# Patient Record
Sex: MALE | Race: BLACK OR AFRICAN AMERICAN | Employment: OTHER | URBAN - METROPOLITAN AREA
[De-identification: names, ages, dates, MRNs, and addresses within clinical notes are randomized per-mention and may not be internally consistent; named-entity substitution may affect disease eponyms.]

---

## 2017-01-12 PROBLEM — L60.0 INGROWN LEFT BIG TOENAIL: Status: ACTIVE | Noted: 2017-01-12

## 2017-07-06 PROBLEM — E11.40 TYPE 2 DIABETES, CONTROLLED, WITH NEUROPATHY (HCC): Status: ACTIVE | Noted: 2017-07-06

## 2017-07-06 PROBLEM — M54.42 ACUTE LEFT-SIDED LOW BACK PAIN WITH LEFT-SIDED SCIATICA: Status: ACTIVE | Noted: 2017-07-06

## 2017-08-28 PROBLEM — M54.2 CERVICALGIA: Status: ACTIVE | Noted: 2017-08-28

## 2017-08-29 ENCOUNTER — HOSPITAL ENCOUNTER (INPATIENT)
Age: 72
LOS: 1 days | Discharge: HOME | DRG: 066 | End: 2017-08-31
Attending: EMERGENCY MEDICINE | Admitting: INTERNAL MEDICINE
Payer: MEDICARE

## 2017-08-29 DIAGNOSIS — R42 DIZZINESS: ICD-10-CM

## 2017-08-29 DIAGNOSIS — I63.9 LEFT-SIDED CEREBROVASCULAR ACCIDENT (CVA) (HCC): Primary | ICD-10-CM

## 2017-08-29 PROBLEM — I63.81 LACUNAR INFARCT, ACUTE (HCC): Status: ACTIVE | Noted: 2017-08-29

## 2017-08-29 LAB
ALBUMIN SERPL-MCNC: 3.4 G/DL (ref 3.2–4.6)
ALBUMIN/GLOB SERPL: 0.8 {RATIO} (ref 1.2–3.5)
ALP SERPL-CCNC: 109 U/L (ref 50–136)
ALT SERPL-CCNC: 36 U/L (ref 12–65)
ANION GAP SERPL CALC-SCNC: 10 MMOL/L (ref 7–16)
APTT PPP: 28.9 SEC (ref 23.5–31.7)
AST SERPL-CCNC: 27 U/L (ref 15–37)
BASOPHILS # BLD: 0 K/UL (ref 0–0.2)
BASOPHILS NFR BLD: 0 % (ref 0–2)
BILIRUB SERPL-MCNC: 0.3 MG/DL (ref 0.2–1.1)
BUN SERPL-MCNC: 14 MG/DL (ref 8–23)
CALCIUM SERPL-MCNC: 9.3 MG/DL (ref 8.3–10.4)
CHLORIDE SERPL-SCNC: 105 MMOL/L (ref 98–107)
CO2 SERPL-SCNC: 28 MMOL/L (ref 21–32)
CREAT SERPL-MCNC: 1.38 MG/DL (ref 0.8–1.5)
DIFFERENTIAL METHOD BLD: ABNORMAL
EOSINOPHIL # BLD: 0.1 K/UL (ref 0–0.8)
EOSINOPHIL NFR BLD: 2 % (ref 0.5–7.8)
ERYTHROCYTE [DISTWIDTH] IN BLOOD BY AUTOMATED COUNT: 14 % (ref 11.9–14.6)
GLOBULIN SER CALC-MCNC: 4.4 G/DL (ref 2.3–3.5)
GLUCOSE SERPL-MCNC: 136 MG/DL (ref 65–100)
HCT VFR BLD AUTO: 37.3 % (ref 41.1–50.3)
HGB BLD-MCNC: 12.7 G/DL (ref 13.6–17.2)
IMM GRANULOCYTES # BLD: 0 K/UL (ref 0–0.5)
IMM GRANULOCYTES NFR BLD: 0.1 % (ref 0–5)
INR PPP: 1.1 (ref 0.9–1.2)
LYMPHOCYTES # BLD: 2.5 K/UL (ref 0.5–4.6)
LYMPHOCYTES NFR BLD: 35 % (ref 13–44)
MCH RBC QN AUTO: 30.1 PG (ref 26.1–32.9)
MCHC RBC AUTO-ENTMCNC: 34 G/DL (ref 31.4–35)
MCV RBC AUTO: 88.4 FL (ref 79.6–97.8)
MONOCYTES # BLD: 0.9 K/UL (ref 0.1–1.3)
MONOCYTES NFR BLD: 12 % (ref 4–12)
NEUTS SEG # BLD: 3.8 K/UL (ref 1.7–8.2)
NEUTS SEG NFR BLD: 51 % (ref 43–78)
PLATELET # BLD AUTO: 212 K/UL (ref 150–450)
PMV BLD AUTO: 12.5 FL (ref 10.8–14.1)
POTASSIUM SERPL-SCNC: 3.7 MMOL/L (ref 3.5–5.1)
PROT SERPL-MCNC: 7.8 G/DL (ref 6.3–8.2)
PROTHROMBIN TIME: 11.4 SEC (ref 9.6–12)
RBC # BLD AUTO: 4.22 M/UL (ref 4.23–5.67)
SODIUM SERPL-SCNC: 143 MMOL/L (ref 136–145)
WBC # BLD AUTO: 7.3 K/UL (ref 4.3–11.1)

## 2017-08-29 PROCEDURE — 85730 THROMBOPLASTIN TIME PARTIAL: CPT | Performed by: EMERGENCY MEDICINE

## 2017-08-29 PROCEDURE — 74011250637 HC RX REV CODE- 250/637: Performed by: EMERGENCY MEDICINE

## 2017-08-29 PROCEDURE — 80053 COMPREHEN METABOLIC PANEL: CPT | Performed by: EMERGENCY MEDICINE

## 2017-08-29 PROCEDURE — 85025 COMPLETE CBC W/AUTO DIFF WBC: CPT | Performed by: EMERGENCY MEDICINE

## 2017-08-29 PROCEDURE — 99285 EMERGENCY DEPT VISIT HI MDM: CPT | Performed by: EMERGENCY MEDICINE

## 2017-08-29 PROCEDURE — 83036 HEMOGLOBIN GLYCOSYLATED A1C: CPT | Performed by: INTERNAL MEDICINE

## 2017-08-29 PROCEDURE — 93005 ELECTROCARDIOGRAM TRACING: CPT | Performed by: EMERGENCY MEDICINE

## 2017-08-29 PROCEDURE — 85610 PROTHROMBIN TIME: CPT | Performed by: EMERGENCY MEDICINE

## 2017-08-29 RX ORDER — INSULIN LISPRO 100 [IU]/ML
INJECTION, SOLUTION INTRAVENOUS; SUBCUTANEOUS
Status: DISCONTINUED | OUTPATIENT
Start: 2017-08-30 | End: 2017-08-31 | Stop reason: HOSPADM

## 2017-08-29 RX ORDER — SODIUM CHLORIDE 0.9 % (FLUSH) 0.9 %
5-10 SYRINGE (ML) INJECTION EVERY 8 HOURS
Status: DISCONTINUED | OUTPATIENT
Start: 2017-08-29 | End: 2017-08-31 | Stop reason: HOSPADM

## 2017-08-29 RX ORDER — BISACODYL 5 MG
5 TABLET, DELAYED RELEASE (ENTERIC COATED) ORAL DAILY PRN
Status: DISCONTINUED | OUTPATIENT
Start: 2017-08-29 | End: 2017-08-31 | Stop reason: HOSPADM

## 2017-08-29 RX ORDER — ONDANSETRON 2 MG/ML
4 INJECTION INTRAMUSCULAR; INTRAVENOUS
Status: DISCONTINUED | OUTPATIENT
Start: 2017-08-29 | End: 2017-08-31 | Stop reason: HOSPADM

## 2017-08-29 RX ORDER — GUAIFENESIN 100 MG/5ML
81 LIQUID (ML) ORAL DAILY
Status: DISCONTINUED | OUTPATIENT
Start: 2017-08-30 | End: 2017-08-31 | Stop reason: HOSPADM

## 2017-08-29 RX ORDER — GUAIFENESIN 100 MG/5ML
324 LIQUID (ML) ORAL
Status: COMPLETED | OUTPATIENT
Start: 2017-08-29 | End: 2017-08-29

## 2017-08-29 RX ORDER — PRAVASTATIN SODIUM 20 MG/1
40 TABLET ORAL
Status: DISCONTINUED | OUTPATIENT
Start: 2017-08-29 | End: 2017-08-31 | Stop reason: HOSPADM

## 2017-08-29 RX ORDER — COLCHICINE 0.6 MG/1
0.6 TABLET ORAL 2 TIMES DAILY
Status: CANCELLED | OUTPATIENT
Start: 2017-08-29

## 2017-08-29 RX ORDER — ENOXAPARIN SODIUM 100 MG/ML
40 INJECTION SUBCUTANEOUS EVERY 24 HOURS
Status: DISCONTINUED | OUTPATIENT
Start: 2017-08-29 | End: 2017-08-31 | Stop reason: HOSPADM

## 2017-08-29 RX ORDER — SODIUM CHLORIDE 0.9 % (FLUSH) 0.9 %
5-10 SYRINGE (ML) INJECTION AS NEEDED
Status: DISCONTINUED | OUTPATIENT
Start: 2017-08-29 | End: 2017-08-31 | Stop reason: HOSPADM

## 2017-08-29 RX ORDER — ACETAMINOPHEN 325 MG/1
650 TABLET ORAL
Status: DISCONTINUED | OUTPATIENT
Start: 2017-08-29 | End: 2017-08-31 | Stop reason: HOSPADM

## 2017-08-29 RX ADMIN — ASPIRIN 81 MG 324 MG: 81 TABLET ORAL at 21:08

## 2017-08-29 NOTE — IP AVS SNAPSHOT
[address] 
[phone] Patient: Peyton Oneill MRN: [id number] RLL:[id number][birth year]71 You are allergic to the following Allergen Reactions Celecoxib Swelling Hands Ibuprofen Unknown (comments) Patient unsure Lescol (Fluvastatin) Unknown (comments) Other reaction(s): Unknown (comments) Nsaids (Non-Steroidal Anti-Inflammatory Drug) Other (comments) Elevated serum creatinine Sulfa (Sulfonamide Antibiotics) Rash Uloric (Febuxostat) Other (comments) Joint Pain Recent Documentation Height Weight BMI Smoking Status 1.778 m 80.9 kg 25.6 kg/m2 Former Smoker Emergency Contacts Name Discharge Info Relation Home Work Mobile Daniel Bowie  Spouse [3] [phone] About your hospitalization You were admitted on:  August 30, 2017 You last received care in the:  [facility] You were discharged on:  August 31, 2017 Unit phone number:  [phone] Why you were hospitalized Your primary diagnosis was:  Lacunar Infarct, Acute (Hcc) Your diagnoses also included:  Type 2 Diabetes, Controlled, With Neuropathy (Hcc), S/P Cabg (Coronary Artery Bypass Graft), Paf (Paroxysmal Atrial Fibrillation) ([facility]), Htn (Hypertension), Ckd (Chronic Kidney Disease) Providers Seen During Your Hospitalizations Provider Role Specialty Primary office phone Carmelo Salinas MD Attending Provider Emergency Medicine [phone] Gisselle Ambrocio MD Attending Provider Internal Medicine [phone] Your Primary Care Physician (PCP) Primary Care Physician Office Phone Office Fax Gonsalo [facility] [phone] Follow-up Information Follow up With Details Comments Contact Info Peace Viramontes MD On 9/7/2017 10:30 arrival for 10:45 appt [address] [place] [address] [address] 
[phone] Scott Ferrara MD On 9/8/2017 11:30 [address] [place] [address] [address] [facility] [address] 
[phone] Your Appointments Thursday September 07, 2017 10:45 AM EDT SHORT with Sarah Fischer MD  
[facility] ([address]) [address] [place] [address]  
[phone] Friday September 08, 2017 11:30 AM EDT TRANSITIONAL CARE MANAGEMENT with Scott Ferrara MD  
[place] OFFICE ([address] [place] [address]) [address]  
[address] [place] [address]  
[phone] Current Discharge Medication List  
  
START taking these medications Dose & Instructions Dispensing Information Comments Morning Noon Evening Bedtime  
 apixaban 5 mg tablet Commonly known as:  Donnis [place] Your next dose is:  Tomorrow Dose:  5 mg Take 1 Tab by mouth two (2) times a day. Quantity:  60 Tab Refills:  0  
     
   
   
   
  
 gabapentin 100 mg capsule Commonly known as:  NEURONTIN Your next dose is:  Tomorrow Dose:  100 mg Take 1 Cap by mouth two (2) times a day. Quantity:  60 Cap Refills:  0  
     
   
   
   
  
 pravastatin 40 mg tablet Commonly known as:  PRAVACHOL Your next dose is: Today Dose:  40 mg Take 1 Tab by mouth nightly. Quantity:  30 Tab Refills:  0 CONTINUE these medications which have CHANGED Dose & Instructions Dispensing Information Comments Morning Noon Evening Bedtime  
 metoprolol tartrate 50 mg tablet Commonly known as:  LOPRESSOR What changed:   
- medication strength 
- how much to take Dose:  50 mg Take 1 Tab by mouth two (2) times a day. Quantity:  60 Tab Refills:  0  
     
   
   
   
10pm  
  
  
CONTINUE these medications which have NOT CHANGED Dose & Instructions Dispensing Information Comments Morning Noon Evening Bedtime  
 dilTIAZem 360 mg ER capsule Commonly known as:  [facility] Your next dose is:  Tomorrow Dose:  360 mg Take 1 Cap by mouth daily. Indications: am  
 Quantity:  90 Cap Refills:  1 ELIDEL 1 % topical cream  
Generic drug:  pimecrolimus Apply  to affected area two (2) times a day. Refills:  0  
     
   
   
   
  
 esomeprazole 40 mg capsule Commonly known as:  NexIUM Your next dose is:  Tomorrow Dose:  40 mg Take 1 Cap by mouth daily. Indications: am  
 Quantity:  90 Cap Refills:  1 FLONASE 50 mcg/actuation nasal spray Generic drug:  fluticasone  
   
 as needed. Refills:  0  
     
   
   
   
  
 glucose blood VI test strips strip Commonly known as:  blood glucose test  
   
 Test once to twice daily DX: E11.8 Quantity:  300 Strip Refills:  3 Lancets Misc Test once to twice daily DX: E11.8 Quantity:  300 Each Refills:  3  
     
   
   
   
  
 mometasone 0.1 % topical cream  
Commonly known as:  Louvenia Fraise Refills:  0  
     
   
   
   
  
 nitroglycerin 0.4 mg SL tablet Commonly known as:  NITROSTAT Dose:  0.4 mg  
1 Tab by SubLINGual route every five (5) minutes as needed for Chest Pain. Quantity:  25 Tab Refills:  11  
     
   
   
   
  
 peg 400-propylene glycol 0.4-0.3 % Drop Commonly known as:  SYSTANE Administer  to left eye as needed. Refills:  0  
     
   
   
   
  
 RANEXA 500 mg SR tablet Generic drug:  ranolazine ER Your next dose is: Today TAKE 1 TABLET TWICE A DAY Quantity:  180 Tab Refills:  3  
     
   
   
   
  
  
 tacrolimus 0.1 % ointment Commonly known as:  PROTOPIC Refills:  0  
     
   
   
   
  
 traMADol 50 mg tablet Commonly known as:  ULTRAM  
   
 Dose:  50 mg  
 Take 1 Tab by mouth every six (6) hours as needed for Pain. Max Daily Amount: 200 mg. Quantity:  60 Tab Refills:  1  
     
   
   
   
  
 triamcinolone acetonide 0.1 % ointment Commonly known as:  KENALOG Refills:  0  
     
   
   
   
  
 VITAMIN D3 2,000 unit Tab Generic drug:  cholecalciferol (vitamin D3) Your next dose is:  Tomorrow Dose:  2000 Units Take 2,000 Units by mouth daily. Indications: OSTEOPOROSIS, am  
 Refills:  0 ZyrTEC 10 mg Cap Generic drug:  Cetirizine Dose:  10 mg Take 10 mg by mouth nightly. Indications: ALLERGIC RHINITIS Refills:  0 STOP taking these medications   
 acetaminophen 325 mg tablet Commonly known as:  TYLENOL  
   
  
 aspirin 81 mg tablet  
   
  
 losartan 100 mg tablet Commonly known as:  COZAAR Where to Get Your Medications Information on where to get these meds will be given to you by the nurse or doctor. ! Ask your nurse or doctor about these medications  
  apixaban 5 mg tablet  
 gabapentin 100 mg capsule  
 metoprolol tartrate 50 mg tablet  
 pravastatin 40 mg tablet Discharge Instructions DISCHARGE SUMMARY from Nurse The following personal items are in your possession at time of discharge: 
 
Dental Appliances: Uppers, Lowers, With patient Home Medications: None Jewelry: Ring, With patient Clothing: At bedside Other Valuables: Angie Galvni, With patient PATIENT INSTRUCTIONS: 
 
After general anesthesia or intravenous sedation, for 24 hours or while taking prescription Narcotics: · Limit your activities · Do not drive and operate hazardous machinery · Do not make important personal or business decisions · Do  not drink alcoholic beverages · If you have not urinated within 8 hours after discharge, please contact your surgeon on call. Report the following to your surgeon: · Excessive pain, swelling, redness or odor of or around the surgical area · Temperature over 100.5 · Nausea and vomiting lasting longer than 4 hours or if unable to take medications · Any signs of decreased circulation or nerve impairment to extremity: change in color, persistent  numbness, tingling, coldness or increase pain · Any questions What to do at Home: 
Recommended activity: Activity as tolerated, per MD 
 
If you experience any of the following symptoms fever>101, pain unrelieved with medication, nausea/vomiting, shortness of breath, dizziness/fainting, chest pain. , please follow up with your doctor. *  Please give a list of your current medications to your Primary Care Provider. *  Please update this list whenever your medications are discontinued, doses are 
    changed, or new medications (including over-the-counter products) are added. *  Please carry medication information at all times in case of emergency situations. These are general instructions for a healthy lifestyle: No smoking/ No tobacco products/ Avoid exposure to second hand smoke Surgeon General's Warning:  Quitting smoking now greatly reduces serious risk to your health. Obesity, smoking, and sedentary lifestyle greatly increases your risk for illness A healthy diet, regular physical exercise & weight monitoring are important for maintaining a healthy lifestyle You may be retaining fluid if you have a history of heart failure or if you experience any of the following symptoms:  Weight gain of 3 pounds or more overnight or 5 pounds in a week, increased swelling in our hands or feet or shortness of breath while lying flat in bed. Please call your doctor as soon as you notice any of these symptoms; do not wait until your next office visit. Recognize signs and symptoms of STROKE: 
 
F-face looks uneven A-arms unable to move or move unevenly S-speech slurred or non-existent T-time-call 911 as soon as signs and symptoms begin-DO NOT go Back to bed or wait to see if you get better-TIME IS BRAIN. Warning Signs of HEART ATTACK Call 911 if you have these symptoms: 
? Chest discomfort. Most heart attacks involve discomfort in the center of the chest that lasts more than a few minutes, or that goes away and comes back. It can feel like uncomfortable pressure, squeezing, fullness, or pain. ? Discomfort in other areas of the upper body. Symptoms can include pain or discomfort in one or both arms, the back, neck, jaw, or stomach. ? Shortness of breath with or without chest discomfort. ? Other signs may include breaking out in a cold sweat, nausea, or lightheadedness. Don't wait more than five minutes to call [address]! Fast action can save your life. Calling 911 is almost always the fastest way to get lifesaving treatment. Emergency Medical Services staff can begin treatment when they arrive  up to an hour sooner than if someone gets to the hospital by car. The discharge information has been reviewed with the patient. The patient verbalized understanding. Discharge medications reviewed with the patient and appropriate educational materials and side effects teaching were provided. Stroke: After Your Visit Your Care Instructions You have had a stroke. Risk factors for stroke include being overweight, smoking, and sedentary lifestyle. This means that the blood flow to a part of your brain was blocked for some time, which damages the nerve cells in that part of the brain. The part of your body controlled by that part of your brain may not function properly now. The brain is an amazing organ that can heal itself to some degree. The stroke you had damaged part of your brain, but other parts of your brain may take over in some way for the damaged areas. You have already started this process. Going home may be hard for you and your family. The more you can try to do for yourself, the better. Remember to take each day one at a time. Follow-up care is a key part of your treatment and safety. Be sure to make and go to all appointments, and call your doctor if you are having problems. Its also a good idea to know your test results and keep a list of the medicines you take. How can you care for yourself at home? Enter a stroke rehabilitation (rehab) program, if your doctor recommends it. Physical, speech, and occupational therapies can help you manage bathing, dressing, eating, and other basics of daily living. Eat a heart-healthy diet that is low in cholesterol, saturated fat, and salt. Eat lots of fresh fruits and vegetables and foods high in fiber. Increase your activities slowly. Take short rest breaks when you get tired. Gradually increase the amount you walk. Start out by walking a little more than you did the day before. Do not drive until your doctor says it is okay. It is normal to feel sad or depressed after a stroke. If the blues last, talk to your doctor. If you are having problems with urine leakage, go to the bathroom at regular times, including when you first wake up and at bedtime. Also, limit fluids after dinner. If you are constipated, drink plenty of fluids, enough so that your urine is light yellow or clear like water. If you have kidney, heart, or liver disease and have to limit fluids, talk with your doctor before you increase the amount of fluids you drink. Set up a regular time for using the toilet. If you continue to have constipation, your doctor may suggest using a bulking agent, such as Metamucil, or a stool softener, laxative, or enema. Medicines Take your medicines exactly as prescribed. Call your doctor if you think you are having a problem with your medicine. You may be taking several medicines.  ACE (angiotensin-converting enzyme) inhibitors, angiotensin II receptor blockers (ARBs), beta-blockers, diuretics (water pills), and calcium channel blockers control your blood pressure. Statins help lower cholesterol. Your doctor may also prescribe medicines for depression, pain, sleep problems, anxiety, or agitation. If your doctor has given you medicine that prevents blood clots, such as warfarin (Coumadin), aspirin combined with extended-release dipyridamole (Aggrenox), clopidogrel (Plavix), or aspirin to prevent another stroke, you should: 
Tell your dentist, pharmacist, and other health professionals that you take these medicines. Watch for unusual bruising or bleeding, such as blood in your urine, red or black stools, or bleeding from your nose or gums. Get regular blood tests to check your clotting time if you are taking Coumadin. Wear medical alert jewelry that says you take blood thinners. You can buy this at most [employer]. Do not take any over-the-counter medicines or herbal products without talking to your doctor first. 
If you take birth control pills or hormone replacement therapy, talk to your doctor about whether they are right for you. For family members and caregivers Make the home safe. Set up a room so that your loved one does not have to climb stairs. Be sure the bathroom is on the same floor. Move throw rugs and furniture that could cause falls, and make sure that the lighting is good. Put grab bars and seats in tubs and showers. Find out what your loved one can do and what he or she needs help with. Try not to do things for your loved one that your loved one can do on his or her own. Help him or her learn and practice new skills. Visit and talk with your loved one often. Try doing activities together that you both enjoy, such as playing cards or board games. Keep in touch with your loved one's friends as much as you can, and encourage them to visit. Take care of yourself. Do not try to do everything yourself.  Ask other family members to help. Eat well, get enough rest, and take time to do things that you enjoy. Keep up with your own doctor visits, and make sure to take your medicines regularly. Get out of the house as much as you can. Join a local support group. Find out if you qualify for home health care visits to help with rehab or for adult day care. When should you call for help? Call 911 anytime you think you may need emergency care. For example, call if: 
You have signs of another stroke. These may include: 
Sudden numbness, paralysis, or weakness in your face, arm, or leg, especially on only one side of your body. New problems with walking or balance. Sudden vision changes. Drooling or slurred speech. New problems speaking or understanding simple statements, or you feel confused. A sudden, severe headache that is different from past headaches. Call 911 even if these symptoms go away in a few minutes. You cough up blood. You vomit blood or what looks like coffee grounds. You pass maroon or very bloody stools. Call your doctor now or seek immediate medical care if: 
You have new bruises or blood spots under your skin. You have a nosebleed. Your gums bleed when you brush your teeth. You have blood in your urine. Your stools are black and tarlike or have streaks of blood. You have vaginal bleeding when you are not having your period, or heavy period bleeding. You have new symptoms that may be related to your stroke, such as falls or trouble swallowing. Watch closely for changes in your health, and be sure to contact your doctor if you have any problems. Where can you learn more? Go to Hell[employer].be Enter Z396  in the search box to learn more about \"Stroke: After Your Visit\". © 4577-3874 Healthwise, Incorporated.  Care instructions adapted under license by [employer] (which disclaims liability or warranty for this information). This care instruction is for use with your licensed healthcare professional. If you have questions about a medical condition or this instruction, always ask your healthcare professional. Shay Littlejohn any warranty or liability for your use of this information. Discharge Orders None ACO Transitions of Care Introducing [employer] [address] Clot[address] Noriega offers a voluntary care coordination program to provide high quality service and care to [facility] fee-for-service beneficiaries. Jorge Solis was designed to help you enhance your health and well-being through the following services: ? Transitions of Care  support for individuals who are transitioning from one care setting to another (example: Hospital to home). ? Chronic and Complex Care Coordination  support for individuals and caregivers of those with serious or chronic illnesses or with more than one chronic (ongoing) condition and those who take a number of different medications. If you meet specific medical criteria, a [facility] [address] may call you directly to coordinate your care with your primary care physician and your other care providers. For questions about the [facility] programs, please, contact your physicians office. For general questions or additional information about Accountable Care Organizations: 
Please visit www.medicare.gov/acos. html or call 1-800-MEDICARE (4-777.364.9703) TTY users should call 2-922.621.4870. [facility] Announcement We are excited to announce that we are making your provider's discharge notes available to you in Campus [facility]. You will see these notes when they are completed and signed by the physician that discharged you from your recent hospital stay.   If you have any questions or concerns about any information you see in [facility], please call the [employer] Department where you were seen or reach out to your Primary Care Provider for more information about your plan of care. Introducing [facility]! Dear Mary Small: Thank you for requesting a [employer] account. Our records indicate that you already have an active [employer] account. You can access your account anytime at [email or web address] Did you know that you can access your hospital and ER discharge instructions at any time in [employer]? You can also review all of your test results from your hospital stay or ER visit. Additional Information If you have questions, please visit the Frequently Asked Questions section of the [employer] website at [email or web address] Remember, [employer] is NOT to be used for urgent needs. For medical emergencies, dial 911. Now available from your iPhone and Android! General Information Please provide this summary of care documentation to your next provider. Patient Signature:  ____________________________________________________________ Date:  ____________________________________________________________  
  
[facility] Provider Signature:  ____________________________________________________________ Date:  ____________________________________________________________

## 2017-08-30 ENCOUNTER — APPOINTMENT (OUTPATIENT)
Dept: ULTRASOUND IMAGING | Age: 72
DRG: 066 | End: 2017-08-30
Attending: INTERNAL MEDICINE
Payer: MEDICARE

## 2017-08-30 LAB
ATRIAL RATE: 60 BPM
BACTERIA SPEC CULT: NORMAL
CALCULATED P AXIS, ECG09: 68 DEGREES
CALCULATED R AXIS, ECG10: 41 DEGREES
CALCULATED T AXIS, ECG11: 53 DEGREES
CHOLEST SERPL-MCNC: 183 MG/DL
CK MB CFR SERPL CALC: 0.9 %
CK MB CFR SERPL CALC: 0.9 %
CK MB SERPL-MCNC: 0.5 NG/ML (ref 0.5–3.6)
CK MB SERPL-MCNC: 0.5 NG/ML (ref 0.5–3.6)
CK SERPL-CCNC: 58 U/L (ref 21–215)
CK SERPL-CCNC: 58 U/L (ref 21–215)
DIAGNOSIS, 93000: NORMAL
EST. AVERAGE GLUCOSE BLD GHB EST-MCNC: 146 MG/DL
GLUCOSE BLD STRIP.AUTO-MCNC: 109 MG/DL (ref 65–100)
GLUCOSE BLD STRIP.AUTO-MCNC: 124 MG/DL (ref 65–100)
GLUCOSE BLD STRIP.AUTO-MCNC: 65 MG/DL (ref 65–100)
GLUCOSE BLD STRIP.AUTO-MCNC: 67 MG/DL (ref 65–100)
GLUCOSE BLD STRIP.AUTO-MCNC: 78 MG/DL (ref 65–100)
GLUCOSE BLD STRIP.AUTO-MCNC: 81 MG/DL (ref 65–100)
HBA1C MFR BLD: 6.7 % (ref 4.8–6)
HDLC SERPL-MCNC: 39 MG/DL (ref 40–60)
HDLC SERPL: 4.7 {RATIO}
LDLC SERPL CALC-MCNC: 128.8 MG/DL
LIPID PROFILE,FLP: ABNORMAL
P-R INTERVAL, ECG05: 176 MS
Q-T INTERVAL, ECG07: 418 MS
QRS DURATION, ECG06: 88 MS
QTC CALCULATION (BEZET), ECG08: 418 MS
SERVICE CMNT-IMP: NORMAL
TRIGL SERPL-MCNC: 76 MG/DL (ref 35–150)
TROPONIN I SERPL-MCNC: <0.04 NG/ML (ref 0.02–0.05)
TROPONIN I SERPL-MCNC: <0.04 NG/ML (ref 0.02–0.05)
VENTRICULAR RATE, ECG03: 60 BPM
VLDLC SERPL CALC-MCNC: 15.2 MG/DL (ref 6–23)

## 2017-08-30 PROCEDURE — 84484 ASSAY OF TROPONIN QUANT: CPT | Performed by: INTERNAL MEDICINE

## 2017-08-30 PROCEDURE — 87641 MR-STAPH DNA AMP PROBE: CPT | Performed by: INTERNAL MEDICINE

## 2017-08-30 PROCEDURE — 80061 LIPID PANEL: CPT | Performed by: INTERNAL MEDICINE

## 2017-08-30 PROCEDURE — 93880 EXTRACRANIAL BILAT STUDY: CPT

## 2017-08-30 PROCEDURE — 97162 PT EVAL MOD COMPLEX 30 MIN: CPT

## 2017-08-30 PROCEDURE — 82962 GLUCOSE BLOOD TEST: CPT

## 2017-08-30 PROCEDURE — 65610000001 HC ROOM ICU GENERAL

## 2017-08-30 PROCEDURE — 82550 ASSAY OF CK (CPK): CPT | Performed by: INTERNAL MEDICINE

## 2017-08-30 PROCEDURE — 74011250636 HC RX REV CODE- 250/636: Performed by: INTERNAL MEDICINE

## 2017-08-30 PROCEDURE — 97165 OT EVAL LOW COMPLEX 30 MIN: CPT

## 2017-08-30 PROCEDURE — 36415 COLL VENOUS BLD VENIPUNCTURE: CPT | Performed by: INTERNAL MEDICINE

## 2017-08-30 PROCEDURE — 74011250637 HC RX REV CODE- 250/637: Performed by: INTERNAL MEDICINE

## 2017-08-30 PROCEDURE — 93306 TTE W/DOPPLER COMPLETE: CPT

## 2017-08-30 PROCEDURE — 92610 EVALUATE SWALLOWING FUNCTION: CPT

## 2017-08-30 RX ORDER — TRAMADOL HYDROCHLORIDE 50 MG/1
50 TABLET ORAL
Status: DISCONTINUED | OUTPATIENT
Start: 2017-08-30 | End: 2017-08-31 | Stop reason: HOSPADM

## 2017-08-30 RX ORDER — DILTIAZEM HYDROCHLORIDE 180 MG/1
360 CAPSULE, COATED, EXTENDED RELEASE ORAL DAILY
Status: DISCONTINUED | OUTPATIENT
Start: 2017-08-30 | End: 2017-08-31 | Stop reason: HOSPADM

## 2017-08-30 RX ORDER — METOPROLOL TARTRATE 50 MG/1
100 TABLET ORAL 2 TIMES DAILY
Status: DISCONTINUED | OUTPATIENT
Start: 2017-08-30 | End: 2017-08-31

## 2017-08-30 RX ORDER — RANOLAZINE 500 MG/1
500 TABLET, EXTENDED RELEASE ORAL 2 TIMES DAILY
Status: DISCONTINUED | OUTPATIENT
Start: 2017-08-30 | End: 2017-08-31 | Stop reason: HOSPADM

## 2017-08-30 RX ORDER — PANTOPRAZOLE SODIUM 40 MG/1
40 TABLET, DELAYED RELEASE ORAL
Status: DISCONTINUED | OUTPATIENT
Start: 2017-08-30 | End: 2017-08-31 | Stop reason: HOSPADM

## 2017-08-30 RX ADMIN — PRAVASTATIN SODIUM 40 MG: 20 TABLET ORAL at 22:01

## 2017-08-30 RX ADMIN — RANOLAZINE 500 MG: 500 TABLET, FILM COATED, EXTENDED RELEASE ORAL at 08:05

## 2017-08-30 RX ADMIN — PRAVASTATIN SODIUM 40 MG: 20 TABLET ORAL at 04:37

## 2017-08-30 RX ADMIN — RANOLAZINE 500 MG: 500 TABLET, FILM COATED, EXTENDED RELEASE ORAL at 18:05

## 2017-08-30 RX ADMIN — Medication 10 ML: at 22:03

## 2017-08-30 RX ADMIN — ASPIRIN 81 MG 81 MG: 81 TABLET ORAL at 08:05

## 2017-08-30 RX ADMIN — ENOXAPARIN SODIUM 40 MG: 40 INJECTION SUBCUTANEOUS at 04:36

## 2017-08-30 RX ADMIN — METOPROLOL TARTRATE 100 MG: 50 TABLET ORAL at 08:05

## 2017-08-30 RX ADMIN — PANTOPRAZOLE SODIUM 40 MG: 40 TABLET, DELAYED RELEASE ORAL at 08:05

## 2017-08-30 RX ADMIN — Medication 10 ML: at 14:00

## 2017-08-30 RX ADMIN — ENOXAPARIN SODIUM 40 MG: 40 INJECTION SUBCUTANEOUS at 22:01

## 2017-08-30 RX ADMIN — DILTIAZEM HYDROCHLORIDE 360 MG: 180 CAPSULE, EXTENDED RELEASE ORAL at 08:05

## 2017-08-30 NOTE — PROGRESS NOTES
Problem: Self Care Deficits Care Plan (Adult)  Goal: *Acute Goals and Plan of Care (Insert Text)  1. Patient will perform grooming with independent. 2. Patient will perform Upper body dressing with independent  3. Patient will perform lower body dressing with stand by assist.  4. Patient will perform upper and lower body bathing with supervision. 5. Patient will perform toilet transfers with modified independent  6. Patient will perform shower transfer with stand by assist  7. Patient will participate in 30 + minutes of ADL/ therapeutic exercise/therapeutic activity with min rest breaks to increase activity tolerance for self care. 8. Patient will perform ADL functional mobility in room with stand by assist    Goals to be achieved in 7 days. OCCUPATIONAL THERAPY: Initial Assessment 8/30/2017  EMERGENCY: Hospital Day: 2  [place] / Product Type: Medicare /      NAME/AGE/GENDER: Rafia Chávez is a 80 y.o. male         PRIMARY DIAGNOSIS:  Lacunar infarct, acute ([address] Lacunar infarct, acute ([address] Lacunar infarct, acute ([address]        ICD-10: Treatment Diagnosis:        · Generalized Muscle Weakness (M62.81)  · Other lack of cordination (R27.8)   Precautions/Allergies:         Celecoxib; Ibuprofen; Lescol [fluvastatin]; Nsaids (non-steroidal anti-inflammatory drug); Sulfa (sulfonamide antibiotics); and Uloric [febuxostat]       ASSESSMENT:      Mr. Rosmery Sims admitted with above diagnosis; pt presents with some slight confusion, decreased self care , functional mobility and generalized weakness. Pt would benefit from skilled OT to increase independence. This section established at most recent assessment   PROBLEM LIST (Impairments causing functional limitations):  1. Decreased Strength  2. Decreased ADL/Functional Activities  3. Decreased Transfer Abilities  4. Decreased Ambulation Ability/Technique  5. Decreased Balance  6.  Decreased Activity Tolerance INTERVENTIONS PLANNED: (Benefits and precautions of occupational therapy have been discussed with the patient.)  1. Activities of daily living training  2. Adaptive equipment training  3. Balance training  4. Clothing management  5. Therapeutic activity  6. Therapeutic exercise      TREATMENT PLAN: Frequency/Duration: Follow patient 2x/week to address above goals. Rehabilitation Potential For Stated Goals: GOOD      RECOMMENDED REHABILITATION/EQUIPMENT: (at time of discharge pending progress): Continue Skilled Therapy. OCCUPATIONAL PROFILE AND HISTORY:   History of Present Injury/Illness (Reason for Referral):  Lacunar infarct  Past Medical History/Comorbidities:   Mr. Nathan Jay  has a past medical history of Allergic rhinitis; Arthritis; CAD (coronary artery disease); Diabetes mellitus type 2, controlled ([facility] Dyslipidemia; ED (erectile dysfunction); Encephalitis (1962); GERD (gastroesophageal reflux disease); Gout; Hypertension; Impotence of organic origin; Lumbago; Mitral valve regurgitation (7/14/12); ROBERTO (obstructive sleep apnea); Prostate cancer ([facility] Psoriasis; SBO (small bowel obstruction) ([facility] (2011, 2015, 2016); and Unspecified sleep apnea. Mr. Nathan Jay  has a past surgical history that includes appendectomy; abdomen surgery proc unlisted (1967); other surgical (1951); coronary artery bypass graft (2009); left heart cath,percutaneous (7/2012); radical prostatectomy (2007); prostate biopsy, needle, saturation sampling; colonoscopy (1998, 2010); hernia repair (Bilateral, 2012); and cataract removal (Bilateral, 2013).   Social History/Living Environment:    lives with wife  Prior Level of Function/Work/Activity:  Independent    Number of Personal Factors/Comorbidities that affect the Plan of Care: Expanded review of therapy/medical records (1-2):  MODERATE COMPLEXITY   ASSESSMENT OF OCCUPATIONAL PERFORMANCE[de-identified]   Activities of Daily Living:          Basic ADLs (From Assessment) Complex ADLs (From Assessment)   Basic ADL  Feeding: Setup  Oral Facial Hygiene/Grooming: Setup  Bathing: Contact guard assistance  Upper Body Dressing: Setup  Lower Body Dressing: Contact guard assistance  Toileting: Setup     Grooming/Bathing/Dressing Activities of Daily Living     Cognitive Retraining  Safety/Judgement: Awareness of environment; Fall prevention                 Functional Transfers  Toilet Transfer : Supervision  Shower Transfer: Supervision     Bed/Mat Mobility  Supine to Sit: Stand-by asssistance  Sit to Supine: Stand-by asssistance  Sit to Stand: Stand-by asssistance  Bed to Chair: Stand-by asssistance          Most Recent Physical Functioning:   Gross Assessment:  AROM: Within functional limits (BUE)  Strength: Generally decreased, functional (BUE)  Coordination: Generally decreased, functional (BUE)  Tone: Normal  Sensation: Intact               Posture:     Balance:  Sitting: Intact  Standing: With support Bed Mobility:  Supine to Sit: Stand-by asssistance  Sit to Supine: Stand-by asssistance  Wheelchair Mobility:     Transfers:  Sit to Stand: Stand-by asssistance  Stand to Sit: Stand-by asssistance  Bed to Chair: Stand-by asssistance                 Patient Vitals for the past 6 hrs:       BP SpO2 Pulse   08/30/17 0530 161/82 100 % (!) 54   08/30/17 0600 177/90 100 % 60   08/30/17 0630 (!) 204/97 100 % 68   08/30/17 0659 - 100 % 65   08/30/17 0713 (!) 180/96 100 % 66   08/30/17 0720 168/86 - 60   08/30/17 0725 - 100 % 65   08/30/17 0740 172/89 - 61   08/30/17 0741 - 100 % (!) 58   08/30/17 0800 186/83 100 % 60   08/30/17 0820 174/84 - 64   08/30/17 0839 - 97 % 70   08/30/17 0900 112/57 - (!) 51   08/30/17 0901 - 100 % (!) 54   08/30/17 0920 132/61 100 % (!) 53   08/30/17 0926 - 100 % (!) 52   08/30/17 0940 139/76 - (!) 57   08/30/17 1000 120/65 - (!) 50   08/30/17 1001 120/65 100 % -        Mental Status  Neurologic State: Alert  Orientation Level: Oriented to person, Oriented to place, Oriented to time  Cognition: Follows commands  Perception: Appears intact  Perseveration: No perseveration noted  Safety/Judgement: Awareness of environment, Fall prevention                               Physical Skills Involved:  1. Balance  2. Strength  3. Activity Tolerance Cognitive Skills Affected (resulting in the inability to perform in a timely and safe manner): 1. none Psychosocial Skills Affected:  1. none   Number of elements that affect the Plan of Care: 1-3:  LOW COMPLEXITY   CLINICAL DECISION MAKIN[address] [place] [address] AM-PAC 6 Clicks   Daily Activity Inpatient Short Form  How much help from another person does the patient currently need. .. Total A Lot A Little None   1. Putting on and taking off regular lower body clothing?   [ ] 1   [ ] 2   [X] 3   [ ] 4   2. Bathing (including washing, rinsing, drying)? [ ] 1   [ ] 2   [X] 3   [ ] 4   3. Toileting, which includes using toilet, bedpan or urinal?   [ ] 1   [ ] 2   [X] 3   [ ] 4   4. Putting on and taking off regular upper body clothing?   [ ] 1   [ ] 2   [X] 3   [ ] 4   5. Taking care of personal grooming such as brushing teeth? [ ] 1   [ ] 2   [X] 3   [ ] 4   6. Eating meals? [ ] 1   [ ] 2   [X] 3   [ ] 4   © , Trustees of [address] [place] [address], under license to [place]. All rights reserved    Score:  Initial: 18 Most Recent: X (Date: -- )     Interpretation of Tool:  Represents activities that are increasingly more difficult (i.e. Bed mobility, Transfers, Gait).        Score 24 23 22-20 19-15 14-10 9-7 6       Modifier CH CI CJ CK CL CM CN         · Self Care:               - CURRENT STATUS:    CK - 40%-59% impaired, limited or restricted               - GOAL STATUS:           CJ - 20%-39% impaired, limited or restricted               - D/C STATUS:                       ---------------To be determined---------------  [place] / Product Type: Medicare /       Medical Necessity: · Patient is expected to demonstrate progress in strength, balance, coordination and functional technique to increase independence with self care and functional mobility. Reason for Services/Other Comments:  · Patient continues to require skilled intervention due to decrease self care and mobility. Use of outcome tool(s) and clinical judgement create a POC that gives a: LOW COMPLEXITY             TREATMENT:   (In addition to Assessment/Re-Assessment sessions the following treatments were rendered)      Pre-treatment Symptoms/Complaints:  No complaints  Pain: Initial:   Pain Intensity 1: 0  Post Session:  0      Assessment/Reassessment only, no treatment provided today     Braces/Orthotics/Lines/Etc:   · ER monitors  · O2 Device: Room air  Treatment/Session Assessment:    · Response to Treatment:  Tolerated well  · Interdisciplinary Collaboration:  · Physical Therapist  · Registered Nurse  · After treatment position/precautions:  · Supine in bed  · Bed/Chair-wheels locked  · Bed in low position  · Call light within reach  · RN notified  · Side rails x 2  · Compliance with Program/Exercises: Will assess as treatment progresses. · Recommendations/Intent for next treatment session: \"Next visit will focus on advancements to more challenging activities and reduction in assistance provided\".   Total Treatment Duration:  OT Patient Time In/Time Out  Time In: 1040  Time Out: 1100 [facility] Jasmin Hugo

## 2017-08-30 NOTE — PROGRESS NOTES
Neuro consult placed to [facility]. Required patient records faxed. Per [facility] nurse, consult will take place tomorrow morning between 8 and 11 am tomorrow morning.

## 2017-08-30 NOTE — ED NOTES
Report from Charis Cook, [address] [facility]. Pt is with PT at this time ambulating around unit.

## 2017-08-30 NOTE — PROGRESS NOTES
Report received. Chart reviewed. Bedside nerological examination completed with AM nurse. Patient alert. No complaints. Vital signs acceptable.

## 2017-08-30 NOTE — H&P
HOSPITALIST H&P/CONSULT  NAME:  Thomas Alvarez   Age:  80 y.o.  :   1945   MRN:   [id number]  PCP: Neva Arciniega MD  Consulting MD:  Treatment Team: Attending Provider: Keyla Colon MD; Primary Nurse: Amaya Jc RN  HPI:   68yo M with PMH as listed below was sent to ER by PCP for abnormal MRI brain that showed Left sided Lacunar Infarct. Pt started having some Left sided headache and Neck pain 3 days ago. PCP ordered MRI that showed Acute Stroke. MRI report not available in Epic yet. He denies any Visual changes, numbness or weakness of face, facial droop, gait dirturbance, speech abnormality, Numbness or weakness of limbs. No LOC reported but he does get some mild CP off and on. In ER his neuro exam was unremarkable. IN ER he was given ASA 325mg and all blood work and EKG were unremarkable. He does have history of Paroxysmal A Fib (old ekg reviewed 2016). Hospitalist asked to admit. Pt feels fine, neck pain has resolved. Denies recent illness, fever, chills, Bowel or bladder symptoms. 10 point ROS done and is negative except as noted in HPI.   Past Medical History:   Diagnosis Date    Allergic rhinitis     Arthritis     CAD (coronary artery disease)     CABG '09; troponin elevated 12 (\"likely due to suply/demand mismatch in setting of fever and increased metabolic deman\"-per cardiac MD note 12)-had cath, echo, EKG-all WNL except cath showed 2 of the bypasses with blockages- \"occluded SVG to PDA & SVG to LISA\"; EF=55%    Diabetes mellitus type 2, controlled ([facility]     Dyslipidemia     ED (erectile dysfunction)     Encephalitis 1962    x 2/hospitalized as teenager    GERD (gastroesophageal reflux disease)     controlled with Nexium    Gout     gout-hx of - off Allopurinol x 1 month, no exacerbations    Hypertension     Impotence of organic origin     Lumbago     Mitral valve regurgitation 12    \"moderate\" on echo    ROBERTO (obstructive sleep apnea)     Prostate cancer ([facility]     prostate    Psoriasis     SBO (small bowel obstruction) ([facility] 2011, 2015, 2016    Unspecified sleep apnea     mild per patient, does not use CPAP      Past Surgical History:   Procedure Laterality Date    ABDOMEN SURGERY PROC UNLISTED  1967    exp lap    HX APPENDECTOMY      over 20 yrs ago    HX CATARACT REMOVAL Bilateral 2013    HX COLONOSCOPY  1998, 2010    HX CORONARY ARTERY BYPASS GRAFT  2009    x4 bypass    HX HERNIA REPAIR Bilateral 2012    HX OTHER SURGICAL  1951    splenectomy    HX RADICAL PROSTATECTOMY  2007    MO LEFT HEART CATH,PERCUTANEOUS  7/2012    no intervention    MO PROSTATE BIOPSY, NEEDLE, SATURATION SAMPLING      and ultrasound      Prior to Admission Medications   Prescriptions Last Dose Informant Patient Reported? Taking? Cetirizine (ZYRTEC) 10 mg Cap   Yes No   Sig: Take 10 mg by mouth nightly. Indications: ALLERGIC RHINITIS   Lancets misc   No No   Sig: Test once to twice daily DX: E11.8   RANEXA 500 mg SR tablet   No No   Sig: TAKE 1 TABLET TWICE A DAY   acetaminophen (TYLENOL) 325 mg tablet   Yes No   Sig: Take  by mouth every four (4) hours as needed for Pain. aspirin 81 mg tablet   Yes No   Sig: Take 81 mg by mouth daily. Last dose 8/18/12   Indications: pm   cholecalciferol, vitamin D3, (VITAMIN D3) 2,000 unit Tab   Yes No   Sig: Take 2,000 Units by mouth daily. Indications: OSTEOPOROSIS, am   dilTIAZem (TIAZAC) 360 mg ER capsule   No No   Sig: Take 1 Cap by mouth daily. Indications: am   esomeprazole (NEXIUM) 40 mg capsule   No No   Sig: Take 1 Cap by mouth daily. Indications: am   fluticasone (FLONASE) 50 mcg/actuation nasal spray   Yes No   Sig: as needed. glucose blood VI test strips (BLOOD GLUCOSE TEST) strip   No No   Sig: Test once to twice daily DX: E11.8   losartan (COZAAR) 100 mg tablet   No No   Sig: Take 1 Tab by mouth daily.    metoprolol tartrate (LOPRESSOR) 100 mg IR tablet   No No   Sig: Take 1 Tab by mouth two (2) times a day. mometasone (ELOCON) 0.1 % topical cream   Yes No   nitroglycerin (NITROSTAT) 0.4 mg SL tablet   No No   Si Tab by SubLINGual route every five (5) minutes as needed for Chest Pain.   peg 400-propylene glycol (SYSTANE) 0.4-0.3 % drop   Yes No   Sig: Administer  to left eye as needed. pimecrolimus (ELIDEL) 1 % topical cream   Yes No   Sig: Apply  to affected area two (2) times a day. tacrolimus (PROTOPIC) 0.1 % ointment   Yes No   traMADol (ULTRAM) 50 mg tablet   No No   Sig: Take 1 Tab by mouth every six (6) hours as needed for Pain. Max Daily Amount: 200 mg.   triamcinolone acetonide (KENALOG) 0.1 % ointment   Yes No      Facility-Administered Medications: None     Home meds reconciled.   Allergies   Allergen Reactions    Celecoxib Swelling     Hands    Ibuprofen Unknown (comments)     Patient unsure    Lescol [Fluvastatin] Unknown (comments)     Other reaction(s): Unknown (comments)    Nsaids (Non-Steroidal Anti-Inflammatory Drug) Other (comments)     Elevated serum creatinine    Sulfa (Sulfonamide Antibiotics) Rash    Uloric [Febuxostat] Other (comments)     Joint Pain      Social History   Substance Use Topics    Smoking status: Former Smoker     Packs/day: 1.00     Years: 15.00     Quit date: 1975    Smokeless tobacco: Former User    Alcohol use Yes      Comment: rarely      Family History   Problem Relation Age of Onset    Hypertension Mother     Gout Mother     Arthritis-osteo Mother     Heart Disease Mother       of Heart Disease    Coronary Artery Disease Mother     Diabetes Father     Cancer Father     Heart Disease Father     Bleeding Prob Paternal Grandmother     Hypertension Brother     Cancer Maternal Uncle      Prostate    Malignant Hyperthermia Neg Hx     Pseudocholinesterase Deficiency Neg Hx     Delayed Awakening Neg Hx     Post-op Nausea/Vomiting Neg Hx     Emergence Delirium Neg Hx     Post-op Cognitive Dysfunction Neg Hx     Other Neg Hx       Immunization History   Administered Date(s) Administered    Influenza High Dose Vaccine PF 10/09/2015    Influenza Vaccine 2014    Pneumococcal Conjugate (PCV-13) 10/09/2015    Pneumococcal Polysaccharide (PPSV-23) 2017    Pneumococcal Vaccine (Unspecified Type) 2010    TD Vaccine 2010    Tdap 2014    ZZZ-RETIRED (DO NOT USE) Pneumococcal Vaccine (Unspecified Type) 2010    Zoster Vaccine, Live 2010     Objective:     Visit Vitals    /70    Pulse (!) 59    Temp 97.5 °F (36.4 °C)    Resp 20    Ht 5' 10\" (1.778 m)    Wt 82.6 kg (182 lb)    SpO2 100%    BMI 26.11 kg/m2      Temp (24hrs), Av.5 °F (36.4 °C), Min:97.5 °F (36.4 °C), Max:97.5 °F (36.4 °C)    Oxygen Therapy  O2 Sat (%): 100 % (17)  Pulse via Oximetry: 59 beats per minute (17)  O2 Device: Room air (17)  Physical Exam:  General:    Alert, cooperative, no distress   Head:   NCAT. No obvious deformity  Nose:  Nares normal. No drainage  Lungs:   CTABL. No wheezing/rhonchi/rales  Heart:   RRR. No m/r/g. Abdomen:   S/nt/nd. Bowel sounds normal.   Extremities: No cyanosis. Skin:     No rashes or lesions. Not Jaundiced  Neurologic: Moves all extremities. no gross focal deficits      Data Review:   Recent Results (from the past 24 hour(s))   CBC, POC    Collection Time: 17 10:51 AM   Result Value Ref Range    WBC 6.4 4.0 - 11.0 K/uL    RBC 4.31 (L) 4.40 - 6.20 M/uL    HGB 12.8 (L) 13.5 - 18.0 g/dL    HCT 40.1 (L) 41.0 - 54.0 %    MCV 93 80 - 100 fL    MCH 29.7 27.0 - 34.0 pg    MCHC 31.9 31.0 - 36.0 g/dL    PLATELET 243 254 - 369 K/uL    LYMPHOCYTES 33.4 20.5 - 51.1 %    MONOCYTES 7.1 3.0 - 10.0 %    GRANULOCYTES 59.5 37.0 - 92.0 %    ABS. LYMPHOCYTES 2.1 0.7 - 3.1 K/uL    ABS. MONOCYTES 0.4 0.1 - 1.1 K/uL    ABS.  GRANULOCYTES 3.9 2.0 - 7.8 K/uL    RDW 15.0 11.0 - 16.0 %    MEAN PLATELET VOLUME 9.7 6.9 - 10.4 fL   URINALYSIS W/MICROSCOPIC Collection Time: 08/29/17 10:51 AM   Result Value Ref Range    Color Yellow Straw-Dark Yellow    Appearance Clear Clear-Slightly Cloudy    Glucose Negative Negative mg/dL    Bilirubin Negative Negative mg/dL    Ketone Negative Negative mg/dL    Specific gravity 1.020 1.000 - 1.030    Blood Negative Negative JUDITH/uL    pH (UA) 5.5 5.0 - 8.0    Protein Negative Negative mg/dL    Urobilinogen 0.2 E.U./dL 0.2 - 1.0 E.U./dl    Nitrites Negative Negative    Leukocyte Esterase Negative Negative CARLOTA/uL    WBC 1-3 (A) 0 - 2 /HPF    RBC 0 0 - 1 /HPF    URINE EPITHELIAL CELLS Rare Negatine-Many /HPF    Bacteria Negative Negative /HPF   EKG, 12 LEAD, INITIAL    Collection Time: 08/29/17  8:24 PM   Result Value Ref Range    Ventricular Rate 60 BPM    Atrial Rate 60 BPM    P-R Interval 176 ms    QRS Duration 88 ms    Q-T Interval 418 ms    QTC Calculation (Bezet) 418 ms    Calculated P Axis 68 degrees    Calculated R Axis 41 degrees    Calculated T Axis 53 degrees    Diagnosis       Normal sinus rhythm  Normal ECG  When compared with ECG of 23-FEB-2016 10:22,  Sinus rhythm has replaced Atrial fibrillation  Nonspecific T wave abnormality, improved in Anterolateral leads     PROTHROMBIN TIME + INR    Collection Time: 08/29/17  8:27 PM   Result Value Ref Range    Prothrombin time 11.4 9.6 - 12.0 sec    INR 1.1 0.9 - 1.2     CBC WITH AUTOMATED DIFF    Collection Time: 08/29/17  8:29 PM   Result Value Ref Range    WBC 7.3 4.3 - 11.1 K/uL    RBC 4.22 (L) 4.23 - 5.67 M/uL    HGB 12.7 (L) 13.6 - 17.2 g/dL    HCT 37.3 (L) 41.1 - 50.3 %    MCV 88.4 79.6 - 97.8 FL    MCH 30.1 26.1 - 32.9 PG    MCHC 34.0 31.4 - 35.0 g/dL    RDW 14.0 11.9 - 14.6 %    PLATELET 473 094 - 907 K/uL    MPV 12.5 10.8 - 14.1 FL    DF AUTOMATED      NEUTROPHILS 51 43 - 78 %    LYMPHOCYTES 35 13 - 44 %    MONOCYTES 12 4.0 - 12.0 %    EOSINOPHILS 2 0.5 - 7.8 %    BASOPHILS 0 0.0 - 2.0 %    IMMATURE GRANULOCYTES 0.1 0.0 - 5.0 %    ABS.  NEUTROPHILS 3.8 1.7 - 8.2 K/UL ABS. LYMPHOCYTES 2.5 0.5 - 4.6 K/UL    ABS. MONOCYTES 0.9 0.1 - 1.3 K/UL    ABS. EOSINOPHILS 0.1 0.0 - 0.8 K/UL    ABS. BASOPHILS 0.0 0.0 - 0.2 K/UL    ABS. IMM. GRANS. 0.0 0.0 - 0.5 K/UL   METABOLIC PANEL, COMPREHENSIVE    Collection Time: 08/29/17  8:29 PM   Result Value Ref Range    Sodium 143 136 - 145 mmol/L    Potassium 3.7 3.5 - 5.1 mmol/L    Chloride 105 98 - 107 mmol/L    CO2 28 21 - 32 mmol/L    Anion gap 10 7 - 16 mmol/L    Glucose 136 (H) 65 - 100 mg/dL    BUN 14 8 - 23 MG/DL    Creatinine 1.38 0.8 - 1.5 MG/DL    GFR est AA >60 >60 ml/min/1.73m2    GFR est non-AA 54 (L) >60 ml/min/1.73m2    Calcium 9.3 8.3 - 10.4 MG/DL    Bilirubin, total 0.3 0.2 - 1.1 MG/DL    ALT (SGPT) 36 12 - 65 U/L    AST (SGOT) 27 15 - 37 U/L    Alk. phosphatase 109 50 - 136 U/L    Protein, total 7.8 6.3 - 8.2 g/dL    Albumin 3.4 3.2 - 4.6 g/dL    Globulin 4.4 (H) 2.3 - 3.5 g/dL    A-G Ratio 0.8 (L) 1.2 - 3.5     PTT    Collection Time: 08/29/17  8:29 PM   Result Value Ref Range    aPTT 28.9 23.5 - 31.7 SEC     Imaging /Procedures /Studies:  I personally reviewed all labs, imaging, and other studies this admission:  CXR Results  (Last 48 hours)    None        CT Results  (Last 48 hours)    None          Assessment and Plan: Active Hospital Problems    Diagnosis Date Noted    Lacunar infarct, acute ([facility] 08/29/2017    Type 2 diabetes, controlled, with neuropathy ([facility] 07/06/2017    CKD (chronic kidney disease) 02/24/2016    HTN (hypertension) 07/14/2012    PAF (paroxysmal atrial fibrillation) ([facility] 03/13/2009    S/P CABG (coronary artery bypass graft) 03/10/2009     taken off statins after an episode of idiopathic jaundice, lipids followed by PCP   3/9/09: LIMA to LAD, SVGs to OM1, OM2 and PDA. Keloid former, required dermatologist care  Cath 7/17/12: Occluded SVG to PDA, Occluded SVG to OM1. Patent LIMA feeding a small vessel. Patent SVG to OM2, small target vessel.   Native Cx small in the AV groove, diffusely small vessels. Sep 2015 -  Stress MPI for chest discomfort - 6 and a half minutes, chest discomfort (declined NTG), non diagnostic EKG changes, normal perfusion and LVEF. Hypertensive response to 222/88           PLAN  · Admit to inpt remote Tele bed  · Start on Aspirin/Statin. Get Carotid doppler, 2d TTE, teleneuro consult  · Monitor for A Fib on tele, trend Trop. He will likely need AC given CHADS score 3. Start NOAC once OK with Neuro for concerns of hemorrhagic conversion. · Check A1c, Lipid panel, start iss  · Hold losartan allow for permissive HTN, c/w metoprolol and cardiazem for rate control. · SLP/PT/OT eval    FEN:  Cardiac/diabetic diet  DVT ppx:  lovenox  Code status:  Full  Estimated LOS:  2 days  Risk assessment:  Moderate due to new stroke  Plan of care discussed with: patient and family at bedside.      Signed By: Long Fortune MD     August 29, 2017

## 2017-08-30 NOTE — PROGRESS NOTES
Patient given a Patient Stroke Education book. Patient educated on signs and symptoms of stroke and importance of getting to nearest ED as soon as symptoms occur. Patient educated on risk factors of stroke.

## 2017-08-30 NOTE — PROGRESS NOTES
Problem: Mobility Impaired (Adult and Pediatric)  Goal: *Acute Goals and Plan of Care (Insert Text)  1 WEEK GOALS :  (1.)Mr. Rosalia Herbert will move from supine to sit and sit to supine with MODIFIED INDEPENDENCE within 7 day(s). (2.)Mr. Rosalia Herbert will transfer from bed to chair and chair to bed with SUPERVISION using the least restrictive device within 7 day(s). (3.)Mr. Rosalia Herbert will ambulate with SUPERVISION for 400 feet with the least restrictive device within 7 day(s). 4) pt ambulating up & down 1 step with no rail & SBA.  5) fair += static & dynamic standing balance.    ________________________________________________________________________________________________      PHYSICAL THERAPY: INITIAL ASSESSMENT, TREATMENT DAY: DAY OF ASSESSMENT, AM 8/30/2017  EMERGENCY: Hospital Day: 2  [place] / Product Type: Medicare /      NAME/AGE/GENDER: Gurmeet Gutierrez is a 80 y.o. male         PRIMARY DIAGNOSIS: Lacunar infarct, acute ([facility] Lacunar infarct, acute ([facility] Lacunar infarct, acute ([facility]        ICD-10: Treatment Diagnosis:       · Generalized Muscle Weakness (M62.81)  · Other lack of cordination (R27.8)  · Difficulty in walking, Not elsewhere classified (R26.2)  · Other abnormalities of gait and mobility (R26.89)   Precaution/Allergies:  Celecoxib; Ibuprofen; Lescol [fluvastatin]; Nsaids (non-steroidal anti-inflammatory drug); Sulfa (sulfonamide antibiotics); and Uloric [febuxostat]       ASSESSMENT:      Mr. Rosalia Herbert presents with some confused speech, but oriented x 2.5. Pt followed cues with mild unsteadiness present during gait & transfers. This pt will benefit from follow up therapy to help restore safe function prior to returning home with caregiver      This section established at most recent assessment   PROBLEM LIST (Impairments causing functional limitations):  1. Decreased Strength  2. Decreased ADL/Functional Activities  3. Decreased Transfer Abilities  4.  Decreased Ambulation Ability/Technique  5. Decreased Balance  6. Decreased Activity Tolerance  7. Decreased Flexibility/Joint Mobility  8. Decreased Whitley with Home Exercise Program    INTERVENTIONS PLANNED: (Benefits and precautions of physical therapy have been discussed with the patient.)  1. Balance Exercise  2. Bed Mobility  3. Family Education  4. Gait Training  5. Neuromuscular Re-education/Strengthening  6. Therapeutic Activites  7. Therapeutic Exercise/Strengthening  8. Transfer Training      TREATMENT PLAN: Frequency/Duration: daily for duration of hospital stay  Rehabilitation Potential For Stated Goals: GOOD      RECOMMENDED REHABILITATION/EQUIPMENT: (at time of discharge pending progress): Continue Skilled Therapy and Home Health: Physical Therapy. HISTORY:   History of Present Injury/Illness (Reason for Referral):  70yo M with PMH as listed below was sent to ER by PCP for abnormal MRI brain that showed Left sided Lacunar Infarct. Pt started having some Left sided headache and Neck pain 3 days ago. PCP ordered MRI that showed Acute Stroke. MRI report not available in Epic yet. He denies any Visual changes, numbness or weakness of face, facial droop, gait dirturbance, speech abnormality, Numbness or weakness of limbs. No LOC reported but he does get some mild CP off and on. In ER his neuro exam was unremarkable. IN ER he was given ASA 325mg and all blood work and EKG were unremarkable. He does have history of Paroxysmal A Fib (old ekg reviewed 2/2016). Hospitalist asked to admit. Past Medical History/Comorbidities:   Mr. Eleno Manzano  has a past medical history of Allergic rhinitis; Arthritis; CAD (coronary artery disease); Diabetes mellitus type 2, controlled ([facility] [address] Dyslipidemia; ED (erectile dysfunction); Encephalitis (1962); GERD (gastroesophageal reflux disease); Gout; Hypertension;  Impotence of organic origin; Lumbago; Mitral valve regurgitation (7/14/12); ROBERTO (obstructive sleep apnea); Prostate cancer ([facility] [address] Psoriasis; SBO (small bowel obstruction) ([facility] (2011, 2015, 2016); and Unspecified sleep apnea. Mr. Alba Jimenez  has a past surgical history that includes appendectomy; abdomen surgery proc unlisted (1967); other surgical (1951); coronary artery bypass graft (2009); left heart cath,percutaneous (7/2012); radical prostatectomy (2007); prostate biopsy, needle, saturation sampling; colonoscopy (1998, 2010); hernia repair (Bilateral, 2012); and cataract removal (Bilateral, 2013). Social History/Living Environment:   Home Environment: Private residence  # Steps to Enter: 1  Rails to Enter: No  One/Two Story Residence: Two story  # of Interior Steps: 15  Interior Rails: Left  Living Alone: No  Support Systems: Child(joelle), Spouse/Significant Other/Partner  Patient Expects to be Discharged to[de-identified] Private residence  Current DME Used/Available at Home: None  Prior Level of Function/Work/Activity:  Pt was independent without an assistive device prior to this admission  Personal Factors: Other factors that influence how disability is experienced by the patient:  Current & PMH   Number of Personal Factors/Comorbidities that affect the Plan of Care: 3+: HIGH COMPLEXITY   EXAMINATION:   Most Recent Physical Functioning:   Gross Assessment:  AROM: Generally decreased, functional (throughout )  Strength: Generally decreased, functional (throughout )  Coordination: Generally decreased, functional (throughout )  Sensation: Intact (grossly)                     Balance:  Sitting: Intact; Without support  Standing: Impaired; Without support Bed Mobility:  Supine to Sit: Stand-by asssistance  Sit to Supine: Stand-by asssistance  Scooting: Stand-by asssistance        Transfers:  Sit to Stand: Stand-by asssistance  Stand to Sit: Stand-by asssistance  Bed to Chair: Stand-by asssistance  Gait:     Speed/Gloria: Delayed  Step Length: Left shortened;Right shortened  Gait Abnormalities: Decreased step clearance;Shuffling gait;Trunk sway increased (slight stagger)  Distance (ft): 200 Feet (ft)  Assistive Device:  (none)  Ambulation - Level of Assistance: Stand-by asssistance   Functional Mobility:         Gait/Ambulation:  sba        Transfers:  sba        Bed Mobility:  sba   Body Structures Involved:  1. Nerves  2. Muscles Body Functions Affected:  1. Neuromusculoskeletal  2. Movement Related Activities and Participation Affected:  1. General Tasks and Demands  2. Mobility   Number of elements that affect the Plan of Care: 4+: HIGH COMPLEXITY   CLINICAL PRESENTATION:   Presentation: Evolving clinical presentation with changing clinical characteristics: MODERATE COMPLEXITY   CLINICAL DECISION MAKIN [facility] Mobility Inpatient Short Form  How much difficulty does the patient currently have. .. Unable A Lot A Little None   1. Turning over in bed (including adjusting bedclothes, sheets and blankets)? [ ] 1   [ ] 2   [X] 3   [ ] 4   2. Sitting down on and standing up from a chair with arms ( e.g., wheelchair, bedside commode, etc.)   [ ] 1   [ ] 2   [X] 3   [ ] 4   3. Moving from lying on back to sitting on the side of the bed? [ ] 1   [ ] 2   [X] 3   [ ] 4   How much help from another person does the patient currently need. .. Total A Lot A Little None   4. Moving to and from a bed to a chair (including a wheelchair)? [ ] 1   [ ] 2   [X] 3   [ ] 4   5. Need to walk in hospital room? [ ] 1   [ ] 2   [X] 3   [ ] 4   6. Climbing 3-5 steps with a railing? [X] 1   [ ] 2   [ ] 3   [ ] 4   © , Trustees of [address] [place] [id number], under license to [employer]. All rights reserved    Score:  Initial: 16 Most Recent: X (Date: -- )     Interpretation of Tool:  Represents activities that are increasingly more difficult (i.e. Bed mobility, Transfers, Gait).        Score 24 23 22-20 19-15 14-10 9-7 6       Modifier CH CI CJ CK CL CM CN         · Mobility - Walking and Moving Around:               - CURRENT STATUS:    CK - 40%-59% impaired, limited or restricted               - GOAL STATUS:           CJ - 20%-39% impaired, limited or restricted               - D/C STATUS:                       ---------------To be determined---------------  [place] / Product Type: Medicare /       Medical Necessity:     · Patient is expected to demonstrate progress in strength, balance, coordination and functional technique to decrease assistance required with bed mobility, transfers & gait. Reason for Services/Other Comments:  · Patient continues to require skilled intervention due to unsteady functional mobility. Use of outcome tool(s) and clinical judgement create a POC that gives a: Questionable prediction of patient's progress: MODERATE COMPLEXITY                 TREATMENT:   (In addition to Assessment/Re-Assessment sessions the following treatments were rendered)   Pre-treatment Symptoms/Complaints:  \" I don't feel the same \"  Pain: Initial: visual scale  Pain Intensity 1: 0  Post Session:  0/10      Assessment/Reassessment only, no treatment provided today     Braces/Orthotics/Lines/Etc:   · IV  · telemetry monitors  · O2 Device: Room air  Treatment/Session Assessment:    · Response to Treatment:  Tolerated well  · Interdisciplinary Collaboration:  · Occupational Therapist  · Registered Nurse  · After treatment position/precautions:  · Supine in bed  · Bed/Chair-wheels locked  · Bed in low position  · Call light within reach  · RN notified  · Compliance with Program/Exercises: Will assess as treatment progresses. · Recommendations/Intent for next treatment session: \"Next visit will focus on advancements to more challenging activities and reduction in assistance provided\".   Total Treatment Duration:  PT Patient Time In/Time Out  Time In: 1030  Time Out: [address] Tori Bills PT

## 2017-08-30 NOTE — PROGRESS NOTES
Speech language pathology: bedside swallow note: Initial Assessment and Discharge    NAME/AGE/GENDER: Connie Goodrich is a 80 y.o. male  DATE: 8/30/2017  PRIMARY DIAGNOSIS: Lacunar infarct, acute ([facility]       ICD-10: Treatment Diagnosis: Oropharyngeal dysphagia (R13.12)  INTERDISCIPLINARY COLLABORATION: none  PRECAUTIONS/ALLERGIES: Celecoxib; Ibuprofen; Lescol [fluvastatin]; Nsaids (non-steroidal anti-inflammatory drug); Sulfa (sulfonamide antibiotics); and Uloric [febuxostat] ASSESSMENT:Based on the objective data described below, Mr. Ellen Andre presents with functional oral and pharyngeal phase of swallow without overt signs or symptoms of aspiration with any consistency assessed. Swallows of all textures timely with adequate laryngeal excursion and clear voicing after swallow. Conversational speech clear and appropriate during assessment, but patient reports it might be a \"little slower\" than baseline. Recommend continue regular textures and thin liquids. No skilled speech/swallow intervention indicated in current setting. If speech issues persist and affect communication after discharge, recommend follow up with outpatient speech therapy. PLAN OF CARE:   Patient will benefit from skilled intervention to address the following impairments. RECOMMENDATIONS AND PLANNED INTERVENTIONS (Benefits and precautions of therapy have been discussed with the patient.):  · continue prescribed diet  MEDICATIONS:  · With liquid  COMPENSATORY STRATEGIES/MODIFICATIONS INCLUDING:  · None  RECOMMENDED REHABILITATION/EQUIPMENT: (at time of discharge pending progress):   None. SUBJECTIVE:   \"I didn't have problems swallowing breakfast. That wasn't the problem. The problem was I didn't really like it. \"  History of Present Injury/Illness: Mr. Ellen Andre  has a past medical history of Allergic rhinitis; Arthritis; CAD (coronary artery disease); Diabetes mellitus type 2, controlled ([facility] [address] Dyslipidemia; ED (erectile dysfunction);  Encephalitis (1065); GERD (gastroesophageal reflux disease); Gout; Hypertension; Impotence of organic origin; Lumbago; Mitral valve regurgitation (7/14/12); ROBERTO (obstructive sleep apnea); Pro[facility] ([facility] Psoriasis; SBO (small bowel obstruction) ([facility] (2011, 2015, 2016); and Unspecified sleep apnea. . He also  has a past surgical history that includes appendectomy; abdomen surgery proc unlisted (1967); other surgical (1951); coronary artery bypass graft (2009); left heart cath,percutaneous (7/2012); radical prostatectomy (2007); prostate biopsy, needle, saturation sampling; colonoscopy (1998, 2010); hernia repair (Bilateral, 2012); and cataract removal (Bilateral, 2013). Present Symptoms: CVA   Pain Intensity 1: 0  Pain Location 1: Neck  Current Medications:   No current facility-administered medications on file prior to encounter. Current Outpatient Prescriptions on File Prior to Encounter   Medication Sig Dispense Refill    esomeprazole (NEXIUM) 40 mg capsule Take 1 Cap by mouth daily. Indications: am 90 Cap 1    glucose blood VI test strips (BLOOD GLUCOSE TEST) strip Test once to twice daily DX: E11.8 300 Strip 3    dilTIAZem (TIAZAC) 360 mg ER capsule Take 1 Cap by mouth daily. Indications: am 90 Cap 1    traMADol (ULTRAM) 50 mg tablet Take 1 Tab by mouth every six (6) hours as needed for Pain. Max Daily Amount: 200 mg. 60 Tab 1    metoprolol tartrate (LOPRESSOR) 100 mg IR tablet Take 1 Tab by mouth two (2) times a day. 180 Tab 3    losartan (COZAAR) 100 mg tablet Take 1 Tab by mouth daily. 90 Tab 3    Lancets misc Test once to twice daily DX: E11.8 300 Each 3    RANEXA 500 mg SR tablet TAKE 1 TABLET TWICE A  Tab 3    triamcinolone acetonide (KENALOG) 0.1 % ointment       tacrolimus (PROTOPIC) 0.1 % ointment       mometasone (ELOCON) 0.1 % topical cream       peg 400-propylene glycol (SYSTANE) 0.4-0.3 % drop Administer  to left eye as needed.       acetaminophen (TYLENOL) 325 mg tablet Take  by mouth every four (4) hours as needed for Pain.  nitroglycerin (NITROSTAT) 0.4 mg SL tablet 1 Tab by SubLINGual route every five (5) minutes as needed for Chest Pain. 25 Tab 11    pimecrolimus (ELIDEL) 1 % topical cream Apply  to affected area two (2) times a day.  fluticasone (FLONASE) 50 mcg/actuation nasal spray as needed.  aspirin 81 mg tablet Take 81 mg by mouth daily. Last dose 8/18/12   Indications: pm      cholecalciferol, vitamin D3, (VITAMIN D3) 2,000 unit Tab Take 2,000 Units by mouth daily. Indications: OSTEOPOROSIS, am      Cetirizine (ZYRTEC) 10 mg Cap Take 10 mg by mouth nightly. Indications: ALLERGIC RHINITIS       Current Dietary Status:  Regular textures, thin liquids      Social History/Home Situation:    Home Environment: Private residence  # Steps to Enter: 1  Rails to Enter: No  One/Two Story Residence: Two story  # of Interior Steps: 15  Interior Rails: Left  Living Alone: No  Support Systems: Child(joelle), Spouse/Significant Other/Partner  Patient Expects to be Discharged to[de-identified] Private residence  Current DME Used/Available at Home: None  OBJECTIVE:   Respiratory Status:          Cognitive and Communication Status:  Neurologic State: Alert  Orientation Level: Oriented to person;Oriented to place;Oriented to situation  Cognition: Follows commands  Perception: Appears intact  Perseveration: No perseveration noted  Safety/Judgement: Awareness of environment    BEDSIDE SWALLOW EVALUATION  Oral Assessment:  Oral Assessment  Labial: No impairment  Dentition: Upper dentures  Oral Hygiene: adequate  Lingual: No impairment  Velum: No impairment  P.O. Trials:  Patient Position: upright in stretcher    The patient was given the following:   Consistency Presented: Thin liquid; Solid;Puree;Mixed consistency  How Presented: Self-fed/presented;Cup/sip;Cup/gulp; Spoon;Straw;Successive swallows    ORAL PHASE:  Bolus Acceptance: No impairment  Bolus Formation/Control: No impairment  Propulsion: No impairment     Oral Residue: None    PHARYNGEAL PHASE:  Initiation of Swallow: No impairment  Laryngeal Elevation: Functional  Aspiration Signs/Symptoms: None  Vocal Quality: No impairment           Pharyngeal Phase Characteristics: No impairment, issues, or problems     OTHER OBSERVATIONS:  Rate/bite size: WNL   Endurance: WNL      Tool Used: Dysphagia Outcome and Severity Scale (RAMANDEEP)    Score Comments   Normal Diet  [x] 7 With no strategies or extra time needed   Functional Swallow  [] 6 May have mild oral or pharyngeal delay       Mild Dysphagia    [] 5 Which may require one diet consistency restricted (those who demonstrate penetration which is entirely cleared on MBS would be included)   Mild-Moderate Dysphagia  [] 4 With 1-2 diet consistencies restricted       Moderate Dysphagia  [] 3 With 2 or more diet consistencies restricted       Moderately Severe Dysphagia  [] 2 With partial PO strategies (trials with ST only)       Severe Dysphagia  [] 1 With inability to tolerate any PO safely          Score:  Initial: 7 Most Recent: X (Date: -- )   Interpretation of Tool: The Dysphagia Outcome and Severity Scale (RAMANDEEP) is a simple, easy-to-use, 7-point scale developed to systematically rate the functional severity of dysphagia based on objective assessment and make recommendations for diet level, independence level, and type of nutrition. Score 7 6 5 4 3 2 1   Modifier CH CI CJ CK CL CM CN   ?  Swallowing:     - CURRENT STATUS: CH - 0% impaired, limited or restricted    - GOAL STATUS:  CH - 0% impaired, limited or restricted    - D/C STATUS:  CH - 0% impaired, limited or restricted  [place] / Product Type: Medicare /     TREATMENT:    (In addition to Assessment/Re-Assessment sessions the following treatments were rendered)  Assessment/Reassessment only, no treatment provided today  __________________________________________________________________________________________  Safety:   After treatment position/precautions:  · Upright in Bed  Treatment Assessment:  No further skilled speech/swallow intervention currently indicated.     Total Treatment Duration:  Time In: 1120  Time Out: [address] [place], [place], [facility]

## 2017-08-30 NOTE — PROGRESS NOTES
Dual skin assessment competed on arrival to unit. Surgical scars to chest and abdomen. Skin appears otherwise grossly intact. CHG bath completed.

## 2017-08-30 NOTE — PROGRESS NOTES
Hospitalist Progress Note     Admit Date:  2017  8:13 PM   Name:  Jackelyn Franks   Age:  80 y.o.  :  1945   MRN:  [id number]   PCP:  Marilee Samaniego MD  Treatment Team: Primary Nurse: Rayna Mckeon RN    Subjective:       Mr. Jonh Wilde is a 79 yo male with PMH of pafib, HTN, CAD, DM2, HLP evaluated per PCP due to MRI showing acute CVA. MRI report not received to date. Admitted for ECHO, carotid duplex, teleneuro consult. Presently on asa/statin/antihypertensvies-likely to need anticoagulation at some point due to afib.  Plans for home at discharge        denies ataxia or focal extremity weakness, no vision or speech changes, some memory loss and word finding issues at times,           Objective:   Patient Vitals for the past 24 hrs:   Temp Pulse Resp BP SpO2   17 1001 98 °F (36.7 °C) - 16 120/65 100 %   17 1000 - (!) 50 14 120/65 -   17 0940 - (!) 57 16 139/76 -   17 0926 - (!) 52 18 - 100 %   17 0920 - (!) 53 16 132/61 100 %   17 0901 - (!) 54 14 - 100 %   17 0900 - (!) 51 16 112/57 -   17 0839 - 70 22 - 97 %   17 0820 - 64 20 174/84 -   17 0800 - 60 13 186/83 100 %   17 0741 - (!) 58 20 - 100 %   17 0740 - 61 11 172/89 -   17 0725 - 65 22 - 100 %   17 0720 - 60 11 168/86 -   17 0713 - 66 15 (!) 180/96 100 %   17 0659 - 65 15 - 100 %   17 0630 - 68 12 (!) 204/97 100 %   17 0600 - 60 16 177/90 100 %   17 0530 - (!) 54 16 161/82 100 %   17 0500 - (!) 54 16 155/75 100 %   17 0430 - (!) 58 16 151/74 100 %   17 0426 - (!) 54 16 159/72 100 %   17 0400 - (!) 59 20 187/85 100 %   17 0330 - (!) 58 20 168/81 98 %   17 0300 - 61 20 145/80 100 %   17 0230 - (!) 59 16 189/88 100 %   17 0200 - (!) 58 16 163/82 100 %   17 0130 - 61 14 151/79 100 %   17 0119 - 60 14 159/77 (!) 78 %   17 0000 - 75 24 148/85 -   17 2330 - 71 16 150/75 100 %   08/29/17 2300 - (!) 59 20 162/70 100 %   08/29/17 2230 - 60 19 121/68 100 %   08/29/17 2200 - 63 14 119/68 100 %   08/29/17 2130 - 68 18 126/70 100 %   08/29/17 2109 - 65 18 - 99 %   08/29/17 2108 - 63 17 130/67 -   08/29/17 2026 - 65 21 - 97 %   08/29/17 2025 - 66 18 166/76 -   08/29/17 2009 97.5 °F (36.4 °C) 69 18 (!) 140/92 94 %     Oxygen Therapy  O2 Sat (%): 100 % (08/30/17 1001)  Pulse via Oximetry: 53 beats per minute (08/30/17 0926)  O2 Device: Room air (08/29/17 2009)  No intake or output data in the 24 hours ending 08/30/17 1012      General:    Well nourished. Alert. CV:   RRR. No murmur, rub, or gallop. Lungs:   Clear to auscultation bilaterally. No wheezing, rhonchi, or rales. Anterior exam   Abdomen:   Soft, nontender, nondistended. Extremities: Warm and dry. No cyanosis or edema. Skin:     No rashes or jaundice. Data Review:  I have reviewed all labs, meds, telemetry events, and studies from the last 24 hours. Recent Results (from the past 24 hour(s))   CBC, POC    Collection Time: 08/29/17 10:51 AM   Result Value Ref Range    WBC 6.4 4.0 - 11.0 K/uL    RBC 4.31 (L) 4.40 - 6.20 M/uL    HGB 12.8 (L) 13.5 - 18.0 g/dL    HCT 40.1 (L) 41.0 - 54.0 %    MCV 93 80 - 100 fL    MCH 29.7 27.0 - 34.0 pg    MCHC 31.9 31.0 - 36.0 g/dL    PLATELET 010 154 - 195 K/uL    LYMPHOCYTES 33.4 20.5 - 51.1 %    MONOCYTES 7.1 3.0 - 10.0 %    GRANULOCYTES 59.5 37.0 - 92.0 %    ABS. LYMPHOCYTES 2.1 0.7 - 3.1 K/uL    ABS. MONOCYTES 0.4 0.1 - 1.1 K/uL    ABS.  GRANULOCYTES 3.9 2.0 - 7.8 K/uL    RDW 15.0 11.0 - 16.0 %    MEAN PLATELET VOLUME 9.7 6.9 - 10.4 fL   URINALYSIS W/MICROSCOPIC    Collection Time: 08/29/17 10:51 AM   Result Value Ref Range    Color Yellow Straw-Dark Yellow    Appearance Clear Clear-Slightly Cloudy    Glucose Negative Negative mg/dL    Bilirubin Negative Negative mg/dL    Ketone Negative Negative mg/dL    Specific gravity 1.020 1.000 - 1.030    Blood Negative Negative JUDITH/uL    pH (UA) 5.5 5.0 - 8.0    Protein Negative Negative mg/dL    Urobilinogen 0.2 E.U./dL 0.2 - 1.0 E.U./dl    Nitrites Negative Negative    Leukocyte Esterase Negative Negative CARLOTA/uL    WBC 1-3 (A) 0 - 2 /HPF    RBC 0 0 - 1 /HPF    URINE EPITHELIAL CELLS Rare Negatine-Many /HPF    Bacteria Negative Negative /HPF   METABOLIC PANEL, COMPREHENSIVE    Collection Time: 08/29/17 12:50 PM   Result Value Ref Range    Glucose 147 (H) 65 - 99 mg/dL    BUN 14 8 - 27 mg/dL    Creatinine 1.39 (H) 0.76 - 1.27 mg/dL    GFR est non-AA 51 (L) >59 mL/min/1.73    GFR est AA 59 (L) >59 mL/min/1.73    BUN/Creatinine ratio 10 10 - 24    Sodium 144 134 - 144 mmol/L    Potassium 4.3 3.5 - 5.2 mmol/L    Chloride 106 96 - 106 mmol/L    CO2 22 18 - 29 mmol/L    Calcium 9.6 8.6 - 10.2 mg/dL    Protein, total 6.4 6.0 - 8.5 g/dL    Albumin 3.7 3.5 - 4.8 g/dL    GLOBULIN, TOTAL 2.7 1.5 - 4.5 g/dL    A-G Ratio 1.4 1.2 - 2.2    Bilirubin, total 0.3 0.0 - 1.2 mg/dL    Alk.  phosphatase 94 39 - 117 IU/L    AST (SGOT) 18 0 - 40 IU/L    ALT (SGPT) 20 0 - 44 IU/L   TSH 3RD GENERATION    Collection Time: 08/29/17 12:50 PM   Result Value Ref Range    TSH 1.350 0.450 - 4.500 uIU/mL   T4, FREE    Collection Time: 08/29/17 12:50 PM   Result Value Ref Range    T4, Free 1.03 0.82 - 1.77 ng/dL   EKG, 12 LEAD, INITIAL    Collection Time: 08/29/17  8:24 PM   Result Value Ref Range    Ventricular Rate 60 BPM    Atrial Rate 60 BPM    P-R Interval 176 ms    QRS Duration 88 ms    Q-T Interval 418 ms    QTC Calculation (Bezet) 418 ms    Calculated P Axis 68 degrees    Calculated R Axis 41 degrees    Calculated T Axis 53 degrees    Diagnosis       Normal sinus rhythm  Normal ECG  When compared with ECG of 23-FEB-2016 10:22,  Sinus rhythm has replaced Atrial fibrillation  Nonspecific T wave abnormality, improved in Anterolateral leads  Confirmed by PARAS PEREZ (), DESTINY VOGT ([id number]) on 8/30/2017 8:04:37 AM     PROTHROMBIN TIME + INR    Collection Time: 08/29/17  8:27 PM   Result Value Ref Range    Prothrombin time 11.4 9.6 - 12.0 sec    INR 1.1 0.9 - 1.2     CBC WITH AUTOMATED DIFF    Collection Time: 08/29/17  8:29 PM   Result Value Ref Range    WBC 7.3 4.3 - 11.1 K/uL    RBC 4.22 (L) 4.23 - 5.67 M/uL    HGB 12.7 (L) 13.6 - 17.2 g/dL    HCT 37.3 (L) 41.1 - 50.3 %    MCV 88.4 79.6 - 97.8 FL    MCH 30.1 26.1 - 32.9 PG    MCHC 34.0 31.4 - 35.0 g/dL    RDW 14.0 11.9 - 14.6 %    PLATELET 171 149 - 566 K/uL    MPV 12.5 10.8 - 14.1 FL    DF AUTOMATED      NEUTROPHILS 51 43 - 78 %    LYMPHOCYTES 35 13 - 44 %    MONOCYTES 12 4.0 - 12.0 %    EOSINOPHILS 2 0.5 - 7.8 %    BASOPHILS 0 0.0 - 2.0 %    IMMATURE GRANULOCYTES 0.1 0.0 - 5.0 %    ABS. NEUTROPHILS 3.8 1.7 - 8.2 K/UL    ABS. LYMPHOCYTES 2.5 0.5 - 4.6 K/UL    ABS. MONOCYTES 0.9 0.1 - 1.3 K/UL    ABS. EOSINOPHILS 0.1 0.0 - 0.8 K/UL    ABS. BASOPHILS 0.0 0.0 - 0.2 K/UL    ABS. IMM. GRANS. 0.0 0.0 - 0.5 K/UL   METABOLIC PANEL, COMPREHENSIVE    Collection Time: 08/29/17  8:29 PM   Result Value Ref Range    Sodium 143 136 - 145 mmol/L    Potassium 3.7 3.5 - 5.1 mmol/L    Chloride 105 98 - 107 mmol/L    CO2 28 21 - 32 mmol/L    Anion gap 10 7 - 16 mmol/L    Glucose 136 (H) 65 - 100 mg/dL    BUN 14 8 - 23 MG/DL    Creatinine 1.38 0.8 - 1.5 MG/DL    GFR est AA >60 >60 ml/min/1.73m2    GFR est non-AA 54 (L) >60 ml/min/1.73m2    Calcium 9.3 8.3 - 10.4 MG/DL    Bilirubin, total 0.3 0.2 - 1.1 MG/DL    ALT (SGPT) 36 12 - 65 U/L    AST (SGOT) 27 15 - 37 U/L    Alk.  phosphatase 109 50 - 136 U/L    Protein, total 7.8 6.3 - 8.2 g/dL    Albumin 3.4 3.2 - 4.6 g/dL    Globulin 4.4 (H) 2.3 - 3.5 g/dL    A-G Ratio 0.8 (L) 1.2 - 3.5     PTT    Collection Time: 08/29/17  8:29 PM   Result Value Ref Range    aPTT 28.9 23.5 - 31.7 SEC   HEMOGLOBIN A1C WITH EAG    Collection Time: 08/29/17  8:29 PM   Result Value Ref Range    Hemoglobin A1c 6.7 (H) 4.8 - 6.0 %    Est. average glucose 146 mg/dL   LIPID PANEL    Collection Time: 08/30/17  5:47 AM Result Value Ref Range    LIPID PROFILE          Cholesterol, total 183 <200 MG/DL    Triglyceride 76 35 - 150 MG/DL    HDL Cholesterol 39 (L) 40 - 60 MG/DL    LDL, calculated 128.8 (H) <100 MG/DL    VLDL, calculated 15.2 6.0 - 23.0 MG/DL    CHOL/HDL Ratio 4.7     TROPONIN I    Collection Time: 08/30/17  5:47 AM   Result Value Ref Range    Troponin-I, Qt. <0.04 0.02 - 0.05 NG/ML   CK WITH MB    Collection Time: 08/30/17  5:47 AM   Result Value Ref Range    CK 58 21 - 215 U/L    CK - MB 0.5 0.5 - 3.6 ng/ml    CK-MB Index 0.9 %   GLUCOSE, POC    Collection Time: 08/30/17  7:19 AM   Result Value Ref Range    Glucose (POC) 67 65 - 100 mg/dL   GLUCOSE, POC    Collection Time: 08/30/17  9:05 AM   Result Value Ref Range    Glucose (POC) 81 65 - 100 mg/dL        All Micro Results     None          Current Meds:  Current Facility-Administered Medications   Medication Dose Route Frequency    dilTIAZem CD (CARDIZEM CD) capsule 360 mg  360 mg Oral DAILY    pantoprazole (PROTONIX) tablet 40 mg  40 mg Oral ACB    ranolazine ER (RANEXA) tablet 500 mg  500 mg Oral BID    traMADol (ULTRAM) tablet 50 mg  50 mg Oral Q6H PRN    metoprolol tartrate (LOPRESSOR) tablet 100 mg  100 mg Oral BID    sodium chloride (NS) flush 5-10 mL  5-10 mL IntraVENous Q8H    sodium chloride (NS) flush 5-10 mL  5-10 mL IntraVENous PRN    ondansetron (ZOFRAN) injection 4 mg  4 mg IntraVENous Q6H PRN    aspirin chewable tablet 81 mg  81 mg Oral DAILY    pravastatin (PRAVACHOL) tablet 40 mg  40 mg Oral QHS    acetaminophen (TYLENOL) tablet 650 mg  650 mg Oral Q4H PRN    bisacodyl (DULCOLAX) tablet 5 mg  5 mg Oral DAILY PRN    enoxaparin (LOVENOX) injection 40 mg  40 mg SubCUTAneous Q24H    insulin lispro (HUMALOG) injection   SubCUTAneous AC&HS     Current Outpatient Prescriptions   Medication Sig    esomeprazole (NEXIUM) 40 mg capsule Take 1 Cap by mouth daily.  Indications: am    glucose blood VI test strips (BLOOD GLUCOSE TEST) strip Test once to twice daily DX: E11.8    dilTIAZem (TIAZAC) 360 mg ER capsule Take 1 Cap by mouth daily. Indications: am    traMADol (ULTRAM) 50 mg tablet Take 1 Tab by mouth every six (6) hours as needed for Pain. Max Daily Amount: 200 mg.    metoprolol tartrate (LOPRESSOR) 100 mg IR tablet Take 1 Tab by mouth two (2) times a day.  losartan (COZAAR) 100 mg tablet Take 1 Tab by mouth daily.  Lancets misc Test once to twice daily DX: E11.8    RANEXA 500 mg SR tablet TAKE 1 TABLET TWICE A DAY    triamcinolone acetonide (KENALOG) 0.1 % ointment     tacrolimus (PROTOPIC) 0.1 % ointment     mometasone (ELOCON) 0.1 % topical cream     peg 400-propylene glycol (SYSTANE) 0.4-0.3 % drop Administer  to left eye as needed.  acetaminophen (TYLENOL) 325 mg tablet Take  by mouth every four (4) hours as needed for Pain.  nitroglycerin (NITROSTAT) 0.4 mg SL tablet 1 Tab by SubLINGual route every five (5) minutes as needed for Chest Pain.  pimecrolimus (ELIDEL) 1 % topical cream Apply  to affected area two (2) times a day.  fluticasone (FLONASE) 50 mcg/actuation nasal spray as needed.  aspirin 81 mg tablet Take 81 mg by mouth daily. Last dose 8/18/12   Indications: pm    cholecalciferol, vitamin D3, (VITAMIN D3) 2,000 unit Tab Take 2,000 Units by mouth daily. Indications: OSTEOPOROSIS, am    Cetirizine (ZYRTEC) 10 mg Cap Take 10 mg by mouth nightly. Indications: ALLERGIC RHINITIS       Other Studies (last 24 hours):  Duplex Carotid Bilateral    Result Date: 8/30/2017  CAROTID ULTRASOUND 8/30/2017 12:24 AM CLINICAL INFORMATION: 52-year-old with concern for CVA. COMPARISON: Brain MRI 8/29/2017. FINDINGS: Grayscale and color Doppler evaluation was performed arteries of the neck.  Right common carotid velocities are as follows: Proximal velocity = 89.5 cm/sec Distal velocity = 76.4 cm/sec Velocity at the bulb = 58.2 cm/sec Internal carotid artery velocities are as follows: Proximal ICA = 62.1 cm/sec Mid ICA = 5.5 cm/sec Distal ICA = 67.7 cm/sec ICA/CCA ratio = 0.9 External carotid artery is patent. The right vertebral artery flow is antegrade. Grayscale images demonstrate diffuse intimal thickening in the common carotid artery with minimal plaque at the level of the bulb. Minimal plaque is also present in the proximal ICA without significant stenosis. Left common carotid velocities are as follows: Proximal velocity = 100.4 cm/sec Distal velocity = 84.9 cm/sec Velocity at the bulb = 42.7 cm/sec Internal carotid artery velocities are as follows: Proximal ICA = 51.1 cm/sec Mid ICA = 53.3 cm/sec Distal ICA = 57.6 cm/sec ICA/CCA ratio = 0.7 External carotid artery is patent. The left vertebral artery flow is antegrade. Grayscale images demonstrate diffuse intimal thickening in the common carotid artery and bulb with mild partially calcific atherosclerotic plaque at the level of the bulb and proximal ICA without significant stenosis. IMPRESSION: 1. No hemodynamically significant stenosis by NASCET criteria.        Assessment and Plan:     Hospital Problems as of 8/30/2017  Date Reviewed: 8/29/2017          Codes Class Noted - Resolved POA    * (Principal)Lacunar infarct, acute ([facility] ICD-10-CM: I63.9  ICD-9-CM: 434.91  8/29/2017 - Present Yes        Type 2 diabetes, controlled, with neuropathy ([facility] ICD-10-CM: E11.40  ICD-9-CM: 250.60, 357.2  7/6/2017 - Present Yes        CKD (chronic kidney disease) (Chronic) ICD-10-CM: N18.9  ICD-9-CM: 585.9  2/24/2016 - Present Yes        HTN (hypertension) (Chronic) ICD-10-CM: I10  ICD-9-CM: 401.9  7/14/2012 - Present Yes        PAF (paroxysmal atrial fibrillation) (HCC) ICD-10-CM: I48.0  ICD-9-CM: 427.31  3/13/2009 - Present Yes        S/P CABG (coronary artery bypass graft) ICD-10-CM: Z95.1  ICD-9-CM: V45.81  3/10/2009 - Present Yes    Overview Signed 8/25/2016 10:12 AM by Luiza Harris MD     taken off statins after an episode of idiopathic jaundice, lipids followed by PCP 3/9/09: LIMA to LAD, SVGs to OM1, OM2 and PDA. Keloid former, required dermatologist care  Cath 7/17/12: Occluded SVG to PDA, Occluded SVG to OM1. Patent LIMA feeding a small vessel. Patent SVG to OM2, small target vessel. Native Cx small in the AV groove, diffusely small vessels. Sep 2015 -  Stress MPI for chest discomfort - 6 and a half minutes, chest discomfort (declined NTG), non diagnostic EKG changes, normal perfusion and LVEF.   Hypertensive response to 222/88                     PLAN:    · Awaiting ECHO, teleneuro and ST/PT/OT consulted   · Consider transition to eliquis in future for afib and CVA risk reduction   · Continue present asa/statin/antihypertensives     DC planning/Dispo:  Pending   DVT ppx:  lovenox    Signed:  Adriana Cazares MD

## 2017-08-30 NOTE — ED PROVIDER NOTES
HPI Comments: 60-year-old male with history of CAD, DM 2, HTN who presents with complaint of dizziness and lethargy over the past several days. Patient had MRI outpatient set up with primary care physician. MRI with evidence of CVA. Patient instructed to present to ED. Patient denies any focal weakness, numbness, tingling, nausea, vomiting. Patient does report mild left-sided neck pain and headache. Denies chest pain, shortness of breath, visual symptoms, abdominal pain. Patient is a 80 y.o. male presenting with weakness. The history is provided by the patient. No  was used. Extremity Weakness    This is a new problem. The current episode started more than 2 days ago. The problem occurs constantly. The problem has not changed since onset. The patient is experiencing no pain. Associated symptoms include neck pain. Pertinent negatives include no numbness and no back pain. He has tried nothing for the symptoms. The treatment provided no relief. There has been no history of extremity trauma.         Past Medical History:   Diagnosis Date    Allergic rhinitis     Arthritis     CAD (coronary artery disease)     CABG '09; troponin elevated 7/14/12 (\"likely due to suply/demand mismatch in setting of fever and increased metabolic deman\"-per cardiac MD note 8/20/12)-had cath, echo, EKG-all WNL except cath showed 2 of the bypasses with blockages- \"occluded SVG to PDA & SVG to LISA\"; EF=55%    Diabetes mellitus type 2, controlled ([facility]     Dyslipidemia     ED (erectile dysfunction)     Encephalitis 1962    x 2/hospitalized as teenager    GERD (gastroesophageal reflux disease)     controlled with Nexium    Gout     gout-hx of - off Allopurinol x 1 month, no exacerbations    Hypertension     Impotence of organic origin     Lumbago     Mitral valve regurgitation 7/14/12    \"moderate\" on echo    ROBERTO (obstructive sleep apnea)     Prostate cancer (HCC)     prostate    Psoriasis     SBO (small bowel obstruction) ([facility] , , 2016    Unspecified sleep apnea     mild per patient, does not use CPAP       Past Surgical History:   Procedure Laterality Date    ABDOMEN SURGERY PROC UNLISTED  1967    exp lap    HX APPENDECTOMY      over 20 yrs ago    HX CATARACT REMOVAL Bilateral 2013    HX COLONOSCOPY  , 2010    HX CORONARY ARTERY BYPASS GRAFT  2009    x4 bypass    HX HERNIA REPAIR Bilateral 2012    HX OTHER SURGICAL  195    splenectomy    HX RADICAL PROSTATECTOMY  2007    AK LEFT HEART CATH,PERCUTANEOUS  2012    no intervention    AK PROSTATE BIOPSY, NEEDLE, SATURATION SAMPLING      and ultrasound         Family History:   Problem Relation Age of Onset    Hypertension Mother    Janomar Erb Gout Mother     Arthritis-osteo Mother     Heart Disease Mother       of Heart Disease    Coronary Artery Disease Mother     Diabetes Father     Cancer Father     Heart Disease Father     Bleeding Prob Paternal Grandmother     Hypertension Brother     Cancer Maternal Uncle      Prostate    Malignant Hyperthermia Neg Hx     Pseudocholinesterase Deficiency Neg Hx     Delayed Awakening Neg Hx     Post-op Nausea/Vomiting Neg Hx     Emergence Delirium Neg Hx     Post-op Cognitive Dysfunction Neg Hx     Other Neg Hx        Social History     Social History    Marital status:      Spouse name: N/A    Number of children: N/A    Years of education: N/A     Occupational History    Not on file. Social History Main Topics    Smoking status: Former Smoker     Packs/day: 1.00     Years: 15.00     Quit date: 1975    Smokeless tobacco: Former User    Alcohol use Yes      Comment: rarely    Drug use: No    Sexual activity: Yes     Partners: Female     Other Topics Concern    Not on file     Social History Narrative         ALLERGIES: Celecoxib; Ibuprofen; Lescol [fluvastatin]; Nsaids (non-steroidal anti-inflammatory drug);  Sulfa (sulfonamide antibiotics); and Uloric [febuxostat]    Review of Systems   Constitutional: Negative for chills and fever. HENT: Negative for congestion, facial swelling and sore throat. Eyes: Negative for pain, redness and visual disturbance. Respiratory: Negative for cough and shortness of breath. Cardiovascular: Negative for chest pain, palpitations and leg swelling. Gastrointestinal: Negative for abdominal pain, constipation, nausea and vomiting. Endocrine: Negative for polydipsia and polyphagia. Genitourinary: Negative for dysuria and hematuria. Musculoskeletal: Positive for neck pain. Negative for back pain, joint swelling and myalgias. Skin: Negative for pallor and wound. Neurological: Positive for dizziness and headaches. Negative for weakness and numbness. Psychiatric/Behavioral: Negative for agitation and confusion. Vitals:    08/29/17 2009 08/29/17 2025   BP: (!) 140/92 166/76   Pulse: 69 66   Resp: 18 18   Temp: 97.5 °F (36.4 °C)    SpO2: 94%    Weight: 82.6 kg (182 lb)    Height: 5' 10\" (1.778 m)             Physical Exam   Constitutional: He is oriented to person, place, and time. He appears well-developed and well-nourished. HENT:   Head: Normocephalic. Mouth/Throat: Oropharynx is clear and moist. No oropharyngeal exudate. Eyes: Conjunctivae and EOM are normal. Pupils are equal, round, and reactive to light. Neck: Normal range of motion. No JVD present. No tracheal deviation present. Cardiovascular: Normal rate, regular rhythm, normal heart sounds and intact distal pulses. No murmur heard. Radial pulses 2+ and equal bilaterally    Pulmonary/Chest: Effort normal and breath sounds normal. No respiratory distress. He has no wheezes. He has no rales. He exhibits no tenderness. Abdominal: Soft. He exhibits no distension. There is no tenderness. There is no rebound and no guarding. Musculoskeletal: Normal range of motion. He exhibits no edema, tenderness or deformity.    Neurological: He is alert and oriented to person, place, and time. No cranial nerve deficit. Coordination normal.   Strength 5/5 throughout. Normal sensory. Skin: Skin is warm and dry. Nursing note and vitals reviewed. MDM  Number of Diagnoses or Management Options  Dizziness: new and requires workup  Left-sided cerebrovascular accident (CVA) [facility]): new and requires workup  Diagnosis management comments:   Patient given aspirin. Hospitalist consulted for admission.        Amount and/or Complexity of Data Reviewed  Clinical lab tests: ordered and reviewed  Tests in the radiology section of CPT®: ordered and reviewed  Tests in the medicine section of CPT®: ordered and reviewed  Review and summarize past medical records: yes  Independent visualization of images, tracings, or specimens: yes    Risk of Complications, Morbidity, and/or Mortality  Presenting problems: moderate  Diagnostic procedures: moderate  Management options: moderate    Patient Progress  Patient progress: stable    ED Course       EKG  Date/Time: 8/29/2017 9:38 PM  Performed by: Sahra Mcgregor  Authorized by: ROSALINDA Lew     Rate:     ECG rate:  60    ECG rate assessment: normal    Rhythm:     Rhythm: sinus rhythm    Ectopy:     Ectopy: none    QRS:     QRS axis:  Normal    QRS intervals:  Normal  Conduction:     Conduction: normal    ST segments:     ST segments:  Normal  T waves:     T waves: normal

## 2017-08-30 NOTE — PROGRESS NOTES
Visited with patient. Demographics on face sheet verified. Patient states he lives at home with his wife and 5 teenage grandchildren. States he was completely independent in all ADLs prior to admission and still drives. He does not use any ambulation assistance devices. He denies any residual weakness and states he expects to go back to the same at d/c and does not anticipate any discharge needs at this time.

## 2017-08-30 NOTE — ED NOTES
TRANSFER - OUT REPORT:    Verbal report given to JOSE MARTIN Lewis(name) on [employer]  being transferred to 373(unit) for routine progression of care       Report consisted of patients Situation, Background, Assessment and   Recommendations(SBAR). Information from the following report(s) SBAR, ED Summary and Procedure Summary was reviewed with the receiving nurse. Lines:   Peripheral IV 08/29/17 Left Hand (Active)   Site Assessment Clean, dry, & intact 8/29/2017  8:33 PM   Phlebitis Assessment 0 8/29/2017  8:33 PM   Infiltration Assessment 0 8/29/2017  8:33 PM   Dressing Status Clean, dry, & intact 8/29/2017  8:33 PM        Opportunity for questions and clarification was provided.       Patient transported with:   Monitor

## 2017-08-30 NOTE — ED NOTES
Pt presents to the ED for weakness and general malaise since last week.   Pt had a MRI today and was told to come to the ED for treatment

## 2017-08-30 NOTE — PROGRESS NOTES
TRANSFER - IN REPORT:    Verbal report received from [facility]) on [employer]  being received from ED(unit) for routine progression of care      Report consisted of patients Situation, Background, Assessment and   Recommendations(SBAR). Information from the following report(s) SBAR, Kardex and ED Summary was reviewed with the receiving nurse. Opportunity for questions and clarification was provided. Assessment completed upon patients arrival to unit and care assumed.

## 2017-08-31 ENCOUNTER — APPOINTMENT (OUTPATIENT)
Dept: MRI IMAGING | Age: 72
DRG: 066 | End: 2017-08-31
Attending: INTERNAL MEDICINE
Payer: MEDICARE

## 2017-08-31 VITALS
TEMPERATURE: 97.8 F | WEIGHT: 178.4 LBS | SYSTOLIC BLOOD PRESSURE: 141 MMHG | RESPIRATION RATE: 18 BRPM | HEART RATE: 58 BPM | DIASTOLIC BLOOD PRESSURE: 76 MMHG | OXYGEN SATURATION: 96 % | BODY MASS INDEX: 25.54 KG/M2 | HEIGHT: 70 IN

## 2017-08-31 LAB
GLUCOSE BLD STRIP.AUTO-MCNC: 111 MG/DL (ref 65–100)
GLUCOSE BLD STRIP.AUTO-MCNC: 135 MG/DL (ref 65–100)
GLUCOSE BLD STRIP.AUTO-MCNC: 161 MG/DL (ref 65–100)

## 2017-08-31 PROCEDURE — 70544 MR ANGIOGRAPHY HEAD W/O DYE: CPT

## 2017-08-31 PROCEDURE — 74011250636 HC RX REV CODE- 250/636: Performed by: INTERNAL MEDICINE

## 2017-08-31 PROCEDURE — 74011636637 HC RX REV CODE- 636/637: Performed by: INTERNAL MEDICINE

## 2017-08-31 PROCEDURE — A9577 INJ MULTIHANCE: HCPCS | Performed by: INTERNAL MEDICINE

## 2017-08-31 PROCEDURE — 74011250637 HC RX REV CODE- 250/637: Performed by: INTERNAL MEDICINE

## 2017-08-31 PROCEDURE — 70548 MR ANGIOGRAPHY NECK W/DYE: CPT

## 2017-08-31 PROCEDURE — 97530 THERAPEUTIC ACTIVITIES: CPT

## 2017-08-31 PROCEDURE — 72141 MRI NECK SPINE W/O DYE: CPT

## 2017-08-31 PROCEDURE — 82962 GLUCOSE BLOOD TEST: CPT

## 2017-08-31 PROCEDURE — 74011000258 HC RX REV CODE- 258: Performed by: INTERNAL MEDICINE

## 2017-08-31 RX ORDER — METOPROLOL TARTRATE 50 MG/1
50 TABLET ORAL 2 TIMES DAILY
Status: DISCONTINUED | OUTPATIENT
Start: 2017-08-31 | End: 2017-08-31 | Stop reason: HOSPADM

## 2017-08-31 RX ORDER — PRAVASTATIN SODIUM 40 MG/1
40 TABLET ORAL
Qty: 30 TAB | Refills: 0 | Status: SHIPPED | OUTPATIENT
Start: 2017-08-31 | End: 2017-10-04 | Stop reason: SDUPTHER

## 2017-08-31 RX ORDER — GABAPENTIN 100 MG/1
100 CAPSULE ORAL 2 TIMES DAILY
Status: DISCONTINUED | OUTPATIENT
Start: 2017-08-31 | End: 2017-08-31 | Stop reason: HOSPADM

## 2017-08-31 RX ORDER — GABAPENTIN 100 MG/1
100 CAPSULE ORAL 2 TIMES DAILY
Qty: 60 CAP | Refills: 0 | Status: SHIPPED | OUTPATIENT
Start: 2017-08-31 | End: 2017-10-04 | Stop reason: SDUPTHER

## 2017-08-31 RX ORDER — METOPROLOL TARTRATE 50 MG/1
50 TABLET ORAL 2 TIMES DAILY
Qty: 60 TAB | Refills: 0 | Status: SHIPPED | OUTPATIENT
Start: 2017-08-31 | End: 2017-10-04 | Stop reason: SDUPTHER

## 2017-08-31 RX ADMIN — SODIUM CHLORIDE 100 ML: 900 INJECTION, SOLUTION INTRAVENOUS at 11:57

## 2017-08-31 RX ADMIN — PANTOPRAZOLE SODIUM 40 MG: 40 TABLET, DELAYED RELEASE ORAL at 08:28

## 2017-08-31 RX ADMIN — RANOLAZINE 500 MG: 500 TABLET, FILM COATED, EXTENDED RELEASE ORAL at 08:28

## 2017-08-31 RX ADMIN — GABAPENTIN 100 MG: 100 CAPSULE ORAL at 16:38

## 2017-08-31 RX ADMIN — ASPIRIN 81 MG 81 MG: 81 TABLET ORAL at 08:29

## 2017-08-31 RX ADMIN — METOPROLOL TARTRATE 100 MG: 50 TABLET ORAL at 08:28

## 2017-08-31 RX ADMIN — Medication 10 ML: at 16:41

## 2017-08-31 RX ADMIN — GADOBENATE DIMEGLUMINE 20 ML: 529 INJECTION, SOLUTION INTRAVENOUS at 11:57

## 2017-08-31 RX ADMIN — RANOLAZINE 500 MG: 500 TABLET, FILM COATED, EXTENDED RELEASE ORAL at 16:40

## 2017-08-31 RX ADMIN — DILTIAZEM HYDROCHLORIDE 360 MG: 180 CAPSULE, EXTENDED RELEASE ORAL at 08:28

## 2017-08-31 NOTE — PROGRESS NOTES
Problem: Nutrition Deficit  Goal: *Optimize nutritional status  Nutrition  Reason for assessment: Referral received from nursing admission Malnutrition Screening Tool for recently lost 14-23# without trying and eating poorly due to decreased appetite. Assessment:   Diet order(s): Consistent CHO, Cardiac  Food/Nutrition Patient History:  Pt admitted with acute stroke. Past medical history notable for CAD, DM2. Pt lives at home with spouse; eats 3 meals per day; usually cereal and coffee at breakfast, sandwich at lunch and meat, potato and vegetable at supper. Pt reports he was ~ 201# 3 months ago; pt attributes weigh loss to a decrease in appetite and was trying to loose weight. Anthropometrics:Height: 5' 10\" (177.8 cm),  Weight: 80.9 kg (178 lb 6.4 oz), Weight Source: Bed, Body mass index is 25.6 kg/(m^2). BMI class of normal range. Pt currently at 89% from stated body weight from 3 months ago. Macronutrient needs:  EER:  4349-8387 kcal /day (20-25 kcal/kg BW)  EPR:  76-91 grams protein/day (1-1.2 grams/kg IBW)  Intake/Comparative Standards: No recorded intake; insufficient data to determine % estimated kcal and protein needs met. Pt states he ate well at lunch today. Nutrition Diagnosis: No nutrition diagnosis at this time. Intervention:  1. Meals and snacks: Continue current diet. 2.  Nutrition Supplement Therapy:  Declines at this time. 3.  Coordination of Nutrition Care:  Interdisciplinary Rounds  4. Nutrition Discharge Plans:  Continue current diet.      [facility], [address] [place] [address], [facility] [address] [place] [address], [facility]  [phone]

## 2017-08-31 NOTE — PROGRESS NOTES
Assessment complete. Patient alert, oriented x 4. Respirations equal and unlabored, no distress noted. Abdomen soft, bowel sounds active in all 4 quadrants. Denies needs or pain. Aware to call should needs arise. Will monitor.

## 2017-08-31 NOTE — PROGRESS NOTES
TRANSFER - IN REPORT:    Verbal report received from Saira RN(name) on [employer]  being received from ICU(unit) for routine progression of care      Report consisted of patients Situation, Background, Assessment and   Recommendations(SBAR). Information from the following report(s) SBAR, Kard[employer], [facility] and Recent Results was reviewed with the receiving nurse. Opportunity for questions and clarification was provided. Assessment completed upon patients arrival to unit and care assumed.

## 2017-08-31 NOTE — PROGRESS NOTES
TRANSFER - OUT REPORT:    Verbal report given to Fahad Hunt RN on Clarisa Lopez  being transferred to med/surg floor for routine progression of care. Report consisted of patients Situation, Background, Assessment and   Recommendations(SBAR). Information from the following report(s) SBAR was reviewed with the receiving nurse. Lines:   Peripheral IV 08/29/17 Left Hand (Active)   Site Assessment Clean, dry, & intact 8/31/2017  1:51 AM   Phlebitis Assessment 0 8/31/2017  1:51 AM   Infiltration Assessment 0 8/31/2017  1:51 AM   Dressing Status Clean, dry, & intact 8/31/2017  1:51 AM   Dressing Type Tape;Transparent 8/31/2017  1:51 AM   Hub Color/Line Status Capped 8/31/2017  1:51 AM   Alcohol Cap Used No 8/30/2017  7:26 PM        Opportunity for questions and clarification was provided.

## 2017-08-31 NOTE — PROGRESS NOTES
Discharge instructions and prescriptions provided and explained to the pt. Med side effect sheet reviewed. Opportunity for questions provided. Pt is waiting on visitors to arrive. Instructed to call once ready to leave.

## 2017-08-31 NOTE — DISCHARGE INSTRUCTIONS
DISCHARGE SUMMARY from Nurse    The following personal items are in your possession at time of discharge:    Dental Appliances: Uppers, Lowers, With patient        Home Medications: None  Jewelry: Ring, With patient  Clothing: At bedside  Other Valuables: Serg Filler, With patient             PATIENT INSTRUCTIONS:    After general anesthesia or intravenous sedation, for 24 hours or while taking prescription Narcotics:  · Limit your activities  · Do not drive and operate hazardous machinery  · Do not make important personal or business decisions  · Do  not drink alcoholic beverages  · If you have not urinated within 8 hours after discharge, please contact your surgeon on call. Report the following to your surgeon:  · Excessive pain, swelling, redness or odor of or around the surgical area  · Temperature over 100.5  · Nausea and vomiting lasting longer than 4 hours or if unable to take medications  · Any signs of decreased circulation or nerve impairment to extremity: change in color, persistent  numbness, tingling, coldness or increase pain  · Any questions        What to do at Home:  Recommended activity: Activity as tolerated, per MD    If you experience any of the following symptoms fever>101, pain unrelieved with medication, nausea/vomiting, shortness of breath, dizziness/fainting, chest pain. , please follow up with your doctor. *  Please give a list of your current medications to your Primary Care Provider. *  Please update this list whenever your medications are discontinued, doses are      changed, or new medications (including over-the-counter products) are added. *  Please carry medication information at all times in case of emergency situations. These are general instructions for a healthy lifestyle:    No smoking/ No tobacco products/ Avoid exposure to second hand smoke    Surgeon General's Warning:  Quitting smoking now greatly reduces serious risk to your health.     Obesity, smoking, and sedentary lifestyle greatly increases your risk for illness    A healthy diet, regular physical exercise & weight monitoring are important for maintaining a healthy lifestyle    You may be retaining fluid if you have a history of heart failure or if you experience any of the following symptoms:  Weight gain of 3 pounds or more overnight or 5 pounds in a week, increased swelling in our hands or feet or shortness of breath while lying flat in bed. Please call your doctor as soon as you notice any of these symptoms; do not wait until your next office visit. Recognize signs and symptoms of STROKE:    F-face looks uneven    A-arms unable to move or move unevenly    S-speech slurred or non-existent    T-time-call 911 as soon as signs and symptoms begin-DO NOT go       Back to bed or wait to see if you get better-TIME IS BRAIN. Warning Signs of HEART ATTACK     Call 911 if you have these symptoms:   Chest discomfort. Most heart attacks involve discomfort in the center of the chest that lasts more than a few minutes, or that goes away and comes back. It can feel like uncomfortable pressure, squeezing, fullness, or pain.  Discomfort in other areas of the upper body. Symptoms can include pain or discomfort in one or both arms, the back, neck, jaw, or stomach.  Shortness of breath with or without chest discomfort.  Other signs may include breaking out in a cold sweat, nausea, or lightheadedness. Don't wait more than five minutes to call 911 - MINUTES MATTER! Fast action can save your life. Calling 911 is almost always the fastest way to get lifesaving treatment. Emergency Medical Services staff can begin treatment when they arrive -- up to an hour sooner than if someone gets to the hospital by car. The discharge information has been reviewed with the patient. The patient verbalized understanding.     Discharge medications reviewed with the patient and appropriate educational materials and side effects teaching were provided. Stroke: After Your Visit     Your Care Instructions     You have had a stroke. Risk factors for stroke include being overweight, smoking, and sedentary lifestyle. This means that the blood flow to a part of your brain was blocked for some time, which damages the nerve cells in that part of the brain. The part of your body controlled by that part of your brain may not function properly now. The brain is an amazing organ that can heal itself to some degree. The stroke you had damaged part of your brain, but other parts of your brain may take over in some way for the damaged areas. You have already started this process. Going home may be hard for you and your family. The more you can try to do for yourself, the better. Remember to take each day one at a time. Follow-up care is a key part of your treatment and safety. Be sure to make and go to all appointments, and call your doctor if you are having problems. Its also a good idea to know your test results and keep a list of the medicines you take. How can you care for yourself at home? Enter a stroke rehabilitation (rehab) program, if your doctor recommends it. Physical, speech, and occupational therapies can help you manage bathing, dressing, eating, and other basics of daily living. Eat a heart-healthy diet that is low in cholesterol, saturated fat, and salt. Eat lots of fresh fruits and vegetables and foods high in fiber. Increase your activities slowly. Take short rest breaks when you get tired. Gradually increase the amount you walk. Start out by walking a little more than you did the day before. Do not drive until your doctor says it is okay. It is normal to feel sad or depressed after a stroke. If the blues last, talk to your doctor. If you are having problems with urine leakage, go to the bathroom at regular times, including when you first wake up and at bedtime. Also, limit fluids after dinner.   If you are constipated, drink plenty of fluids, enough so that your urine is light yellow or clear like water. If you have kidney, heart, or liver disease and have to limit fluids, talk with your doctor before you increase the amount of fluids you drink. Set up a regular time for using the toilet. If you continue to have constipation, your doctor may suggest using a bulking agent, such as Metamucil, or a stool softener, laxative, or enema. Medicines  Take your medicines exactly as prescribed. Call your doctor if you think you are having a problem with your medicine. You may be taking several medicines. ACE (angiotensin-converting enzyme) inhibitors, angiotensin II receptor blockers (ARBs), beta-blockers, diuretics (water pills), and calcium channel blockers control your blood pressure. Statins help lower cholesterol. Your doctor may also prescribe medicines for depression, pain, sleep problems, anxiety, or agitation. If your doctor has given you medicine that prevents blood clots, such as warfarin (Coumadin), aspirin combined with extended-release dipyridamole (Aggrenox), clopidogrel (Plavix), or aspirin to prevent another stroke, you should:  Tell your dentist, pharmacist, and other health professionals that you take these medicines. Watch for unusual bruising or bleeding, such as blood in your urine, red or black stools, or bleeding from your nose or gums. Get regular blood tests to check your clotting time if you are taking Coumadin. Wear medical alert jewelry that says you take blood thinners. You can buy this at most drugstores. Do not take any over-the-counter medicines or herbal products without talking to your doctor first.  If you take birth control pills or hormone replacement therapy, talk to your doctor about whether they are right for you. For family members and caregivers  Make the home safe. Set up a room so that your loved one does not have to climb stairs.  Be sure the bathroom is on the same floor. Move throw rugs and furniture that could cause falls, and make sure that the lighting is good. Put grab bars and seats in tubs and showers. Find out what your loved one can do and what he or she needs help with. Try not to do things for your loved one that your loved one can do on his or her own. Help him or her learn and practice new skills. Visit and talk with your loved one often. Try doing activities together that you both enjoy, such as playing cards or board games. Keep in touch with your loved one's friends as much as you can, and encourage them to visit. Take care of yourself. Do not try to do everything yourself. Ask other family members to help. Eat well, get enough rest, and take time to do things that you enjoy. Keep up with your own doctor visits, and make sure to take your medicines regularly. Get out of the house as much as you can. Join a local support group. Find out if you qualify for home health care visits to help with rehab or for adult day care. When should you call for help? Call 911 anytime you think you may need emergency care. For example, call if:  You have signs of another stroke. These may include:  Sudden numbness, paralysis, or weakness in your face, arm, or leg, especially on only one side of your body. New problems with walking or balance. Sudden vision changes. Drooling or slurred speech. New problems speaking or understanding simple statements, or you feel confused. A sudden, severe headache that is different from past headaches. Call 911 even if these symptoms go away in a few minutes. You cough up blood. You vomit blood or what looks like coffee grounds. You pass maroon or very bloody stools. Call your doctor now or seek immediate medical care if:  You have new bruises or blood spots under your skin. You have a nosebleed. Your gums bleed when you brush your teeth. You have blood in your urine.   Your stools are black and tarlike or have streaks of blood.  You have vaginal bleeding when you are not having your period, or heavy period bleeding. You have new symptoms that may be related to your stroke, such as falls or trouble swallowing. Watch closely for changes in your health, and be sure to contact your doctor if you have any problems. Where can you learn more? Go to [employer]    Enter C294  in the search box to learn more about \"Stroke: After Your Visit\". © 0357-2032 [employer]. Care instructions adapted under license by Samara Ch (which disclaims liability or warranty for this information). This care instruction is for use with your licensed healthcare professional. If you have questions about a medical condition or this instruction, always ask your healthcare professional. Amara Newberry any warranty or liability for your use of this information.

## 2017-08-31 NOTE — PROGRESS NOTES
Problem: Mobility Impaired (Adult and Pediatric)  Goal: *Acute Goals and Plan of Care (Insert Text)  1 WEEK GOALS :  (1.)Mr. Jodie Cid will move from supine to sit and sit to supine with MODIFIED INDEPENDENCE within 7 day(s). (2.)Mr. Jodie Cid will transfer from bed to chair and chair to bed with SUPERVISION using the least restrictive device within 7 day(s). (3.)Mr. Jodie Cid will ambulate with SUPERVISION for 400 feet with the least restrictive device within 7 day(s). Met 8/31/17  4) pt ambulating up & down 1 step with no rail & SBA.  5) fair += static & dynamic standing balance. Met 8/31/17    ________________________________________________________________________________________________      PHYSICAL THERAPY: Daily Note and AM 8/31/2017  INPATIENT: Hospital Day: 3  [place] / [place] / Product Type: Medicare /      NAME/AGE/GENDER: Zhane Yung is a 80 y.o. male         PRIMARY DIAGNOSIS: Lacunar infarct, acute ([facility] [address] Lacunar infarct, acute ([facility] [address] Lacunar infarct, acute ([facility] [address]        ICD-10: Treatment Diagnosis:       · Generalized Muscle Weakness (M62.81)  · Other lack of cordination (R27.8)  · Difficulty in walking, Not elsewhere classified (R26.2)  · Other abnormalities of gait and mobility (R26.89)   Precaution/Allergies:  Celecoxib; Ibuprofen; Lescol [fluvastatin]; Nsaids (non-steroidal anti-inflammatory drug); Sulfa (sulfonamide antibiotics); and Uloric [febuxostat]       ASSESSMENT:      Mr. Jodie Cid presents with some confused speech, but oriented x 2.5. Pt followed cues with mild unsteadiness present during gait & transfers. This pt will benefit from follow up therapy to help restore safe function prior to returning home with caregiver. Patient participated well with therapy but session shortened secondary to patient being transported to MRI. Patient able to ambulate entire 3rd floor with supervision.   While ambulating, patient able to demonstrate marching for weight shifting and head movements horizontally and vertically without loss of balance or path deviations. Will follow patient for likely one additional session to ensure all goals met. Otherwise, no new needs observed. Patient may benefit from outpatient therapy assessment to further assess balance but do not anticipate need for HHPT. This section established at most recent assessment   PROBLEM LIST (Impairments causing functional limitations):  1. Decreased Strength  2. Decreased ADL/Functional Activities  3. Decreased Transfer Abilities  4. Decreased Ambulation Ability/Technique  5. Decreased Balance  6. Decreased Activity Tolerance  7. Decreased Flexibility/Joint Mobility  8. Decreased [place] with Home Exercise Program    INTERVENTIONS PLANNED: (Benefits and precautions of physical therapy have been discussed with the patient.)  1. Balance Exercise  2. Bed Mobility  3. Family Education  4. Gait Training  5. Neuromuscular Re-education/Strengthening  6. Therapeutic Activites  7. Therapeutic Exercise/Strengthening  8. Transfer Training      TREATMENT PLAN: Frequency/Duration: daily for duration of hospital stay  Rehabilitation Potential For Stated Goals: GOOD      RECOMMENDED REHABILITATION/EQUIPMENT: (at time of discharge pending progress): Continue Skilled Therapy and Home Health: Physical Therapy. HISTORY:   History of Present Injury/Illness (Reason for Referral):  70yo M with PMH as listed below was sent to ER by PCP for abnormal MRI brain that showed Left sided Lacunar Infarct. Pt started having some Left sided headache and Neck pain 3 days ago. PCP ordered MRI that showed Acute Stroke. MRI report not available in Epic yet. He denies any Visual changes, numbness or weakness of face, facial droop, gait dirturbance, speech abnormality, Numbness or weakness of limbs. No LOC reported but he does get some mild CP off and on. In ER his neuro exam was unremarkable.  IN ER he was given ASA 325mg and all blood work and EKG were unremarkable. He does have history of Paroxysmal A Fib (old ekg reviewed 2/2016). Hospitalist asked to admit. Past Medical History/Comorbidities:   Mr. Margaret Guallpa  has a past medical history of Allergic rhinitis; Arthritis; CAD (coronary artery disease); Diabetes mellitus type 2, controlled ([facility] [address] Dyslipidemia; ED (erectile dysfunction); Encephalitis (1962); GERD (gastroesophageal reflux disease); Gout; Hypertension; Impotence of organic origin; Lumbago; Mitral valve regurgitation (7/14/12); ROBERTO (obstructive sleep apnea); Prostate cancer ([facility] [address] Psoriasis; SBO (small bowel obstruction) ([facility] [address] (2011, 2015, 2016); and Unspecified sleep apnea. Mr. Margaret Guallpa  has a past surgical history that includes appendectomy; abdomen surgery proc unlisted (1967); other surgical (1951); coronary artery bypass graft (2009); left heart cath,percutaneous (7/2012); radical prostatectomy (2007); prostate biopsy, needle, saturation sampling; colonoscopy (1998, 2010); hernia repair (Bilateral, 2012); and cataract removal (Bilateral, 2013). Social History/Living Environment:   Home Environment: Private residence  # Steps to Enter: 1  Rails to Enter: No  One/Two Story Residence: Two story  # of Interior Steps: 13  Interior Rails: Left  Living Alone: No  Support Systems: Child(joelle), Family member(s), Friends \ neighbors, [facility], Spouse/Significant Other/Partner  Patient Expects to be Discharged to[de-identified] Private residence  Current DME Used/Available at Home: None  Prior Level of Function/Work/Activity:  Pt was independent without an assistive device prior to this admission  Personal Factors:           Other factors that influence how disability is experienced by the patient:  Current & PMH   Number of Personal Factors/Comorbidities that affect the Plan of Care: 3+: HIGH COMPLEXITY   EXAMINATION:   Most Recent Physical Functioning:   Gross Assessment:  AROM: Generally decreased, functional  Strength: Generally decreased, functional  Coordination: Within functional limits  Tone: Normal  Sensation: Intact                     Balance:  Sitting: Intact  Standing - Static: Good  Standing - Dynamic : Fair (plus) Bed Mobility:           Transfers:  Sit to Stand: Independent  Stand to Sit: Independent  Gait:     Speed/Gloria: Pace decreased (<100 feet/min); Shuffled  Step Length: Left shortened;Right shortened  Gait Abnormalities: Decreased step clearance  Distance (ft): 650 Feet (ft)  Ambulation - Level of Assistance: Supervision  Interventions: Safety awareness training;Verbal cues   Functional Mobility:         Gait/Ambulation:  sba        Transfers:  sba        Bed Mobility:  sba   Body Structures Involved:  1. Nerves  2. Muscles Body Functions Affected:  1. Neuromusculoskeletal  2. Movement Related Activities and Participation Affected:  1. General Tasks and Demands  2. Mobility   Number of elements that affect the Plan of Care: 4+: HIGH COMPLEXITY   CLINICAL PRESENTATION:   Presentation: Evolving clinical presentation with changing clinical characteristics: MODERATE COMPLEXITY   CLINICAL DECISION MAKIN [facility] Inpatient Short Form  How much difficulty does the patient currently have. .. Unable A Lot A Little None   1. Turning over in bed (including adjusting bedclothes, sheets and blankets)? [ ] 1   [ ] 2   [X] 3   [ ] 4   2. Sitting down on and standing up from a chair with arms ( e.g., wheelchair, bedside commode, etc.)   [ ] 1   [ ] 2   [X] 3   [ ] 4   3. Moving from lying on back to sitting on the side of the bed? [ ] 1   [ ] 2   [X] 3   [ ] 4   How much help from another person does the patient currently need. .. Total A Lot A Little None   4. Moving to and from a bed to a chair (including a wheelchair)? [ ] 1   [ ] 2   [X] 3   [ ] 4   5. Need to walk in hospital room? [ ] 1   [ ] 2   [X] 3   [ ] 4   6.   Climbing 3-5 steps with a railing? [X] 1   [ ] 2   [ ] 3   [ ] 4   © 2007, Trustees of [address] [place] [address] [address] [id number], under license to [employer]. All rights reserved    Score:  Initial: 16 Most Recent: X (Date: -- )     Interpretation of Tool:  Represents activities that are increasingly more difficult (i.e. Bed mobility, Transfers, Gait). Score 24 23 22-20 19-15 14-10 9-7 6       Modifier CH CI CJ CK CL CM CN         · Mobility - Walking and Moving Around:               - CURRENT STATUS:    CK - 40%-59% impaired, limited or restricted               - GOAL STATUS:           CJ - 20%-39% impaired, limited or restricted               - D/C STATUS:                       ---------------To be determined---------------  [place] / Product Type: Medicare /       Medical Necessity:     · Patient is expected to demonstrate progress in strength, balance, coordination and functional technique to decrease assistance required with bed mobility, transfers & gait. Reason for Services/Other Comments:  · Patient continues to require skilled intervention due to unsteady functional mobility. Use of outcome tool(s) and clinical judgement create a POC that gives a: Questionable prediction of patient's progress: MODERATE COMPLEXITY                 TREATMENT:   (In addition to Assessment/Re-Assessment sessions the following treatments were rendered)   Pre-treatment Symptoms/Complaints:  Patient agreeable to work with therapy. Pain: Initial:   Pain Intensity 1: 2  Pain Location 1: Back  Pain Orientation 1: Lower, Mid  Post Session:  2/10      Therapeutic Activity: (    14 minutes): Therapeutic activities including Chair transfers and Ambulation on level ground to improve mobility, strength, balance and coordination. Ambulated 650' with supervision with intermittent marching and head movements horizontally and vertically. Transitioned between sitting and standing x 5 from chair without UE support. Required minimal Safety awareness training;Verbal cues to promote static and dynamic balance in standing. Braces/Orthotics/Lines/Etc:   · none  Treatment/Session Assessment:    · Response to Treatment:  Patient tolerated well. Session shortened by patient being taken to MRI. May need one additional session to ensure all goals met. · Interdisciplinary Collaboration:  · Physical Therapist and Registered Nurse  · After treatment position/precautions:  · Patient being transported to MRI  · Compliance with Program/Exercises: compliant most of the time. · Recommendations/Intent for next treatment session: \"Next visit will focus on advancements to more challenging activities and reduction in assistance provided\".   Total Treatment Duration:PT Patient Time In/Time Out  Time In: 1404  Time Out: 161 [facility] YULISA Maldonado

## 2017-08-31 NOTE — PROGRESS NOTES
Shift assessment complete. Respirations present. Even and unlabored. No s/s of distress. Zero c/o pain at this time. Call light within reach. Encouraged patient to call for assistance. Patient verbalizes understanding. See Doc Flowsheet for assessment details. Patient resting in bed. Patient with no complaints. Moves all extremities. Speech clear. Ambulates to bathroom. Steady on feet. Saline well intact .

## 2017-08-31 NOTE — DISCHARGE SUMMARY
Hospitalist Discharge Summary     Admit Date:  2017  8:13 PM   Name:  Benjy Rain   Age:  80 y.o.  :  1945   MRN:  [id number]   PCP:  Corby Cruz MD  Treatment Team: Attending Provider: Reinaldo Jauregui MD; Care Manager: Tamiko Herrera RN    Problem List for this Hospitalization:  Hospital Problems as of 2017  Date Reviewed: 2017          Codes Class Noted - Resolved POA    * (Principal)Lacunar infarct, acute ([facility] ICD-10-CM: I63.9  ICD-9-CM: 434.91  2017 - Present Yes        Type 2 diabetes, controlled, with neuropathy ([facility] ICD-10-CM: E11.40  ICD-9-CM: 250.60, 357.2  2017 - Present Yes        CKD (chronic kidney disease) (Chronic) ICD-10-CM: N18.9  ICD-9-CM: 585.9  2016 - Present Yes        HTN (hypertension) (Chronic) ICD-10-CM: I10  ICD-9-CM: 401.9  2012 - Present Yes        PAF (paroxysmal atrial fibrillation) ([facility] ICD-10-CM: I48.0  ICD-9-CM: 427.31  3/13/2009 - Present Yes        S/P CABG (coronary artery bypass graft) ICD-10-CM: Z95.1  ICD-9-CM: V45.81  3/10/2009 - Present Yes    Overview Signed 2016 10:12 AM by Priscila Stacy MD     taken off statins after an episode of idiopathic jaundice, lipids followed by PCP   3/9/09: LIMA to LAD, SVGs to OM1, OM2 and PDA. Keloid former, required dermatologist care  Cath 12: Occluded SVG to PDA, Occluded SVG to OM1. Patent LIMA feeding a small vessel. Patent SVG to OM2, small target vessel. Native Cx small in the AV groove, diffusely small vessels. Sep 2015 -  Stress MPI for chest discomfort - 6 and a half minutes, chest discomfort (declined NTG), non diagnostic EKG changes, normal perfusion and LVEF. Hypertensive response to 222/                         Hospital Course:      Mr. Beba Allen is a 81 yo male with PMH of pafib, HTN, CAD, DM2, HLP evaluated per PCP due to MRI showing acute CVA of left basal ganglia .  Admitted for ECHO, carotid duplex, MRI cspine and MRA head/neck/ seen by teleneuro consult who has recommended starting eliquis. He is also on statin/antihypertensvies. I have reviewed this plan with him and he voiced understanding and is agreeable. Additionally he had a headache and neck pain with left Upper extremity paresthesia, and cspine MRI showed moderate spinal stenosis. Neurosurgery referral recommended and neurontin started. He has Plans for home at discharge with cardiology and PCP followup. He declines explanation of his course to his wife at this time. Follow up instructions below. Plan was discussed with patient. All questions answered. Patient was stable at time of discharge and was instructed to call or return if there are any concerns or recurrence of symptoms. Diagnostic Imaging/Tests:   Duplex Carotid Bilateral    Result Date: 8/30/2017  CAROTID ULTRASOUND 8/30/2017 12:24 AM CLINICAL INFORMATION: 80-year-old with concern for CVA. COMPARISON: Brain MRI 8/29/2017. FINDINGS: Grayscale and color Doppler evaluation was performed arteries of the neck. Right common carotid velocities are as follows: Proximal velocity = 89.5 cm/sec Distal velocity = 76.4 cm/sec Velocity at the bulb = 58.2 cm/sec Internal carotid artery velocities are as follows: Proximal ICA = 62.1 cm/sec Mid ICA = 5.5 cm/sec Distal ICA = 67.7 cm/sec ICA/CCA ratio = 0.9 External carotid artery is patent. The right vertebral artery flow is antegrade. Grayscale images demonstrate diffuse intimal thickening in the common carotid artery with minimal plaque at the level of the bulb. Minimal plaque is also present in the proximal ICA without significant stenosis. Left common carotid velocities are as follows: Proximal velocity = 100.4 cm/sec Distal velocity = 84.9 cm/sec Velocity at the bulb = 42.7 cm/sec Internal carotid artery velocities are as follows: Proximal ICA = 51.1 cm/sec Mid ICA = 53.3 cm/sec Distal ICA = 57.6 cm/sec ICA/CCA ratio = 0.7 External carotid artery is patent.  The left vertebral artery flow is antegrade. Grayscale images demonstrate diffuse intimal thickening in the common carotid artery and bulb with mild partially calcific atherosclerotic plaque at the level of the bulb and proximal ICA without significant stenosis. IMPRESSION: 1. No hemodynamically significant stenosis by NASCET criteria. Echocardiogram results:  Results for orders placed or performed during the hospital encounter of 17   2D ECHO COMPLETE ADULT (TTE) [address]  [address] [facility], [address] [facility]  [phone]    Transthoracic Echocardiogram  2D, M-mode, Doppler, and Color Doppler    Patient: Jose Norton  MR #: [id number]  : 1945  Age: 80 years  Gender: Male  Study date: 30-Aug-2017  Account #: [de-identified]  Height: 70 in  Weight: 181.7 lb  BSA: 2.01 mï¾²  Status:Routine  Location:  ER 8  BP: 168/ 86    Allergies: CELECOXIB, IBUPROFEN, FLUVASTATIN, NSAIDS (NON-STEROIDAL  ANTI-INFLAMMATORY DRUG), SULFA (SULFONAMIDE ANTIBIOTICS), FEBUXOSTAT    Sonographer:  Brenna Cash [facility]  Group:  [id number] Cardiology  Referring Physician:  Alexandre Gilbert MD  Reading Physician:  WILLOW Richardson MD [facility]    INDICATIONS: Stroke Workup. PROCEDURE: This was a routine study. A transthoracic echocardiogram was  performed. The study included complete 2D imaging, M-mode, complete spectral  Doppler, and color Doppler. Intravenous contrast (agitated saline) was  administered. Image quality was adequate. LEFT VENTRICLE: Size was normal. Systolic function was at the lower limits of  normal. Ejection fraction was estimated in the range of 50 % to 55 %. There  were no regional wall motion abnormalities. Wall thickness was normal. Left  ventricular diastolic function parameters were normal for the patient's age.     RIGHT VENTRICLE: The size was normal. Systolic function was low normal. The  tricuspid jet envelope definition was inadequate for estimation of RV   systolic  pressure. LEFT ATRIUM: Size was normal.    ATRIAL SEPTUM: This study was inadequate to rule out a PFO or ASD because   there  was no visualization of agitated saline after using working IV access. Attempted multiple times with his nurse. RIGHT ATRIUM: Size was normal.    SYSTEMIC VEINS: IVC: The inferior vena cava was not well visualized. AORTIC VALVE: The valve was structurally normal, tri-commissural. There was   no  evidence for stenosis. There was no insufficiency. MITRAL VALVE: Valve structure was normal. There was no evidence for stenosis. There was mild regurgitation. TRICUSPID VALVE: The valve structure was normal. There was no evidence for  stenosis. There was mild regurgitation. PULMONIC VALVE: Not well visualized. There was no evidence for stenosis. There  was trivial regurgitation. PERICARDIUM: There was no pericardial effusion. AORTA: The root exhibited normal size. SUMMARY:    -  Left ventricle: Systolic function was at the lower limits of normal.  Ejection fraction was estimated in the range of 50 % to 55 %. There were no  regional wall motion abnormalities. -  Atrial septum: This study was inadequate to rule out a PFO or ASD because  there was no visualization of agitated saline after using working IV access. Attempted multiple times with his nurse. -  Mitral valve: There was mild regurgitation.    -  Tricuspid valve: There was mild regurgitation. SYSTEM MEASUREMENT TABLES    2D mode  AoR Diam (2D): 3.6 cm  LA Dimension (2D): 3.3 cm  Left Atrium Systolic Volume Index; Method of Disks, Biplane; 2D mode;: 20.4  ml/m2  IVS/LVPW (2D): 1.1  IVSd (2D): 1.1 cm  LVIDd (2D): 4.1 cm  LVIDs (2D): 2.8 cm  LVPWd (2D): 1 cm    Unspecified Scan Mode  Peak Grad; Mean; Antegrade Flow: 4 mm[Hg]  Vmax; Antegrade Flow: 105 cm/s    Prepared and signed by    WILLOW Painter MD [facility]  Signed 30-Aug-2017 12:41:35           All Micro Results     Procedure Component Value Units Date/Time    MRSA SCREEN - PCR (NASAL) [[id number]] Collected:  08/30/17 1900    Order Status:  Completed Specimen:  Nasal from Nares Updated:  08/30/17 2216     Special Requests: NO SPECIAL REQUESTS        Culture result:         MRSA target DNA is not detected (presumptive not colonized with MRSA)          Labs: Results:       BMP, Mg, Phos Recent Labs      08/29/17 2029 08/29/17   1250   NA  143  144   K  3.7  4.3   CL  105  106   CO2  28  22   AGAP  10   --    BUN  14  14   CREA  1.38  1.39*   CA  9.3  9.6   GLU  136*  147*      CBC Recent Labs      08/29/17 2029 08/29/17   1051   WBC  7.3  6.4   RBC  4.22*  4.31*   HGB  12.7*  12.8*   HCT  37.3*  40.1*   PLT  212  203   GRANS  51   --    LYMPH  35   --    EOS  2   --    MONOS  12   --    BASOS  0   --    IG  0.1   --    ANEU  3.8   --    ABL  2.5   --    MARY  0.1   --    ABM  0.9   --    ABB  0.0   --    AIG  0.0   --       LFT Recent Labs      08/29/17 2029 08/29/17   1250   SGOT  27  18   ALT  36  20   AP  109  94   TP  7.8  6.4   ALB  3.4  3.7   GLOB  4.4*   --    AGRAT  0.8*  1.4      Cardiac Testing Lab Results   Component Value Date/Time    CK 58 08/30/2017 03:23 PM    CK 58 08/30/2017 05:47 AM    CK 86 07/14/2012 08:48 AM    CK - MB 0.5 08/30/2017 03:23 PM    CK - MB 0.5 08/30/2017 05:47 AM    CK - MB 3.3 07/14/2012 08:48 AM    CK-MB Index 0.9 08/30/2017 03:23 PM    CK-MB Index 0.9 08/30/2017 05:47 AM    CK-MB Index 3.8 07/14/2012 08:48 AM    Troponin-I, Qt. <0.04 08/30/2017 03:23 PM    Troponin-I, Qt. <0.04 08/30/2017 05:47 AM    Troponin-I, Qt. <0.04 02/23/2016 01:18 PM      Coagulation Tests Lab Results   Component Value Date/Time    Prothrombin time 11.4 08/29/2017 08:27 PM    Prothrombin time 11.8 02/20/2015 06:37 AM    Prothrombin time 12.0 07/15/2012 06:01 AM    INR 1.1 08/29/2017 08:27 PM    INR 1.1 02/20/2015 06:37 AM    INR 1.2 07/15/2012 06:01 AM    aPTT 28.9 08/29/2017 08:29 PM    aPTT 30.6 07/15/2012 06:01 AM    aPTT 38.1 03/09/2009 07:00 PM      A1c Lab Results   Component Value Date/Time    Hemoglobin A1c 6.7 08/29/2017 08:29 PM    Hemoglobin A1c 6.6 06/29/2017 08:43 AM    Hemoglobin A1c 6.6 03/30/2017 09:41 AM    Hemoglobin A1c, External 6.5 03/30/2015      Lipid Panel Lab Results   Component Value Date/Time    Cholesterol, total 183 08/30/2017 05:47 AM    HDL Cholesterol 39 08/30/2017 05:47 AM    LDL, calculated 128.8 08/30/2017 05:47 AM    VLDL, calculated 15.2 08/30/2017 05:47 AM    Triglyceride 76 08/30/2017 05:47 AM    CHOL/HDL Ratio 4.7 08/30/2017 05:47 AM      Thyroid Panel Lab Results   Component Value Date/Time    TSH 1.350 08/29/2017 12:50 PM    TSH 2.050 06/29/2017 08:43 AM        Most Recent UA Lab Results   Component Value Date/Time    Color Yellow 08/29/2017 10:51 AM    Appearance Clear 08/29/2017 10:51 AM    pH (UA) 5.5 08/29/2017 10:51 AM    Protein Negative 08/29/2017 10:51 AM    Glucose Negative 08/29/2017 10:51 AM    Ketone Negative 08/29/2017 10:51 AM    Bilirubin Negative 08/29/2017 10:51 AM    Blood Negative 08/29/2017 10:51 AM    Urobilinogen 0.2 E.U./dL 08/29/2017 10:51 AM    Nitrites Negative 08/29/2017 10:51 AM    Leukocyte Esterase Negative 08/29/2017 10:51 AM        Allergies   Allergen Reactions    Celecoxib Swelling     Hands    Ibuprofen Unknown (comments)     Patient unsure    Lescol [Fluvastatin] Unknown (comments)     Other reaction(s): Unknown (comments)    Nsaids (Non-Steroidal Anti-Inflammatory Drug) Other (comments)     Elevated serum creatinine    Sulfa (Sulfonamide Antibiotics) Rash    Uloric [Febuxostat] Other (comments)     Joint Pain     Immunization History   Administered Date(s) Administered    Influenza High Dose Vaccine PF 10/09/2015    Influenza Vaccine 09/29/2014    Pneumococcal Conjugate (PCV-13) 10/09/2015    Pneumococcal Polysaccharide (PPSV-23) 07/06/2017    Pneumococcal Vaccine (Unspecified Type) 08/25/2010    TD Vaccine 01/16/2010  Tdap 09/29/2014    ZZZ-RETIRED (DO NOT USE) Pneumococcal Vaccine (Unspecified Type) 07/14/2010    Zoster Vaccine, Live 01/01/2010       All Labs from Last 24 Hrs:  Recent Results (from the past 24 hour(s))   GLUCOSE, POC    Collection Time: 08/30/17  6:10 PM   Result Value Ref Range    Glucose (POC) 65 65 - 100 mg/dL   MRSA SCREEN - PCR (NASAL)    Collection Time: 08/30/17  7:00 PM   Result Value Ref Range    Special Requests: NO SPECIAL REQUESTS      Culture result:        MRSA target DNA is not detected (presumptive not colonized with MRSA)   GLUCOSE, POC    Collection Time: 08/30/17  7:03 PM   Result Value Ref Range    Glucose (POC) 109 (H) 65 - 100 mg/dL   GLUCOSE, POC    Collection Time: 08/30/17  9:17 PM   Result Value Ref Range    Glucose (POC) 124 (H) 65 - 100 mg/dL   GLUCOSE, POC    Collection Time: 08/31/17  8:08 AM   Result Value Ref Range    Glucose (POC) 111 (H) 65 - 100 mg/dL   GLUCOSE, POC    Collection Time: 08/31/17 12:09 PM   Result Value Ref Range    Glucose (POC) 135 (H) 65 - 100 mg/dL       Discharge Exam:  Patient Vitals for the past 24 hrs:   Temp Pulse Resp BP SpO2   08/31/17 1442 97.8 °F (36.6 °C) (!) 58 18 141/76 96 %   08/31/17 1209 98.2 °F (36.8 °C) (!) 55 22 132/73 95 %   08/31/17 0710 96.5 °F (35.8 °C) 64 20 141/80 97 %   08/31/17 0550 97.7 °F (36.5 °C) (!) 52 20 166/80 99 %   08/31/17 0151 99.7 °F (37.6 °C) 62 24 131/66 97 %   08/30/17 2118 - (!) 53 29 110/66 -   08/30/17 2018 - 60 (!) 34 153/62 -   08/30/17 1918 97.5 °F (36.4 °C) (!) 53 21 147/75 97 %   08/30/17 1849 - (!) 55 28 134/81 -   08/30/17 1822 - - - - 93 %   08/30/17 1800 - (!) 53 19 (!) 145/91 -   08/30/17 1734 - (!) 52 18 164/77 -   08/30/17 1719 - (!) 52 26 152/88 -   08/30/17 1704 - (!) 51 9 153/89 -   08/30/17 1634 - (!) 53 16 147/82 -     Oxygen Therapy  O2 Sat (%): 96 % (08/31/17 1442)  Pulse via Oximetry: 50 beats per minute (08/30/17 1549)  O2 Device: Room air (08/30/17 1917)    Intake/Output Summary (Last 24 hours) at 08/31/17 1630  Last data filed at 08/31/17 0942   Gross per 24 hour   Intake                0 ml   Output              650 ml   Net             -650 ml       General:    Well nourished. Alert. No distress. Eyes:   Normal sclera. Extraocular movements intact. ENT:  Normocephalic, atraumatic. Moist mucous membranes  CV:   Regular rate and irregular rhythm. No murmur, rub, or gallop. Lungs:  Clear to auscultation bilaterally. No wheezing, rhonchi, or rales. Abdomen: Soft, nontender, nondistended. Bowel sounds normal.   Extremities: Warm and dry. No cyanosis or edema. Neurologic:  grossly intact. Skin:     No rashes or jaundice. Psych:  Normal mood and affect. Discharge Info:   Current Discharge Medication List      START taking these medications    Details   pravastatin (PRAVACHOL) 40 mg tablet Take 1 Tab by mouth nightly. Qty: 30 Tab, Refills: 0      gabapentin (NEURONTIN) 100 mg capsule Take 1 Cap by mouth two (2) times a day. Qty: 60 Cap, Refills: 0      apixaban (ELIQUIS) 5 mg tablet Take 1 Tab by mouth two (2) times a day. Qty: 60 Tab, Refills: 0         CONTINUE these medications which have CHANGED    Details   metoprolol tartrate (LOPRESSOR) 50 mg tablet Take 1 Tab by mouth two (2) times a day. Qty: 60 Tab, Refills: 0         CONTINUE these medications which have NOT CHANGED    Details   esomeprazole (NEXIUM) 40 mg capsule Take 1 Cap by mouth daily. Indications: am  Qty: 90 Cap, Refills: 1    Associated Diagnoses: Gastroesophageal reflux disease without esophagitis      glucose blood VI test strips (BLOOD GLUCOSE TEST) strip Test once to twice daily DX: E11.8  Qty: 300 Strip, Refills: 3    Associated Diagnoses: Type 2 diabetes mellitus with complication, unspecified long term insulin use status (HCC)      dilTIAZem (TIAZAC) 360 mg ER capsule Take 1 Cap by mouth daily.  Indications: am  Qty: 90 Cap, Refills: 1    Associated Diagnoses: Essential hypertension      traMADol (ULTRAM) 50 mg tablet Take 1 Tab by mouth every six (6) hours as needed for Pain. Max Daily Amount: 200 mg. Qty: 60 Tab, Refills: 1    Associated Diagnoses: Acute left-sided low back pain with left-sided sciatica      Lancets misc Test once to twice daily DX: E11.8  Qty: 300 Each, Refills: 3    Associated Diagnoses: Type 2 diabetes mellitus with complication, unspecified long term insulin use status (HCC)      RANEXA 500 mg SR tablet TAKE 1 TABLET TWICE A DAY  Qty: 180 Tab, Refills: 3      triamcinolone acetonide (KENALOG) 0.1 % ointment       tacrolimus (PROTOPIC) 0.1 % ointment       mometasone (ELOCON) 0.1 % topical cream       peg 400-propylene glycol (SYSTANE) 0.4-0.3 % drop Administer  to left eye as needed. nitroglycerin (NITROSTAT) 0.4 mg SL tablet 1 Tab by SubLINGual route every five (5) minutes as needed for Chest Pain. Qty: 25 Tab, Refills: 11      pimecrolimus (ELIDEL) 1 % topical cream Apply  to affected area two (2) times a day. fluticasone (FLONASE) 50 mcg/actuation nasal spray as needed. cholecalciferol, vitamin D3, (VITAMIN D3) 2,000 unit Tab Take 2,000 Units by mouth daily. Indications: OSTEOPOROSIS, am      Cetirizine (ZYRTEC) 10 mg Cap Take 10 mg by mouth nightly. Indications: ALLERGIC RHINITIS         STOP taking these medications       losartan (COZAAR) 100 mg tablet Comments:   Reason for Stopping:         acetaminophen (TYLENOL) 325 mg tablet Comments:   Reason for Stopping:         aspirin 81 mg tablet Comments:   Reason for Stopping:                 Disposition: home    Activity: Activity as tolerated  Diet: DIET DIABETIC CONSISTENT CARB Regular; 2 GM NA (House Low NA); AHA-LOW-CHOL FAT; No Conc.  Sweets    Follow-up Appointments   Procedures    FOLLOW UP VISIT Appointment in: One Week 1 week PCP, 2 weeks [facility] cardiology, needs new visit with neurosurgery for cervical spinal stenosis thanks     1 week PCP, 2 weeks [facility] cardiology, needs new visit with neurosurgery for cervical spinal stenosis thanks     Standing Status:   Standing     Number of Occurrences:   1     Order Specific Question:   Appointment in     Answer: One Week         Follow-up Information     Follow up With Details Comments [address] [place] Isela Laguna MD   [address] [facility]  [phone]            Time spent in patient discharge planning and coordination 30 minutes.     Signed:  Gurvinder Cm MD

## 2017-09-01 ENCOUNTER — PATIENT OUTREACH (OUTPATIENT)
Dept: CASE MANAGEMENT | Age: 72
End: 2017-09-01

## 2017-09-01 NOTE — PROGRESS NOTES
CC attempted to contact patient. Someone answered the phone but did not speak and hung-up phone. CC will attempt second outreach to patient. This note will not be viewable in [address]

## 2017-09-02 ENCOUNTER — PATIENT OUTREACH (OUTPATIENT)
Dept: CASE MANAGEMENT | Age: 72
End: 2017-09-02

## 2017-09-02 NOTE — PROGRESS NOTES
Second outreach attempt to patient was unsuccessful. Third outreach attempt will be made within 5 business days. PCP appt scheduled 9/7. This note will not be viewable in [address]

## 2017-09-08 ENCOUNTER — PATIENT OUTREACH (OUTPATIENT)
Dept: CASE MANAGEMENT | Age: 72
End: 2017-09-08

## 2017-12-05 PROBLEM — M79.674 GREAT TOE PAIN, RIGHT: Status: ACTIVE | Noted: 2017-12-05

## 2017-12-28 ENCOUNTER — HOSPITAL ENCOUNTER (OUTPATIENT)
Dept: LAB | Age: 72
Discharge: HOME | End: 2017-12-28
Payer: MEDICARE

## 2017-12-28 ENCOUNTER — ANESTHESIA EVENT (OUTPATIENT)
Dept: SURGERY | Age: 72
End: 2017-12-28
Payer: MEDICARE

## 2017-12-28 LAB
ANION GAP SERPL CALC-SCNC: 7 MMOL/L (ref 7–16)
BUN SERPL-MCNC: 12 MG/DL (ref 8–23)
CALCIUM SERPL-MCNC: 9.4 MG/DL (ref 8.3–10.4)
CHLORIDE SERPL-SCNC: 111 MMOL/L (ref 98–107)
CO2 SERPL-SCNC: 26 MMOL/L (ref 21–32)
CREAT SERPL-MCNC: 1.3 MG/DL (ref 0.8–1.5)
GLUCOSE SERPL-MCNC: 93 MG/DL (ref 65–100)
POTASSIUM SERPL-SCNC: 4.2 MMOL/L (ref 3.5–5.1)
SODIUM SERPL-SCNC: 144 MMOL/L (ref 136–145)

## 2017-12-28 PROCEDURE — 80048 BASIC METABOLIC PNL TOTAL CA: CPT | Performed by: SURGERY

## 2017-12-28 PROCEDURE — 36415 COLL VENOUS BLD VENIPUNCTURE: CPT | Performed by: SURGERY

## 2017-12-29 ENCOUNTER — ANESTHESIA (OUTPATIENT)
Dept: SURGERY | Age: 72
End: 2017-12-29
Payer: MEDICARE

## 2017-12-29 ENCOUNTER — HOSPITAL ENCOUNTER (OUTPATIENT)
Age: 72
Setting detail: OUTPATIENT SURGERY
Discharge: HOME | End: 2017-12-29
Attending: SURGERY | Admitting: SURGERY
Payer: MEDICARE

## 2017-12-29 ENCOUNTER — APPOINTMENT (OUTPATIENT)
Dept: INTERVENTIONAL RADIOLOGY/VASCULAR | Age: 72
End: 2017-12-29
Attending: SURGERY
Payer: MEDICARE

## 2017-12-29 VITALS
HEIGHT: 71 IN | RESPIRATION RATE: 17 BRPM | WEIGHT: 190.9 LBS | BODY MASS INDEX: 26.73 KG/M2 | TEMPERATURE: 98 F | SYSTOLIC BLOOD PRESSURE: 176 MMHG | HEART RATE: 53 BPM | DIASTOLIC BLOOD PRESSURE: 93 MMHG | OXYGEN SATURATION: 98 %

## 2017-12-29 LAB
GLUCOSE BLD STRIP.AUTO-MCNC: 135 MG/DL (ref 65–100)
GLUCOSE BLD STRIP.AUTO-MCNC: 86 MG/DL (ref 65–100)

## 2017-12-29 PROCEDURE — 76060000033 HC ANESTHESIA 1 TO 1.5 HR: Performed by: SURGERY

## 2017-12-29 PROCEDURE — 74011250636 HC RX REV CODE- 250/636: Performed by: SURGERY

## 2017-12-29 PROCEDURE — 74011250636 HC RX REV CODE- 250/636: Performed by: ANESTHESIOLOGY

## 2017-12-29 PROCEDURE — 74011250636 HC RX REV CODE- 250/636

## 2017-12-29 PROCEDURE — 77030020782 HC GWN BAIR PAWS FLX 3M -B: Performed by: ANESTHESIOLOGY

## 2017-12-29 PROCEDURE — 76210000000 HC OR PH I REC 2 TO 2.5 HR: Performed by: SURGERY

## 2017-12-29 PROCEDURE — C1760 CLOSURE DEV, VASC: HCPCS

## 2017-12-29 PROCEDURE — C1887 CATHETER, GUIDING: HCPCS

## 2017-12-29 PROCEDURE — C1751 CATH, INF, PER/CENT/MIDLINE: HCPCS

## 2017-12-29 PROCEDURE — 74011000250 HC RX REV CODE- 250

## 2017-12-29 PROCEDURE — C1894 INTRO/SHEATH, NON-LASER: HCPCS

## 2017-12-29 PROCEDURE — C1769 GUIDE WIRE: HCPCS

## 2017-12-29 PROCEDURE — 74011000250 HC RX REV CODE- 250: Performed by: SURGERY

## 2017-12-29 PROCEDURE — 76210000020 HC REC RM PH II FIRST 0.5 HR: Performed by: SURGERY

## 2017-12-29 PROCEDURE — 76010000149 HC OR TIME 1 TO 1.5 HR: Performed by: SURGERY

## 2017-12-29 PROCEDURE — 74011636320 HC RX REV CODE- 636/320: Performed by: SURGERY

## 2017-12-29 PROCEDURE — 74011250637 HC RX REV CODE- 250/637: Performed by: ANESTHESIOLOGY

## 2017-12-29 PROCEDURE — 76937 US GUIDE VASCULAR ACCESS: CPT

## 2017-12-29 PROCEDURE — 82962 GLUCOSE BLOOD TEST: CPT

## 2017-12-29 RX ORDER — HEPARIN SODIUM 1000 [USP'U]/ML
INJECTION, SOLUTION INTRAVENOUS; SUBCUTANEOUS AS NEEDED
Status: DISCONTINUED | OUTPATIENT
Start: 2017-12-29 | End: 2017-12-29 | Stop reason: HOSPADM

## 2017-12-29 RX ORDER — OXYCODONE AND ACETAMINOPHEN 5; 325 MG/1; MG/1
1 TABLET ORAL
Status: DISCONTINUED | OUTPATIENT
Start: 2017-12-29 | End: 2017-12-29 | Stop reason: HOSPADM

## 2017-12-29 RX ORDER — SODIUM CHLORIDE, SODIUM LACTATE, POTASSIUM CHLORIDE, CALCIUM CHLORIDE 600; 310; 30; 20 MG/100ML; MG/100ML; MG/100ML; MG/100ML
1000 INJECTION, SOLUTION INTRAVENOUS CONTINUOUS
Status: DISCONTINUED | OUTPATIENT
Start: 2017-12-29 | End: 2017-12-29 | Stop reason: HOSPADM

## 2017-12-29 RX ORDER — HYDROMORPHONE HYDROCHLORIDE 2 MG/ML
0.5 INJECTION, SOLUTION INTRAMUSCULAR; INTRAVENOUS; SUBCUTANEOUS
Status: DISCONTINUED | OUTPATIENT
Start: 2017-12-29 | End: 2017-12-29 | Stop reason: HOSPADM

## 2017-12-29 RX ORDER — MORPHINE SULFATE 10 MG/ML
1 INJECTION, SOLUTION INTRAMUSCULAR; INTRAVENOUS
Status: DISCONTINUED | OUTPATIENT
Start: 2017-12-29 | End: 2017-12-29 | Stop reason: HOSPADM

## 2017-12-29 RX ORDER — SODIUM CHLORIDE 0.9 % (FLUSH) 0.9 %
5-10 SYRINGE (ML) INJECTION AS NEEDED
Status: DISCONTINUED | OUTPATIENT
Start: 2017-12-29 | End: 2017-12-29 | Stop reason: HOSPADM

## 2017-12-29 RX ORDER — GLYCOPYRROLATE 0.2 MG/ML
INJECTION INTRAMUSCULAR; INTRAVENOUS AS NEEDED
Status: DISCONTINUED | OUTPATIENT
Start: 2017-12-29 | End: 2017-12-29 | Stop reason: HOSPADM

## 2017-12-29 RX ORDER — PROTAMINE SULFATE 10 MG/ML
INJECTION, SOLUTION INTRAVENOUS AS NEEDED
Status: DISCONTINUED | OUTPATIENT
Start: 2017-12-29 | End: 2017-12-29 | Stop reason: HOSPADM

## 2017-12-29 RX ORDER — SODIUM CHLORIDE 9 MG/ML
75 INJECTION, SOLUTION INTRAVENOUS CONTINUOUS
Status: DISCONTINUED | OUTPATIENT
Start: 2017-12-29 | End: 2017-12-29 | Stop reason: HOSPADM

## 2017-12-29 RX ORDER — MIDAZOLAM HYDROCHLORIDE 1 MG/ML
2 INJECTION, SOLUTION INTRAMUSCULAR; INTRAVENOUS
Status: DISCONTINUED | OUTPATIENT
Start: 2017-12-29 | End: 2017-12-29 | Stop reason: HOSPADM

## 2017-12-29 RX ORDER — HEPARIN SODIUM 5000 [USP'U]/ML
INJECTION, SOLUTION INTRAVENOUS; SUBCUTANEOUS AS NEEDED
Status: DISCONTINUED | OUTPATIENT
Start: 2017-12-29 | End: 2017-12-29 | Stop reason: HOSPADM

## 2017-12-29 RX ORDER — GABAPENTIN 100 MG/1
200 CAPSULE ORAL ONCE
Status: COMPLETED | OUTPATIENT
Start: 2017-12-29 | End: 2017-12-29

## 2017-12-29 RX ORDER — ONDANSETRON 2 MG/ML
4 INJECTION INTRAMUSCULAR; INTRAVENOUS
Status: DISCONTINUED | OUTPATIENT
Start: 2017-12-29 | End: 2017-12-29 | Stop reason: HOSPADM

## 2017-12-29 RX ORDER — ALBUTEROL SULFATE 0.83 MG/ML
2.5 SOLUTION RESPIRATORY (INHALATION) AS NEEDED
Status: DISCONTINUED | OUTPATIENT
Start: 2017-12-29 | End: 2017-12-29 | Stop reason: HOSPADM

## 2017-12-29 RX ORDER — PROPOFOL 10 MG/ML
INJECTION, EMULSION INTRAVENOUS AS NEEDED
Status: DISCONTINUED | OUTPATIENT
Start: 2017-12-29 | End: 2017-12-29 | Stop reason: HOSPADM

## 2017-12-29 RX ORDER — DIPHENHYDRAMINE HYDROCHLORIDE 50 MG/ML
12.5 INJECTION, SOLUTION INTRAMUSCULAR; INTRAVENOUS
Status: DISCONTINUED | OUTPATIENT
Start: 2017-12-29 | End: 2017-12-29 | Stop reason: HOSPADM

## 2017-12-29 RX ORDER — CEFAZOLIN SODIUM/WATER 2 G/20 ML
2 SYRINGE (ML) INTRAVENOUS
Status: COMPLETED | OUTPATIENT
Start: 2017-12-29 | End: 2017-12-29

## 2017-12-29 RX ORDER — LIDOCAINE HYDROCHLORIDE 10 MG/ML
0.1 INJECTION INFILTRATION; PERINEURAL AS NEEDED
Status: DISCONTINUED | OUTPATIENT
Start: 2017-12-29 | End: 2017-12-29 | Stop reason: HOSPADM

## 2017-12-29 RX ORDER — SODIUM CHLORIDE 0.9 % (FLUSH) 0.9 %
5-10 SYRINGE (ML) INJECTION EVERY 8 HOURS
Status: DISCONTINUED | OUTPATIENT
Start: 2017-12-29 | End: 2017-12-29 | Stop reason: HOSPADM

## 2017-12-29 RX ORDER — FENTANYL CITRATE 50 UG/ML
INJECTION, SOLUTION INTRAMUSCULAR; INTRAVENOUS AS NEEDED
Status: DISCONTINUED | OUTPATIENT
Start: 2017-12-29 | End: 2017-12-29 | Stop reason: HOSPADM

## 2017-12-29 RX ORDER — ACETAMINOPHEN 500 MG
1000 TABLET ORAL ONCE
Status: COMPLETED | OUTPATIENT
Start: 2017-12-29 | End: 2017-12-29

## 2017-12-29 RX ORDER — LIDOCAINE HYDROCHLORIDE 20 MG/ML
INJECTION, SOLUTION INFILTRATION; PERINEURAL AS NEEDED
Status: DISCONTINUED | OUTPATIENT
Start: 2017-12-29 | End: 2017-12-29 | Stop reason: HOSPADM

## 2017-12-29 RX ORDER — PROPOFOL 10 MG/ML
INJECTION, EMULSION INTRAVENOUS
Status: DISCONTINUED | OUTPATIENT
Start: 2017-12-29 | End: 2017-12-29 | Stop reason: HOSPADM

## 2017-12-29 RX ADMIN — Medication 2 G: at 07:24

## 2017-12-29 RX ADMIN — PROTAMINE SULFATE 20 MG: 10 INJECTION, SOLUTION INTRAVENOUS at 08:22

## 2017-12-29 RX ADMIN — FENTANYL CITRATE 25 MCG: 50 INJECTION, SOLUTION INTRAMUSCULAR; INTRAVENOUS at 07:45

## 2017-12-29 RX ADMIN — HEPARIN SODIUM 4000 UNITS: 1000 INJECTION, SOLUTION INTRAVENOUS; SUBCUTANEOUS at 08:01

## 2017-12-29 RX ADMIN — ACETAMINOPHEN 1000 MG: 500 TABLET, FILM COATED ORAL at 06:16

## 2017-12-29 RX ADMIN — SODIUM CHLORIDE, SODIUM LACTATE, POTASSIUM CHLORIDE, AND CALCIUM CHLORIDE: 600; 310; 30; 20 INJECTION, SOLUTION INTRAVENOUS at 07:19

## 2017-12-29 RX ADMIN — GABAPENTIN 200 MG: 100 CAPSULE ORAL at 06:58

## 2017-12-29 RX ADMIN — PROPOFOL 50 MG: 10 INJECTION, EMULSION INTRAVENOUS at 07:29

## 2017-12-29 RX ADMIN — SODIUM CHLORIDE, SODIUM LACTATE, POTASSIUM CHLORIDE, AND CALCIUM CHLORIDE 1000 ML: 600; 310; 30; 20 INJECTION, SOLUTION INTRAVENOUS at 06:16

## 2017-12-29 RX ADMIN — PROPOFOL 140 MCG/KG/MIN: 10 INJECTION, EMULSION INTRAVENOUS at 07:29

## 2017-12-29 RX ADMIN — GLYCOPYRROLATE 0.1 MG: 0.2 INJECTION INTRAMUSCULAR; INTRAVENOUS at 07:38

## 2017-12-29 RX ADMIN — FENTANYL CITRATE 25 MCG: 50 INJECTION, SOLUTION INTRAMUSCULAR; INTRAVENOUS at 07:37

## 2017-12-29 NOTE — ANESTHESIA POSTPROCEDURE EVALUATION
Post-Anesthesia Evaluation and Assessment    Patient: Jolanta Le MRN: [id number]  SSN: [id number]    YOB: 1945  Age: 71 y.o. Sex: male       Cardiovascular Function/Vital Signs  Visit Vitals    /86    Pulse (!) 57    Temp 36.6 °C (97.9 °F)    Resp 16    Ht 5' 10.5\" (1.791 m)    Wt 86.6 kg (190 lb 14.4 oz)    SpO2 98%    BMI 27 kg/m2       Patient is status post total IV anesthesia anesthesia for Procedure(s):  BILATERAL LOWER EXTREMITY ARTERIOGRAM , AORTAGRAM WITH RUN OFF, ULTRASOUND GUIDED ACCESS, . Nausea/Vomiting: None    Postoperative hydration reviewed and adequate. Pain:  Pain Scale 1: Numeric (0 - 10) (12/29/17 0857)  Pain Intensity 1: 0 (12/29/17 0857)   Managed    Neurological Status:   Neuro (WDL): Within Defined Limits (12/29/17 0844)   At baseline    Mental Status and Level of Consciousness: Arousable    Pulmonary Status:   O2 Device: Nasal cannula (12/29/17 0844)   Adequate oxygenation and airway patent    Complications related to anesthesia: None    Post-anesthesia assessment completed.  No concerns    Signed By: Kota Valdivia MD     December 29, 2017

## 2017-12-29 NOTE — DISCHARGE INSTRUCTIONS
*January 5th @ 3PM for Bilateral Lower Extremity vein mapping  * January 11th @ 3:15PM for Follow up with Dr. Manuel Last: What to Expect at [address] [facility]  The doctor inserted a thin, flexible tube (catheter) into a blood vessel in your groin. In some cases, the catheter is placed in a blood vessel in the arm. After an arteriogram, your groin or arm may have a bruise and feel sore for a day or two. You can do light activities around the house but nothing strenuous for several days. Your doctor may give you specific instructions on when you can do your normal activities again, such as driving and going back to work. This care sheet gives you a general idea about how long it will take for you to recover. But each person recovers at a different pace. Follow the steps below to feel better as quickly as possible. How can you care for yourself at home? Activity  · Do not do strenuous exercise and do not lift, pull, or push anything heavy until your doctor says it is okay. This may be for a day or two. You can walk around the house and do light activity, such as cooking. · You may shower 24 to 48 hours after the procedure, if your doctor okays it. Pat the incision dry. Do not take a bath for 1 week, or until your doctor tells you it is okay. · If the catheter was placed in your groin, try not to walk up stairs for the first couple of days. · If your doctor recommends it, get more exercise. Walking is a good choice. Bit by bit, increase the amount you walk every day. Try for at least 30 minutes on most days of the week. Diet  · Drink plenty of fluids to help your body flush out the dye. If you have kidney, heart, or liver disease and have to limit fluids, talk with your doctor before you increase the amount of fluids you drink. · You can eat your normal diet. If your stomach is upset, try bland, low-fat foods like plain rice, broiled chicken, toast, and yogurt. Medicines  · Be safe with medicines. Read and follow all instructions on the label. ¨ If the doctor gave you a prescription medicine for pain, take it as prescribed. ¨ If you are not taking a prescription pain medicine, ask your doctor if you can take an over-the-counter medicine. · If you take blood thinners, such as warfarin (Coumadin), clopidogrel (Plavix), or aspirin, be sure to talk to your doctor. He or she will tell you if and when to start taking those medicines again. Make sure that you understand exactly what your doctor wants you to do. · Your doctor will tell you if and when you can restart your medicines. He or she will also give you instructions about taking any new medicines. Care of the catheter site  · You will have a dressing over the cut (incision). A dressing helps the incision heal and protects it. Your doctor will tell you how to take care of this. · Put ice or a cold pack on the area for 10 to 20 minutes at a time to help with soreness or swelling. Put a thin cloth between the ice and your skin. Follow-up care is a key part of your treatment and safety. Be sure to make and go to all appointments, and call your doctor if you are having problems. It's also a good idea to know your test results and keep a list of the medicines you take. When should you call for help? Call 911 anytime you think you may need emergency care. For example, call if:  · You passed out (lost consciousness). · You have severe trouble breathing. · You have sudden chest pain and shortness of breath, or you cough up blood. Call your doctor now or seek immediate medical care if:  · You are bleeding from the area where the catheter was put in your artery. · You have a fast-growing, painful lump at the catheter site. · You have signs of infection, such as:  ¨ Increased pain, swelling, warmth, or redness. ¨ Red streaks leading from the incision. ¨ Pus draining from the incision. ¨ A fever.   Watch closely for any changes in your health, and be sure to contact your doctor if:  · You don't get better as expected. After general anesthesia or intravenous sedation, for 24 hours or while taking prescription Narcotics:  · Limit your activities  · Do not drive and operate hazardous machinery  · Do not make important personal or business decisions  · Do  not drink alcoholic beverages  · If you have not urinated within 8 hours after discharge, please contact your surgeon on call. *  Please give a list of your current medications to your Primary Care Provider. *  Please update this list whenever your medications are discontinued, doses are      changed, or new medications (including over-the-counter products) are added. *  Please carry medication information at all times in case of emergency situations. These are general instructions for a healthy lifestyle:  No smoking/ No tobacco products/ Avoid exposure to second hand smoke  Surgeon General's Warning:  Quitting smoking now greatly reduces serious risk to your health. Obesity, smoking, and sedentary lifestyle greatly increases your risk for illness  A healthy diet, regular physical exercise & weight monitoring are important for maintaining a healthy lifestyle    You may be retaining fluid if you have a history of heart failure or if you experience any of the following symptoms:  Weight gain of 3 pounds or more overnight or 5 pounds in a week, increased swelling in our hands or feet or shortness of breath while lying flat in bed. Please call your doctor as soon as you notice any of these symptoms; do not wait until your next office visit. Recognize signs and symptoms of STROKE:    F-face looks uneven    A-arms unable to move or move unevenly    S-speech slurred or non-existent    T-time-call 911 as soon as signs and symptoms begin-DO NOT go       Back to bed or wait to see if you get better-TIME IS BRAIN.

## 2017-12-29 NOTE — OP NOTES
[address] REPORT    Maryellen Kauffman  MR#: [id number]  : 1945  ACCOUNT #: [de-identified]   DATE OF SERVICE: 2017    PREOPERATIVE DIAGNOSIS:  Right lower extremity ischemia. with right 1st toe infection. POSTOPERATIVE DIAGNOSIS:  Right lower extremity ischemia, with right 1st toe infection. PROCEDURES PERFORMED;  1. Aortogram, with bilateral lower extremity runoff imaging. 2.  Selective right lower extremity arteriogram (third-order). 3.  Ultrasound-guided left femoral artery access. SURGEON:  Thomas Lopez MD    ANESTHESIA:  IV sedation, with 1% lidocaine as local.      TOTAL CONTRAST: 108 mL. TOTAL FLUOROSCOPY TIME:  9 minutes. DESCRIPTION OF PROCEDURE:  The patient brought to the Endo suite, placed on the Endo table in supine position. Following adequate IV sedation and a time-out procedure, the groins were draped and prepped in sterile fashion. The left common femoral artery was percutaneously punctured under direct vision using ultrasound and local anesthesia. A 5-Ecuadorean sheath was placed over a guidewire. Next, a 5-Ecuadorean Omniflush catheter was advanced in the supraceliac aorta. Abdominal aortogram was performed. The catheter was then pulled down to just above the aortic bifurcation, where bilateral lower extremity runoff imaging was performed. At this point, the [employer] [address] catheter was used to direct a wire over to the right side. A 6-Ecuadorean crossover sheath was then advanced to the distal SFA. At this point, serial imaging of the tibial vessels was performed in multiple views, including lateral foot. I see no opportunity for catheter-based intervention based on the severity of his tibial disease. The guidewires and catheters were removed, and the puncture site was closed with the Mynx closure device. Sterile dressings were applied. The patient was awakened from anesthesia and transferred to the recovery room in stable condition. The patient tolerated the procedure well. No complications. ANGIOGRAPHIC FINDINGS:  The abdominal aorta is normal caliber. There are multiple areas of irregularity, but no luminal deficit is seen. There appear to be single renal arteries bilaterally, each of which are widely patent. The common iliac arteries were patent bilaterally. The hypogastric arteries were patent bilaterally. The external iliac arteries are normal in appearance bilaterally. On the left, the common femoral and profunda are widely patent. The SFA is patent throughout its length. The popliteal artery is normal in appearance. The trifurcation of the tibial vessels is occluded. There is no reconstitution of any named vessels in the left lower extremity. There does appear to be reconstitution of the medial plantar branch of the posterior tibial artery on this side. On the right, the common femoral and profunda are patent. The SFA is patent throughout its length. There is mild irregularity of the popliteal artery, with no evidence of hemodynamically significant stenosis. The anterior tibial artery is occluded shortly beyond its origin. The tibioperoneal trunk is patent; however, the posterior tibial and peroneal arteries appear to be occluded. There are multiple diffuse collaterals present below the level of chronic occlusion of the tibial vessels. There ultimately is reconstitution of a very small distal anterior tibial artery that fills to an even smaller dorsalis pedis artery on the foot. I cannot tell if it is in continuity with the plantar arch or not. There also was reconstitution of the posterior tibial artery at its bifurcation with the medial and lateral plantar branches. I believe the medial plantar branch fills the plantar arch. 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       MD Momo Altamirano  D: 12/29/2017 14:41     T: 12/29/2017 16:38  JOB #: [id number]

## 2017-12-29 NOTE — IP AVS SNAPSHOT
[facility] 
 
 
 [address] [facility] [address] 
[phone] Patient: Ole Alegria MRN: [id number] About your hospitalization You were admitted on:  December 29, 2017 You last received care in the:  [facility] PACU You were discharged on:  December 29, 2017 Why you were hospitalized Your primary diagnosis was:  Not on File Things You Need To Do (next 8 weeks) Follow up with Inez Hale MD  
  
Phone:  [phone] Where:  [address] [place] [address], [address] [place] [id number] Follow up with Del Dean MD  
  
Phone:  [phone] Where:  [facility], [address] [facility], Vascular [address] [facility] [address] Monday Jan 22, 2018 ESTABLISHED PATIENT with VSAE PROVIDER at  2:00 PM  
Where:  [facility] ([facility]) Discharge Orders None A check yonas indicates which time of day the medication should be taken. My Medications TAKE these medications as instructed Instructions Each Dose to Equal  
 Morning Noon Evening Bedtime  
 allopurinol 300 mg tablet Commonly known as:  Bk Croon Your last dose was: Your next dose is: Take  by mouth daily. apixaban 5 mg tablet Commonly known as:  Gillian Nunes Your last dose was: Your next dose is: Take 1 Tab by mouth two (2) times a day. 5 mg  
    
   
   
   
  
 aspirin delayed-release 81 mg tablet Your last dose was: Your next dose is: Take 81 mg by mouth nightly. 81 mg  
    
   
   
   
  
 dilTIAZem  mg Tb24 tablet Commonly known as:  CARDIZEM LA Your last dose was: Your next dose is: Take 360 mg by mouth daily.  Patient has not been taking for several days, called to doctor to see if he was to continue this 360 mg  
    
   
   
   
  
 ELIDEL 1 % topical cream  
Generic drug:  pimecrolimus Your last dose was: Your next dose is:    
   
   
 Apply  to affected area two (2) times a day. esomeprazole 40 mg capsule Commonly known as:  NexIUM Your last dose was: Your next dose is: Take 1 Cap by mouth daily. Indications: am  
 40 mg  
    
   
   
   
  
 FLONASE 50 mcg/actuation nasal spray Generic drug:  fluticasone Your last dose was: Your next dose is:    
   
   
 as needed. gabapentin 100 mg capsule Commonly known as:  NEURONTIN Your last dose was: Your next dose is: Take 1 Cap by mouth two (2) times a day. 100 mg  
    
   
   
   
  
 glucose blood VI test strips strip Commonly known as:  blood glucose test  
   
Your last dose was: Your next dose is:    
   
   
 Test once to twice daily DX: E11.8 Lancets Misc Your last dose was: Your next dose is:    
   
   
 Test once to twice daily DX: E11.8  
     
   
   
   
  
 losartan 100 mg tablet Commonly known as:  COZAAR Your last dose was: Your next dose is: Take 100 mg by mouth daily. Call in to doctor to see if he should restart this 100 mg  
    
   
   
   
  
 metoprolol tartrate 50 mg tablet Commonly known as:  LOPRESSOR Your last dose was: Your next dose is: Take 1 Tab by mouth two (2) times a day. 50 mg  
    
   
   
   
  
 mometasone 0.1 % topical cream  
Commonly known as:  Migue Young Your last dose was: Your next dose is:    
   
   
      
   
   
   
  
 nitroglycerin 0.4 mg SL tablet Commonly known as:  NITROSTAT Your last dose was: Your next dose is: 1 Tab by SubLINGual route every five (5) minutes as needed for Chest Pain. 0.4 mg  
    
   
   
   
  
 pravastatin 40 mg tablet Commonly known as:  PRAVACHOL Your last dose was: Your next dose is: Take 1 Tab by mouth nightly. 40 mg  
    
   
   
   
  
 RANEXA 500 mg SR tablet Generic drug:  ranolazine ER Your last dose was: Your next dose is: TAKE 1 TABLET TWICE A DAY  
     
   
   
   
  
 tacrolimus 0.1 % ointment Commonly known as:  PROTOPIC Your last dose was: Your next dose is:    
   
   
      
   
   
   
  
 traMADol 50 mg tablet Commonly known as:  ULTRAM  
   
Your last dose was: Your next dose is: Take 1 Tab by mouth every six (6) hours as needed for Pain. Max Daily Amount: 200 mg.  
 50 mg  
    
   
   
   
  
 triamcinolone acetonide 0.1 % ointment Commonly known as:  KENALOG Your last dose was: Your next dose is: VITAMIN D3 2,000 unit Tab Generic drug:  cholecalciferol (vitamin D3) Your last dose was: Your next dose is: Take 2,000 Units by mouth daily. Indications: OSTEOPOROSIS, am  
 2000 Units ZyrTEC 10 mg Cap Generic drug:  Cetirizine Your last dose was: Your next dose is: Take 10 mg by mouth nightly. Indications: ALLERGIC RHINITIS 10 mg Discharge Instructions *January 5th @ 3PM for Bilateral Lower Extremity vein mapping * January 11th @ 3:15PM for Follow up with Dr. Archana Rhodes Arteriogram: What to Expect at [facility] Your Recovery The doctor inserted a thin, flexible tube (catheter) into a blood vessel in your groin. In some cases, the catheter is placed in a blood vessel in the arm.  
After an arteriogram, your groin or arm may have a bruise and feel sore for a day or two. You can do light activities around the house but nothing strenuous for several days. Your doctor may give you specific instructions on when you can do your normal activities again, such as driving and going back to work. This care sheet gives you a general idea about how long it will take for you to recover. But each person recovers at a different pace. Follow the steps below to feel better as quickly as possible. How can you care for yourself at home? Activity · Do not do strenuous exercise and do not lift, pull, or push anything heavy until your doctor says it is okay. This may be for a day or two. You can walk around the house and do light activity, such as cooking. · You may shower 24 to 48 hours after the procedure, if your doctor okays it. Pat the incision dry. Do not take a bath for 1 week, or until your doctor tells you it is okay. · If the catheter was placed in your groin, try not to walk up stairs for the first couple of days. · If your doctor recommends it, get more exercise. Walking is a good choice. Bit by bit, increase the amount you walk every day. Try for at least 30 minutes on most days of the week. Diet · Drink plenty of fluids to help your body flush out the dye. If you have kidney, heart, or liver disease and have to limit fluids, talk with your doctor before you increase the amount of fluids you drink. · You can eat your normal diet. If your stomach is upset, try bland, low-fat foods like plain rice, broiled chicken, toast, and yogurt. Medicines · Be safe with medicines. Read and follow all instructions on the label. ¨ If the doctor gave you a prescription medicine for pain, take it as prescribed. ¨ If you are not taking a prescription pain medicine, ask your doctor if you can take an over-the-counter medicine. · If you take blood thinners, such as warfarin (Coumadin), clopidogrel (Plavix), or aspirin, be sure to talk to your doctor.  He or she will tell you if and when to start taking those medicines again. Make sure that you understand exactly what your doctor wants you to do. · Your doctor will tell you if and when you can restart your medicines. He or she will also give you instructions about taking any new medicines. Care of the catheter site · You will have a dressing over the cut (incision). A dressing helps the incision heal and protects it. Your doctor will tell you how to take care of this. · Put ice or a cold pack on the area for 10 to 20 minutes at a time to help with soreness or swelling. Put a thin cloth between the ice and your skin. Follow-up care is a key part of your treatment and safety. Be sure to make and go to all appointments, and call your doctor if you are having problems. It's also a good idea to know your test results and keep a list of the medicines you take. When should you call for help? Call 911 anytime you think you may need emergency care. For example, call if: 
· You passed out (lost consciousness). · You have severe trouble breathing. · You have sudden chest pain and shortness of breath, or you cough up blood. Call your doctor now or seek immediate medical care if: 
· You are bleeding from the area where the catheter was put in your artery. · You have a fast-growing, painful lump at the catheter site. · You have signs of infection, such as: 
¨ Increased pain, swelling, warmth, or redness. ¨ Red streaks leading from the incision. ¨ Pus draining from the incision. ¨ A fever. Watch closely for any changes in your health, and be sure to contact your doctor if: 
· You don't get better as expected. After general anesthesia or intravenous sedation, for 24 hours or while taking prescription Narcotics: · Limit your activities · Do not drive and operate hazardous machinery · Do not make important personal or business decisions · Do  not drink alcoholic beverages · If you have not urinated within 8 hours after discharge, please contact your surgeon on call. *  Please give a list of your current medications to your Primary Care Provider. *  Please update this list whenever your medications are discontinued, doses are 
    changed, or new medications (including over-the-counter products) are added. *  Please carry medication information at all times in case of emergency situations. These are general instructions for a healthy lifestyle: No smoking/ No tobacco products/ Avoid exposure to second hand smoke Surgeon General's Warning:  Quitting smoking now greatly reduces serious risk to your health. Obesity, smoking, and sedentary lifestyle greatly increases your risk for illness A healthy diet, regular physical exercise & weight monitoring are important for maintaining a healthy lifestyle You may be retaining fluid if you have a history of heart failure or if you experience any of the following symptoms:  Weight gain of 3 pounds or more overnight or 5 pounds in a week, increased swelling in our hands or feet or shortness of breath while lying flat in bed. Please call your doctor as soon as you notice any of these symptoms; do not wait until your next office visit. Recognize signs and symptoms of STROKE: 
 
F-face looks uneven A-arms unable to move or move unevenly S-speech slurred or non-existent T-time-call 911 as soon as signs and symptoms begin-DO NOT go Back to bed or wait to see if you get better-TIME IS BRAIN. ACO Transitions of Care Introducing [employer] [address] Noriega offers a voluntary care coordination program to provide high quality service and care to [facility] fee-for-service beneficiaries. Taylor Spencer was designed to help you enhance your health and well-being through the following services: ? Transitions of Care  support for individuals who are transitioning from one care setting to another (example: Hospital to home). ? Chronic and Complex Care Coordination  support for individuals and caregivers of those with serious or chronic illnesses or with more than one chronic (ongoing) condition and those who take a number of different medications. If you meet specific medical criteria, a [address] may call you directly to coordinate your care with your primary care physician and your other care providers. For questions about the [facility] programs, please, contact your physicians office. For general questions or additional information about Accountable Care Organizations: 
Please visit www.medicare.gov/acos. html or call 1-800-MEDICARE (9-182.914.6914) TTY users should call 0-528.840.6109. Introducing [facility]! Dear Elaine Lozoya: Thank you for requesting a [facility] account. Our records indicate that you already have an active [facility] account. You can access your account anytime at [email or web address] Did you know that you can access your hospital and ER discharge instructions at any time in [facility]? You can also review all of your test results from your hospital stay or ER visit. Additional Information If you have questions, please visit the Frequently Asked Questions section of the [facility] website at [email or web address] Remember, [facility] is NOT to be used for urgent needs. For medical emergencies, dial 911. Now available from your iPhone and Android! Unresulted Labs-Please follow up with your PCP about these lab tests Order Current Status IR ANGIO EXT LOWER BI In process Providers Seen During Your Hospitalization Provider Specialty Primary office phone Velia Webster MD Vascular Surgery [phone] Your Primary Care Physician (PCP) Primary Care Physician Office Phone Office Fax Yadira Abebe [phone] You are allergic to the following Allergen Reactions Celecoxib Swelling Hands Ibuprofen Unknown (comments) Patient unsure Lescol (Fluvastatin) Unknown (comments) Other reaction(s): Unknown (comments) Nsaids (Non-Steroidal Anti-Inflammatory Drug) Other (comments) Elevated serum creatinine Sulfa (Sulfonamide Antibiotics) Rash Uloric (Febuxostat) Other (comments) Joint Pain Recent Documentation Height Weight BMI Smoking Status 1.791 m 86.6 kg 27 kg/m2 Former Smoker Emergency Contacts Name Discharge Info Relation Home Work Mobile Neville Perez CAREGIVER [3] Spouse [3] [phone] Patient Belongings The following personal items are in your possession at time of discharge: 
  Dental Appliances: Uppers, Lowers, Partials  Visual Aid: Glasses, At home      Home Medications: None   Jewelry: Ring (gold colored wedding band with no stones)  Clothing: Footwear, Jacket/Coat, Shirt, Pants, Undergarments    Other Valuables: None Please provide this summary of care documentation to your next provider. Signatures-by signing, you are acknowledging that this After Visit Summary has been reviewed with you and you have received a copy. Patient Signature:  ____________________________________________________________ Date:  ____________________________________________________________  
  
Shea Moritz Provider Signature:  ____________________________________________________________ Date:  ____________________________________________________________

## 2017-12-29 NOTE — PROGRESS NOTES
's Pre-op visit requested by patient. Conveyed care and concern for patient and family. Offered prayer as requested for patient, family, and staff.     Martin Gomes MDiv, BS  Board Certified

## 2018-01-04 PROBLEM — I99.8 ISCHEMIA OF RIGHT LOWER EXTREMITY: Status: ACTIVE | Noted: 2018-01-04

## 2018-01-04 PROBLEM — L08.9 TOE INFECTION: Status: ACTIVE | Noted: 2018-01-04

## 2018-02-04 ENCOUNTER — APPOINTMENT (OUTPATIENT)
Dept: GENERAL RADIOLOGY | Age: 73
End: 2018-02-04
Attending: EMERGENCY MEDICINE
Payer: MEDICARE

## 2018-02-04 ENCOUNTER — HOSPITAL ENCOUNTER (EMERGENCY)
Age: 73
Discharge: HOME | End: 2018-02-04
Attending: EMERGENCY MEDICINE
Payer: MEDICARE

## 2018-02-04 VITALS
DIASTOLIC BLOOD PRESSURE: 71 MMHG | SYSTOLIC BLOOD PRESSURE: 131 MMHG | WEIGHT: 187 LBS | TEMPERATURE: 98.2 F | RESPIRATION RATE: 16 BRPM | BODY MASS INDEX: 26.77 KG/M2 | OXYGEN SATURATION: 97 % | HEART RATE: 65 BPM | HEIGHT: 70 IN

## 2018-02-04 DIAGNOSIS — M25.511 ACUTE PAIN OF RIGHT SHOULDER: Primary | ICD-10-CM

## 2018-02-04 PROCEDURE — 73030 X-RAY EXAM OF SHOULDER: CPT

## 2018-02-04 PROCEDURE — L3650 SO 8 ABD RESTRAINT PRE OTS: HCPCS | Performed by: EMERGENCY MEDICINE

## 2018-02-04 PROCEDURE — 99283 EMERGENCY DEPT VISIT LOW MDM: CPT | Performed by: EMERGENCY MEDICINE

## 2018-02-04 NOTE — DISCHARGE INSTRUCTIONS
Shoulder Pain: Care Instructions  Your Care Instructions    You can hurt your shoulder by using it too much during an activity, such as fishing or baseball. It can also happen as part of the everyday wear and tear of getting older. Shoulder injuries can be slow to heal, but your shoulder should get better with time. Your doctor may recommend a sling to rest your shoulder. If you have injured your shoulder, you may need testing and treatment. Follow-up care is a key part of your treatment and safety. Be sure to make and go to all appointments, and call your doctor if you are having problems. It's also a good idea to know your test results and keep a list of the medicines you take. How can you care for yourself at home? · Take pain medicines exactly as directed. ¨ If the doctor gave you a prescription medicine for pain, take it as prescribed. ¨ If you are not taking a prescription pain medicine, ask your doctor if you can take an over-the-counter medicine. ¨ Do not take two or more pain medicines at the same time unless the doctor told you to. Many pain medicines contain acetaminophen, which is Tylenol. Too much acetaminophen (Tylenol) can be harmful. · If your doctor recommends that you wear a sling, use it as directed. Do not take it off before your doctor tells you to. · Put ice or a cold pack on the sore area for 10 to 20 minutes at a time. Put a thin cloth between the ice and your skin. · If there is no swelling, you can put moist heat, a heating pad, or a warm cloth on your shoulder. Some doctors suggest alternating between hot and cold. · Rest your shoulder for a few days. If your doctor recommends it, you can then begin gentle exercise of the shoulder, but do not lift anything heavy. When should you call for help? Call 911 anytime you think you may need emergency care. For example, call if:  ? · You have chest pain or pressure. This may occur with:  ¨ Sweating.   ¨ Shortness of breath. ¨ Nausea or vomiting. ¨ Pain that spreads from the chest to the neck, jaw, or one or both shoulders or arms. ¨ Dizziness or lightheadedness. ¨ A fast or uneven pulse. After calling 911, chew 1 adult-strength aspirin. Wait for an ambulance. Do not try to drive yourself. ? · Your arm or hand is cool or pale or changes color. ?Call your doctor now or seek immediate medical care if:  ? · You have signs of infection, such as:  ¨ Increased pain, swelling, warmth, or redness in your shoulder. ¨ Red streaks leading from a place on your shoulder. ¨ Pus draining from an area of your shoulder. ¨ Swollen lymph nodes in your neck, armpits, or groin. ¨ A fever. ? Watch closely for changes in your health, and be sure to contact your doctor if:  ? · You cannot use your shoulder. ? · Your shoulder does not get better as expected. Where can you learn more? Go to http://maykel-xander.info/. Enter O433 in the search box to learn more about \"Shoulder Pain: Care Instructions. \"  Current as of: March 21, 2017  Content Version: 11.4  © 3008-1535 [employer]. Care instructions adapted under license by [employer] (which disclaims liability or warranty for this information). If you have questions about a medical condition or this instruction, always ask your healthcare professional. [place] [address] any warranty or liability for your use of this information. Arthritis: Care Instructions  Your Care Instructions  Arthritis, also called osteoarthritis, is a breakdown of the cartilage that cushions your joints. When the cartilage wears down, your bones rub against each other. This causes pain and stiffness. Many people have some arthritis as they age. Arthritis most often affects the joints of the spine, hands, hips, knees, or feet. You can take simple measures to protect your joints, ease your pain, and help you stay active.   Follow-up care is a key part of your treatment and safety. Be sure to make and go to all appointments, and call your doctor if you are having problems. It's also a good idea to know your test results and keep a list of the medicines you take. How can you care for yourself at home? · Stay at a healthy weight. Being overweight puts extra strain on your joints. · Talk to your doctor or physical therapist about exercises that will help ease joint pain. ¨ Stretch. You may enjoy gentle forms of yoga to help keep your joints and muscles flexible. ¨ Walk instead of jog. Other types of exercise that are less stressful on the joints include riding a bicycle, swimming, adeel chi, or water exercise. ¨ Lift weights. Strong muscles help reduce stress on your joints. Stronger thigh muscles, for example, take some of the stress off of the knees and hips. Learn the right way to lift weights so you do not make joint pain worse. · Take your medicines exactly as prescribed. Call your doctor if you think you are having a problem with your medicine. · Take pain medicines exactly as directed. ¨ If the doctor gave you a prescription medicine for pain, take it as prescribed. ¨ If you are not taking a prescription pain medicine, ask your doctor if you can take an over-the-counter medicine. · Use a cane, crutch, walker, or another device if you need help to get around. These can help rest your joints. You also can use other things to make life easier, such as a higher toilet seat and padded handles on kitchen utensils. · Do not sit in low chairs, which can make it hard to get up. · Put heat or cold on your sore joints as needed. Use whichever helps you most. You also can take turns with hot and cold packs. ¨ Apply heat 2 or 3 times a day for 20 to 30 minutes-using a heating pad, hot shower, or hot pack-to relieve pain and stiffness. ¨ Put ice or a cold pack on your sore joint for 10 to 20 minutes at a time.  Put a thin cloth between the ice and your skin.  When should you call for help? Call your doctor now or seek immediate medical care if:  ? · You have sudden swelling, warmth, or pain in any joint. ? · You have joint pain and a fever or rash. ? · You have such bad pain that you cannot use a joint. ? Watch closely for changes in your health, and be sure to contact your doctor if:  ? · You have mild joint symptoms that continue even with more than 6 weeks of care at home. ? · You have stomach pain or other problems with your medicine. Where can you learn more? Go to http://maykel-xander.info/. Enter D295 in the search box to learn more about \"Arthritis: Care Instructions. \"  Current as of: October 31, 2016  Content Version: 11.4  © 1761-6821 [employer]. Care instructions adapted under license by [employer] (which disclaims liability or warranty for this information). If you have questions about a medical condition or this instruction, always ask your healthcare professional. [address] any warranty or liability for your use of this information.

## 2018-02-04 NOTE — ED TRIAGE NOTES
Pain in right shoulder since yesterday. Pt states he does not remember injury but pain is getting worse.

## 2018-02-04 NOTE — ED NOTES
I have reviewed discharge instructions with the patient. The patient verbalized understanding. Patient left ED via Discharge Method: ambulatory to Home with (spouse). Opportunity for questions and clarification provided. Patient given 0 scripts. To continue your aftercare when you leave the hospital, you may receive an automated call from our care team to check in on how you are doing. This is a free service and part of our promise to provide the best care and service to meet your aftercare needs.  If you have questions, or wish to unsubscribe from this service please call [phone]. Thank you for Choosing our [facility] Emergency Department.

## 2018-02-04 NOTE — ED PROVIDER NOTES
HPI Comments: Patient is a 70 yo male with right shoulder pain. States  It gradually started hurting yesterday afternoon and worsened to this morning. Denies any injury or sudden pain however states \"maybe it is out of socket\" and states he has \"bad cartilage\" and was supposed to have surgery in the past because his shoulder has come out of socket before. No chest pain, no SOB, no weakness or his extremities or numbness, no fevers or chills, no redness. No further complaints. Patient well appearing. Patient is a 71 y.o. male presenting with shoulder pain. The history is provided by the patient. No  was used.    Shoulder Pain           Past Medical History:   Diagnosis Date    Allergic rhinitis     Arthritis     CAD (coronary artery disease)     CABG '09; troponin elevated 7/14/12 (\"likely due to suply/demand mismatch in setting of fever and increased metabolic deman\"-per cardiac MD note 8/20/12)-had cath, echo, EKG-all WNL except cath showed 2 of the bypasses with blockages- \"occluded SVG to PDA & SVG to LISA\"; EF=55%    CVA (cerebral vascular accident) [facility]) 2017 August, 2017-Left , no residual weakness    Diabetes mellitus type 2, controlled ([address]     requires no medications for this    Dyslipidemia     ED (erectile dysfunction)     Encephalitis 1962    x 2/hospitalized as teenager    GERD (gastroesophageal reflux disease)     controlled with Nexium    Gout     gout-hx of - off Allopurinol x 1 month, no exacerbations    Hypertension     Impotence of organic origin     Lacunar infarct, acute ([address]     Lumbago     Mitral valve regurgitation 7/14/12    \"moderate\" on echo    ROBERTO (obstructive sleep apnea)     Prostate cancer ([address]     prostate    Psoriasis     SBO (small bowel obstruction) 2011, 2015, 2016    Unspecified sleep apnea     mild per patient, does not use CPAP       Past Surgical History:   Procedure Laterality Date   [address] exp lap    HX APPENDECTOMY  age 49         HX CATARACT REMOVAL Bilateral 2013    iol     HX COLONOSCOPY  , 2010    HX CORONARY ARTERY BYPASS GRAFT  2009    x4 bypass    HX HERNIA REPAIR Bilateral 2012    HX OTHER SURGICAL  1951    splenectomy    HX RADICAL PROSTATECTOMY       OH LEFT HEART CATH,PERCUTANEOUS  2012    no intervention    OH PROSTATE BIOPSY, NEEDLE, SATURATION SAMPLING      and ultrasound    VASCULAR SURGERY PROCEDURE UNLIST  2017    aortogram, w/cass LE runoff,R-LE arteriogram         Family History:   Problem Relation Age of Onset    Hypertension Mother    Flora Rung Gout Mother     Arthritis-osteo Mother     Heart Disease Mother       of Heart Disease    Coronary Artery Disease Mother     Diabetes Father     Cancer Father     Heart Disease Father     Bleeding Prob Paternal Grandmother     Hypertension Brother     Cancer Maternal Uncle      Prostate    Malignant Hyperthermia Neg Hx     Pseudocholinesterase Deficiency Neg Hx     Delayed Awakening Neg Hx     Post-op Nausea/Vomiting Neg Hx     Emergence Delirium Neg Hx     Post-op Cognitive Dysfunction Neg Hx     Other Neg Hx        Social History     Social History    Marital status:      Spouse name: N/A    Number of children: N/A    Years of education: N/A     Occupational History    Not on file. Social History Main Topics    Smoking status: Former Smoker     Packs/day: 1.00     Years: 15.00     Quit date: 1975    Smokeless tobacco: Former User    Alcohol use Yes      Comment: rarely    Drug use: No    Sexual activity: Yes     Partners: Female     Other Topics Concern    Not on file     Social History Narrative         ALLERGIES: Celecoxib; Ibuprofen; Lescol [fluvastatin]; Nsaids (non-steroidal anti-inflammatory drug); Sulfa (sulfonamide antibiotics); and Uloric [febuxostat]    Review of Systems   Constitutional: Negative for chills and fever. HENT: Negative for rhinorrhea and sore throat. Eyes: Negative for visual disturbance. Respiratory: Negative for cough and shortness of breath. Cardiovascular: Negative for chest pain and leg swelling. Gastrointestinal: Negative for abdominal pain, diarrhea, nausea and vomiting. Genitourinary: Negative for dysuria. Musculoskeletal: Positive for arthralgias. Negative for back pain and neck pain. Skin: Negative for rash. Neurological: Negative for weakness and headaches. Psychiatric/Behavioral: The patient is not nervous/anxious. Vitals:    02/04/18 1010   BP: 138/78   Pulse: 68   Resp: 16   Temp: 97.5 °F (36.4 °C)   SpO2: 96%   Weight: 84.8 kg (187 lb)   Height: 5' 10\" (1.778 m)            Physical Exam   Constitutional: He is oriented to person, place, and time. He appears well-developed and well-nourished. HENT:   Head: Normocephalic. Right Ear: External ear normal.   Left Ear: External ear normal.   Eyes: Conjunctivae and EOM are normal. Pupils are equal, round, and reactive to light. Neck: Normal range of motion. Neck supple. No tracheal deviation present. Cardiovascular: Normal rate, regular rhythm, normal heart sounds and intact distal pulses. No murmur heard. Pulmonary/Chest: Effort normal and breath sounds normal. No respiratory distress. Abdominal: Soft. There is no tenderness. Musculoskeletal: He exhibits tenderness (To palpation of right distal clavicle and over glenohumeral joint. ROM intact passively, pain with active ROM. Radial pulse 2+ bilaterally). Neurological: He is alert and oriented to person, place, and time. No cranial nerve deficit. Skin: No rash noted. Nursing note and vitals reviewed.        MDM  Number of Diagnoses or Management Options  Acute pain of right shoulder: new and requires workup     Amount and/or Complexity of Data Reviewed  Tests in the radiology section of CPT®: ordered and reviewed  Review and summarize past medical records: yes    Risk of Complications, Morbidity, and/or Mortality  Presenting problems: high  Diagnostic procedures: high  Management options: high    Patient Progress  Patient progress: stable        ED Course       Procedures  Xr Shoulder Rt Ap/lat Min 2 V    Result Date: 2/4/2018  RIGHT 3 views. HISTORY: Right shoulder pain without known injury. TECHNIQUE: AP and lateral and oblique views. COMPARISON: None. FINDINGS: There are large osteophytes off the humeral head and inferior glenoid with subchondral cystic changes and joint space narrowing. No fracture or dislocation. Included right ribs are unremarkable. There are sternal wires. IMPRESSION: Moderate osteoarthritis of the glenohumeral joint. No fracture or dislocation. 72 yo male with shoulder pain:     Patient with moderate/severe osteoarthritis, no pain at rest, only with movement and to palpation. Will place in sling, discussed tylenol for pain and most importantly how vital it is for follow up with orthopedic surgery which patient and wife agree to do. Patient will return with any further pain, any chest pain or SOB, inability to move shoulder or any further concerns.

## 2018-03-07 ENCOUNTER — HOSPITAL ENCOUNTER (OUTPATIENT)
Dept: SURGERY | Age: 73
Discharge: HOME | End: 2018-03-07
Attending: ORTHOPAEDIC SURGERY
Payer: MEDICARE

## 2018-03-07 VITALS
DIASTOLIC BLOOD PRESSURE: 88 MMHG | HEART RATE: 62 BPM | WEIGHT: 190.44 LBS | BODY MASS INDEX: 27.26 KG/M2 | TEMPERATURE: 96.9 F | RESPIRATION RATE: 14 BRPM | SYSTOLIC BLOOD PRESSURE: 145 MMHG | OXYGEN SATURATION: 97 % | HEIGHT: 70 IN

## 2018-03-07 DIAGNOSIS — R06.83 SNORING: Primary | ICD-10-CM

## 2018-03-07 LAB
ALBUMIN SERPL-MCNC: 3.4 G/DL (ref 3.2–4.6)
ALBUMIN/GLOB SERPL: 0.8 {RATIO} (ref 1.2–3.5)
ALP SERPL-CCNC: 92 U/L (ref 50–136)
ALT SERPL-CCNC: 31 U/L (ref 12–65)
ANION GAP SERPL CALC-SCNC: 9 MMOL/L (ref 7–16)
APTT PPP: 37.3 SEC (ref 23.2–35.3)
AST SERPL-CCNC: 27 U/L (ref 15–37)
BACTERIA SPEC CULT: ABNORMAL
BILIRUB SERPL-MCNC: 0.4 MG/DL (ref 0.2–1.1)
BUN SERPL-MCNC: 16 MG/DL (ref 8–23)
CALCIUM SERPL-MCNC: 9.7 MG/DL (ref 8.3–10.4)
CHLORIDE SERPL-SCNC: 106 MMOL/L (ref 98–107)
CO2 SERPL-SCNC: 26 MMOL/L (ref 21–32)
CREAT SERPL-MCNC: 1.46 MG/DL (ref 0.8–1.5)
ERYTHROCYTE [DISTWIDTH] IN BLOOD BY AUTOMATED COUNT: 14.3 % (ref 11.9–14.6)
GLOBULIN SER CALC-MCNC: 4.4 G/DL (ref 2.3–3.5)
GLUCOSE BLD STRIP.AUTO-MCNC: 120 MG/DL (ref 65–100)
GLUCOSE SERPL-MCNC: 117 MG/DL (ref 65–100)
HCT VFR BLD AUTO: 38.6 % (ref 41.1–50.3)
HGB BLD-MCNC: 13.1 G/DL (ref 13.6–17.2)
INR PPP: 1.2
MAGNESIUM SERPL-MCNC: 1.8 MG/DL (ref 1.8–2.4)
MCH RBC QN AUTO: 29.8 PG (ref 26.1–32.9)
MCHC RBC AUTO-ENTMCNC: 33.9 G/DL (ref 31.4–35)
MCV RBC AUTO: 87.7 FL (ref 79.6–97.8)
PLATELET # BLD AUTO: 227 K/UL (ref 150–450)
PMV BLD AUTO: 12.4 FL (ref 10.8–14.1)
POTASSIUM SERPL-SCNC: 3.9 MMOL/L (ref 3.5–5.1)
PROT SERPL-MCNC: 7.8 G/DL (ref 6.3–8.2)
PROTHROMBIN TIME: 15 SEC (ref 11.5–14.5)
RBC # BLD AUTO: 4.4 M/UL (ref 4.23–5.67)
SERVICE CMNT-IMP: ABNORMAL
SODIUM SERPL-SCNC: 141 MMOL/L (ref 136–145)
WBC # BLD AUTO: 6.3 K/UL (ref 4.3–11.1)

## 2018-03-07 PROCEDURE — 77030027138 HC INCENT SPIROMETER -A

## 2018-03-07 PROCEDURE — 85730 THROMBOPLASTIN TIME PARTIAL: CPT | Performed by: ORTHOPAEDIC SURGERY

## 2018-03-07 PROCEDURE — 87641 MR-STAPH DNA AMP PROBE: CPT | Performed by: ORTHOPAEDIC SURGERY

## 2018-03-07 PROCEDURE — 83735 ASSAY OF MAGNESIUM: CPT | Performed by: ORTHOPAEDIC SURGERY

## 2018-03-07 PROCEDURE — 85027 COMPLETE CBC AUTOMATED: CPT | Performed by: ORTHOPAEDIC SURGERY

## 2018-03-07 PROCEDURE — 80053 COMPREHEN METABOLIC PANEL: CPT | Performed by: ORTHOPAEDIC SURGERY

## 2018-03-07 PROCEDURE — 82962 GLUCOSE BLOOD TEST: CPT

## 2018-03-07 PROCEDURE — 85610 PROTHROMBIN TIME: CPT | Performed by: ORTHOPAEDIC SURGERY

## 2018-03-07 RX ORDER — SODIUM CHLORIDE 9 MG/ML
250 INJECTION, SOLUTION INTRAVENOUS AS NEEDED
Status: CANCELLED | OUTPATIENT
Start: 2018-03-07

## 2018-03-07 NOTE — PROGRESS NOTES
18 1339   Oxygen Therapy   O2 Sat (%) 97 %   O2 Device Room air   Pre-Treatment   Cough Non-productive;Dry   Breath Sounds Bilateral Clear   Pre FEV1 (liters) 2.4 liters   % Predicted 85   Incentive Spirometry Treatment   Actual Volume (ml) 1750 ml   Sleep Disorder Breathing Screen:     Patient reports symptoms of:   · Snoring   · Excessive daytime sleepiness with napping  · Observed apnea        HTN  · STOP-BANG _6___  · [place] Score 12____  · Height___5'10\"__ Weight___190 lbs__   Tsai 4, history of A-fib  PREVIOUS DIAGNOSIS OF ROBERTO  Refer patient for sleep study based on above assessment. Sleepiness Scale:     Sitting and reading 1    Watching TV 1    Sitting inactive in a public place 3    As a passenger in a car for an hour without a break 3    Lying down to rest in the afternoon when circumstances Permits 3    Sitting and talking to someone 0    Sitting quietly after lunch without alcohol 1    In a car, while stopped for a few minutes 0    Total :  12    Initial respiratory Assessment completed with pt. Pt was interviewed and evaluated in [facility] camp prior to surgery. Patient ID:  Sirena Dillard  [id number]  22 y.o.  1945  Surgeon: Dr. Niraj Delgadillo  Date of Surgery: 3/29/2018  Procedure: Total Right Shoulder Arthroplasty  Primary Care Physician: Dalton Parker [phone]  Specialists:                                  Pt instructed in the use of Incentive Spirometry. Pt instructed to bring Incentive Spirometer back on date of surgery & to start using Is upon return to pt room.     Pt taught proper cough technique    History of smokin 1/2 PPD FOR 16 YEARS                                   FORMER                          Quit date:        0    Secondhand smoke:PARENTS      Past procedures with Oxygen desaturation:NONE    Past Medical History:   Diagnosis Date    Allergic rhinitis     Arthritis     CAD (coronary artery disease)     CABG '09; troponin elevated 12 (\"likely due to supply/demand mismatch in setting of fever and increased metabolic demand\"-per cardiac MD note 8/20/12)-had cath, echo, EKG-all WNL except cath showed 2 of the bypasses with blockages- \"occluded SVG to PDA & SVG to LISA\"; EF=55%    [facility]A [facility]cere[facility]) [facility] 08/2017    Left- no residual weakness    Diabetes mellitus type 2, controlled ([facility])     no meds, does not check BG on regular basis, last hgba1c- 6.7 (8/2017)    Dyslipidemia     ED (erectile dysfunction)     Encephalitis 1962    x 2/hospitalized as teenager    GERD (gastroesophageal reflux disease)     controlled with Nexium    Gout     hx of    Hypertension     Impotence of organic origin     Lacunar infarct, acute ([facility]     possible embolic, remote a-fib- on eliquis    Lumbago     Mitral valve regurgitation 7/14/12    \"moderate\" on echo    ROBERTO (obstructive sleep apnea)     mild per patient, does not use CPAP    Prostate cancer ([facility]     Psoriasis     topical creams    SBO (small bowel obstruction) 2011, 2015, 2016                                                                                                                                                   Respiratory history:                                  SOB  ON EXERTION                                    Respiratory meds:                                         FAMILY PRESENT:            SPOUSE,                                                                                          PAST SLEEP STUDY:        YES                  OVER 15 YEARS AGO  HX OF ROBERTO:                        YES                    - NO CPAP                                     ROBERTO assessment:                                               SLEEPS ON SIDE                                                           PHYSICAL EXAM   Body mass index is 27.32 kg/(m^2).    Visit Vitals    /88 (BP 1 Location: Left arm, BP Patient Position: At rest;Sitting)    Pulse 62    Temp 96.9 °F (36.1 °C)  Resp 14    Ht 5' 10\" (1.778 m)    Wt 86.4 kg (190 lb 7 oz)    SpO2 97%    BMI 27.32 kg/m2     Neck circumference: 39     cm    Loud snoring:        YES                         Witnessed apnea or wakening gasping or choking:,                                                                                                            APNEA    Awakens with headaches:                                                  DENIES    Morning or daytime tiredness/ sleepiness:                NAPS EVERY DAY FOR 1 1/2 HOURS                                                                                         TIRED   Dry mouth or sore throat in morning:                YES                                                                         Tsai stage:  4    SACS score:22    STOP/BAN                              CPAP:                       NONE                                   LUNG EXPANSION THERAPY , PEP THERAPY         CONT SAT HS         O2 AT 3 HS

## 2018-03-07 NOTE — PERIOP NOTES
Lab results within anesthesia guidelines except for elevated PT/PTT- will have anesthesia review per protocol; MRSA swab result pending. Recent Results (from the past 12 hour(s))   GLUCOSE, POC    Collection Time: 03/07/18  1:45 PM   Result Value Ref Range    Glucose (POC) 120 (H) 65 - 100 mg/dL   CBC W/O DIFF    Collection Time: 03/07/18  1:48 PM   Result Value Ref Range    WBC 6.3 4.3 - 11.1 K/uL    RBC 4.40 4.23 - 5.67 M/uL    HGB 13.1 (L) 13.6 - 17.2 g/dL    HCT 38.6 (L) 41.1 - 50.3 %    MCV 87.7 79.6 - 97.8 FL    MCH 29.8 26.1 - 32.9 PG    MCHC 33.9 31.4 - 35.0 g/dL    RDW 14.3 11.9 - 14.6 %    PLATELET 849 911 - 593 K/uL    MPV 12.4 10.8 - 40.4 FL   METABOLIC PANEL, COMPREHENSIVE    Collection Time: 03/07/18  1:48 PM   Result Value Ref Range    Sodium 141 136 - 145 mmol/L    Potassium 3.9 3.5 - 5.1 mmol/L    Chloride 106 98 - 107 mmol/L    CO2 26 21 - 32 mmol/L    Anion gap 9 7 - 16 mmol/L    Glucose 117 (H) 65 - 100 mg/dL    BUN 16 8 - 23 MG/DL    Creatinine 1.46 0.8 - 1.5 MG/DL    GFR est AA >60 >60 ml/min/1.73m2    GFR est non-AA 51 (L) >60 ml/min/1.73m2    Calcium 9.7 8.3 - 10.4 MG/DL    Bilirubin, total 0.4 0.2 - 1.1 MG/DL    ALT (SGPT) 31 12 - 65 U/L    AST (SGOT) 27 15 - 37 U/L    Alk.  phosphatase 92 50 - 136 U/L    Protein, total 7.8 6.3 - 8.2 g/dL    Albumin 3.4 3.2 - 4.6 g/dL    Globulin 4.4 (H) 2.3 - 3.5 g/dL    A-G Ratio 0.8 (L) 1.2 - 3.5     MAGNESIUM    Collection Time: 03/07/18  1:48 PM   Result Value Ref Range    Magnesium 1.8 1.8 - 2.4 mg/dL   PROTHROMBIN TIME + INR    Collection Time: 03/07/18  1:48 PM   Result Value Ref Range    Prothrombin time 15.0 (H) 11.5 - 14.5 sec    INR 1.2     PTT    Collection Time: 03/07/18  1:48 PM   Result Value Ref Range    aPTT 37.3 (H) 23.2 - 35.3 SEC

## 2018-03-07 NOTE — PERIOP NOTES
Patient verified name, , and surgery as listed in [facility]. Type 3 surgery, joint PAt assessment complete. Labs per surgeon: CBC, CMP, Magnesium, PT/PTT, UA and MRSA swab collected  Labs per anesthesia protocol: no additional labs needed  EKG: done 17- result NSR and tracing in EMR for anesthesia reference    Pt has cardiology clearance appt 3/12/18- will follow up in EMR re clearance note. In EMR for anesthesia reference- ECHO (17) and heart cath (12)- 2/4 patent grafts. Hibiclens and instructions to return bottle on DOS given per hospital policy. Nasal Swab collected per MD order and instructions for Mupirocin nasal ointment if required. Patient provided with handouts including Guide to Surgery, Pain Management, Hand Hygiene, Blood Transfusion Education, and [facility] Brochure. Patient answered medical/surgical history questions at their best of ability. All prior to admission medications documented in [facility]. Original medication prescription bottles not visualized during patient appointment. Patient instructed to hold all vitamins 7 days prior to surgery and NSAIDS 5 days prior to surgery. Medications to be held: eliquis x 3 days (if okay with cardiology)- pt instructed to remain on ASA 81mg. Patient instructed to continue previous medications as prescribed prior to surgery and to take the following medications the day of surgery according to anesthesia guidelines with a small sip of water: allopurinol, diltiazem, nexium, gabapentin, metoprolol, ranexa, tramadol. Patient teach back successful and patient demonstrates knowledge of instruction. Pt taken to [facility], respiratory therapist, for respiratory consult prior to surgery.

## 2018-03-08 PROBLEM — Z91.14 NONCOMPLIANCE WITH CPAP TREATMENT: Status: ACTIVE | Noted: 2018-03-08

## 2018-03-08 NOTE — ADVANCED PRACTICE NURSE
Total Joint Surgery Preoperative Chart Review      Patient ID:  Tereza Wilkins  [id number]  48 y.o.  1945  Surgeon: Dr. Anam Noel  Date of Surgery: 3/29/2018  Procedure: Total Right Shoulder Arthroplasty  Primary Care Physician: Claudia Guardado [phone]  Specialty Physician(s):      Subjective:   Tereza Wilkins is a 71 y.o. BLACK OR  male who presents for preoperative evaluation for Total Right Shoulder arthroplasty. This is a preoperative chart review note based on data collected by the nurse at the surgical Pre-Assessment visit.     Past Medical History:   Diagnosis Date    Allergic rhinitis     Arthritis     CAD (coronary artery disease)     CABG '09; troponin elevated 7/14/12 (\"likely due to supply/demand mismatch in setting of fever and increased metabolic demand\"-per cardiac MD note 8/20/12)-had cath, echo, EKG-all WNL except cath showed 2 of the bypasses with blockages- \"occluded SVG to PDA & SVG to LISA\"; EF=55%    CVA (cerebral vascular accident) ([address] 08/2017    Left- no residual weakness    Diabetes mellitus type 2, controlled ([facility])     no meds, does not check BG on regular basis, last hgba1c- 6.7 (8/2017)    Dyslipidemia     ED (erectile dysfunction)     Encephalitis 1962    x 2/hospitalized as teenager    GERD (gastroesophageal reflux disease)     controlled with Nexium    Gout     hx of    Hypertension     Impotence of organic origin     Lacunar infarct, acute ([address]     possible embolic, remote a-fib- on eliquis    Lumbago     Mitral valve regurgitation 7/14/12    \"moderate\" on echo    ROBERTO (obstructive sleep apnea)     mild per patient, does not use CPAP    Prostate cancer ([address]     Psoriasis     topical creams    SBO (small bowel obstruction) 2011, 2015, 2016      Past Surgical History:   Procedure Laterality Date    ABDOMEN SURGERY PROC UNLISTED  1967    exp lap    HX APPENDECTOMY  age 49        [place] [place] CATARACT REMOVAL Bilateral 2013    iol     HX COLONOSCOPY  ,     HX CORONARY ARTERY BYPASS GRAFT  2009    x4 bypass    HX HERNIA REPAIR Bilateral     HX RADICAL PROSTATECTOMY       HX SPLENECTOMY  1951    ND LEFT HEART CATH,PERCUTANEOUS  2012    no intervention    ND PROSTATE BIOPSY, NEEDLE, SATURATION SAMPLING      and ultrasound    VASCULAR SURGERY PROCEDURE UNLIST  2017    aortogram, w/cass LE runoff,R-LE arteriogram     Family History   Problem Relation Age of Onset    Hypertension Mother    [facility] Gout Mother     Arthritis-osteo Mother     Heart Disease Mother       of Heart Disease    Coronary Artery Disease Mother     Diabetes Father     Cancer Father     Heart Disease Father     Bleeding Prob Paternal Grandmother     Hypertension Brother     Cancer Maternal Uncle      Prostate    Malignant Hyperthermia Neg Hx     Pseudocholinesterase Deficiency Neg Hx     Delayed Awakening Neg Hx     Post-op Nausea/Vomiting Neg Hx     Emergence Delirium Neg Hx     Post-op Cognitive Dysfunction Neg Hx     Other Neg Hx       Social History   Substance Use Topics    Smoking status: Former Smoker     Packs/day: 1.00     Years: 15.00     Quit date: 1975    Smokeless tobacco: Former User    Alcohol use Yes      Comment: rarely- once/month       Prior to Admission medications    Medication Sig Start Date End Date Taking? Authorizing Provider   dilTIAZem ER (CARDIZEM LA) 360 mg Tb24 tablet Take 1 Tab by mouth daily. 18  Yes Idris Lara MD   losartan (COZAAR) 100 mg tablet Take 1 Tab by mouth daily. 18  Yes Idris Lara MD   aspirin delayed-release 81 mg tablet Take 81 mg by mouth nightly. Yes Historical Provider   esomeprazole (NEXIUM) 40 mg capsule Take 1 Cap by mouth daily. Indications: am 17  Yes Idris Lara MD   apixaban [facility]) 5 mg tablet Take 1 Tab by mouth two (2) times a day.  17  Yes Idris Lara MD   allopurinol (ZYLOPRIM) 300 mg tablet Take  by mouth daily. Yes Historical Provider   pravastatin (PRAVACHOL) 40 mg tablet Take 1 Tab by mouth nightly. 10/4/17  Yes Kitty Sumner MD   metoprolol tartrate (LOPRESSOR) 50 mg tablet Take 1 Tab by mouth two (2) times a day. 10/4/17  Yes Kitty Sumner MD   gabapentin (NEURONTIN) 100 mg capsule Take 1 Cap by mouth two (2) times a day. 10/4/17  Yes Kitty Sumner MD   traMADol Othel Solders) 50 mg tablet Take 1 Tab by mouth every six (6) hours as needed for Pain. Max Daily Amount: 200 mg. 10/4/17  Yes Kitty Sumner MD   RANEXA 500 mg SR tablet TAKE 1 TABLET TWICE A DAY 1/2/17  Yes David Leon MD   nitroglycerin (NITROSTAT) 0.4 mg SL tablet 1 Tab by SubLINGual route every five (5) minutes as needed for Chest Pain. 5/19/16  Yes David Leon MD   fluticasone (FLONASE) 50 mcg/actuation nasal spray as needed. Yes Historical Provider   cholecalciferol, vitamin D3, (VITAMIN D3) 2,000 unit Tab Take 2,000 Units by mouth daily. Indications: OSTEOPOROSIS, am   Yes Historical Provider   Cetirizine (ZYRTEC) 10 mg Cap Take 10 mg by mouth nightly. Indications: ALLERGIC RHINITIS   Yes Historical Provider   glucose blood VI test strips (BLOOD GLUCOSE TEST) strip Test once to twice daily DX: E11.8 7/6/17   Kitty Sumner MD   Lancets misc Test once to twice daily DX: E11.8 1/12/17   Kitty Sumner MD   triamcinolone acetonide (KENALOG) 0.1 % ointment  7/30/16   Historical Provider   tacrolimus (PROTOPIC) 0.1 % ointment  7/14/16   Historical Provider   mometasone (ELOCON) 0.1 % topical cream  7/30/16   Historical Provider   pimecrolimus (ELIDEL) 1 % topical cream Apply  to affected area two (2) times a day.     Historical Provider     Allergies   Allergen Reactions    Celecoxib Swelling     Hands    Ibuprofen Unknown (comments)     Patient unsure    Lescol [Fluvastatin] Unknown (comments)     Other reaction(s): Unknown (comments)    Nsaids (Non-Steroidal Anti-Inflammatory Drug) Other (comments)     Elevated serum creatinine    Sulfa (Sulfonamide Antibiotics) Rash    Uloric [Febuxostat] Other (comments)     Joint Pain          Objective:     Physical Exam:   Patient Vitals for the past 24 hrs:   Temp Pulse Resp BP SpO2   03/07/18 1339 96.9 °F (36.1 °C) 62 14 145/88 97 %       ECG:    EKG Results     None          Data Review:   Labs:   Recent Results (from the past 24 hour(s))   GLUCOSE, POC    Collection Time: 03/07/18  1:45 PM   Result Value Ref Range    Glucose (POC) 120 (H) 65 - 100 mg/dL   CBC W/O DIFF    Collection Time: 03/07/18  1:48 PM   Result Value Ref Range    WBC 6.3 4.3 - 11.1 K/uL    RBC 4.40 4.23 - 5.67 M/uL    HGB 13.1 (L) 13.6 - 17.2 g/dL    HCT 38.6 (L) 41.1 - 50.3 %    MCV 87.7 79.6 - 97.8 FL    MCH 29.8 26.1 - 32.9 PG    MCHC 33.9 31.4 - 35.0 g/dL    RDW 14.3 11.9 - 14.6 %    PLATELET 250 772 - 518 K/uL    MPV 12.4 10.8 - 75.7 FL   METABOLIC PANEL, COMPREHENSIVE    Collection Time: 03/07/18  1:48 PM   Result Value Ref Range    Sodium 141 136 - 145 mmol/L    Potassium 3.9 3.5 - 5.1 mmol/L    Chloride 106 98 - 107 mmol/L    CO2 26 21 - 32 mmol/L    Anion gap 9 7 - 16 mmol/L    Glucose 117 (H) 65 - 100 mg/dL    BUN 16 8 - 23 MG/DL    Creatinine 1.46 0.8 - 1.5 MG/DL    GFR est AA >60 >60 ml/min/1.73m2    GFR est non-AA 51 (L) >60 ml/min/1.73m2    Calcium 9.7 8.3 - 10.4 MG/DL    Bilirubin, total 0.4 0.2 - 1.1 MG/DL    ALT (SGPT) 31 12 - 65 U/L    AST (SGOT) 27 15 - 37 U/L    Alk.  phosphatase 92 50 - 136 U/L    Protein, total 7.8 6.3 - 8.2 g/dL    Albumin 3.4 3.2 - 4.6 g/dL    Globulin 4.4 (H) 2.3 - 3.5 g/dL    A-G Ratio 0.8 (L) 1.2 - 3.5     MAGNESIUM    Collection Time: 03/07/18  1:48 PM   Result Value Ref Range    Magnesium 1.8 1.8 - 2.4 mg/dL   PROTHROMBIN TIME + INR    Collection Time: 03/07/18  1:48 PM   Result Value Ref Range    Prothrombin time 15.0 (H) 11.5 - 14.5 sec    INR 1.2     PTT    Collection Time: 03/07/18  1:48 PM   Result Value Ref Range    aPTT 37.3 (H) 23.2 - 35.3 SEC   MSSA/MRSA SC BY PCR, NASAL SWAB    Collection Time: 03/07/18  1:48 PM   Result Value Ref Range    Special Requests: NO SPECIAL REQUESTS      Culture result: (A)       MRSA target DNA not detected, SA target DNA detected. A MRSA negative, SA positive test result does not preclude MRSA nasal colonization. Problem List:  )  Patient Active Problem List   Diagnosis Code    Coronary atherosclerosis of native coronary artery I25.10    Dyslipidemia E78.5    Gout M10.9    S/P CABG (coronary artery bypass graft) Z95.1    PAF (paroxysmal atrial fibrillation) ([facility]) I48.0    Intestinal adhesions with obstruction K56.50    ANABELLA (acute kidney injury) ([facility] N17.9    HTN (hypertension) I10    ROBERTO (obstructive sleep apnea) G47.33    Malignant neoplasm of prostate ([facility] C61    Other specified disorder of male genital organs(608.89) N50.89    Elevated prostate specific antigen (PSA) R97.20    Lumbago M54.5    Impotence of organic origin N52.9    Allergic rhinitis J30.9    ED (erectile dysfunction) N52.9    Psoriasis L40.9    Arthritis M19.90    Pain, foot M79.673    Medicare annual wellness visit, subsequent Z00.00    Arthralgia M25.50    CKD (chronic kidney disease) N18.9    Ingrown left big toenail L60.0    Type 2 diabetes, controlled, with neuropathy ([facility] E11.40    Acute left-sided low back pain with left-sided sciatica M54.42    Cervicalgia M54.2    Lacunar infarct, acute ([facility]) I63.9    Great toe pain, right M79.674    Ischemia of right lower extremity I99.8    Toe infection L08.9    Noncompliance with CPAP treatment Z91.14       Total Joint Surgery Pre-Assessment Recommendations: The patient is not compliant with wearing CPAP. Recommend oxygen saturation monitoring during hospitalization and oxygen at 3 liter via nc qhs. PEP therapy for lung expansion due to sleep apnea. Patient agreed to new sleep study.   Will refer patient for HST based on above assessment.          Signed By: WAGNER Murillo    March 8, 2018

## 2018-03-12 NOTE — PERIOP NOTES
Anesthesia (Meet Palomo ) reviewed chart. OK for surgery. Received orders to repeat PT/PTT DOS. Pt with cardiology appt 3/12/18.

## 2018-03-13 NOTE — PERIOP NOTES
Called pt, spoke with wife. Informed that pt will need to reschedule Cardiology appointment prior to DOS. Noted in EMR that letter was sent from Dr. Cristofer Santana Cardiology yesterday for missed appointment 3/12/18. Per Dr. Maureen Jernigan (PCP)  note in EMR dated  3/12/18, pt needs cardiac clearance for surgery. Per wife, she will make sure pt contacts [facility] cardiology and reschedules appointment.

## 2018-03-20 PROBLEM — Z01.810 PREOP CARDIOVASCULAR EXAM: Status: ACTIVE | Noted: 2018-03-20

## 2018-03-21 NOTE — PERIOP NOTES
Per [facility] Cardiology office note (3/20/18) - pt needs stress prior to surgery. Stress scheduled for 4/6/18 and new surgery date noted of 4/19/18.

## 2018-04-09 PROBLEM — R41.3 MEMORY LOSS: Status: ACTIVE | Noted: 2018-04-09

## 2018-04-09 PROBLEM — L08.9 TOE INFECTION: Status: RESOLVED | Noted: 2018-01-04 | Resolved: 2018-04-09

## 2018-04-09 RX ORDER — METFORMIN HYDROCHLORIDE 500 MG/1
1000 TABLET, EXTENDED RELEASE ORAL
COMMUNITY
End: 2018-04-16

## 2018-04-10 NOTE — PERIOP NOTES
Pt with stress test 4/6/18- results: EF =62%. Stress EKG- normal. Normal SPECT perfusion imaging. LVSF normal. Low risk scan.       Awaiting MD note stating pt clear to proceed with shoulder surgery per Dr. Greer Cue note 3/2018- clearance pending outcome of stress test.

## 2018-04-13 RX ORDER — SODIUM CHLORIDE 9 MG/ML
250 INJECTION, SOLUTION INTRAVENOUS AS NEEDED
Status: CANCELLED | OUTPATIENT
Start: 2018-04-13

## 2018-04-13 NOTE — PERIOP NOTES
Reviewed pt's chart for upcoming sx. Had preop cardiac consult visit 3/20/18 with follow-up stress test 4/6/18- no follow-up office visit/note/telephone encounter stating \"okay\" to proceed with surgery from cardiac standpoint. Sent note to [facility] Cardiology triage nurse requesting above. Pt had labs collected 3/7/18 and new surgery date is 4/19/18. Left message with Fouzia Ochoa, [place] to Dr Americo Thornton, asking if surgeon would like labs repeated so they will be within 30 days of surgery. Also, informed Fouzia Ochoa that we have requested a cardiac clearance note from [facility] Cardiology. Lab orders placed in EMR on hold- PT/PTT ordered per anesthesia due to abnormal results from walk-in PAT appt. Type and Cross for 2 units ordered in EMR on hold to be collected day before surgery per surgeon's orders. Will complete phone assessment update once the above is confirmed.

## 2018-04-13 NOTE — PERIOP NOTES
Received call from Rommel Mayen to surgeon, re pt's labs- all labs ordered by Dr Carmen Vazquez will need to recollected prior to new surgery date to be within 30 days of surgery. Lab orders entered on hold for pt's arrival to o/p lab. Jd Barber stated she was reviewing pt's chart earlier and also noticed that no \"okay\" to proceed with surgery received from [facility] in chart- she had left a message this AM but has not received a return call at this time.

## 2018-04-16 ENCOUNTER — HOSPITAL ENCOUNTER (OUTPATIENT)
Dept: SURGERY | Age: 73
Discharge: HOME | End: 2018-04-16

## 2018-04-16 VITALS — WEIGHT: 192 LBS | BODY MASS INDEX: 26.88 KG/M2 | HEIGHT: 71 IN

## 2018-04-16 NOTE — PERIOP NOTES
Pt's wife, Lupis Dixon, called to verify instructions given this AM- states pt was confused with information that he wrote down earlier this AM. Medication instructions, o/p lab instructions and surgery instructions discussed with pt's wife in detail. Sealed envelope with surgery instructions and Hibiclens soap left at registration desk at Entrance B for when pt arrives to o/p lab for blood draw.

## 2018-04-16 NOTE — PERIOP NOTES
Patient verified name, , and surgery as listed in [facility]. Type 3 surgery, phone update assessment complete. Orders on chart- received. Labs per surgeon: CBC, CMP, Magnesium, PT/PTT, UA, MRSA swab, Type and Cross orders in EMR- Pt instructed to go to outpatient lab at Entrance B for blood draw Tuesday or Wednesday [phone] prior to day of surgery. Pt verbalizes understanding. Orders in EMR on hold for arrival.    Labs per anesthesia protocol: no additional labs needed    Telephone encounter 18 from Dr Esperanza Mercado Cardiology- states: \"Stress study was normal.  Surgical risk is considered low to moderate. Continue low dose ASA. \" All cardiology records in EMR for anesthesia reference. Patient answered medical/surgical history questions at their best of ability- pt is a poor historian- pt's wife on speaker phone helping to answer questions. All prior to admission medications documented in [facility]. Patient instructed to take the following medications the day of surgery according to anesthesia guidelines with a small sip of water: allopurinol, diltiazem, nexium, gabapentin, metoprolol, ranexa, tramadol. Hold all vitamins 7 days prior to surgery and NSAIDS 5 days prior to surgery. Medications to be held- eliquis x 3 days- must remain on ASA 81mg- pt verbalizes understanding. Pt states took Eliquis this AM but will not take evening dose nor any more doses prior to surgery. MRSA nasal swab positive 3/7/18- Pt states he will start using his mupirocin nasal ointment this AM and understands to continue to use from now until surgery.  Pt will complete doses here in hospital.    Patient instructed on the following:  Arrive at A Entrance, time of arrival to be called the day before by 1700  NPO after midnight including gum, mints, and ice chips  Responsible adult must drive patient to the hospital, stay during surgery, and patient will need supervision 24 hours after anesthesia  Use Hibiclens in shower the night before surgery and on the morning of surgery  Leave all valuables (money and jewelry) at home but bring insurance card and ID on  DOS  Do not wear make-up, nail polish, lotions, cologne, perfumes, powders, or oil on skin. Patient teach back successful and patient demonstrates knowledge of instruction.

## 2018-04-17 ENCOUNTER — HOSPITAL ENCOUNTER (OUTPATIENT)
Dept: LAB | Age: 73
Discharge: HOME | DRG: 483 | End: 2018-04-17
Attending: ORTHOPAEDIC SURGERY
Payer: MEDICARE

## 2018-04-17 LAB
ALBUMIN SERPL-MCNC: 3.4 G/DL (ref 3.2–4.6)
ALBUMIN/GLOB SERPL: 0.8 {RATIO} (ref 1.2–3.5)
ALP SERPL-CCNC: 94 U/L (ref 50–136)
ALT SERPL-CCNC: 25 U/L (ref 12–65)
ANION GAP SERPL CALC-SCNC: 10 MMOL/L (ref 7–16)
APPEARANCE UR: CLEAR
APTT PPP: 34.9 SEC (ref 23.2–35.3)
AST SERPL-CCNC: 19 U/L (ref 15–37)
BACTERIA SPEC CULT: ABNORMAL
BILIRUB SERPL-MCNC: 0.4 MG/DL (ref 0.2–1.1)
BILIRUB UR QL: NEGATIVE
BUN SERPL-MCNC: 13 MG/DL (ref 8–23)
CALCIUM SERPL-MCNC: 9 MG/DL (ref 8.3–10.4)
CHLORIDE SERPL-SCNC: 106 MMOL/L (ref 98–107)
CO2 SERPL-SCNC: 27 MMOL/L (ref 21–32)
COLOR UR: YELLOW
CREAT SERPL-MCNC: 1.44 MG/DL (ref 0.8–1.5)
ERYTHROCYTE [DISTWIDTH] IN BLOOD BY AUTOMATED COUNT: 14.3 % (ref 11.9–14.6)
GLOBULIN SER CALC-MCNC: 4.2 G/DL (ref 2.3–3.5)
GLUCOSE SERPL-MCNC: 189 MG/DL (ref 65–100)
GLUCOSE UR STRIP.AUTO-MCNC: >1000 MG/DL
HCT VFR BLD AUTO: 40.3 % (ref 41.1–50.3)
HGB BLD-MCNC: 13.3 G/DL (ref 13.6–17.2)
HGB UR QL STRIP: NEGATIVE
INR PPP: 1.1
KETONES UR QL STRIP.AUTO: NEGATIVE MG/DL
LEUKOCYTE ESTERASE UR QL STRIP.AUTO: NEGATIVE
MAGNESIUM SERPL-MCNC: 1.7 MG/DL (ref 1.8–2.4)
MCH RBC QN AUTO: 29.7 PG (ref 26.1–32.9)
MCHC RBC AUTO-ENTMCNC: 33 G/DL (ref 31.4–35)
MCV RBC AUTO: 90 FL (ref 79.6–97.8)
NITRITE UR QL STRIP.AUTO: NEGATIVE
PH UR STRIP: 5.5 [PH] (ref 5–9)
PLATELET # BLD AUTO: 214 K/UL (ref 150–450)
PMV BLD AUTO: 12.5 FL (ref 10.8–14.1)
POTASSIUM SERPL-SCNC: 4 MMOL/L (ref 3.5–5.1)
PROT SERPL-MCNC: 7.6 G/DL (ref 6.3–8.2)
PROT UR STRIP-MCNC: NEGATIVE MG/DL
PROTHROMBIN TIME: 14.2 SEC (ref 11.5–14.5)
RBC # BLD AUTO: 4.48 M/UL (ref 4.23–5.67)
SERVICE CMNT-IMP: ABNORMAL
SODIUM SERPL-SCNC: 143 MMOL/L (ref 136–145)
SP GR UR REFRACTOMETRY: 1.01 (ref 1–1.02)
UROBILINOGEN UR QL STRIP.AUTO: 1 EU/DL (ref 0.2–1)
WBC # BLD AUTO: 6 K/UL (ref 4.3–11.1)

## 2018-04-17 PROCEDURE — 85730 THROMBOPLASTIN TIME PARTIAL: CPT | Performed by: ORTHOPAEDIC SURGERY

## 2018-04-17 PROCEDURE — 85027 COMPLETE CBC AUTOMATED: CPT | Performed by: ORTHOPAEDIC SURGERY

## 2018-04-17 PROCEDURE — 36415 COLL VENOUS BLD VENIPUNCTURE: CPT | Performed by: ORTHOPAEDIC SURGERY

## 2018-04-17 PROCEDURE — 87641 MR-STAPH DNA AMP PROBE: CPT | Performed by: ORTHOPAEDIC SURGERY

## 2018-04-17 PROCEDURE — 80053 COMPREHEN METABOLIC PANEL: CPT | Performed by: ORTHOPAEDIC SURGERY

## 2018-04-17 PROCEDURE — 83735 ASSAY OF MAGNESIUM: CPT | Performed by: ORTHOPAEDIC SURGERY

## 2018-04-17 PROCEDURE — 85610 PROTHROMBIN TIME: CPT | Performed by: ORTHOPAEDIC SURGERY

## 2018-04-17 PROCEDURE — 81003 URINALYSIS AUTO W/O SCOPE: CPT | Performed by: ORTHOPAEDIC SURGERY

## 2018-04-18 ENCOUNTER — ANESTHESIA EVENT (OUTPATIENT)
Dept: SURGERY | Age: 73
DRG: 483 | End: 2018-04-18
Payer: MEDICARE

## 2018-04-18 PROBLEM — M19.011 OSTEOARTHRITIS OF RIGHT GLENOHUMERAL JOINT: Status: ACTIVE | Noted: 2018-04-18

## 2018-04-18 NOTE — PERIOP NOTES
4/17/2018  3:21 PM - Daniel, Lab In [facility] ([place])   Component Results   Component Value Flag Ref Range Units Status   Special Requests: NO SPECIAL REQUESTS     Final   Culture result:  (A)    Final   MRSA target DNA not detected, SA target DNA detected.   A MRSA negative, SA positive test result does not preclude MRSA nasal colonization. Pt has mupirocin Rx. Confirmed with pt he has ointment and has been using it.

## 2018-04-18 NOTE — BRIEF OP NOTE
BRIEF OPERATIVE NOTE    Date of Procedure: 4/19/2018     Preoperative Diagnosis:  GLENOHUMERAL OSTEOARTHRITIS RIGHT SHOULDER  [M19.011]      Postoperative Diagnosis:  SAME    Procedure(s): REVERSE RIGHT TOTAL SHOULDER ARTHROPLASTY WITH DELTA EXTEND PROSTHESIS, BICEPS TENODESIS, LATISSIMUS DORSI AND TERES MAJOR TENDON TRANSFERS    Surgeon(s) and Role:     * Kathe Carr MD - Primary         Assistant Staff: None      Surgical Staff:  Circ-1: (Unknown)  Scrub Tech-1: (Unknown)  Scrub Tech-2: (Unknown)  Scrub Tech-3: (Unknown)  No case tracking events are documented in the log. Anesthesia:  GENERAL WITH INTERSCALENE BLOCK    Estimated Blood Loss: 250 cc. Complications: NONE    Implants:     Implant Name Type Inv.  Item Serial No.  Lot No. LRB No. Used Action   CEMENT BNE FL 20ML MONMR 40GM -- SIMPLEX P - [id number]  CEMENT BNE FL 20ML MONMR 40GM -- SIMPLEX P [id number] [employer] HOW [id number] Right 1 Implanted   CEMENT BNE FL 20ML MONMR 40GM -- SIMPLEX P - [id number]  CEMENT BNE FL 20ML MONMR 40GM -- SIMPLEX P [id number] [employer] HOW [id number] Right 1 Implanted   COMPNT DIANA METAGLENE -- DELTA EXTEND - [id number]  COMPNT DIANA METAGLENE -- DELTA EXTEND [id number] [facility] [employer] [id number] Right 1 Implanted   COMPNT GLENOSPHERE ECC 42MM -- DELTA EXTEND - [id number]  COMPNT GLENOSPHERE ECC 42MM -- DELTA EXTEND [id number] [facility] [employer] [id number] Right 1 Implanted   SCR GLENOID THRD 4.5X18MM -- DELTA EXTEND - [id number]  [facility] GLENOID THRD 4.5X18MM -- DELTA EXTEND [id number] [facility] [employer] [id number] Right 1 Implanted   SCR GLENOID THRD 4.5X42MM -- DELTA EXTEND - [id number]  SCR GLENOID THRD 4.5X42MM -- DELTA EXTEND [id number] [facility] [employer] [id number] Right 1 Implanted   SCR GLENOID THRD 4.5X42MM -- DELTA EXTEND - [id number]  SCR GLENOID THRD 4.5X42MM -- DELTA EXTEND [id number] [facility] [employer] [id number] Right 1 Implanted   SCR GLENOID THRD 4.5X42MM -- DELTA EXTEND - [id number]  SCR GLENOID THRD 4.5X42MM -- DELTA EXTEND [id number] [facility] [employer] [id number] Right 1 Implanted   STEM [facility] EPI STD SZ2 12M -- Belvie Clines - [id number]  STEM HUM MBLOC EPI STD SZ2 12M -- DELTA XTEND [id number] [facility] [employer] [id number] Right 1 Implanted   RSTRCTR JIMMY BNE BIOABSRB 12MM -- Brooklynn Tawana - [id number]  RSTRCTR JIMMY BNE BIOABSRB 12MM -- Brooklynn Tawana [id number] [facility] [employer] [id number] Right 1 Implanted   CUP HUM STD DELT PE 42+9MM -- Belchivo Clines - [id number]   CUP HUM STD DELT PE 42+9MM -- Belvie Clines [id number] [facility] [employer]THOPEDICS [id number] Right 1 Implanted       Christian Sevilla MD

## 2018-04-18 NOTE — H&P
[facility] [place] HISTORY AND PHYSICAL    Subjective:     Patient is a 71 y.o. RHD MALE WITH RIGHT SHOULDER PAIN. SEE OFFICE NOTE.     Patient Active Problem List    Diagnosis Date Noted    Osteoarthritis of right glenohumeral joint 04/18/2018    Memory loss 04/09/2018    Preop cardiovascular exam 03/20/2018    Noncompliance with CPAP treatment 03/08/2018    Ischemia of right lower extremity 01/04/2018    Great toe pain, right 12/05/2017    Lacunar infarct, acute ([address] 08/29/2017    Cervicalgia 08/28/2017    Type 2 diabetes, controlled, with neuropathy ([address] 07/06/2017    Acute left-sided low back pain with left-sided sciatica 07/06/2017    Ingrown left big toenail 01/12/2017    CKD (chronic kidney disease) 02/24/2016    Arthralgia 02/02/2016    Pain, foot 10/09/2015    Medicare annual wellness visit, subsequent 10/09/2015    Allergic rhinitis 07/16/2015    ED (erectile dysfunction) 07/16/2015    Psoriasis 07/16/2015    Arthritis 07/16/2015    Malignant neoplasm of prostate ([address]     Other specified disorder of male genital organs(608.89)     Elevated prostate specific antigen (PSA)     Lumbago     Impotence of organic origin     ANABELLA (acute kidney injury) ([address] 07/14/2012    HTN (hypertension) 07/14/2012    ROBERTO (obstructive sleep apnea) 07/14/2012    Intestinal adhesions with obstruction ([address] 11/09/2011    PAF (paroxysmal atrial fibrillation) ([address] 03/13/2009    S/P CABG (coronary artery bypass graft) 03/10/2009    Coronary atherosclerosis of native coronary artery 03/06/2009    Dyslipidemia 03/06/2009    Gout 03/06/2009     Past Medical History:   Diagnosis Date    Allergic rhinitis     Arthritis     CAD (coronary artery disease)     CABG '09; troponin elevated 7/14/12 (\"likely due to supply/demand mismatch in setting of fever and increased metabolic demand\"-per cardiac MD note 8/20/12)-had cath, echo, EKG-all WNL except cath showed 2 of the bypasses with blockages- \"occluded SVG to PDA & SVG to LISA\"; EF=55%    CVA (cerebral vascular accident) ([address] 08/2017    Left- no residual weakness    Diabetes mellitus type 2, controlled ([address]     restarted oral med (metformin 3/2018), does not check BG on regular basis, last hgba1c- 8 (3/12/18)    Dyslipidemia     ED (erectile dysfunction)     Encephalitis 1962    x 2/hospitalized as teenager    GERD (gastroesophageal reflux disease)     controlled with Nexium    Gout     hx of    Hypertension     Impotence of organic origin     Lacunar infarct, acute ([address]     possible embolic, remote a-fib- on eliquis    Lumbago     Memory loss     Mitral valve regurgitation 7/14/12    \"moderate\" on echo    ROBERTO (obstructive sleep apnea)     mild per patient, does not use CPAP    PAF (paroxysmal atrial fibrillation) ([address]     Prostate cancer ([address]     Psoriasis     topical creams    SBO (small bowel obstruction) ([address] 2011, 2015, 2016    Stage 2 chronic kidney disease       Past Surgical History:   Procedure Laterality Date    ABDOMEN SURGERY PROC UNLISTED  1967    exp lap    HX APPENDECTOMY  age 49        [de-identified] CATARACT REMOVAL Bilateral 2013    iol     HX COLONOSCOPY  1998, 2010    HX CORONARY ARTERY BYPASS GRAFT  2009    x4 bypass    HX HERNIA REPAIR Bilateral 2012    HX RADICAL PROSTATECTOMY       HX SPLENECTOMY  1951    IA LEFT HEART CATH,PERCUTANEOUS  7/2012    no intervention    IA PROSTATE BIOPSY, NEEDLE, SATURATION SAMPLING      and ultrasound    VASCULAR SURGERY PROCEDURE UNLIST  12/29/2017    aortogram, w/cass LE runoff,R-LE arteriogram      Prior to Admission medications    Medication Sig Start Date End Date Taking? Authorizing Provider   metFORMIN ER (GLUCOPHAGE XR) 500 mg tablet Take 2 Tabs by mouth daily (with dinner). 4/9/18   Da Valadez MD   allopurinol (ZYLOPRIM) 300 mg tablet Take 1 Tab by mouth daily.  3/13/18   Da aVladez MD   metoprolol tartrate (LOPRESSOR) 50 mg tablet Take 1 Tab by mouth two (2) times a day. 3/13/18   Alta Devine MD   gabapentin (NEURONTIN) 100 mg capsule Take 1 Cap by mouth two (2) times a day. 3/13/18   Alta Devine MD   pravastatin (PRAVACHOL) 40 mg tablet Take 1 Tab by mouth nightly. 3/13/18   Alta Devine MD   ranolazine ER (RANEXA) 500 mg SR tablet TAKE 1 TABLET TWICE A DAY 3/13/18   Alta Devine MD   dilTIAZem ER (CARDIZEM LA) 360 mg Tb24 tablet Take 1 Tab by mouth daily. 3/13/18   Alta Devine MD   losartan (COZAAR) 100 mg tablet Take 1 Tab by mouth daily. 3/13/18   Alta Devine MD   traMADol Radha Gambles) 50 mg tablet Take 1 Tab by mouth every six (6) hours as needed for Pain. Max Daily Amount: 200 mg. 3/12/18   Alta Devine MD   aspirin delayed-release 81 mg tablet Take 81 mg by mouth nightly. Historical Provider   esomeprazole (NEXIUM) 40 mg capsule Take 1 Cap by mouth daily. Indications: am  Patient taking differently: Take 40 mg by mouth daily. 12/5/17   Alta Devine MD   apixaban (ELIQUIS) 5 mg tablet Take 1 Tab by mouth two (2) times a day. 12/5/17   Alta Devine MD   glucose blood VI test strips (BLOOD GLUCOSE TEST) strip Test once to twice daily DX: E11.8 7/6/17   Alta Devine MD   Lancets misc Test once to twice daily DX: E11.8 1/12/17   Alta Devine MD   triamcinolone acetonide (KENALOG) 0.1 % ointment  7/30/16   Historical Provider   tacrolimus (PROTOPIC) 0.1 % ointment  7/14/16   Historical Provider   mometasone (ELOCON) 0.1 % topical cream  7/30/16   Historical Provider   nitroglycerin (NITROSTAT) 0.4 mg SL tablet 1 Tab by SubLINGual route every five (5) minutes as needed for Chest Pain. 5/19/16   Cherelle Lazo MD   pimecrolimus (ELIDEL) 1 % topical cream Apply  to affected area two (2) times a day. Historical Provider   fluticasone (FLONASE) 50 mcg/actuation nasal spray as needed.     Historical Provider   cholecalciferol, vitamin D3, (VITAMIN D3) 2,000 unit Tab Take 2,000 Units by mouth daily. Indications: OSTEOPOROSIS, am    Historical Provider   Cetirizine (ZYRTEC) 10 mg Cap Take 10 mg by mouth nightly. Indications: ALLERGIC RHINITIS    Historical Provider     Allergies   Allergen Reactions    Celecoxib Swelling     Hands    Ibuprofen Unknown (comments)     Patient unsure    Lescol [Fluvastatin] Unknown (comments)     Other reaction(s): Unknown (comments)    Nsaids (Non-Steroidal Anti-Inflammatory Drug) Other (comments)     Elevated serum creatinine    Sulfa (Sulfonamide Antibiotics) Rash    Uloric [Febuxostat] Other (comments)     Joint Pain      Social History   Substance Use Topics    Smoking status: Former Smoker     Packs/day: 1.00     Years: 15.00     Quit date: 1975    Smokeless tobacco: Former User    Alcohol use Yes      Comment: rarely- once/month      Family History   Problem Relation Age of Onset    Hypertension Mother     Gout Mother     Arthritis-osteo Mother     Heart Disease Mother       of Heart Disease    Coronary Artery Disease Mother     Diabetes Father     Cancer Father     Heart Disease Father     Bleeding Prob Paternal Grandmother     Hypertension Brother     Cancer Maternal Uncle      Prostate      Review of Systems  A comprehensive review of systems was negative except for that written in the HPI. Objective:     No data found. Visit Vitals    Ht 5' 10\" (1.778 m)    Wt 86.4 kg (190 lb 7 oz)    BMI 27.32 kg/m2     General:  Alert, cooperative, no distress, appears stated age. Head:  Normocephalic, without obvious abnormality, atraumatic. Back:   Symmetric, no curvature. ROM normal. No CVA tenderness. Lungs:   Clear to auscultation bilaterally. Chest wall:  No tenderness or deformity. Heart:  Regular rate and rhythm, S1, S2 normal, no murmur, click, rub or gallop. Extremities: Extremities normal, atraumatic, no cyanosis or edema. Pulses: 2+ and symmetric all extremities. Skin: Skin color, texture, turgor normal. No rashes or lesions   Lymph nodes: Cervical, supraclavicular, and axillary nodes normal.   Neurologic: CNII-XII intact. Normal strength, sensation and reflexes throughout. Assessment:     Principal Problem:    Osteoarthritis of right glenohumeral joint (4/18/2018)        Plan:     The various methods of treatment have been discussed with the patient and family. PATIENT HAS EXHAUSTED NON-OPERATIVE MODALITIES     After consideration of risks, benefits and other options for treatment, the patient has consented to surgical intervention.     SEE OFFICE NOTE    Sophie Whitman MD

## 2018-04-19 ENCOUNTER — ANESTHESIA (OUTPATIENT)
Dept: SURGERY | Age: 73
DRG: 483 | End: 2018-04-19
Payer: MEDICARE

## 2018-04-19 ENCOUNTER — HOSPITAL ENCOUNTER (INPATIENT)
Age: 73
LOS: 2 days | Discharge: HOME | DRG: 483 | End: 2018-04-21
Attending: ORTHOPAEDIC SURGERY | Admitting: ORTHOPAEDIC SURGERY
Payer: MEDICARE

## 2018-04-19 ENCOUNTER — APPOINTMENT (OUTPATIENT)
Dept: GENERAL RADIOLOGY | Age: 73
DRG: 483 | End: 2018-04-19
Attending: ORTHOPAEDIC SURGERY
Payer: MEDICARE

## 2018-04-19 DIAGNOSIS — M19.011 OSTEOARTHRITIS OF RIGHT GLENOHUMERAL JOINT: ICD-10-CM

## 2018-04-19 DIAGNOSIS — D62 ANEMIA ASSOCIATED WITH ACUTE BLOOD LOSS: ICD-10-CM

## 2018-04-19 PROBLEM — E83.42 HYPOMAGNESEMIA: Status: ACTIVE | Noted: 2018-04-19

## 2018-04-19 PROBLEM — D64.9 ANEMIA: Status: ACTIVE | Noted: 2018-04-19

## 2018-04-19 LAB — GLUCOSE BLD STRIP.AUTO-MCNC: 148 MG/DL (ref 65–100)

## 2018-04-19 PROCEDURE — 74011250636 HC RX REV CODE- 250/636: Performed by: ORTHOPAEDIC SURGERY

## 2018-04-19 PROCEDURE — 77030020782 HC GWN BAIR PAWS FLX 3M -B: Performed by: ANESTHESIOLOGY

## 2018-04-19 PROCEDURE — 77030002913 HC SUT ETHBND J&J -B: Performed by: ORTHOPAEDIC SURGERY

## 2018-04-19 PROCEDURE — 76010000171 HC OR TIME 2 TO 2.5 HR INTENSV-TIER 1: Performed by: ORTHOPAEDIC SURGERY

## 2018-04-19 PROCEDURE — 74011250636 HC RX REV CODE- 250/636

## 2018-04-19 PROCEDURE — 77030018836 HC SOL IRR NACL ICUM -A: Performed by: ORTHOPAEDIC SURGERY

## 2018-04-19 PROCEDURE — 76210000006 HC OR PH I REC 0.5 TO 1 HR: Performed by: ORTHOPAEDIC SURGERY

## 2018-04-19 PROCEDURE — 77030003602 HC NDL NRV BLK BBMI -B: Performed by: ANESTHESIOLOGY

## 2018-04-19 PROCEDURE — 77030011640 HC PAD GRND REM COVD -A: Performed by: ORTHOPAEDIC SURGERY

## 2018-04-19 PROCEDURE — 77030027138 HC INCENT SPIROMETER -A

## 2018-04-19 PROCEDURE — 77030032490 HC SLV COMPR SCD KNE COVD -B

## 2018-04-19 PROCEDURE — 77030011283 HC ELECTRD NDL COVD -A: Performed by: ORTHOPAEDIC SURGERY

## 2018-04-19 PROCEDURE — 77030016570 HC BLNKT BAIR HGGR 3M -B: Performed by: ANESTHESIOLOGY

## 2018-04-19 PROCEDURE — 74011250637 HC RX REV CODE- 250/637: Performed by: HOSPITALIST

## 2018-04-19 PROCEDURE — 65270000029 HC RM PRIVATE

## 2018-04-19 PROCEDURE — 82962 GLUCOSE BLOOD TEST: CPT

## 2018-04-19 PROCEDURE — 86580 TB INTRADERMAL TEST: CPT | Performed by: ORTHOPAEDIC SURGERY

## 2018-04-19 PROCEDURE — 77030002986 HC SUT PROL J&J -A: Performed by: ORTHOPAEDIC SURGERY

## 2018-04-19 PROCEDURE — 86900 BLOOD TYPING SEROLOGIC ABO: CPT | Performed by: ORTHOPAEDIC SURGERY

## 2018-04-19 PROCEDURE — C1776 JOINT DEVICE (IMPLANTABLE): HCPCS | Performed by: ORTHOPAEDIC SURGERY

## 2018-04-19 PROCEDURE — 74011250637 HC RX REV CODE- 250/637: Performed by: ORTHOPAEDIC SURGERY

## 2018-04-19 PROCEDURE — 86923 COMPATIBILITY TEST ELECTRIC: CPT | Performed by: ORTHOPAEDIC SURGERY

## 2018-04-19 PROCEDURE — 74011000302 HC RX REV CODE- 302: Performed by: ORTHOPAEDIC SURGERY

## 2018-04-19 PROCEDURE — 74011000250 HC RX REV CODE- 250

## 2018-04-19 PROCEDURE — 74011000258 HC RX REV CODE- 258: Performed by: ORTHOPAEDIC SURGERY

## 2018-04-19 PROCEDURE — 77030008477 HC STYL SATN SLP COVD -A: Performed by: ANESTHESIOLOGY

## 2018-04-19 PROCEDURE — 74011250637 HC RX REV CODE- 250/637: Performed by: ANESTHESIOLOGY

## 2018-04-19 PROCEDURE — 77030018846 HC SOL IRR STRL H20 ICUM -A: Performed by: ORTHOPAEDIC SURGERY

## 2018-04-19 PROCEDURE — 77030002937 HC SUT MERS J&J -B: Performed by: ORTHOPAEDIC SURGERY

## 2018-04-19 PROCEDURE — 77030003827 HC BIT DRL J&J -B: Performed by: ORTHOPAEDIC SURGERY

## 2018-04-19 PROCEDURE — 77030032490 HC SLV COMPR SCD KNE COVD -B: Performed by: ORTHOPAEDIC SURGERY

## 2018-04-19 PROCEDURE — 77030013727 HC IRR FAN PULSVC ZIMM -B: Performed by: ORTHOPAEDIC SURGERY

## 2018-04-19 PROCEDURE — 77030035643 HC BLD SAW OSC PRECIS STRY -C: Performed by: ORTHOPAEDIC SURGERY

## 2018-04-19 PROCEDURE — 94762 N-INVAS EAR/PLS OXIMTRY CONT: CPT

## 2018-04-19 PROCEDURE — 76060000035 HC ANESTHESIA 2 TO 2.5 HR: Performed by: ORTHOPAEDIC SURGERY

## 2018-04-19 PROCEDURE — 77030012547: Performed by: ORTHOPAEDIC SURGERY

## 2018-04-19 PROCEDURE — 76942 ECHO GUIDE FOR BIOPSY: CPT | Performed by: ORTHOPAEDIC SURGERY

## 2018-04-19 PROCEDURE — 73030 X-RAY EXAM OF SHOULDER: CPT

## 2018-04-19 PROCEDURE — 74011250636 HC RX REV CODE- 250/636: Performed by: ANESTHESIOLOGY

## 2018-04-19 PROCEDURE — 76010010054 HC POST OP PAIN BLOCK: Performed by: ORTHOPAEDIC SURGERY

## 2018-04-19 PROCEDURE — 77030031139 HC SUT VCRL2 J&J -A: Performed by: ORTHOPAEDIC SURGERY

## 2018-04-19 PROCEDURE — 77030008703 HC TU ET UNCUF COVD -A: Performed by: ANESTHESIOLOGY

## 2018-04-19 PROCEDURE — 77010033678 HC OXYGEN DAILY

## 2018-04-19 PROCEDURE — C1713 ANCHOR/SCREW BN/BN,TIS/BN: HCPCS | Performed by: ORTHOPAEDIC SURGERY

## 2018-04-19 PROCEDURE — 77030020163 HC SEAL HEMSTAT HALY -B: Performed by: ORTHOPAEDIC SURGERY

## 2018-04-19 PROCEDURE — 0RRJ00Z REPLACEMENT OF RIGHT SHOULDER JOINT WITH REVERSE BALL AND SOCKET SYNTHETIC SUBSTITUTE, OPEN APPROACH: ICD-10-PCS | Performed by: ORTHOPAEDIC SURGERY

## 2018-04-19 DEVICE — SCREW BNE L42MM DIA4.5MM METAGLENE NONLOCKING FOR PLATFRM: Type: IMPLANTABLE DEVICE | Site: SHOULDER | Status: FUNCTIONAL

## 2018-04-19 DEVICE — STEM HUM SZ 2 DIA12MM 155DEG SHLDR CO CHROM ALLY STD LEN: Type: IMPLANTABLE DEVICE | Site: SHOULDER | Status: FUNCTIONAL

## 2018-04-19 DEVICE — CEMENT BNE FL 20ML MONMR 40GM -- SIMPLEX P: Type: IMPLANTABLE DEVICE | Site: SHOULDER | Status: FUNCTIONAL

## 2018-04-19 DEVICE — IMPLANTABLE DEVICE: Type: IMPLANTABLE DEVICE | Site: SHOULDER | Status: FUNCTIONAL

## 2018-04-19 DEVICE — CUP HUM DIA42MM +9MM OFFSET STD SHLDR POLYETH DELT XTEND: Type: IMPLANTABLE DEVICE | Site: SHOULDER | Status: FUNCTIONAL

## 2018-04-19 DEVICE — SCREW BNE L18MM DIA4.5MM SHLDR TI NONLOCKING FULL THRD FOR: Type: IMPLANTABLE DEVICE | Site: SHOULDER | Status: FUNCTIONAL

## 2018-04-19 DEVICE — SPHERE GLEN DIA42MM SHLDR CO CHROM ECC FOR DELT XTEND REV: Type: IMPLANTABLE DEVICE | Site: SHOULDER | Status: FUNCTIONAL

## 2018-04-19 DEVICE — RESTRICTOR CEM DIA12MM UNIV FEM CNL UHMWPE BIOSTP G: Type: IMPLANTABLE DEVICE | Site: SHOULDER | Status: FUNCTIONAL

## 2018-04-19 RX ORDER — HYDROGEN PEROXIDE 3 %
SOLUTION, NON-ORAL MISCELLANEOUS AS NEEDED
Status: DISCONTINUED | OUTPATIENT
Start: 2018-04-19 | End: 2018-04-19 | Stop reason: HOSPADM

## 2018-04-19 RX ORDER — NITROGLYCERIN 0.4 MG/1
0.4 TABLET SUBLINGUAL
Status: DISCONTINUED | OUTPATIENT
Start: 2018-04-19 | End: 2018-04-21 | Stop reason: HOSPADM

## 2018-04-19 RX ORDER — NEOSTIGMINE METHYLSULFATE 1 MG/ML
INJECTION INTRAVENOUS AS NEEDED
Status: DISCONTINUED | OUTPATIENT
Start: 2018-04-19 | End: 2018-04-19 | Stop reason: HOSPADM

## 2018-04-19 RX ORDER — GABAPENTIN 100 MG/1
100 CAPSULE ORAL 2 TIMES DAILY
Status: DISCONTINUED | OUTPATIENT
Start: 2018-04-19 | End: 2018-04-21 | Stop reason: HOSPADM

## 2018-04-19 RX ORDER — OXYCODONE HYDROCHLORIDE 5 MG/1
5 TABLET ORAL
Status: DISCONTINUED | OUTPATIENT
Start: 2018-04-19 | End: 2018-04-19 | Stop reason: HOSPADM

## 2018-04-19 RX ORDER — HYDROMORPHONE HYDROCHLORIDE 2 MG/ML
1 INJECTION, SOLUTION INTRAMUSCULAR; INTRAVENOUS; SUBCUTANEOUS
Status: DISCONTINUED | OUTPATIENT
Start: 2018-04-19 | End: 2018-04-21 | Stop reason: HOSPADM

## 2018-04-19 RX ORDER — PANTOPRAZOLE SODIUM 40 MG/1
40 TABLET, DELAYED RELEASE ORAL
Status: DISCONTINUED | OUTPATIENT
Start: 2018-04-20 | End: 2018-04-21 | Stop reason: HOSPADM

## 2018-04-19 RX ORDER — FENTANYL CITRATE 50 UG/ML
100 INJECTION, SOLUTION INTRAMUSCULAR; INTRAVENOUS ONCE
Status: COMPLETED | OUTPATIENT
Start: 2018-04-19 | End: 2018-04-19

## 2018-04-19 RX ORDER — SODIUM CHLORIDE, SODIUM LACTATE, POTASSIUM CHLORIDE, CALCIUM CHLORIDE 600; 310; 30; 20 MG/100ML; MG/100ML; MG/100ML; MG/100ML
75 INJECTION, SOLUTION INTRAVENOUS CONTINUOUS
Status: DISCONTINUED | OUTPATIENT
Start: 2018-04-19 | End: 2018-04-19 | Stop reason: HOSPADM

## 2018-04-19 RX ORDER — LIDOCAINE HYDROCHLORIDE 20 MG/ML
INJECTION, SOLUTION EPIDURAL; INFILTRATION; INTRACAUDAL; PERINEURAL AS NEEDED
Status: DISCONTINUED | OUTPATIENT
Start: 2018-04-19 | End: 2018-04-19 | Stop reason: HOSPADM

## 2018-04-19 RX ORDER — MAGNESIUM SULFATE HEPTAHYDRATE 40 MG/ML
2 INJECTION, SOLUTION INTRAVENOUS ONCE
Status: COMPLETED | OUTPATIENT
Start: 2018-04-19 | End: 2018-04-20

## 2018-04-19 RX ORDER — FACIAL-BODY WIPES
10 EACH TOPICAL DAILY PRN
Status: DISCONTINUED | OUTPATIENT
Start: 2018-04-19 | End: 2018-04-21 | Stop reason: HOSPADM

## 2018-04-19 RX ORDER — MIDAZOLAM HYDROCHLORIDE 1 MG/ML
5 INJECTION, SOLUTION INTRAMUSCULAR; INTRAVENOUS ONCE
Status: COMPLETED | OUTPATIENT
Start: 2018-04-19 | End: 2018-04-19

## 2018-04-19 RX ORDER — FAMOTIDINE 20 MG/1
20 TABLET, FILM COATED ORAL ONCE
Status: COMPLETED | OUTPATIENT
Start: 2018-04-19 | End: 2018-04-19

## 2018-04-19 RX ORDER — MIDAZOLAM HYDROCHLORIDE 1 MG/ML
2 INJECTION, SOLUTION INTRAMUSCULAR; INTRAVENOUS
Status: DISCONTINUED | OUTPATIENT
Start: 2018-04-19 | End: 2018-04-19 | Stop reason: HOSPADM

## 2018-04-19 RX ORDER — MAGNESIUM SULFATE HEPTAHYDRATE 40 MG/ML
2 INJECTION, SOLUTION INTRAVENOUS ONCE
Status: DISCONTINUED | OUTPATIENT
Start: 2018-04-19 | End: 2018-04-19 | Stop reason: SDUPTHER

## 2018-04-19 RX ORDER — LANOLIN ALCOHOL/MO/W.PET/CERES
1 CREAM (GRAM) TOPICAL 2 TIMES DAILY WITH MEALS
Status: DISCONTINUED | OUTPATIENT
Start: 2018-04-20 | End: 2018-04-21 | Stop reason: HOSPADM

## 2018-04-19 RX ORDER — LOSARTAN POTASSIUM 50 MG/1
100 TABLET ORAL DAILY
Status: DISCONTINUED | OUTPATIENT
Start: 2018-04-20 | End: 2018-04-21 | Stop reason: HOSPADM

## 2018-04-19 RX ORDER — EPHEDRINE SULFATE 50 MG/ML
INJECTION, SOLUTION INTRAVENOUS AS NEEDED
Status: DISCONTINUED | OUTPATIENT
Start: 2018-04-19 | End: 2018-04-19 | Stop reason: HOSPADM

## 2018-04-19 RX ORDER — GLYCOPYRROLATE 0.2 MG/ML
INJECTION INTRAMUSCULAR; INTRAVENOUS AS NEEDED
Status: DISCONTINUED | OUTPATIENT
Start: 2018-04-19 | End: 2018-04-19 | Stop reason: HOSPADM

## 2018-04-19 RX ORDER — SODIUM CHLORIDE 9 MG/ML
75 INJECTION, SOLUTION INTRAVENOUS CONTINUOUS
Status: DISPENSED | OUTPATIENT
Start: 2018-04-19 | End: 2018-04-20

## 2018-04-19 RX ORDER — ONDANSETRON 2 MG/ML
INJECTION INTRAMUSCULAR; INTRAVENOUS AS NEEDED
Status: DISCONTINUED | OUTPATIENT
Start: 2018-04-19 | End: 2018-04-19 | Stop reason: HOSPADM

## 2018-04-19 RX ORDER — PRAVASTATIN SODIUM 20 MG/1
40 TABLET ORAL
Status: DISCONTINUED | OUTPATIENT
Start: 2018-04-19 | End: 2018-04-21 | Stop reason: HOSPADM

## 2018-04-19 RX ORDER — HYDROMORPHONE HYDROCHLORIDE 2 MG/1
2 TABLET ORAL
Status: DISCONTINUED | OUTPATIENT
Start: 2018-04-19 | End: 2018-04-21 | Stop reason: HOSPADM

## 2018-04-19 RX ORDER — SODIUM CHLORIDE 9 MG/ML
250 INJECTION, SOLUTION INTRAVENOUS AS NEEDED
Status: DISCONTINUED | OUTPATIENT
Start: 2018-04-19 | End: 2018-04-19 | Stop reason: HOSPADM

## 2018-04-19 RX ORDER — TEMAZEPAM 15 MG/1
15 CAPSULE ORAL
Status: DISCONTINUED | OUTPATIENT
Start: 2018-04-19 | End: 2018-04-21 | Stop reason: HOSPADM

## 2018-04-19 RX ORDER — HYDROMORPHONE HYDROCHLORIDE 4 MG/1
4 TABLET ORAL
Status: DISCONTINUED | OUTPATIENT
Start: 2018-04-19 | End: 2018-04-21 | Stop reason: HOSPADM

## 2018-04-19 RX ORDER — ROPIVACAINE HYDROCHLORIDE 10 MG/ML
INJECTION EPIDURAL; INFILTRATION; PERINEURAL AS NEEDED
Status: DISCONTINUED | OUTPATIENT
Start: 2018-04-19 | End: 2018-04-19 | Stop reason: HOSPADM

## 2018-04-19 RX ORDER — CEFAZOLIN SODIUM/WATER 2 G/20 ML
2 SYRINGE (ML) INTRAVENOUS
Status: COMPLETED | OUTPATIENT
Start: 2018-04-19 | End: 2018-04-19

## 2018-04-19 RX ORDER — LIDOCAINE HYDROCHLORIDE 10 MG/ML
0.1 INJECTION INFILTRATION; PERINEURAL AS NEEDED
Status: DISCONTINUED | OUTPATIENT
Start: 2018-04-19 | End: 2018-04-19 | Stop reason: HOSPADM

## 2018-04-19 RX ORDER — METOPROLOL TARTRATE 50 MG/1
50 TABLET ORAL 2 TIMES DAILY
Status: DISCONTINUED | OUTPATIENT
Start: 2018-04-19 | End: 2018-04-21 | Stop reason: HOSPADM

## 2018-04-19 RX ORDER — PROPOFOL 10 MG/ML
INJECTION, EMULSION INTRAVENOUS AS NEEDED
Status: DISCONTINUED | OUTPATIENT
Start: 2018-04-19 | End: 2018-04-19 | Stop reason: HOSPADM

## 2018-04-19 RX ORDER — SODIUM CHLORIDE 0.9 % (FLUSH) 0.9 %
5-10 SYRINGE (ML) INJECTION EVERY 8 HOURS
Status: DISCONTINUED | OUTPATIENT
Start: 2018-04-19 | End: 2018-04-21 | Stop reason: HOSPADM

## 2018-04-19 RX ORDER — CEFAZOLIN SODIUM/WATER 2 G/20 ML
2 SYRINGE (ML) INTRAVENOUS
Status: DISCONTINUED | OUTPATIENT
Start: 2018-04-19 | End: 2018-04-19 | Stop reason: SDUPTHER

## 2018-04-19 RX ORDER — HYDROCODONE BITARTRATE AND ACETAMINOPHEN 5; 325 MG/1; MG/1
2 TABLET ORAL AS NEEDED
Status: DISCONTINUED | OUTPATIENT
Start: 2018-04-19 | End: 2018-04-19 | Stop reason: HOSPADM

## 2018-04-19 RX ORDER — SODIUM CHLORIDE 0.9 % (FLUSH) 0.9 %
5-10 SYRINGE (ML) INJECTION AS NEEDED
Status: DISCONTINUED | OUTPATIENT
Start: 2018-04-19 | End: 2018-04-21 | Stop reason: HOSPADM

## 2018-04-19 RX ORDER — DOCUSATE SODIUM 100 MG/1
100 CAPSULE, LIQUID FILLED ORAL 2 TIMES DAILY
Status: DISCONTINUED | OUTPATIENT
Start: 2018-04-20 | End: 2018-04-21 | Stop reason: HOSPADM

## 2018-04-19 RX ORDER — RANOLAZINE 500 MG/1
500 TABLET, EXTENDED RELEASE ORAL 2 TIMES DAILY
Status: DISCONTINUED | OUTPATIENT
Start: 2018-04-19 | End: 2018-04-21 | Stop reason: HOSPADM

## 2018-04-19 RX ORDER — DILTIAZEM HYDROCHLORIDE 180 MG/1
360 CAPSULE, COATED, EXTENDED RELEASE ORAL DAILY
Status: DISCONTINUED | OUTPATIENT
Start: 2018-04-20 | End: 2018-04-21 | Stop reason: HOSPADM

## 2018-04-19 RX ORDER — PROMETHAZINE HYDROCHLORIDE 25 MG/1
25 TABLET ORAL
Status: DISCONTINUED | OUTPATIENT
Start: 2018-04-19 | End: 2018-04-21 | Stop reason: HOSPADM

## 2018-04-19 RX ORDER — HYDROMORPHONE HYDROCHLORIDE 2 MG/ML
0.5 INJECTION, SOLUTION INTRAMUSCULAR; INTRAVENOUS; SUBCUTANEOUS
Status: DISCONTINUED | OUTPATIENT
Start: 2018-04-19 | End: 2018-04-19 | Stop reason: HOSPADM

## 2018-04-19 RX ORDER — ALLOPURINOL 300 MG/1
300 TABLET ORAL DAILY
Status: DISCONTINUED | OUTPATIENT
Start: 2018-04-20 | End: 2018-04-21 | Stop reason: HOSPADM

## 2018-04-19 RX ORDER — ROCURONIUM BROMIDE 10 MG/ML
INJECTION, SOLUTION INTRAVENOUS AS NEEDED
Status: DISCONTINUED | OUTPATIENT
Start: 2018-04-19 | End: 2018-04-19 | Stop reason: HOSPADM

## 2018-04-19 RX ADMIN — EPHEDRINE SULFATE 10 MG: 50 INJECTION, SOLUTION INTRAVENOUS at 14:19

## 2018-04-19 RX ADMIN — Medication 2 G: at 13:41

## 2018-04-19 RX ADMIN — HYDROMORPHONE HYDROCHLORIDE 4 MG: 4 TABLET ORAL at 17:56

## 2018-04-19 RX ADMIN — HYDROMORPHONE HYDROCHLORIDE 4 MG: 4 TABLET ORAL at 20:04

## 2018-04-19 RX ADMIN — METOPROLOL TARTRATE 50 MG: 50 TABLET ORAL at 20:03

## 2018-04-19 RX ADMIN — RANOLAZINE 500 MG: 500 TABLET, FILM COATED, EXTENDED RELEASE ORAL at 20:00

## 2018-04-19 RX ADMIN — ROPIVACAINE HYDROCHLORIDE 15 ML: 10 INJECTION EPIDURAL; INFILTRATION; PERINEURAL at 11:48

## 2018-04-19 RX ADMIN — EPHEDRINE SULFATE 10 MG: 50 INJECTION, SOLUTION INTRAVENOUS at 15:16

## 2018-04-19 RX ADMIN — PROPOFOL 200 MG: 10 INJECTION, EMULSION INTRAVENOUS at 13:44

## 2018-04-19 RX ADMIN — GLYCOPYRROLATE 0.6 MG: 0.2 INJECTION INTRAMUSCULAR; INTRAVENOUS at 15:30

## 2018-04-19 RX ADMIN — EPHEDRINE SULFATE 15 MG: 50 INJECTION, SOLUTION INTRAVENOUS at 14:27

## 2018-04-19 RX ADMIN — HYDROMORPHONE HYDROCHLORIDE 1 MG: 2 INJECTION, SOLUTION INTRAMUSCULAR; INTRAVENOUS; SUBCUTANEOUS at 21:33

## 2018-04-19 RX ADMIN — TUBERCULIN PURIFIED PROTEIN DERIVATIVE 5 UNITS: 5 INJECTION, SOLUTION INTRADERMAL at 21:34

## 2018-04-19 RX ADMIN — SODIUM CHLORIDE, SODIUM LACTATE, POTASSIUM CHLORIDE, AND CALCIUM CHLORIDE 75 ML/HR: 600; 310; 30; 20 INJECTION, SOLUTION INTRAVENOUS at 10:29

## 2018-04-19 RX ADMIN — PRAVASTATIN SODIUM 40 MG: 20 TABLET ORAL at 20:03

## 2018-04-19 RX ADMIN — NEOSTIGMINE METHYLSULFATE 4 MG: 1 INJECTION INTRAVENOUS at 15:30

## 2018-04-19 RX ADMIN — EPHEDRINE SULFATE 5 MG: 50 INJECTION, SOLUTION INTRAVENOUS at 15:25

## 2018-04-19 RX ADMIN — SODIUM CHLORIDE, SODIUM LACTATE, POTASSIUM CHLORIDE, AND CALCIUM CHLORIDE: 600; 310; 30; 20 INJECTION, SOLUTION INTRAVENOUS at 13:50

## 2018-04-19 RX ADMIN — MIDAZOLAM 3 MG: 1 INJECTION INTRAMUSCULAR; INTRAVENOUS at 11:45

## 2018-04-19 RX ADMIN — CEFAZOLIN SODIUM 1 G: 1 INJECTION, POWDER, FOR SOLUTION INTRAMUSCULAR; INTRAVENOUS at 20:04

## 2018-04-19 RX ADMIN — ONDANSETRON 4 MG: 2 INJECTION INTRAMUSCULAR; INTRAVENOUS at 15:12

## 2018-04-19 RX ADMIN — EPHEDRINE SULFATE 10 MG: 50 INJECTION, SOLUTION INTRAVENOUS at 14:17

## 2018-04-19 RX ADMIN — MAGNESIUM SULFATE HEPTAHYDRATE 2 G: 40 INJECTION, SOLUTION INTRAVENOUS at 21:00

## 2018-04-19 RX ADMIN — SODIUM CHLORIDE 75 ML/HR: 900 INJECTION, SOLUTION INTRAVENOUS at 17:48

## 2018-04-19 RX ADMIN — GABAPENTIN 100 MG: 100 CAPSULE ORAL at 20:03

## 2018-04-19 RX ADMIN — EPHEDRINE SULFATE 5 MG: 50 INJECTION, SOLUTION INTRAVENOUS at 15:12

## 2018-04-19 RX ADMIN — ROCURONIUM BROMIDE 50 MG: 10 INJECTION, SOLUTION INTRAVENOUS at 13:44

## 2018-04-19 RX ADMIN — FAMOTIDINE 20 MG: 20 TABLET, FILM COATED ORAL at 10:28

## 2018-04-19 RX ADMIN — LIDOCAINE HYDROCHLORIDE 100 MG: 20 INJECTION, SOLUTION EPIDURAL; INFILTRATION; INTRACAUDAL; PERINEURAL at 13:44

## 2018-04-19 RX ADMIN — FENTANYL CITRATE 50 MCG: 50 INJECTION INTRAMUSCULAR; INTRAVENOUS at 11:49

## 2018-04-19 RX ADMIN — EPHEDRINE SULFATE 10 MG: 50 INJECTION, SOLUTION INTRAVENOUS at 15:10

## 2018-04-19 NOTE — DISCHARGE SUMMARY
[facility] Discharge Summary      Patient ID:  Zhane Yung  [id number]  32 y.o.  1945    Allergies: Celecoxib; Ibuprofen; Lescol [fluvastatin]; Nsaids (non-steroidal anti-inflammatory drug); Sulfa (sulfonamide antibiotics); and Uloric [febuxostat]    Admit date: 4/19/2018    Discharge date and time: 4/21/2018    Admitting Physician: Daisha Sparks MD     Discharge Physician: Daisha Sparks MD      * Admission Diagnoses: Primary osteoarthritis, right shoulder [M19.011]    * Discharge Diagnoses:   Hospital Problems as of 4/21/2018  Date Reviewed: 3/20/2018          Codes Class Noted - Resolved POA    Hypokalemia ICD-10-CM: E87.6  ICD-9-CM: 276.8  4/21/2018 - Present Yes        Anemia ICD-10-CM: D64.9  ICD-9-CM: 285.9  4/19/2018 - Present Yes        Anemia associated with acute blood loss ICD-10-CM: D62  ICD-9-CM: 285.1  4/19/2018 - Present Yes        * (Principal)Osteoarthritis of right glenohumeral joint ICD-10-CM: M19.011  ICD-9-CM: 715.91  4/18/2018 - Present Yes        RESOLVED: Hypomagnesemia ICD-10-CM: E83.42  ICD-9-CM: 275.2  4/19/2018 - 4/21/2018 Yes              Surgeon: Daisha Sparks MD      Preoperative Medical Clearance: YES    * Procedure: Procedure(s):  RIGHT SHOULDER ARTHROPLASTY TOTAL REVERSE WITH BICEPS TENODESIS IN COMBINATION WITH LATISSIMUS KIRBY AND TERES MAJOR TENDON TRANSFER / INTERSCALENE  DELTA XTEND           Perioperative Antibiotics: Ancef  _X__                                                Vancomycin  ___          Post Op complications: none        * Discharge Condition: good  Wound appears to be healing without any evidence of infection.          * Discharged to: Home    * Follow-up Care/Discharge instructions:  - Resume pre hospital diet            - Resume home medications per medical continuation form     CONTINUE PHYSICAL THERAPY  SLING RIGHT SHOULDER  - Follow up in office as scheduled       Signed:  Daisha Sparks MD  4/21/2018  12:02 PM

## 2018-04-19 NOTE — ANESTHESIA PROCEDURE NOTES
Peripheral Block    Start time: 4/19/2018 11:46 AM  End time: 4/19/2018 11:48 AM  Performed by: Gail Jones  Authorized by: Cesia HAGER       Pre-procedure: Indications: at surgeon's request, post-op pain management and procedure for pain    Preanesthetic Checklist: patient identified, risks and benefits discussed, site marked, timeout performed, anesthesia consent given and patient being monitored    Timeout Time: 11:45          Block Type:   Block Type:   Interscalene  Laterality:  Right  Monitoring:  Standard ASA monitoring, responsive to questions, oxygen, continuous pulse ox, heart rate and frequent vital sign checks  Injection Technique:  Single shot  Procedures: ultrasound guided and nerve stimulator    Patient Position: supine  Prep: chlorhexidine    Location:  Interscalene  Needle Type:  Stimuplex  Needle Gauge:  22 G  Needle Localization:  Nerve stimulator and ultrasound guidance  Motor Response: minimal motor response >0.4 mA    Medication Injected:  1.0%  ropivacaine  Adds:  Epi 1:200K  Volume (mL):  15  Add'l Medication Injected:  2.0%  mepivacaine  Adds:  Epi 1:200K  Volume (mL):  15    Assessment:  Number of attempts:  1  Injection Assessment:  Incremental injection every 5 mL, no paresthesia, ultrasound image on chart, local visualized surrounding nerve on ultrasound, negative aspiration for blood and no intravascular symptoms  Patient tolerance:  Patient tolerated the procedure well with no immediate complications

## 2018-04-19 NOTE — ANESTHESIA PREPROCEDURE EVALUATION
Anesthetic History   No history of anesthetic complications            Review of Systems / Medical History  Patient summary reviewed and pertinent labs reviewed    Pulmonary        Sleep apnea: No treatment           Neuro/Psych       CVA (memory issues, otherwise on symptoms.)       Cardiovascular    Hypertension: well controlled          CAD, PAD, CABG (2009) and hyperlipidemia    Exercise tolerance: <4 METS  Comments: H/o mr, mild on most recent echo.    GI/Hepatic/Renal     GERD: well controlled    Renal disease: CRI       Endo/Other    Diabetes: well controlled, type 2    Arthritis     Other Findings            Physical Exam    Airway  Mallampati: II  TM Distance: 4 - 6 cm  Neck ROM: normal range of motion   Mouth opening: Normal     Cardiovascular    Rhythm: regular  Rate: normal      Pertinent negatives: No murmur, JVD and peripheral edema   Dental  No notable dental hx       Pulmonary  Breath sounds clear to auscultation               Abdominal         Other Findings            Anesthetic Plan    ASA: 3  Anesthesia type: general      Post-op pain plan if not by surgeon: peripheral nerve block single    Induction: Intravenous  Anesthetic plan and risks discussed with: Patient

## 2018-04-19 NOTE — PROGRESS NOTES
04/19/18 1753   Oxygen Therapy   O2 Sat (%) 97 %   Pulse via Oximetry 51 beats per minute   O2 Device Room air   Incentive Spirometry Treatment   Actual Volume (ml) 1250 ml   Number of Attempts 3   Patient achieved 1250 Ml/sec on IS. Patient encouraged to do 10 breaths every hour while awake-patient agreed and demonstrated. No shortness of breath or distress noted. [facility] notes reviewed- PEP therapy BID and continuous SAT monitoring ordered; monitor #13 at bedside.

## 2018-04-19 NOTE — IP AVS SNAPSHOT
[address] 
[phone] Patient: Rebekah Mills MRN: [id number] WAP:[id number] About your hospitalization You were admitted on:  April 19, 2018 You last received care in the:  [facility] You were discharged on:  April 21, 2018 Why you were hospitalized Your primary diagnosis was:  Osteoarthritis Of Right Glenohumeral Joint Your diagnoses also included:  Anemia, Anemia Associated With Acute Blood Loss, Hypomagnesemia, Hypokalemia Follow-up Information Follow up With Details Comments Contact Info Suzie Villafana MD   [address] [place] [address] [address] 
[phone] Madhu Murphy MD   [address] [facility] 
[address] contact you within 48 hours to set up home visits [facility] [address] [place] [address] 
[phone] Your Scheduled Appointments Wednesday May 09, 2018  9:40 AM EDT  
LAB with [facility] LAB [facility] ([facility] [address]  
[phone] Discharge Orders None A check yonas indicates which time of day the medication should be taken. My Medications ASK your doctor about these medications Instructions Each Dose to Equal  
 Morning Noon Evening Bedtime  
 allopurinol 300 mg tablet Commonly known as:  Carolynne Flavors Take 1 Tab by mouth daily. 300 mg  
    
  
   
   
   
  
 apixaban 5 mg tablet Commonly known as:  Pattricia Boom Take 1 Tab by mouth two (2) times a day. 5 mg  
    
  
   
   
  
   
  
 aspirin delayed-release 81 mg tablet Take 81 mg by mouth nightly. 81 mg  
    
   
   
  
   
  
 dilTIAZem  mg Tb24 tablet Commonly known as:  CARDIZEM LA Take 1 Tab by mouth daily. 360 mg  
    
  
   
   
   
  
 ELIDEL 1 % topical cream  
Generic drug:  pimecrolimus Apply  to affected area two (2) times a day. esomeprazole 40 mg capsule Commonly known as:  NexIUM Take 1 Cap by mouth daily. Indications: am  
 40 mg  
    
  
   
   
   
  
 FLONASE 50 mcg/actuation nasal spray Generic drug:  fluticasone  
   
 as needed. gabapentin 100 mg capsule Commonly known as:  NEURONTIN Take 1 Cap by mouth two (2) times a day. 100 mg  
    
  
   
   
  
   
  
 glucose blood VI test strips strip Commonly known as:  blood glucose test  
   
 Test once to twice daily DX: E11.8 Lancets Misc Test once to twice daily DX: E11.8  
     
   
   
   
  
 losartan 100 mg tablet Commonly known as:  COZAAR Take 1 Tab by mouth daily. 100 mg  
    
  
   
   
   
  
 metFORMIN  mg tablet Commonly known as:  GLUCOPHAGE XR Take 2 Tabs by mouth daily (with dinner). 1000 mg  
    
   
   
  
   
  
 metoprolol tartrate 50 mg tablet Commonly known as:  LOPRESSOR Take 1 Tab by mouth two (2) times a day. 50 mg  
    
  
   
   
  
   
  
 mometasone 0.1 % topical cream  
Commonly known as:  ELOCON  
   
      
   
   
   
  
 nitroglycerin 0.4 mg SL tablet Commonly known as:  NITROSTAT  
   
 1 Tab by SubLINGual route every five (5) minutes as needed for Chest Pain. 0.4 mg  
    
   
   
   
  
 pravastatin 40 mg tablet Commonly known as:  PRAVACHOL Take 1 Tab by mouth nightly. 40 mg  
    
   
   
   
  
  
 ranolazine  mg SR tablet Commonly known as:  RANEXA  
   
 TAKE 1 TABLET TWICE A DAY  
     
  
   
   
  
   
  
 tacrolimus 0.1 % ointment Commonly known as:  PROTOPIC  
   
      
   
   
   
  
 traMADol 50 mg tablet Commonly known as:  ULTRAM  
   
 Take 1 Tab by mouth every six (6) hours as needed for Pain.  Max Daily Amount: 200 mg.  
 50 mg  
    
   
   
   
  
 triamcinolone acetonide 0.1 % ointment Commonly known as:  KENALOG  
   
      
   
   
   
  
 VITAMIN D3 2,000 unit Tab Generic drug:  cholecalciferol (vitamin D3) Take 2,000 Units by mouth daily. Indications: OSTEOPOROSIS, am  
 2000 Units ZyrTEC 10 mg Cap Generic drug:  Cetirizine Take 10 mg by mouth nightly. Indications: ALLERGIC RHINITIS 10 mg Opioid Education Prescription Opioids: What You Need to Know: 
 
 
After general anesthesia or intravenous sedation, for 24 hours or while taking prescription Narcotics: · Limit your activities · Do not drive and operate hazardous machinery · Do not make important personal or business decisions · Do  not drink alcoholic beverages · If you have not urinated within 8 hours after discharge, please contact your surgeon on call. Report the following to your surgeon: 
· Excessive pain, swelling, redness or odor of or around the surgical area · Temperature over 100.5 · Nausea and vomiting lasting longer than 4 hours or if unable to take medications · Any signs of decreased circulation or nerve impairment to extremity: change in color, persistent  numbness, tingling, coldness or increase pain · Any questions What to do at Home: 
Recommended activity: Activity as tolerated, If you experience any of the following symptoms, please follow up with Dr. Kelle Hand. *  Please give a list of your current medications to your Primary Care Provider.  
 
*  Please update this list whenever your medications are discontinued, doses are 
 changed, or new medications (including over-the-counter products) are added. *  Please carry medication information at all times in case of emergency situations. These are general instructions for a healthy lifestyle: No smoking/ No tobacco products/ Avoid exposure to second hand smoke Surgeon General's Warning:  Quitting smoking now greatly reduces serious risk to your health. Obesity, smoking, and sedentary lifestyle greatly increases your risk for illness A healthy diet, regular physical exercise & weight monitoring are important for maintaining a healthy lifestyle You may be retaining fluid if you have a history of heart failure or if you experience any of the following symptoms:  Weight gain of 3 pounds or more overnight or 5 pounds in a week, increased swelling in our hands or feet or shortness of breath while lying flat in bed. Please call your doctor as soon as you notice any of these symptoms; do not wait until your next office visit. Recognize signs and symptoms of STROKE: 
 
F-face looks uneven A-arms unable to move or move unevenly S-speech slurred or non-existent T-time-call 911 as soon as signs and symptoms begin-DO NOT go Back to bed or wait to see if you get better-TIME IS BRAIN. Warning Signs of HEART ATTACK Call 911 if you have these symptoms: 
? Chest discomfort. Most heart attacks involve discomfort in the center of the chest that lasts more than a few minutes, or that goes away and comes back. It can feel like uncomfortable pressure, squeezing, fullness, or pain. ? Discomfort in other areas of the upper body. Symptoms can include pain or discomfort in one or both arms, the back, neck, jaw, or stomach. ? Shortness of breath with or without chest discomfort. ? Other signs may include breaking out in a cold sweat, nausea, or lightheadedness. Don't wait more than five minutes to call [address]!  Fast action can save your life. Calling 911 is almost always the fastest way to get lifesaving treatment. Emergency Medical Services staff can begin treatment when they arrive  up to an hour sooner than if someone gets to the hospital by car. The discharge information has been reviewed with the patient. The patient and spouse verbalized understanding. Discharge medications reviewed with the patient and spouse and appropriate educational materials and side effects teaching were provided. ___________________________________________________________________________________________________________________________________ Shoulder Arthroscopy: What to Expect at Home Your Recovery Your arm may be in a sling. You will feel tired for several days. Your shoulder will be swollen, and you may notice that your skin is a different color near the cuts the doctor made (incisions). Your hand and arm may also be swollen. This is normal and will go away in a few days. Depending on the medicine you had during the surgery, your entire arm may feel numb or like you cannot move it. This goes away in 12 to 24 hours. When you can return to work or your usual routine will depend on your shoulder problem. Most people need 6 weeks or longer to recover. How much time you need depends on the surgery that was done. You may have to limit your activity until your shoulder strength and range of motion return to normal. You may also be in a rehabilitation program (rehab). If you have a desk job, you may be able to return to work a few days after the surgery. If you lift things at work, it may take months before you return to work. This care sheet gives you a general idea about how long it will take for you to recover. But each person recovers at a different pace. Follow the steps below to get better as quickly as possible. How can you care for yourself at home? Activity ? · Rest when you feel tired. Getting enough sleep will help you recover. You may be more comfortable if you sleep in a reclining chair. To make your arm and shoulder feel better, keep a thin pillow under the back of your arm while you are lying down. ? · Try to walk each day. Start by walking a little more than you did the day before. Bit by bit, increase the amount you walk. Walking boosts blood flow and helps prevent pneumonia and constipation. ? · For 2 to 3 weeks, avoid lifting anything heavier than a plate or a glass. You need to give your shoulder time to heal.  
? · Your arm may be in a sling. You may need to use the sling for a few days to a few weeks. Your doctor will advise you on how long to wear the sling. ? · You may take the sling off when you dress or wash. ? · Do not use your arm for repeated movements. These include painting, vacuuming, or using a computer. Diet ? · You can eat your normal diet. If your stomach is upset, try bland, low-fat foods like plain rice, broiled chicken, toast, and yogurt. ? · Drink plenty of fluids, unless your doctor tells you not to. ? · You may notice that your bowel movements are not regular right after your surgery. This is common. Try to avoid constipation and straining with bowel movements. You may want to take a fiber supplement every day. If you have not had a bowel movement after a couple of days, ask your doctor about taking a mild laxative. Medicines ? · Your doctor will tell you if and when you can restart your medicines. He or she will also give you instructions about taking any new medicines. ? · If you take blood thinners, such as warfarin (Coumadin), clopidogrel (Plavix), or aspirin, be sure to talk to your doctor. He or she will tell you if and when to start taking those medicines again. Make sure that you understand exactly what your doctor wants you to do. ? · Take pain medicines exactly as directed. ¨ If the doctor gave you a prescription medicine for pain, take it as prescribed. ¨ If you are not taking a prescription pain medicine, ask your doctor if you can take an over-the-counter medicine. ? · If you think your pain medicine is making you sick to your stomach: 
¨ Take your medicine after meals (unless your doctor has told you not to). ¨ Ask your doctor for a different pain medicine. ? · If your doctor prescribed antibiotics, take them as directed. Do not stop taking them just because you feel better. You need to take the full course of antibiotics. Incision care ? · If you have a dressing over your incision, keep it clean and dry. You may remove it 2 to 3 days after the surgery. ? · If your incision is open to the air, keep the area clean and dry. ? · If you have strips of tape on the incision, leave the tape on for a week or until it falls off. Exercise ? · You may need rehabilitation. This is a series of exercises you do after your surgery. Rehab helps you get back your shoulder's range of motion and strength. You will work with your doctor and physical therapist to plan this exercise program. To get the best results, you need to do the exercises correctly and as often and as long as your doctor tells you. ?Ice ? · To reduce swelling and pain, put ice or a cold pack on your shoulder for 10 to 20 minutes at a time. Do this every 1 to 2 hours. Put a thin cloth between the ice and your skin. Follow-up care is a key part of your treatment and safety. Be sure to make and go to all appointments, and call your doctor if you are having problems. It's also a good idea to know your test results and keep a list of the medicines you take. When should you call for help? Call 911 anytime you think you may need emergency care. For example, call if: 
? · You passed out (lost consciousness). ? · You have severe trouble breathing. ? · You have sudden chest pain and shortness of breath, or you cough up blood. ?Call your doctor now or seek immediate medical care if: 
? · Your hand is cool, pale, or numb, or it changes color. ? · You are unable to move your fingers, wrist, or elbow. ? · You are sick to your stomach or cannot keep fluids down. ? · You have pain that does not get better after you take pain medicine. ? · You have signs of infection, such as: 
¨ Increased pain, swelling, warmth, or redness. ¨ Red streaks leading from the incision. ¨ Pus draining from the incision. ¨ A fever. ? · You have loose stitches, or your incision comes open. ? · Your incision bleeds through your first dressing or is still bleeding 3 days after your surgery. ? Watch closely for changes in your health, and be sure to contact your doctor if: 
? · Your sling feels too tight, and you cannot loosen it. ? · You have new or increased swelling in your arm. ? · You have new pain that develops in another area of the affected limb. For example, you have pain in your hand or elbow. ? · You do not have a bowel movement after taking a laxative. ? · You do not get better as expected. Where can you learn more? Go to http://maykel-xander.info/. Enter W550 in the search box to learn more about \"Shoulder Arthroscopy: What to Expect at Home. \" Current as of: March 21, 2017 Content Version: 11.4 © 0015-9359 [employer]. Care instructions adapted under license by [employer] (which disclaims liability or warranty for this information). If you have questions about a medical condition or this instruction, always ask your healthcare professional. [place] [address] any warranty or liability for your use of this information. ACO Transitions of Care [facility] offers a voluntary care coordination program to provide high quality service and care to [facility] fee-for-service beneficiaries. Minor Philipp was designed to help you enhance your health and well-being through the following services: ? Transitions of Care  support for individuals who are transitioning from one care setting to another (example: Hospital to home). ? Chronic and Complex Care Coordination  support for individuals and caregivers of those with serious or chronic illnesses or with more than one chronic (ongoing) condition and those who take a number of different medications. If you meet specific medical criteria, a [facility] [address] may call you directly to coordinate your care with your primary care physician and your other care providers. For questions about the [facility] programs, please, contact your physicians office. For general questions or additional information about Accountable Care Organizations: 
Please visit www.medicare.gov/acos. html or call 1-800-MEDICARE (3-274.411.1777) TTY users should call 8-156.760.8410. Magisto Announcement We are excited to announce that we are making your provider's discharge notes available to you in Magisto. You will see these notes when they are completed and signed by the physician that discharged you from your recent hospital stay. If you have any questions or concerns about any information you see in TRIA Beautyhart, please call the Health Information Department where you were seen or reach out to your Primary Care Provider for more information about your plan of care. Introducing [facility]! Dear Robinson Purdy: Thank you for requesting a Magisto account. Our records indicate that you already have an active Magisto account. You can access your account anytime at [email or web address] Did you know that you can access your hospital and ER discharge instructions at any time in Health [facility]? You can also review all of your test results from your hospital stay or ER visit. Additional Information If you have questions, please visit the Frequently Asked Questions section of the [employer] website at [email or web address] Remember, Lumidigmt is NOT to be used for urgent needs. For medical emergencies, dial 911. Now available from your iPhone and Android! Introducing Pasquale Serra As a [employer] patient, I wanted to make you aware of our electronic visit tool called Pasquale Serra. [employer] 24/7 allows you to connect within minutes with a medical provider 24 hours a day, seven days a week via a mobile device or tablet or logging into a secure website from your computer. You can access Pasquale Serra from anywhere in the [place]. A virtual visit might be right for you when you have a simple condition and feel like you just dont want to get out of bed, or cant get away from work for an appointment, when your regular [employer] provider is not available (evenings, weekends or holidays), or when youre out of town and need minor care. Electronic visits cost only $49 and if the [employer] 24/7 provider determines a prescription is needed to treat your condition, one can be electronically transmitted to a nearby pharmacy*. Please take a moment to enroll today if you have not already done so. The enrollment process is free and takes just a few minutes. To enroll, please download the [employer] 24/7 yulisa to your tablet or phone, or visit [email or web address] to enroll on your computer. And, as an [address] [place] [address] patient with a [employer] account, the results of your visits will be scanned into your electronic medical record and your primary care provider will be able to view the scanned results.    
We urge you to continue to see your regular [employer] provider for your ongoing medical care. And while your primary care provider may not be the one available when you seek a Pasquale Dueñaslinettefin virtual visit, the peace of mind you get from getting a real diagnosis real time can be priceless. For more information on Pasquale Dueñaslinettefin, view our Frequently Asked Questions (FAQs) at [email or web address]. Sincerely, 
 
Shana Rojo MD 
Chief Medical Officer Anu Noriega *:  certain medications cannot be prescribed via [employer] Circus[employer]ette[employer] Providers Seen During Your Hospitalization Provider Specialty Primary office phone Hernesto Osorio MD Orthopedic Surgery [phone] Immunizations Administered for This Admission Name Date  
 TB Skin Test (PPD) Intradermal  Deferred (), 4/19/2018 Your Primary Care Physician (PCP) Primary Care Physician Office Phone Office Fax Deisyadan Mcgowan [phone] You are allergic to the following Allergen Reactions Celecoxib Swelling Hands Ibuprofen Unknown (comments) Patient unsure Lescol (Fluvastatin) Unknown (comments) Other reaction(s): Unknown (comments) Nsaids (Non-Steroidal Anti-Inflammatory Drug) Other (comments) Elevated serum creatinine Sulfa (Sulfonamide Antibiotics) Rash Uloric (Febuxostat) Other (comments) Joint Pain Recent Documentation Height Weight BMI Smoking Status 1.803 m 87.1 kg 26.78 kg/m2 Former Smoker Emergency Contacts Name Discharge Info Relation Home Work Mobile Armando Dupree CAREGIVER [3] Spouse [3] [phone] Patient Belongings The following personal items are in your possession at time of discharge: 
  Dental Appliances: Lowers, Uppers  Visual Aid: Glasses      Home Medications: None   Jewelry: None  Clothing: At bedside    Other Valuables: None Please provide this summary of care documentation to your next provider. Signatures-by signing, you are acknowledging that this After Visit Summary has been reviewed with you and you have received a copy. Patient Signature:  ____________________________________________________________ Date:  ____________________________________________________________  
  
Arna Branchland Provider Signature:  ____________________________________________________________ Date:  ____________________________________________________________

## 2018-04-19 NOTE — PROGRESS NOTES
Received to room from PACU. Alert and oriented x3. Rates pain 6/10. Surgical bandage to right shoulder is clean dry and intact. Unable to wiggle fingers to RUE but sensation is intact. Discussed pain and diet. Oriented to bed functions.

## 2018-04-19 NOTE — CONSULTS
Hospitalist Consult Note      Patient: Alejandra Murillo               Sex: male             MRN: [id number]      YOB: 1945      Age:  71 y.o. Reason for Consult:  Medical Managment    HPI     This is a 71 y.o. old Gentleman came in for Rt Shoulder Arthroplasty . Patient is doing well post operatively, denies any Chest pain, SOB, Palpitations. Pain is controlled, No Nausea or vomiting  VS reviewed as below    Review of Systems  Comprehensive 10 point ROS is done, and pertinent positives & negatives per HPI, rest of them are negative.     Past Medical History:   Diagnosis Date    Allergic rhinitis     Arthritis     CAD (coronary artery disease)     CABG '09; troponin elevated 7/14/12 (\"likely due to supply/demand mismatch in setting of fever and increased metabolic demand\"-per cardiac MD note 8/20/12)-had cath, echo, EKG-all WNL except cath showed 2 of the bypasses with blockages- \"occluded SVG to PDA & SVG to LISA\"; EF=55%    CVA (cerebral vascular accident) ([address] 08/2017    Left- no residual weakness    Diabetes mellitus type 2, controlled ([address]     restarted oral med (metformin 3/2018), does not check BG on regular basis, last hgba1c- 8 (3/12/18)    Dyslipidemia     ED (erectile dysfunction)     Encephalitis 1962    x 2/hospitalized as teenager    GERD (gastroesophageal reflux disease)     controlled with Nexium    Gout     hx of    Hypertension     Lacunar infarct, acute ([address]     possible embolic, remote a-fib- on eliquis    Lumbago     Memory loss     Mitral valve regurgitation 7/14/12    \"moderate\" on echo    ROBERTO (obstructive sleep apnea)     mild per patient, does not use CPAP    PAF (paroxysmal atrial fibrillation) (HCC)     Prostate cancer ([address]     Psoriasis     topical creams    SBO (small bowel obstruction) ([address] 2011, 2015, 2016    Stage 2 chronic kidney disease        Past Surgical History:   Procedure Laterality Date   [address] UNLISTED  1896 exp lap    HX APPENDECTOMY  age 49         HX CATARACT REMOVAL Bilateral 2013    iol     HX COLONOSCOPY  , 2010    HX CORONARY ARTERY BYPASS GRAFT  2009    x4 bypass    HX HERNIA REPAIR Bilateral 2012    HX RADICAL PROSTATECTOMY       HX SPLENECTOMY  1951    VT LEFT HEART CATH,PERCUTANEOUS  2012    no intervention    VT PROSTATE BIOPSY, NEEDLE, SATURATION SAMPLING      and ultrasound    VASCULAR SURGERY PROCEDURE UNLIST  2017    aortogram, w/cass LE runoff,R-LE arteriogram       Family History   Problem Relation Age of Onset    Hypertension Mother    [place] Gout Mother     Arthritis-osteo Mother     Heart Disease Mother       of Heart Disease    Coronary Artery Disease Mother     Diabetes Father     Cancer Father     Heart Disease Father     Bleeding Prob Paternal Grandmother     Hypertension Brother     Cancer Maternal Uncle      Prostate       Social History     Social History    Marital status:      Spouse name: N/A    Number of children: N/A    Years of education: N/A     Social History Main Topics    Smoking status: Former Smoker     Packs/day: 1.00     Years: 15.00     Quit date: 1975    Smokeless tobacco: Former User    Alcohol use Yes      Comment: rarely- once/month    Drug use: No    Sexual activity: Yes     Partners: Female     Other Topics Concern    None     Social History Narrative       Allergies   Allergen Reactions    Celecoxib Swelling     Hands    Ibuprofen Unknown (comments)     Patient unsure    Lescol [Fluvastatin] Unknown (comments)     Other reaction(s): Unknown (comments)    Nsaids (Non-Steroidal Anti-Inflammatory Drug) Other (comments)     Elevated serum creatinine    Sulfa (Sulfonamide Antibiotics) Rash    Uloric [Febuxostat] Other (comments)     Joint Pain       Prior to Admission medications    Medication Sig Start Date End Date Taking?  Authorizing Provider   metFORMIN ER (GLUCOPHAGE XR) 500 mg tablet Take 2 Tabs by mouth daily (with dinner). 4/9/18  Yes Peace Viramontes MD   allopurinol (ZYLOPRIM) 300 mg tablet Take 1 Tab by mouth daily. 3/13/18  Yes Peace Viramontes MD   metoprolol tartrate (LOPRESSOR) 50 mg tablet Take 1 Tab by mouth two (2) times a day. 3/13/18  Yes Peace Viramontes MD   gabapentin (NEURONTIN) 100 mg capsule Take 1 Cap by mouth two (2) times a day. 3/13/18  Yes Peace Viramontes MD   pravastatin (PRAVACHOL) 40 mg tablet Take 1 Tab by mouth nightly. 3/13/18  Yes Peace Viramontes MD   ranolazine ER (RANEXA) 500 mg SR tablet TAKE 1 TABLET TWICE A DAY 3/13/18  Yes Peace Viramontes MD   dilTIAZem ER (CARDIZEM LA) 360 mg Tb24 tablet Take 1 Tab by mouth daily. 3/13/18  Yes Peace Viramontes MD   losartan (COZAAR) 100 mg tablet Take 1 Tab by mouth daily. 3/13/18  Yes Peace Viramontes MD   traMADol [place]) 50 mg tablet Take 1 Tab by mouth every six (6) hours as needed for Pain. Max Daily Amount: 200 mg. 3/12/18  Yes Peace Viramontes MD   aspirin delayed-release 81 mg tablet Take 81 mg by mouth nightly. Yes Historical Provider   esomeprazole (NEXIUM) 40 mg capsule Take 1 Cap by mouth daily. Indications: am  Patient taking differently: Take 40 mg by mouth daily. 12/5/17  Yes Peace Viramontes MD   glucose blood VI test strips (BLOOD GLUCOSE TEST) strip Test once to twice daily DX: E11.8 7/6/17  Yes Peace Viramontes MD   Lancets misc Test once to twice daily DX: E11.8 1/12/17  Yes Peace Viramontes MD   triamcinolone acetonide (KENALOG) 0.1 % ointment  7/30/16  Yes Historical Provider   tacrolimus (PROTOPIC) 0.1 % ointment  7/14/16  Yes Historical Provider   mometasone (ELOCON) 0.1 % topical cream  7/30/16  Yes Historical Provider   cholecalciferol, vitamin D3, (VITAMIN D3) 2,000 unit Tab Take 2,000 Units by mouth daily. Indications: OSTEOPOROSIS, am   Yes Historical Provider   Cetirizine (ZYRTEC) 10 mg Cap Take 10 mg by mouth nightly.     Indications: ALLERGIC RHINITIS   Yes Historical Provider   apixaban (ELIQUIS) 5 mg tablet Take 1 Tab by mouth two (2) times a day. 17   Shelley Gallego MD   nitroglycerin (NITROSTAT) 0.4 mg SL tablet 1 Tab by SubLINGual route every five (5) minutes as needed for Chest Pain. 16   Melida Chase MD   pimecrolimus (ELIDEL) 1 % topical cream Apply  to affected area two (2) times a day. Historical Provider   fluticasone (FLONASE) 50 mcg/actuation nasal spray as needed.     Historical Provider         Physical Exam     Visit Vitals    BP (P) 143/81 (BP 1 Location: Left arm, BP Patient Position: At rest)    Pulse (!) (P) 53    Temp 97.2 °F (36.2 °C)    Resp (P) 16    Ht 5' 11\" (1.803 m)    Wt 87.1 kg (192 lb)    SpO2 97%    BMI 26.78 kg/m2      Temp (24hrs), Av.6 °F (36.4 °C), Min:97.2 °F (36.2 °C), Max:97.9 °F (36.6 °C)    Oxygen Therapy  O2 Sat (%): 97 % (18)  Pulse via Oximetry: 51 beats per minute (18 1753)  O2 Device: Room air (18)  O2 Flow Rate (L/min): 3 l/min (18 1653)    Intake/Output Summary (Last 24 hours) at 18 1834  Last data filed at 18 1700   Gross per 24 hour   Intake              300 ml   Output              250 ml   Net               50 ml          General:- Conscious, No acute distress   Eyes:- No pallor/icterus    HENT- Oral Mucosa is Moist,   Neck:- Supple, No JVD  Lungs- CTA Bilaterally, No significant wheezing  Heart:- S1 S2 regular  Abdomen:- Soft, Positive bowel sounds,   Extremities:-Rt shoulder in dressing  Neurologic: - AOX3, No acute FND,  Skin: - No acute rashes  Musculoskeletal: No Acute findings  Psych: - Appropriate mood    LAB  Recent Results (from the past 24 hour(s))   GLUCOSE, POC    Collection Time: 18 10:27 AM   Result Value Ref Range    Glucose (POC) 148 (H) 65 - 100 mg/dL   TYPE + CROSSMATCH    Collection Time: 18 11:04 AM   Result Value Ref Range    Crossmatch Expiration 2018     ABO/Rh(D) Bettina Story POSITIVE Antibody screen NEG     Unit number [id number]     Blood component type RC LR     Unit division 00     Status of unit ALLOCATED     Crossmatch result Compatible     Unit number [id number]     Blood component type RC LR     Unit division 00     Status of unit ALLOCATED     Crossmatch result Compatible        IMAGING:     Xr Shoulder Rt Ap/lat Min 2 V    Result Date: 4/19/2018  Impression: Postoperative findings as above.          Assessment/Plan     Principal Problem:    Osteoarthritis of right glenohumeral joint (4/18/2018)    Active Problems:    Anemia (4/19/2018)      Anemia associated with acute blood loss (4/19/2018)      Hypomagnesemia (4/19/2018)        Plan:   DVT prophylaxis, PT/OT, pain management as per Ortho service    Hx of CAD- Cont with home meds    Hx of DM -2- hold off on Metformin, monitor FSG with sliding scale    Hx of HTN- Cont with home meds on metoprolol, Cardizem, Losartan, monitor BP    Hx of Paroxysmal Afib- On Eliquis at home, can be resumed if okw with Ortho    Hx of GERD- On PPI    Will continue to follow him  On Behalf of [facility], I would like to thank Ortho service for allowing to see this patient    Danya Pope MD  April 19, 2018

## 2018-04-19 NOTE — PERIOP NOTES
TRANSFER - OUT REPORT:    Verbal report given to Noemy Guerrero RN(name) on [employer]  being transferred to [facility](unit) for routine post - op       Report consisted of patients Situation, Background, Assessment and   Recommendations(SBAR). Information from the following report(s) SBAR, Kardex, OR Summary, Procedure Summary, Intake/Output and MAR was reviewed with the receiving nurse. Opportunity for questions and clarification was provided.       Patient transported with:   O2 @ 3 liters  Tech

## 2018-04-20 ENCOUNTER — HOME HEALTH ADMISSION (OUTPATIENT)
Dept: HOME HEALTH SERVICES | Facility: HOME HEALTH | Age: 73
End: 2018-04-20
Payer: MEDICARE

## 2018-04-20 LAB
ANION GAP SERPL CALC-SCNC: 13 MMOL/L (ref 7–16)
BUN SERPL-MCNC: 14 MG/DL (ref 8–23)
CALCIUM SERPL-MCNC: 8.4 MG/DL (ref 8.3–10.4)
CHLORIDE SERPL-SCNC: 103 MMOL/L (ref 98–107)
CO2 SERPL-SCNC: 23 MMOL/L (ref 21–32)
CREAT SERPL-MCNC: 1.6 MG/DL (ref 0.8–1.5)
ERYTHROCYTE [DISTWIDTH] IN BLOOD BY AUTOMATED COUNT: 14.2 % (ref 11.9–14.6)
GLUCOSE SERPL-MCNC: 233 MG/DL (ref 65–100)
HCT VFR BLD AUTO: 33 % (ref 41.1–50.3)
HGB BLD-MCNC: 11.2 G/DL (ref 13.6–17.2)
MAGNESIUM SERPL-MCNC: 2 MG/DL (ref 1.8–2.4)
MCH RBC QN AUTO: 30.4 PG (ref 26.1–32.9)
MCHC RBC AUTO-ENTMCNC: 33.9 G/DL (ref 31.4–35)
MCV RBC AUTO: 89.4 FL (ref 79.6–97.8)
MM INDURATION POC: 0 MM (ref 0–5)
PLATELET # BLD AUTO: 203 K/UL (ref 150–450)
PMV BLD AUTO: 12.3 FL (ref 10.8–14.1)
POTASSIUM SERPL-SCNC: 4 MMOL/L (ref 3.5–5.1)
PPD POC: NORMAL NEGATIVE
RBC # BLD AUTO: 3.69 M/UL (ref 4.23–5.67)
SODIUM SERPL-SCNC: 139 MMOL/L (ref 136–145)
WBC # BLD AUTO: 9.8 K/UL (ref 4.3–11.1)

## 2018-04-20 PROCEDURE — 97530 THERAPEUTIC ACTIVITIES: CPT

## 2018-04-20 PROCEDURE — 74011250636 HC RX REV CODE- 250/636: Performed by: ORTHOPAEDIC SURGERY

## 2018-04-20 PROCEDURE — 36415 COLL VENOUS BLD VENIPUNCTURE: CPT | Performed by: ORTHOPAEDIC SURGERY

## 2018-04-20 PROCEDURE — 97161 PT EVAL LOW COMPLEX 20 MIN: CPT

## 2018-04-20 PROCEDURE — 94640 AIRWAY INHALATION TREATMENT: CPT

## 2018-04-20 PROCEDURE — 65270000029 HC RM PRIVATE

## 2018-04-20 PROCEDURE — 94760 N-INVAS EAR/PLS OXIMETRY 1: CPT

## 2018-04-20 PROCEDURE — 77010033678 HC OXYGEN DAILY

## 2018-04-20 PROCEDURE — 99221 1ST HOSP IP/OBS SF/LOW 40: CPT | Performed by: PHYSICAL MEDICINE & REHABILITATION

## 2018-04-20 PROCEDURE — 74011250637 HC RX REV CODE- 250/637: Performed by: ORTHOPAEDIC SURGERY

## 2018-04-20 PROCEDURE — 94762 N-INVAS EAR/PLS OXIMTRY CONT: CPT

## 2018-04-20 PROCEDURE — 77030034849

## 2018-04-20 PROCEDURE — 83735 ASSAY OF MAGNESIUM: CPT | Performed by: ORTHOPAEDIC SURGERY

## 2018-04-20 PROCEDURE — 97110 THERAPEUTIC EXERCISES: CPT

## 2018-04-20 PROCEDURE — 80048 BASIC METABOLIC PNL TOTAL CA: CPT | Performed by: ORTHOPAEDIC SURGERY

## 2018-04-20 PROCEDURE — 74011250637 HC RX REV CODE- 250/637: Performed by: HOSPITALIST

## 2018-04-20 PROCEDURE — 85027 COMPLETE CBC AUTOMATED: CPT | Performed by: ORTHOPAEDIC SURGERY

## 2018-04-20 PROCEDURE — 74011000258 HC RX REV CODE- 258: Performed by: ORTHOPAEDIC SURGERY

## 2018-04-20 RX ORDER — ACETAMINOPHEN 500 MG
1000 TABLET ORAL
Status: DISCONTINUED | OUTPATIENT
Start: 2018-04-20 | End: 2018-04-21 | Stop reason: HOSPADM

## 2018-04-20 RX ADMIN — ALLOPURINOL 300 MG: 300 TABLET ORAL at 08:30

## 2018-04-20 RX ADMIN — GABAPENTIN 100 MG: 100 CAPSULE ORAL at 08:31

## 2018-04-20 RX ADMIN — HYDROMORPHONE HYDROCHLORIDE 1 MG: 2 INJECTION, SOLUTION INTRAMUSCULAR; INTRAVENOUS; SUBCUTANEOUS at 04:29

## 2018-04-20 RX ADMIN — HYDROMORPHONE HYDROCHLORIDE 4 MG: 4 TABLET ORAL at 02:13

## 2018-04-20 RX ADMIN — RANOLAZINE 500 MG: 500 TABLET, FILM COATED, EXTENDED RELEASE ORAL at 22:03

## 2018-04-20 RX ADMIN — RANOLAZINE 500 MG: 500 TABLET, FILM COATED, EXTENDED RELEASE ORAL at 08:30

## 2018-04-20 RX ADMIN — ACETAMINOPHEN 1000 MG: 500 TABLET, FILM COATED ORAL at 17:30

## 2018-04-20 RX ADMIN — FERROUS SULFATE TAB 325 MG (65 MG ELEMENTAL FE) 325 MG: 325 (65 FE) TAB at 08:29

## 2018-04-20 RX ADMIN — DOCUSATE SODIUM 100 MG: 100 CAPSULE, LIQUID FILLED ORAL at 17:30

## 2018-04-20 RX ADMIN — ACETAMINOPHEN 1000 MG: 500 TABLET, FILM COATED ORAL at 12:01

## 2018-04-20 RX ADMIN — CEFAZOLIN SODIUM 1 G: 1 INJECTION, POWDER, FOR SOLUTION INTRAMUSCULAR; INTRAVENOUS at 05:00

## 2018-04-20 RX ADMIN — DILTIAZEM HYDROCHLORIDE 360 MG: 180 CAPSULE, COATED, EXTENDED RELEASE ORAL at 08:29

## 2018-04-20 RX ADMIN — PANTOPRAZOLE SODIUM 40 MG: 40 TABLET, DELAYED RELEASE ORAL at 08:29

## 2018-04-20 RX ADMIN — LOSARTAN POTASSIUM 100 MG: 50 TABLET ORAL at 08:31

## 2018-04-20 RX ADMIN — GABAPENTIN 100 MG: 100 CAPSULE ORAL at 22:03

## 2018-04-20 RX ADMIN — CEFAZOLIN SODIUM 1 G: 1 INJECTION, POWDER, FOR SOLUTION INTRAMUSCULAR; INTRAVENOUS at 14:44

## 2018-04-20 RX ADMIN — Medication 10 ML: at 14:00

## 2018-04-20 RX ADMIN — FERROUS SULFATE TAB 325 MG (65 MG ELEMENTAL FE) 325 MG: 325 (65 FE) TAB at 17:31

## 2018-04-20 RX ADMIN — HYDROMORPHONE HYDROCHLORIDE 2 MG: 2 TABLET ORAL at 12:01

## 2018-04-20 RX ADMIN — PRAVASTATIN SODIUM 40 MG: 20 TABLET ORAL at 22:03

## 2018-04-20 RX ADMIN — METOPROLOL TARTRATE 50 MG: 50 TABLET ORAL at 22:03

## 2018-04-20 RX ADMIN — HYDROMORPHONE HYDROCHLORIDE 2 MG: 2 TABLET ORAL at 08:32

## 2018-04-20 RX ADMIN — DOCUSATE SODIUM 100 MG: 100 CAPSULE, LIQUID FILLED ORAL at 08:29

## 2018-04-20 NOTE — PROGRESS NOTES
Patient keeps trying to get out of chair, still confused as to where he is, patient is agreeable and pleasant but does not retain instructions well. Patient tried to pull on catheter multiple times, removed de la cruz catheter to prevent injury. Patient now resting in bed with Bed exit alarm on.

## 2018-04-20 NOTE — PROGRESS NOTES
Lying quietly with eyes closed. RR even and unlabored. No change in NV status noted. Family member at bedside. Call light within reach.

## 2018-04-20 NOTE — PROGRESS NOTES
04/19/18 2044   Oxygen Therapy   O2 Sat (%) 99 %   Pulse via Oximetry 45 beats per minute   O2 Device Nasal cannula   O2 Flow Rate (L/min) 2 l/min   C/S 13 PLACED ON PT, HX DELETED. NO COMPLICATIONS AT THIS TIME.

## 2018-04-20 NOTE — PROGRESS NOTES
[address]  Face to Face Encounter    Patients Name: María Martinez    YOB: 1945    Ordering Physician: Terence Aguirre    Primary Diagnosis: Primary osteoarthritis, right shoulder [M19.011]  S/p right reverse  TSA    Date of Face to Face:   4-19-18                                Face to Face Encounter findings are related to primary reason for home care:   yes. 1. I certify that the patient needs intermittent care as follows: physical therapy: stretching/ROM and gait/stair training  occupational therapy:  ADL safety (ie. cooking, bathing, dressing) and ROM    2. I certify that this patient is homebound, that is: patient has a normal inability to leave the home and leaving the home requires considerable and taxing effort. Patient may leave the home for infrequent and short duration for medical reasons, and occasional absences for non-medical reasons. Homebound status is due to the following functional limitations: Patient with strength deficits limiting the performance of all ADL's without caregiver assistance or the use of an assistive device. 3. I certify that this patient is under my care and that I, or a nurse practitioner or  [id number], or clinical nurse specialist, or certified nurse midwife, working with me, had a Face-to-Face Encounter that meets the physician Face-to-Face Encounter requirements. The following are the clinical findings from the [address] [place] [address] encounter that support the need for skilled services and is a summary of the encounter:  See hospital chart        Gissel Moraleselinor, BSW  4/20/2018      THE FOLLOWING TO BE COMPLETED BY THE COMMUNITY PHYSICIAN:    I concur with the findings described above from the [facility] encounter that this patient is homebound and in need of a skilled service.     Certifying Physician: _____________________________________      Printed Certifying Physician Name: _____________________________________    Date: _________________

## 2018-04-20 NOTE — PROGRESS NOTES
Patient set off bed alarm trying to get out of bed without assistance, found patient standing at bedside. Ask him why he got up and if I could help him, patient replied he wanted to go to his closet and get some pants, patient does not have any clothes available and is wearing hospital gown. Family member arrived and is sitting at bedside in recliner.

## 2018-04-20 NOTE — PROGRESS NOTES
Continues resting comfortably without c/o. Call light within reach. Has not voided since surgery,states does not feel urge to void. Urinal left at bedside,encouraged increased po intake.

## 2018-04-20 NOTE — CONSULTS
Physical Medicine & Rehabilitation Note-consult    Patient: Pau Ball MRN: [id number]  SSN: [id number]    YOB: 1945  Age: 71 y.o. Sex: male      Admit Date: 4/19/2018  Admitting Physician: Kathe Ortega., MD    Medical Decision Making/Plan/Recommend: s/p Reverse right total shoulder arthroplasty. Mobility impairment and functional deficits. Patient shows slow, expected functional gains due to pain. Continue PT, OT for right shoulder rehab per protocol. Therapies limited to active and passive range of motion right elbow, hand. Active assisted and passive range of motion right shoulder to tolerance. Pulleys and pendulums. Do not push motion. NWB right shoulder. Continue mobility, transfers, gait training. Plan for [facility] therapy following home d/c. Chief Complaint : Gait dysfunction secondary to below.   Admit Diagnosis: Primary osteoarthritis, right shoulder [M19.011]  S/P shoulder surgery   S/P shoulder surgery   Reverse right total shoulder arthroplasty with a Delta stem prosthesis and biceps tenodesis  Pain  DVT risk  Post op hemorrhagic anemia  PAF (paroxysmal atrial fibrillation) (HCC)  Diabetes mellitus type 2, controlled ([address]  Acute Rehab Dx:  Gait impairment  Mobility and ambulation deficits  Self Care/ADL deficits    Medical Dx:  Past Medical History:   Diagnosis Date    Allergic rhinitis     Arthritis     CAD (coronary artery disease)     CABG '09; troponin elevated 7/14/12 (\"likely due to supply/demand mismatch in setting of fever and increased metabolic demand\"-per cardiac MD note 8/20/12)-had cath, echo, EKG-all WNL except cath showed 2 of the bypasses with blockages- \"occluded SVG to PDA & SVG to LISA\"; EF=55%    CVA (cerebral vascular accident) ([address] 08/2017    Left- no residual weakness    Diabetes mellitus type 2, controlled ([address]     restarted oral med (metformin 3/2018), does not check BG on regular basis, last hgba1c- 8 (3/12/18)    Dyslipidemia     ED (erectile dysfunction)     Encephalitis 1962    x 2/hospitalized as teenager    GERD (gastroesophageal reflux disease)     controlled with Nexium    Gout     hx of    Hypertension     Lacunar infarct, acute ([facility]     possible embolic, remote a-fib- on eliquis    Lumbago     Memory loss     Mitral valve regurgitation 7/14/12    \"moderate\" on echo    ROBERTO (obstructive sleep apnea)     mild per patient, does not use CPAP    PAF (paroxysmal atrial fibrillation) (HCC)     Prostate cancer ([facility]     Psoriasis     topical creams    SBO (small bowel obstruction) ([facility] 2011, 2015, 2016    Stage 2 chronic kidney disease      Subjective:     Date of Evaluation:  April 20, 2018    HPI: Peyton Oneill is a 71 y.o. male patient at [address] [place] [address] who was admitted on 4/19/2018  by Mackenzie Koroma MD with below mentioned medical history, is being seen for Physical Medicine and Rehabilitation consult. Peyton Oneill who presented with worsening right shoulder pain  due to end stage glenohumeral DJD underwent a reverse right total shoulder arthroplasty per Dr. Mackenzie Koroma MD on 4/19/2018. The patient's post operative course has been uncomplicated. Pain has been relatively well tolerated. We are consulted to assist with rehab needs and placement. Patient is to be NWB RUE. Patient is starting to mobilize with acute PT and OT. He is making expected gains with ambulation, mobility, and ADLs. He is primarily limited due to post op shoulder pain, decreased strength and NWB status. Peyton Oneill is seen and examined today. Medical Records reviewed. He denies any other prior functional deficits.  He has been independent with all activities at baseline      Current Level of Function: bed mobility - CGA, transfers -min A, decreased balance , ambulation - 40' with min A.     Prior Level of Function/Work/Activity:  Pt was independent without an assistive device prior to this admission. Family History   Problem Relation Age of Onset    Hypertension Mother    Kayce Coffman Gout Mother    Kayce Coffman Arthritis-osteo Mother     Heart Disease Mother       of Heart Disease    Coronary Artery Disease Mother     Diabetes Father     Cancer Father     Heart Disease Father     Bleeding Prob Paternal Grandmother     Hypertension Brother     Cancer Maternal Uncle      Prostate      Social History   Substance Use Topics    Smoking status: Former Smoker     Packs/day: 1.00     Years: 15.00     Quit date: 1975    Smokeless tobacco: Former User    Alcohol use Yes      Comment: rarely- once/month     Past Surgical History:   Procedure Laterality Date    ABDOMEN SURGERY PROC UNLISTED  1967    exp lap    HX APPENDECTOMY  age 49         HX CATARACT REMOVAL Bilateral 2013    iol     HX COLONOSCOPY  ,     HX CORONARY ARTERY BYPASS GRAFT  2009    x4 bypass    HX HERNIA REPAIR Bilateral     HX RADICAL PROSTATECTOMY       HX SPLENECTOMY      SC LEFT HEART CATH,PERCUTANEOUS  2012    no intervention    SC PROSTATE BIOPSY, NEEDLE, SATURATION SAMPLING      and ultrasound    VASCULAR SURGERY PROCEDURE UNLIST  2017    aortogram, w/cass LE runoff,R-LE arteriogram      Prior to Admission medications    Medication Sig Start Date End Date Taking? Authorizing Provider   metFORMIN ER (GLUCOPHAGE XR) 500 mg tablet Take 2 Tabs by mouth daily (with dinner). 18  Yes Anni Steven MD   allopurinol (ZYLOPRIM) 300 mg tablet Take 1 Tab by mouth daily. 3/13/18  Yes Anni Steven MD   metoprolol tartrate (LOPRESSOR) 50 mg tablet Take 1 Tab by mouth two (2) times a day. 3/13/18  Yes Anni Steven MD   gabapentin (NEURONTIN) 100 mg capsule Take 1 Cap by mouth two (2) times a day. 3/13/18  Yes Anni Steven MD   pravastatin (PRAVACHOL) 40 mg tablet Take 1 Tab by mouth nightly.  3/13/18  Yes Anni Steven MD   ranolazine ER (RANEXA) 500 mg SR tablet TAKE 1 TABLET TWICE A DAY 3/13/18  Yes Anni Steven MD   dilTIAZem ER (CARDIZEM LA) 360 mg Tb24 tablet Take 1 Tab by mouth daily. 3/13/18  Yes Anni Steven MD   losartan (COZAAR) 100 mg tablet Take 1 Tab by mouth daily. 3/13/18  Yes Anni Steven MD   traMADol Tori Sheer) 50 mg tablet Take 1 Tab by mouth every six (6) hours as needed for Pain. Max Daily Amount: 200 mg. 3/12/18  Yes Anni Steven MD   aspirin delayed-release 81 mg tablet Take 81 mg by mouth nightly. Yes Historical Provider   esomeprazole (NEXIUM) 40 mg capsule Take 1 Cap by mouth daily. Indications: am  Patient taking differently: Take 40 mg by mouth daily. 12/5/17  Yes Anni Steven MD   glucose blood VI test strips (BLOOD GLUCOSE TEST) strip Test once to twice daily DX: E11.8 7/6/17  Yes Anni Steven MD   Lancets misc Test once to twice daily DX: E11.8 1/12/17  Yes Anni Steven MD   triamcinolone acetonide (KENALOG) 0.1 % ointment  7/30/16  Yes Historical Provider   tacrolimus (PROTOPIC) 0.1 % ointment  7/14/16  Yes Historical Provider   mometasone (ELOCON) 0.1 % topical cream  7/30/16  Yes Historical Provider   cholecalciferol, vitamin D3, (VITAMIN D3) 2,000 unit Tab Take 2,000 Units by mouth daily. Indications: OSTEOPOROSIS, am   Yes Historical Provider   Cetirizine (ZYRTEC) 10 mg Cap Take 10 mg by mouth nightly. Indications: ALLERGIC RHINITIS   Yes Historical Provider   apixaban (ELIQUIS) 5 mg tablet Take 1 Tab by mouth two (2) times a day. 12/5/17   Anni Steven MD   nitroglycerin (NITROSTAT) 0.4 mg SL tablet 1 Tab by SubLINGual route every five (5) minutes as needed for Chest Pain. 5/19/16   Keisha Giraldo MD   pimecrolimus (ELIDEL) 1 % topical cream Apply  to affected area two (2) times a day. Historical Provider   fluticasone (FLONASE) 50 mcg/actuation nasal spray as needed.     Historical Provider     Allergies   Allergen Reactions    Celecoxib Swelling     Hands    Ibuprofen Unknown (comments)     Patient unsure    Lescol [Fluvastatin] Unknown (comments)     Other reaction(s): Unknown (comments)    Nsaids (Non-Steroidal Anti-Inflammatory Drug) Other (comments)     Elevated serum creatinine    Sulfa (Sulfonamide Antibiotics) Rash    Uloric [Febuxostat] Other (comments)     Joint Pain        Review of Systems: +right shoulder pain, + unsteady gait. Denies chest pain, shortness of breath, cough, headache, visual problems, abdominal pain, dysurea, calf pain. Pertinent positives are as noted in the medical records and unremarkable otherwise. Objective:     Vitals:  Blood pressure 160/77, pulse 60, temperature 98.6 °F (37 °C), resp. rate 16, height 5' 11\" (1.803 m), weight 192 lb (87.1 kg), SpO2 100 %. Temp (24hrs), Av.8 °F (36.6 °C), Min:97.2 °F (36.2 °C), Max:98.6 °F (37 °C)    BMI (calculated): 26.8 (18 0950)   Intake and Output:   0701 -  1900  In: 300 [I.V.:300]  Out: 6674 [Urine:2225]    Physical Exam:   General: Alert and age appropriately oriented. No acute cardio respiratory distress. HEENT: Normocephalic, no conjunctival pallor. Oral mucosa moist without cyanosis. No JVD. Lungs: Clear to auscultation anteriorly  Respiration even and unlabored   Heart: Regular rate and rhythm, S1, S2   No  Murmurs. Abdomen: Soft, non-tender, non-distended. Genitourinary: defered   Neuromuscular:      Grossly no focal motor deficits. Moves left fingers, hand well. R wrist extension 5/5   R wrist flexion 5/5   R ADMQ 5/5   No sensory deficits distally BUE to soft touch. Skin/extremity: Non tender calves BLE. No rashes, no marginal erythema.                                                                                          Labs/Studies:  Recent Results (from the past 72 hour(s))   GLUCOSE, POC    Collection Time: 18 10:27 AM   Result Value Ref Range    Glucose (POC) 148 (H) 65 - 100 mg/dL   TYPE + CROSSMATCH Collection Time: 04/19/18 11:04 AM   Result Value Ref Range    Crossmatch Expiration 04/22/2018     ABO/Rh(D) O POSITIVE     Antibody screen NEG     Unit number [id number]     Blood component type [facility]     Unit division 00     Status of unit ALLOCATED     Crossmatch result Compatible     Unit number [id number]     Blood component type [facility]     Unit division 00     Status of unit ALLOCATED     Crossmatch result Compatible    METABOLIC PANEL, BASIC    Collection Time: 04/20/18  5:16 AM   Result Value Ref Range    Sodium 139 136 - 145 mmol/L    Potassium 4.0 3.5 - 5.1 mmol/L    Chloride 103 98 - 107 mmol/L    CO2 23 21 - 32 mmol/L    Anion gap 13 7 - 16 mmol/L    Glucose 233 (H) 65 - 100 mg/dL    BUN 14 8 - 23 MG/DL    Creatinine 1.60 (H) 0.8 - 1.5 MG/DL    GFR est AA 55 (L) >60 ml/min/1.73m2    GFR est non-AA 46 (L) >60 ml/min/1.73m2    Calcium 8.4 8.3 - 10.4 MG/DL   MAGNESIUM    Collection Time: 04/20/18  5:16 AM   Result Value Ref Range    Magnesium 2.0 1.8 - 2.4 mg/dL   CBC W/O DIFF    Collection Time: 04/20/18  5:16 AM   Result Value Ref Range    WBC 9.8 4.3 - 11.1 K/uL    RBC 3.69 (L) 4.23 - 5.67 M/uL    HGB 11.2 (L) 13.6 - 17.2 g/dL    HCT 33.0 (L) 41.1 - 50.3 %    MCV 89.4 79.6 - 97.8 FL    MCH 30.4 26.1 - 32.9 PG    MCHC 33.9 31.4 - 35.0 g/dL    RDW 14.2 11.9 - 14.6 %    PLATELET 036 060 - 985 K/uL    MPV 12.3 10.8 - 14.1 FL   PLEASE READ & DOCUMENT PPD TEST IN 24 HRS    Collection Time: 04/20/18  7:00 PM   Result Value Ref Range    PPD Neg Neg Negative    mm Induration 0 0 mm       Functional Assessment:  Reviewed participation and progress in therapies  Gross Assessment  AROM: Generally decreased, functional (left UE both LE's) (04/20/18 1000)  Strength: Generally decreased, functional (left UE both LE's) (04/20/18 1000)  Coordination: Generally decreased, functional (left UE both LE's) (04/20/18 1000)   Bed Mobility  Supine to Sit: Contact guard assistance (04/20/18 1400)  Sit to Supine: Contact guard assistance (04/20/18 1400)  Scooting: Contact guard assistance (04/20/18 1000)   Balance  Sitting: Intact; Without support (04/20/18 1000)  Standing: Impaired; With support (walker) (04/20/18 1000)               Bed/Mat Mobility  Supine to Sit: Contact guard assistance (04/20/18 1400)  Sit to Supine: Contact guard assistance (04/20/18 1400)  Sit to Stand: Minimum assistance (04/20/18 1000)  Bed to Chair: Minimum assistance (04/20/18 1000)  Scooting: Contact guard assistance (04/20/18 1000)     Ambulation:  Gait  Speed/Gloria: Shuffled; Slow (04/20/18 1000)  Gait Abnormalities: Decreased step clearance; Path deviations; Shuffling gait;Trunk sway increased (04/20/18 1000)  Ambulation - Level of Assistance: Minimal assistance (04/20/18 1000)  Distance (ft): 40 Feet (ft) (04/20/18 1000)  Assistive Device:  (none) (04/20/18 1000)    Impression/Plan:     Principal Problem:    Osteoarthritis of right glenohumeral joint (4/18/2018)    Active Problems:    Anemia (4/19/2018)      Anemia associated with acute blood loss (4/19/2018)      Hypomagnesemia (4/19/2018)        Current Facility-Administered Medications   Medication Dose Route Frequency Provider Last Rate Last Dose    acetaminophen (TYLENOL) tablet 1,000 mg  1,000 mg Oral Q6H PRN Krys Dry., MD   1,000 mg at 04/20/18 1730    [START ON 4/21/2018] apixaban (ELIQUIS) tablet 5 mg  5 mg Oral Q12H Guthrie Dry., MD        sodium chloride (NS) flush 5-10 mL  5-10 mL IntraVENous Q8H Guthrie Dry., MD   10 mL at 04/20/18 1400    sodium chloride (NS) flush 5-10 mL  5-10 mL IntraVENous PRN Guthrie Dry., MD        bisacodyl (DULCOLAX) suppository 10 mg  10 mg Rectal DAILY PRN Guthrie Dry., MD        sodium phosphate (FLEET'S) enema 118 mL  1 Enema Rectal PRN Krys Dry., MD        ferrous sulfate tablet 325 mg  1 Tab Oral BID WITH MEALS Guthrie Dry., MD   325 mg at 04/20/18 1731    docusate sodium (COLACE) capsule 100 mg  100 mg Oral BID Regino Camp Ciara Rivers MD   100 mg at 04/20/18 1730    promethazine (PHENERGAN) tablet 25 mg  25 mg Oral Q4H PRN Kathe aCrr MD        HYDROmorphone (PF) (DILAUDID) injection 1 mg  1 mg IntraVENous Q1H PRN Kathe Carr MD   1 mg at 04/20/18 0429    HYDROmorphone (DILAUDID) tablet 2 mg  2 mg Oral Q3H PRN Kathe Carr MD   2 mg at 04/20/18 1201    HYDROmorphone (DILAUDID) tablet 4 mg  4 mg Oral Q3H PRN Kathe Carr MD   4 mg at 04/20/18 7387    temazepam (RESTORIL) capsule 15 mg  15 mg Oral QHS PRN Kathe Carr MD        allopurinol (ZYLOPRIM) tablet 300 mg  300 mg Oral DAILY Kevin Cummins MD   300 mg at 04/20/18 0830    dilTIAZem CD (CARDIZEM CD) capsule 360 mg  360 mg Oral DAILY Kevin Cummins MD   360 mg at 04/20/18 0829    pantoprazole (PROTONIX) tablet 40 mg  40 mg Oral ACB Kevin Cummins MD   40 mg at 04/20/18 0829    gabapentin (NEURONTIN) capsule 100 mg  100 mg Oral BID Kevin Cummins MD   100 mg at 04/20/18 2203    losartan (COZAAR) tablet 100 mg  100 mg Oral DAILY Kevin Cummins MD   100 mg at 04/20/18 0831    metoprolol tartrate (LOPRESSOR) tablet 50 mg  50 mg Oral BID Kevin Cummins MD   50 mg at 04/20/18 2203    nitroglycerin (NITROSTAT) tablet 0.4 mg  0.4 mg SubLINGual Q5MIN PRN Kevin Cummins MD        pravastatin (PRAVACHOL) tablet 40 mg  40 mg Oral QHS Kevin Cummins MD   40 mg at 04/20/18 2203    ranolazine ER (RANEXA) tablet 500 mg  500 mg Oral BID Kevin Cummins MD   500 mg at 04/20/18 2203        Recommendations: Recommend home discharge with [facility] therapy  Coordination of rehab/medical care. Counseling of Physical Medicine & Rehab care issues management. Monitoring and management of rehab conditions per the plan of care/orders. Continue PT, OT for right shoulder rehab per protocol. Therapies limited to active and passive range of motion right elbow, hand. Active assisted and passive range of motion right shoulder to tolerance. Pulleys and pendulums. Do not push motion.  NWB right shoulder. Sling right shoulder. Continue mobility, transfers, gait training. Will follow progress. Rehabilitation Management/ Medical Management: 1. Devices: sling R shoulder  2. Consult:Rehab team including PT, OT, and . 3. Disposition Rehab-discussed with patient. 4. Thigh-high or knee-high LALO's when out of bed. 5. DVT Prophylaxis - pt resumed home eliquis   6. Incentive spirometer Q1H while awake  7. Post op hemorrhagic anemia-  monitor. 8. Activity: NWB RUE, WBAT BLE  9. Planned Labs: CBC,BMP  10. Pain Control:Continue scheduled tylenol, and PRN meds. 11. Wound Care: Keep right shoulder wound clean and dry and reinforce dressing PRN. Follow up with Josette Menchaca Dr. Agueda Bray MD per instructions. Thank you for the opportunity to participate in the care of this patient.     Signed By: Becka Salmeron MD     April 20, 2018

## 2018-04-20 NOTE — OP NOTES
[facility] REPORT    Daryl Velasquez  MR#: [id number]  : 1945  ACCOUNT #: [de-identified]   DATE OF SERVICE: 2018    DATE OF PROCEDURE:  2018       PREOPERATIVE DIAGNOSIS:  End-stage glenohumeral osteoarthritis, right shoulder. POSTOPERATIVE DIAGNOSIS:  End-stage glenohumeral osteoarthritis, right shoulder. PROCEDURES PERFORMED:  Reverse right total shoulder arthroplasty with a Delta Xtend prosthesis, biceps tenodesis, latissimus dorsi and teres major tendon transfer. OPERATIVE SURGEON:  Marcin Colunga. Caleb Moore MD          PATHOLOGY:  Severe end-stage glenohumeral osteoarthritis, right shoulder. CPT CODES:  [id number] and 0499 52 06 34. ICD-10 CODE:  M19.011      IMPLANTS:  Hardware utilized is DePuy metaglene 42 eccentric glenosphere, 42+9 cup, 12/2 stem, 12 mm Biostop G, 42 mm superior, anterior, and inferior nonlocking cortical screws, and an 18 mm posterior nonlocking cortical screw. INDICATIONS:  The patient is a 80-year-old gentleman with a history of severe right shoulder pain. The patient has a history of instability, marked right shoulder pain, weakness, and limitation of function. Preoperative physical exam and radiographic studies generate severe end-stage glenohumeral osteoarthritis with a marked deformity. The patient has exhausted all nonoperative modalities and is electing for operative intervention. ANESTHESIA:  General with an interscalene block. ESTIMATED BLOOD LOSS:  250 mL. COMPLICATIONS:  None. DESCRIPTION OF PROCEDURE:  Following identification of the patient, the patient taken to the operative suite. Following administration of general anesthesia, an interscalene block for postop pain control, and 2 grams of IV Ancef, the patient was then very carefully positioned on the operative table in the beach chair fashion. Right shoulder was then prepped and draped in a sterile fashion.   The standard deltopectoral incision was identified and marked. InteguSeal was applied. Once InteguSeal was allowed to cure, the skin was incised, subcutaneous tissue was then dissected down to the deltopectoral interval.  Cephalic vein was identified, dissected proximally and distally. Deltoid was retracted laterally. Strap muscles were retracted medially. Biceps tendon was identified. It was dissected up to the rotator interval.  It was tacked and transected for later tenodesis. The subscap was then elevated sharply off the lesser tuberosity in a subscap peel type fashion and tagged for later reconstruction. Humerus was very carefully dislocated from the wound. There was a marked deformity humeral head with large osteophytes, complete flattening of the humeral head. There was significant central glenoid wear and osteophytes on the glenoid side as well. At this point, a proximal cutting guide was assembled. A proximal cut was then made. Circumferential osteophytes were then resected. Glenoid retractors were then inserted. Glenoid was then meticulously exposed. Osteophytes were removed circumferentially to reapproximate and reestablish the native glenoid. Guide was placed. Starter wire was then drilled. The glenoid was then drilled and reamed. A metaglene was inserted and secured with a 42 mm superior, anterior, and inferior nonlocking cortical screw. An 18 mm nonlocking cortical screw was used posteriorly. The metaglene was stable. A 42 eccentric glenosphere was inserted and secured in the standard fashion. The metaglene glenosphere was stable. Humerus then dislocated back from the wound and reamed to a comminuted 12/2 stem. A 12/2 proximal cutting guide was assembled. The proximal cut was then made. It was noted that the 12/2 stem with a 42+9 cup gave excellent stability and mobility. At this point, all trial components were then removed. The humeral shaft was identified.   Latissimus dorsi and teres major tendons were then released off the humeral shaft. They were tagged and mobilized posterior for later transfer. Axillary and radial nerves were protected. At this point, the humeral canal was irrigated and dried. A 12 mm Biostop G was then placed distally. Cement was mixed. Cement was inserted in the humeral canal and a 12/2 stem was cemented to the appropriate version. Excess cement was removed with a curette. #5 sutures were preloaded on the fins of the prosthesis. Once the cement was allowed to cure, the true 42+9 cup was inserted. Shoulder was reduced. There was excellent stability with excellent mobility. At this point, the subscap was repaired using #5 mersilene sutures in a modified Sebastian-Milo type fashion. Latissimus dorsi and teres major tendons were then transferred posterior and then secured using #5 and #2 Mersilene sutures. Biceps was tenodesed using #5 Mersilene sutures. Axillary and radial nerves were intact. At this point, the arm was put through range of motion and stable. The wound was then irrigated. Deltopectoral interval was approximated with #2 Mersilene suture. Skin was closed with 0 Vicryl figure-of-8 sutures and a 2-0 Prolene subcuticular stitch. A sterile dressing was applied. A sling and swathe was applied. The patient was then transferred to recovery in stable condition. CPT CODES:  [id number] and [id number]      ICD-10 CODE:  M19.011      HARDWARE UTILIZED:  DePuy metaglene 42 eccentric glenosphere, 42+9 cup, 12/2 stem, 12 mm Biostop G, 42 mm anterior superior and inferior nonlocking cortical screws, and an 18 mm nonlocking cortical screw. The patient had a severe deformity with marked posterior glenoid wear.       MD ROMEO Durham /   D: 04/19/2018 15:26     T: 04/20/2018 02:09  JOB #: [id number]  CC: Nerissa Aleman MD

## 2018-04-20 NOTE — PROGRESS NOTES
Patient got out of chair and is standing in front of recliner, set off posey alarm, said he wants to try to go to rest room, assisted patient to rest room. Placed back in bed with bed alarm on.

## 2018-04-20 NOTE — PROGRESS NOTES
Orthopedic Joint Progress Note    2018  Admit Date: 2018  Admit Diagnosis: Primary osteoarthritis, right shoulder [M19.011]    1 Day Post-Op    Subjective: Doing well. No complaints. Review of Systems: Pertinent items are noted in HPI. Objective:     PT/OT:     PATIENT MOBILITY                           Vital Signs:    Blood pressure 134/78, pulse 61, temperature 97.2 °F (36.2 °C), resp. rate 16, height 5' 11\" (1.803 m), weight 87.1 kg (192 lb), SpO2 97 %.   Temp (24hrs), Av.4 °F (36.3 °C), Min:97.2 °F (36.2 °C), Max:97.9 °F (36.6 °C)      Pain Control:   Pain Assessment  Pain Scale 1: Numeric (0 - 10)  Pain Intensity 1: 1  Pain Location 1: Shoulder  Pain Orientation 1: Right  Pain Description 1: Aching  Pain Intervention(s) 1: Medication (see MAR)    Meds:  Current Facility-Administered Medications   Medication Dose Route Frequency    0.9% sodium chloride infusion  75 mL/hr IntraVENous CONTINUOUS    sodium chloride (NS) flush 5-10 mL  5-10 mL IntraVENous Q8H    sodium chloride (NS) flush 5-10 mL  5-10 mL IntraVENous PRN    bisacodyl (DULCOLAX) suppository 10 mg  10 mg Rectal DAILY PRN    sodium phosphate (FLEET'S) enema 118 mL  1 Enema Rectal PRN    ferrous sulfate tablet 325 mg  1 Tab Oral BID WITH MEALS    docusate sodium (COLACE) capsule 100 mg  100 mg Oral BID    promethazine (PHENERGAN) tablet 25 mg  25 mg Oral Q4H PRN    HYDROmorphone (PF) (DILAUDID) injection 1 mg  1 mg IntraVENous Q1H PRN    HYDROmorphone (DILAUDID) tablet 2 mg  2 mg Oral Q3H PRN    HYDROmorphone (DILAUDID) tablet 4 mg  4 mg Oral Q3H PRN    temazepam (RESTORIL) capsule 15 mg  15 mg Oral QHS PRN    tuberculin injection 5 Units  5 Units IntraDERMal ONCE    ceFAZolin (ANCEF) 1 g in 0.9% sodium chloride (MBP/ADV) 50 mL  1 g IntraVENous Q8H    allopurinol (ZYLOPRIM) tablet 300 mg  300 mg Oral DAILY    dilTIAZem CD (CARDIZEM CD) capsule 360 mg  360 mg Oral DAILY    pantoprazole (PROTONIX) tablet 40 mg  40 mg Oral ACB    gabapentin (NEURONTIN) capsule 100 mg  100 mg Oral BID    losartan (COZAAR) tablet 100 mg  100 mg Oral DAILY    metoprolol tartrate (LOPRESSOR) tablet 50 mg  50 mg Oral BID    nitroglycerin (NITROSTAT) tablet 0.4 mg  0.4 mg SubLINGual Q5MIN PRN    pravastatin (PRAVACHOL) tablet 40 mg  40 mg Oral QHS    ranolazine ER (RANEXA) tablet 500 mg  500 mg Oral BID        LAB:    Lab Results   Component Value Date/Time    INR 1.1 04/17/2018 11:08 AM    INR 1.2 03/07/2018 01:48 PM    INR 1.1 08/29/2017 08:27 PM     Lab Results   Component Value Date/Time    HGB 11.2 (L) 04/20/2018 05:16 AM    HGB 13.3 (L) 04/17/2018 11:08 AM    HGB 13.1 (L) 03/07/2018 01:48 PM       Wound Groin Left (Active)   Number of days:112       Wound Shoulder Right (Active)   DRESSING STATUS Clean, dry, and intact 4/19/2018  8:04 PM   DRESSING TYPE Dry dressing 4/19/2018  8:04 PM   SPLINT TYPE/MATERIAL Shoulder Immobilizer;Sling 4/19/2018  8:04 PM   Number of days:1             Physical Exam:  No significant changes    Assessment:      Principal Problem:    Osteoarthritis of right glenohumeral joint (4/18/2018)    Active Problems:    Anemia (4/19/2018)      Anemia associated with acute blood loss (4/19/2018)      Hypomagnesemia (4/19/2018)         Plan:     Continue PT/OT/Rehab  Observe  Continue Care  D/C jono tomorrow 4/21        Patient Expects to be Discharged to[de-identified] Other (comment) (OR)     Signed By: Deacon Dowd NP

## 2018-04-20 NOTE — PROGRESS NOTES
Problem: Mobility Impaired (Adult and Pediatric)  Goal: *Acute Goals and Plan of Care (Insert Text)  GOALS (1-4 days):  (1.)  Patient will move from supine to sit and sit to supine  in bed with SUPERVISION. (2.)  Patient will transfer from bed to chair and chair to bed with STAND BY ASSIST using the least restrictive device. (3.)  Patient will ambulate with STAND BY ASSIST for 200 feet with the least restrictive device. (4.)  Patient will be safe with shoulder HEP, with caregiver's help, to increase range of motion per MD orders. ________________________________________________________________________________________________    PHYSICAL THERAPY: Initial Assessment, Treatment Day: Day of Assessment, PM 4/20/2018  INPATIENT: Hospital Day: 2  [place] / Product Type: Medicare /      NAME/AGE/GENDER: Ruel Bishop is a 71 y.o. male   PRIMARY DIAGNOSIS: Primary osteoarthritis, right shoulder [M19.011] Osteoarthritis of right glenohumeral joint Osteoarthritis of right glenohumeral joint  Procedure(s) (LRB):  RIGHT SHOULDER ARTHROPLASTY TOTAL REVERSE WITH BICEPS TENODESIS IN COMBINATION WITH LATISSIMUS KIRBY AND TERES MAJOR TENDON TRANSFER / INTERSCALENE  DELTA XTEND (Right)  1 Day Post-Op  ICD-10: Treatment Diagnosis:   · Pain in Right Shoulder (M25.511)  · Stiffness of Right Shoulder, Not elsewhere classified (M25.611)  · Generalized Muscle Weakness (M62.81)  · Other lack of cordination (R27.8)  · Difficulty in walking, Not elsewhere classified (R26.2)  · Other abnormalities of gait and mobility (R26.89)   Precaution/Allergies:  Celecoxib; Ibuprofen; Lescol [fluvastatin]; Nsaids (non-steroidal anti-inflammatory drug); Sulfa (sulfonamide antibiotics); and Uloric [febuxostat]      ASSESSMENT:     Mr. Suleman Mendez presents with painful & stiff right shoulder along with unsteady gait & transfers.  This pt will benefit from follow up therapy to help restore safe function & to establish HEP.  4/20 pm he is supine upon arrival.  He is very sleeping this afternoon. Therapist and pt performs exercises together, throughout the tx he keeps falling asleep. He is not safe to walk @ this time, to can't keep his eyes open. He remain in supine with call light near. This section established at most recent assessment   PROBLEM LIST (Impairments causing functional limitations):  1. Decreased [place] with Bed Mobility  2. Decreased [place] with Transfers  3. Decreased [place] with Ambulation   4. Decreased [place] with shoulder HEP   INTERVENTIONS PLANNED: (Benefits and precautions of physical therapy have been discussed with the patient.)  1. Bed Mobility Training  2. Transfer Training  3. Gait Training  4. Therapeutic Exercises per MD orders  5. Modalities for Pain     TREATMENT PLAN: Frequency/Duration: twice daily for duration of hospital stay  Rehabilitation Potential For Stated Goals: Good     RECOMMENDED REHABILITATION/EQUIPMENT: (at time of discharge pending progress): Continue Skilled Therapy and Home Health: Physical Therapy. HISTORY:   History of Present Injury/Illness (Reason for Referral):  Painful right shoulder s/p reverse TSA  Past Medical History/Comorbidities:   Mr. Candelaria Page  has a past medical history of Allergic rhinitis; Arthritis; CAD (coronary artery disease); CVA (cerebral vascular accident) ([address] (08/2017); Diabetes mellitus type 2, controlled ([address] Dyslipidemia; ED (erectile dysfunction); Encephalitis (1962); GERD (gastroesophageal reflux disease); Gout; Hypertension; Lacunar infarct, acute ([address] Lumbago; Memory loss; Mitral valve regurgitation (7/14/12); ROBERTO (obstructive sleep apnea); PAF (paroxysmal atrial fibrillation) ([address] Prostate cancer ([address] Psoriasis; SBO (small bowel obstruction) ([address] (2011, 2015, 2016); and Stage 2 chronic kidney disease.   Mr. 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 Social History/Living Environment:   Home Environment: Private residence  # Steps to Enter: 1  Rails to Enter: No  One/Two Story Residence: Two story, live on 1st floor  # of Interior Steps: 12  Interior Rails: Both  Lift Chair Available: No  Living Alone: No  Support Systems: Spouse/Significant Other/Partner  Patient Expects to be Discharged to[de-identified] Private residence  Current DME Used/Available at Home: None  Prior Level of Function/Work/Activity:  Pt was independent without an assistive device prior to this admission. Number of Personal Factors/Comorbidities that affect the Plan of Care: 3+: HIGH COMPLEXITY   EXAMINATION:   Most Recent Physical Functioning:   Gross Assessment:                  Posture:     Balance:    Bed Mobility:  Supine to Sit: Contact guard assistance  Sit to Supine: Contact guard assistance  Duration: 8 Minutes  Wheelchair Mobility:     Transfers:     Gait:         Functional Mobility:         Gait/Ambulation:  min        Transfers:  min        Bed Mobility:  cga   Body Structures Involved:  1. Joints  2. Muscles Body Functions Affected:  1. Sensory/Pain  2. Movement Related Activities and Participation Affected:  1. General Tasks and Demands  2. Mobility   Number of elements that affect the Plan of Care: 4+: HIGH COMPLEXITY   CLINICAL PRESENTATION:   Presentation: Stable and uncomplicated: LOW COMPLEXITY   CLINICAL DECISION MAKIN [facility] [address] [id number] AM-PAC 6 Clicks   Basic Mobility Inpatient Short Form  How much difficulty does the patient currently have. .. Unable A Lot A Little None   1.   Turning over in bed (including adjusting bedclothes, sheets and blankets)? [] 1   [] 2   [x] 3   [] 4   2. Sitting down on and standing up from a chair with arms ( e.g., wheelchair, bedside commode, etc.)   [] 1   [] 2   [x] 3   [] 4   3. Moving from lying on back to sitting on the side of the bed? [] 1   [] 2   [x] 3   [] 4   How much help from another person does the patient currently need. .. Total A Lot A Little None   4. Moving to and from a bed to a chair (including a wheelchair)? [] 1   [] 2   [x] 3   [] 4   5. Need to walk in hospital room? [] 1   [] 2   [x] 3   [] 4   6. Climbing 3-5 steps with a railing? [x] 1   [] 2   [] 3   [] 4   © 2007, Trustees of [address] [place] [address], under license to [employer]. All rights reserved      Score:  Initial: 16 Most Recent: X (Date: -- )    Interpretation of Tool:  Represents activities that are increasingly more difficult (i.e. Bed mobility, Transfers, Gait). Score 24 23 22-20 19-15 14-10 9-7 6     Modifier CH CI CJ CK CL CM CN      ? Mobility - Walking and Moving Around:     - CURRENT STATUS: CK - 40%-59% impaired, limited or restricted    - GOAL STATUS: CJ - 20%-39% impaired, limited or restricted    - D/C STATUS:  ---------------To be determined---------------  [place] / [place] / Product Type: Medicare /      Medical Necessity:     · Patient is expected to demonstrate progress in strength, range of motion, balance, coordination and functional technique to decrease assistance required with bed mobility, transfers, gait & HEP. Reason for Services/Other Comments:  · Patient continues to require skilled intervention due to pt not safe with functional mobility & HEP.    Use of outcome tool(s) and clinical judgement create a POC that gives a: Clear prediction of patient's progress: LOW COMPLEXITY            TREATMENT:   (In addition to Assessment/Re-Assessment sessions the following treatments were rendered)   Pre-treatment Symptoms/Complaints:  Discomfort at right shoulder  Pain: Initial: visual scale  Pain Intensity 1: 0  Post Session:  2/10     Therapeutic Exercise: (15 Minutes):  Exercises per grid below to improve mobility and dynamic movement of arm - right to improve functional endurance. Required minimal verbal cues to promote proper body alignment and promote proper body mechanics. Therapeutic Activity: (8 Minutes   ):  Therapeutic activities including bed mobility. Date:  4/20 Date:     Date:     ACTIVITY/EXERCISE AM PM AM PM AM PM   Gripping 15 15       Wrist Flexion/Extension 15 15 aa       Wrist Ulnar/Radial Deviation         Pronation/Supination 15 15 aa       Elbow Flexion/Extension 15aa 15 aa       Shoulder Flexion/Extension 15aa to tolerance 15 aa to tolerance       Shoulder AB/ADduction         Shoulder IR/ER         Pulleys         Pendulums 15aa clock & counter clockwise To sleeyp       Shrugs         Isometric:                 Flexion         Extension         ABduction         ADduction         Biceps/Triceps                  B = bilateral; AA = active assistive; A = active; P = passive  Education:  [x]  Home Exercises  [x]  Sling Application   [x]  Movement Precautions   []  Pulleys   []  Use of Ice   []  Other:   Treatment/Session Assessment:    · Response to Treatment:  Tolerated session Ok. · Interdisciplinary Collaboration:   o Registered Nurse  · After treatment position/precautions:   o Up in chair  o Bed/Chair-wheels locked  o Call light within reach  o RN notified   · Compliance with Program/Exercises: Will assess as treatment progresses. · Recommendations/Intent for next treatment session:  Treatment next visit will focus on increasing Mr. Viola Herrera independence with bed mobility, transfers, gait training, strength/ROM exercises, modalities for pain, and patient education.    Total Treatment Duration:  PT Patient Time In/Time Out  Time In: 1300  Time Out: 200 [facility] Terri Guido PTA

## 2018-04-20 NOTE — PROGRESS NOTES
Care Management Interventions  Mode of Transport at Discharge: Self  Transition of Care Consult (CM Consult): [address]: Yes  Discharge Durable Medical Equipment: No  Physical Therapy Consult: Yes  Occupational Therapy Consult: Yes  Current Support Network: Lives with Spouse  Confirm Follow Up Transport: Family  Plan discussed with Pt/Family/Caregiver: Yes  Freedom of Choice Offered: Yes  Discharge Location  Discharge Placement: Home with home health    Patient is a 71y.o. year old male admitted for a right reverse TSA . Patient lives with His spouse and plans to return home on discharge. Order received to arrange home health. Patient without preference towards agency. Referral sent to [facility]. Will follow until discharge.   Orvil Art

## 2018-04-20 NOTE — PROGRESS NOTES
Patient: Ty Gonzalez               Sex: male             MRN: [id number]      YOB: 1945      Age:  71 y.o. Reason for Consult:  Medical Managment    HPI     This is a 71 y.o. old Gentleman came in for Rt Shoulder Arthroplasty . Subjective  Patient was seen this AM, back on O2 last night, denies any Chest pain, SOB, Palpitations.   Pain is controlled, No Nausea or vomiting        Physical Exam     Visit Vitals    /78    Pulse 61    Temp 97.2 °F (36.2 °C)    Resp 16    Ht 5' 11\" (1.803 m)    Wt 87.1 kg (192 lb)    SpO2 97%    BMI 26.78 kg/m2      Temp (24hrs), Av.4 °F (36.3 °C), Min:97.2 °F (36.2 °C), Max:97.9 °F (36.6 °C)    Oxygen Therapy  O2 Sat (%): 97 % (18)  Pulse via Oximetry: 45 beats per minute (18)  O2 Device: Nasal cannula (18)  O2 Flow Rate (L/min): 2 l/min (18)    Intake/Output Summary (Last 24 hours) at 18 0728  Last data filed at 18 0230   Gross per 24 hour   Intake              300 ml   Output             1000 ml   Net             -700 ml          General:- Conscious, No acute distress   Eyes:- No pallor/icterus    Lungs- CTA Bilaterally, No significant wheezing  Heart:- S1 S2 regular  Abdomen:- Soft, Positive bowel sounds,   Extremities:-Rt shoulder in dressing and splint  Skin: - No acute rashes  Musculoskeletal: No Acute findings  Psych: - Appropriate mood    LAB  Recent Results (from the past 24 hour(s))   GLUCOSE, POC    Collection Time: 18 10:27 AM   Result Value Ref Range    Glucose (POC) 148 (H) 65 - 100 mg/dL   TYPE + CROSSMATCH    Collection Time: 18 11:04 AM   Result Value Ref Range    Crossmatch Expiration 2018     ABO/Rh(D) O POSITIVE     Antibody screen NEG     Unit number [id number]     Blood component type [facility]     Unit division 00     Status of unit ALLOCATED     Crossmatch result Compatible     Unit number [id number]     Blood component type [facility] Unit division 00     Status of unit ALLOCATED     Crossmatch result Compatible    METABOLIC PANEL, BASIC    Collection Time: 04/20/18  5:16 AM   Result Value Ref Range    Sodium 139 136 - 145 mmol/L    Potassium 4.0 3.5 - 5.1 mmol/L    Chloride 103 98 - 107 mmol/L    CO2 23 21 - 32 mmol/L    Anion gap 13 7 - 16 mmol/L    Glucose 233 (H) 65 - 100 mg/dL    BUN 14 8 - 23 MG/DL    Creatinine 1.60 (H) 0.8 - 1.5 MG/DL    GFR est AA 55 (L) >60 ml/min/1.73m2    GFR est non-AA 46 (L) >60 ml/min/1.73m2    Calcium 8.4 8.3 - 10.4 MG/DL   MAGNESIUM    Collection Time: 04/20/18  5:16 AM   Result Value Ref Range    Magnesium 2.0 1.8 - 2.4 mg/dL   CBC W/O DIFF    Collection Time: 04/20/18  5:16 AM   Result Value Ref Range    WBC 9.8 4.3 - 11.1 K/uL    RBC 3.69 (L) 4.23 - 5.67 M/uL    HGB 11.2 (L) 13.6 - 17.2 g/dL    HCT 33.0 (L) 41.1 - 50.3 %    MCV 89.4 79.6 - 97.8 FL    MCH 30.4 26.1 - 32.9 PG    MCHC 33.9 31.4 - 35.0 g/dL    RDW 14.2 11.9 - 14.6 %    PLATELET 145 716 - 076 K/uL    MPV 12.3 10.8 - 14.1 FL       IMAGING:     Xr Shoulder Rt Ap/lat Min 2 V    Result Date: 4/19/2018  Impression: Postoperative findings as above.          Assessment/Plan     Principal Problem:    Osteoarthritis of right glenohumeral joint (4/18/2018)    Active Problems:    Anemia (4/19/2018)      Anemia associated with acute blood loss (4/19/2018)      Hypomagnesemia (4/19/2018)        Plan:   Rt SHoulder Arthroplasty- DVT prophylaxis, PT/OT, pain management as per Ortho service    Mild ANABELLA- Cr slightly elevated, mostly from Volume shift with surgery, monitor    Hx of CAD- Cont with home meds    Hx of DM -2- hold off on Metformin, monitor FSG with sliding scale  FSG stable    Hx of HTN- Cont with home meds on metoprolol, Cardizem, Losartan, monitor BP  BP stable now    Hx of Paroxysmal Afib- On Eliquis at home, can be resumed if okw with Ortho  HR controlled    Hx of GERD- On PPI    Will continue to follow him to monitor Shena Douglas MD  April 20, 2018

## 2018-04-20 NOTE — PROGRESS NOTES
Problem: Falls - Risk of  Goal: *Absence of Falls  Document Sesar Fall Risk and appropriate interventions in the flowsheet.    Outcome: Progressing Towards Goal  Fall Risk Interventions:  Mobility Interventions: Communicate number of staff needed for ambulation/transfer    Mentation Interventions: Evaluate medications/consider consulting pharmacy    Medication Interventions: Patient to call before getting OOB    Elimination Interventions: Call light in reach, Patient to call for help with toileting needs

## 2018-04-20 NOTE — PROGRESS NOTES
Dilaudid 4 mg po for c/o pain. Pillow under shoulder,sling and swath in use. Family member at bedside.

## 2018-04-20 NOTE — PROGRESS NOTES
Now confused,wants to get up to bathroom to void. Reminded him that he now has de la cruz. IV hepwelled. SCDs off for now. He does keep taking off ice pack and removing pillow from under shoulder,and removing oxygen. Will monitor closely .

## 2018-04-20 NOTE — PROGRESS NOTES
Bladder scanned patient for 437ml,  Patient tried but was unable to pass urine. Walked patient around the floor made one full circlet from Ortho to Med/Surg and back to patient's room. Patient tried to void again and was successful voiding 525 mL Clear Yellow urine.

## 2018-04-20 NOTE — PROGRESS NOTES
Dilaudid 4 mg po for c/o pain. Bulky dsng dry and intact. Sling and swathe in use right shoulder and arm. Pillow under shoulder and upper arm. Assisted with repositioning,tolerated well.

## 2018-04-20 NOTE — PROGRESS NOTES
Still has not voided,bladder scanner shows 380 mls urine. Inserted #16 Fr de la cruz cath,immediate return 750 mls dark yellow urine.

## 2018-04-20 NOTE — PROGRESS NOTES
SpO2 = 88% on room air on arrival.  Initiated IPPV @ 15 cmH2O x 20 breaths. Pt tolerated well. Spirometry performed, patient achieved 3000. Sats improved to 97% post-therapy. Encouraged IS - 10 breaths per hour. Left patient on room air with RN at bedside to ambulate. RN to place pt on O2 after ambulation if indicated.

## 2018-04-20 NOTE — PROGRESS NOTES
Patient in bed with family at bedside, patient pleasantly confused to date and time, place, cannot follow direction well. Ley catheter placed last nite due to inability to void. Instructed family member to alert nurse when ready to leave so staff can place exit alarm on.

## 2018-04-20 NOTE — PROGRESS NOTES
Problem: Mobility Impaired (Adult and Pediatric)  Goal: *Acute Goals and Plan of Care (Insert Text)  GOALS (1-4 days):  (1.)  Patient will move from supine to sit and sit to supine  in bed with SUPERVISION. (2.)  Patient will transfer from bed to chair and chair to bed with STAND BY ASSIST using the least restrictive device. (3.)  Patient will ambulate with STAND BY ASSIST for 200 feet with the least restrictive device. (4.)  Patient will be safe with shoulder HEP, with caregiver's help, to increase range of motion per MD orders. ________________________________________________________________________________________________    PHYSICAL THERAPY: Initial Assessment, Treatment Day: Day of Assessment, AM 4/20/2018  INPATIENT: Hospital Day: 2  [place] / Product Type: Medicare /      NAME/AGE/GENDER: Beba Munguia is a 71 y.o. male   PRIMARY DIAGNOSIS: Primary osteoarthritis, right shoulder [M19.011] Osteoarthritis of right glenohumeral joint Osteoarthritis of right glenohumeral joint  Procedure(s) (LRB):  RIGHT SHOULDER ARTHROPLASTY TOTAL REVERSE WITH BICEPS TENODESIS IN COMBINATION WITH LATISSIMUS KIRBY AND TERES MAJOR TENDON TRANSFER / INTERSCALENE  DELTA XTEND (Right)  1 Day Post-Op  ICD-10: Treatment Diagnosis:   · Pain in Right Shoulder (M25.511)  · Stiffness of Right Shoulder, Not elsewhere classified (M25.611)  · Generalized Muscle Weakness (M62.81)  · Other lack of cordination (R27.8)  · Difficulty in walking, Not elsewhere classified (R26.2)  · Other abnormalities of gait and mobility (R26.89)   Precaution/Allergies:  Celecoxib; Ibuprofen; Lescol [fluvastatin]; Nsaids (non-steroidal anti-inflammatory drug); Sulfa (sulfonamide antibiotics); and Uloric [febuxostat]      ASSESSMENT:     Mr. Rio Umana presents with painful & stiff right shoulder along with unsteady gait & transfers.  This pt will benefit from follow up therapy to help restore safe function & to establish HEP. This section established at most recent assessment   PROBLEM LIST (Impairments causing functional limitations):  1. Decreased Dickey with Bed Mobility  2. Decreased Dickey with Transfers  3. Decreased Dickey with Ambulation   4. Decreased Dickey with shoulder HEP   INTERVENTIONS PLANNED: (Benefits and precautions of physical therapy have been discussed with the patient.)  1. Bed Mobility Training  2. Transfer Training  3. Gait Training  4. Therapeutic Exercises per MD orders  5. Modalities for Pain     TREATMENT PLAN: Frequency/Duration: twice daily for duration of hospital stay  Rehabilitation Potential For Stated Goals: Good     RECOMMENDED REHABILITATION/EQUIPMENT: (at time of discharge pending progress): Continue Skilled Therapy and Home Health: Physical Therapy. HISTORY:   History of Present Injury/Illness (Reason for Referral):  Painful right shoulder s/p reverse TSA  Past Medical History/Comorbidities:   Mr. Beba Allen  has a past medical history of Allergic rhinitis; Arthritis; CAD (coronary artery disease); CVA (cerebral vascular accident) ([facility] [address] (08/2017); Diabetes mellitus type 2, controlled ([facility] [address] Dyslipidemia; ED (erectile dysfunction); Encephalitis (1962); GERD (gastroesophageal reflux disease); Gout; Hypertension; Lacunar infarct, acute ([facility] [address] Lumbago; Memory loss; Mitral valve regurgitation (7/14/12); ROBERTO (obstructive sleep apnea); PAF (paroxysmal atrial fibrillation) ([facility] [address] Prostate cancer ([facility] [address] Psoriasis; SBO (small bowel obstruction) ([facility] [address] (2011, 2015, 2016); and Stage 2 chronic kidney disease.   Mr. Beba Allen  has a past surgical history that includes pr left heart cath,percutaneous (7/2012); hx colonoscopy (1998, 2010); pr abdomen surgery proc unlisted (1967); hx hernia repair (Bilateral, 2012); hx appendectomy (age 49); hx radical prostatectomy ( ); pr prostate biopsy, needle, saturation sampling; hx cataract removal (Bilateral, 2013); hx coronary artery bypass graft (2009); vascular surgery procedure unlist (12/29/2017); and hx splenectomy (1951). Social History/Living Environment:   Home Environment: Private residence  # Steps to Enter: 1  Rails to Enter: No  One/Two Story Residence: Two story, live on 1st floor  # of Interior Steps: 12  Interior Rails: Both  Lift Chair Available: No  Living Alone: No  Support Systems: Spouse/Significant Other/Partner  Patient Expects to be Discharged to[de-identified] Private residence  Current DME Used/Available at Home: None  Prior Level of Function/Work/Activity:  Pt was independent without an assistive device prior to this admission. Number of Personal Factors/Comorbidities that affect the Plan of Care: 3+: HIGH COMPLEXITY   EXAMINATION:   Most Recent Physical Functioning:   Gross Assessment:  AROM: Generally decreased, functional (left UE both LE's)  Strength: Generally decreased, functional (left UE both LE's)  Coordination: Generally decreased, functional (left UE both LE's)               Posture:     Balance:  Sitting: Intact; Without support  Standing: Impaired; With support (walker) Bed Mobility:  Supine to Sit: Contact guard assistance  Sit to Supine:  (NT)  Scooting: Contact guard assistance  Wheelchair Mobility:     Transfers:  Sit to Stand: Minimum assistance  Stand to Sit: Minimum assistance  Bed to Chair: Minimum assistance  Gait:     Speed/Gloria: Shuffled; Slow  Gait Abnormalities: Decreased step clearance; Path deviations; Shuffling gait;Trunk sway increased  Distance (ft): 40 Feet (ft)  Assistive Device:  (none)  Ambulation - Level of Assistance: Minimal assistance   Functional Mobility:         Gait/Ambulation:  min        Transfers:  min        Bed Mobility:  cga   Body Structures Involved:  1. Joints  2. Muscles Body Functions Affected:  1. Sensory/Pain  2. Movement Related Activities and Participation Affected:  1. General Tasks and Demands  2.  Mobility   Number of elements that affect the Plan of Care: 4+: HIGH COMPLEXITY   CLINICAL PRESENTATION:   Presentation: Stable and uncomplicated: LOW COMPLEXITY   CLINICAL DECISION MAKIN[address] [place] [address] [address] [id number] AM-PAC 6 Clicks   Basic Mobility Inpatient Short Form  How much difficulty does the patient currently have. .. Unable A Lot A Little None   1. Turning over in bed (including adjusting bedclothes, sheets and blankets)? [] 1   [] 2   [x] 3   [] 4   2. Sitting down on and standing up from a chair with arms ( e.g., wheelchair, bedside commode, etc.)   [] 1   [] 2   [x] 3   [] 4   3. Moving from lying on back to sitting on the side of the bed? [] 1   [] 2   [x] 3   [] 4   How much help from another person does the patient currently need. .. Total A Lot A Little None   4. Moving to and from a bed to a chair (including a wheelchair)? [] 1   [] 2   [x] 3   [] 4   5. Need to walk in hospital room? [] 1   [] 2   [x] 3   [] 4   6. Climbing 3-5 steps with a railing? [x] 1   [] 2   [] 3   [] 4   © , Trustees of [address] [place] [address] [address] [id number], under license to [employer]. All rights reserved      Score:  Initial: 16 Most Recent: X (Date: -- )    Interpretation of Tool:  Represents activities that are increasingly more difficult (i.e. Bed mobility, Transfers, Gait). Score 24 23 22-20 19-15 14-10 9-7 6     Modifier CH CI CJ CK CL CM CN      ? Mobility - Walking and Moving Around:     - CURRENT STATUS: CK - 40%-59% impaired, limited or restricted    - GOAL STATUS: CJ - 20%-39% impaired, limited or restricted    - D/C STATUS:  ---------------To be determined---------------  [place] / [place] / Product Type: Medicare /      Medical Necessity:     · Patient is expected to demonstrate progress in strength, range of motion, balance, coordination and functional technique to decrease assistance required with bed mobility, transfers, gait & HEP.   Reason for Services/Other Comments:  · Patient continues to require skilled intervention due to pt not safe with functional mobility & HEP. Use of outcome tool(s) and clinical judgement create a POC that gives a: Clear prediction of patient's progress: LOW COMPLEXITY            TREATMENT:   (In addition to Assessment/Re-Assessment sessions the following treatments were rendered)   Pre-treatment Symptoms/Complaints:  Discomfort at right shoulder  Pain: Initial: visual scale  Pain Intensity 1: 4  Pain Location 1: Shoulder  Pain Orientation 1: Right  Pain Intervention(s) 1: Repositioned  Post Session:  4/10     Therapeutic Exercise: (15 Minutes):  Exercises per grid below to improve mobility and dynamic movement of arm - right to improve functional endurance. Required minimal verbal cues to promote proper body alignment and promote proper body mechanics. Assessment/15 min     Date:  4/20 Date:   Date:     ACTIVITY/EXERCISE AM PM AM PM AM PM   Gripping 15        Wrist Flexion/Extension 15        Wrist Ulnar/Radial Deviation         Pronation/Supination 15        Elbow Flexion/Extension 15aa        Shoulder Flexion/Extension 15aa to tolerance        Shoulder AB/ADduction         Shoulder IR/ER         Pulleys         Pendulums 15aa clock & counter clockwise        Shrugs         Isometric:                 Flexion         Extension         ABduction         ADduction         Biceps/Triceps                  B = bilateral; AA = active assistive; A = active; P = passive  Education:  [x]  Home Exercises  [x]  Sling Application   [x]  Movement Precautions   []  Pulleys   []  Use of Ice   []  Other:   Treatment/Session Assessment:    · Response to Treatment:  Tolerated well  · Interdisciplinary Collaboration:   o Registered Nurse  · After treatment position/precautions:   o Up in chair  o Bed/Chair-wheels locked  o Call light within reach  o RN notified   · Compliance with Program/Exercises: Will assess as treatment progresses.   · Recommendations/Intent for next treatment session:  Treatment next visit will focus on increasing Mr. Lucas Pak independence with bed mobility, transfers, gait training, strength/ROM exercises, modalities for pain, and patient education.    Total Treatment Duration:  PT Patient Time In/Time Out  Time In: 0830  Time Out: 0900    Salty Mariscal PT

## 2018-04-21 VITALS
BODY MASS INDEX: 26.88 KG/M2 | WEIGHT: 192 LBS | TEMPERATURE: 98.6 F | SYSTOLIC BLOOD PRESSURE: 145 MMHG | OXYGEN SATURATION: 93 % | HEIGHT: 71 IN | RESPIRATION RATE: 18 BRPM | HEART RATE: 75 BPM | DIASTOLIC BLOOD PRESSURE: 80 MMHG

## 2018-04-21 PROBLEM — E83.42 HYPOMAGNESEMIA: Status: RESOLVED | Noted: 2018-04-19 | Resolved: 2018-04-21

## 2018-04-21 PROBLEM — E87.6 HYPOKALEMIA: Status: ACTIVE | Noted: 2018-04-21

## 2018-04-21 LAB
ANION GAP SERPL CALC-SCNC: 7 MMOL/L (ref 7–16)
BUN SERPL-MCNC: 12 MG/DL (ref 8–23)
CALCIUM SERPL-MCNC: 8.6 MG/DL (ref 8.3–10.4)
CHLORIDE SERPL-SCNC: 101 MMOL/L (ref 98–107)
CO2 SERPL-SCNC: 28 MMOL/L (ref 21–32)
CREAT SERPL-MCNC: 1.22 MG/DL (ref 0.8–1.5)
ERYTHROCYTE [DISTWIDTH] IN BLOOD BY AUTOMATED COUNT: 14 % (ref 11.9–14.6)
GLUCOSE SERPL-MCNC: 183 MG/DL (ref 65–100)
HCT VFR BLD AUTO: 30.6 % (ref 41.1–50.3)
HGB BLD-MCNC: 10.5 G/DL (ref 13.6–17.2)
MAGNESIUM SERPL-MCNC: 1.8 MG/DL (ref 1.8–2.4)
MCH RBC QN AUTO: 30 PG (ref 26.1–32.9)
MCHC RBC AUTO-ENTMCNC: 34.3 G/DL (ref 31.4–35)
MCV RBC AUTO: 87.4 FL (ref 79.6–97.8)
PLATELET # BLD AUTO: 186 K/UL (ref 150–450)
PMV BLD AUTO: 12.3 FL (ref 10.8–14.1)
POTASSIUM SERPL-SCNC: 3.4 MMOL/L (ref 3.5–5.1)
RBC # BLD AUTO: 3.5 M/UL (ref 4.23–5.67)
SODIUM SERPL-SCNC: 136 MMOL/L (ref 136–145)
WBC # BLD AUTO: 11.1 K/UL (ref 4.3–11.1)

## 2018-04-21 PROCEDURE — 74011250637 HC RX REV CODE- 250/637: Performed by: HOSPITALIST

## 2018-04-21 PROCEDURE — 85027 COMPLETE CBC AUTOMATED: CPT | Performed by: ORTHOPAEDIC SURGERY

## 2018-04-21 PROCEDURE — 97116 GAIT TRAINING THERAPY: CPT

## 2018-04-21 PROCEDURE — 97110 THERAPEUTIC EXERCISES: CPT

## 2018-04-21 PROCEDURE — 80048 BASIC METABOLIC PNL TOTAL CA: CPT | Performed by: ORTHOPAEDIC SURGERY

## 2018-04-21 PROCEDURE — 83735 ASSAY OF MAGNESIUM: CPT | Performed by: ORTHOPAEDIC SURGERY

## 2018-04-21 PROCEDURE — 36415 COLL VENOUS BLD VENIPUNCTURE: CPT | Performed by: ORTHOPAEDIC SURGERY

## 2018-04-21 PROCEDURE — 74011250637 HC RX REV CODE- 250/637: Performed by: ORTHOPAEDIC SURGERY

## 2018-04-21 RX ORDER — POTASSIUM CHLORIDE 20 MEQ/1
20 TABLET, EXTENDED RELEASE ORAL 2 TIMES DAILY
Status: DISCONTINUED | OUTPATIENT
Start: 2018-04-21 | End: 2018-04-21 | Stop reason: HOSPADM

## 2018-04-21 RX ADMIN — METOPROLOL TARTRATE 50 MG: 50 TABLET ORAL at 08:12

## 2018-04-21 RX ADMIN — DOCUSATE SODIUM 100 MG: 100 CAPSULE, LIQUID FILLED ORAL at 08:12

## 2018-04-21 RX ADMIN — ALLOPURINOL 300 MG: 300 TABLET ORAL at 08:11

## 2018-04-21 RX ADMIN — POTASSIUM CHLORIDE 20 MEQ: 20 TABLET, EXTENDED RELEASE ORAL at 12:50

## 2018-04-21 RX ADMIN — DILTIAZEM HYDROCHLORIDE 360 MG: 180 CAPSULE, COATED, EXTENDED RELEASE ORAL at 08:12

## 2018-04-21 RX ADMIN — ACETAMINOPHEN 1000 MG: 500 TABLET, FILM COATED ORAL at 12:50

## 2018-04-21 RX ADMIN — LOSARTAN POTASSIUM 100 MG: 50 TABLET ORAL at 08:11

## 2018-04-21 RX ADMIN — GABAPENTIN 100 MG: 100 CAPSULE ORAL at 08:12

## 2018-04-21 RX ADMIN — FERROUS SULFATE TAB 325 MG (65 MG ELEMENTAL FE) 325 MG: 325 (65 FE) TAB at 08:12

## 2018-04-21 RX ADMIN — ACETAMINOPHEN 1000 MG: 500 TABLET, FILM COATED ORAL at 08:16

## 2018-04-21 RX ADMIN — RANOLAZINE 500 MG: 500 TABLET, FILM COATED, EXTENDED RELEASE ORAL at 08:12

## 2018-04-21 RX ADMIN — PANTOPRAZOLE SODIUM 40 MG: 40 TABLET, DELAYED RELEASE ORAL at 07:57

## 2018-04-21 RX ADMIN — HYDROMORPHONE HYDROCHLORIDE 2 MG: 2 TABLET ORAL at 03:56

## 2018-04-21 RX ADMIN — APIXABAN 5 MG: 5 TABLET, FILM COATED ORAL at 08:12

## 2018-04-21 NOTE — PROGRESS NOTES
Resting comfortably,dsng D/I. NV status WDL. Sling in use right  arm and shoulder. Pillow under shoulder and upper arm. Remains confused as to when his surgery was done,where his wife is,where the bathroom is,and why he cant get up on his own. Reoriented as much as possible,showed him again how to call for assistance and made sure bed alarm is on.

## 2018-04-21 NOTE — PROGRESS NOTES
04/20/18 2009   Oxygen Therapy   O2 Sat (%) 100 %   Pulse via Oximetry 68 beats per minute   O2 Device Room air   c/s 13 connected. Hx deleted. Alarms set. PEP therapy completed.

## 2018-04-21 NOTE — PROGRESS NOTES
Patient alert and oriented, states his pain is well controlled without narcotic use, denies any needs at this time.

## 2018-04-21 NOTE — PROGRESS NOTES
Problem: Falls - Risk of  Goal: *Absence of Falls  Document Sesar Fall Risk and appropriate interventions in the flowsheet.    Outcome: Progressing Towards Goal  Fall Risk Interventions:  Mobility Interventions: Patient to call before getting OOB    Mentation Interventions: Evaluate medications/consider consulting pharmacy    Medication Interventions: Evaluate medications/consider consulting pharmacy, Patient to call before getting OOB    Elimination Interventions: Call light in reach, Patient to call for help with toileting needs

## 2018-04-21 NOTE — PROGRESS NOTES
Dressing to right shoulder changed per order, removed gauze wrap/ abd pads. Incision line intact, covered with steri strips, no bruising noted, no drainage. Placed sterile 4 x 4 gauze strip over steri strips and covered with tegaderm film for waterproof dressing. Patient tolerated well and experienced no discomfort.

## 2018-04-21 NOTE — DISCHARGE INSTRUCTIONS
DISCHARGE SUMMARY from Nurse    PATIENT INSTRUCTIONS:    After general anesthesia or intravenous sedation, for 24 hours or while taking prescription Narcotics:  · Limit your activities  · Do not drive and operate hazardous machinery  · Do not make important personal or business decisions  · Do  not drink alcoholic beverages  · If you have not urinated within 8 hours after discharge, please contact your surgeon on call. Report the following to your surgeon:  · Excessive pain, swelling, redness or odor of or around the surgical area  · Temperature over 100.5  · Nausea and vomiting lasting longer than 4 hours or if unable to take medications  · Any signs of decreased circulation or nerve impairment to extremity: change in color, persistent  numbness, tingling, coldness or increase pain  · Any questions    What to do at Home:  Recommended activity: Activity as tolerated,     If you experience any of the following symptoms, please follow up with Dr. Sandeep Crook. *  Please give a list of your current medications to your Primary Care Provider. *  Please update this list whenever your medications are discontinued, doses are      changed, or new medications (including over-the-counter products) are added. *  Please carry medication information at all times in case of emergency situations. These are general instructions for a healthy lifestyle:    No smoking/ No tobacco products/ Avoid exposure to second hand smoke  Surgeon General's Warning:  Quitting smoking now greatly reduces serious risk to your health.     Obesity, smoking, and sedentary lifestyle greatly increases your risk for illness    A healthy diet, regular physical exercise & weight monitoring are important for maintaining a healthy lifestyle    You may be retaining fluid if you have a history of heart failure or if you experience any of the following symptoms:  Weight gain of 3 pounds or more overnight or 5 pounds in a week, increased swelling in our hands or feet or shortness of breath while lying flat in bed. Please call your doctor as soon as you notice any of these symptoms; do not wait until your next office visit. Recognize signs and symptoms of STROKE:    F-face looks uneven    A-arms unable to move or move unevenly    S-speech slurred or non-existent    T-time-call 911 as soon as signs and symptoms begin-DO NOT go       Back to bed or wait to see if you get better-TIME IS BRAIN. Warning Signs of HEART ATTACK     Call 911 if you have these symptoms:   Chest discomfort. Most heart attacks involve discomfort in the center of the chest that lasts more than a few minutes, or that goes away and comes back. It can feel like uncomfortable pressure, squeezing, fullness, or pain.  Discomfort in other areas of the upper body. Symptoms can include pain or discomfort in one or both arms, the back, neck, jaw, or stomach.  Shortness of breath with or without chest discomfort.  Other signs may include breaking out in a cold sweat, nausea, or lightheadedness. Don't wait more than five minutes to call 911 - MINUTES MATTER! Fast action can save your life. Calling 911 is almost always the fastest way to get lifesaving treatment. Emergency Medical Services staff can begin treatment when they arrive -- up to an hour sooner than if someone gets to the hospital by car. The discharge information has been reviewed with the patient. The patient and spouse verbalized understanding. Discharge medications reviewed with the patient and spouse and appropriate educational materials and side effects teaching were provided. ___________________________________________________________________________________________________________________________________                 Shoulder Arthroscopy: What to Expect at Home  Your Recovery    Your arm may be in a sling. You will feel tired for several days.  Your shoulder will be swollen, and you may notice that your skin is a different color near the cuts the doctor made (incisions). Your hand and arm may also be swollen. This is normal and will go away in a few days. Depending on the medicine you had during the surgery, your entire arm may feel numb or like you cannot move it. This goes away in 12 to 24 hours. When you can return to work or your usual routine will depend on your shoulder problem. Most people need 6 weeks or longer to recover. How much time you need depends on the surgery that was done. You may have to limit your activity until your shoulder strength and range of motion return to normal. You may also be in a rehabilitation program (rehab). If you have a desk job, you may be able to return to work a few days after the surgery. If you lift things at work, it may take months before you return to work. This care sheet gives you a general idea about how long it will take for you to recover. But each person recovers at a different pace. Follow the steps below to get better as quickly as possible. How can you care for yourself at home? Activity  ? · Rest when you feel tired. Getting enough sleep will help you recover. You may be more comfortable if you sleep in a reclining chair. To make your arm and shoulder feel better, keep a thin pillow under the back of your arm while you are lying down. ? · Try to walk each day. Start by walking a little more than you did the day before. Bit by bit, increase the amount you walk. Walking boosts blood flow and helps prevent pneumonia and constipation. ? · For 2 to 3 weeks, avoid lifting anything heavier than a plate or a glass. You need to give your shoulder time to heal.   ? · Your arm may be in a sling. You may need to use the sling for a few days to a few weeks. Your doctor will advise you on how long to wear the sling. ? · You may take the sling off when you dress or wash. ? · Do not use your arm for repeated movements.  These include painting, vacuuming, or using a computer. Diet  ? · You can eat your normal diet. If your stomach is upset, try bland, low-fat foods like plain rice, broiled chicken, toast, and yogurt. ? · Drink plenty of fluids, unless your doctor tells you not to. ? · You may notice that your bowel movements are not regular right after your surgery. This is common. Try to avoid constipation and straining with bowel movements. You may want to take a fiber supplement every day. If you have not had a bowel movement after a couple of days, ask your doctor about taking a mild laxative. Medicines  ? · Your doctor will tell you if and when you can restart your medicines. He or she will also give you instructions about taking any new medicines. ? · If you take blood thinners, such as warfarin (Coumadin), clopidogrel (Plavix), or aspirin, be sure to talk to your doctor. He or she will tell you if and when to start taking those medicines again. Make sure that you understand exactly what your doctor wants you to do. ? · Take pain medicines exactly as directed. ¨ If the doctor gave you a prescription medicine for pain, take it as prescribed. ¨ If you are not taking a prescription pain medicine, ask your doctor if you can take an over-the-counter medicine. ? · If you think your pain medicine is making you sick to your stomach:  ¨ Take your medicine after meals (unless your doctor has told you not to). ¨ Ask your doctor for a different pain medicine. ? · If your doctor prescribed antibiotics, take them as directed. Do not stop taking them just because you feel better. You need to take the full course of antibiotics. Incision care  ? · If you have a dressing over your incision, keep it clean and dry. You may remove it 2 to 3 days after the surgery. ? · If your incision is open to the air, keep the area clean and dry. ? · If you have strips of tape on the incision, leave the tape on for a week or until it falls off. Exercise  ?  · You may need rehabilitation. This is a series of exercises you do after your surgery. Rehab helps you get back your shoulder's range of motion and strength. You will work with your doctor and physical therapist to plan this exercise program. To get the best results, you need to do the exercises correctly and as often and as long as your doctor tells you. ?Ice  ? · To reduce swelling and pain, put ice or a cold pack on your shoulder for 10 to 20 minutes at a time. Do this every 1 to 2 hours. Put a thin cloth between the ice and your skin. Follow-up care is a key part of your treatment and safety. Be sure to make and go to all appointments, and call your doctor if you are having problems. It's also a good idea to know your test results and keep a list of the medicines you take. When should you call for help? Call 911 anytime you think you may need emergency care. For example, call if:  ? · You passed out (lost consciousness). ? · You have severe trouble breathing. ? · You have sudden chest pain and shortness of breath, or you cough up blood. ?Call your doctor now or seek immediate medical care if:  ? · Your hand is cool, pale, or numb, or it changes color. ? · You are unable to move your fingers, wrist, or elbow. ? · You are sick to your stomach or cannot keep fluids down. ? · You have pain that does not get better after you take pain medicine. ? · You have signs of infection, such as:  ¨ Increased pain, swelling, warmth, or redness. ¨ Red streaks leading from the incision. ¨ Pus draining from the incision. ¨ A fever. ? · You have loose stitches, or your incision comes open. ? · Your incision bleeds through your first dressing or is still bleeding 3 days after your surgery. ? Watch closely for changes in your health, and be sure to contact your doctor if:  ? · Your sling feels too tight, and you cannot loosen it. ? · You have new or increased swelling in your arm.    ? · You have new pain that develops in another area of the affected limb. For example, you have pain in your hand or elbow. ? · You do not have a bowel movement after taking a laxative. ? · You do not get better as expected. Where can you learn more? Go to http://maykel-xander.info/. Enter X487 in the search box to learn more about \"Shoulder Arthroscopy: What to Expect at Home. \"  Current as of: March 21, 2017  Content Version: 11.4  © 1400-9333 [employer]. Care instructions adapted under license by [employer] (which disclaims liability or warranty for this information). If you have questions about a medical condition or this instruction, always ask your healthcare professional. [address] any warranty or liability for your use of this information.

## 2018-04-21 NOTE — PROGRESS NOTES
Hospitalist Progress Note     Admit Date:  2018  9:08 AM   Name:  Antonia Diana   Age:  71 y.o.  :  1945   MRN:  [id number]   PCP:  Ramila Bergeron MD  Treatment Team: Attending Provider: Rosio Berger MD; Consulting Provider: Fouzia Stephenson MD; Consulting Provider: Yara Pitts MD    Subjective: The patient is a 72 y/o gentleman with CAD, HTN, DM type 2, Paroxysmal A Fib on Eliquis, GERD, is s/p R shoulder arthroplasty. He was followed by the Hospitalist service for management of his comorbidities. The patient is doing well and about to be discharged home today. He denies any significant complaints, except for some mild soreness of the R shoulder area. Objective:   Patient Vitals for the past 24 hrs:   Temp Pulse Resp BP SpO2   18 1115 98.6 °F (37 °C) 75 18 145/80 -   18 0716 98.5 °F (36.9 °C) 71 18 163/82 93 %   18 0433 98.7 °F (37.1 °C) 68 16 155/71 94 %   18 2359 98.4 °F (36.9 °C) 67 16 165/74 97 %   18 - - - - 100 %   18 - - - - 100 %   18 1838 98.6 °F (37 °C) 60 16 160/77 96 %   18 1555 98.3 °F (36.8 °C) 70 16 146/63 90 %   18 1530 - 68 16 - (!) 88 %     Oxygen Therapy  O2 Sat (%): 93 % (18 0716)  Pulse via Oximetry: 68 beats per minute (18)  O2 Device: Room air (18)  O2 Flow Rate (L/min): 2 l/min (decreased to ra) (18 0915)    Intake/Output Summary (Last 24 hours) at 18 1402  Last data filed at 18 1623   Gross per 24 hour   Intake                0 ml   Output              525 ml   Net             -525 ml         General:    Well nourished. Alert. CV:   RRR. No murmur, rub, or gallop. Lungs:   Clear to auscultation bilaterally. No wheezing, rhonchi, or rales. Abdomen:   Soft, nontender, nondistended. Extremities: Warm and dry. No cyanosis or edema. Skin:     No rashes or jaundice.      Data Review:  I have reviewed all labs, meds, telemetry events, and studies from the last 24 hours.     Recent Results (from the past 24 hour(s))   PLEASE READ & DOCUMENT PPD TEST IN 24 HRS    Collection Time: 04/20/18  7:00 PM   Result Value Ref Range    PPD Neg Neg Negative    mm Induration 0 0 mm   METABOLIC PANEL, BASIC    Collection Time: 04/21/18  5:39 AM   Result Value Ref Range    Sodium 136 136 - 145 mmol/L    Potassium 3.4 (L) 3.5 - 5.1 mmol/L    Chloride 101 98 - 107 mmol/L    CO2 28 21 - 32 mmol/L    Anion gap 7 7 - 16 mmol/L    Glucose 183 (H) 65 - 100 mg/dL    BUN 12 8 - 23 MG/DL    Creatinine 1.22 0.8 - 1.5 MG/DL    GFR est AA >60 >60 ml/min/1.73m2    GFR est non-AA >60 >60 ml/min/1.73m2    Calcium 8.6 8.3 - 10.4 MG/DL   MAGNESIUM    Collection Time: 04/21/18  5:39 AM   Result Value Ref Range    Magnesium 1.8 1.8 - 2.4 mg/dL   CBC W/O DIFF    Collection Time: 04/21/18  5:39 AM   Result Value Ref Range    WBC 11.1 4.3 - 11.1 K/uL    RBC 3.50 (L) 4.23 - 5.67 M/uL    HGB 10.5 (L) 13.6 - 17.2 g/dL    HCT 30.6 (L) 41.1 - 50.3 %    MCV 87.4 79.6 - 97.8 FL    MCH 30.0 26.1 - 32.9 PG    MCHC 34.3 31.4 - 35.0 g/dL    RDW 14.0 11.9 - 14.6 %    PLATELET 263 944 - 420 K/uL    MPV 12.3 10.8 - 14.1 FL        All Micro Results     None          Current Meds:  Current Facility-Administered Medications   Medication Dose Route Frequency    potassium chloride (K-DUR, KLOR-CON) SR tablet 20 mEq  20 mEq Oral BID    acetaminophen (TYLENOL) tablet 1,000 mg  1,000 mg Oral Q6H PRN    apixaban (ELIQUIS) tablet 5 mg  5 mg Oral Q12H    sodium chloride (NS) flush 5-10 mL  5-10 mL IntraVENous Q8H    sodium chloride (NS) flush 5-10 mL  5-10 mL IntraVENous PRN    bisacodyl (DULCOLAX) suppository 10 mg  10 mg Rectal DAILY PRN    sodium phosphate (FLEET'S) enema 118 mL  1 Enema Rectal PRN    ferrous sulfate tablet 325 mg  1 Tab Oral BID WITH MEALS    docusate sodium (COLACE) capsule 100 mg  100 mg Oral BID    promethazine (PHENERGAN) tablet 25 mg  25 mg Oral Q4H PRN    HYDROmorphone (PF) (DILAUDID) injection 1 mg  1 mg IntraVENous Q1H PRN    HYDROmorphone (DILAUDID) tablet 2 mg  2 mg Oral Q3H PRN    HYDROmorphone (DILAUDID) tablet 4 mg  4 mg Oral Q3H PRN    temazepam (RESTORIL) capsule 15 mg  15 mg Oral QHS PRN    allopurinol (ZYLOPRIM) tablet 300 mg  300 mg Oral DAILY    dilTIAZem CD (CARDIZEM CD) capsule 360 mg  360 mg Oral DAILY    pantoprazole (PROTONIX) tablet 40 mg  40 mg Oral ACB    gabapentin (NEURONTIN) capsule 100 mg  100 mg Oral BID    losartan (COZAAR) tablet 100 mg  100 mg Oral DAILY    metoprolol tartrate (LOPRESSOR) tablet 50 mg  50 mg Oral BID    nitroglycerin (NITROSTAT) tablet 0.4 mg  0.4 mg SubLINGual Q5MIN PRN    pravastatin (PRAVACHOL) tablet 40 mg  40 mg Oral QHS    ranolazine ER (RANEXA) tablet 500 mg  500 mg Oral BID       Other Studies (last 24 hours):  No results found. Assessment and Plan:     Hospital Problems as of 4/21/2018  Date Reviewed: 3/20/2018          Codes Class Noted - Resolved POA    Hypokalemia ICD-10-CM: E87.6  ICD-9-CM: 276.8  4/21/2018 - Present Yes        Anemia ICD-10-CM: D64.9  ICD-9-CM: 285.9  4/19/2018 - Present Yes        Anemia associated with acute blood loss ICD-10-CM: D62  ICD-9-CM: 285.1  4/19/2018 - Present Yes        * (Principal)Osteoarthritis of right glenohumeral joint ICD-10-CM: M19.011  ICD-9-CM: 715.91  4/18/2018 - Present Yes        RESOLVED: Hypomagnesemia ICD-10-CM: E83.42  ICD-9-CM: 275.2  4/19/2018 - 4/21/2018 Yes            1 - s/p R Shoulder Arthroplasty  2 - CAD s/p CABG  3 - HTN  4 - DM type 2  5 - Paroxysmal A Fib on Eliquis  6 - GERD    PLAN:    · The patient is doing well and seems to be clinically and hemodynamically stable  · Continue current home medications  · Agree with discharge planning    Thank you for allowing us to participate in the care of your patient. Signed:   Akiko Aggarwal MD

## 2018-04-21 NOTE — PROGRESS NOTES
Alert and oriented. (A bit of altered pre existing)  Wound area is benign with no obvious infection  HG ok  Denies chest pain or dyspnea  No inordinate pain  Pain management improving. Needs continued hospital stay for parenteral pain meds as needed. I recommend at least twelve hours without need for parenteral narcotics for pain management. Needs continued inpatient physiotherapy  Hydration status acceptable.  Wean IV fluids as able today

## 2018-04-21 NOTE — PROGRESS NOTES
Problem: Mobility Impaired (Adult and Pediatric)  Goal: *Acute Goals and Plan of Care (Insert Text)  GOALS (1-4 days):  (1.)  Patient will move from supine to sit and sit to supine  in bed with SUPERVISION. (2.)  Patient will transfer from bed to chair and chair to bed with STAND BY ASSIST using the least restrictive device. (3.)  Patient will ambulate with STAND BY ASSIST for 200 feet with the least restrictive device. (4.)  Patient will be safe with shoulder HEP, with caregiver's help, to increase range of motion per MD orders. ________________________________________________________________________________________________    PHYSICAL THERAPY: Daily Note, Treatment Day: 1st, AM 4/21/2018  INPATIENT: Hospital Day: 3  [place] / [place] / Product Type: Medicare /      NAME/AGE/GENDER: Alejandra Murillo is a 71 y.o. male   PRIMARY DIAGNOSIS: Primary osteoarthritis, right shoulder [M19.011] Osteoarthritis of right glenohumeral joint Osteoarthritis of right glenohumeral joint  Procedure(s) (LRB):  RIGHT SHOULDER ARTHROPLASTY TOTAL REVERSE WITH BICEPS TENODESIS IN COMBINATION WITH LATISSIMUS KIRBY AND TERES MAJOR TENDON TRANSFER / INTERSCALENE  DELTA XTEND (Right)  2 Days Post-Op  ICD-10: Treatment Diagnosis:   · Pain in Right Shoulder (M25.511)  · Stiffness of Right Shoulder, Not elsewhere classified (M25.611)  · Generalized Muscle Weakness (M62.81)  · Other lack of cordination (R27.8)  · Difficulty in walking, Not elsewhere classified (R26.2)  · Other abnormalities of gait and mobility (R26.89)   Precaution/Allergies:  Celecoxib; Ibuprofen; Lescol [fluvastatin]; Nsaids (non-steroidal anti-inflammatory drug);  Sulfa (sulfonamide antibiotics); and Uloric [febuxostat]      ASSESSMENT:     Mr. Elba Whitaker showed improved stability with WB activity & also seemed to have better tolerance to exercises this am.    This section established at most recent assessment   PROBLEM LIST (Impairments causing functional limitations):  1. Decreased Hutchinson with Bed Mobility  2. Decreased Hutchinson with Transfers  3. Decreased Hutchinson with Ambulation   4. Decreased Hutchinson with shoulder HEP   INTERVENTIONS PLANNED: (Benefits and precautions of physical therapy have been discussed with the patient.)  1. Bed Mobility Training  2. Transfer Training  3. Gait Training  4. Therapeutic Exercises per MD orders  5. Modalities for Pain     TREATMENT PLAN: Frequency/Duration: twice daily for duration of hospital stay  Rehabilitation Potential For Stated Goals: Good     RECOMMENDED REHABILITATION/EQUIPMENT: (at time of discharge pending progress): Continue Skilled Therapy and Home Health: Physical Therapy. HISTORY:   History of Present Injury/Illness (Reason for Referral):  Painful right shoulder s/p reverse TSA  Past Medical History/Comorbidities:   Mr. Nikhil Gtz  has a past medical history of Allergic rhinitis; Arthritis; CAD (coronary artery disease); CVA (cerebral vascular accident) ([facility] (08/2017); Diabetes mellitus type 2, controlled ([facility] Dyslipidemia; ED (erectile dysfunction); Encephalitis (1962); GERD (gastroesophageal reflux disease); Gout; Hypertension; Lacunar infarct, acute ([facility] Lumbago; Memory loss; Mitral valve regurgitation (7/14/12); ROBERTO (obstructive sleep apnea); PAF (paroxysmal atrial fibrillation) ([facility] Prostate cancer ([facility] Psoriasis; SBO (small bowel obstruction) ([facility] (2011, 2015, 2016); and Stage 2 chronic kidney disease.   Mr. 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 Social History/Living Environment:   Home Environment: Private residence  # Steps to Enter: 1  Rails to Enter: No  One/Two Story Residence: Two story, live on 1st floor  # of Interior Steps: 12  Interior Rails: Both  Lift Chair Available: No  Living Alone: No  Support Systems: Spouse/Significant Other/Partner  Patient Expects to be Discharged to[de-identified] Private residence  Current DME Used/Available at Home: None  Prior Level of Function/Work/Activity:  Pt was independent without an assistive device prior to this admission. Number of Personal Factors/Comorbidities that affect the Plan of Care: 3+: HIGH COMPLEXITY   EXAMINATION:   Most Recent Physical Functioning:   Gross Assessment:  AROM: Generally decreased, functional (left UE both LE's)  Strength: Generally decreased, functional (left UE both LE's)  Coordination: Generally decreased, functional (left UE both LE's)                    Balance:  Sitting: Intact; Without support  Standing: Impaired; Without support Bed Mobility:  Supine to Sit: Stand-by assistance  Sit to Supine:  (NT)  Scooting: Stand-by assistance       Transfers:  Sit to Stand: Contact guard assistance  Stand to Sit: Contact guard assistance  Bed to Chair: Contact guard assistance  Gait:     Speed/Gloria: Delayed  Gait Abnormalities: Decreased step clearance  Distance (ft): 200 Feet (ft)  Assistive Device:  (none)  Ambulation - Level of Assistance: Contact guard assistance  Duration: 12 Minutes   Functional Mobility:         Gait/Ambulation:  cga        Transfers:  cga        Bed Mobility:  sba   Body Structures Involved:  1. Joints  2. Muscles Body Functions Affected:  1. Sensory/Pain  2. Movement Related Activities and Participation Affected:  1. General Tasks and Demands  2.  Mobility   Number of elements that affect the Plan of Care: 4+: HIGH COMPLEXITY   CLINICAL PRESENTATION:   Presentation: Stable and uncomplicated: LOW COMPLEXITY   CLINICAL DECISION MAKIN [facility] [address] [id number] AM-PAC Judie.Robe Clicks   Basic Mobility Inpatient Short Form  How much difficulty does the patient currently have. .. Unable A Lot A Little None   1. Turning over in bed (including adjusting bedclothes, sheets and blankets)? [] 1   [] 2   [x] 3   [] 4   2. Sitting down on and standing up from a chair with arms ( e.g., wheelchair, bedside commode, etc.)   [] 1   [] 2   [x] 3   [] 4   3. Moving from lying on back to sitting on the side of the bed? [] 1   [] 2   [x] 3   [] 4   How much help from another person does the patient currently need. .. Total A Lot A Little None   4. Moving to and from a bed to a chair (including a wheelchair)? [] 1   [] 2   [x] 3   [] 4   5. Need to walk in hospital room? [] 1   [] 2   [x] 3   [] 4   6. Climbing 3-5 steps with a railing? [x] 1   [] 2   [] 3   [] 4   © 2007, Trustees of [address] [place], [place] [address], under license to [employer]. All rights reserved      Score:  Initial: 16 Most Recent: X (Date: -- )    Interpretation of Tool:  Represents activities that are increasingly more difficult (i.e. Bed mobility, Transfers, Gait). Score 24 23 22-20 19-15 14-10 9-7 6     Modifier CH CI CJ CK CL CM CN      ? Mobility - Walking and Moving Around:     - CURRENT STATUS: CK - 40%-59% impaired, limited or restricted    - GOAL STATUS: CJ - 20%-39% impaired, limited or restricted    - D/C STATUS:  ---------------To be determined---------------  [place] / [place] / Product Type: Medicare /      Medical Necessity:     · Patient is expected to demonstrate progress in strength, range of motion, balance, coordination and functional technique to decrease assistance required with bed mobility, transfers, gait & HEP. Reason for Services/Other Comments:  · Patient continues to require skilled intervention due to pt not safe with functional mobility & HEP.    Use of outcome tool(s) and clinical judgement create a POC that gives a: Clear prediction of patient's progress: LOW COMPLEXITY            TREATMENT:   (In addition to Assessment/Re-Assessment sessions the following treatments were rendered)   Pre-treatment Symptoms/Complaints:  none  Pain: Initial: visual scale  Pain Intensity 1: 3  Pain Location 1: Shoulder  Pain Orientation 1: Right  Pain Intervention(s) 1: Exercise, Repositioned  Post Session:  3/10     Therapeutic Exercise: (13 Minutes):  Exercises per grid below to improve mobility and dynamic movement of arm - right to improve functional endurance. Required minimal verbal cues to promote proper body alignment and promote proper body mechanics. Gait Training (12 Minutes):  Gait training to improve and/or restore physical functioning as related to mobility, strength, balance, coordination and dynamic movement of leg - bilateral to improve functional gait. Ambulated 200 Feet (ft) with Contact guard assistance using a  (none) and minimal cues related to their stride length and heel strike to promote proper body alignment, promote proper body posture and promote proper body mechanics.        Date:  4/20 Date:  4/21   Date:     ACTIVITY/EXERCISE AM PM AM PM AM PM   Gripping 15 15 20      Wrist Flexion/Extension 15 15 aa 20      Wrist Ulnar/Radial Deviation         Pronation/Supination 15 15 aa 20      Elbow Flexion/Extension 15aa 15 aa 20aa      Shoulder Flexion/Extension 15aa to tolerance 15 aa to tolerance 20aa to tolerance      Shoulder AB/ADduction         Shoulder IR/ER         Pulleys         Pendulums 15aa clock & counter clockwise To sleeyp 20aa clock & counter clockwise      Shrugs         Isometric:                 Flexion         Extension         ABduction         ADduction         Biceps/Triceps                  B = bilateral; AA = active assistive; A = active; P = passive  Education:  [x]  Home Exercises  [x]  Sling Application   [x]  Movement Precautions   []  Pulleys   []  Use of Ice   []  Other:   Treatment/Session Assessment:    · Response to Treatment:  Pt more engaged  · Interdisciplinary Collaboration:   o Registered Nurse  · After treatment position/precautions:   o Up in chair  o Bed/Chair-wheels locked  o Caregiver at bedside  o Call light within reach  o RN notified  o Family at bedside   · Compliance with Program/Exercises: Will assess as treatment progresses. · Recommendations/Intent for next treatment session:  Treatment next visit will focus on increasing Mr. Geovanny Jain independence with bed mobility, transfers, gait training, strength/ROM exercises, modalities for pain, and patient education.    Total Treatment Duration:  PT Patient Time In/Time Out  Time In: 1000  Time Out: 14 [facility],

## 2018-04-21 NOTE — PROGRESS NOTES
Problem: Mobility Impaired (Adult and Pediatric)  Goal: *Acute Goals and Plan of Care (Insert Text)  GOALS (1-4 days):  (1.)  Patient will move from supine to sit and sit to supine  in bed with SUPERVISION. (2.)  Patient will transfer from bed to chair and chair to bed with STAND BY ASSIST using the least restrictive device. Met 4/21  (3.)  Patient will ambulate with STAND BY ASSIST for 200 feet with the least restrictive device. Met 4/21  (4.)  Patient will be safe with shoulder HEP, with caregiver's help, to increase range of motion per MD orders. Met 4/21  ________________________________________________________________________________________________    PHYSICAL THERAPY: Daily Note, Treatment Day: 1st, PM 4/21/2018  INPATIENT: Hospital Day: 3  [place] / [place] / Product Type: Medicare /      NAME/AGE/GENDER: Peyton Oneill is a 71 y.o. male   PRIMARY DIAGNOSIS: Primary osteoarthritis, right shoulder [M19.011] Osteoarthritis of right glenohumeral joint Osteoarthritis of right glenohumeral joint  Procedure(s) (LRB):  RIGHT SHOULDER ARTHROPLASTY TOTAL REVERSE WITH BICEPS TENODESIS IN COMBINATION WITH LATISSIMUS KIRBY AND TERES MAJOR TENDON TRANSFER / INTERSCALENE  DELTA XTEND (Right)  2 Days Post-Op  ICD-10: Treatment Diagnosis:   · Pain in Right Shoulder (M25.511)  · Stiffness of Right Shoulder, Not elsewhere classified (M25.611)  · Generalized Muscle Weakness (M62.81)  · Other lack of cordination (R27.8)  · Difficulty in walking, Not elsewhere classified (R26.2)  · Other abnormalities of gait and mobility (R26.89)   Precaution/Allergies:  Celecoxib; Ibuprofen; Lescol [fluvastatin]; Nsaids (non-steroidal anti-inflammatory drug);  Sulfa (sulfonamide antibiotics); and Uloric [febuxostat]      ASSESSMENT:     Mr. Nandini Dwyer showed improved stability with WB activity & also seemed to have better tolerance to exercises this am. Pt & spouse reviewed HEP with written guidelines provided, no concerns. This section established at most recent assessment   PROBLEM LIST (Impairments causing functional limitations):  1. Decreased Joes with Bed Mobility  2. Decreased Joes with Transfers  3. Decreased Joes with Ambulation   4. Decreased Joes with shoulder HEP   INTERVENTIONS PLANNED: (Benefits and precautions of physical therapy have been discussed with the patient.)  1. Bed Mobility Training  2. Transfer Training  3. Gait Training  4. Therapeutic Exercises per MD orders  5. Modalities for Pain     TREATMENT PLAN: Frequency/Duration: twice daily for duration of hospital stay  Rehabilitation Potential For Stated Goals: Good     RECOMMENDED REHABILITATION/EQUIPMENT: (at time of discharge pending progress): Continue Skilled Therapy and Home Health: Physical Therapy. HISTORY:   History of Present Injury/Illness (Reason for Referral):  Painful right shoulder s/p reverse TSA  Past Medical History/Comorbidities:   Mr. Magalys Jimenez  has a past medical history of Allergic rhinitis; Arthritis; CAD (coronary artery disease); CVA (cerebral vascular accident) ([facility] (08/2017); Diabetes mellitus type 2, controlled ([facility] Dyslipidemia; ED (erectile dysfunction); Encephalitis (1962); GERD (gastroesophageal reflux disease); Gout; Hypertension; Lacunar infarct, acute ([facility] Lumbago; Memory loss; Mitral valve regurgitation (7/14/12); ROBERTO (obstructive sleep apnea); PAF (paroxysmal atrial fibrillation) ([facility] Prostate cancer ([facility] Psoriasis; SBO (small bowel obstruction) ([facility] (2011, 2015, 2016); and Stage 2 chronic kidney disease.   Mr. Magalys Jimenez  has a past surgical history that includes pr left heart cath,percutaneous (7/2012); hx colonoscopy (1998, 2010); pr abdomen surgery proc unlisted (1967); hx hernia repair (Bilateral, 2012); hx appendectomy (age 49); hx radical prostatectomy ( ); pr prostate biopsy, needle, saturation sampling; hx cataract removal (Bilateral, 2013); hx coronary artery bypass graft (2009); vascular surgery procedure unlist (12/29/2017); and hx splenectomy (1951). Social History/Living Environment:   Home Environment: Private residence  # Steps to Enter: 1  Rails to Enter: No  One/Two Story Residence: Two story, live on 1st floor  # of Interior Steps: 12  Interior Rails: Both  Lift Chair Available: No  Living Alone: No  Support Systems: Spouse/Significant Other/Partner  Patient Expects to be Discharged to[de-identified] Private residence  Current DME Used/Available at Home: None  Prior Level of Function/Work/Activity:  Pt was independent without an assistive device prior to this admission. Number of Personal Factors/Comorbidities that affect the Plan of Care: 3+: HIGH COMPLEXITY   EXAMINATION:   Most Recent Physical Functioning:   Gross Assessment:  AROM: Generally decreased, functional (left UE both LE's)  Strength: Generally decreased, functional (left UE both LE's)  Coordination: Generally decreased, functional (left UE both LE's)                    Balance:  Sitting: Intact; Without support  Standing: Impaired; Without support Bed Mobility:  Supine to Sit:  (NT)  Sit to Supine:  (NT)  Scooting: Stand-by assistance       Transfers:  Sit to Stand: Stand-by assistance  Stand to Sit: Stand-by assistance  Bed to Chair: Stand-by assistance  Gait:     Speed/Gloria: Delayed  Gait Abnormalities: Decreased step clearance  Distance (ft): 300 Feet (ft)  Assistive Device:  (none)  Ambulation - Level of Assistance: Stand-by assistance  Duration: 12 Minutes   Functional Mobility:         Gait/Ambulation:  sba        Transfers:  sba        Bed Mobility:  NT   Body Structures Involved:  1. Joints  2. Muscles Body Functions Affected:  1. Sensory/Pain  2. Movement Related Activities and Participation Affected:  1. General Tasks and Demands  2.  Mobility   Number of elements that affect the Plan of Care: 4+: HIGH COMPLEXITY   CLINICAL PRESENTATION:   Presentation: Stable and uncomplicated: LOW COMPLEXITY   CLINICAL DECISION MAKIN[address] [place] [id number] AM-PAC 6 Clicks   Basic Mobility Inpatient Short Form  How much difficulty does the patient currently have. .. Unable A Lot A Little None   1. Turning over in bed (including adjusting bedclothes, sheets and blankets)? [] 1   [] 2   [x] 3   [] 4   2. Sitting down on and standing up from a chair with arms ( e.g., wheelchair, bedside commode, etc.)   [] 1   [] 2   [x] 3   [] 4   3. Moving from lying on back to sitting on the side of the bed? [] 1   [] 2   [x] 3   [] 4   How much help from another person does the patient currently need. .. Total A Lot A Little None   4. Moving to and from a bed to a chair (including a wheelchair)? [] 1   [] 2   [x] 3   [] 4   5. Need to walk in hospital room? [] 1   [] 2   [x] 3   [] 4   6. Climbing 3-5 steps with a railing? [x] 1   [] 2   [] 3   [] 4   © 2007, Trustees of [address] [place] [address] [address] [address], under license to [employer]. All rights reserved      Score:  Initial: 16 Most Recent: X (Date: -- )    Interpretation of Tool:  Represents activities that are increasingly more difficult (i.e. Bed mobility, Transfers, Gait). Score 24 23 22-20 19-15 14-10 9-7 6     Modifier CH CI CJ CK CL CM CN      ? Mobility - Walking and Moving Around:     - CURRENT STATUS: CK - 40%-59% impaired, limited or restricted    - GOAL STATUS: CJ - 20%-39% impaired, limited or restricted    - D/C STATUS:  ---------------To be determined---------------  [place] / [place] / Product Type: Medicare /      Medical Necessity:     · Patient is expected to demonstrate progress in strength, range of motion, balance, coordination and functional technique to decrease assistance required with bed mobility, transfers, gait & HEP. Reason for Services/Other Comments:  · Patient continues to require skilled intervention due to pt not safe with functional mobility & HEP.    Use of outcome tool(s) and clinical judgement create a POC that gives a: Clear prediction of patient's progress: LOW COMPLEXITY            TREATMENT:   (In addition to Assessment/Re-Assessment sessions the following treatments were rendered)   Pre-treatment Symptoms/Complaints:  none  Pain: Initial: visual scale  Pain Intensity 1: 2  Pain Location 1: Shoulder  Pain Orientation 1: Right  Pain Intervention(s) 1: Repositioned  Post Session:  3/10     Therapeutic Exercise: (13 Minutes):  Exercises per grid below to improve mobility and dynamic movement of arm - right to improve functional endurance. Required minimal verbal cues to promote proper body alignment and promote proper body mechanics. Gait Training (12 Minutes):  Gait training to improve and/or restore physical functioning as related to mobility, strength, balance, coordination and dynamic movement of leg - bilateral to improve functional gait. Ambulated 300 Feet (ft) with Stand-by assistance using a  (none) and minimal cues related to their stride length and heel strike to promote proper body alignment, promote proper body posture and promote proper body mechanics.        Date:  4/20 Date:  4/21   Date:     ACTIVITY/EXERCISE AM PM AM PM AM PM   Gripping 15 15 20 20     Wrist Flexion/Extension 15 15 aa 20 20     Wrist Ulnar/Radial Deviation         Pronation/Supination 15 15 aa 20 20     Elbow Flexion/Extension 15aa 15 aa 20aa 20     Shoulder Flexion/Extension 15aa to tolerance 15 aa to tolerance 20aa to tolerance 20aa to tolerance     Shoulder AB/ADduction         Shoulder IR/ER         Pulleys         Pendulums 15aa clock & counter clockwise To sleeyp 20aa clock & counter clockwise 20aa clock & counter clockwise     Shrugs         Isometric:                 Flexion         Extension         ABduction         ADduction         Biceps/Triceps                  B = bilateral; AA = active assistive; A = active; P = passive  Education:  [x]  Home Exercises  [x]  Sling Application   [x]  Movement Precautions   []  Pulleys   []  Use of Ice   []  Other:   Treatment/Session Assessment:    · Response to Treatment:  No concerns  · Interdisciplinary Collaboration:   o Registered Nurse  · After treatment position/precautions:   o Up in chair  o Bed/Chair-wheels locked  o Caregiver at bedside  o Call light within reach  o RN notified  o Family at bedside   · Compliance with Program/Exercises: Will assess as treatment progresses. · Recommendations/Intent for next treatment session:  Treatment next visit will focus on increasing Mr. Eileen Mireles independence with bed mobility, transfers, gait training, strength/ROM exercises, modalities for pain, and patient education.    Total Treatment Duration:  PT Patient Time In/Time Out  Time In: 1500  Time Out: [address], PT

## 2018-04-23 ENCOUNTER — HOME CARE VISIT (OUTPATIENT)
Dept: SCHEDULING | Facility: HOME HEALTH | Age: 73
End: 2018-04-23
Payer: MEDICARE

## 2018-04-23 ENCOUNTER — PATIENT OUTREACH (OUTPATIENT)
Dept: CASE MANAGEMENT | Age: 73
End: 2018-04-23

## 2018-04-23 VITALS
HEART RATE: 76 BPM | WEIGHT: 190 LBS | TEMPERATURE: 97.9 F | RESPIRATION RATE: 18 BRPM | HEIGHT: 70 IN | BODY MASS INDEX: 27.2 KG/M2 | SYSTOLIC BLOOD PRESSURE: 140 MMHG | DIASTOLIC BLOOD PRESSURE: 72 MMHG

## 2018-04-23 LAB
ABO + RH BLD: NORMAL
BLD PROD TYP BPU: NORMAL
BLD PROD TYP BPU: NORMAL
BLOOD GROUP ANTIBODIES SERPL: NORMAL
BPU ID: NORMAL
BPU ID: NORMAL
CROSSMATCH RESULT,%XM: NORMAL
CROSSMATCH RESULT,%XM: NORMAL
SPECIMEN EXP DATE BLD: NORMAL
STATUS OF UNIT,%ST: NORMAL
STATUS OF UNIT,%ST: NORMAL
UNIT DIVISION, %UDIV: 0
UNIT DIVISION, %UDIV: 0

## 2018-04-23 PROCEDURE — 400013 HH SOC

## 2018-04-23 PROCEDURE — 3331090002 HH PPS REVENUE DEBIT

## 2018-04-23 PROCEDURE — G0151 HHCP-SERV OF PT,EA 15 MIN: HCPCS

## 2018-04-23 PROCEDURE — 3331090001 HH PPS REVENUE CREDIT

## 2018-04-23 NOTE — PROGRESS NOTES
Transition of Care Discharge Follow-up Questionnaire   Date/Time of Call:   April 23, 2018 1:08PM   What was the patient hospitalized for? Osteoarthritis of right glenohumeral joint        Does the patient understand his/her diagnosis and/or treatment and what happened during the hospitalization? Patient states understanding of diagnosis and treatment plan during hospitalization     Did the patient receive discharge instructions? Yes   CM Assessed Risk for Readmission:       Patient stated Risk for Readmission:      Yes      Patient state he has no risk for readmission   Review any discharge instructions (see discharge instructions/AVS in [facility]). Ask patient if they understand these. Do they have any questions? Patient states understanding of discharge instructions, patient states no questions. Were home services ordered (nursing, PT, OT, ST, etc.)? Yes, home health services ordered with [facility] (PT). If so, has the first visit occurred? If not, why? (Assist with coordination of services if necessary.)   No, patient states first visit is scheduled for today 04/23/2018. Was any DME ordered? No durable medical equipment ordered. If so, has it been received? If not, why?  (Assist patient in obtaining DME orders &/or equipment if necessary. ) NA         Complete a review of all medications (new, continued and discontinued meds per the D/C instructions and [facility] Care Coordinator reviewed all medications with patient per connect [facility]. NO NEW MEDICATIONS ORDERED. Were all new prescriptions filled? If not, why?  (Assist patient in obtaining medications if necessary  escalate for CCM &/or SW if ongoing issues are verbalized by pt or anticipated)   NA         Does the patient understand the purpose and dosing instructions for all medications?   (If patient has questions, provide explanation and education.)   Patient states understanding of current medications and dosing instructions. Care Coordinator educated patient on the importance of medication compliance and reporting medication side effects to PCP/Specialist.      Does the patient have any problems in performing ADLs? (If patient is unable to perform ADLs  what is the limiting factor(s)? Do they have a support system that can assist? If no support system is present, discuss possible assistance that they may be able to obtain. Escalate for CCM/SW if ongoing issues are verbalized by pt or anticipated)   Patient states she is independent with ADL's and ambulation. Patient states everything is going okay, no pain or discomfort to right arm/ shoulder. Patient states sling is in place to right arm/shoulder area, patient states he is able to move arm and states right arm area is somewhat stiff . Patient states his spouse will assist him if he requires any assistance. Does the patient have all follow-up appointments scheduled? 7 day f/up with PCP?   (f/up with PCP may be w/in 14 days if patient has a f/up with their specialist w/in 7 days)    7-14 day f/up with specialist?   (or per discharge instructions)    If f/up has not been made  what actions has the care coordinator made to accomplish this? Has transportation been arranged? Care Coordinator educated patient on the importance of scheduling follow up appointment with PCP within 7 to 14 days of hospital discharge. Patient states he will call and schedule follow up with Dr. Geovanna Khalil. Enoc Camargo (PCP). Care Coordinator offered assistance with scheduling hospital follow up with Dr. Singh Sullivan (PCP), patient declined. Patient states he is waiting for return call from Dr. Ryan Dawson. (Orthopedic Surgeon) office with date/time of appointment. Yes   Any other questions or concerns expressed by the patient? No further needs identified, patient instructed to call Care Coordinator if further questions or concerns arise. Schedule next appointment with ELO Monahan or refer to RN Case Manager/ per the workflow guidelines. When is care coordinators next follow-up call scheduled? If referred for CCM  what RN care manager was the referral assigned? NA            NA        NA   FRED Call Completed By: [facility]  Care Coordinator         This note will not be viewable in [address]

## 2018-04-24 PROCEDURE — 3331090002 HH PPS REVENUE DEBIT

## 2018-04-24 PROCEDURE — A6222 GAUZE <=16 IN NO W/SAL W/O B: HCPCS

## 2018-04-24 PROCEDURE — A6258 TRANSPARENT FILM >16<=48 IN: HCPCS

## 2018-04-24 PROCEDURE — 3331090001 HH PPS REVENUE CREDIT

## 2018-04-24 NOTE — DISCHARGE SUMMARY
[address] [facility]    Theodora Rivas  MR#: [id number]  : 1945  ACCOUNT #: [de-identified]   ADMIT DATE: 2018  DISCHARGE DATE: 2018    ADMISSION DIAGNOSIS:  End-stage glenohumeral osteoarthritis, right shoulder. DISCHARGE DIAGNOSIS:  End-stage glenohumeral osteoarthritis, right shoulder. Please see H and P, operative summary and consults for further details. HISTORY OF PRESENT ILLNESS:  The patient is a 71-year-old gentleman with history of severe right shoulder pain. He was admitted on 2018 after undergoing an uncomplicated reverse right total shoulder arthroplasty with a Delta Xtend prosthesis, biceps tenodesis, latissimus dorsi and teres major tendon transfer. He did experience urinary retention following surgery. De La Cruz catheter was placed on 2018 and was also removed on 2018. Patient was able to void following de la cruz removal without difficulty  Upon admission, consults were obtained for physiatry and hospitalist to manage his other medical issues. On postoperative day 1, hemoglobin 11.2, potassium 4.0, magnesium 2.0. On postoperative day 2, hemoglobin 10.5, potassium 3.4 which was repleted, and magnesium 1.8. The patient was discharged home where he will start home health and continue rehabilitation. He was instructed to follow up with Dr. Leonardo Chavez office according to his scheduled appointment. DISPOSITION:  Home.       Trev Luna NP MM/ALAINA  D: 2018 22:29     T: 2018 12:46  JOB #: [id number]  CC: Jennifer Castillo MD

## 2018-04-25 ENCOUNTER — HOME CARE VISIT (OUTPATIENT)
Dept: SCHEDULING | Facility: HOME HEALTH | Age: 73
End: 2018-04-25
Payer: MEDICARE

## 2018-04-25 VITALS
TEMPERATURE: 98 F | HEART RATE: 80 BPM | DIASTOLIC BLOOD PRESSURE: 62 MMHG | SYSTOLIC BLOOD PRESSURE: 130 MMHG | RESPIRATION RATE: 17 BRPM

## 2018-04-25 PROCEDURE — 3331090001 HH PPS REVENUE CREDIT

## 2018-04-25 PROCEDURE — 3331090002 HH PPS REVENUE DEBIT

## 2018-04-25 PROCEDURE — G0152 HHCP-SERV OF OT,EA 15 MIN: HCPCS

## 2018-04-26 ENCOUNTER — HOME CARE VISIT (OUTPATIENT)
Dept: SCHEDULING | Facility: HOME HEALTH | Age: 73
End: 2018-04-26
Payer: MEDICARE

## 2018-04-26 VITALS
DIASTOLIC BLOOD PRESSURE: 68 MMHG | RESPIRATION RATE: 17 BRPM | SYSTOLIC BLOOD PRESSURE: 130 MMHG | HEART RATE: 88 BPM | TEMPERATURE: 97.3 F

## 2018-04-26 PROCEDURE — G0157 HHC PT ASSISTANT EA 15: HCPCS

## 2018-04-26 PROCEDURE — 3331090002 HH PPS REVENUE DEBIT

## 2018-04-26 PROCEDURE — 3331090001 HH PPS REVENUE CREDIT

## 2018-04-27 ENCOUNTER — HOME CARE VISIT (OUTPATIENT)
Dept: SCHEDULING | Facility: HOME HEALTH | Age: 73
End: 2018-04-27
Payer: MEDICARE

## 2018-04-27 VITALS
TEMPERATURE: 97.9 F | HEART RATE: 68 BPM | RESPIRATION RATE: 16 BRPM | DIASTOLIC BLOOD PRESSURE: 80 MMHG | SYSTOLIC BLOOD PRESSURE: 120 MMHG

## 2018-04-27 PROCEDURE — 3331090001 HH PPS REVENUE CREDIT

## 2018-04-27 PROCEDURE — 3331090002 HH PPS REVENUE DEBIT

## 2018-04-27 PROCEDURE — G0157 HHC PT ASSISTANT EA 15: HCPCS

## 2018-04-28 PROCEDURE — 3331090001 HH PPS REVENUE CREDIT

## 2018-04-28 PROCEDURE — 3331090002 HH PPS REVENUE DEBIT

## 2018-04-29 PROCEDURE — 3331090002 HH PPS REVENUE DEBIT

## 2018-04-29 PROCEDURE — 3331090001 HH PPS REVENUE CREDIT

## 2018-04-30 ENCOUNTER — HOME CARE VISIT (OUTPATIENT)
Dept: SCHEDULING | Facility: HOME HEALTH | Age: 73
End: 2018-04-30
Payer: MEDICARE

## 2018-04-30 VITALS
DIASTOLIC BLOOD PRESSURE: 78 MMHG | HEART RATE: 72 BPM | SYSTOLIC BLOOD PRESSURE: 118 MMHG | RESPIRATION RATE: 18 BRPM | TEMPERATURE: 97.8 F

## 2018-04-30 PROCEDURE — 3331090001 HH PPS REVENUE CREDIT

## 2018-04-30 PROCEDURE — 3331090002 HH PPS REVENUE DEBIT

## 2018-04-30 PROCEDURE — G0157 HHC PT ASSISTANT EA 15: HCPCS

## 2018-05-01 ENCOUNTER — HOME CARE VISIT (OUTPATIENT)
Dept: SCHEDULING | Facility: HOME HEALTH | Age: 73
End: 2018-05-01
Payer: MEDICARE

## 2018-05-01 VITALS
TEMPERATURE: 98.3 F | SYSTOLIC BLOOD PRESSURE: 126 MMHG | RESPIRATION RATE: 16 BRPM | HEART RATE: 68 BPM | DIASTOLIC BLOOD PRESSURE: 64 MMHG

## 2018-05-01 PROCEDURE — G0152 HHCP-SERV OF OT,EA 15 MIN: HCPCS

## 2018-05-01 PROCEDURE — 3331090001 HH PPS REVENUE CREDIT

## 2018-05-01 PROCEDURE — 3331090002 HH PPS REVENUE DEBIT

## 2018-05-02 ENCOUNTER — HOME CARE VISIT (OUTPATIENT)
Dept: HOME HEALTH SERVICES | Facility: HOME HEALTH | Age: 73
End: 2018-05-02
Payer: MEDICARE

## 2018-05-02 ENCOUNTER — HOME CARE VISIT (OUTPATIENT)
Dept: SCHEDULING | Facility: HOME HEALTH | Age: 73
End: 2018-05-02
Payer: MEDICARE

## 2018-05-02 VITALS
DIASTOLIC BLOOD PRESSURE: 88 MMHG | TEMPERATURE: 97.7 F | HEART RATE: 72 BPM | SYSTOLIC BLOOD PRESSURE: 138 MMHG | RESPIRATION RATE: 17 BRPM

## 2018-05-02 PROCEDURE — 3331090002 HH PPS REVENUE DEBIT

## 2018-05-02 PROCEDURE — G0157 HHC PT ASSISTANT EA 15: HCPCS

## 2018-05-02 PROCEDURE — 3331090001 HH PPS REVENUE CREDIT

## 2018-05-03 ENCOUNTER — HOME CARE VISIT (OUTPATIENT)
Dept: SCHEDULING | Facility: HOME HEALTH | Age: 73
End: 2018-05-03
Payer: MEDICARE

## 2018-05-03 VITALS
DIASTOLIC BLOOD PRESSURE: 68 MMHG | SYSTOLIC BLOOD PRESSURE: 122 MMHG | HEART RATE: 64 BPM | RESPIRATION RATE: 16 BRPM | TEMPERATURE: 98.3 F

## 2018-05-03 PROCEDURE — 3331090001 HH PPS REVENUE CREDIT

## 2018-05-03 PROCEDURE — 3331090002 HH PPS REVENUE DEBIT

## 2018-05-03 PROCEDURE — G0152 HHCP-SERV OF OT,EA 15 MIN: HCPCS

## 2018-05-04 ENCOUNTER — HOME CARE VISIT (OUTPATIENT)
Dept: SCHEDULING | Facility: HOME HEALTH | Age: 73
End: 2018-05-04
Payer: MEDICARE

## 2018-05-04 PROCEDURE — G0151 HHCP-SERV OF PT,EA 15 MIN: HCPCS

## 2018-05-04 PROCEDURE — 3331090001 HH PPS REVENUE CREDIT

## 2018-05-04 PROCEDURE — 3331090002 HH PPS REVENUE DEBIT

## 2018-05-05 PROCEDURE — 3331090002 HH PPS REVENUE DEBIT

## 2018-05-05 PROCEDURE — 3331090001 HH PPS REVENUE CREDIT

## 2018-05-06 PROCEDURE — 3331090002 HH PPS REVENUE DEBIT

## 2018-05-06 PROCEDURE — 3331090001 HH PPS REVENUE CREDIT

## 2018-05-07 VITALS
HEART RATE: 84 BPM | RESPIRATION RATE: 18 BRPM | TEMPERATURE: 97.8 F | SYSTOLIC BLOOD PRESSURE: 124 MMHG | DIASTOLIC BLOOD PRESSURE: 82 MMHG

## 2018-05-08 ENCOUNTER — HOSPITAL ENCOUNTER (OUTPATIENT)
Dept: PHYSICAL THERAPY | Age: 73
Discharge: HOME | End: 2018-05-08
Payer: MEDICARE

## 2018-05-08 ENCOUNTER — HOSPITAL ENCOUNTER (OUTPATIENT)
Dept: SLEEP MEDICINE | Age: 73
Discharge: HOME | End: 2018-05-08
Payer: MEDICARE

## 2018-05-08 PROCEDURE — 97161 PT EVAL LOW COMPLEX 20 MIN: CPT

## 2018-05-08 PROCEDURE — 95806 SLEEP STUDY UNATT&RESP EFFT: CPT

## 2018-05-08 PROCEDURE — G8984 CARRY CURRENT STATUS: HCPCS

## 2018-05-08 PROCEDURE — G8985 CARRY GOAL STATUS: HCPCS

## 2018-05-08 NOTE — THERAPY EVALUATION
Connie Goodrich  : 1945  [place] / [place] / Product Type: Medicare /  [address] at [facility] [place]  [address] [facility], [address] [place] [address],[address], [facility] [address]  [phone]   [phone]       OUTPATIENT PHYSICAL THERAPY:Initial Assessment 2018     ICD-10: Treatment Diagnosis: Stiffness of right shoulder, not elsewhere classified (M25.611),  Aftercare following joint replacement surgery (Z47.1)  Precautions/Allergies:   Celecoxib; Ibuprofen; Lescol [fluvastatin]; Nsaids (non-steroidal anti-inflammatory drug); Sulfa (sulfonamide antibiotics); and Uloric [febuxostat]   Fall Risk Score: 1 (? 5 = High Risk)  MD Orders: Evaluate and treat, HEP, ROM, \"delta protocol\" (18) MEDICAL/REFERRING DIAGNOSIS:  s/p  R reverse TSA w/ BT   DATE OF ONSET: 18  REFERRING PHYSICIAN: Jules Lyman MD  RETURN PHYSICIAN APPOINTMENT: TBD     INITIAL ASSESSMENT:  Mr. Ellen Andre 18 days s/p R reverse total shoulder arthroplasty with a Delta Xtend prostheses, biceps tenodesis, latissimus dorsi and teres major tendon transfer. Post-op pain, swelling, wound healing, weakness and decreased ROM are limiting normalized use and function of R UE. He will benefit from PT for guided shoulder rehabl to promote safe return to normalized use of the UE with functional activities. PROBLEM LIST (Impacting functional limitations):  1. Decreased [place] with Home Exercise Program  2. Post-op R shoulder pain and swelling  3. Decreased R shoulder ROM   4. Weakness R shoulder INTERVENTIONS PLANNED:  1. Thermal and electric modalities, manual therapies for pain   2. Manual therapies, therapeutic exercises, HEP for ROM    3. Therapeutic exercises and HEP for strength   TREATMENT PLAN:  Effective Dates: 2018 TO 18 .  Frequency/Duration: 2-3 visits per week for 90 Days (with assumption that MD will authorize more visits at subsequent appointments)  GOALS: (Goals have been discussed and agreed upon with patient.)  Short-Term Functional Goals: Time Frame: 6 weeks  1. Report no more than 0-1/10 intermittent pain to R shoulder with compensatory use during basic functional activities, and score less than 30% on the DASH. 2. R shoulder PROM forward elevation greater than 120 degrees and external rotation greater than 60 degrees to progress into functional ranges. 3. Demonstrate good R shoulder isometric strength with manual testing to progress into strength phase. 4. Independent with initial HEP. Discharge Goals: Time Frame: 12 weeks  1. No more than 1-2/10 intermittent pain R shoulder with return to normalized household and work activities, and score less than 15% on the DASH. 2. R shoulder AROM forward elevation greater than 120 degrees, external rotation greater than 45 degrees, and strength to shoulder are grossly WNL's for safe use with normalized activities. 3. Demonstrate good functional shoulder strength and endurance for return to normalized household and work activities. 4. Independent with advanced shoulder HEP for continued self-management. Rehabilitation Potential For Stated Goals: Good  Regarding Saul Muñoz therapy, I certify that the treatment plan above will be carried out by a therapist or under their direction. Thank you for this referral,    Marie Aguirre, PT       Referring Physician Signature:               Annika Sanchez MD            The information in this section was collected on 5-8-18 (except where otherwise noted). HISTORY:   History of Present Injury/Illness (Reason for Referral): Patient underwent a reverse total shoulder arthroplasty on 4/20/18 secondary to reports of ongoing shoulder pain and instability. Patient states that his shoulder frequently \"went out\" if he moved it in certain positions.  Patient received home health following discharge from hospital after having R reverse TSA. Past Medical History/Comorbidities: Mr. Judith Oh  has a past medical history of Allergic rhinitis; Arthritis; CAD (coronary artery disease); CVA (cerebral vascular accident) ([facility] [address] (08/2017); Diabetes mellitus type 2, controlled ([facility] [address] [facility] Dyslipidemia; ED (erectile dysfunction); Encephalitis (1962); GERD (gastroesophageal reflux disease); Gout; Hypertension; Lacunar infarct, acute ([facility] [address] [facility] Lumbago; Memory loss; Mitral valve regurgitation (7/14/12); ROBERTO (obstructive sleep apnea); PAF (paroxysmal atrial fibrillation) ([facility] [address] [facility] Prostate cancer ([facility] [address] [facility] Psoriasis; SBO (small bowel obstruction) ([facility] [address] (2011, 2015, 2016); and Stage 2 chronic kidney disease. Mr. Judith Oh  has a past surgical history that includes pr left heart cath,percutaneous (7/2012); hx colonoscopy (1998, 2010); pr abdomen surgery proc unlisted (1967); hx hernia repair (Bilateral, 2012); hx appendectomy (age 49); hx radical prostatectomy ( ); pr prostate biopsy, needle, saturation sampling; hx cataract removal (Bilateral, 2013); hx coronary artery bypass graft (2009); vascular surgery procedure unlist (12/29/2017); and hx splenectomy (1951). Social History/Living Environment:  Patient lives with his spouse in a 2-story house (main level and basement) with 1 step to enter. Prior Level of Function/Work/Activity: Patient is retired. States that he does perform some  work but is unable to currently. Dominant Side:         RIGHT  Current Medications:       Current Outpatient Prescriptions:     metFORMIN ER (GLUCOPHAGE XR) 500 mg tablet, Take 2 Tabs by mouth daily (with dinner). , Disp: 180 Tab, Rfl: 1    allopurinol (ZYLOPRIM) 300 mg tablet, Take 1 Tab by mouth daily. , Disp: 90 Tab, Rfl: 1    metoprolol tartrate (LOPRESSOR) 50 mg tablet, Take 1 Tab by mouth two (2) times a day., Disp: 180 Tab, Rfl: 1    gabapentin (NEURONTIN) 100 mg capsule, Take 1 Cap by mouth two (2) times a day., Disp: 180 Cap, Rfl: 1   pravastatin (PRAVACHOL) 40 mg tablet, Take 1 Tab by mouth nightly., Disp: 90 Tab, Rfl: 1    ranolazine ER (RANEXA) 500 mg SR tablet, TAKE 1 TABLET TWICE A DAY, Disp: 180 Tab, Rfl: 1    dilTIAZem ER (CARDIZEM LA) 360 mg Tb24 tablet, Take 1 Tab by mouth daily. , Disp: 90 Tab, Rfl: 1    losartan (COZAAR) 100 mg tablet, Take 1 Tab by mouth daily. , Disp: 90 Tab, Rfl: 1    traMADol (ULTRAM) 50 mg tablet, Take 1 Tab by mouth every six (6) hours as needed for Pain. Max Daily Amount: 200 mg., Disp: 60 Tab, Rfl: 1    aspirin delayed-release 81 mg tablet, Take 81 mg by mouth nightly., Disp: , Rfl:     esomeprazole (NEXIUM) 40 mg capsule, Take 1 Cap by mouth daily. Indications: am (Patient taking differently: Take 40 mg by mouth daily.), Disp: 90 Cap, Rfl: 1    apixaban (ELIQUIS) 5 mg tablet, Take 1 Tab by mouth two (2) times a day., Disp: 180 Tab, Rfl: 1    glucose blood VI test strips (BLOOD GLUCOSE TEST) strip, Test once to twice daily DX: E11.8, Disp: 300 Strip, Rfl: 3    Lancets misc, Test once to twice daily DX: E11.8, Disp: 300 Each, Rfl: 3    triamcinolone acetonide (KENALOG) 0.1 % ointment, , Disp: , Rfl:     tacrolimus (PROTOPIC) 0.1 % ointment, , Disp: , Rfl:     mometasone (ELOCON) 0.1 % topical cream, , Disp: , Rfl:     nitroglycerin (NITROSTAT) 0.4 mg SL tablet, 1 Tab by SubLINGual route every five (5) minutes as needed for Chest Pain., Disp: 25 Tab, Rfl: 11    pimecrolimus (ELIDEL) 1 % topical cream, Apply  to affected area two (2) times a day., Disp: , Rfl:     fluticasone (FLONASE) 50 mcg/actuation nasal spray, as needed. , Disp: , Rfl:     cholecalciferol, vitamin D3, (VITAMIN D3) 2,000 unit Tab, Take 2,000 Units by mouth daily. Indications: OSTEOPOROSIS, am, Disp: , Rfl:     Cetirizine (ZYRTEC) 10 mg Cap, Take 10 mg by mouth nightly.     Indications: ALLERGIC RHINITIS, Disp: , Rfl:    Date Last Reviewed:  5-8-18   Number of Personal Factors/Comorbidities that affect the Plan of Care: 1-2: MODERATE COMPLEXITY   EXAMINATION:   5-8-18  Observation/Orthostatic Postural Assessment:  Surgical wound to R shoulder appears to be healing well with incision edges well approximated. No warmth or increased redness. Patient demonstrates kyphotic posture. Arrived to PT without R arm in sling. Did not bring sling with him to evaluation. Palpation: tenderness with palpation of R anterior shoulder along incision. Patient stiff at end range of forward elevation in supine passively. ROM:    R shoulder PROM:    Forward elevation 60 degrees   External rotation: 20 degrees (at 15 deg abduction in scapular plane with elbow supported)   Internal rotation to belt line in supine with elbow supported on towel roll      R elbow flexion PROM: 148 degrees (supine)   R elbow extension PROM: -5 degrees (hyperextension, in supine)                         L shoulder AROM:     Forward elevation: 145 degrees    External rotation (Apley scratch): T3    Internal rotation (Apley): T6         Strength:    R shoulder not tested     L shoulder flexion 5/5, abduction 5/5, external rotation (standing) 5/5, Internal rotation (standing) 5/5   L elbow 5/5         Special Tests: None  Neurological Screen: Motor and sensory to R UE intact. Functional Mobility: Requires assistance currently with dressing and grooming ADL's. Body Structures Involved:  1. Nerves  2. Bones  3. Joints  4. Muscles Body Functions Affected:  1. Sensory/Pain  2. Neuromusculoskeletal  3. Movement Related  4. Skin Related Activities and Participation Affected:  1. Self Care  2. Domestic Life  3. Interpersonal Interactions and Relationships   Number of elements (examined above) that affect the Plan of Care: 4+: HIGH COMPLEXITY   CLINICAL PRESENTATION:   Presentation: Stable and uncomplicated: LOW COMPLEXITY   CLINICAL DECISION MAKING:   Outcome Measure:      Tool Used: Disabilities of the Arm, Shoulder and Hand (DASH) Questionnaire - Quick Version  Score: Initial: 34/55 or 52% disability Most Recent: X/55 (Date: -- )   Interpretation of Score: The DASH is designed to measure the activities of daily living in person's with upper extremity dysfunction or pain. Each section is scored on a 1-5 scale, 5 representing the greatest disability. The scores of each section are added together for a total score of 55. This number is divided by 11, followed by subtracting 1 and multiplying by 25 to get a percent score of disability. This value represents the percentage disability: 0-20% minimal disability; 20-40% moderate disability; 40-60% severe disability; % dependent for care or exaggerated symptom behavior. Minimal detectable change is 12%. Score 11 12-19 20-28 29-37 38-45 46-54 55   Modifier CH CI CJ CK CL CM CN ? Carrying, Moving, and Handling Objects:     - CURRENT STATUS: CK - 40%-59% impaired, limited or restricted    - GOAL STATUS: CI - 1%-19% impaired, limited or restricted    - D/C STATUS:  ---------------To be determined---------------    Medical Necessity:   · Skilled intervention continues to be required due to reduced R shoulder range of motion, weakness, pain and decreased function s/p R reverse TSA. Reason for Services/Other Comments:  · Patient continues to require skilled intervention due to recent R reverse total shoulder arthroplasty and subsequent deficits in R UE function. Use of outcome tool(s) and clinical judgement create a POC that gives a: Clear prediction of patient's progress: LOW COMPLEXITY            TREATMENT:   (In addition to Assessment/Re-Assessment sessions the following treatments were rendered)  Pre-treatment Symptoms/Complaints:  Patient reports that he is able to sleep in the bed. Worst shoulder pain in past 48 hours he rates as 4/10.    Pain: Initial:   3/10 Post Session:  No change in pain after PT     Evaluation time limited as patient had initially cancelled appointment due to schedule conflict, and arrived late to appointment without completed paperwork, which he had forgotten at home. THERAPEUTIC EXERCISE: (7 minutes):  Exercises per grid below to improve mobility. Required minimal verbal cues to promote relaxation and reduced guarding of R UE. Progressed range as indicated. Passive range of motion in supine to facilitate increased R shoulder forward elevation and external rotation range of motion. HEP: Patient demonstrated current HEP and was instructed to continue with pendulums, hand and wrist motion exercises. Patient instructed to ice at home as needed or pain control. PT educated patient to be cautious with lifting activities using R biceps secondary to tenodesis and the need to allow tissue healing. Patient verbalized understanding. Treatment/Session Assessment:    · Response to Treatment:  Patient stiff with forward elevation passively and needs continued passive ranging to increase range of motion. · Compliance with Program/Exercises: Will assess as treatment progresses. · Recommendations/Intent for next treatment session: \"Next visit will focus on improving R shoulder range of motion \".    Total Treatment Duration: 31 Minutes  PT Patient Time In/Time Out  Time In: 1345  Time Out: [id number] René Chand, PT

## 2018-05-08 NOTE — PROGRESS NOTES
Ambulatory/Rehab Services H2 Model Falls Risk Assessment    Risk Factor Pts. ·   Confusion/Disorientation/Impulsivity  []    4 ·   Symptomatic Depression  []   2 ·   Altered Elimination  []   1 ·   Dizziness/Vertigo  []   1 ·   Gender (Male)  [x]   1 ·   Any administered antiepileptics (anticonvulsants):  []   2 ·   Any administered benzodiazepines:  []   1 ·   Visual Impairment (specify):  []   1 ·   Portable Oxygen Use  []   1 ·   Orthostatic ? BP  []   1 ·   History of Recent Falls (within 3 mos.)  []   5     Ability to Rise from Chair (choose one) Pts. ·   Ability to rise in a single movement  [x]   0 ·   Pushes up, successful in one attempt  []   1 ·   Multiple attempts, but successful  []   3 ·   Unable to rise without assistance  []   4   Total: (5 or greater = High Risk) 1     Falls Prevention Plan:   []                Physical Limitations to Exercise (specify):   []                Mobility Assistance Device (type):   []                Exercise/Equipment Adaptation (specify):    ©2010 [facility] [address] Patent #[id number].  Federal Law prohibits the replication, distribution or use without written permission from [facility]

## 2018-05-10 ENCOUNTER — HOSPITAL ENCOUNTER (OUTPATIENT)
Dept: PHYSICAL THERAPY | Age: 73
Discharge: HOME | End: 2018-05-10
Payer: MEDICARE

## 2018-05-10 PROCEDURE — 97140 MANUAL THERAPY 1/> REGIONS: CPT

## 2018-05-10 NOTE — PROGRESS NOTES
Saran Harrington  : 1945  [place] / [place] / Product Type: Medicare /  [address] at [facility] RENITA [place]ALADO  [address] [facility], [address] [place] [address],[address], [address] [facility]  [phone]   [phone]       OUTPATIENT PHYSICAL THERAPY:Daily Note 5/10/2018     ICD-10: Treatment Diagnosis: Stiffness of right shoulder, not elsewhere classified (M25.611),  Aftercare following joint replacement surgery (Z47.1)  Precautions/Allergies:   Celecoxib; Ibuprofen; Lescol [fluvastatin]; Nsaids (non-steroidal anti-inflammatory drug); Sulfa (sulfonamide antibiotics); and Uloric [febuxostat]   Fall Risk Score: 1 (? 5 = High Risk)  MD Orders: Evaluate and treat, HEP, ROM, \"delta protocol\" (18) MEDICAL/REFERRING DIAGNOSIS:  s/p  R reverse TSA w/ BT   DATE OF ONSET: 18  REFERRING PHYSICIAN: Esperanza Lyman MD  RETURN PHYSICIAN APPOINTMENT: TBD     INITIAL ASSESSMENT:  Mr. Lacey Dalal 18 days s/p R reverse total shoulder arthroplasty with a Delta Xtend prostheses, biceps tenodesis, latissimus dorsi and teres major tendon transfer. Post-op pain, swelling, wound healing, weakness and decreased ROM are limiting normalized use and function of R UE. He will benefit from PT for guided shoulder rehabl to promote safe return to normalized use of the UE with functional activities. PROBLEM LIST (Impacting functional limitations):  1. Decreased Gilman with Home Exercise Program  2. Post-op R shoulder pain and swelling  3. Decreased R shoulder ROM   4. Weakness R shoulder INTERVENTIONS PLANNED:  1. Thermal and electric modalities, manual therapies for pain   2. Manual therapies, therapeutic exercises, HEP for ROM    3. Therapeutic exercises and HEP for strength   TREATMENT PLAN:  Effective Dates: 2018 TO 18 .  Frequency/Duration: 2-3 visits per week for 90 Days (with assumption that MD will authorize more visits at subsequent appointments)  GOALS: (Goals have been discussed and agreed upon with patient.)  Short-Term Functional Goals: Time Frame: 6 weeks  1. Report no more than 0-1/10 intermittent pain to R shoulder with compensatory use during basic functional activities, and score less than 30% on the DASH. 2. R shoulder PROM forward elevation greater than 120 degrees and external rotation greater than 60 degrees to progress into functional ranges. 3. Demonstrate good R shoulder isometric strength with manual testing to progress into strength phase. 4. Independent with initial HEP. Discharge Goals: Time Frame: 12 weeks  1. No more than 1-2/10 intermittent pain R shoulder with return to normalized household and work activities, and score less than 15% on the DASH. 2. R shoulder AROM forward elevation greater than 120 degrees, external rotation greater than 45 degrees, and strength to shoulder are grossly WNL's for safe use with normalized activities. 3. Demonstrate good functional shoulder strength and endurance for return to normalized household and work activities. 4. Independent with advanced shoulder HEP for continued self-management. Rehabilitation Potential For Stated Goals: Good              The information in this section was collected on 5-8-18 (except where otherwise noted). HISTORY:   History of Present Injury/Illness (Reason for Referral): Patient underwent a reverse total shoulder arthroplasty on 4/20/18 secondary to reports of ongoing shoulder pain and instability. Patient states that his shoulder frequently \"went out\" if he moved it in certain positions. Patient received home health following discharge from hospital after having R reverse TSA. Past Medical History/Comorbidities: Mr. Alba Jimenez  has a past medical history of Allergic rhinitis; Arthritis; CAD (coronary artery disease); CVA (cerebral vascular accident) ([facility] (08/2017); Diabetes mellitus type 2, controlled ([facility] Dyslipidemia; ED (erectile dysfunction);  Encephalitis (1962); GERD (gastroesophageal reflux disease); Gout; Hypertension; Lacunar infarct, acute ([facility] Lumbago; Memory loss; Mitral valve regurgitation (7/14/12); ROBERTO (obstructive sleep apnea); PAF (paroxysmal atrial fibrillation) ([facility] Pro[facility] Psoriasis; SBO (small bowel obstruction) ([facility] (2011, 2015, 2016); and Stage 2 chronic kidney disease. Mr. Ginna Loo  has a past surgical history that includes pr left heart cath,percutaneous (7/2012); hx colonoscopy (1998, 2010); pr abdomen surgery proc unlisted (1967); hx hernia repair (Bilateral, 2012); hx appendectomy (age 49); hx radical prostatectomy ( ); pr prostate biopsy, needle, saturation sampling; hx cataract removal (Bilateral, 2013); hx coronary artery bypass graft (2009); vascular surgery procedure unlist (12/29/2017); and hx splenectomy (1951). Social History/Living Environment:  Patient lives with his spouse in a 2-story house (main level and basement) with 1 step to enter. Prior Level of Function/Work/Activity: Patient is retired. States that he does perform some  work but is unable to currently. Dominant Side:         RIGHT  Current Medications:       Current Outpatient Prescriptions:     metFORMIN ER (GLUCOPHAGE XR) 500 mg tablet, Take 2 Tabs by mouth daily (with dinner). , Disp: 180 Tab, Rfl: 1    allopurinol (ZYLOPRIM) 300 mg tablet, Take 1 Tab by mouth daily. , Disp: 90 Tab, Rfl: 1    metoprolol tartrate (LOPRESSOR) 50 mg tablet, Take 1 Tab by mouth two (2) times a day., Disp: 180 Tab, Rfl: 1    gabapentin (NEURONTIN) 100 mg capsule, Take 1 Cap by mouth two (2) times a day., Disp: 180 Cap, Rfl: 1    pravastatin (PRAVACHOL) 40 mg tablet, Take 1 Tab by mouth nightly., Disp: 90 Tab, Rfl: 1    ranolazine ER (RANEXA) 500 mg SR tablet, TAKE 1 TABLET TWICE A DAY, Disp: 180 Tab, Rfl: 1    dilTIAZem ER (CARDIZEM LA) 360 mg Tb24 tablet, Take 1 Tab by mouth daily. , Disp: 90 Tab, Rfl: 1    losartan (COZAAR) 100 mg tablet, Take 1 Tab by mouth daily. , Disp: 90 Tab, Rfl: 1   traMADol (ULTRAM) 50 mg tablet, Take 1 Tab by mouth every six (6) hours as needed for Pain. Max Daily Amount: 200 mg., Disp: 60 Tab, Rfl: 1    aspirin delayed-release 81 mg tablet, Take 81 mg by mouth nightly., Disp: , Rfl:     esomeprazole (NEXIUM) 40 mg capsule, Take 1 Cap by mouth daily. Indications: am (Patient taking differently: Take 40 mg by mouth daily.), Disp: 90 Cap, Rfl: 1    apixaban (ELIQUIS) 5 mg tablet, Take 1 Tab by mouth two (2) times a day., Disp: 180 Tab, Rfl: 1    glucose blood VI test strips (BLOOD GLUCOSE TEST) strip, Test once to twice daily DX: E11.8, Disp: 300 Strip, Rfl: 3    Lancets misc, Test once to twice daily DX: E11.8, Disp: 300 Each, Rfl: 3    triamcinolone acetonide (KENALOG) 0.1 % ointment, , Disp: , Rfl:     tacrolimus (PROTOPIC) 0.1 % ointment, , Disp: , Rfl:     mometasone (ELOCON) 0.1 % topical cream, , Disp: , Rfl:     nitroglycerin (NITROSTAT) 0.4 mg SL tablet, 1 Tab by SubLINGual route every five (5) minutes as needed for Chest Pain., Disp: 25 Tab, Rfl: 11    pimecrolimus (ELIDEL) 1 % topical cream, Apply  to affected area two (2) times a day., Disp: , Rfl:     fluticasone (FLONASE) 50 mcg/actuation nasal spray, as needed. , Disp: , Rfl:     cholecalciferol, vitamin D3, (VITAMIN D3) 2,000 unit Tab, Take 2,000 Units by mouth daily. Indications: OSTEOPOROSIS, am, Disp: , Rfl:     Cetirizine (ZYRTEC) 10 mg Cap, Take 10 mg by mouth nightly. Indications: ALLERGIC RHINITIS, Disp: , Rfl:    Date Last Reviewed:  5-8-18   Number of Personal Factors/Comorbidities that affect the Plan of Care: 1-2: MODERATE COMPLEXITY   EXAMINATION:   5/10/2018  Observation/Orthostatic Postural Assessment: Comes into clinic with R UE in sling. No apparent distress. Appears to be swelling to the R shoulder. Palpation: Not assessed.       ROM:                  RUE PROM  R Shoulder Flexion: 80 (forward elevation)  R Shoulder ABduction: 70 (with scapula stabilized)  R Shoulder External Rotation: 30 (in shoulder neutral and 45 deg abd)         Strength: Not measured. Body Structures Involved:  1. Nerves  2. Bones  3. Joints  4. Muscles Body Functions Affected:  1. Sensory/Pain  2. Neuromusculoskeletal  3. Movement Related  4. Skin Related Activities and Participation Affected:  1. Self Care  2. Domestic Life  3. Interpersonal Interactions and Relationships   Number of elements (examined above) that affect the Plan of Care: 4+: HIGH COMPLEXITY   CLINICAL PRESENTATION:   Presentation: Stable and uncomplicated: LOW COMPLEXITY   CLINICAL DECISION MAKING:   Outcome Measure: Tool Used: Disabilities of the Arm, Shoulder and Hand (DASH) Questionnaire - Quick Version  Score:  Initial: 34/55 or 52% disability Most Recent: X/55 (Date: -- )   Interpretation of Score: The DASH is designed to measure the activities of daily living in person's with upper extremity dysfunction or pain. Each section is scored on a 1-5 scale, 5 representing the greatest disability. The scores of each section are added together for a total score of 55. This number is divided by 11, followed by subtracting 1 and multiplying by 25 to get a percent score of disability. This value represents the percentage disability: 0-20% minimal disability; 20-40% moderate disability; 40-60% severe disability; % dependent for care or exaggerated symptom behavior. Minimal detectable change is 12%. Score 11 12-19 20-28 29-37 38-45 46-54 55   Modifier CH CI CJ CK CL CM CN ? Carrying, Moving, and Handling Objects:     - CURRENT STATUS: CK - 40%-59% impaired, limited or restricted    - GOAL STATUS: CI - 1%-19% impaired, limited or restricted    - D/C STATUS:  ---------------To be determined---------------    Medical Necessity:   · Skilled intervention continues to be required due to reduced R shoulder range of motion, weakness, pain and decreased function s/p R reverse TSA.   Reason for Services/Other Comments:  · Patient continues to require skilled intervention due to recent R reverse total shoulder arthroplasty and subsequent deficits in R UE function. Use of outcome tool(s) and clinical judgement create a POC that gives a: Clear prediction of patient's progress: LOW COMPLEXITY            TREATMENT:   (In addition to Assessment/Re-Assessment sessions the following treatments were rendered)  5/10/2018  Pre-treatment Symptoms/Complaints: Shoulder is sore. Feels swollen still. Has been doing his HEP started last time. Pain is under control. Using sling as instructed. Pain: Initial:   3/10 Post Session:  No change in pain after PT     Manual Therapies: (30 Minutes): Positional Release technique to R upper trap and pec major for muscle restrictions. PROM and physiologic mobilizations to R shoulder for stiffness with respect to pain while supine to ER at 30 and 45 deg abd; abduction, IR stabilized at 30-40 deg scaption in short arc, and forward elevation, grade 2 to 4- - to 4- as pain allows to each except IR. Therapeutic Exercise: (5 Minutes):  Reviewed and performed HEP, including hand, wrist, forearm AROM, and pendulums. Added finger extension and opposition; cervical AROM all straight planes, and scapular AROM elevation and adduction in comfortable ranges. .Therapeutic Modalities: Cryo therapy using [employer] to R shoulder for post treatment soreness. Right Shoulder Cold  Type: Cold/ice pack (with compression, low pressure, 50 deg F)  Duration : 15 minutes  Patient Position: Sitting (propped on table)                                                                            HEP: He is to continue with his existing HEP, and to add the new exercises instructed in today. He verbalized understanding. Treatment/Session Assessment:    · Response to Treatment:  Stiff to shoulder, but some gains in range over last session. He is 3 weeks post op. · Compliance with Program/Exercises:  Will assess as treatment progresses. · Recommendations/Intent for next treatment session: \"Next visit will focus on improving R shoulder range of motion , modalities as needed for post treatment pain, swelling.    Total Treatment Duration: 50 Minutes  PT Patient Time In/Time Out  Time In: 1040  Time Out: 100 Panchito Mohan, PT, MSPT, OCS

## 2018-05-14 ENCOUNTER — HOSPITAL ENCOUNTER (OUTPATIENT)
Dept: PHYSICAL THERAPY | Age: 73
Discharge: HOME | End: 2018-05-14
Payer: MEDICARE

## 2018-05-14 PROCEDURE — 97140 MANUAL THERAPY 1/> REGIONS: CPT

## 2018-05-14 PROCEDURE — 97110 THERAPEUTIC EXERCISES: CPT

## 2018-05-14 NOTE — PROGRESS NOTES
Jackie Gomez  : 1945  [place] / [place] / Product Type: Medicare /  [address]  at [facility] [address]  [address] [facility], [address] [place] [address],[address], [facility] [address].  [phone]   [phone]       OUTPATIENT PHYSICAL THERAPY:Daily Note 2018     ICD-10: Treatment Diagnosis: Stiffness of right shoulder, not elsewhere classified (M25.611),  Aftercare following joint replacement surgery (Z47.1)  Precautions/Allergies:   Celecoxib; Ibuprofen; Lescol [fluvastatin]; Nsaids (non-steroidal anti-inflammatory drug); Sulfa (sulfonamide antibiotics); and Uloric [febuxostat]   Fall Risk Score: 1 (? 5 = High Risk)  MD Orders: Evaluate and treat, HEP, ROM, \"delta protocol\" (18) MEDICAL/REFERRING DIAGNOSIS:  s/p  R reverse TSA w/ BT   DATE OF ONSET: 18  REFERRING PHYSICIAN: Posta, Kandy Fabry., MD  RETURN PHYSICIAN APPOINTMENT: TBD     INITIAL ASSESSMENT:  Mr. Margaret Guallpa 18 days s/p R reverse total shoulder arthroplasty with a Delta Xtend prostheses, biceps tenodesis, latissimus dorsi and teres major tendon transfer. Post-op pain, swelling, wound healing, weakness and decreased ROM are limiting normalized use and function of R UE. He will benefit from PT for guided shoulder rehabl to promote safe return to normalized use of the UE with functional activities. PROBLEM LIST (Impacting functional limitations):  1. Decreased [place] with Home Exercise Program  2. Post-op R shoulder pain and swelling  3. Decreased R shoulder ROM   4. Weakness R shoulder INTERVENTIONS PLANNED:  1. Thermal and electric modalities, manual therapies for pain   2. Manual therapies, therapeutic exercises, HEP for ROM    3. Therapeutic exercises and HEP for strength   TREATMENT PLAN:  Effective Dates: 2018 TO 18 .  Frequency/Duration: 2-3 visits per week for 90 Days (with assumption that MD will authorize more visits at subsequent appointments)  GOALS: (Goals have been discussed and agreed upon with patient.)  Short-Term Functional Goals: Time Frame: 6 weeks  1. Report no more than 0-1/10 intermittent pain to R shoulder with compensatory use during basic functional activities, and score less than 30% on the DASH. 2. R shoulder PROM forward elevation greater than 120 degrees and external rotation greater than 60 degrees to progress into functional ranges. 3. Demonstrate good R shoulder isometric strength with manual testing to progress into strength phase. 4. Independent with initial HEP. Discharge Goals: Time Frame: 12 weeks  1. No more than 1-2/10 intermittent pain R shoulder with return to normalized household and work activities, and score less than 15% on the DASH. 2. R shoulder AROM forward elevation greater than 120 degrees, external rotation greater than 45 degrees, and strength to shoulder are grossly WNL's for safe use with normalized activities. 3. Demonstrate good functional shoulder strength and endurance for return to normalized household and work activities. 4. Independent with advanced shoulder HEP for continued self-management. Rehabilitation Potential For Stated Goals: Good              The information in this section was collected on 5-8-18 (except where otherwise noted). HISTORY:   History of Present Injury/Illness (Reason for Referral): Patient underwent a reverse total shoulder arthroplasty on 4/20/18 secondary to reports of ongoing shoulder pain and instability. Patient states that his shoulder frequently \"went out\" if he moved it in certain positions. Patient received home health following discharge from hospital after having R reverse TSA. Past Medical History/Comorbidities: Mr. Jonh Wilde  has a past medical history of Allergic rhinitis; Arthritis; CAD (coronary artery disease); CVA (cerebral vascular accident) ([facility] [address] (08/2017); Diabetes mellitus type 2, controlled ([facility] [address] Dyslipidemia; ED (erectile dysfunction);  Encephalitis (1962); GERD (gastroesophageal reflux disease); Gout; Hypertension; Lacunar infarct, acute ([facility] Lumbago; Memory loss; Mitral valve regurgitation (7/14/12); ROBERTO (obstructive sleep apnea); PAF (paroxysmal atrial fibrillation) ([facility] [facility] Psoriasis; SBO (small bowel obstruction) ([facility] (2011, 2015, 2016); and Stage 2 chronic kidney disease. Mr. Ginna Loo  has a past surgical history that includes pr left heart cath,percutaneous (7/2012); hx colonoscopy (1998, 2010); pr abdomen surgery proc unlisted (1967); hx hernia repair (Bilateral, 2012); hx appendectomy (age 49); hx radical prostatectomy ( ); pr prostate biopsy, needle, saturation sampling; hx cataract removal (Bilateral, 2013); hx coronary artery bypass graft (2009); vascular surgery procedure unlist (12/29/2017); and hx splenectomy (1951). Social History/Living Environment:  Patient lives with his spouse in a 2-story house (main level and basement) with 1 step to enter. Prior Level of Function/Work/Activity: Patient is retired. States that he does perform some  work but is unable to currently. Dominant Side:         RIGHT  Current Medications:  Tramadol. prn     Current Outpatient Prescriptions:     metFORMIN ER (GLUCOPHAGE XR) 500 mg tablet, Take 2 Tabs by mouth daily (with dinner). , Disp: 180 Tab, Rfl: 1    allopurinol (ZYLOPRIM) 300 mg tablet, Take 1 Tab by mouth daily. , Disp: 90 Tab, Rfl: 1    metoprolol tartrate (LOPRESSOR) 50 mg tablet, Take 1 Tab by mouth two (2) times a day., Disp: 180 Tab, Rfl: 1    gabapentin (NEURONTIN) 100 mg capsule, Take 1 Cap by mouth two (2) times a day., Disp: 180 Cap, Rfl: 1    pravastatin (PRAVACHOL) 40 mg tablet, Take 1 Tab by mouth nightly., Disp: 90 Tab, Rfl: 1    ranolazine ER (RANEXA) 500 mg SR tablet, TAKE 1 TABLET TWICE A DAY, Disp: 180 Tab, Rfl: 1    dilTIAZem ER (CARDIZEM LA) 360 mg Tb24 tablet, Take 1 Tab by mouth daily. , Disp: 90 Tab, Rfl: 1    losartan (COZAAR) 100 mg tablet, Take 1 Tab by mouth daily. , Disp: 90 Tab, Rfl: 1    traMADol (ULTRAM) 50 mg tablet, Take 1 Tab by mouth every six (6) hours as needed for Pain. Max Daily Amount: 200 mg., Disp: 60 Tab, Rfl: 1    aspirin delayed-release 81 mg tablet, Take 81 mg by mouth nightly., Disp: , Rfl:     esomeprazole (NEXIUM) 40 mg capsule, Take 1 Cap by mouth daily. Indications: am (Patient taking differently: Take 40 mg by mouth daily.), Disp: 90 Cap, Rfl: 1    apixaban (ELIQUIS) 5 mg tablet, Take 1 Tab by mouth two (2) times a day., Disp: 180 Tab, Rfl: 1    glucose blood VI test strips (BLOOD GLUCOSE TEST) strip, Test once to twice daily DX: E11.8, Disp: 300 Strip, Rfl: 3    Lancets misc, Test once to twice daily DX: E11.8, Disp: 300 Each, Rfl: 3    triamcinolone acetonide (KENALOG) 0.1 % ointment, , Disp: , Rfl:     tacrolimus (PROTOPIC) 0.1 % ointment, , Disp: , Rfl:     mometasone (ELOCON) 0.1 % topical cream, , Disp: , Rfl:     nitroglycerin (NITROSTAT) 0.4 mg SL tablet, 1 Tab by SubLINGual route every five (5) minutes as needed for Chest Pain., Disp: 25 Tab, Rfl: 11    pimecrolimus (ELIDEL) 1 % topical cream, Apply  to affected area two (2) times a day., Disp: , Rfl:     fluticasone (FLONASE) 50 mcg/actuation nasal spray, as needed. , Disp: , Rfl:     cholecalciferol, vitamin D3, (VITAMIN D3) 2,000 unit Tab, Take 2,000 Units by mouth daily. Indications: OSTEOPOROSIS, am, Disp: , Rfl:     Cetirizine (ZYRTEC) 10 mg Cap, Take 10 mg by mouth nightly. Indications: ALLERGIC RHINITIS, Disp: , Rfl:    Date Last Reviewed:  5-14-18   Number of Personal Factors/Comorbidities that affect the Plan of Care: 1-2: MODERATE COMPLEXITY   EXAMINATION:   5/14/2018  Observation/Orthostatic Postural Assessment: Comes into clinic without sling. No apparent distress. Palpation: Not assessed.       ROM:                  RUE PROM  R Shoulder Flexion: 90 (forward elevation)  R Shoulder Internal Rotation: 40 (stabilized at 40 deg scaption)  R Shoulder External Rotation: 30 (in shoulder neutral, 40 deg at 45 abd)         Strength: Not measured. Body Structures Involved:  1. Nerves  2. Bones  3. Joints  4. Muscles Body Functions Affected:  1. Sensory/Pain  2. Neuromusculoskeletal  3. Movement Related  4. Skin Related Activities and Participation Affected:  1. Self Care  2. Domestic Life  3. Interpersonal Interactions and Relationships   Number of elements (examined above) that affect the Plan of Care: 4+: HIGH COMPLEXITY   CLINICAL PRESENTATION:   Presentation: Stable and uncomplicated: LOW COMPLEXITY   CLINICAL DECISION MAKING:   Outcome Measure: Tool Used: Disabilities of the Arm, Shoulder and Hand (DASH) Questionnaire - Quick Version  Score:  Initial: 34/55 or 52% disability Most Recent: X/55 (Date: -- )   Interpretation of Score: The DASH is designed to measure the activities of daily living in person's with upper extremity dysfunction or pain. Each section is scored on a 1-5 scale, 5 representing the greatest disability. The scores of each section are added together for a total score of 55. This number is divided by 11, followed by subtracting 1 and multiplying by 25 to get a percent score of disability. This value represents the percentage disability: 0-20% minimal disability; 20-40% moderate disability; 40-60% severe disability; % dependent for care or exaggerated symptom behavior. Minimal detectable change is 12%. Score 11 12-19 20-28 29-37 38-45 46-54 55   Modifier CH CI CJ CK CL CM CN ? Carrying, Moving, and Handling Objects:     - CURRENT STATUS: CK - 40%-59% impaired, limited or restricted    - GOAL STATUS: CI - 1%-19% impaired, limited or restricted    - D/C STATUS:  ---------------To be determined---------------    Medical Necessity:   · Skilled intervention continues to be required due to reduced R shoulder range of motion, weakness, pain and decreased function s/p R reverse TSA.   Reason for Services/Other Comments:  · Patient continues to require skilled intervention due to recent R reverse total shoulder arthroplasty and subsequent deficits in R UE function. Use of outcome tool(s) and clinical judgement create a POC that gives a: Clear prediction of patient's progress: LOW COMPLEXITY            TREATMENT:   (In addition to Assessment/Re-Assessment sessions the following treatments were rendered)  5/14/2018  Pre-treatment Symptoms/Complaints: Shoulder is \"ok\". Not too sore after last time. Not taking pain med regularly, but did take the tramadol yesterday. Pain: Initial:   3/10 Post Session:  No change in pain after PT     Manual Therapies: (20 Minutes): PROM and physiologic mobilizations to R shoulder for stiffness with respect to pain while supine to ER at 30 and 45 deg abd; abduction, IR stabilized at 30-40 deg scaption in short arc, and forward elevation, grade 2 to 4- - to 4- as pain allows to each, except IR grade 3- -, 3x30\" each. Therapeutic Exercise: (10 Minutes): Assisted wrist flexor and extensor stretch with elbow extended, 3\"x10 each. Manual resisted isometrics to R shoulder Ext, Abd, and flex while supine, shoulder neutral for muscle activation. And instruction and performance of pulleys to scaption in standing and sitting. Cues for HEP performance. HEP: Provided pulleys for home use as done today. He is to continue with his existing HEP. He verbalized understanding. Treatment/Session Assessment:    · Response to Treatment: Small gains to shoulder PROM. Pain noted with forward elevation with increased range toward humeral rotation neutral and improved when in IR. Good motion with the pulleys. Good deltoid activation with the isometrics. · Compliance with Program/Exercises: Will assess as treatment progresses. · Recommendations/Intent for next treatment session: \"Next visit will focus on improving R shoulder range of motion , modalities as needed for post treatment pain, swelling.    Total Treatment Duration: 30 Minutes  PT Patient Time In/Time Out  Time In: 5687  Time Out: [address] PT, MSPT, OCS

## 2018-05-16 ENCOUNTER — HOSPITAL ENCOUNTER (OUTPATIENT)
Dept: PHYSICAL THERAPY | Age: 73
Discharge: HOME | End: 2018-05-16
Payer: MEDICARE

## 2018-05-16 PROCEDURE — 97110 THERAPEUTIC EXERCISES: CPT

## 2018-05-16 PROCEDURE — 97140 MANUAL THERAPY 1/> REGIONS: CPT

## 2018-05-16 NOTE — PROGRESS NOTES
Gaye Paxton  : 1945  [place] / Product Type: Medicare /  [address]  at [facility] [place]  [address], [address] [facility] [address], [place] [address]  [phone]   [phone]       OUTPATIENT PHYSICAL THERAPY:Daily Note 2018     ICD-10: Treatment Diagnosis: Stiffness of right shoulder, not elsewhere classified (M25.611),  Aftercare following joint replacement surgery (Z47.1)  Precautions/Allergies:   Celecoxib; Ibuprofen; Lescol [fluvastatin]; Nsaids (non-steroidal anti-inflammatory drug); Sulfa (sulfonamide antibiotics); and Uloric [febuxostat]   Fall Risk Score: 1 (? 5 = High Risk)  MD Orders: Evaluate and treat, HEP, ROM, \"delta protocol\" (18) MEDICAL/REFERRING DIAGNOSIS:  s/p  R reverse TSA w/ BT   DATE OF ONSET: 18  REFERRING PHYSICIAN: Linda Lyman., MD  RETURN PHYSICIAN APPOINTMENT: TBD     INITIAL ASSESSMENT:  Mr. Nathan Jay 18 days s/p R reverse total shoulder arthroplasty with a Delta Xtend prostheses, biceps tenodesis, latissimus dorsi and teres major tendon transfer. Post-op pain, swelling, wound healing, weakness and decreased ROM are limiting normalized use and function of R UE. He will benefit from PT for guided shoulder rehabl to promote safe return to normalized use of the UE with functional activities. PROBLEM LIST (Impacting functional limitations):  1. Decreased [place] with Home Exercise Program  2. Post-op R shoulder pain and swelling  3. Decreased R shoulder ROM   4. Weakness R shoulder INTERVENTIONS PLANNED:  1. Thermal and electric modalities, manual therapies for pain   2. Manual therapies, therapeutic exercises, HEP for ROM    3. Therapeutic exercises and HEP for strength   TREATMENT PLAN:  Effective Dates: 2018 TO 18 .  Frequency/Duration: 2-3 visits per week for 90 Days (with assumption that MD will authorize more visits at subsequent appointments)  GOALS: (Goals have been discussed and agreed upon with patient.)  Short-Term Functional Goals: Time Frame: 6 weeks  1. Report no more than 0-1/10 intermittent pain to R shoulder with compensatory use during basic functional activities, and score less than 30% on the DASH. 2. R shoulder PROM forward elevation greater than 120 degrees and external rotation greater than 60 degrees to progress into functional ranges. 3. Demonstrate good R shoulder isometric strength with manual testing to progress into strength phase. 4. Independent with initial HEP. Discharge Goals: Time Frame: 12 weeks  1. No more than 1-2/10 intermittent pain R shoulder with return to normalized household and work activities, and score less than 15% on the DASH. 2. R shoulder AROM forward elevation greater than 120 degrees, external rotation greater than 45 degrees, and strength to shoulder are grossly WNL's for safe use with normalized activities. 3. Demonstrate good functional shoulder strength and endurance for return to normalized household and work activities. 4. Independent with advanced shoulder HEP for continued self-management. Rehabilitation Potential For Stated Goals: Good              The information in this section was collected on 5-8-18 (except where otherwise noted). HISTORY:   History of Present Injury/Illness (Reason for Referral): Patient underwent a reverse total shoulder arthroplasty on 4/20/18 secondary to reports of ongoing shoulder pain and instability. Patient states that his shoulder frequently \"went out\" if he moved it in certain positions. Patient received home health following discharge from hospital after having R reverse TSA. Past Medical History/Comorbidities: Mr. Kayden Wilson  has a past medical history of Allergic rhinitis; Arthritis; CAD (coronary artery disease); CVA (cerebral vascular accident) ([facility] [address] (08/2017); Diabetes mellitus type 2, controlled ([facility] [address] Dyslipidemia; ED (erectile dysfunction);  Encephalitis (1962); GERD (gastroesophageal reflux disease); Gout; Hypertension; Lacunar infarct, acute ([facility] [address] [address] Lumbago; Memory loss; Mitral valve regurgitation (7/14/12); ROBERTO (obstructive sleep apnea); PAF (paroxysmal atrial fibrillation) ([facility] [address] [facility] Psoriasis; SBO (small bowel obstruction) ([facility] (2011, 2015, 2016); and Stage 2 chronic kidney disease. Mr. Ginna Loo  has a past surgical history that includes pr left heart cath,percutaneous (7/2012); hx colonoscopy (1998, 2010); pr abdomen surgery proc unlisted (1967); hx hernia repair (Bilateral, 2012); hx appendectomy (age 49); hx radical prostatectomy ( ); pr prostate biopsy, needle, saturation sampling; hx cataract removal (Bilateral, 2013); hx coronary artery bypass graft (2009); vascular surgery procedure unlist (12/29/2017); and hx splenectomy (1951). Social History/Living Environment:  Patient lives with his spouse in a 2-story house (main level and basement) with 1 step to enter. Prior Level of Function/Work/Activity: Patient is retired. States that he does perform some  work but is unable to currently. Dominant Side:         RIGHT  Current Medications:  Tramadol. prn     Current Outpatient Prescriptions:     metFORMIN ER (GLUCOPHAGE XR) 500 mg tablet, Take 2 Tabs by mouth daily (with dinner). , Disp: 180 Tab, Rfl: 1    allopurinol (ZYLOPRIM) 300 mg tablet, Take 1 Tab by mouth daily. , Disp: 90 Tab, Rfl: 1    metoprolol tartrate (LOPRESSOR) 50 mg tablet, Take 1 Tab by mouth two (2) times a day., Disp: 180 Tab, Rfl: 1    gabapentin (NEURONTIN) 100 mg capsule, Take 1 Cap by mouth two (2) times a day., Disp: 180 Cap, Rfl: 1    pravastatin (PRAVACHOL) 40 mg tablet, Take 1 Tab by mouth nightly., Disp: 90 Tab, Rfl: 1    ranolazine ER (RANEXA) 500 mg SR tablet, TAKE 1 TABLET TWICE A DAY, Disp: 180 Tab, Rfl: 1    dilTIAZem ER (CARDIZEM LA) 360 mg Tb24 tablet, Take 1 Tab by mouth daily. , Disp: 90 Tab, Rfl: 1    losartan (COZAAR) 100 mg tablet, Take 1 Tab by mouth daily. , Disp: 90 Tab, Rfl: 1    traMADol (ULTRAM) 50 mg tablet, Take 1 Tab by mouth every six (6) hours as needed for Pain. Max Daily Amount: 200 mg., Disp: 60 Tab, Rfl: 1    aspirin delayed-release 81 mg tablet, Take 81 mg by mouth nightly., Disp: , Rfl:     esomeprazole (NEXIUM) 40 mg capsule, Take 1 Cap by mouth daily. Indications: am (Patient taking differently: Take 40 mg by mouth daily.), Disp: 90 Cap, Rfl: 1    apixaban (ELIQUIS) 5 mg tablet, Take 1 Tab by mouth two (2) times a day., Disp: 180 Tab, Rfl: 1    glucose blood VI test strips (BLOOD GLUCOSE TEST) strip, Test once to twice daily DX: E11.8, Disp: 300 Strip, Rfl: 3    Lancets misc, Test once to twice daily DX: E11.8, Disp: 300 Each, Rfl: 3    triamcinolone acetonide (KENALOG) 0.1 % ointment, , Disp: , Rfl:     tacrolimus (PROTOPIC) 0.1 % ointment, , Disp: , Rfl:     mometasone (ELOCON) 0.1 % topical cream, , Disp: , Rfl:     nitroglycerin (NITROSTAT) 0.4 mg SL tablet, 1 Tab by SubLINGual route every five (5) minutes as needed for Chest Pain., Disp: 25 Tab, Rfl: 11    pimecrolimus (ELIDEL) 1 % topical cream, Apply  to affected area two (2) times a day., Disp: , Rfl:     fluticasone (FLONASE) 50 mcg/actuation nasal spray, as needed. , Disp: , Rfl:     cholecalciferol, vitamin D3, (VITAMIN D3) 2,000 unit Tab, Take 2,000 Units by mouth daily. Indications: OSTEOPOROSIS, am, Disp: , Rfl:     Cetirizine (ZYRTEC) 10 mg Cap, Take 10 mg by mouth nightly. Indications: ALLERGIC RHINITIS, Disp: , Rfl:    Date Last Reviewed:  5-14-18   Number of Personal Factors/Comorbidities that affect the Plan of Care: 1-2: MODERATE COMPLEXITY   EXAMINATION:   5/16/2018  Observation/Orthostatic Postural Assessment: Comes into clinic with sling on R UE. No apparent distress, but drowsy. .   Palpation: Not assessed. ROM: Not measured. Strength: Not measured. Body Structures Involved:  1. Nerves  2. Bones  3. Joints  4.  Muscles Body Functions Affected:  1. Sensory/Pain  2. Neuromusculoskeletal  3. Movement Related  4. Skin Related Activities and Participation Affected:  1. Self Care  2. Domestic Life  3. Interpersonal Interactions and Relationships   Number of elements (examined above) that affect the Plan of Care: 4+: HIGH COMPLEXITY   CLINICAL PRESENTATION:   Presentation: Stable and uncomplicated: LOW COMPLEXITY   CLINICAL DECISION MAKING:   Outcome Measure: Tool Used: Disabilities of the Arm, Shoulder and Hand (DASH) Questionnaire - Quick Version  Score:  Initial: 34/55 or 52% disability Most Recent: X/55 (Date: -- )   Interpretation of Score: The DASH is designed to measure the activities of daily living in person's with upper extremity dysfunction or pain. Each section is scored on a 1-5 scale, 5 representing the greatest disability. The scores of each section are added together for a total score of 55. This number is divided by 11, followed by subtracting 1 and multiplying by 25 to get a percent score of disability. This value represents the percentage disability: 0-20% minimal disability; 20-40% moderate disability; 40-60% severe disability; % dependent for care or exaggerated symptom behavior. Minimal detectable change is 12%. Score 11 12-19 20-28 29-37 38-45 46-54 55   Modifier CH CI CJ CK CL CM CN ? Carrying, Moving, and Handling Objects:     - CURRENT STATUS: CK - 40%-59% impaired, limited or restricted    - GOAL STATUS: CI - 1%-19% impaired, limited or restricted    - D/C STATUS:  ---------------To be determined---------------    Medical Necessity:   · Skilled intervention continues to be required due to reduced R shoulder range of motion, weakness, pain and decreased function s/p R reverse TSA. Reason for Services/Other Comments:  · Patient continues to require skilled intervention due to recent R reverse total shoulder arthroplasty and subsequent deficits in R UE function.    Use of outcome tool(s) and clinical judgement create a POC that gives a: Clear prediction of patient's progress: LOW COMPLEXITY            TREATMENT:   (In addition to Assessment/Re-Assessment sessions the following treatments were rendered)  5/16/2018  Pre-treatment Symptoms/Complaints: Shoulder was sore after last time. Soreness til next day. Did not ice--does not like ice. Pain: Initial:   No VAS. Post Session:  No significant increase immediately post.      Therapeutic Exercise: (25 Minutes): Motion exercises with active cervical and scapular AROM; pulleys to scaption reviewed and performed; added supine wand flexion, side-lying abd to 90-deg AAROM, and attempts at side-lying middle trap but unable. Manual Therapies: (15 Minutes): PROM and physiologic mobilizations to R shoulder for stiffness with respect to pain while supine to ER at 30 and 45 deg abd; abduction, IR stabilized at 30-40 deg scaption in short arc, and forward elevation, grade 2 to 4- - to 4- as pain allows to each, except IR grade 3- -, 3x30\" each. HEP: He is to continue with his existing HEP. He can add the supine wand flexion for AAROM. He verbalized understanding. Treatment/Session Assessment:    · Response to Treatment:Continues to be stiff with pain to R shoulder. Improved humeral elevation range with the self-assisted wand flexion in supine. Less pain at end of session. · Compliance with Program/Exercises: Appears to be complaint. · Recommendations/Intent for next treatment session: \"Next visit will focus on improving R shoulder range of motion , modalities as needed for post treatment pain, swelling.    Total Treatment Duration: 40 Minutes  PT Patient Time In/Time Out  Time In: 1400  Time Out: [address], PT, MSPT, OCS

## 2018-05-18 ENCOUNTER — HOSPITAL ENCOUNTER (OUTPATIENT)
Dept: PHYSICAL THERAPY | Age: 73
Discharge: HOME | End: 2018-05-18
Payer: MEDICARE

## 2018-05-18 PROCEDURE — 97110 THERAPEUTIC EXERCISES: CPT

## 2018-05-18 PROCEDURE — 97140 MANUAL THERAPY 1/> REGIONS: CPT

## 2018-05-18 NOTE — PROGRESS NOTES
Connie Goodrich  : 1945  [place] / [place] / Product Type: Medicare /  [address] at [facility] RENITA REGALADO  [address] [facility], [address] [place] [address],[address], [facility] [address].  [phone]   [phone]       OUTPATIENT PHYSICAL THERAPY:Daily Note 2018     ICD-10: Treatment Diagnosis: Stiffness of right shoulder, not elsewhere classified (M25.611),  Aftercare following joint replacement surgery (Z47.1)  Precautions/Allergies:   Celecoxib; Ibuprofen; Lescol [fluvastatin]; Nsaids (non-steroidal anti-inflammatory drug); Sulfa (sulfonamide antibiotics); and Uloric [febuxostat]   Fall Risk Score: 1 (? 5 = High Risk)  MD Orders: Evaluate and treat, HEP, ROM, \"delta protocol\" (18) MEDICAL/REFERRING DIAGNOSIS:  s/p  R reverse TSA w/ BT   DATE OF ONSET: 18  REFERRING PHYSICIAN: Jules Lyman MD  RETURN PHYSICIAN APPOINTMENT: TBD     INITIAL ASSESSMENT:  Mr. Ellen Andre 18 days s/p R reverse total shoulder arthroplasty with a Delta Xtend prostheses, biceps tenodesis, latissimus dorsi and teres major tendon transfer. Post-op pain, swelling, wound healing, weakness and decreased ROM are limiting normalized use and function of R UE. He will benefit from PT for guided shoulder rehabl to promote safe return to normalized use of the UE with functional activities. PROBLEM LIST (Impacting functional limitations):  1. Decreased [place] with Home Exercise Program  2. Post-op R shoulder pain and swelling  3. Decreased R shoulder ROM   4. Weakness R shoulder INTERVENTIONS PLANNED:  1. Thermal and electric modalities, manual therapies for pain   2. Manual therapies, therapeutic exercises, HEP for ROM    3. Therapeutic exercises and HEP for strength   TREATMENT PLAN:  Effective Dates: 2018 TO 18 .  Frequency/Duration: 2-3 visits per week for 90 Days (with assumption that MD will authorize more visits at subsequent appointments)  GOALS: (Goals have been discussed and agreed upon with patient.)  Short-Term Functional Goals: Time Frame: 6 weeks  1. Report no more than 0-1/10 intermittent pain to R shoulder with compensatory use during basic functional activities, and score less than 30% on the DASH. 2. R shoulder PROM forward elevation greater than 120 degrees and external rotation greater than 60 degrees to progress into functional ranges. 3. Demonstrate good R shoulder isometric strength with manual testing to progress into strength phase. 4. Independent with initial HEP. Discharge Goals: Time Frame: 12 weeks  1. No more than 1-2/10 intermittent pain R shoulder with return to normalized household and work activities, and score less than 15% on the DASH. 2. R shoulder AROM forward elevation greater than 120 degrees, external rotation greater than 45 degrees, and strength to shoulder are grossly WNL's for safe use with normalized activities. 3. Demonstrate good functional shoulder strength and endurance for return to normalized household and work activities. 4. Independent with advanced shoulder HEP for continued self-management. Rehabilitation Potential For Stated Goals: Good              The information in this section was collected on 5-8-18 (except where otherwise noted). HISTORY:   History of Present Injury/Illness (Reason for Referral): Patient underwent a reverse total shoulder arthroplasty on 4/20/18 secondary to reports of ongoing shoulder pain and instability. Patient states that his shoulder frequently \"went out\" if he moved it in certain positions. Patient received home health following discharge from hospital after having R reverse TSA. Past Medical History/Comorbidities: Mr. Suleman Mendez  has a past medical history of Allergic rhinitis; Arthritis; CAD (coronary artery disease); CVA (cerebral vascular accident) ([facility] (08/2017); Diabetes mellitus type 2, controlled ([facility] Dyslipidemia; ED (erectile dysfunction);  Encephalitis (1962); GERD (gastroesophageal reflux disease); Gout; Hypertension; Lacunar infarct, acute ([facility] [facility] Lumbago; Memory loss; Mitral valve regurgitation (7/14/12); ROBERTO (obstructive sleep apnea); PAF (paroxysmal atrial fibrillation) ([facility] [facility] Pro[facility] Psoriasis; SBO (small bowel obstruction) ([facility] [facility] (2011, 2015, 2016); and Stage 2 chronic kidney disease. Mr. Ginna Loo  has a past surgical history that includes pr left heart cath,percutaneous (7/2012); hx colonoscopy (1998, 2010); pr abdomen surgery proc unlisted (1967); hx hernia repair (Bilateral, 2012); hx appendectomy (age 49); hx radical prostatectomy ( ); pr prostate biopsy, needle, saturation sampling; hx cataract removal (Bilateral, 2013); hx coronary artery bypass graft (2009); vascular surgery procedure unlist (12/29/2017); and hx splenectomy (1951). Social History/Living Environment:  Patient lives with his spouse in a 2-story house (main level and basement) with 1 step to enter. Prior Level of Function/Work/Activity: Patient is retired. States that he does perform some  work but is unable to currently. Dominant Side:         RIGHT  Current Medications:  Tramadol. prn     Current Outpatient Prescriptions:     metFORMIN ER (GLUCOPHAGE XR) 500 mg tablet, Take 2 Tabs by mouth daily (with dinner). , Disp: 180 Tab, Rfl: 1    allopurinol (ZYLOPRIM) 300 mg tablet, Take 1 Tab by mouth daily. , Disp: 90 Tab, Rfl: 1    metoprolol tartrate (LOPRESSOR) 50 mg tablet, Take 1 Tab by mouth two (2) times a day., Disp: 180 Tab, Rfl: 1    gabapentin (NEURONTIN) 100 mg capsule, Take 1 Cap by mouth two (2) times a day., Disp: 180 Cap, Rfl: 1    pravastatin (PRAVACHOL) 40 mg tablet, Take 1 Tab by mouth nightly., Disp: 90 Tab, Rfl: 1    ranolazine ER (RANEXA) 500 mg SR tablet, TAKE 1 TABLET TWICE A DAY, Disp: 180 Tab, Rfl: 1    dilTIAZem ER (CARDIZEM LA) 360 mg Tb24 tablet, Take 1 Tab by mouth daily. , Disp: 90 Tab, Rfl: 1    losartan (COZAAR) 100 mg tablet, Take 1 Tab by mouth daily. , Disp: 90 Tab, Rfl: 1    traMADol (ULTRAM) 50 mg tablet, Take 1 Tab by mouth every six (6) hours as needed for Pain. Max Daily Amount: 200 mg., Disp: 60 Tab, Rfl: 1    aspirin delayed-release 81 mg tablet, Take 81 mg by mouth nightly., Disp: , Rfl:     esomeprazole (NEXIUM) 40 mg capsule, Take 1 Cap by mouth daily. Indications: am (Patient taking differently: Take 40 mg by mouth daily.), Disp: 90 Cap, Rfl: 1    apixaban (ELIQUIS) 5 mg tablet, Take 1 Tab by mouth two (2) times a day., Disp: 180 Tab, Rfl: 1    glucose blood VI test strips (BLOOD GLUCOSE TEST) strip, Test once to twice daily DX: E11.8, Disp: 300 Strip, Rfl: 3    Lancets misc, Test once to twice daily DX: E11.8, Disp: 300 Each, Rfl: 3    triamcinolone acetonide (KENALOG) 0.1 % ointment, , Disp: , Rfl:     tacrolimus (PROTOPIC) 0.1 % ointment, , Disp: , Rfl:     mometasone (ELOCON) 0.1 % topical cream, , Disp: , Rfl:     nitroglycerin (NITROSTAT) 0.4 mg SL tablet, 1 Tab by SubLINGual route every five (5) minutes as needed for Chest Pain., Disp: 25 Tab, Rfl: 11    pimecrolimus (ELIDEL) 1 % topical cream, Apply  to affected area two (2) times a day., Disp: , Rfl:     fluticasone (FLONASE) 50 mcg/actuation nasal spray, as needed. , Disp: , Rfl:     cholecalciferol, vitamin D3, (VITAMIN D3) 2,000 unit Tab, Take 2,000 Units by mouth daily. Indications: OSTEOPOROSIS, am, Disp: , Rfl:     Cetirizine (ZYRTEC) 10 mg Cap, Take 10 mg by mouth nightly. Indications: ALLERGIC RHINITIS, Disp: , Rfl:    Date Last Reviewed:  5-14-18   Number of Personal Factors/Comorbidities that affect the Plan of Care: 1-2: MODERATE COMPLEXITY   EXAMINATION:   5/18/2018  Observation/Orthostatic Postural Assessment: Comes into clinic with sling on R UE. No apparent distress, but drowsy. .   Palpation: Not assessed. ROM: Not measured. Strength: Not measured. Body Structures Involved:  1. Nerves  2. Bones  3. Joints  4.  Muscles Body Functions Affected:  1. Sensory/Pain  2. Neuromusculoskeletal  3. Movement Related  4. Skin Related Activities and Participation Affected:  1. Self Care  2. Domestic Life  3. Interpersonal Interactions and Relationships   Number of elements (examined above) that affect the Plan of Care: 4+: HIGH COMPLEXITY   CLINICAL PRESENTATION:   Presentation: Stable and uncomplicated: LOW COMPLEXITY   CLINICAL DECISION MAKING:   Outcome Measure: Tool Used: Disabilities of the Arm, Shoulder and Hand (DASH) Questionnaire - Quick Version  Score:  Initial: 34/55 or 52% disability Most Recent: X/55 (Date: -- )   Interpretation of Score: The DASH is designed to measure the activities of daily living in person's with upper extremity dysfunction or pain. Each section is scored on a 1-5 scale, 5 representing the greatest disability. The scores of each section are added together for a total score of 55. This number is divided by 11, followed by subtracting 1 and multiplying by 25 to get a percent score of disability. This value represents the percentage disability: 0-20% minimal disability; 20-40% moderate disability; 40-60% severe disability; % dependent for care or exaggerated symptom behavior. Minimal detectable change is 12%. Score 11 12-19 20-28 29-37 38-45 46-54 55   Modifier CH CI CJ CK CL CM CN ? Carrying, Moving, and Handling Objects:     - CURRENT STATUS: CK - 40%-59% impaired, limited or restricted    - GOAL STATUS: CI - 1%-19% impaired, limited or restricted    - D/C STATUS:  ---------------To be determined---------------    Medical Necessity:   · Skilled intervention continues to be required due to reduced R shoulder range of motion, weakness, pain and decreased function s/p R reverse TSA. Reason for Services/Other Comments:  · Patient continues to require skilled intervention due to recent R reverse total shoulder arthroplasty and subsequent deficits in R UE function.    Use of outcome tool(s) and clinical judgement create a POC that gives a: Clear prediction of patient's progress: LOW COMPLEXITY            TREATMENT:   (In addition to Assessment/Re-Assessment sessions the following treatments were rendered)  5/18/2018  Pre-treatment Symptoms/Complaints: Reports that his shoulder has been more sore as of late. Reported that it \"didn't feel any worse\" after PT  Pain: Initial:   5/10 Post Session:  No numeric value, reported that it did not feel any worse      Therapeutic Exercise: ( 11 minutes) for improved upper extremity functioning and positioning. Cervical active range of motion x 20 each for flexion/extension. Gentle scapular retractions 2 x 12 for improved periscapular muscle activation on R. Supine AAROM wand flexion and wand presses 1 x 10 performed. Manual Therapies: (28 Minutes): Passive range of motion progressing to physiologic mobilizations, grade 2-3 for improved R shoulder range of motion for flexion/elevation, scaption and external rotation. ER performed at 30-45 degrees of abduction in scapular plane. HEP: He is to continue with his existing HEP. He can add the supine wand flexion for AAROM. He verbalized understanding. Treatment/Session Assessment:    · Response to Treatment: Stiff with R shoulder ROM which progressed as continued mobilizations were performed. · Compliance with Program/Exercises: Appears to be complaint. · Recommendations/Intent for next treatment session: \"Next visit will focus on improving R shoulder range of motion , modalities as needed for post treatment pain, swelling.    Total Treatment Duration: 39 Minutes  PT Patient Time In/Time Out  Time In: 1127  Time Out: 79 Pat Regalado PT

## 2018-05-21 ENCOUNTER — HOSPITAL ENCOUNTER (OUTPATIENT)
Dept: PHYSICAL THERAPY | Age: 73
Discharge: HOME | End: 2018-05-21
Payer: MEDICARE

## 2018-05-21 PROCEDURE — 97140 MANUAL THERAPY 1/> REGIONS: CPT

## 2018-05-21 PROCEDURE — 97110 THERAPEUTIC EXERCISES: CPT

## 2018-05-21 NOTE — PROGRESS NOTES
Jackie Gomez  : 1945  [place] / [place] / Product Type: Medicare /  [address] at [facility] [place]O  [address] [facility], [address] [place] [address],[address], [facility] [address].  [phone]   [phone]       OUTPATIENT PHYSICAL THERAPY:Daily Note 2018     ICD-10: Treatment Diagnosis: Stiffness of right shoulder, not elsewhere classified (M25.611),  Aftercare following joint replacement surgery (Z47.1)  Precautions/Allergies:   Celecoxib; Ibuprofen; Lescol [fluvastatin]; Nsaids (non-steroidal anti-inflammatory drug); Sulfa (sulfonamide antibiotics); and Uloric [febuxostat]   Fall Risk Score: 1 (? 5 = High Risk)  MD Orders: Evaluate and treat, HEP, ROM, \"delta protocol\" (18) MEDICAL/REFERRING DIAGNOSIS:  s/p  R reverse TSA w/ BT   DATE OF ONSET: 18  REFERRING PHYSICIAN: Posta, Kandy Fabry., MD  RETURN PHYSICIAN APPOINTMENT: TBD     INITIAL ASSESSMENT:  Mr. Margaret Guallpa 18 days s/p R reverse total shoulder arthroplasty with a Delta Xtend prostheses, biceps tenodesis, latissimus dorsi and teres major tendon transfer. Post-op pain, swelling, wound healing, weakness and decreased ROM are limiting normalized use and function of R UE. He will benefit from PT for guided shoulder rehabl to promote safe return to normalized use of the UE with functional activities. PROBLEM LIST (Impacting functional limitations):  1. Decreased [place] with Home Exercise Program  2. Post-op R shoulder pain and swelling  3. Decreased R shoulder ROM   4. Weakness R shoulder INTERVENTIONS PLANNED:  1. Thermal and electric modalities, manual therapies for pain   2. Manual therapies, therapeutic exercises, HEP for ROM    3. Therapeutic exercises and HEP for strength   TREATMENT PLAN:  Effective Dates: 2018 TO 18 .  Frequency/Duration: 2-3 visits per week for 90 Days (with assumption that MD will authorize more visits at subsequent appointments)  GOALS: (Goals have been discussed and agreed upon with patient.)  Short-Term Functional Goals: Time Frame: 6 weeks  1. Report no more than 0-1/10 intermittent pain to R shoulder with compensatory use during basic functional activities, and score less than 30% on the DASH. 2. R shoulder PROM forward elevation greater than 120 degrees and external rotation greater than 60 degrees to progress into functional ranges. 3. Demonstrate good R shoulder isometric strength with manual testing to progress into strength phase. 4. Independent with initial HEP. Discharge Goals: Time Frame: 12 weeks  1. No more than 1-2/10 intermittent pain R shoulder with return to normalized household and work activities, and score less than 15% on the DASH. 2. R shoulder AROM forward elevation greater than 120 degrees, external rotation greater than 45 degrees, and strength to shoulder are grossly WNL's for safe use with normalized activities. 3. Demonstrate good functional shoulder strength and endurance for return to normalized household and work activities. 4. Independent with advanced shoulder HEP for continued self-management. Rehabilitation Potential For Stated Goals: Good              The information in this section was collected on 5-8-18 (except where otherwise noted). HISTORY:   History of Present Injury/Illness (Reason for Referral): Patient underwent a reverse total shoulder arthroplasty on 4/20/18 secondary to reports of ongoing shoulder pain and instability. Patient states that his shoulder frequently \"went out\" if he moved it in certain positions. Patient received home health following discharge from hospital after having R reverse TSA. Past Medical History/Comorbidities: Mr. Calderon Lentz  has a past medical history of Allergic rhinitis; Arthritis; CAD (coronary artery disease); CVA (cerebral vascular accident) ([facility] [address] (08/2017); Diabetes mellitus type 2, controlled ([facility] [address] Dyslipidemia; ED (erectile dysfunction);  Encephalitis (1962); GERD (gastroesophageal reflux disease); Gout; Hypertension; Lacunar infarct, acute ([facility] [address] Lumbago; Memory loss; Mitral valve regurgitation (7/14/12); ROBERTO (obstructive sleep apnea); PAF (paroxysmal atrial fibrillation) ([facility] [address] Prostate cancer ([facility] [address] Psoriasis; SBO (small bowel obstruction) ([facility] [address] (2011, 2015, 2016); and Stage 2 chronic kidney disease. Mr. Roque Jim  has a past surgical history that includes pr left heart cath,percutaneous (7/2012); hx colonoscopy (1998, 2010); pr abdomen surgery proc unlisted (1967); hx hernia repair (Bilateral, 2012); hx appendectomy (age 49); hx radical prostatectomy ( ); pr prostate biopsy, needle, saturation sampling; hx cataract removal (Bilateral, 2013); hx coronary artery bypass graft (2009); vascular surgery procedure unlist (12/29/2017); and hx splenectomy (1951). Social History/Living Environment:  Patient lives with his spouse in a 2-story house (main level and basement) with 1 step to enter. Prior Level of Function/Work/Activity: Patient is retired. States that he does perform some  work but is unable to currently. Dominant Side:         RIGHT  Current Medications:  Tramadol. prn     Current Outpatient Prescriptions:     metFORMIN ER (GLUCOPHAGE XR) 500 mg tablet, Take 2 Tabs by mouth daily (with dinner). , Disp: 180 Tab, Rfl: 1    allopurinol (ZYLOPRIM) 300 mg tablet, Take 1 Tab by mouth daily. , Disp: 90 Tab, Rfl: 1    metoprolol tartrate (LOPRESSOR) 50 mg tablet, Take 1 Tab by mouth two (2) times a day., Disp: 180 Tab, Rfl: 1    gabapentin (NEURONTIN) 100 mg capsule, Take 1 Cap by mouth two (2) times a day., Disp: 180 Cap, Rfl: 1    pravastatin (PRAVACHOL) 40 mg tablet, Take 1 Tab by mouth nightly., Disp: 90 Tab, Rfl: 1    ranolazine ER (RANEXA) 500 mg SR tablet, TAKE 1 TABLET TWICE A DAY, Disp: 180 Tab, Rfl: 1    dilTIAZem ER (CARDIZEM LA) 360 mg Tb24 tablet, Take 1 Tab by mouth daily. , Disp: 90 Tab, Rfl: 1    losartan (COZAAR) 100 mg tablet, Take 1 Tab by mouth daily. , Disp: 90 Tab, Rfl: 1    traMADol (ULTRAM) 50 mg tablet, Take 1 Tab by mouth every six (6) hours as needed for Pain. Max Daily Amount: 200 mg., Disp: 60 Tab, Rfl: 1    aspirin delayed-release 81 mg tablet, Take 81 mg by mouth nightly., Disp: , Rfl:     esomeprazole (NEXIUM) 40 mg capsule, Take 1 Cap by mouth daily. Indications: am (Patient taking differently: Take 40 mg by mouth daily.), Disp: 90 Cap, Rfl: 1    apixaban (ELIQUIS) 5 mg tablet, Take 1 Tab by mouth two (2) times a day., Disp: 180 Tab, Rfl: 1    glucose blood VI test strips (BLOOD GLUCOSE TEST) strip, Test once to twice daily DX: E11.8, Disp: 300 Strip, Rfl: 3    Lancets misc, Test once to twice daily DX: E11.8, Disp: 300 Each, Rfl: 3    triamcinolone acetonide (KENALOG) 0.1 % ointment, , Disp: , Rfl:     tacrolimus (PROTOPIC) 0.1 % ointment, , Disp: , Rfl:     mometasone (ELOCON) 0.1 % topical cream, , Disp: , Rfl:     nitroglycerin (NITROSTAT) 0.4 mg SL tablet, 1 Tab by SubLINGual route every five (5) minutes as needed for Chest Pain., Disp: 25 Tab, Rfl: 11    pimecrolimus (ELIDEL) 1 % topical cream, Apply  to affected area two (2) times a day., Disp: , Rfl:     fluticasone (FLONASE) 50 mcg/actuation nasal spray, as needed. , Disp: , Rfl:     cholecalciferol, vitamin D3, (VITAMIN D3) 2,000 unit Tab, Take 2,000 Units by mouth daily. Indications: OSTEOPOROSIS, am, Disp: , Rfl:     Cetirizine (ZYRTEC) 10 mg Cap, Take 10 mg by mouth nightly. Indications: ALLERGIC RHINITIS, Disp: , Rfl:    Date Last Reviewed:  5-14-18   Number of Personal Factors/Comorbidities that affect the Plan of Care: 1-2: MODERATE COMPLEXITY   EXAMINATION:   5/21/2018  Observation/Orthostatic Postural Assessment: Comes into clinic with sling on R UE. No apparent distress, but drowsy. .   Palpation: Not assessed. ROM: Not measured. Strength: Not measured. Body Structures Involved:  1. Nerves  2. Bones  3. Joints  4.  Muscles Body Functions Affected:  1. Sensory/Pain  2. Neuromusculoskeletal  3. Movement Related  4. Skin Related Activities and Participation Affected:  1. Self Care  2. Domestic Life  3. Interpersonal Interactions and Relationships   Number of elements (examined above) that affect the Plan of Care: 4+: HIGH COMPLEXITY   CLINICAL PRESENTATION:   Presentation: Stable and uncomplicated: LOW COMPLEXITY   CLINICAL DECISION MAKING:   Outcome Measure: Tool Used: Disabilities of the Arm, Shoulder and Hand (DASH) Questionnaire - Quick Version  Score:  Initial: 34/55 or 52% disability Most Recent: X/55 (Date: -- )   Interpretation of Score: The DASH is designed to measure the activities of daily living in person's with upper extremity dysfunction or pain. Each section is scored on a 1-5 scale, 5 representing the greatest disability. The scores of each section are added together for a total score of 55. This number is divided by 11, followed by subtracting 1 and multiplying by 25 to get a percent score of disability. This value represents the percentage disability: 0-20% minimal disability; 20-40% moderate disability; 40-60% severe disability; % dependent for care or exaggerated symptom behavior. Minimal detectable change is 12%. Score 11 12-19 20-28 29-37 38-45 46-54 55   Modifier CH CI CJ CK CL CM CN ? Carrying, Moving, and Handling Objects:     - CURRENT STATUS: CK - 40%-59% impaired, limited or restricted    - GOAL STATUS: CI - 1%-19% impaired, limited or restricted    - D/C STATUS:  ---------------To be determined---------------    Medical Necessity:   · Skilled intervention continues to be required due to reduced R shoulder range of motion, weakness, pain and decreased function s/p R reverse TSA. Reason for Services/Other Comments:  · Patient continues to require skilled intervention due to recent R reverse total shoulder arthroplasty and subsequent deficits in R UE function.    Use of outcome tool(s) and clinical judgement create a POC that gives a: Clear prediction of patient's progress: LOW COMPLEXITY            TREATMENT:   (In addition to Assessment/Re-Assessment sessions the following treatments were rendered)  5/21/2018  Pre-treatment Symptoms/Complaints: Shoulder was sore over the weekend. Reports that it felt better after PT. Pain: Initial:   5/10 Post Session:  No numeric value reported after PT. Reported \"feeling better\"      Therapeutic Exercise: ( 25 minutes) for improved upper extremity functioning and positioning. Supine press with R upper extremity 3 x 10 with manual assist for control. Cervical flexion/extension and rotation x 10 each direction. Seated manually assisted thoracic flexion/extension x 10. Gentle alternating isometrics in supine at 80 degrees of flexion x 3 x 10-15 repetitions and with arm supported at 45 deg abduction and alternating isometrics for ER/IR activation x 3 x 10-15 repetitions. Seated scapular retractions 3 x 10 at edge of treatment table with cues for improved seated posture. Side-lying abduction to approx 90 degrees (2 x 10). Attempted isometrics in side-lying to R scapula (2 x 5) which was limited by reports of scapular discomfort. Manual Therapies: (17 Minutes) for improved R shoulder range of motion and function. Side-lying scapular mobilizations to R scapula for depression and retraction manually. Passive range of motion progressing to physio mobilizations (grade 2-3) for improved R shoulder range of motion for flexion, abduction, external and internal rotation. ER and IR performed to tolerance and with arm supported in scapular plane. Soft tissue mobilizations to periscapular muscles to reduce tension and tightness. HEP: He is to continue with his existing HEP. No changes made.     Treatment/Session Assessment:    · Response to Treatment: Patient exhibits stiffness to R shoulder at approximately 90 degrees of flexion and external rotation at approximately 45 degrees. Difficulty performing scapular isometrics due to discomfort. · Compliance with Program/Exercises: Appears to be complaint. · Recommendations/Intent for next treatment session: \"Next visit will focus on improving R shoulder range of motion , modalities as needed for post treatment pain, swelling.    Total Treatment Duration: 42 Minutes  PT Patient Time In/Time Out  Time In: 1350  Time Out: [address] [facility],

## 2018-05-23 ENCOUNTER — HOSPITAL ENCOUNTER (OUTPATIENT)
Dept: PHYSICAL THERAPY | Age: 73
Discharge: HOME | End: 2018-05-23
Payer: MEDICARE

## 2018-05-23 PROCEDURE — 97140 MANUAL THERAPY 1/> REGIONS: CPT

## 2018-05-23 NOTE — PROGRESS NOTES
Prabhjot Tierney  : 1945  [place] / [place] / Product Type: Medicare /  [address]  at [facility] RENITA REGALADO  [address] [facility], [address] [place] [address],[address]  [phone]   [phone]       OUTPATIENT PHYSICAL THERAPY:Daily Note 2018     ICD-10: Treatment Diagnosis: Stiffness of right shoulder, not elsewhere classified (M25.611),  Aftercare following joint replacement surgery (Z47.1)  Precautions/Allergies:   Celecoxib; Ibuprofen; Lescol [fluvastatin]; Nsaids (non-steroidal anti-inflammatory drug); Sulfa (sulfonamide antibiotics); and Uloric [febuxostat]   Fall Risk Score: 1 (? 5 = High Risk)  MD Orders: Evaluate and treat, HEP, , Strengthening, ROM, \"full ROM/full strength\" (18) MEDICAL/REFERRING DIAGNOSIS: s/p  R reverse TSA w/ BT   DATE OF ONSET: 18  REFERRING PHYSICIAN: Gloria Cuevas MD  RETURN PHYSICIAN APPOINTMENT: 18     INITIAL ASSESSMENT:  Mr. Willis Barbara 18 days s/p R reverse total shoulder arthroplasty with a Delta Xtend prostheses, biceps tenodesis, latissimus dorsi and teres major tendon transfer. Post-op pain, swelling, wound healing, weakness and decreased ROM are limiting normalized use and function of R UE. He will benefit from PT for guided shoulder rehabl to promote safe return to normalized use of the UE with functional activities. PROBLEM LIST (Impacting functional limitations):  1. Decreased [place] with Home Exercise Program  2. Post-op R shoulder pain and swelling  3. Decreased R shoulder ROM   4. Weakness R shoulder INTERVENTIONS PLANNED:  1. Thermal and electric modalities, manual therapies for pain   2. Manual therapies, therapeutic exercises, HEP for ROM    3. Therapeutic exercises and HEP for strength   TREATMENT PLAN:  Effective Dates: 2018 TO 18 .  Frequency/Duration: 2-3 visits per week for 90 Days (with assumption that MD will authorize more visits at subsequent appointments)  GOALS: (Goals have been discussed and agreed upon with patient.)  Short-Term Functional Goals: Time Frame: 6 weeks  1. Report no more than 0-1/10 intermittent pain to R shoulder with compensatory use during basic functional activities, and score less than 30% on the DASH. 2. R shoulder PROM forward elevation greater than 120 degrees and external rotation greater than 60 degrees to progress into functional ranges. 3. Demonstrate good R shoulder isometric strength with manual testing to progress into strength phase. 4. Independent with initial HEP. Discharge Goals: Time Frame: 12 weeks  1. No more than 1-2/10 intermittent pain R shoulder with return to normalized household and work activities, and score less than 15% on the DASH. 2. R shoulder AROM forward elevation greater than 120 degrees, external rotation greater than 45 degrees, and strength to shoulder are grossly WNL's for safe use with normalized activities. 3. Demonstrate good functional shoulder strength and endurance for return to normalized household and work activities. 4. Independent with advanced shoulder HEP for continued self-management. Rehabilitation Potential For Stated Goals: Good              The information in this section was collected on 5-8-18 (except where otherwise noted). HISTORY:   History of Present Injury/Illness (Reason for Referral): Patient underwent a reverse total shoulder arthroplasty on 4/20/18 secondary to reports of ongoing shoulder pain and instability. Patient states that his shoulder frequently \"went out\" if he moved it in certain positions. Patient received home health following discharge from hospital after having R reverse TSA. Past Medical History/Comorbidities: Mr. Montey Dakin  has a past medical history of Allergic rhinitis; Arthritis; CAD (coronary artery disease); CVA (cerebral vascular accident) ([facility] (08/2017); Diabetes mellitus type 2, controlled ([facility] [facility] Dyslipidemia; ED (erectile dysfunction);  Encephalitis (7430); GERD (gastroesophageal reflux disease); Gout; Hypertension; Lacunar infarct, acute ([facility] Lumbago; Memory loss; Mitral valve regurgitation (7/14/12); ROBERTO (obstructive sleep apnea); PAF (paroxysmal atrial fibrillation) ([facility] [facility] Prostate cancer ([facility] Psoriasis; SBO (small bowel obstruction) ([facility] (2011, 2015, 2016); and Stage 2 chronic kidney disease. Mr. Lincoln Weinstein  has a past surgical history that includes pr left heart cath,percutaneous (7/2012); hx colonoscopy (1998, 2010); pr abdomen surgery proc unlisted (1967); hx hernia repair (Bilateral, 2012); hx appendectomy (age 49); hx radical prostatectomy ( ); pr prostate biopsy, needle, saturation sampling; hx cataract removal (Bilateral, 2013); hx coronary artery bypass graft (2009); vascular surgery procedure unlist (12/29/2017); and hx splenectomy (1951). Social History/Living Environment:  Patient lives with his spouse in a 2-story house (main level and basement) with 1 step to enter. Prior Level of Function/Work/Activity: Patient is retired. States that he does perform some  work but is unable to currently. Dominant Side:         RIGHT  Current Medications:  Tramadol. prn     Current Outpatient Prescriptions:     metFORMIN ER (GLUCOPHAGE XR) 500 mg tablet, Take 2 Tabs by mouth daily (with dinner). , Disp: 180 Tab, Rfl: 1    allopurinol (ZYLOPRIM) 300 mg tablet, Take 1 Tab by mouth daily. , Disp: 90 Tab, Rfl: 1    metoprolol tartrate (LOPRESSOR) 50 mg tablet, Take 1 Tab by mouth two (2) times a day., Disp: 180 Tab, Rfl: 1    gabapentin (NEURONTIN) 100 mg capsule, Take 1 Cap by mouth two (2) times a day., Disp: 180 Cap, Rfl: 1    pravastatin (PRAVACHOL) 40 mg tablet, Take 1 Tab by mouth nightly., Disp: 90 Tab, Rfl: 1    ranolazine ER (RANEXA) 500 mg SR tablet, TAKE 1 TABLET TWICE A DAY, Disp: 180 Tab, Rfl: 1    dilTIAZem ER (CARDIZEM LA) 360 mg Tb24 tablet, Take 1 Tab by mouth daily. , Disp: 90 Tab, Rfl: 1    losartan (COZAAR) 100 mg tablet, Take 1 Tab by mouth daily. , Disp: 90 Tab, Rfl: 1    traMADol (ULTRAM) 50 mg tablet, Take 1 Tab by mouth every six (6) hours as needed for Pain. Max Daily Amount: 200 mg., Disp: 60 Tab, Rfl: 1    aspirin delayed-release 81 mg tablet, Take 81 mg by mouth nightly., Disp: , Rfl:     esomeprazole (NEXIUM) 40 mg capsule, Take 1 Cap by mouth daily. Indications: am (Patient taking differently: Take 40 mg by mouth daily.), Disp: 90 Cap, Rfl: 1    apixaban (ELIQUIS) 5 mg tablet, Take 1 Tab by mouth two (2) times a day., Disp: 180 Tab, Rfl: 1    glucose blood VI test strips (BLOOD GLUCOSE TEST) strip, Test once to twice daily DX: E11.8, Disp: 300 Strip, Rfl: 3    Lancets misc, Test once to twice daily DX: E11.8, Disp: 300 Each, Rfl: 3    triamcinolone acetonide (KENALOG) 0.1 % ointment, , Disp: , Rfl:     tacrolimus (PROTOPIC) 0.1 % ointment, , Disp: , Rfl:     mometasone (ELOCON) 0.1 % topical cream, , Disp: , Rfl:     nitroglycerin (NITROSTAT) 0.4 mg SL tablet, 1 Tab by SubLINGual route every five (5) minutes as needed for Chest Pain., Disp: 25 Tab, Rfl: 11    pimecrolimus (ELIDEL) 1 % topical cream, Apply  to affected area two (2) times a day., Disp: , Rfl:     fluticasone (FLONASE) 50 mcg/actuation nasal spray, as needed. , Disp: , Rfl:     cholecalciferol, vitamin D3, (VITAMIN D3) 2,000 unit Tab, Take 2,000 Units by mouth daily. Indications: OSTEOPOROSIS, am, Disp: , Rfl:     Cetirizine (ZYRTEC) 10 mg Cap, Take 10 mg by mouth nightly. Indications: ALLERGIC RHINITIS, Disp: , Rfl:    Date Last Reviewed:  5-24-18   Number of Personal Factors/Comorbidities that affect the Plan of Care: 1-2: MODERATE COMPLEXITY   EXAMINATION:   5/23/2018  Observation/Orthostatic Postural Assessment: Comes into clinic without sling. No apparent distress. Palpation: Not assessed. ROM: Not measured. Strength: Not measured.                        Body Structures Involved:  1. Nerves  2. Bones  3. Joints  4. Muscles Body Functions Affected:  1. Sensory/Pain  2. Neuromusculoskeletal  3. Movement Related  4. Skin Related Activities and Participation Affected:  1. Self Care  2. Domestic Life  3. Interpersonal Interactions and Relationships   Number of elements (examined above) that affect the Plan of Care: 4+: HIGH COMPLEXITY   CLINICAL PRESENTATION:   Presentation: Stable and uncomplicated: LOW COMPLEXITY   CLINICAL DECISION MAKING:   Outcome Measure: Tool Used: Disabilities of the Arm, Shoulder and Hand (DASH) Questionnaire - Quick Version  Score:  Initial: 34/55 or 52% disability Most Recent: X/55 (Date: -- )   Interpretation of Score: The DASH is designed to measure the activities of daily living in person's with upper extremity dysfunction or pain. Each section is scored on a 1-5 scale, 5 representing the greatest disability. The scores of each section are added together for a total score of 55. This number is divided by 11, followed by subtracting 1 and multiplying by 25 to get a percent score of disability. This value represents the percentage disability: 0-20% minimal disability; 20-40% moderate disability; 40-60% severe disability; % dependent for care or exaggerated symptom behavior. Minimal detectable change is 12%. Score 11 12-19 20-28 29-37 38-45 46-54 55   Modifier CH CI CJ CK CL CM CN ? Carrying, Moving, and Handling Objects:     - CURRENT STATUS: CK - 40%-59% impaired, limited or restricted    - GOAL STATUS: CI - 1%-19% impaired, limited or restricted    - D/C STATUS:  ---------------To be determined---------------    Medical Necessity:   · Skilled intervention continues to be required due to reduced R shoulder range of motion, weakness, pain and decreased function s/p R reverse TSA.   Reason for Services/Other Comments:  · Patient continues to require skilled intervention due to recent R reverse total shoulder arthroplasty and subsequent deficits in R UE function. Use of outcome tool(s) and clinical judgement create a POC that gives a: Clear prediction of patient's progress: LOW COMPLEXITY            TREATMENT:   (In addition to Assessment/Re-Assessment sessions the following treatments were rendered)  5/23/2018   Mr. Yareli Pelayo comes in immediately after his MD follow up, and 4 hours prior to his appointment time. He reports Dr. Mohan Pereyra had is shoulder x-rayed. Was told it \"looks good\". He is to continue with the PT and was provided with a new order to continue with \"full ROM/full strength\". He is to follow up again in 4 weeks. Pre-treatment Symptoms/Complaints: Says his shoulder was sore after last time. Says he has been working on his HEP. Pain is better today. Pain: Initial:   No VAS. Post Session:  No VAS. Therapeutic Exercise: ( )   Manual Therapies: (20 Minutes): Positional Release to R pec major for muscle restriction to motion. PROM and physiologic mobilizations to R shoulder for stiffness with respect to pain while supine to ER at 30, 45, and 60 deg abd; abduction, IR stabilized at 45 deg scaption in short arc, and forward elevation, grade 2 to 4- - to 4- as pain allows to each, except IR grade 3- -, 3x30\" each. HEP: He is to continue with his existing HEP, especially pulleys. He verbalizes understanding. Treatment/Session Assessment:    · Response to Treatment: Short session secondary to work in at an earlier time than scheduled. PROM to R shoulder is progressing to functional ranges to all, expect forward elevation. This is pain and restricted at about 90-deg . · Compliance with Program/Exercises: Appears to be complaint. · Recommendations/Intent for next treatment session: \"Next visit will focus on progression into \"active\" phase of post-op rehab with initial scapualr and deltoid muscle activation and strength, and modalities as needed for post-treatment pain and swelling.   Total Treatment Duration: 20 Minutes  PT Patient Time In/Time Out  Time In: 1015  Time Out: [address], PT, MSPT, OCS

## 2018-05-24 ENCOUNTER — HOSPITAL ENCOUNTER (OUTPATIENT)
Dept: SLEEP MEDICINE | Age: 73
Discharge: HOME | End: 2018-05-24
Payer: MEDICARE

## 2018-05-24 PROCEDURE — 95811 POLYSOM 6/>YRS CPAP 4/> PARM: CPT

## 2018-05-25 ENCOUNTER — HOSPITAL ENCOUNTER (OUTPATIENT)
Dept: PHYSICAL THERAPY | Age: 73
Discharge: HOME | End: 2018-05-25
Payer: MEDICARE

## 2018-05-25 PROCEDURE — 97140 MANUAL THERAPY 1/> REGIONS: CPT

## 2018-05-25 PROCEDURE — 97110 THERAPEUTIC EXERCISES: CPT

## 2018-05-25 NOTE — PROGRESS NOTES
Severiano Vallecillo  : 1945  [place] / [place] / Product Type: Medicare /  [address]  at [facility] RENITA REGALADO  [address] [facility], [address] [place] [address],[address], [facility] [address]  [phone]   [phone]       OUTPATIENT PHYSICAL THERAPY:Daily Note 2018     ICD-10: Treatment Diagnosis: Stiffness of right shoulder, not elsewhere classified (M25.611),  Aftercare following joint replacement surgery (Z47.1)  Precautions/Allergies:   Celecoxib; Ibuprofen; Lescol [fluvastatin]; Nsaids (non-steroidal anti-inflammatory drug); Sulfa (sulfonamide antibiotics); and Uloric [febuxostat]   Fall Risk Score: 1 (? 5 = High Risk)  MD Orders: Evaluate and treat, HEP, , Strengthening, ROM, \"full ROM/full strength\" (18) MEDICAL/REFERRING DIAGNOSIS: s/p  R reverse TSA w/ BT   DATE OF ONSET: 18  REFERRING PHYSICIAN: Esperanza Rutherford MD  RETURN PHYSICIAN APPOINTMENT: 18     INITIAL ASSESSMENT:  Mr. Rick Gayle 18 days s/p R reverse total shoulder arthroplasty with a Delta Xtend prostheses, biceps tenodesis, latissimus dorsi and teres major tendon transfer. Post-op pain, swelling, wound healing, weakness and decreased ROM are limiting normalized use and function of R UE. He will benefit from PT for guided shoulder rehabl to promote safe return to normalized use of the UE with functional activities. PROBLEM LIST (Impacting functional limitations):  1. Decreased [place] with Home Exercise Program  2. Post-op R shoulder pain and swelling  3. Decreased R shoulder ROM   4. Weakness R shoulder INTERVENTIONS PLANNED:  1. Thermal and electric modalities, manual therapies for pain   2. Manual therapies, therapeutic exercises, HEP for ROM    3. Therapeutic exercises and HEP for strength   TREATMENT PLAN:  Effective Dates: 2018 TO 18 .  Frequency/Duration: 2-3 visits per week for 90 Days (with assumption that MD will authorize more visits at subsequent appointments)  GOALS: (Goals have been discussed and agreed upon with patient.)  Short-Term Functional Goals: Time Frame: 6 weeks  1. Report no more than 0-1/10 intermittent pain to R shoulder with compensatory use during basic functional activities, and score less than 30% on the DASH. 2. R shoulder PROM forward elevation greater than 120 degrees and external rotation greater than 60 degrees to progress into functional ranges. 3. Demonstrate good R shoulder isometric strength with manual testing to progress into strength phase. 4. Independent with initial HEP. Discharge Goals: Time Frame: 12 weeks  1. No more than 1-2/10 intermittent pain R shoulder with return to normalized household and work activities, and score less than 15% on the DASH. 2. R shoulder AROM forward elevation greater than 120 degrees, external rotation greater than 45 degrees, and strength to shoulder are grossly WNL's for safe use with normalized activities. 3. Demonstrate good functional shoulder strength and endurance for return to normalized household and work activities. 4. Independent with advanced shoulder HEP for continued self-management. Rehabilitation Potential For Stated Goals: Good              The information in this section was collected on 5-8-18 (except where otherwise noted). HISTORY:   History of Present Injury/Illness (Reason for Referral): Patient underwent a reverse total shoulder arthroplasty on 4/20/18 secondary to reports of ongoing shoulder pain and instability. Patient states that his shoulder frequently \"went out\" if he moved it in certain positions. Patient received home health following discharge from hospital after having R reverse TSA. Past Medical History/Comorbidities: Mr. Don Joe  has a past medical history of Allergic rhinitis; Arthritis; CAD (coronary artery disease); CVA (cerebral vascular accident) ([facility] [address] (08/2017); Diabetes mellitus type 2, controlled ([facility] [address] Dyslipidemia; ED (erectile dysfunction);  Encephalitis (1140); GERD (gastroesophageal reflux disease); Gout; Hypertension; Lacunar infarct, acute ([facility] [address] Lumbago; Memory loss; Mitral valve regurgitation (7/14/12); ROBERTO (obstructive sleep apnea); PAF (paroxysmal atrial fibrillation) ([facility] [address] [facility] Psoriasis; SBO (small bowel obstruction) ([facility] (2011, 2015, 2016); and Stage 2 chronic kidney disease. Mr. Marline Freire  has a past surgical history that includes pr left heart cath,percutaneous (7/2012); hx colonoscopy (1998, 2010); pr abdomen surgery proc unlisted (1967); hx hernia repair (Bilateral, 2012); hx appendectomy (age 49); hx radical prostatectomy ( ); pr prostate biopsy, needle, saturation sampling; hx cataract removal (Bilateral, 2013); hx coronary artery bypass graft (2009); vascular surgery procedure unlist (12/29/2017); and hx splenectomy (1951). Social History/Living Environment:  Patient lives with his spouse in a 2-story house (main level and basement) with 1 step to enter. Prior Level of Function/Work/Activity: Patient is retired. States that he does perform some  work but is unable to currently. Dominant Side:         RIGHT  Current Medications:  Tramadol. prn     Current Outpatient Prescriptions:     metFORMIN ER (GLUCOPHAGE XR) 500 mg tablet, Take 2 Tabs by mouth daily (with dinner). , Disp: 180 Tab, Rfl: 1    allopurinol (ZYLOPRIM) 300 mg tablet, Take 1 Tab by mouth daily. , Disp: 90 Tab, Rfl: 1    metoprolol tartrate (LOPRESSOR) 50 mg tablet, Take 1 Tab by mouth two (2) times a day., Disp: 180 Tab, Rfl: 1    gabapentin (NEURONTIN) 100 mg capsule, Take 1 Cap by mouth two (2) times a day., Disp: 180 Cap, Rfl: 1    pravastatin (PRAVACHOL) 40 mg tablet, Take 1 Tab by mouth nightly., Disp: 90 Tab, Rfl: 1    ranolazine ER (RANEXA) 500 mg SR tablet, TAKE 1 TABLET TWICE A DAY, Disp: 180 Tab, Rfl: 1    dilTIAZem ER (CARDIZEM LA) 360 mg Tb24 tablet, Take 1 Tab by mouth daily. , Disp: 90 Tab, Rfl: 1    losartan (COZAAR) 100 mg tablet, Take 1 Tab by mouth daily. , Disp: 90 Tab, Rfl: 1    traMADol (ULTRAM) 50 mg tablet, Take 1 Tab by mouth every six (6) hours as needed for Pain. Max Daily Amount: 200 mg., Disp: 60 Tab, Rfl: 1    aspirin delayed-release 81 mg tablet, Take 81 mg by mouth nightly., Disp: , Rfl:     esomeprazole (NEXIUM) 40 mg capsule, Take 1 Cap by mouth daily. Indications: am (Patient taking differently: Take 40 mg by mouth daily.), Disp: 90 Cap, Rfl: 1    apixaban (ELIQUIS) 5 mg tablet, Take 1 Tab by mouth two (2) times a day., Disp: 180 Tab, Rfl: 1    glucose blood VI test strips (BLOOD GLUCOSE TEST) strip, Test once to twice daily DX: E11.8, Disp: 300 Strip, Rfl: 3    Lancets misc, Test once to twice daily DX: E11.8, Disp: 300 Each, Rfl: 3    triamcinolone acetonide (KENALOG) 0.1 % ointment, , Disp: , Rfl:     tacrolimus (PROTOPIC) 0.1 % ointment, , Disp: , Rfl:     mometasone (ELOCON) 0.1 % topical cream, , Disp: , Rfl:     nitroglycerin (NITROSTAT) 0.4 mg SL tablet, 1 Tab by SubLINGual route every five (5) minutes as needed for Chest Pain., Disp: 25 Tab, Rfl: 11    pimecrolimus (ELIDEL) 1 % topical cream, Apply  to affected area two (2) times a day., Disp: , Rfl:     fluticasone (FLONASE) 50 mcg/actuation nasal spray, as needed. , Disp: , Rfl:     cholecalciferol, vitamin D3, (VITAMIN D3) 2,000 unit Tab, Take 2,000 Units by mouth daily. Indications: OSTEOPOROSIS, am, Disp: , Rfl:     Cetirizine (ZYRTEC) 10 mg Cap, Take 10 mg by mouth nightly. Indications: ALLERGIC RHINITIS, Disp: , Rfl:    Date Last Reviewed:  5-24-18   Number of Personal Factors/Comorbidities that affect the Plan of Care: 1-2: MODERATE COMPLEXITY   EXAMINATION:   5/25/2018  Observation/Orthostatic Postural Assessment: Comes into clinic without sling. No apparent distress. Palpation: Not assessed. ROM: Not measured. Strength: Not measured.                        Body Structures Involved:  1. Nerves  2. Bones  3. Joints  4. Muscles Body Functions Affected:  1. Sensory/Pain  2. Neuromusculoskeletal  3. Movement Related  4. Skin Related Activities and Participation Affected:  1. Self Care  2. Domestic Life  3. Interpersonal Interactions and Relationships   Number of elements (examined above) that affect the Plan of Care: 4+: HIGH COMPLEXITY   CLINICAL PRESENTATION:   Presentation: Stable and uncomplicated: LOW COMPLEXITY   CLINICAL DECISION MAKING:   Outcome Measure: Tool Used: Disabilities of the Arm, Shoulder and Hand (DASH) Questionnaire - Quick Version  Score:  Initial: 34/55 or 52% disability Most Recent: X/55 (Date: -- )   Interpretation of Score: The DASH is designed to measure the activities of daily living in person's with upper extremity dysfunction or pain. Each section is scored on a 1-5 scale, 5 representing the greatest disability. The scores of each section are added together for a total score of 55. This number is divided by 11, followed by subtracting 1 and multiplying by 25 to get a percent score of disability. This value represents the percentage disability: 0-20% minimal disability; 20-40% moderate disability; 40-60% severe disability; % dependent for care or exaggerated symptom behavior. Minimal detectable change is 12%. Score 11 12-19 20-28 29-37 38-45 46-54 55   Modifier CH CI CJ CK CL CM CN ? Carrying, Moving, and Handling Objects:     - CURRENT STATUS: CK - 40%-59% impaired, limited or restricted    - GOAL STATUS: CI - 1%-19% impaired, limited or restricted    - D/C STATUS:  ---------------To be determined---------------    Medical Necessity:   · Skilled intervention continues to be required due to reduced R shoulder range of motion, weakness, pain and decreased function s/p R reverse TSA.   Reason for Services/Other Comments:  · Patient continues to require skilled intervention due to recent R reverse total shoulder arthroplasty and subsequent deficits in R UE function. Use of outcome tool(s) and clinical judgement create a POC that gives a: Clear prediction of patient's progress: LOW COMPLEXITY            TREATMENT:   (In addition to Assessment/Re-Assessment sessions the following treatments were rendered)  5/25/2018  . Pre-treatment Symptoms/Complaints: Stated that his shoulder is very sore this morning and feels like he has gone backwards a little bit since Wednesday  Pain: Initial:   4/10 Post Session:  Pt denied pain after PT      Therapeutic Exercise: ( 17 minutes) Scapular isometrics to improve ability to \"set\" scapula on rib cage prior to moving distal R UE (5 x 5 x 5\"), supine chest press, 0# 2 x 10, 1# x 12 for improved deltoid and triceps strength, side-lying mid trap raise 2 x 5 with tactile and verbal cues to \"set\" scapula prior to moving R UE. Active-assisted R shoulder flexion in supine 2 x 10 through available range of motion for improved R deltoid muscle activation. Isometrics for shoulder flexion (x 10) for improved deltoid activation. Manual Therapies: (23 Minutes): soft tissue mobilizatoins R deltoid for reduced muscle tightness and discomfort, Grade 3 physio mobs to improve R shoulder flexion, abduction/scaption and external rotation ROM. Scapular mobilizations performed in side-lying for improved scapular relaxation to improve scapulohumeral rhythm. HEP: He is to continue with his existing HEP, especially pulleys. He verbalizes understanding. Treatment/Session Assessment:    · Response to Treatment:  Patient demonstrated progress with R shoulder range of motion. Patient initially limited by discomfort but was able to achieve approximately 100 degrees of flexion in supine actively and passively until limited by pain and guarding. · Compliance with Program/Exercises: Appears to be complaint. · Recommendations/Intent for next treatment session:  \"Next visit will focus on progression into \"active\" phase of post-op rehab with initial scapualr and deltoid muscle activation and strength, and modalities as needed for post-treatment pain and swelling.   Total Treatment Duration: 40 Minutes    PT Patient Time In/Time Out  Time In: 1117  Time Out: [address], PT

## 2018-05-29 ENCOUNTER — HOSPITAL ENCOUNTER (OUTPATIENT)
Dept: PHYSICAL THERAPY | Age: 73
Discharge: HOME | End: 2018-05-29
Payer: MEDICARE

## 2018-05-29 PROCEDURE — 97140 MANUAL THERAPY 1/> REGIONS: CPT

## 2018-05-29 PROCEDURE — 97110 THERAPEUTIC EXERCISES: CPT

## 2018-05-29 NOTE — PROGRESS NOTES
Lex Atkinson  : 1945  [place] / [place] / Product Type: Medicare /  [address] at [facility]  [address] [place] [address], [address] [place] [address],[address], [facility] [address] [facility]  [phone]   [phone]       OUTPATIENT PHYSICAL THERAPY:Daily Note 2018     ICD-10: Treatment Diagnosis: Stiffness of right shoulder, not elsewhere classified (M25.611),  Aftercare following joint replacement surgery (Z47.1)  Precautions/Allergies:   Celecoxib; Ibuprofen; Lescol [fluvastatin]; Nsaids (non-steroidal anti-inflammatory drug); Sulfa (sulfonamide antibiotics); and Uloric [febuxostat]   Fall Risk Score: 1 (? 5 = High Risk)  MD Orders: Evaluate and treat, HEP, , Strengthening, ROM, \"full ROM/full strength\" (18) MEDICAL/REFERRING DIAGNOSIS: s/p  R reverse TSA w/ BT   DATE OF ONSET: 18  REFERRING PHYSICIAN: Bere Fernandez MD  RETURN PHYSICIAN APPOINTMENT: 18     INITIAL ASSESSMENT:  Mr. Nikhil Gtz 18 days s/p R reverse total shoulder arthroplasty with a Delta Xtend prostheses, biceps tenodesis, latissimus dorsi and teres major tendon transfer. Post-op pain, swelling, wound healing, weakness and decreased ROM are limiting normalized use and function of R UE. He will benefit from PT for guided shoulder rehabl to promote safe return to normalized use of the UE with functional activities. PROBLEM LIST (Impacting functional limitations):  1. Decreased [place] with Home Exercise Program  2. Post-op R shoulder pain and swelling  3. Decreased R shoulder ROM   4. Weakness R shoulder INTERVENTIONS PLANNED:  1. Thermal and electric modalities, manual therapies for pain   2. Manual therapies, therapeutic exercises, HEP for ROM    3. Therapeutic exercises and HEP for strength   TREATMENT PLAN:  Effective Dates: 2018 TO 18 .  Frequency/Duration: 2-3 visits per week for 90 Days (with assumption that MD will authorize more visits at subsequent appointments)  GOALS: (Goals have been discussed and agreed upon with patient.)  Short-Term Functional Goals: Time Frame: 6 weeks  1. Report no more than 0-1/10 intermittent pain to R shoulder with compensatory use during basic functional activities, and score less than 30% on the DASH. 2. R shoulder PROM forward elevation greater than 120 degrees and external rotation greater than 60 degrees to progress into functional ranges. 3. Demonstrate good R shoulder isometric strength with manual testing to progress into strength phase. 4. Independent with initial HEP. Discharge Goals: Time Frame: 12 weeks  1. No more than 1-2/10 intermittent pain R shoulder with return to normalized household and work activities, and score less than 15% on the DASH. 2. R shoulder AROM forward elevation greater than 120 degrees, external rotation greater than 45 degrees, and strength to shoulder are grossly WNL's for safe use with normalized activities. 3. Demonstrate good functional shoulder strength and endurance for return to normalized household and work activities. 4. Independent with advanced shoulder HEP for continued self-management. Rehabilitation Potential For Stated Goals: Good              The information in this section was collected on 5-8-18 (except where otherwise noted). HISTORY:   History of Present Injury/Illness (Reason for Referral): Patient underwent a reverse total shoulder arthroplasty on 4/20/18 secondary to reports of ongoing shoulder pain and instability. Patient states that his shoulder frequently \"went out\" if he moved it in certain positions. Patient received home health following discharge from hospital after having R reverse TSA. Past Medical History/Comorbidities: Mr. Eleno Manzano  has a past medical history of Allergic rhinitis; Arthritis; CAD (coronary artery disease); CVA (cerebral vascular accident) ([facility] (08/2017); Diabetes mellitus type 2, controlled ([facility] Dyslipidemia; ED (erectile dysfunction);  Encephalitis (0185); GERD (gastroesophageal reflux disease); Gout; Hypertension; Lacunar infarct, acute ([facility] Lumbago; Memory loss; Mitral valve regurgitation (7/14/12); ROBERTO (obstructive sleep apnea); PAF (paroxysmal atrial fibrillation) ([facility] Prostate cancer ([facility] Psoriasis; SBO (small bowel obstruction) ([facility] (2011, 2015, 2016); and Stage 2 chronic kidney disease. Mr. Rick Gayle  has a past surgical history that includes pr left heart cath,percutaneous (7/2012); hx colonoscopy (1998, 2010); pr abdomen surgery proc unlisted (1967); hx hernia repair (Bilateral, 2012); hx appendectomy (age 49); hx radical prostatectomy ( ); pr prostate biopsy, needle, saturation sampling; hx cataract removal (Bilateral, 2013); hx coronary artery bypass graft (2009); vascular surgery procedure unlist (12/29/2017); and hx splenectomy (1951). Social History/Living Environment:  Patient lives with his spouse in a 2-story house (main level and basement) with 1 step to enter. Prior Level of Function/Work/Activity: Patient is retired. States that he does perform some  work but is unable to currently. Dominant Side:         RIGHT  Current Medications:  Tramadol. prn     Current Outpatient Prescriptions:     metFORMIN ER (GLUCOPHAGE XR) 500 mg tablet, Take 2 Tabs by mouth daily (with dinner). , Disp: 180 Tab, Rfl: 1    allopurinol (ZYLOPRIM) 300 mg tablet, Take 1 Tab by mouth daily. , Disp: 90 Tab, Rfl: 1    metoprolol tartrate (LOPRESSOR) 50 mg tablet, Take 1 Tab by mouth two (2) times a day., Disp: 180 Tab, Rfl: 1    gabapentin (NEURONTIN) 100 mg capsule, Take 1 Cap by mouth two (2) times a day., Disp: 180 Cap, Rfl: 1    pravastatin (PRAVACHOL) 40 mg tablet, Take 1 Tab by mouth nightly., Disp: 90 Tab, Rfl: 1    ranolazine ER (RANEXA) 500 mg SR tablet, TAKE 1 TABLET TWICE A DAY, Disp: 180 Tab, Rfl: 1    dilTIAZem ER (CARDIZEM LA) 360 mg Tb24 tablet, Take 1 Tab by mouth daily. , Disp: 90 Tab, Rfl: 1    losartan (COZAAR) 100 mg tablet, Take 1 Tab by mouth daily. , Disp: 90 Tab, Rfl: 1    traMADol (ULTRAM) 50 mg tablet, Take 1 Tab by mouth every six (6) hours as needed for Pain. Max Daily Amount: 200 mg., Disp: 60 Tab, Rfl: 1    aspirin delayed-release 81 mg tablet, Take 81 mg by mouth nightly., Disp: , Rfl:     esomeprazole (NEXIUM) 40 mg capsule, Take 1 Cap by mouth daily. Indications: am (Patient taking differently: Take 40 mg by mouth daily.), Disp: 90 Cap, Rfl: 1    apixaban (ELIQUIS) 5 mg tablet, Take 1 Tab by mouth two (2) times a day., Disp: 180 Tab, Rfl: 1    glucose blood VI test strips (BLOOD GLUCOSE TEST) strip, Test once to twice daily DX: E11.8, Disp: 300 Strip, Rfl: 3    Lancets misc, Test once to twice daily DX: E11.8, Disp: 300 Each, Rfl: 3    triamcinolone acetonide (KENALOG) 0.1 % ointment, , Disp: , Rfl:     tacrolimus (PROTOPIC) 0.1 % ointment, , Disp: , Rfl:     mometasone (ELOCON) 0.1 % topical cream, , Disp: , Rfl:     nitroglycerin (NITROSTAT) 0.4 mg SL tablet, 1 Tab by SubLINGual route every five (5) minutes as needed for Chest Pain., Disp: 25 Tab, Rfl: 11    pimecrolimus (ELIDEL) 1 % topical cream, Apply  to affected area two (2) times a day., Disp: , Rfl:     fluticasone (FLONASE) 50 mcg/actuation nasal spray, as needed. , Disp: , Rfl:     cholecalciferol, vitamin D3, (VITAMIN D3) 2,000 unit Tab, Take 2,000 Units by mouth daily. Indications: OSTEOPOROSIS, am, Disp: , Rfl:     Cetirizine (ZYRTEC) 10 mg Cap, Take 10 mg by mouth nightly. Indications: ALLERGIC RHINITIS, Disp: , Rfl:    Date Last Reviewed:  5-24-18   Number of Personal Factors/Comorbidities that affect the Plan of Care: 1-2: MODERATE COMPLEXITY   EXAMINATION:   5/29/2018  Observation/Orthostatic Postural Assessment: Comes into clinic without sling. No apparent distress. Palpation: Not assessed. ROM: Not measured. Strength: Not measured.                        Body Structures Involved:  1. Nerves  2. Bones  3. Joints  4. Muscles Body Functions Affected:  1. Sensory/Pain  2. Neuromusculoskeletal  3. Movement Related  4. Skin Related Activities and Participation Affected:  1. Self Care  2. Domestic Life  3. Interpersonal Interactions and Relationships   Number of elements (examined above) that affect the Plan of Care: 4+: HIGH COMPLEXITY   CLINICAL PRESENTATION:   Presentation: Stable and uncomplicated: LOW COMPLEXITY   CLINICAL DECISION MAKING:   Outcome Measure: Tool Used: Disabilities of the Arm, Shoulder and Hand (DASH) Questionnaire - Quick Version  Score:  Initial: 34/55 or 52% disability Most Recent: X/55 (Date: -- )   Interpretation of Score: The DASH is designed to measure the activities of daily living in person's with upper extremity dysfunction or pain. Each section is scored on a 1-5 scale, 5 representing the greatest disability. The scores of each section are added together for a total score of 55. This number is divided by 11, followed by subtracting 1 and multiplying by 25 to get a percent score of disability. This value represents the percentage disability: 0-20% minimal disability; 20-40% moderate disability; 40-60% severe disability; % dependent for care or exaggerated symptom behavior. Minimal detectable change is 12%. Score 11 12-19 20-28 29-37 38-45 46-54 55   Modifier CH CI CJ CK CL CM CN ? Carrying, Moving, and Handling Objects:     - CURRENT STATUS: CK - 40%-59% impaired, limited or restricted    - GOAL STATUS: CI - 1%-19% impaired, limited or restricted    - D/C STATUS:  ---------------To be determined---------------    Medical Necessity:   · Skilled intervention continues to be required due to reduced R shoulder range of motion, weakness, pain and decreased function s/p R reverse TSA.   Reason for Services/Other Comments:  · Patient continues to require skilled intervention due to recent R reverse total shoulder arthroplasty and subsequent deficits in R UE function. Use of outcome tool(s) and clinical judgement create a POC that gives a: Clear prediction of patient's progress: LOW COMPLEXITY            TREATMENT:   (In addition to Assessment/Re-Assessment sessions the following treatments were rendered)  5/29/2018  . Pre-treatment Symptoms/Complaints: States that his shoulder has been sore but that he feels it has more mobility. Pain: Initial:   4/10 Post Session:  5-6/10      Therapeutic Exercise: ( 27 minutes) Scapular isometrics to improve ability to \"set\" scapula on rib cage prior to moving distal R UE (5 x 5 x 5\"), supine chest press, 2# 3 x 8, 1# x 12 for improved deltoid and triceps strength, side-lying mid trap raise 2 x 6 with tactile and verbal cues to \"set\" scapula prior to moving R UE. Active R shoulder flexion in supine 2 x 10 through available range of motion for improved R deltoid muscle activation. Side-lying ER R upper ext 2 x 10 with assist to lift R arm for improved motion and deltoid muscle activation. Side-lying abduction 2 x 10 0#, 1# x 10 for improved deltoid activation with tactile cues to support scapula; prone rows R UE 0# 2 x 10 and prone extensions 0# 2 x 12 to improve scapula strength and stability. .     Manual Therapies: (12 Minutes): soft tissue mobilizatoins R deltoid for reduced muscle tightness and discomfort, Grade 3-4 physio mobs to improve R shoulder flexion, abduction/scaption and external rotation ROM. ER performed at 45-60 degrees of abduction within scapular plane. HEP: He is to continue with his existing HEP, especially pulleys. He verbalizes understanding. Treatment/Session Assessment:    · Response to Treatment:  Patient stiff with elevation passively. Able to achieve increased range of motion with R scapula stabilized when elevating R UE.   · Compliance with Program/Exercises: Appears to be complaint. · Recommendations/Intent for next treatment session:  \"Next visit will focus on progression into \"active\" phase of post-op rehab with initial scapualr and deltoid muscle activation and strength, and modalities as needed for post-treatment pain and swelling.   Total Treatment Duration: 39 Minutes    PT Patient Time In/Time Out  Time In: 1436  Time Out: [address]

## 2018-05-30 ENCOUNTER — HOSPITAL ENCOUNTER (OUTPATIENT)
Dept: PHYSICAL THERAPY | Age: 73
Discharge: HOME | End: 2018-05-30
Payer: MEDICARE

## 2018-05-30 PROCEDURE — 97110 THERAPEUTIC EXERCISES: CPT

## 2018-05-30 PROCEDURE — G8985 CARRY GOAL STATUS: HCPCS

## 2018-05-30 PROCEDURE — G8984 CARRY CURRENT STATUS: HCPCS

## 2018-05-30 NOTE — PROGRESS NOTES
Colonel Riddle  : 1945  [place] / [place] / Product Type: Medicare /  [address]  at [facility] REN[address]as[address], [address], [place].  [phone]   [phone]       OUTPATIENT PHYSICAL THERAPY:Daily Note and Progress Report 2018     ICD-10: Treatment Diagnosis: Stiffness of right shoulder, not elsewhere classified (M25.611),  Aftercare following joint replacement surgery (Z47.1)  Precautions/Allergies:   Celecoxib; Ibuprofen; Lescol [fluvastatin]; Nsaids (non-steroidal anti-inflammatory drug); Sulfa (sulfonamide antibiotics); and Uloric [febuxostat]   Fall Risk Score: 1 (? 5 = High Risk)  MD Orders: Evaluate and treat, HEP, , Strengthening, ROM, \"full ROM/full strength\" () MEDICAL/REFERRING DIAGNOSIS: s/p  R reverse TSA w/ BT   DATE OF ONSET: 18  REFERRING PHYSICIAN: Miriam Hanson MD  RETURN PHYSICIAN APPOINTMENT: 18     PROGRESS ASSESSMENT (18):  Mr. Juliane Kennedy has attended 10 visits to date. He is now nearly 6 weeks s/p R reverse total shoulder arthroplasty with a Delta Xtend prostheses, biceps tenodesis, latissimus dorsi and teres major tendon transfer. He is making progress. Pain is under control, and mostly present with active movement and stretching. His shoulder ROM is nearly in functional ranges now. He has a good start to deltoid strength. He is weak as expected at this point. He is progressing toward his goals. He will benefit from continued PT to further progress guided shoulder rehabl to promote safe return to normalized use of the UE with functional activities. PROBLEM LIST (Impacting functional limitations):  1. Decreased [place] with Home Exercise Program  2. Post-op R shoulder pain and swelling  3. Decreased R shoulder ROM   4. Weakness R shoulder INTERVENTIONS PLANNED:  1. Thermal and electric modalities, manual therapies for pain   2.  Manual therapies, therapeutic exercises, HEP for ROM    3. Therapeutic exercises and HEP for strength   TREATMENT PLAN:  Effective Dates: 5/8/2018 TO 8/6/18 . Frequency/Duration: 2-3 visits per week for 90 Days (with assumption that MD will authorize more visits at subsequent appointments)  GOALS: (Goals have been discussed and agreed upon with patient.)  Short-Term Functional Goals: Time Frame: 6 weeks  1. Report no more than 0-1/10 intermittent pain to R shoulder with compensatory use during basic functional activities, and score less than 30% on the DASH. Ongoing 5-30-18  2. R shoulder PROM forward elevation greater than 120 degrees and external rotation greater than 60 degrees to progress into functional ranges. Progressed, nearly met 5-30-18  3. Demonstrate good R shoulder isometric strength with manual testing to progress into strength phase. Met 5-30-18  4. Independent with initial HEP. Met 5-30-18  Discharge Goals: Time Frame: 12 weeks  1. No more than 1-2/10 intermittent pain R shoulder with return to normalized household and work activities, and score less than 15% on the DASH. 2. R shoulder AROM forward elevation greater than 120 degrees, external rotation greater than 45 degrees, and strength to shoulder are grossly WNL's for safe use with normalized activities. 3. Demonstrate good functional shoulder strength and endurance for return to normalized household and work activities. 4. Independent with advanced shoulder HEP for continued self-management. Rehabilitation Potential For Stated Goals: Good              The information in this section was collected on 5-8-18 (except where otherwise noted). HISTORY:   History of Present Injury/Illness (Reason for Referral): Patient underwent a reverse total shoulder arthroplasty on 4/20/18 secondary to reports of ongoing shoulder pain and instability. Patient states that his shoulder frequently \"went out\" if he moved it in certain positions.  Patient received home health following discharge from hospital after having R reverse TSA. Past Medical History/Comorbidities: Mr. Sierra Meyer  has a past medical history of Allergic rhinitis; Arthritis; CAD (coronary artery disease); CVA (cerebral vascular accident) ([facility] [address] (08/2017); Diabetes mellitus type 2, controlled ([facility] [address] Dyslipidemia; ED (erectile dysfunction); Encephalitis (1962); GERD (gastroesophageal reflux disease); Gout; Hypertension; Lacunar infarct, acute ([facility] [address] Lumbago; Memory loss; Mitral valve regurgitation (7/14/12); ROBERTO (obstructive sleep apnea); PAF (paroxysmal atrial fibrillation) ([facility] [address] Prostate cancer ([facility] [address] Psoriasis; SBO (small bowel obstruction) ([facility] [address] (2011, 2015, 2016); and Stage 2 chronic kidney disease. Mr. Sierra Meyer  has a past surgical history that includes pr left heart cath,percutaneous (7/2012); hx colonoscopy (1998, 2010); pr abdomen surgery proc unlisted (1967); hx hernia repair (Bilateral, 2012); hx appendectomy (age 49); hx radical prostatectomy ( ); pr prostate biopsy, needle, saturation sampling; hx cataract removal (Bilateral, 2013); hx coronary artery bypass graft (2009); vascular surgery procedure unlist (12/29/2017); and hx splenectomy (1951). Social History/Living Environment:  Patient lives with his spouse in a 2-story house (main level and basement) with 1 step to enter. Prior Level of Function/Work/Activity: Patient is retired. States that he does perform some  work but is unable to currently. Dominant Side:         RIGHT  Current Medications:  Tramadol. prn     Current Outpatient Prescriptions:     metFORMIN ER (GLUCOPHAGE XR) 500 mg tablet, Take 2 Tabs by mouth daily (with dinner). , Disp: 180 Tab, Rfl: 1    allopurinol (ZYLOPRIM) 300 mg tablet, Take 1 Tab by mouth daily. , Disp: 90 Tab, Rfl: 1    metoprolol tartrate (LOPRESSOR) 50 mg tablet, Take 1 Tab by mouth two (2) times a day., Disp: 180 Tab, Rfl: 1    gabapentin (NEURONTIN) 100 mg capsule, Take 1 Cap by mouth two (2) times a day., Disp: 180 Cap, Rfl: 1   pravastatin (PRAVACHOL) 40 mg tablet, Take 1 Tab by mouth nightly., Disp: 90 Tab, Rfl: 1    ranolazine ER (RANEXA) 500 mg SR tablet, TAKE 1 TABLET TWICE A DAY, Disp: 180 Tab, Rfl: 1    dilTIAZem ER (CARDIZEM LA) 360 mg Tb24 tablet, Take 1 Tab by mouth daily. , Disp: 90 Tab, Rfl: 1    losartan (COZAAR) 100 mg tablet, Take 1 Tab by mouth daily. , Disp: 90 Tab, Rfl: 1    traMADol (ULTRAM) 50 mg tablet, Take 1 Tab by mouth every six (6) hours as needed for Pain. Max Daily Amount: 200 mg., Disp: 60 Tab, Rfl: 1    aspirin delayed-release 81 mg tablet, Take 81 mg by mouth nightly., Disp: , Rfl:     esomeprazole (NEXIUM) 40 mg capsule, Take 1 Cap by mouth daily. Indications: am (Patient taking differently: Take 40 mg by mouth daily.), Disp: 90 Cap, Rfl: 1    apixaban (ELIQUIS) 5 mg tablet, Take 1 Tab by mouth two (2) times a day., Disp: 180 Tab, Rfl: 1    glucose blood VI test strips (BLOOD GLUCOSE TEST) strip, Test once to twice daily DX: E11.8, Disp: 300 Strip, Rfl: 3    Lancets misc, Test once to twice daily DX: E11.8, Disp: 300 Each, Rfl: 3    triamcinolone acetonide (KENALOG) 0.1 % ointment, , Disp: , Rfl:     tacrolimus (PROTOPIC) 0.1 % ointment, , Disp: , Rfl:     mometasone (ELOCON) 0.1 % topical cream, , Disp: , Rfl:     nitroglycerin (NITROSTAT) 0.4 mg SL tablet, 1 Tab by SubLINGual route every five (5) minutes as needed for Chest Pain., Disp: 25 Tab, Rfl: 11    pimecrolimus (ELIDEL) 1 % topical cream, Apply  to affected area two (2) times a day., Disp: , Rfl:     fluticasone (FLONASE) 50 mcg/actuation nasal spray, as needed. , Disp: , Rfl:     cholecalciferol, vitamin D3, (VITAMIN D3) 2,000 unit Tab, Take 2,000 Units by mouth daily. Indications: OSTEOPOROSIS, am, Disp: , Rfl:     Cetirizine (ZYRTEC) 10 mg Cap, Take 10 mg by mouth nightly.     Indications: ALLERGIC RHINITIS, Disp: , Rfl:    Date Last Reviewed:  5-30-18   Number of Personal Factors/Comorbidities that affect the Plan of Care: 1-2: MODERATE COMPLEXITY   EXAMINATION:   5/30/2018  Observation/Orthostatic Postural Assessment: Comes into clinic without sling. No apparent distress. He is drowsy. Palpation: Not assessed. ROM:                  RUE AROM  R Shoulder Flexion: 100  RUE PROM  R Shoulder Flexion: 135 (forward elevation)  R Shoulder ABduction: 90 (with scapula stabilized)  R Shoulder Internal Rotation: 45 (in shoulder neutral, 55 at 45 adn 60 deg abd)  R Shoulder External Rotation: 50 (stabilized at 45 deg abd)         Strength:         R Shoulder: good isometrics to deltoid. Body Structures Involved:  1. Nerves  2. Bones  3. Joints  4. Muscles Body Functions Affected:  1. Sensory/Pain  2. Neuromusculoskeletal  3. Movement Related  4. Skin Related Activities and Participation Affected:  1. Self Care  2. Domestic Life  3. Interpersonal Interactions and Relationships   Number of elements (examined above) that affect the Plan of Care: 4+: HIGH COMPLEXITY   CLINICAL PRESENTATION:   Presentation: Stable and uncomplicated: LOW COMPLEXITY   CLINICAL DECISION MAKING:   Outcome Measure: Tool Used: Disabilities of the Arm, Shoulder and Hand (DASH) Questionnaire - Quick Version  Score:  Initial: 34/55 or 52% disability Most Recent: 33/55 or 50% disability (5-30-18)   Interpretation of Score: The DASH is designed to measure the activities of daily living in person's with upper extremity dysfunction or pain. Each section is scored on a 1-5 scale, 5 representing the greatest disability. The scores of each section are added together for a total score of 55. This number is divided by 11, followed by subtracting 1 and multiplying by 25 to get a percent score of disability. This value represents the percentage disability: 0-20% minimal disability; 20-40% moderate disability; 40-60% severe disability; % dependent for care or exaggerated symptom behavior. Minimal detectable change is 12%.     Score 11 12-19 20-28 29-37 38-45 46-54 55   Modifier CH CI CJ CK CL CM CN ? Carrying, Moving, and Handling Objects:     - CURRENT STATUS: CK - 40%-59% impaired, limited or restricted (4-82-01    - GOAL STATUS: CI - 1%-19% impaired, limited or restricted    - D/C STATUS:  ---------------To be determined---------------    Medical Necessity:   · Skilled intervention continues to be required due to reduced R shoulder range of motion, weakness, pain and decreased function s/p R reverse TSA. Reason for Services/Other Comments:  · Patient continues to require skilled intervention due to recent R reverse total shoulder arthroplasty and subsequent deficits in R UE function. Use of outcome tool(s) and clinical judgement create a POC that gives a: Clear prediction of patient's progress: LOW COMPLEXITY            TREATMENT:   (In addition to Assessment/Re-Assessment sessions the following treatments were rendered)  5/30/2018  . Pre-treatment Symptoms/Complaints: States that his shoulder is doing \"OK\". Has not been doing much HEP on a daily basis. Using his arm some through the day. Not too sore right now. Pain: Initial:   4/10 Post Session:  5-6/10      Therapeutic Exercise: (40 Minutes): Initiated UBE for UE motion and endurance, level 1 x6'. Shoulder motion with pulleys x20 each 3 ways. Shoulder motion with passive to AAROM in supine to forward elevation, 3x10, PROM to ER in neutral, 45, and 60 deg abd, IR stabilized at 45 abd, and abduction, 3\"x10 each; hold relax 3x15-20\" to each. Shoulder active strength with guided side-lying abd to 90-deg 2x10, side-lying middle and lower trap scapular adduction + UE lift 2x5 each; side-lying ER eccentrics 2x10; and standing UE [place] to forward elevation 2x10. Manual guiding to each. HEP: Advised to work on the pulleys daily, and can do active wall slide forward elevation. He verbalizes understanding. Treatment/Session Assessment:    · Response to Treatment: Motion is improving.  Still mildly stiff and weak to the shoulder and whole UE. He is about 6 week post op. · Compliance with Program/Exercises: Appears to be complaint. · Recommendations/Intent for next treatment session: \"Next visit will focus on progression into \"active\" phase of post-op rehab with initial scapualr and deltoid muscle activation and strength, and modalities as needed for post-treatment pain and swelling.   Total Treatment Duration:40 Minutes  PT Patient Time In/Time Out  Time In: 1350  Time Out: [address], PT, MSPT, OCS

## 2018-06-01 ENCOUNTER — HOSPITAL ENCOUNTER (OUTPATIENT)
Dept: PHYSICAL THERAPY | Age: 73
Discharge: HOME | End: 2018-06-01
Payer: MEDICARE

## 2018-06-01 PROCEDURE — 97140 MANUAL THERAPY 1/> REGIONS: CPT

## 2018-06-01 PROCEDURE — 97110 THERAPEUTIC EXERCISES: CPT

## 2018-06-01 NOTE — PROGRESS NOTES
Cristofer Members  : 1945  [place] / [place] / Product Type: Medicare /  [address]  at [facility] RENITA REGALADO  [address] [facility], [address] [place] [address],[address], [facility] [address]  [phone]   [phone]       OUTPATIENT PHYSICAL THERAPY:Daily Note 2018     ICD-10: Treatment Diagnosis: Stiffness of right shoulder, not elsewhere classified (M25.611),  Aftercare following joint replacement surgery (Z47.1)  Precautions/Allergies:   Celecoxib; Ibuprofen; Lescol [fluvastatin]; Nsaids (non-steroidal anti-inflammatory drug); Sulfa (sulfonamide antibiotics); and Uloric [febuxostat]   Fall Risk Score: 1 (? 5 = High Risk)  MD Orders: Evaluate and treat, HEP, , Strengthening, ROM, \"full ROM/full strength\" (704-89) MEDICAL/REFERRING DIAGNOSIS: s/p  R reverse TSA w/ BT   DATE OF ONSET: 18  REFERRING PHYSICIAN: Umer Noonan MD  RETURN PHYSICIAN APPOINTMENT: 18     PROGRESS ASSESSMENT (18):  Mr. Roque Jim has attended 10 visits to date. He is now nearly 6 weeks s/p R reverse total shoulder arthroplasty with a Delta Xtend prostheses, biceps tenodesis, latissimus dorsi and teres major tendon transfer. He is making progress. Pain is under control, and mostly present with active movement and stretching. His shoulder ROM is nearly in functional ranges now. He has a good start to deltoid strength. He is weak as expected at this point. He is progressing toward his goals. He will benefit from continued PT to further progress guided shoulder rehabl to promote safe return to normalized use of the UE with functional activities. PROBLEM LIST (Impacting functional limitations):  1. Decreased Yates with Home Exercise Program  2. Post-op R shoulder pain and swelling  3. Decreased R shoulder ROM   4. Weakness R shoulder INTERVENTIONS PLANNED:  1. Thermal and electric modalities, manual therapies for pain   2.  Manual therapies, therapeutic exercises, HEP for ROM 3. Therapeutic exercises and HEP for strength   TREATMENT PLAN:  Effective Dates: 5/8/2018 TO 8/6/18 . Frequency/Duration: 2-3 visits per week for 90 Days (with assumption that MD will authorize more visits at subsequent appointments)  GOALS: (Goals have been discussed and agreed upon with patient.)  Short-Term Functional Goals: Time Frame: 6 weeks  1. Report no more than 0-1/10 intermittent pain to R shoulder with compensatory use during basic functional activities, and score less than 30% on the DASH. Ongoing 5-30-18  2. R shoulder PROM forward elevation greater than 120 degrees and external rotation greater than 60 degrees to progress into functional ranges. Progressed, nearly met 5-30-18  3. Demonstrate good R shoulder isometric strength with manual testing to progress into strength phase. Met 5-30-18  4. Independent with initial HEP. Met 5-30-18  Discharge Goals: Time Frame: 12 weeks  1. No more than 1-2/10 intermittent pain R shoulder with return to normalized household and work activities, and score less than 15% on the DASH. 2. R shoulder AROM forward elevation greater than 120 degrees, external rotation greater than 45 degrees, and strength to shoulder are grossly WNL's for safe use with normalized activities. 3. Demonstrate good functional shoulder strength and endurance for return to normalized household and work activities. 4. Independent with advanced shoulder HEP for continued self-management. Rehabilitation Potential For Stated Goals: Good              The information in this section was collected on 5-8-18 (except where otherwise noted). HISTORY:   History of Present Injury/Illness (Reason for Referral): Patient underwent a reverse total shoulder arthroplasty on 4/20/18 secondary to reports of ongoing shoulder pain and instability. Patient states that his shoulder frequently \"went out\" if he moved it in certain positions.  Patient received home health following discharge from hospital after having R reverse TSA. Past Medical History/Comorbidities: Mr. Grady Holman  has a past medical history of Allergic rhinitis; Arthritis; CAD (coronary artery disease); CVA (cerebral vascular accident) ([facility] [address] (08/2017); Diabetes mellitus type 2, controlled ([facility] [address] Dyslipidemia; ED (erectile dysfunction); Encephalitis (1962); GERD (gastroesophageal reflux disease); Gout; Hypertension; Lacunar infarct, acute ([facility] [address] Lumbago; Memory loss; Mitral valve regurgitation (7/14/12); ROBERTO (obstructive sleep apnea); PAF (paroxysmal atrial fibrillation) ([facility] [address] Prostate cancer ([facility] [address] Psoriasis; SBO (small bowel obstruction) ([address] (2011, 2015, 2016); and Stage 2 chronic kidney disease. Mr. Grady Holman  has a past surgical history that includes pr left heart cath,percutaneous (7/2012); hx colonoscopy (1998, 2010); pr abdomen surgery proc unlisted (1967); hx hernia repair (Bilateral, 2012); hx appendectomy (age 49); hx radical prostatectomy ( ); pr prostate biopsy, needle, saturation sampling; hx cataract removal (Bilateral, 2013); hx coronary artery bypass graft (2009); vascular surgery procedure unlist (12/29/2017); and hx splenectomy (1951). Social History/Living Environment:  Patient lives with his spouse in a 2-story house (main level and basement) with 1 step to enter. Prior Level of Function/Work/Activity: Patient is retired. States that he does perform some  work but is unable to currently. Dominant Side:         RIGHT  Current Medications:  Tramadol. prn     Current Outpatient Prescriptions:     metFORMIN ER (GLUCOPHAGE XR) 500 mg tablet, Take 2 Tabs by mouth daily (with dinner). , Disp: 180 Tab, Rfl: 1    allopurinol (ZYLOPRIM) 300 mg tablet, Take 1 Tab by mouth daily. , Disp: 90 Tab, Rfl: 1    metoprolol tartrate (LOPRESSOR) 50 mg tablet, Take 1 Tab by mouth two (2) times a day., Disp: 180 Tab, Rfl: 1    gabapentin (NEURONTIN) 100 mg capsule, Take 1 Cap by mouth two (2) times a day., Disp: 180 Cap, Rfl: 1    pravastatin (PRAVACHOL) 40 mg tablet, Take 1 Tab by mouth nightly., Disp: 90 Tab, Rfl: 1    ranolazine ER (RANEXA) 500 mg SR tablet, TAKE 1 TABLET TWICE A DAY, Disp: 180 Tab, Rfl: 1    dilTIAZem ER (CARDIZEM LA) 360 mg Tb24 tablet, Take 1 Tab by mouth daily. , Disp: 90 Tab, Rfl: 1    losartan (COZAAR) 100 mg tablet, Take 1 Tab by mouth daily. , Disp: 90 Tab, Rfl: 1    traMADol (ULTRAM) 50 mg tablet, Take 1 Tab by mouth every six (6) hours as needed for Pain. Max Daily Amount: 200 mg., Disp: 60 Tab, Rfl: 1    aspirin delayed-release 81 mg tablet, Take 81 mg by mouth nightly., Disp: , Rfl:     esomeprazole (NEXIUM) 40 mg capsule, Take 1 Cap by mouth daily. Indications: am (Patient taking differently: Take 40 mg by mouth daily.), Disp: 90 Cap, Rfl: 1    apixaban (ELIQUIS) 5 mg tablet, Take 1 Tab by mouth two (2) times a day., Disp: 180 Tab, Rfl: 1    glucose blood VI test strips (BLOOD GLUCOSE TEST) strip, Test once to twice daily DX: E11.8, Disp: 300 Strip, Rfl: 3    Lancets misc, Test once to twice daily DX: E11.8, Disp: 300 Each, Rfl: 3    triamcinolone acetonide (KENALOG) 0.1 % ointment, , Disp: , Rfl:     tacrolimus (PROTOPIC) 0.1 % ointment, , Disp: , Rfl:     mometasone (ELOCON) 0.1 % topical cream, , Disp: , Rfl:     nitroglycerin (NITROSTAT) 0.4 mg SL tablet, 1 Tab by SubLINGual route every five (5) minutes as needed for Chest Pain., Disp: 25 Tab, Rfl: 11    pimecrolimus (ELIDEL) 1 % topical cream, Apply  to affected area two (2) times a day., Disp: , Rfl:     fluticasone (FLONASE) 50 mcg/actuation nasal spray, as needed. , Disp: , Rfl:     cholecalciferol, vitamin D3, (VITAMIN D3) 2,000 unit Tab, Take 2,000 Units by mouth daily. Indications: OSTEOPOROSIS, am, Disp: , Rfl:     Cetirizine (ZYRTEC) 10 mg Cap, Take 10 mg by mouth nightly.     Indications: ALLERGIC RHINITIS, Disp: , Rfl:    Date Last Reviewed:  5-30-18   Number of Personal Factors/Comorbidities that affect the Plan of Care: 1-2: MODERATE COMPLEXITY   EXAMINATION:   6/1/2018  Observation/Orthostatic Postural Assessment: Comes into clinic without sling. No apparent distress. He is drowsy. Palpation: Not assessed. ROM:                            Strength:         R Shoulder: good isometrics to deltoid. Body Structures Involved:  1. Nerves  2. Bones  3. Joints  4. Muscles Body Functions Affected:  1. Sensory/Pain  2. Neuromusculoskeletal  3. Movement Related  4. Skin Related Activities and Participation Affected:  1. Self Care  2. Domestic Life  3. Interpersonal Interactions and Relationships   Number of elements (examined above) that affect the Plan of Care: 4+: HIGH COMPLEXITY   CLINICAL PRESENTATION:   Presentation: Stable and uncomplicated: LOW COMPLEXITY   CLINICAL DECISION MAKING:   Outcome Measure: Tool Used: Disabilities of the Arm, Shoulder and Hand (DASH) Questionnaire - Quick Version  Score:  Initial: 34/55 or 52% disability Most Recent: 33/55 or 50% disability (5-30-18)   Interpretation of Score: The DASH is designed to measure the activities of daily living in person's with upper extremity dysfunction or pain. Each section is scored on a 1-5 scale, 5 representing the greatest disability. The scores of each section are added together for a total score of 55. This number is divided by 11, followed by subtracting 1 and multiplying by 25 to get a percent score of disability. This value represents the percentage disability: 0-20% minimal disability; 20-40% moderate disability; 40-60% severe disability; % dependent for care or exaggerated symptom behavior. Minimal detectable change is 12%. Score 11 12-19 20-28 29-37 38-45 46-54 55   Modifier CH CI CJ CK CL CM CN ?    Carrying, Moving, and Handling Objects:     - CURRENT STATUS: CK - 40%-59% impaired, limited or restricted (3-46-36    - GOAL STATUS: CI - 1%-19% impaired, limited or restricted    - D/C STATUS:  ---------------To be determined---------------    Medical Necessity:   · Skilled intervention continues to be required due to reduced R shoulder range of motion, weakness, pain and decreased function s/p R reverse TSA. Reason for Services/Other Comments:  · Patient continues to require skilled intervention due to recent R reverse total shoulder arthroplasty and subsequent deficits in R UE function. Use of outcome tool(s) and clinical judgement create a POC that gives a: Clear prediction of patient's progress: LOW COMPLEXITY            TREATMENT:   (In addition to Assessment/Re-Assessment sessions the following treatments were rendered)  6/1/2018  . Pre-treatment Symptoms/Complaints: States that his arm feels good today and that he does not have any pain upon arrival to PT. Pain: Initial:   0/10 Post Session:  5-6/10    Manual therapy (15 minutes) to improve R shoulder ROM and reduce discomfort. Soft tissue mobilizations to reduce discomfort and tightness around R deltoid and incisions. PROM progressed tog rade 3 physio mobilizations to increase R shoulder forward elevation and external rotation range of motion. Side-lying scapular mobilizations to improve scapular mobility. Therapeutic Exercise: ( 25 minutes) to improve R shoulder strength and function. Side-lying abduction 2 x 10 and side-lying external rotations 2 x 10 to improve deltoid strength. Middle trap isometrics in side-lying (10 x 5\"). Wall slides 3 x 5 w/R UE. Deltoid isometrics for flexion x 10. Side-lying ABD 2 x 10, prone rows 3 x 10 with R UE. Prone extensions 2 x 10. Scapular retractions (blue band) 2 x 15 bilaterally to improve scapular strength. HEP: No changes today  Treatment/Session Assessment:    · Response to Treatment: Making progress. Remains stiff with flexion but improves with continued motion. Performed better with scapular isometrics today and able to retract scapula against resistance without pain.   · Compliance with Program/Exercises: Appears to be complaint. · Recommendations/Intent for next treatment session: \"Next visit will focus on progression into \"active\" phase of post-op rehab with initial scapualr and deltoid muscle activation and strength, and modalities as needed for post-treatment pain and swelling.   Total Treatment Duration:40 Minutes  PT Patient Time In/Time Out  Time In: 1130  Time Out: [address] Harold[address] Silveira, PT

## 2018-06-04 ENCOUNTER — HOSPITAL ENCOUNTER (OUTPATIENT)
Dept: PHYSICAL THERAPY | Age: 73
Discharge: HOME | End: 2018-06-04
Payer: MEDICARE

## 2018-06-04 PROCEDURE — 97140 MANUAL THERAPY 1/> REGIONS: CPT

## 2018-06-04 PROCEDURE — 97110 THERAPEUTIC EXERCISES: CPT

## 2018-06-04 NOTE — PROGRESS NOTES
María Maritnez  : 1945  [place] / [place] / Product Type: Medicare /  [address]  at [facility] RENITA REGALADO  [address] [facility], [address] [place] [address],[address], [facility] [address]  [phone]   [phone]       OUTPATIENT PHYSICAL THERAPY:Daily Note 2018     ICD-10: Treatment Diagnosis: Stiffness of right shoulder, not elsewhere classified (M25.611),  Aftercare following joint replacement surgery (Z47.1)  Precautions/Allergies:   Celecoxib; Ibuprofen; Lescol [fluvastatin]; Nsaids (non-steroidal anti-inflammatory drug); Sulfa (sulfonamide antibiotics); and Uloric [febuxostat]   Fall Risk Score: 1 (? 5 = High Risk)  MD Orders: Evaluate and treat, HEP, , Strengthening, ROM, \"full ROM/full strength\" (22) MEDICAL/REFERRING DIAGNOSIS: s/p  R reverse TSA w/ BT   DATE OF ONSET: 18  REFERRING PHYSICIAN: Claudia Kirk MD  RETURN PHYSICIAN APPOINTMENT: 18     PROGRESS ASSESSMENT (18):  Mr. Montey Dakin has attended 10 visits to date. He is now nearly 6 weeks s/p R reverse total shoulder arthroplasty with a Delta Xtend prostheses, biceps tenodesis, latissimus dorsi and teres major tendon transfer. He is making progress. Pain is under control, and mostly present with active movement and stretching. His shoulder ROM is nearly in functional ranges now. He has a good start to deltoid strength. He is weak as expected at this point. He is progressing toward his goals. He will benefit from continued PT to further progress guided shoulder rehabl to promote safe return to normalized use of the UE with functional activities. PROBLEM LIST (Impacting functional limitations):  1. Decreased [place] with Home Exercise Program  2. Post-op R shoulder pain and swelling  3. Decreased R shoulder ROM   4. Weakness R shoulder INTERVENTIONS PLANNED:  1. Thermal and electric modalities, manual therapies for pain   2.  Manual therapies, therapeutic exercises, HEP for ROM 3. Therapeutic exercises and HEP for strength   TREATMENT PLAN:  Effective Dates: 5/8/2018 TO 8/6/18 . Frequency/Duration: 2-3 visits per week for 90 Days (with assumption that MD will authorize more visits at subsequent appointments)  GOALS: (Goals have been discussed and agreed upon with patient.)  Short-Term Functional Goals: Time Frame: 6 weeks  1. Report no more than 0-1/10 intermittent pain to R shoulder with compensatory use during basic functional activities, and score less than 30% on the DASH. Ongoing 5-30-18  2. R shoulder PROM forward elevation greater than 120 degrees and external rotation greater than 60 degrees to progress into functional ranges. Progressed, nearly met 5-30-18  3. Demonstrate good R shoulder isometric strength with manual testing to progress into strength phase. Met 5-30-18  4. Independent with initial HEP. Met 5-30-18  Discharge Goals: Time Frame: 12 weeks  1. No more than 1-2/10 intermittent pain R shoulder with return to normalized household and work activities, and score less than 15% on the DASH. 2. R shoulder AROM forward elevation greater than 120 degrees, external rotation greater than 45 degrees, and strength to shoulder are grossly WNL's for safe use with normalized activities. 3. Demonstrate good functional shoulder strength and endurance for return to normalized household and work activities. 4. Independent with advanced shoulder HEP for continued self-management. Rehabilitation Potential For Stated Goals: Good              The information in this section was collected on 5-8-18 (except where otherwise noted). HISTORY:   History of Present Injury/Illness (Reason for Referral): Patient underwent a reverse total shoulder arthroplasty on 4/20/18 secondary to reports of ongoing shoulder pain and instability. Patient states that his shoulder frequently \"went out\" if he moved it in certain positions.  Patient received home health following discharge from hospital after having R reverse TSA. Past Medical History/Comorbidities: Mr. Dheeraj Sanchez  has a past medical history of Allergic rhinitis; Arthritis; CAD (coronary artery disease); CVA (cerebral vascular accident) ([facility] (08/2017); Diabetes mellitus type 2, controlled ([facility] Dyslipidemia; ED (erectile dysfunction); Encephalitis (1962); GERD (gastroesophageal reflux disease); Gout; Hypertension; Lacunar infarct, acute ([facility] Lumbago; Memory loss; Mitral valve regurgitation (7/14/12); ROBERTO (obstructive sleep apnea); PAF (paroxysmal atrial fibrillation) ([facility] Pro[facility] Psoriasis; SBO (small bowel obstruction) ([facility] (2011, 2015, 2016); and Stage 2 chronic kidney disease. Mr. Dheeraj Sanchez  has a past surgical history that includes pr left heart cath,percutaneous (7/2012); hx colonoscopy (1998, 2010); pr abdomen surgery proc unlisted (1967); hx hernia repair (Bilateral, 2012); hx appendectomy (age 49); hx radical prostatectomy ( ); pr prostate biopsy, needle, saturation sampling; hx cataract removal (Bilateral, 2013); hx coronary artery bypass graft (2009); vascular surgery procedure unlist (12/29/2017); and hx splenectomy (1951). Social History/Living Environment:  Patient lives with his spouse in a 2-story house (main level and basement) with 1 step to enter. Prior Level of Function/Work/Activity: Patient is retired. States that he does perform some  work but is unable to currently. Dominant Side:         RIGHT  Current Medications:  Tramadol. prn     Current Outpatient Prescriptions:     metFORMIN ER (GLUCOPHAGE XR) 500 mg tablet, Take 2 Tabs by mouth daily (with dinner). , Disp: 180 Tab, Rfl: 1    allopurinol (ZYLOPRIM) 300 mg tablet, Take 1 Tab by mouth daily. , Disp: 90 Tab, Rfl: 1    metoprolol tartrate (LOPRESSOR) 50 mg tablet, Take 1 Tab by mouth two (2) times a day., Disp: 180 Tab, Rfl: 1    gabapentin (NEURONTIN) 100 mg capsule, Take 1 Cap by mouth two (2) times a day., Disp: 180 Cap, Rfl: 1    pravastatin (PRAVACHOL) 40 mg tablet, Take 1 Tab by mouth nightly., Disp: 90 Tab, Rfl: 1    ranolazine ER (RANEXA) 500 mg SR tablet, TAKE 1 TABLET TWICE A DAY, Disp: 180 Tab, Rfl: 1    dilTIAZem ER (CARDIZEM LA) 360 mg Tb24 tablet, Take 1 Tab by mouth daily. , Disp: 90 Tab, Rfl: 1    losartan (COZAAR) 100 mg tablet, Take 1 Tab by mouth daily. , Disp: 90 Tab, Rfl: 1    traMADol (ULTRAM) 50 mg tablet, Take 1 Tab by mouth every six (6) hours as needed for Pain. Max Daily Amount: 200 mg., Disp: 60 Tab, Rfl: 1    aspirin delayed-release 81 mg tablet, Take 81 mg by mouth nightly., Disp: , Rfl:     esomeprazole (NEXIUM) 40 mg capsule, Take 1 Cap by mouth daily. Indications: am (Patient taking differently: Take 40 mg by mouth daily.), Disp: 90 Cap, Rfl: 1    apixaban (ELIQUIS) 5 mg tablet, Take 1 Tab by mouth two (2) times a day., Disp: 180 Tab, Rfl: 1    glucose blood VI test strips (BLOOD GLUCOSE TEST) strip, Test once to twice daily DX: E11.8, Disp: 300 Strip, Rfl: 3    Lancets misc, Test once to twice daily DX: E11.8, Disp: 300 Each, Rfl: 3    triamcinolone acetonide (KENALOG) 0.1 % ointment, , Disp: , Rfl:     tacrolimus (PROTOPIC) 0.1 % ointment, , Disp: , Rfl:     mometasone (ELOCON) 0.1 % topical cream, , Disp: , Rfl:     nitroglycerin (NITROSTAT) 0.4 mg SL tablet, 1 Tab by SubLINGual route every five (5) minutes as needed for Chest Pain., Disp: 25 Tab, Rfl: 11    pimecrolimus (ELIDEL) 1 % topical cream, Apply  to affected area two (2) times a day., Disp: , Rfl:     fluticasone (FLONASE) 50 mcg/actuation nasal spray, as needed. , Disp: , Rfl:     cholecalciferol, vitamin D3, (VITAMIN D3) 2,000 unit Tab, Take 2,000 Units by mouth daily. Indications: OSTEOPOROSIS, am, Disp: , Rfl:     Cetirizine (ZYRTEC) 10 mg Cap, Take 10 mg by mouth nightly.     Indications: ALLERGIC RHINITIS, Disp: , Rfl:    Date Last Reviewed:  5-30-18   Number of Personal Factors/Comorbidities that affect the Plan of Care: 1-2: MODERATE COMPLEXITY   EXAMINATION:   6/4/2018  Observation/Orthostatic Postural Assessment: Comes into clinic without sling. No apparent distress. He is drowsy. Palpation: Not assessed. ROM:                   Passive R shoulder forward elevation (supine) 134 degrees         Strength: Heddie Creston Body Structures Involved:  1. Nerves  2. Bones  3. Joints  4. Muscles Body Functions Affected:  1. Sensory/Pain  2. Neuromusculoskeletal  3. Movement Related  4. Skin Related Activities and Participation Affected:  1. Self Care  2. Domestic Life  3. Interpersonal Interactions and Relationships   Number of elements (examined above) that affect the Plan of Care: 4+: HIGH COMPLEXITY   CLINICAL PRESENTATION:   Presentation: Stable and uncomplicated: LOW COMPLEXITY   CLINICAL DECISION MAKING:   Outcome Measure: Tool Used: Disabilities of the Arm, Shoulder and Hand (DASH) Questionnaire - Quick Version  Score:  Initial: 34/55 or 52% disability Most Recent: 33/55 or 50% disability (5-30-18)   Interpretation of Score: The DASH is designed to measure the activities of daily living in person's with upper extremity dysfunction or pain. Each section is scored on a 1-5 scale, 5 representing the greatest disability. The scores of each section are added together for a total score of 55. This number is divided by 11, followed by subtracting 1 and multiplying by 25 to get a percent score of disability. This value represents the percentage disability: 0-20% minimal disability; 20-40% moderate disability; 40-60% severe disability; % dependent for care or exaggerated symptom behavior. Minimal detectable change is 12%. Score 11 12-19 20-28 29-37 38-45 46-54 55   Modifier CH CI CJ CK CL CM CN ?    Carrying, Moving, and Handling Objects:     - CURRENT STATUS: CK - 40%-59% impaired, limited or restricted (8-48-67    - GOAL STATUS: CI - 1%-19% impaired, limited or restricted    - D/C STATUS:  ---------------To be determined---------------    Medical Necessity:   · Skilled intervention continues to be required due to reduced R shoulder range of motion, weakness, pain and decreased function s/p R reverse TSA. Reason for Services/Other Comments:  · Patient continues to require skilled intervention due to recent R reverse total shoulder arthroplasty and subsequent deficits in R UE function. Use of outcome tool(s) and clinical judgement create a POC that gives a: Clear prediction of patient's progress: LOW COMPLEXITY            TREATMENT:   (In addition to Assessment/Re-Assessment sessions the following treatments were rendered)  6/4/2018  . Pre-treatment Symptoms/Complaints: States that his arm is sore today upon arrival to PT. Rates his pain \"about a 5\" out of 10. Pain: Initial:   5/10 Post Session:  No numeric value reported, but stated that it felt tired. Denied pain. Manual therapy (15 minutes) to improve R shoulder ROM and reduce discomfort. Soft tissue mobilizations to reduce discomfort and tightness around R deltoid and incisions. PROM progressed to  grade 3 physio mobilizations to increase R shoulder forward elevation and external rotation range of motion. Therapeutic Exercise: ( 25 minutes) to improve R shoulder strength and function. Isometrics in supine at 90 degrees of flexion ( x 15 ea) for improved deltoid muscle activation. Supine press 3 x 10 at 2# for increased R deltoid strength. Side-lying abduction 1# 3 x 10 and side-lying ER for increased deltoid strength. Side-lying prone trap lifts (2 x 6 repetitions) for improved scapular stabilization. UE Novi (2 x 12) for improved R forward elevation. Bentover rows 0# x 12, 2# 2 x 12 with R UE, cable rows 3# x 10, 7# 2 x 10 with R UE and prone rows 0# 2 x 12 for improved R scapular strength. R shoulder active flexion in standing to  degrees, 0# x 10, 1# 2 x 10 for improved R shoulder strength and range of motion.     HEP: No changes today    Treatment/Session Assessment:    · Response to Treatment: Continues to demonstrate good progress with range of motion, but continues to demonstrate reduced strength with periscapular muscles. · Compliance with Program/Exercises: Appears to be complaint. · Recommendations/Intent for next treatment session: \"Next visit will focus on progression into \"active\" phase of post-op rehab with initial scapualr and deltoid muscle activation and strength, and modalities as needed for post-treatment pain and swelling.   Total Treatment Duration: 40 Minutes  PT Patient Time In/Time Out  Time In: 1333  Time Out: [address], PT

## 2018-06-06 ENCOUNTER — HOSPITAL ENCOUNTER (OUTPATIENT)
Dept: PHYSICAL THERAPY | Age: 73
Discharge: HOME | End: 2018-06-06
Payer: MEDICARE

## 2018-06-06 PROCEDURE — 97110 THERAPEUTIC EXERCISES: CPT

## 2018-06-06 PROCEDURE — 97140 MANUAL THERAPY 1/> REGIONS: CPT

## 2018-06-06 NOTE — PROGRESS NOTES
Sammie [place]  : 1945  [place] / [place] / Product Type: Medicare /  [address]  at [facility] RENITA REGALADO  [address] [facility], [address] [place] [address],[address], [address] [facility]  [phone]   [phone]       OUTPATIENT PHYSICAL THERAPY:Daily Note 2018     ICD-10: Treatment Diagnosis: Stiffness of right shoulder, not elsewhere classified (M25.611),  Aftercare following joint replacement surgery (Z47.1)  Precautions/Allergies:   Celecoxib; Ibuprofen; Lescol [fluvastatin]; Nsaids (non-steroidal anti-inflammatory drug); Sulfa (sulfonamide antibiotics); and Uloric [febuxostat]   Fall Risk Score: 1 (? 5 = High Risk)  MD Orders: Evaluate and treat, HEP, , Strengthening, ROM, \"full ROM/full strength\" (274-86) MEDICAL/REFERRING DIAGNOSIS: s/p  R reverse TSA w/ BT   DATE OF ONSET: 18  REFERRING PHYSICIAN: Rogelio Christianson MD  RETURN PHYSICIAN APPOINTMENT: 18     PROGRESS ASSESSMENT (18):  Mr. Ginna Loo has attended 10 visits to date. He is now nearly 6 weeks s/p R reverse total shoulder arthroplasty with a Delta Xtend prostheses, biceps tenodesis, latissimus dorsi and teres major tendon transfer. He is making progress. Pain is under control, and mostly present with active movement and stretching. His shoulder ROM is nearly in functional ranges now. He has a good start to deltoid strength. He is weak as expected at this point. He is progressing toward his goals. He will benefit from continued PT to further progress guided shoulder rehabl to promote safe return to normalized use of the UE with functional activities. PROBLEM LIST (Impacting functional limitations):  1. Decreased [place] with Home Exercise Program  2. Post-op R shoulder pain and swelling  3. Decreased R shoulder ROM   4. Weakness R shoulder INTERVENTIONS PLANNED:  1. Thermal and electric modalities, manual therapies for pain   2.  Manual therapies, therapeutic exercises, HEP for ROM 3. Therapeutic exercises and HEP for strength   TREATMENT PLAN:  Effective Dates: 5/8/2018 TO 8/6/18 . Frequency/Duration: 2-3 visits per week for 90 Days (with assumption that MD will authorize more visits at subsequent appointments)  GOALS: (Goals have been discussed and agreed upon with patient.)  Short-Term Functional Goals: Time Frame: 6 weeks  1. Report no more than 0-1/10 intermittent pain to R shoulder with compensatory use during basic functional activities, and score less than 30% on the DASH. Ongoing 5-30-18  2. R shoulder PROM forward elevation greater than 120 degrees and external rotation greater than 60 degrees to progress into functional ranges. Progressed, nearly met 5-30-18  3. Demonstrate good R shoulder isometric strength with manual testing to progress into strength phase. Met 5-30-18  4. Independent with initial HEP. Met 5-30-18  Discharge Goals: Time Frame: 12 weeks  1. No more than 1-2/10 intermittent pain R shoulder with return to normalized household and work activities, and score less than 15% on the DASH. 2. R shoulder AROM forward elevation greater than 120 degrees, external rotation greater than 45 degrees, and strength to shoulder are grossly WNL's for safe use with normalized activities. 3. Demonstrate good functional shoulder strength and endurance for return to normalized household and work activities. 4. Independent with advanced shoulder HEP for continued self-management. Rehabilitation Potential For Stated Goals: Good              The information in this section was collected on 5-8-18 (except where otherwise noted). HISTORY:   History of Present Injury/Illness (Reason for Referral): Patient underwent a reverse total shoulder arthroplasty on 4/20/18 secondary to reports of ongoing shoulder pain and instability. Patient states that his shoulder frequently \"went out\" if he moved it in certain positions.  Patient received home health following discharge from hospital after having R reverse TSA. Past Medical History/Comorbidities: Mr. Kayden Wilson  has a past medical history of Allergic rhinitis; Arthritis; CAD (coronary artery disease); CVA (cerebral vascular accident) ([facility] (08/2017); Diabetes mellitus type 2, controlled ([facility] Dyslipidemia; ED (erectile dysfunction); Encephalitis (1962); GERD (gastroesophageal reflux disease); Gout; Hypertension; Lacunar infarct, acute ([facility] Lumbago; Memory loss; Mitral valve regurgitation (7/14/12); ROBERTO (obstructive sleep apnea); PAF (paroxysmal atrial fibrillation) ([facility] Prostate cancer ([facility] Psoriasis; SBO (small bowel obstruction) ([facility] (2011, 2015, 2016); and Stage 2 chronic kidney disease. Mr. Kayden Wilson  has a past surgical history that includes pr left heart cath,percutaneous (7/2012); hx colonoscopy (1998, 2010); pr abdomen surgery proc unlisted (1967); hx hernia repair (Bilateral, 2012); hx appendectomy (age 49); hx radical prostatectomy ( ); pr prostate biopsy, needle, saturation sampling; hx cataract removal (Bilateral, 2013); hx coronary artery bypass graft (2009); vascular surgery procedure unlist (12/29/2017); and hx splenectomy (1951). Social History/Living Environment:  Patient lives with his spouse in a 2-story house (main level and basement) with 1 step to enter. Prior Level of Function/Work/Activity: Patient is retired. States that he does perform some  work but is unable to currently. Dominant Side:         RIGHT  Current Medications:  Tramadol. prn     Current Outpatient Prescriptions:     metFORMIN ER (GLUCOPHAGE XR) 500 mg tablet, Take 2 Tabs by mouth daily (with dinner). , Disp: 180 Tab, Rfl: 1    allopurinol (ZYLOPRIM) 300 mg tablet, Take 1 Tab by mouth daily. , Disp: 90 Tab, Rfl: 1    metoprolol tartrate (LOPRESSOR) 50 mg tablet, Take 1 Tab by mouth two (2) times a day., Disp: 180 Tab, Rfl: 1    gabapentin (NEURONTIN) 100 mg capsule, Take 1 Cap by mouth two (2) times a day., Disp: 180 Cap, Rfl: 1    pravastatin (PRAVACHOL) 40 mg tablet, Take 1 Tab by mouth nightly., Disp: 90 Tab, Rfl: 1    ranolazine ER (RANEXA) 500 mg SR tablet, TAKE 1 TABLET TWICE A DAY, Disp: 180 Tab, Rfl: 1    dilTIAZem ER (CARDIZEM LA) 360 mg Tb24 tablet, Take 1 Tab by mouth daily. , Disp: 90 Tab, Rfl: 1    losartan (COZAAR) 100 mg tablet, Take 1 Tab by mouth daily. , Disp: 90 Tab, Rfl: 1    traMADol (ULTRAM) 50 mg tablet, Take 1 Tab by mouth every six (6) hours as needed for Pain. Max Daily Amount: 200 mg., Disp: 60 Tab, Rfl: 1    aspirin delayed-release 81 mg tablet, Take 81 mg by mouth nightly., Disp: , Rfl:     esomeprazole (NEXIUM) 40 mg capsule, Take 1 Cap by mouth daily. Indications: am (Patient taking differently: Take 40 mg by mouth daily.), Disp: 90 Cap, Rfl: 1    apixaban (ELIQUIS) 5 mg tablet, Take 1 Tab by mouth two (2) times a day., Disp: 180 Tab, Rfl: 1    glucose blood VI test strips (BLOOD GLUCOSE TEST) strip, Test once to twice daily DX: E11.8, Disp: 300 Strip, Rfl: 3    Lancets misc, Test once to twice daily DX: E11.8, Disp: 300 Each, Rfl: 3    triamcinolone acetonide (KENALOG) 0.1 % ointment, , Disp: , Rfl:     tacrolimus (PROTOPIC) 0.1 % ointment, , Disp: , Rfl:     mometasone (ELOCON) 0.1 % topical cream, , Disp: , Rfl:     nitroglycerin (NITROSTAT) 0.4 mg SL tablet, 1 Tab by SubLINGual route every five (5) minutes as needed for Chest Pain., Disp: 25 Tab, Rfl: 11    pimecrolimus (ELIDEL) 1 % topical cream, Apply  to affected area two (2) times a day., Disp: , Rfl:     fluticasone (FLONASE) 50 mcg/actuation nasal spray, as needed. , Disp: , Rfl:     cholecalciferol, vitamin D3, (VITAMIN D3) 2,000 unit Tab, Take 2,000 Units by mouth daily. Indications: OSTEOPOROSIS, am, Disp: , Rfl:     Cetirizine (ZYRTEC) 10 mg Cap, Take 10 mg by mouth nightly.     Indications: ALLERGIC RHINITIS, Disp: , Rfl:    Date Last Reviewed:  5-30-18   Number of Personal Factors/Comorbidities that affect the Plan of Care: 1-2: MODERATE COMPLEXITY   EXAMINATION:   6/6/2018  Observation/Orthostatic Postural Assessment: Comes into clinic without sling. No apparent distress. He is drowsy. Palpation: Not assessed. ROM:                   Passive R shoulder forward elevation (supine) 134 degrees         Strength: Andrewa Curly Body Structures Involved:  1. Nerves  2. Bones  3. Joints  4. Muscles Body Functions Affected:  1. Sensory/Pain  2. Neuromusculoskeletal  3. Movement Related  4. Skin Related Activities and Participation Affected:  1. Self Care  2. Domestic Life  3. Interpersonal Interactions and Relationships   Number of elements (examined above) that affect the Plan of Care: 4+: HIGH COMPLEXITY   CLINICAL PRESENTATION:   Presentation: Stable and uncomplicated: LOW COMPLEXITY   CLINICAL DECISION MAKING:   Outcome Measure: Tool Used: Disabilities of the Arm, Shoulder and Hand (DASH) Questionnaire - Quick Version  Score:  Initial: 34/55 or 52% disability Most Recent: 33/55 or 50% disability (5-30-18)   Interpretation of Score: The DASH is designed to measure the activities of daily living in person's with upper extremity dysfunction or pain. Each section is scored on a 1-5 scale, 5 representing the greatest disability. The scores of each section are added together for a total score of 55. This number is divided by 11, followed by subtracting 1 and multiplying by 25 to get a percent score of disability. This value represents the percentage disability: 0-20% minimal disability; 20-40% moderate disability; 40-60% severe disability; % dependent for care or exaggerated symptom behavior. Minimal detectable change is 12%. Score 11 12-19 20-28 29-37 38-45 46-54 55   Modifier CH CI CJ CK CL CM CN ?    Carrying, Moving, and Handling Objects:     - CURRENT STATUS: CK - 40%-59% impaired, limited or restricted (6-90-27    - GOAL STATUS: CI - 1%-19% impaired, limited or restricted    - D/C STATUS:  ---------------To be determined---------------    Medical Necessity:   · Skilled intervention continues to be required due to reduced R shoulder range of motion, weakness, pain and decreased function s/p R reverse TSA. Reason for Services/Other Comments:  · Patient continues to require skilled intervention due to recent R reverse total shoulder arthroplasty and subsequent deficits in R UE function. Use of outcome tool(s) and clinical judgement create a POC that gives a: Clear prediction of patient's progress: LOW COMPLEXITY            TREATMENT:   (In addition to Assessment/Re-Assessment sessions the following treatments were rendered)  6/6/2018  . Pre-treatment Symptoms/Complaints: \"I'm doing rough\". States that his R shoulder is sore today  Pain: Initial:   6/10 Post Session:  No number provided. Stated that it felt better but was sore. Manual therapy (15 minutes) to improve R shoulder ROM and reduce discomfort. Soft tissue mobilizations to reduce discomfort and tightness around R deltoid and incisions. PROM progressed to  grade 3 physiologic mobilizations to increase R shoulder forward elevation and external rotation range of motion. Scapular mobilizations (grade 3) for improved scapular mobility with downward rotation, depression and abduction/adduction. Therapeutic Exercise: ( 20 minutes)  Supine press 3 x 10 at 2# for increased R deltoid strength. Side-lying abduction 2# 3 x 8  for increased deltoid strength with tactile cues to depress R scapula. Side-lying prone trap lifts (2 x 8 repetitions) for improved scapular stabilization with tactile cues to \"set\" scapula prior to performing distal movements. Cable rows 7# 3 x 10 with R UE for mproved R scapular strength. R shoulder active flexion in standing to  degrees with manual cues to depress scapula 3 x 10.  Scapular retractions with blue theraband and elbows extended (2 x 15) and with black theraband with elbows flexed (2 x 15) for improved posture and scapular strengthening. HEP: No changes today    Treatment/Session Assessment:    · Response to Treatment: Improving with forward elevation range of motion, both actively and passively. Did show some progress with side-lying middle trap exercises with improved palpable muscle contraction. Remains somewhat tight with R shoulder ER but progressing. · Compliance with Program/Exercises: Appears to be complaint. · Recommendations/Intent for next treatment session: \"Next visit will focus on progression into \"active\" phase of post-op rehab with initial scapualr and deltoid muscle activation and strength, and modalities as needed for post-treatment pain and swelling.   Total Treatment Duration: 35 Minutes  PT Patient Time In/Time Out  Time In: 1355  Time Out: [address], PT

## 2018-06-08 ENCOUNTER — HOSPITAL ENCOUNTER (OUTPATIENT)
Dept: PHYSICAL THERAPY | Age: 73
Discharge: HOME | End: 2018-06-08
Payer: MEDICARE

## 2018-06-08 PROCEDURE — 97140 MANUAL THERAPY 1/> REGIONS: CPT

## 2018-06-08 PROCEDURE — 97110 THERAPEUTIC EXERCISES: CPT

## 2018-06-08 NOTE — PROGRESS NOTES
Gaye Paxton  : 1945  [place] / [place] / Product Type: Medicare /  [address] at [facility] RENITA REGALADO  [address] [facility], [address] [place] [address],[address], [address] [facility]  [phone]   [phone]       OUTPATIENT PHYSICAL THERAPY:Daily Note 2018     ICD-10: Treatment Diagnosis: Stiffness of right shoulder, not elsewhere classified (M25.611),  Aftercare following joint replacement surgery (Z47.1)  Precautions/Allergies:   Celecoxib; Ibuprofen; Lescol [fluvastatin]; Nsaids (non-steroidal anti-inflammatory drug); Sulfa (sulfonamide antibiotics); and Uloric [febuxostat]   Fall Risk Score: 1 (? 5 = High Risk)  MD Orders: Evaluate and treat, HEP, , Strengthening, ROM, \"full ROM/full strength\" (8-55-23) MEDICAL/REFERRING DIAGNOSIS: s/p  R reverse TSA w/ BT   DATE OF ONSET: 18  REFERRING PHYSICIAN: Ila Greer MD  RETURN PHYSICIAN APPOINTMENT: 18     PROGRESS ASSESSMENT (18):  Mr. Nathan Jay has attended 10 visits to date. He is now nearly 6 weeks s/p R reverse total shoulder arthroplasty with a Delta Xtend prostheses, biceps tenodesis, latissimus dorsi and teres major tendon transfer. He is making progress. Pain is under control, and mostly present with active movement and stretching. His shoulder ROM is nearly in functional ranges now. He has a good start to deltoid strength. He is weak as expected at this point. He is progressing toward his goals. He will benefit from continued PT to further progress guided shoulder rehabl to promote safe return to normalized use of the UE with functional activities. PROBLEM LIST (Impacting functional limitations):  1. Decreased [place] with Home Exercise Program  2. Post-op R shoulder pain and swelling  3. Decreased R shoulder ROM   4. Weakness R shoulder INTERVENTIONS PLANNED:  1. Thermal and electric modalities, manual therapies for pain   2.  Manual therapies, therapeutic exercises, HEP for ROM 3. Therapeutic exercises and HEP for strength   TREATMENT PLAN:  Effective Dates: 5/8/2018 TO 8/6/18 . Frequency/Duration: 2-3 visits per week for 90 Days (with assumption that MD will authorize more visits at subsequent appointments)  GOALS: (Goals have been discussed and agreed upon with patient.)  Short-Term Functional Goals: Time Frame: 6 weeks  1. Report no more than 0-1/10 intermittent pain to R shoulder with compensatory use during basic functional activities, and score less than 30% on the DASH. Ongoing 5-30-18  2. R shoulder PROM forward elevation greater than 120 degrees and external rotation greater than 60 degrees to progress into functional ranges. Progressed, nearly met 5-30-18  3. Demonstrate good R shoulder isometric strength with manual testing to progress into strength phase. Met 5-30-18  4. Independent with initial HEP. Met 5-30-18  Discharge Goals: Time Frame: 12 weeks  1. No more than 1-2/10 intermittent pain R shoulder with return to normalized household and work activities, and score less than 15% on the DASH. 2. R shoulder AROM forward elevation greater than 120 degrees, external rotation greater than 45 degrees, and strength to shoulder are grossly WNL's for safe use with normalized activities. 3. Demonstrate good functional shoulder strength and endurance for return to normalized household and work activities. 4. Independent with advanced shoulder HEP for continued self-management. Rehabilitation Potential For Stated Goals: Good              The information in this section was collected on 5-8-18 (except where otherwise noted). HISTORY:   History of Present Injury/Illness (Reason for Referral): Patient underwent a reverse total shoulder arthroplasty on 4/20/18 secondary to reports of ongoing shoulder pain and instability. Patient states that his shoulder frequently \"went out\" if he moved it in certain positions.  Patient received home health following discharge from hospital after having R reverse TSA. Past Medical History/Comorbidities: Mr. Candelaria Page  has a past medical history of Allergic rhinitis; Arthritis; CAD (coronary artery disease); CVA (cerebral vascular accident) ([facility] (08/2017); Diabetes mellitus type 2, controlled ([facility] [address] [facility] Dyslipidemia; ED (erectile dysfunction); Encephalitis (1962); GERD (gastroesophageal reflux disease); Gout; Hypertension; Lacunar infarct, acute ([facility] [address] [facility] Lumbago; Memory loss; Mitral valve regurgitation (7/14/12); ROBERTO (obstructive sleep apnea); PAF (paroxysmal atrial fibrillation) ([facility] [address] [facility] Prostate cancer ([facility] [address] [facility] Psoriasis; SBO (small bowel obstruction) ([address] (2011, 2015, 2016); and Stage 2 chronic kidney disease. Mr. Candelaria Page  has a past surgical history that includes pr left heart cath,percutaneous (7/2012); hx colonoscopy (1998, 2010); pr abdomen surgery proc unlisted (1967); hx hernia repair (Bilateral, 2012); hx appendectomy (age 49); hx radical prostatectomy ( ); pr prostate biopsy, needle, saturation sampling; hx cataract removal (Bilateral, 2013); hx coronary artery bypass graft (2009); vascular surgery procedure unlist (12/29/2017); and hx splenectomy (1951). Social History/Living Environment:  Patient lives with his spouse in a 2-story house (main level and basement) with 1 step to enter. Prior Level of Function/Work/Activity: Patient is retired. States that he does perform some  work but is unable to currently. Dominant Side:         RIGHT  Current Medications:  Tramadol. prn     Current Outpatient Prescriptions:     metFORMIN ER (GLUCOPHAGE XR) 500 mg tablet, Take 2 Tabs by mouth daily (with dinner). , Disp: 180 Tab, Rfl: 1    allopurinol (ZYLOPRIM) 300 mg tablet, Take 1 Tab by mouth daily. , Disp: 90 Tab, Rfl: 1    metoprolol tartrate (LOPRESSOR) 50 mg tablet, Take 1 Tab by mouth two (2) times a day., Disp: 180 Tab, Rfl: 1    gabapentin (NEURONTIN) 100 mg capsule, Take 1 Cap by mouth two (2) times a day., Disp: 180 Cap, Rfl: 1    pravastatin (PRAVACHOL) 40 mg tablet, Take 1 Tab by mouth nightly., Disp: 90 Tab, Rfl: 1    ranolazine ER (RANEXA) 500 mg SR tablet, TAKE 1 TABLET TWICE A DAY, Disp: 180 Tab, Rfl: 1    dilTIAZem ER (CARDIZEM LA) 360 mg Tb24 tablet, Take 1 Tab by mouth daily. , Disp: 90 Tab, Rfl: 1    losartan (COZAAR) 100 mg tablet, Take 1 Tab by mouth daily. , Disp: 90 Tab, Rfl: 1    traMADol (ULTRAM) 50 mg tablet, Take 1 Tab by mouth every six (6) hours as needed for Pain. Max Daily Amount: 200 mg., Disp: 60 Tab, Rfl: 1    aspirin delayed-release 81 mg tablet, Take 81 mg by mouth nightly., Disp: , Rfl:     esomeprazole (NEXIUM) 40 mg capsule, Take 1 Cap by mouth daily. Indications: am (Patient taking differently: Take 40 mg by mouth daily.), Disp: 90 Cap, Rfl: 1    apixaban (ELIQUIS) 5 mg tablet, Take 1 Tab by mouth two (2) times a day., Disp: 180 Tab, Rfl: 1    glucose blood VI test strips (BLOOD GLUCOSE TEST) strip, Test once to twice daily DX: E11.8, Disp: 300 Strip, Rfl: 3    Lancets misc, Test once to twice daily DX: E11.8, Disp: 300 Each, Rfl: 3    triamcinolone acetonide (KENALOG) 0.1 % ointment, , Disp: , Rfl:     tacrolimus (PROTOPIC) 0.1 % ointment, , Disp: , Rfl:     mometasone (ELOCON) 0.1 % topical cream, , Disp: , Rfl:     nitroglycerin (NITROSTAT) 0.4 mg SL tablet, 1 Tab by SubLINGual route every five (5) minutes as needed for Chest Pain., Disp: 25 Tab, Rfl: 11    pimecrolimus (ELIDEL) 1 % topical cream, Apply  to affected area two (2) times a day., Disp: , Rfl:     fluticasone (FLONASE) 50 mcg/actuation nasal spray, as needed. , Disp: , Rfl:     cholecalciferol, vitamin D3, (VITAMIN D3) 2,000 unit Tab, Take 2,000 Units by mouth daily. Indications: OSTEOPOROSIS, am, Disp: , Rfl:     Cetirizine (ZYRTEC) 10 mg Cap, Take 10 mg by mouth nightly.     Indications: ALLERGIC RHINITIS, Disp: , Rfl:    Date Last Reviewed:  6-8-18   Number of Personal Factors/Comorbidities that affect the Plan of Care: 1-2: MODERATE COMPLEXITY   EXAMINATION:   6/8/2018  Observation/Orthostatic Postural Assessment: Not wearing sling. Palpation: Not assessed. ROM:                 Active R shoulder forward elevation 110 degrees in standing         Strength:        .n/t              Body Structures Involved:  1. Nerves  2. Bones  3. Joints  4. Muscles Body Functions Affected:  1. Sensory/Pain  2. Neuromusculoskeletal  3. Movement Related  4. Skin Related Activities and Participation Affected:  1. Self Care  2. Domestic Life  3. Interpersonal Interactions and Relationships   Number of elements (examined above) that affect the Plan of Care: 4+: HIGH COMPLEXITY   CLINICAL PRESENTATION:   Presentation: Stable and uncomplicated: LOW COMPLEXITY   CLINICAL DECISION MAKING:   Outcome Measure: Tool Used: Disabilities of the Arm, Shoulder and Hand (DASH) Questionnaire - Quick Version  Score:  Initial: 34/55 or 52% disability Most Recent: 33/55 or 50% disability (5-30-18)   Interpretation of Score: The DASH is designed to measure the activities of daily living in person's with upper extremity dysfunction or pain. Each section is scored on a 1-5 scale, 5 representing the greatest disability. The scores of each section are added together for a total score of 55. This number is divided by 11, followed by subtracting 1 and multiplying by 25 to get a percent score of disability. This value represents the percentage disability: 0-20% minimal disability; 20-40% moderate disability; 40-60% severe disability; % dependent for care or exaggerated symptom behavior. Minimal detectable change is 12%. Score 11 12-19 20-28 29-37 38-45 46-54 55   Modifier CH CI CJ CK CL CM CN ?    Carrying, Moving, and Handling Objects:     - CURRENT STATUS: CK - 40%-59% impaired, limited or restricted (1-26-56    - GOAL STATUS: CI - 1%-19% impaired, limited or restricted    - D/C STATUS:  ---------------To be determined---------------    Medical Necessity: · Skilled intervention continues to be required due to reduced R shoulder range of motion, weakness, pain and decreased function s/p R reverse TSA. Reason for Services/Other Comments:  · Patient continues to require skilled intervention due to recent R reverse total shoulder arthroplasty and subsequent deficits in R UE function. Use of outcome tool(s) and clinical judgement create a POC that gives a: Clear prediction of patient's progress: LOW COMPLEXITY            TREATMENT:   (In addition to Assessment/Re-Assessment sessions the following treatments were rendered)  6/8/2018  . Pre-treatment Symptoms/Complaints: States that his shoulder is doing a little bit better today. Sore but not as bad. Pain: Initial:   \"A little bit better\" than 5/10 Post Session:  No pain reported after PT    Manual therapy (11 minutes) to improve R shoulder ROM and reduce discomfort. PROM progressed to  grade 3 physiologic mobilizations to increase R shoulder forward elevation and external rotation range of motion. R scapula manually blocked by PT to prevent patient from shrugging with forward elevation in supine. Therapeutic Exercise: ( 31 minutes)  Supine press 3 x 10 at 3# for increased R deltoid strength. Side-lying abduction 2# 3 x 10  for increased deltoid strength with tactile cues to depress R scapula. Side-lying prone trap lifts (2 x 8 repetitions) for improved scapular stabilization with tactile cues to \"set\" scapula prior to performing distal movements. Prone T's (x 10, x 5) to improve middle trapezius strength. Cable rows 7# 3 x 10 with R UE for mproved R scapular strength. R shoulder active flexion in standing to  degrees with manual cues to depress scapula 3 x 10 with 1# dumbbell. Scapular retractions with black theraband and elbows extended (2 x 15).  UE [place] for flexion (2 x 10), IR/ER with elbow flexed to 90 degrees (2 x 10), and forward reaching with use of UE [place] ( x15) and wall slides (2 x 10) for flexion and for D2 flexion (2 x 10) for improved use of R upper extremity and improved R shoulder ROM. HEP: No changes today    Treatment/Session Assessment:    · Response to Treatment: Pt demonstrated significant progress with middle trapezius activation, and was able to perform side-lying lift offs through much larger range of motion. Stiff with prone mid-trap raises on second set. Patient making good progress with range of motion and tolerance to resisted exercises. · Compliance with Program/Exercises: Appears to be complaint. · Recommendations/Intent for next treatment session: \"Next visit will focus on progression into \"active\" phase of post-op rehab with initial scapualr and deltoid muscle activation and strength, and modalities as needed for post-treatment pain and swelling.   Total Treatment Duration: 42 Minutes  PT Patient Time In/Time Out  Time In: 1113  Time Out: [address] [facility],[address] [facility], PT

## 2018-06-11 ENCOUNTER — HOSPITAL ENCOUNTER (OUTPATIENT)
Dept: PHYSICAL THERAPY | Age: 73
Discharge: HOME | End: 2018-06-11
Payer: MEDICARE

## 2018-06-11 PROCEDURE — 97110 THERAPEUTIC EXERCISES: CPT

## 2018-06-11 PROCEDURE — 97140 MANUAL THERAPY 1/> REGIONS: CPT

## 2018-06-11 NOTE — PROGRESS NOTES
Antonia Diana  : 1945  [place] / [place] / Product Type: Medicare /  [address]  at [facility] RENITA REGALADO  [address] [place] [address], [address], [facility]  [phone]   [phone]       OUTPATIENT PHYSICAL THERAPY:Daily Note 2018     ICD-10: Treatment Diagnosis: Stiffness of right shoulder, not elsewhere classified (M25.611),  Aftercare following joint replacement surgery (Z47.1)  Precautions/Allergies:   Celecoxib; Ibuprofen; Lescol [fluvastatin]; Nsaids (non-steroidal anti-inflammatory drug); Sulfa (sulfonamide antibiotics); and Uloric [febuxostat]   Fall Risk Score: 1 (? 5 = High Risk)  MD Orders: Evaluate and treat, HEP, , Strengthening, ROM, \"full ROM/full strength\" (41) MEDICAL/REFERRING DIAGNOSIS: s/p  R reverse TSA w/ BT   DATE OF ONSET: 18  REFERRING PHYSICIAN: Doyle Barbour MD  RETURN PHYSICIAN APPOINTMENT: 18     PROGRESS ASSESSMENT (18):  Mr. Yareli Pelayo has attended 10 visits to date. He is now nearly 6 weeks s/p R reverse total shoulder arthroplasty with a Delta Xtend prostheses, biceps tenodesis, latissimus dorsi and teres major tendon transfer. He is making progress. Pain is under control, and mostly present with active movement and stretching. His shoulder ROM is nearly in functional ranges now. He has a good start to deltoid strength. He is weak as expected at this point. He is progressing toward his goals. He will benefit from continued PT to further progress guided shoulder rehabl to promote safe return to normalized use of the UE with functional activities. PROBLEM LIST (Impacting functional limitations):  1. Decreased [place] with Home Exercise Program  2. Post-op R shoulder pain and swelling  3. Decreased R shoulder ROM   4. Weakness R shoulder INTERVENTIONS PLANNED:  1. Thermal and electric modalities, manual therapies for pain   2.  Manual therapies, therapeutic exercises, HEP for ROM 3. Therapeutic exercises and HEP for strength   TREATMENT PLAN:  Effective Dates: 5/8/2018 TO 8/6/18 . Frequency/Duration: 2-3 visits per week for 90 Days (with assumption that MD will authorize more visits at subsequent appointments)  GOALS: (Goals have been discussed and agreed upon with patient.)  Short-Term Functional Goals: Time Frame: 6 weeks  1. Report no more than 0-1/10 intermittent pain to R shoulder with compensatory use during basic functional activities, and score less than 30% on the DASH. Ongoing 5-30-18  2. R shoulder PROM forward elevation greater than 120 degrees and external rotation greater than 60 degrees to progress into functional ranges. Progressed, nearly met 5-30-18  3. Demonstrate good R shoulder isometric strength with manual testing to progress into strength phase. Met 5-30-18  4. Independent with initial HEP. Met 5-30-18  Discharge Goals: Time Frame: 12 weeks  1. No more than 1-2/10 intermittent pain R shoulder with return to normalized household and work activities, and score less than 15% on the DASH. 2. R shoulder AROM forward elevation greater than 120 degrees, external rotation greater than 45 degrees, and strength to shoulder are grossly WNL's for safe use with normalized activities. 3. Demonstrate good functional shoulder strength and endurance for return to normalized household and work activities. 4. Independent with advanced shoulder HEP for continued self-management. Rehabilitation Potential For Stated Goals: Good              The information in this section was collected on 5-8-18 (except where otherwise noted). HISTORY:   History of Present Injury/Illness (Reason for Referral): Patient underwent a reverse total shoulder arthroplasty on 4/20/18 secondary to reports of ongoing shoulder pain and instability. Patient states that his shoulder frequently \"went out\" if he moved it in certain positions.  Patient received home health following discharge from hospital after having R reverse TSA. Past Medical History/Comorbidities: Mr. Juliane Kennedy  has a past medical history of Allergic rhinitis; Arthritis; CAD (coronary artery disease); CVA (cerebral vascular accident) ([facility] (08/2017); Diabetes mellitus type 2, controlled ([facility] Dyslipidemia; ED (erectile dysfunction); Encephalitis (1962); GERD (gastroesophageal reflux disease); Gout; Hypertension; Lacunar infarct, acute ([facility] Lumbago; Memory loss; Mitral valve regurgitation (7/14/12); ROBERTO (obstructive sleep apnea); PAF (paroxysmal atrial fibrillation) ([facility] Prostate cancer ([facility] Psoriasis; SBO (small bowel obstruction) ([facility] (2011, 2015, 2016); and Stage 2 chronic kidney disease. Mr. Juliane Kennedy  has a past surgical history that includes pr left heart cath,percutaneous (7/2012); hx colonoscopy (1998, 2010); pr abdomen surgery proc unlisted (1967); hx hernia repair (Bilateral, 2012); hx appendectomy (age 49); hx radical prostatectomy ( ); pr prostate biopsy, needle, saturation sampling; hx cataract removal (Bilateral, 2013); hx coronary artery bypass graft (2009); vascular surgery procedure unlist (12/29/2017); and hx splenectomy (1951). Social History/Living Environment:  Patient lives with his spouse in a 2-story house (main level and basement) with 1 step to enter. Prior Level of Function/Work/Activity: Patient is retired. States that he does perform some  work but is unable to currently. Dominant Side:         RIGHT  Current Medications:  Tramadol. prn     Current Outpatient Prescriptions:     metFORMIN ER (GLUCOPHAGE XR) 500 mg tablet, Take 2 Tabs by mouth daily (with dinner). , Disp: 180 Tab, Rfl: 1    allopurinol (ZYLOPRIM) 300 mg tablet, Take 1 Tab by mouth daily. , Disp: 90 Tab, Rfl: 1    metoprolol tartrate (LOPRESSOR) 50 mg tablet, Take 1 Tab by mouth two (2) times a day., Disp: 180 Tab, Rfl: 1    gabapentin (NEURONTIN) 100 mg capsule, Take 1 Cap by mouth two (2) times a day., Disp: 180 Cap, Rfl: 1    pravastatin (PRAVACHOL) 40 mg tablet, Take 1 Tab by mouth nightly., Disp: 90 Tab, Rfl: 1    ranolazine ER (RANEXA) 500 mg SR tablet, TAKE 1 TABLET TWICE A DAY, Disp: 180 Tab, Rfl: 1    dilTIAZem ER (CARDIZEM LA) 360 mg Tb24 tablet, Take 1 Tab by mouth daily. , Disp: 90 Tab, Rfl: 1    losartan (COZAAR) 100 mg tablet, Take 1 Tab by mouth daily. , Disp: 90 Tab, Rfl: 1    traMADol (ULTRAM) 50 mg tablet, Take 1 Tab by mouth every six (6) hours as needed for Pain. Max Daily Amount: 200 mg., Disp: 60 Tab, Rfl: 1    aspirin delayed-release 81 mg tablet, Take 81 mg by mouth nightly., Disp: , Rfl:     esomeprazole (NEXIUM) 40 mg capsule, Take 1 Cap by mouth daily. Indications: am (Patient taking differently: Take 40 mg by mouth daily.), Disp: 90 Cap, Rfl: 1    apixaban (ELIQUIS) 5 mg tablet, Take 1 Tab by mouth two (2) times a day., Disp: 180 Tab, Rfl: 1    glucose blood VI test strips (BLOOD GLUCOSE TEST) strip, Test once to twice daily DX: E11.8, Disp: 300 Strip, Rfl: 3    Lancets misc, Test once to twice daily DX: E11.8, Disp: 300 Each, Rfl: 3    triamcinolone acetonide (KENALOG) 0.1 % ointment, , Disp: , Rfl:     tacrolimus (PROTOPIC) 0.1 % ointment, , Disp: , Rfl:     mometasone (ELOCON) 0.1 % topical cream, , Disp: , Rfl:     nitroglycerin (NITROSTAT) 0.4 mg SL tablet, 1 Tab by SubLINGual route every five (5) minutes as needed for Chest Pain., Disp: 25 Tab, Rfl: 11    pimecrolimus (ELIDEL) 1 % topical cream, Apply  to affected area two (2) times a day., Disp: , Rfl:     fluticasone (FLONASE) 50 mcg/actuation nasal spray, as needed. , Disp: , Rfl:     cholecalciferol, vitamin D3, (VITAMIN D3) 2,000 unit Tab, Take 2,000 Units by mouth daily. Indications: OSTEOPOROSIS, am, Disp: , Rfl:     Cetirizine (ZYRTEC) 10 mg Cap, Take 10 mg by mouth nightly.     Indications: ALLERGIC RHINITIS, Disp: , Rfl:    Date Last Reviewed:  6-8-18   Number of Personal Factors/Comorbidities that affect the Plan of Care: 1-2: MODERATE COMPLEXITY   EXAMINATION:   6/11/2018  Observation/Orthostatic Postural Assessment: Not wearing sling. Palpation: Not assessed. ROM:                 Active R shoulder forward elevation 110 degrees in standing         Strength:        .n/t              Body Structures Involved:  1. Nerves  2. Bones  3. Joints  4. Muscles Body Functions Affected:  1. Sensory/Pain  2. Neuromusculoskeletal  3. Movement Related  4. Skin Related Activities and Participation Affected:  1. Self Care  2. Domestic Life  3. Interpersonal Interactions and Relationships   Number of elements (examined above) that affect the Plan of Care: 4+: HIGH COMPLEXITY   CLINICAL PRESENTATION:   Presentation: Stable and uncomplicated: LOW COMPLEXITY   CLINICAL DECISION MAKING:   Outcome Measure: Tool Used: Disabilities of the Arm, Shoulder and Hand (DASH) Questionnaire - Quick Version  Score:  Initial: 34/55 or 52% disability Most Recent: 33/55 or 50% disability (5-30-18)   Interpretation of Score: The DASH is designed to measure the activities of daily living in person's with upper extremity dysfunction or pain. Each section is scored on a 1-5 scale, 5 representing the greatest disability. The scores of each section are added together for a total score of 55. This number is divided by 11, followed by subtracting 1 and multiplying by 25 to get a percent score of disability. This value represents the percentage disability: 0-20% minimal disability; 20-40% moderate disability; 40-60% severe disability; % dependent for care or exaggerated symptom behavior. Minimal detectable change is 12%. Score 11 12-19 20-28 29-37 38-45 46-54 55   Modifier CH CI CJ CK CL CM CN ?    Carrying, Moving, and Handling Objects:     - CURRENT STATUS: CK - 40%-59% impaired, limited or restricted (4-09-14    - GOAL STATUS: CI - 1%-19% impaired, limited or restricted    - D/C STATUS:  ---------------To be determined---------------    Medical Necessity:   · Skilled intervention continues to be required due to reduced R shoulder range of motion, weakness, pain and decreased function s/p R reverse TSA. Reason for Services/Other Comments:  · Patient continues to require skilled intervention due to recent R reverse total shoulder arthroplasty and subsequent deficits in R UE function. Use of outcome tool(s) and clinical judgement create a POC that gives a: Clear prediction of patient's progress: LOW COMPLEXITY            TREATMENT:   (In addition to Assessment/Re-Assessment sessions the following treatments were rendered)  6/11/2018  . Pre-treatment Symptoms/Complaints: Reports that his shoulder feels ok today. Pain: Initial:   No numeric value, but no pain. Post Session:  No pain but fatigued    Manual therapy (15 minutes) to improve R shoulder ROM and reduce discomfort. PROM progressed to  grade 3-4 physiologic mobilizations to increase R shoulder forward elevation and external rotation range of motion. R scapula manually blocked by PT to prevent patient from shrugging with forward elevation in supine. Therapeutic Exercise: ( 25 minutes)  Supine press 3 x 10 at 3# for increased R deltoid strength. Standing scaption x 10, 2 x 10 at 2# and standing shoulder raises/flexion 2# 3 x 10  for increased deltoid strength with tactile cues to depress R scapula. Prone T's without added resistance (3 x 8) with R UE for improved scapular stability. Cable rows 7# 3 x 10 with R UE for mproved R scapular strength. Scapular retractions with black theraband and elbows flexed (x20). UE [place] for flexion (2 x 10). Standing D2 flexion wall slides to improve R shoulder range of motion with combined flexion, abduction and external rotation. HEP: No changes today    Treatment/Session Assessment:    · Response to Treatment: Pt is progressing with active range of motion for flexion and scaption. Patient tolerates increased passive range of motion.   · Compliance with Program/Exercises: Appears to be complaint. · Recommendations/Intent for next treatment session: \"Next visit will focus on progression into \"active\" phase of post-op rehab with initial scapualr and deltoid muscle activation and strength, and modalities as needed for post-treatment pain and swelling.   Total Treatment Duration: 40 Minutes  PT Patient Time In/Time Out  Time In: 1430  Time Out: 79 Pat Regalado, PT

## 2018-06-12 PROBLEM — G47.34 NOCTURNAL HYPOXEMIA: Status: ACTIVE | Noted: 2018-06-12

## 2018-06-12 PROBLEM — E11.21 TYPE 2 DIABETES WITH NEPHROPATHY (HCC): Status: ACTIVE | Noted: 2018-06-12

## 2018-06-12 PROBLEM — D64.9 ANEMIA: Status: RESOLVED | Noted: 2018-04-19 | Resolved: 2018-06-12

## 2018-06-13 ENCOUNTER — HOSPITAL ENCOUNTER (OUTPATIENT)
Dept: PHYSICAL THERAPY | Age: 73
Discharge: HOME | End: 2018-06-13
Payer: MEDICARE

## 2018-06-13 PROCEDURE — 97110 THERAPEUTIC EXERCISES: CPT

## 2018-06-13 PROCEDURE — 97140 MANUAL THERAPY 1/> REGIONS: CPT

## 2018-06-13 NOTE — PROGRESS NOTES
Yuli Morgan  : 1945  [place] / [place] / Product Type: Medicare /  [address]  at [facility] RENITA REGALADO  [address] [facility], [address] [place] [address],[address], [address] [facility]  [phone]   [phone]       OUTPATIENT PHYSICAL THERAPY:Daily Note 2018     ICD-10: Treatment Diagnosis: Stiffness of right shoulder, not elsewhere classified (M25.611),  Aftercare following joint replacement surgery (Z47.1)  Precautions/Allergies:   Celecoxib; Ibuprofen; Lescol [fluvastatin]; Nsaids (non-steroidal anti-inflammatory drug); Sulfa (sulfonamide antibiotics); and Uloric [febuxostat]   Fall Risk Score: 1 (? 5 = High Risk)  MD Orders: Evaluate and treat, HEP, , Strengthening, ROM, \"full ROM/full strength\" () MEDICAL/REFERRING DIAGNOSIS: s/p  R reverse TSA w/ BT   DATE OF ONSET: 18  REFERRING PHYSICIAN: Eulalio Scott MD  RETURN PHYSICIAN APPOINTMENT: 18     PROGRESS ASSESSMENT (18):  Mr. Marline Freire has attended 10 visits to date. He is now nearly 6 weeks s/p R reverse total shoulder arthroplasty with a Delta Xtend prostheses, biceps tenodesis, latissimus dorsi and teres major tendon transfer. He is making progress. Pain is under control, and mostly present with active movement and stretching. His shoulder ROM is nearly in functional ranges now. He has a good start to deltoid strength. He is weak as expected at this point. He is progressing toward his goals. He will benefit from continued PT to further progress guided shoulder rehabl to promote safe return to normalized use of the UE with functional activities. PROBLEM LIST (Impacting functional limitations):  1. Decreased [place] with Home Exercise Program  2. Post-op R shoulder pain and swelling  3. Decreased R shoulder ROM   4. Weakness R shoulder INTERVENTIONS PLANNED:  1. Thermal and electric modalities, manual therapies for pain   2.  Manual therapies, therapeutic exercises, HEP for ROM 3. Therapeutic exercises and HEP for strength   TREATMENT PLAN:  Effective Dates: 5/8/2018 TO 8/6/18 . Frequency/Duration: 2-3 visits per week for 90 Days (with assumption that MD will authorize more visits at subsequent appointments)  GOALS: (Goals have been discussed and agreed upon with patient.)  Short-Term Functional Goals: Time Frame: 6 weeks  1. Report no more than 0-1/10 intermittent pain to R shoulder with compensatory use during basic functional activities, and score less than 30% on the DASH. Ongoing 5-30-18  2. R shoulder PROM forward elevation greater than 120 degrees and external rotation greater than 60 degrees to progress into functional ranges. Progressed, nearly met 5-30-18  3. Demonstrate good R shoulder isometric strength with manual testing to progress into strength phase. Met 5-30-18  4. Independent with initial HEP. Met 5-30-18  Discharge Goals: Time Frame: 12 weeks  1. No more than 1-2/10 intermittent pain R shoulder with return to normalized household and work activities, and score less than 15% on the DASH. 2. R shoulder AROM forward elevation greater than 120 degrees, external rotation greater than 45 degrees, and strength to shoulder are grossly WNL's for safe use with normalized activities. 3. Demonstrate good functional shoulder strength and endurance for return to normalized household and work activities. 4. Independent with advanced shoulder HEP for continued self-management. Rehabilitation Potential For Stated Goals: Good              The information in this section was collected on 5-8-18 (except where otherwise noted). HISTORY:   History of Present Injury/Illness (Reason for Referral): Patient underwent a reverse total shoulder arthroplasty on 4/20/18 secondary to reports of ongoing shoulder pain and instability. Patient states that his shoulder frequently \"went out\" if he moved it in certain positions.  Patient received home health following discharge from hospital after having R reverse TSA. Past Medical History/Comorbidities: Mr. Ellen Andre  has a past medical history of Allergic rhinitis; Arthritis; CAD (coronary artery disease); CVA (cerebral vascular accident) ([facility] (08/2017); Diabetes mellitus type 2, controlled ([facility] Dyslipidemia; ED (erectile dysfunction); Encephalitis (1962); GERD (gastroesophageal reflux disease); Gout; Hypertension; Lacunar infarct, acute ([facility] Lumbago; Memory loss; Mitral valve regurgitation (7/14/12); ROBERTO (obstructive sleep apnea); PAF (paroxysmal atrial fibrillation) ([facility] Prostate cancer ([facility] Psoriasis; SBO (small bowel obstruction) ([facility] (2011, 2015, 2016); and Stage 2 chronic kidney disease. Mr. Ellen Andre  has a past surgical history that includes pr left heart cath,percutaneous (7/2012); hx colonoscopy (1998, 2010); pr abdomen surgery proc unlisted (1967); hx hernia repair (Bilateral, 2012); hx appendectomy (age 49); hx radical prostatectomy ( ); pr prostate biopsy, needle, saturation sampling; hx cataract removal (Bilateral, 2013); hx coronary artery bypass graft (2009); vascular surgery procedure unlist (12/29/2017); and hx splenectomy (1951). Social History/Living Environment:  Patient lives with his spouse in a 2-story house (main level and basement) with 1 step to enter. Prior Level of Function/Work/Activity: Patient is retired. States that he does perform some  work but is unable to currently. Dominant Side:         RIGHT  Current Medications:  Tramadol. prn     Current Outpatient Prescriptions:     metFORMIN ER (GLUCOPHAGE XR) 500 mg tablet, Take 2 Tabs by mouth daily (with dinner). , Disp: 180 Tab, Rfl: 1    allopurinol (ZYLOPRIM) 300 mg tablet, Take 1 Tab by mouth daily. , Disp: 90 Tab, Rfl: 1    metoprolol tartrate (LOPRESSOR) 50 mg tablet, Take 1 Tab by mouth two (2) times a day., Disp: 180 Tab, Rfl: 1    gabapentin (NEURONTIN) 100 mg capsule, Take 1 Cap by mouth two (2) times a day., Disp: 180 Cap, Rfl: 1    pravastatin (PRAVACHOL) 40 mg tablet, Take 1 Tab by mouth nightly., Disp: 90 Tab, Rfl: 1    ranolazine ER (RANEXA) 500 mg SR tablet, TAKE 1 TABLET TWICE A DAY, Disp: 180 Tab, Rfl: 1    dilTIAZem ER (CARDIZEM LA) 360 mg Tb24 tablet, Take 1 Tab by mouth daily. , Disp: 90 Tab, Rfl: 1    losartan (COZAAR) 100 mg tablet, Take 1 Tab by mouth daily. , Disp: 90 Tab, Rfl: 1    traMADol (ULTRAM) 50 mg tablet, Take 1 Tab by mouth every six (6) hours as needed for Pain. Max Daily Amount: 200 mg., Disp: 60 Tab, Rfl: 1    aspirin delayed-release 81 mg tablet, Take 81 mg by mouth nightly., Disp: , Rfl:     esomeprazole (NEXIUM) 40 mg capsule, Take 1 Cap by mouth daily. Indications: am (Patient taking differently: Take 40 mg by mouth daily.), Disp: 90 Cap, Rfl: 1    apixaban (ELIQUIS) 5 mg tablet, Take 1 Tab by mouth two (2) times a day., Disp: 180 Tab, Rfl: 1    glucose blood VI test strips (BLOOD GLUCOSE TEST) strip, Test once to twice daily DX: E11.8, Disp: 300 Strip, Rfl: 3    Lancets misc, Test once to twice daily DX: E11.8, Disp: 300 Each, Rfl: 3    triamcinolone acetonide (KENALOG) 0.1 % ointment, , Disp: , Rfl:     tacrolimus (PROTOPIC) 0.1 % ointment, , Disp: , Rfl:     mometasone (ELOCON) 0.1 % topical cream, , Disp: , Rfl:     nitroglycerin (NITROSTAT) 0.4 mg SL tablet, 1 Tab by SubLINGual route every five (5) minutes as needed for Chest Pain., Disp: 25 Tab, Rfl: 11    pimecrolimus (ELIDEL) 1 % topical cream, Apply  to affected area two (2) times a day., Disp: , Rfl:     fluticasone (FLONASE) 50 mcg/actuation nasal spray, as needed. , Disp: , Rfl:     cholecalciferol, vitamin D3, (VITAMIN D3) 2,000 unit Tab, Take 2,000 Units by mouth daily. Indications: OSTEOPOROSIS, am, Disp: , Rfl:     Cetirizine (ZYRTEC) 10 mg Cap, Take 10 mg by mouth nightly.     Indications: ALLERGIC RHINITIS, Disp: , Rfl:    Date Last Reviewed:  6-8-18   Number of Personal Factors/Comorbidities that affect the Plan of Care: 1-2: MODERATE COMPLEXITY   EXAMINATION:   6/13/2018  Observation/Orthostatic Postural Assessment: Not wearing sling. Palpation: Not assessed. ROM:                 Passive R shoulder forward elevation 135 degrees          Strength:        .n/t              Body Structures Involved:  1. Nerves  2. Bones  3. Joints  4. Muscles Body Functions Affected:  1. Sensory/Pain  2. Neuromusculoskeletal  3. Movement Related  4. Skin Related Activities and Participation Affected:  1. Self Care  2. Domestic Life  3. Interpersonal Interactions and Relationships   Number of elements (examined above) that affect the Plan of Care: 4+: HIGH COMPLEXITY   CLINICAL PRESENTATION:   Presentation: Stable and uncomplicated: LOW COMPLEXITY   CLINICAL DECISION MAKING:   Outcome Measure: Tool Used: Disabilities of the Arm, Shoulder and Hand (DASH) Questionnaire - Quick Version  Score:  Initial: 34/55 or 52% disability Most Recent: 33/55 or 50% disability (5-30-18)   Interpretation of Score: The DASH is designed to measure the activities of daily living in person's with upper extremity dysfunction or pain. Each section is scored on a 1-5 scale, 5 representing the greatest disability. The scores of each section are added together for a total score of 55. This number is divided by 11, followed by subtracting 1 and multiplying by 25 to get a percent score of disability. This value represents the percentage disability: 0-20% minimal disability; 20-40% moderate disability; 40-60% severe disability; % dependent for care or exaggerated symptom behavior. Minimal detectable change is 12%. Score 11 12-19 20-28 29-37 38-45 46-54 55   Modifier CH CI CJ CK CL CM CN ?    Carrying, Moving, and Handling Objects:     - CURRENT STATUS: CK - 40%-59% impaired, limited or restricted (7-11-92    - GOAL STATUS: CI - 1%-19% impaired, limited or restricted    - D/C STATUS:  ---------------To be determined---------------    Medical Necessity: · Skilled intervention continues to be required due to reduced R shoulder range of motion, weakness, pain and decreased function s/p R reverse TSA. Reason for Services/Other Comments:  · Patient continues to require skilled intervention due to recent R reverse total shoulder arthroplasty and subsequent deficits in R UE function. Use of outcome tool(s) and clinical judgement create a POC that gives a: Clear prediction of patient's progress: LOW COMPLEXITY            TREATMENT:   (In addition to Assessment/Re-Assessment sessions the following treatments were rendered)  6/13/2018  . Pre-treatment Symptoms/Complaints: Reports that his shoulder is sore today upon arrival.  Pain: Initial:   No numeric value provided. Soreness. Post Session:  No pain, reports that his arm feels \"tired\"    Manual therapy (13 minutes) to improve R shoulder ROM and reduce discomfort. PROM progressed to  grade 3-4 physiologic mobilizations to increase R shoulder forward elevation and external rotation range of motion. R scapula manually blocked by PT to prevent patient from shrugging with forward elevation in supine. Soft tissue mobilizations to R deltoid to reduce tightness. Therapeutic Exercise: ( 27 minutes) for improved R shoulder strength and function. Verbal cues on hand position when performing prone middle trap lifts and lower trap lifts. Date:  6-13-18 Date:   Date:     Activity/Exercise Parameters Parameters Parameters   Supine press 3# 3 x 10     Prone middle trapezius 3 x 10 R     Scapular retractions Cable row, 7# 3 x 10 R     Shoulder abduction Standing scaption, 2 x 10     Shoulder flexion  Standing ,1# 2 x 10     UE [place] [facility] 2 x 12 flexion     Wall slides Flexion, D1, D2 x 10 ea     Lower trap Prone Y, 3 x 5 with min A to elevate arm through ROM           HEP: No changes today    Treatment/Session Assessment:    · Response to Treatment: Pt demonstrates improving range of motion actively and passively.  Continues to demonstrate scapular weakness but is progressing. · Compliance with Program/Exercises: Appears to be complaint. · Recommendations/Intent for next treatment session: \"Next visit will focus on improving scapular stability to facilitate improved forward elevation actively without shrugging R shoulder.   Total Treatment Duration: 40 Minutes  PT Patient Time In/Time Out  Time In: 0230  Time Out: Fawad Garcia 71, PT

## 2018-06-14 ENCOUNTER — HOSPITAL ENCOUNTER (OUTPATIENT)
Dept: PHYSICAL THERAPY | Age: 73
Discharge: HOME | End: 2018-06-14
Payer: MEDICARE

## 2018-06-14 PROCEDURE — 97110 THERAPEUTIC EXERCISES: CPT

## 2018-06-14 PROCEDURE — 97140 MANUAL THERAPY 1/> REGIONS: CPT

## 2018-06-14 NOTE — PROGRESS NOTES
Naveen Mccall  : 1945  [place] / [place] / Product Type: Medicare /  [address] at [facility] RENITA REGALADO  [address] [facility], [address] [place] [address],[address], [address] [facility]  [phone]   [phone]       OUTPATIENT PHYSICAL THERAPY:Daily Note 2018     ICD-10: Treatment Diagnosis: Stiffness of right shoulder, not elsewhere classified (M25.611),  Aftercare following joint replacement surgery (Z47.1)  Precautions/Allergies:   Celecoxib; Ibuprofen; Lescol [fluvastatin]; Nsaids (non-steroidal anti-inflammatory drug); Sulfa (sulfonamide antibiotics); and Uloric [febuxostat]   Fall Risk Score: 1 (? 5 = High Risk)  MD Orders: Evaluate and treat, HEP, , Strengthening, ROM, \"full ROM/full strength\" () MEDICAL/REFERRING DIAGNOSIS: s/p  R reverse TSA w/ BT   DATE OF ONSET: 18  REFERRING PHYSICIAN: Patricia Gustafson MD  RETURN PHYSICIAN APPOINTMENT: 18     PROGRESS ASSESSMENT (18):  Mr. Orly Pfeiffer has attended 10 visits to date. He is now nearly 6 weeks s/p R reverse total shoulder arthroplasty with a Delta Xtend prostheses, biceps tenodesis, latissimus dorsi and teres major tendon transfer. He is making progress. Pain is under control, and mostly present with active movement and stretching. His shoulder ROM is nearly in functional ranges now. He has a good start to deltoid strength. He is weak as expected at this point. He is progressing toward his goals. He will benefit from continued PT to further progress guided shoulder rehabl to promote safe return to normalized use of the UE with functional activities. PROBLEM LIST (Impacting functional limitations):  1. Decreased [place] with Home Exercise Program  2. Post-op R shoulder pain and swelling  3. Decreased R shoulder ROM   4. Weakness R shoulder INTERVENTIONS PLANNED:  1. Thermal and electric modalities, manual therapies for pain   2.  Manual therapies, therapeutic exercises, HEP for ROM 3. Therapeutic exercises and HEP for strength   TREATMENT PLAN:  Effective Dates: 5/8/2018 TO 8/6/18 . Frequency/Duration: 2-3 visits per week for 90 Days (with assumption that MD will authorize more visits at subsequent appointments)  GOALS: (Goals have been discussed and agreed upon with patient.)  Short-Term Functional Goals: Time Frame: 6 weeks  1. Report no more than 0-1/10 intermittent pain to R shoulder with compensatory use during basic functional activities, and score less than 30% on the DASH. Ongoing 5-30-18  2. R shoulder PROM forward elevation greater than 120 degrees and external rotation greater than 60 degrees to progress into functional ranges. Progressed, nearly met 5-30-18  3. Demonstrate good R shoulder isometric strength with manual testing to progress into strength phase. Met 5-30-18  4. Independent with initial HEP. Met 5-30-18  Discharge Goals: Time Frame: 12 weeks  1. No more than 1-2/10 intermittent pain R shoulder with return to normalized household and work activities, and score less than 15% on the DASH. 2. R shoulder AROM forward elevation greater than 120 degrees, external rotation greater than 45 degrees, and strength to shoulder are grossly WNL's for safe use with normalized activities. 3. Demonstrate good functional shoulder strength and endurance for return to normalized household and work activities. 4. Independent with advanced shoulder HEP for continued self-management. Rehabilitation Potential For Stated Goals: Good              The information in this section was collected on 5-8-18 (except where otherwise noted). HISTORY:   History of Present Injury/Illness (Reason for Referral): Patient underwent a reverse total shoulder arthroplasty on 4/20/18 secondary to reports of ongoing shoulder pain and instability. Patient states that his shoulder frequently \"went out\" if he moved it in certain positions.  Patient received home health following discharge from hospital after having R reverse TSA. Past Medical History/Comorbidities: Mr. Rosalia Herbert  has a past medical history of Allergic rhinitis; Arthritis; CAD (coronary artery disease); CVA (cerebral vascular accident) ([facility] (08/2017); Diabetes mellitus type 2, controlled ([facility] Dyslipidemia; ED (erectile dysfunction); Encephalitis (1962); GERD (gastroesophageal reflux disease); Gout; Hypertension; Lacunar infarct, acute ([facility] Lumbago; Memory loss; Mitral valve regurgitation (7/14/12); ROBERTO (obstructive sleep apnea); PAF (paroxysmal atrial fibrillation) ([facility] Psoriasis; SBO (small bowel obstruction) [facility] (2011, 2015, 2016); and Stage 2 chronic kidney disease. Mr. Rosalia Herbert  has a past surgical history that includes pr left heart cath,percutaneous (7/2012); hx colonoscopy (1998, 2010); pr abdomen surgery proc unlisted (1967); hx hernia repair (Bilateral, 2012); hx appendectomy (age 49); hx radical prostatectomy ( ); pr prostate biopsy, needle, saturation sampling; hx cataract removal (Bilateral, 2013); hx coronary artery bypass graft (2009); vascular surgery procedure unlist (12/29/2017); and hx splenectomy (1951). Social History/Living Environment:  Patient lives with his spouse in a 2-story house (main level and basement) with 1 step to enter. Prior Level of Function/Work/Activity: Patient is retired. States that he does perform some  work but is unable to currently. Dominant Side:         RIGHT  Current Medications:  Tramadol. prn     Current Outpatient Prescriptions:     metFORMIN ER (GLUCOPHAGE XR) 500 mg tablet, Take 2 Tabs by mouth daily (with dinner). , Disp: 180 Tab, Rfl: 1    allopurinol (ZYLOPRIM) 300 mg tablet, Take 1 Tab by mouth daily. , Disp: 90 Tab, Rfl: 1    metoprolol tartrate (LOPRESSOR) 50 mg tablet, Take 1 Tab by mouth two (2) times a day., Disp: 180 Tab, Rfl: 1    gabapentin (NEURONTIN) 100 mg capsule, Take 1 Cap by mouth two (2) times a day., Disp: 180 Cap, Rfl: 1    pravastatin (PRAVACHOL) 40 mg tablet, Take 1 Tab by mouth nightly., Disp: 90 Tab, Rfl: 1    ranolazine ER (RANEXA) 500 mg SR tablet, TAKE 1 TABLET TWICE A DAY, Disp: 180 Tab, Rfl: 1    dilTIAZem ER (CARDIZEM LA) 360 mg Tb24 tablet, Take 1 Tab by mouth daily. , Disp: 90 Tab, Rfl: 1    losartan (COZAAR) 100 mg tablet, Take 1 Tab by mouth daily. , Disp: 90 Tab, Rfl: 1    traMADol (ULTRAM) 50 mg tablet, Take 1 Tab by mouth every six (6) hours as needed for Pain. Max Daily Amount: 200 mg., Disp: 60 Tab, Rfl: 1    aspirin delayed-release 81 mg tablet, Take 81 mg by mouth nightly., Disp: , Rfl:     esomeprazole (NEXIUM) 40 mg capsule, Take 1 Cap by mouth daily. Indications: am (Patient taking differently: Take 40 mg by mouth daily.), Disp: 90 Cap, Rfl: 1    apixaban (ELIQUIS) 5 mg tablet, Take 1 Tab by mouth two (2) times a day., Disp: 180 Tab, Rfl: 1    glucose blood VI test strips (BLOOD GLUCOSE TEST) strip, Test once to twice daily DX: E11.8, Disp: 300 Strip, Rfl: 3    Lancets misc, Test once to twice daily DX: E11.8, Disp: 300 Each, Rfl: 3    triamcinolone acetonide (KENALOG) 0.1 % ointment, , Disp: , Rfl:     tacrolimus (PROTOPIC) 0.1 % ointment, , Disp: , Rfl:     mometasone (ELOCON) 0.1 % topical cream, , Disp: , Rfl:     nitroglycerin (NITROSTAT) 0.4 mg SL tablet, 1 Tab by SubLINGual route every five (5) minutes as needed for Chest Pain., Disp: 25 Tab, Rfl: 11    pimecrolimus (ELIDEL) 1 % topical cream, Apply  to affected area two (2) times a day., Disp: , Rfl:     fluticasone (FLONASE) 50 mcg/actuation nasal spray, as needed. , Disp: , Rfl:     cholecalciferol, vitamin D3, (VITAMIN D3) 2,000 unit Tab, Take 2,000 Units by mouth daily. Indications: OSTEOPOROSIS, am, Disp: , Rfl:     Cetirizine (ZYRTEC) 10 mg Cap, Take 10 mg by mouth nightly.     Indications: ALLERGIC RHINITIS, Disp: , Rfl:    Date Last Reviewed:  6-8-18   Number of Personal Factors/Comorbidities that affect the Plan of Care: 1-2: MODERATE COMPLEXITY   EXAMINATION:   6/14/2018  Observation/Orthostatic Postural Assessment: Not wearing sling. Palpation: Not assessed. ROM:                 Passive R shoulder forward elevation 135 degrees          Strength:        .n/t              Body Structures Involved:  1. Nerves  2. Bones  3. Joints  4. Muscles Body Functions Affected:  1. Sensory/Pain  2. Neuromusculoskeletal  3. Movement Related  4. Skin Related Activities and Participation Affected:  1. Self Care  2. Domestic Life  3. Interpersonal Interactions and Relationships   Number of elements (examined above) that affect the Plan of Care: 4+: HIGH COMPLEXITY   CLINICAL PRESENTATION:   Presentation: Stable and uncomplicated: LOW COMPLEXITY   CLINICAL DECISION MAKING:   Outcome Measure: Tool Used: Disabilities of the Arm, Shoulder and Hand (DASH) Questionnaire - Quick Version  Score:  Initial: 34/55 or 52% disability Most Recent: 33/55 or 50% disability (5-30-18)   Interpretation of Score: The DASH is designed to measure the activities of daily living in person's with upper extremity dysfunction or pain. Each section is scored on a 1-5 scale, 5 representing the greatest disability. The scores of each section are added together for a total score of 55. This number is divided by 11, followed by subtracting 1 and multiplying by 25 to get a percent score of disability. This value represents the percentage disability: 0-20% minimal disability; 20-40% moderate disability; 40-60% severe disability; % dependent for care or exaggerated symptom behavior. Minimal detectable change is 12%. Score 11 12-19 20-28 29-37 38-45 46-54 55   Modifier CH CI CJ CK CL CM CN ?    Carrying, Moving, and Handling Objects:     - CURRENT STATUS: CK - 40%-59% impaired, limited or restricted (5-43-97    - GOAL STATUS: CI - 1%-19% impaired, limited or restricted    - D/C STATUS:  ---------------To be determined---------------    Medical Necessity: · Skilled intervention continues to be required due to reduced R shoulder range of motion, weakness, pain and decreased function s/p R reverse TSA. Reason for Services/Other Comments:  · Patient continues to require skilled intervention due to recent R reverse total shoulder arthroplasty and subsequent deficits in R UE function. Use of outcome tool(s) and clinical judgement create a POC that gives a: Clear prediction of patient's progress: LOW COMPLEXITY            TREATMENT:   (In addition to Assessment/Re-Assessment sessions the following treatments were rendered)  6/14/2018  . Pre-treatment Symptoms/Complaints: Reports that his shoulder is sore. Feels he can move it more easily but reports soreness. Pain: Initial:   No numeric value provided. Soreness. Post Session:  No pain. Fatigued. Manual therapy (10 minutes) to improve R shoulder ROM and reduce discomfort. PROM progressed to  grade 3-4 physiologic mobilizations to increase R shoulder forward elevation and external rotation range of motion. R scapula manually blocked by PT to prevent patient from shrugging with forward elevation in supine. Prone thoracic grade 3 P-A mobilizations for improved thoracic mobility. Therapeutic Exercise: ( 25 minutes) for improved R shoulder strength and function. Verbal cues on hand position when performing prone middle trap lifts and lower trap lifts.      Date:  6-13-18 Date:  6-14-18 Date:     Activity/Exercise Parameters Parameters Parameters   Supine press 3# 3 x 10 3# 3 x 10    Prone middle trapezius 3 x 10 R 3 x 10 R    Scapular retractions Cable row, 7# 3 x 10 R Cable row 7# 3 x 10 R    Bentover row  5# 2 x 10    Shoulder abduction Standing scaption, 2 x 10 Standing scaption, 3 x 10     Shoulder flexion  Standing ,1# 2 x 10     UE Fletcher 2 x 12 flexion 2 x 15 R flexion    Wall slides Flexion, D1, D2 x 10 ea D1 and D1 flex 2 x 10 ea    Lower trap Prone Y, 3 x 5 with min A to elevate arm through ROM Prone, 3 x 8     Shoulder extensions  Standing, cable 3# 3 x 10                      HEP: No changes today    Treatment/Session Assessment:    · Response to Treatment: Improving with range of motion actively. Mild upper trapezius compensation exhibited. Progressing. · Compliance with Program/Exercises: Appears to be complaint. · Recommendations/Intent for next treatment session: \"Next visit will focus on improving scapular stability to facilitate improved forward elevation actively without shrugging R shoulder.   Total Treatment Duration: 35 Minutes  PT Patient Time In/Time Out  Time In: 1440  Time Out: 6847 CHAPARRO Conner PT

## 2018-06-19 ENCOUNTER — HOSPITAL ENCOUNTER (OUTPATIENT)
Dept: PHYSICAL THERAPY | Age: 73
Discharge: HOME | End: 2018-06-19
Payer: MEDICARE

## 2018-06-19 PROCEDURE — 97110 THERAPEUTIC EXERCISES: CPT

## 2018-06-19 PROCEDURE — 97140 MANUAL THERAPY 1/> REGIONS: CPT

## 2018-06-19 NOTE — PROGRESS NOTES
Anai Fowler  : 1945  [place] / [place] / Product Type: Medicare /  [id number]  at [facility] [address]  [address] [facility], [address] [place] [address],[address], [facility] [address]  [phone]   [phone]       OUTPATIENT PHYSICAL THERAPY:Daily Note 2018     ICD-10: Treatment Diagnosis: Stiffness of right shoulder, not elsewhere classified (M25.611),  Aftercare following joint replacement surgery (Z47.1)  Precautions/Allergies:   Celecoxib; Ibuprofen; Lescol [fluvastatin]; Nsaids (non-steroidal anti-inflammatory drug); Sulfa (sulfonamide antibiotics); and Uloric [febuxostat]   Fall Risk Score: 1 (? 5 = High Risk)  MD Orders: Evaluate and treat, HEP, , Strengthening, ROM, \"full ROM/full strength\" (-60-80) MEDICAL/REFERRING DIAGNOSIS: s/p  R reverse TSA w/ BT   DATE OF ONSET: 18  REFERRING PHYSICIAN: Ezio Cerda MD  RETURN PHYSICIAN APPOINTMENT: 18     PROGRESS ASSESSMENT (18):  Mr. Magalys Jimenez has attended 10 visits to date. He is now nearly 6 weeks s/p R reverse total shoulder arthroplasty with a Delta Xtend prostheses, biceps tenodesis, latissimus dorsi and teres major tendon transfer. He is making progress. Pain is under control, and mostly present with active movement and stretching. His shoulder ROM is nearly in functional ranges now. He has a good start to deltoid strength. He is weak as expected at this point. He is progressing toward his goals. He will benefit from continued PT to further progress guided shoulder rehabl to promote safe return to normalized use of the UE with functional activities. PROBLEM LIST (Impacting functional limitations):  1. Decreased [place] with Home Exercise Program  2. Post-op R shoulder pain and swelling  3. Decreased R shoulder ROM   4. Weakness R shoulder INTERVENTIONS PLANNED:  1. Thermal and electric modalities, manual therapies for pain   2.  Manual therapies, therapeutic exercises, HEP for ROM 3. Therapeutic exercises and HEP for strength   TREATMENT PLAN:  Effective Dates: 5/8/2018 TO 8/6/18 . Frequency/Duration: 2-3 visits per week for 90 Days (with assumption that MD will authorize more visits at subsequent appointments)  GOALS: (Goals have been discussed and agreed upon with patient.)  Short-Term Functional Goals: Time Frame: 6 weeks  1. Report no more than 0-1/10 intermittent pain to R shoulder with compensatory use during basic functional activities, and score less than 30% on the DASH. Ongoing 5-30-18  2. R shoulder PROM forward elevation greater than 120 degrees and external rotation greater than 60 degrees to progress into functional ranges. Progressed, nearly met 5-30-18  3. Demonstrate good R shoulder isometric strength with manual testing to progress into strength phase. Met 5-30-18  4. Independent with initial HEP. Met 5-30-18  Discharge Goals: Time Frame: 12 weeks  1. No more than 1-2/10 intermittent pain R shoulder with return to normalized household and work activities, and score less than 15% on the DASH. 2. R shoulder AROM forward elevation greater than 120 degrees, external rotation greater than 45 degrees, and strength to shoulder are grossly WNL's for safe use with normalized activities. 3. Demonstrate good functional shoulder strength and endurance for return to normalized household and work activities. 4. Independent with advanced shoulder HEP for continued self-management. Rehabilitation Potential For Stated Goals: Good              The information in this section was collected on 5-8-18 (except where otherwise noted). HISTORY:   History of Present Injury/Illness (Reason for Referral): Patient underwent a reverse total shoulder arthroplasty on 4/20/18 secondary to reports of ongoing shoulder pain and instability. Patient states that his shoulder frequently \"went out\" if he moved it in certain positions.  Patient received home health following discharge from hospital after having R reverse TSA. Past Medical History/Comorbidities: Mr. Zeferino Menchaca  has a past medical history of Allergic rhinitis; Arthritis; CAD (coronary artery disease); CVA (cerebral vascular accident) ([facility] (08/2017); Diabetes mellitus type 2, controlled ([facility] [facility] Dyslipidemia; ED (erectile dysfunction); Encephalitis (1962); GERD (gastroesophageal reflux disease); Gout; Hypertension; Lacunar infarct, acute ([facility] Lumbago; Memory loss; Mitral valve regurgitation (7/14/12); ROBERTO (obstructive sleep apnea); PAF (paroxysmal atrial fibrillation) ([facility] Prostate cancer ([facility] Psoriasis; SBO (small bowel obstruction) ([facility] (2011, 2015, 2016); and Stage 2 chronic kidney disease. Mr. Zeferino Menchaca  has a past surgical history that includes pr left heart cath,percutaneous (7/2012); hx colonoscopy (1998, 2010); pr abdomen surgery proc unlisted (1967); hx hernia repair (Bilateral, 2012); hx appendectomy (age 49); hx radical prostatectomy ( ); pr prostate biopsy, needle, saturation sampling; hx cataract removal (Bilateral, 2013); hx coronary artery bypass graft (2009); vascular surgery procedure unlist (12/29/2017); and hx splenectomy (1951). Social History/Living Environment:  Patient lives with his spouse in a 2-story house (main level and basement) with 1 step to enter. Prior Level of Function/Work/Activity: Patient is retired. States that he does perform some  work but is unable to currently. Dominant Side:         RIGHT  Current Medications:  Tramadol. prn     Current Outpatient Prescriptions:     metFORMIN ER (GLUCOPHAGE XR) 500 mg tablet, Take 2 Tabs by mouth daily (with dinner). , Disp: 180 Tab, Rfl: 1    allopurinol (ZYLOPRIM) 300 mg tablet, Take 1 Tab by mouth daily. , Disp: 90 Tab, Rfl: 1    metoprolol tartrate (LOPRESSOR) 50 mg tablet, Take 1 Tab by mouth two (2) times a day., Disp: 180 Tab, Rfl: 1    gabapentin (NEURONTIN) 100 mg capsule, Take 1 Cap by mouth two (2) times a day., Disp: 180 Cap, Rfl: 1    pravastatin (PRAVACHOL) 40 mg tablet, Take 1 Tab by mouth nightly., Disp: 90 Tab, Rfl: 1    ranolazine ER (RANEXA) 500 mg SR tablet, TAKE 1 TABLET TWICE A DAY, Disp: 180 Tab, Rfl: 1    dilTIAZem ER (CARDIZEM LA) 360 mg Tb24 tablet, Take 1 Tab by mouth daily. , Disp: 90 Tab, Rfl: 1    losartan (COZAAR) 100 mg tablet, Take 1 Tab by mouth daily. , Disp: 90 Tab, Rfl: 1    traMADol (ULTRAM) 50 mg tablet, Take 1 Tab by mouth every six (6) hours as needed for Pain. Max Daily Amount: 200 mg., Disp: 60 Tab, Rfl: 1    aspirin delayed-release 81 mg tablet, Take 81 mg by mouth nightly., Disp: , Rfl:     esomeprazole (NEXIUM) 40 mg capsule, Take 1 Cap by mouth daily. Indications: am (Patient taking differently: Take 40 mg by mouth daily.), Disp: 90 Cap, Rfl: 1    apixaban (ELIQUIS) 5 mg tablet, Take 1 Tab by mouth two (2) times a day., Disp: 180 Tab, Rfl: 1    glucose blood VI test strips (BLOOD GLUCOSE TEST) strip, Test once to twice daily DX: E11.8, Disp: 300 Strip, Rfl: 3    Lancets misc, Test once to twice daily DX: E11.8, Disp: 300 Each, Rfl: 3    triamcinolone acetonide (KENALOG) 0.1 % ointment, , Disp: , Rfl:     tacrolimus (PROTOPIC) 0.1 % ointment, , Disp: , Rfl:     mometasone (ELOCON) 0.1 % topical cream, , Disp: , Rfl:     nitroglycerin (NITROSTAT) 0.4 mg SL tablet, 1 Tab by SubLINGual route every five (5) minutes as needed for Chest Pain., Disp: 25 Tab, Rfl: 11    pimecrolimus (ELIDEL) 1 % topical cream, Apply  to affected area two (2) times a day., Disp: , Rfl:     fluticasone (FLONASE) 50 mcg/actuation nasal spray, as needed. , Disp: , Rfl:     cholecalciferol, vitamin D3, (VITAMIN D3) 2,000 unit Tab, Take 2,000 Units by mouth daily. Indications: OSTEOPOROSIS, am, Disp: , Rfl:     Cetirizine (ZYRTEC) 10 mg Cap, Take 10 mg by mouth nightly.     Indications: ALLERGIC RHINITIS, Disp: , Rfl:    Date Last Reviewed:  6-19-18   Number of Personal Factors/Comorbidities that affect the Plan of Care: 1-2: MODERATE COMPLEXITY   EXAMINATION:   6/19/2018  Observation/Orthostatic Postural Assessment: Not wearing sling. Palpation: Not assessed. ROM:                 n/t         Strength:        .n/t              Body Structures Involved:  1. Nerves  2. Bones  3. Joints  4. Muscles Body Functions Affected:  1. Sensory/Pain  2. Neuromusculoskeletal  3. Movement Related  4. Skin Related Activities and Participation Affected:  1. Self Care  2. Domestic Life  3. Interpersonal Interactions and Relationships   Number of elements (examined above) that affect the Plan of Care: 4+: HIGH COMPLEXITY   CLINICAL PRESENTATION:   Presentation: Stable and uncomplicated: LOW COMPLEXITY   CLINICAL DECISION MAKING:   Outcome Measure: Tool Used: Disabilities of the Arm, Shoulder and Hand (DASH) Questionnaire - Quick Version  Score:  Initial: 34/55 or 52% disability Most Recent: 33/55 or 50% disability (5-30-18)   Interpretation of Score: The DASH is designed to measure the activities of daily living in person's with upper extremity dysfunction or pain. Each section is scored on a 1-5 scale, 5 representing the greatest disability. The scores of each section are added together for a total score of 55. This number is divided by 11, followed by subtracting 1 and multiplying by 25 to get a percent score of disability. This value represents the percentage disability: 0-20% minimal disability; 20-40% moderate disability; 40-60% severe disability; % dependent for care or exaggerated symptom behavior. Minimal detectable change is 12%. Score 11 12-19 20-28 29-37 38-45 46-54 55   Modifier CH CI CJ CK CL CM CN ?    Carrying, Moving, and Handling Objects:     - CURRENT STATUS: CK - 40%-59% impaired, limited or restricted (5-30-18    - GOAL STATUS: CI - 1%-19% impaired, limited or restricted    - D/C STATUS:  ---------------To be determined---------------    Medical Necessity:   · Skilled intervention continues to be required due to reduced R shoulder range of motion, weakness, pain and decreased function s/p R reverse TSA. Reason for Services/Other Comments:  · Patient continues to require skilled intervention due to recent R reverse total shoulder arthroplasty and subsequent deficits in R UE function. Use of outcome tool(s) and clinical judgement create a POC that gives a: Clear prediction of patient's progress: LOW COMPLEXITY            TREATMENT:   (In addition to Assessment/Re-Assessment sessions the following treatments were rendered)  6/19/2018  . Pre-treatment Symptoms/Complaints: Reports R shoulder soreness upon arrival to PT. Pain: Initial:   5-6/10 Post Session:  No numeric value reported after PT    Manual therapy (13 minutes) to improve R shoulder ROM and reduce discomfort. PROM progressed to  grade 3-4 physiologic mobilizations to increase R shoulder forward elevation and external rotation range of motion. R scapula manually blocked by PT to prevent upward elevation of scapula when performing passive forward elevation. Soft tissue mobilization to R upper trapezius to reduce discomfort. Therapeutic Exercise: ( 22 minutes) for improved R shoulder strength and function.       Date:  6-13-18 Date:  6-14-18 Date:  6-19-18   Activity/Exercise Parameters Parameters Parameters   Supine press 3# 3 x 10 3# 3 x 10 3# 3 x 10 R   Prone middle trapezius 3 x 10 R 3 x 10 R Bentover, 2 x 10 R   Scapular retractions Cable row, 7# 3 x 10 R Cable row 7# 3 x 10 R    Bentover row  5# 2 x 10 Cable row 10# 2 x 6     Bentover row 5# 3 x 10   Shoulder abduction Standing scaption, 2 x 10 Standing scaption, 3 x 10  Standing scaption, 1# 2 x 10   Shoulder flexion  Standing ,1# 2 x 10  Standing, 1# 3 x 10   UE [place] 2 x 12 flexion 2 x 15 R flexion 2 x 12 flexion   Wall slides Flexion, D1, D2 x 10 ea D1 and D1 flex 2 x 10 ea D1 and D2, x 20 ea   Lower trap Prone Y, 3 x 5 with min A to elevate arm through ROM Prone, 3 x 8  -   Shoulder extensions Standing, cable 3# 3 x 10    Triceps extensions   Standing, blue x 10; green 2 x 10               HEP: No changes today    Treatment/Session Assessment:    · Response to Treatment: Patient demonstrates excellent improvement with R shoulder active forward elevation ROM. · Compliance with Program/Exercises: Appears to be complaint. · Recommendations/Intent for next treatment session: \"Next visit will focus on improving scapular stability and R shoulder ROM to maximize funcitonal abilities.   Total Treatment Duration: 35 Minutes  PT Patient Time In/Time Out  Time In: 1115  Time Out: One [facility], PT

## 2018-06-25 ENCOUNTER — HOSPITAL ENCOUNTER (OUTPATIENT)
Dept: PHYSICAL THERAPY | Age: 73
Discharge: HOME | End: 2018-06-25
Payer: MEDICARE

## 2018-06-25 PROCEDURE — 97110 THERAPEUTIC EXERCISES: CPT

## 2018-06-25 PROCEDURE — 97140 MANUAL THERAPY 1/> REGIONS: CPT

## 2018-06-25 PROCEDURE — G8985 CARRY GOAL STATUS: HCPCS

## 2018-06-25 PROCEDURE — G8984 CARRY CURRENT STATUS: HCPCS

## 2018-06-25 NOTE — PROGRESS NOTES
Lamont Elizabeth  : 1945  [place] / [place] / Product Type: Medicare /  [address]  at [facility] RENITA REGALADO  [address] [place] [address], [address], [place] [address].  [phone]   [phone]       OUTPATIENT PHYSICAL THERAPY:Daily Note and Progress Report 2018     ICD-10: Treatment Diagnosis: Stiffness of right shoulder, not elsewhere classified (M25.611),  Aftercare following joint replacement surgery (Z47.1)  Precautions/Allergies:   Celecoxib; Ibuprofen; Lescol [fluvastatin]; Nsaids (non-steroidal anti-inflammatory drug); Sulfa (sulfonamide antibiotics); and Uloric [febuxostat]   Fall Risk Score: 1 (? 5 = High Risk)  MD Orders: Evaluate and treat, HEP, , Strengthening, ROM, \"full ROM/full strength\" (18) MEDICAL/REFERRING DIAGNOSIS: s/p  R reverse TSA w/ BT   DATE OF ONSET: 18  REFERRING PHYSICIAN: Romy Marrero MD  RETURN PHYSICIAN APPOINTMENT: 18     PROGRESS ASSESSMENT (18):  Mr. Nandini Dwyer is now nearly 2 months s/p R reverse total shoulder arthroplasty with a Delta Xtend prostheses, biceps tenodesis, latissimus dorsi and teres major tendon transfer. Patient is making progress with his forward elevation range of motion and strength, but will benefit from continued PT to improve R shoulder strength and function. Patient continues to report some pain in R shoulder and remains limited with external rotation range of motion. PROBLEM LIST (Impacting functional limitations):  1. Decreased Cassia with Home Exercise Program  2. Post-op R shoulder pain and swelling  3. Decreased R shoulder ROM   4. Weakness R shoulder INTERVENTIONS PLANNED:  1. Thermal and electric modalities, manual therapies for pain   2. Manual therapies, therapeutic exercises, HEP for ROM    3. Therapeutic exercises and HEP for strength   TREATMENT PLAN:  Effective Dates: 2018 TO 18 .  Frequency/Duration: 2-3 visits per week for 90 Days (with assumption that MD will authorize more visits at subsequent appointments)  GOALS: (Goals have been discussed and agreed upon with patient.)  Short-Term Functional Goals: Time Frame: 6 weeks  1. Report no more than 0-1/10 intermittent pain to R shoulder with compensatory use during basic functional activities, and score less than 30% on the DASH. Ongoing 6-25-18  2. R shoulder PROM forward elevation greater than 120 degrees and external rotation greater than 60 degrees to progress into functional ranges. Progressed, ongoing 6-25-18  3. Demonstrate good R shoulder isometric strength with manual testing to progress into strength phase. Met 5-30-18  4. Independent with initial HEP. Met 5-30-18  Discharge Goals: Time Frame: 12 weeks  1. No more than 1-2/10 intermittent pain R shoulder with return to normalized household and work activities, and score less than 15% on the DASH. 2. R shoulder AROM forward elevation greater than 120 degrees, external rotation greater than 45 degrees, and strength to shoulder are grossly WNL's for safe use with normalized activities. 3. Demonstrate good functional shoulder strength and endurance for return to normalized household and work activities. 4. Independent with advanced shoulder HEP for continued self-management. Rehabilitation Potential For Stated Goals: Good              The information in this section was collected on 5-8-18 (except where otherwise noted). HISTORY:   History of Present Injury/Illness (Reason for Referral): Patient underwent a reverse total shoulder arthroplasty on 4/20/18 secondary to reports of ongoing shoulder pain and instability. Patient states that his shoulder frequently \"went out\" if he moved it in certain positions. Patient received home health following discharge from hospital after having R reverse TSA. Past Medical History/Comorbidities: Mr. Jodie Cid  has a past medical history of Allergic rhinitis; Arthritis; CAD (coronary artery disease);  CVA (cerebral vascular accident) ([facility] (08/2017); Diabetes mellitus type 2, controlled ([facility] Dyslipidemia; ED (erectile dysfunction); Encephalitis (1962); GERD (gastroesophageal reflux disease); Gout; Hypertension; Lacunar infarct, acute ([facility] Lumbago; Memory loss; Mitral valve regurgitation (7/14/12); ROBERTO (obstructive sleep apnea); PAF (paroxysmal atrial fibrillation) ([facility] Prostate cancer ([facility] Psoriasis; SBO (small bowel obstruction) ([facility] (2011, 2015, 2016); and Stage 2 chronic kidney disease. Mr. Jodie Cid  has a past surgical history that includes pr left heart cath,percutaneous (7/2012); hx colonoscopy (1998, 2010); pr abdomen surgery proc unlisted (1967); hx hernia repair (Bilateral, 2012); hx appendectomy (age 49); hx radical prostatectomy ( ); pr prostate biopsy, needle, saturation sampling; hx cataract removal (Bilateral, 2013); hx coronary artery bypass graft (2009); vascular surgery procedure unlist (12/29/2017); and hx splenectomy (1951). Social History/Living Environment:  Patient lives with his spouse in a 2-story house (main level and basement) with 1 step to enter. Prior Level of Function/Work/Activity: Patient is retired. States that he does perform some  work but is unable to currently. Dominant Side:         RIGHT  Current Medications:  Tramadol. prn     Current Outpatient Prescriptions:     metFORMIN ER (GLUCOPHAGE XR) 500 mg tablet, Take 2 Tabs by mouth daily (with dinner). , Disp: 180 Tab, Rfl: 1    allopurinol (ZYLOPRIM) 300 mg tablet, Take 1 Tab by mouth daily. , Disp: 90 Tab, Rfl: 1    metoprolol tartrate (LOPRESSOR) 50 mg tablet, Take 1 Tab by mouth two (2) times a day., Disp: 180 Tab, Rfl: 1    gabapentin (NEURONTIN) 100 mg capsule, Take 1 Cap by mouth two (2) times a day., Disp: 180 Cap, Rfl: 1    pravastatin (PRAVACHOL) 40 mg tablet, Take 1 Tab by mouth nightly., Disp: 90 Tab, Rfl: 1    ranolazine ER (RANEXA) 500 mg SR tablet, TAKE 1 TABLET TWICE A DAY, Disp: 180 Tab, Rfl: 1    dilTIAZem ER (CARDIZEM LA) 360 mg Tb24 tablet, Take 1 Tab by mouth daily. , Disp: 90 Tab, Rfl: 1    losartan (COZAAR) 100 mg tablet, Take 1 Tab by mouth daily. , Disp: 90 Tab, Rfl: 1    traMADol (ULTRAM) 50 mg tablet, Take 1 Tab by mouth every six (6) hours as needed for Pain. Max Daily Amount: 200 mg., Disp: 60 Tab, Rfl: 1    aspirin delayed-release 81 mg tablet, Take 81 mg by mouth nightly., Disp: , Rfl:     esomeprazole (NEXIUM) 40 mg capsule, Take 1 Cap by mouth daily. Indications: am (Patient taking differently: Take 40 mg by mouth daily.), Disp: 90 Cap, Rfl: 1    apixaban (ELIQUIS) 5 mg tablet, Take 1 Tab by mouth two (2) times a day., Disp: 180 Tab, Rfl: 1    glucose blood VI test strips (BLOOD GLUCOSE TEST) strip, Test once to twice daily DX: E11.8, Disp: 300 Strip, Rfl: 3    Lancets misc, Test once to twice daily DX: E11.8, Disp: 300 Each, Rfl: 3    triamcinolone acetonide (KENALOG) 0.1 % ointment, , Disp: , Rfl:     tacrolimus (PROTOPIC) 0.1 % ointment, , Disp: , Rfl:     mometasone (ELOCON) 0.1 % topical cream, , Disp: , Rfl:     nitroglycerin (NITROSTAT) 0.4 mg SL tablet, 1 Tab by SubLINGual route every five (5) minutes as needed for Chest Pain., Disp: 25 Tab, Rfl: 11    pimecrolimus (ELIDEL) 1 % topical cream, Apply  to affected area two (2) times a day., Disp: , Rfl:     fluticasone (FLONASE) 50 mcg/actuation nasal spray, as needed. , Disp: , Rfl:     cholecalciferol, vitamin D3, (VITAMIN D3) 2,000 unit Tab, Take 2,000 Units by mouth daily. Indications: OSTEOPOROSIS, am, Disp: , Rfl:     Cetirizine (ZYRTEC) 10 mg Cap, Take 10 mg by mouth nightly. Indications: ALLERGIC RHINITIS, Disp: , Rfl:    Date Last Reviewed:  6-19-18   Number of Personal Factors/Comorbidities that affect the Plan of Care: 1-2: MODERATE COMPLEXITY   EXAMINATION:   6/25/2018  Observation/Orthostatic Postural Assessment: Not wearing sling. Palpation: Not assessed.       ROM:    n/t          L shoulder elevation active ROM: 118 degrees  Active ER to back of head with R UE         Strength:        R shoulder   Flexion: 4+/5  ABD: 4+/5              Body Structures Involved:  1. Nerves  2. Bones  3. Joints  4. Muscles Body Functions Affected:  1. Sensory/Pain  2. Neuromusculoskeletal  3. Movement Related  4. Skin Related Activities and Participation Affected:  1. Self Care  2. Domestic Life  3. Interpersonal Interactions and Relationships   Number of elements (examined above) that affect the Plan of Care: 4+: HIGH COMPLEXITY   CLINICAL PRESENTATION:   Presentation: Stable and uncomplicated: LOW COMPLEXITY   CLINICAL DECISION MAKING:   Outcome Measure: Tool Used: Disabilities of the Arm, Shoulder and Hand (DASH) Questionnaire - Quick Version  Score:  Initial: 34/55 or 52% disability  33/55 or 50% disability (5-30-18) Most recent: 30/55 or 43% limited (6-25-18)   Interpretation of Score: The DASH is designed to measure the activities of daily living in person's with upper extremity dysfunction or pain. Each section is scored on a 1-5 scale, 5 representing the greatest disability. The scores of each section are added together for a total score of 55. This number is divided by 11, followed by subtracting 1 and multiplying by 25 to get a percent score of disability. This value represents the percentage disability: 0-20% minimal disability; 20-40% moderate disability; 40-60% severe disability; % dependent for care or exaggerated symptom behavior. Minimal detectable change is 12%. Score 11 12-19 20-28 29-37 38-45 46-54 55   Modifier CH CI CJ CK CL CM CN ?    Carrying, Moving, and Handling Objects:     - CURRENT STATUS: CK - 40%-59% impaired, limited or restricted (5-30-18    - GOAL STATUS: CI - 1%-19% impaired, limited or restricted    - D/C STATUS:  ---------------To be determined---------------    Medical Necessity:   · Skilled intervention continues to be required due to reduced R shoulder range of motion, weakness, pain and decreased function s/p R reverse TSA. Reason for Services/Other Comments:  · Patient continues to require skilled intervention due to recent R reverse total shoulder arthroplasty and subsequent deficits in R UE function. Use of outcome tool(s) and clinical judgement create a POC that gives a: Clear prediction of patient's progress: LOW COMPLEXITY            TREATMENT:   (In addition to Assessment/Re-Assessment sessions the following treatments were rendered)  6/25/2018  . Patient arrived 10 minutes late to appointment     Pre-treatment Symptoms/Complaints:No complaints upon arrival to PT today. Pain: Initial:   No numeric value provided Post Session:  States that it feels looser but tired; no pain number provided    Manual therapy (15 minutes) to improve R shoulder ROM and reduce discomfort. PROM progressed to  grade 3-4 physiologic mobilizations to increase R shoulder forward elevation and external rotation range of motion. R scapula manually blocked by PT to prevent upward elevation of scapula when performing passive forward elevation. Therapeutic Exercise: ( 25 minutes) for improved R shoulder strength and function. Cues to keep scapula \"set\" and down when performing prone middle trap lifts.      Date:  6-13-18 Date:  6-14-18 Date:  6-19-18 Date  6-25-18   Activity/Exercise Parameters Parameters Parameters    Supine press 3# 3 x 10 3# 3 x 10 3# 3 x 10 R 3# x 10 R  4# 2 x 10 R   Prone middle trapezius 3 x 10 R 3 x 10 R Bentover, 2 x 10 R Prone, 1# 3 x 8   Scapular retractions Cable row, 7# 3 x 10 R Cable row 7# 3 x 10 R    Bentover row  5# 2 x 10 Cable row 10# 2 x 6     Bentover row 5# 3 x 10 Cable row 10# 3 x 10    Prone row, 5# 3 x 10 R   Shoulder abduction Standing scaption, 2 x 10 Standing scaption, 3 x 10  Standing scaption, 1# 2 x 10    Shoulder flexion  Standing ,1# 2 x 10  Standing, 1# 3 x 10 Standing, 1# 3 x 10 R   UE [place] 2 x 12 flexion 2 x 15 R flexion 2 x 12 flexion    Wall slides Flexion, D1, D2 x 10 ea D1 and D1 flex 2 x 10 ea D1 and D2, x 20 ea Flexion, D1 and D2, 2 x 10 ea   Lower trap Prone Y, 3 x 5 with min A to elevate arm through ROM Prone, 3 x 8  - -   Shoulder extensions  Standing, cable 3# 3 x 10     Triceps extensions   Standing, blue x 10; green 2 x 10    Bicep curls    3# 3 x 10 R         HEP: No changes today    Treatment/Session Assessment:    · Response to Treatment: Patient a little more stiff today. Sees MD tomorrow. Patient improving with scapular strength and overall R UE strength but continues to have difficulty with scapular stability. · Compliance with Program/Exercises: Appears to be complaint. · Recommendations/Intent for next treatment session: \"Next visit will focus on improving scapular stability and R shoulder ROM to maximize funcitonal abilities.   Total Treatment Duration: 40 Minutes  PT Patient Time In/Time Out  Time In: 5155  Time Out: Allan Varner PT

## 2018-06-27 ENCOUNTER — HOSPITAL ENCOUNTER (OUTPATIENT)
Dept: PHYSICAL THERAPY | Age: 73
Discharge: HOME | End: 2018-06-27
Payer: MEDICARE

## 2018-06-27 PROCEDURE — 97110 THERAPEUTIC EXERCISES: CPT

## 2018-06-27 PROCEDURE — 97140 MANUAL THERAPY 1/> REGIONS: CPT

## 2018-06-27 NOTE — PROGRESS NOTES
Sujey Olga  : 1945  [place] / [place] / Product Type: Medicare /  [address]  at [facility] RENITA REGALADO  [address] [facility], [address] [place] [address],[address], [facility].  [phone]   [phone]       OUTPATIENT PHYSICAL THERAPY:Daily Note 2018     ICD-10: Treatment Diagnosis: Stiffness of right shoulder, not elsewhere classified (M25.611),  Aftercare following joint replacement surgery (Z47.1)  Precautions/Allergies:   Celecoxib; Ibuprofen; Lescol [fluvastatin]; Nsaids (non-steroidal anti-inflammatory drug); Sulfa (sulfonamide antibiotics); and Uloric [febuxostat]   Fall Risk Score: 1 (? 5 = High Risk)  MD Orders: Evaluate and treat, HEP, , Strengthening, ROM, \"full ROM/full strength\" (18) MEDICAL/REFERRING DIAGNOSIS: s/p  R reverse TSA w/ BT   DATE OF ONSET: 18  REFERRING PHYSICIAN: Quan Glez MD  RETURN PHYSICIAN APPOINTMENT: 18     PROGRESS ASSESSMENT (18):  Mr. Rafia Dietrich is now nearly 2 months s/p R reverse total shoulder arthroplasty with a Delta Xtend prostheses, biceps tenodesis, latissimus dorsi and teres major tendon transfer. Patient is making progress with his forward elevation range of motion and strength, but will benefit from continued PT to improve R shoulder strength and function. Patient continues to report some pain in R shoulder and remains limited with external rotation range of motion. PROBLEM LIST (Impacting functional limitations):  1. Decreased [place] with Home Exercise Program  2. Post-op R shoulder pain and swelling  3. Decreased R shoulder ROM   4. Weakness R shoulder INTERVENTIONS PLANNED:  1. Thermal and electric modalities, manual therapies for pain   2. Manual therapies, therapeutic exercises, HEP for ROM    3. Therapeutic exercises and HEP for strength   TREATMENT PLAN:  Effective Dates: 2018 TO 18 .  Frequency/Duration: 2-3 visits per week for 90 Days (with assumption that MD will authorize more visits at subsequent appointments)  GOALS: (Goals have been discussed and agreed upon with patient.)  Short-Term Functional Goals: Time Frame: 6 weeks  1. Report no more than 0-1/10 intermittent pain to R shoulder with compensatory use during basic functional activities, and score less than 30% on the DASH. Ongoing 6-25-18  2. R shoulder PROM forward elevation greater than 120 degrees and external rotation greater than 60 degrees to progress into functional ranges. Progressed, ongoing 6-25-18  3. Demonstrate good R shoulder isometric strength with manual testing to progress into strength phase. Met 5-30-18  4. Independent with initial HEP. Met 5-30-18  Discharge Goals: Time Frame: 12 weeks  1. No more than 1-2/10 intermittent pain R shoulder with return to normalized household and work activities, and score less than 15% on the DASH. 2. R shoulder AROM forward elevation greater than 120 degrees, external rotation greater than 45 degrees, and strength to shoulder are grossly WNL's for safe use with normalized activities. 3. Demonstrate good functional shoulder strength and endurance for return to normalized household and work activities. 4. Independent with advanced shoulder HEP for continued self-management. Rehabilitation Potential For Stated Goals: Good              The information in this section was collected on 5-8-18 (except where otherwise noted). HISTORY:   History of Present Injury/Illness (Reason for Referral): Patient underwent a reverse total shoulder arthroplasty on 4/20/18 secondary to reports of ongoing shoulder pain and instability. Patient states that his shoulder frequently \"went out\" if he moved it in certain positions. Patient received home health following discharge from hospital after having R reverse TSA. Past Medical History/Comorbidities: Mr. Bertram Hawley  has a past medical history of Allergic rhinitis;  Arthritis; CAD (coronary artery disease); CVA (cerebral vascular accident) [facility]) (08/2017); Diabetes mellitus type 2, controlled ([facility] Dyslipidemia; ED (erectile dysfunction); Encephalitis (1962); GERD (gastroesophageal reflux disease); Gout; Hypertension; Lacunar infarct, acute ([facility] Lumbago; Memory loss; Mitral valve regurgitation (7/14/12); ROBERTO (obstructive sleep apnea); PAF (paroxysmal atrial fibrillation) ([facility] Pro[facility] Psoriasis; SBO (small bowel obstruction) ([facility] (2011, 2015, 2016); and Stage 2 chronic kidney disease. Mr. Hawk Mitchell  has a past surgical history that includes pr left heart cath,percutaneous (7/2012); hx colonoscopy (1998, 2010); pr abdomen surgery proc unlisted (1967); hx hernia repair (Bilateral, 2012); hx appendectomy (age 49); hx radical prostatectomy ( ); pr prostate biopsy, needle, saturation sampling; hx cataract removal (Bilateral, 2013); hx coronary artery bypass graft (2009); vascular surgery procedure unlist (12/29/2017); and hx splenectomy (1951). Social History/Living Environment:  Patient lives with his spouse in a 2-story house (main level and basement) with 1 step to enter. Prior Level of Function/Work/Activity: Patient is retired. States that he does perform some  work but is unable to currently. Dominant Side:         RIGHT  Current Medications:  Tramadol. prn     Current Outpatient Prescriptions:     metFORMIN ER (GLUCOPHAGE XR) 500 mg tablet, Take 2 Tabs by mouth daily (with dinner). , Disp: 180 Tab, Rfl: 1    allopurinol (ZYLOPRIM) 300 mg tablet, Take 1 Tab by mouth daily. , Disp: 90 Tab, Rfl: 1    metoprolol tartrate (LOPRESSOR) 50 mg tablet, Take 1 Tab by mouth two (2) times a day., Disp: 180 Tab, Rfl: 1    gabapentin (NEURONTIN) 100 mg capsule, Take 1 Cap by mouth two (2) times a day., Disp: 180 Cap, Rfl: 1    pravastatin (PRAVACHOL) 40 mg tablet, Take 1 Tab by mouth nightly., Disp: 90 Tab, Rfl: 1    ranolazine ER (RANEXA) 500 mg SR tablet, TAKE 1 TABLET TWICE A DAY, Disp: 180 Tab, Rfl: 1    dilTIAZem ER (CARDIZEM LA) 360 mg Tb24 tablet, Take 1 Tab by mouth daily. , Disp: 90 Tab, Rfl: 1    losartan (COZAAR) 100 mg tablet, Take 1 Tab by mouth daily. , Disp: 90 Tab, Rfl: 1    traMADol (ULTRAM) 50 mg tablet, Take 1 Tab by mouth every six (6) hours as needed for Pain. Max Daily Amount: 200 mg., Disp: 60 Tab, Rfl: 1    aspirin delayed-release 81 mg tablet, Take 81 mg by mouth nightly., Disp: , Rfl:     esomeprazole (NEXIUM) 40 mg capsule, Take 1 Cap by mouth daily. Indications: am (Patient taking differently: Take 40 mg by mouth daily.), Disp: 90 Cap, Rfl: 1    apixaban (ELIQUIS) 5 mg tablet, Take 1 Tab by mouth two (2) times a day., Disp: 180 Tab, Rfl: 1    glucose blood VI test strips (BLOOD GLUCOSE TEST) strip, Test once to twice daily DX: E11.8, Disp: 300 Strip, Rfl: 3    Lancets misc, Test once to twice daily DX: E11.8, Disp: 300 Each, Rfl: 3    triamcinolone acetonide (KENALOG) 0.1 % ointment, , Disp: , Rfl:     tacrolimus (PROTOPIC) 0.1 % ointment, , Disp: , Rfl:     mometasone (ELOCON) 0.1 % topical cream, , Disp: , Rfl:     nitroglycerin (NITROSTAT) 0.4 mg SL tablet, 1 Tab by SubLINGual route every five (5) minutes as needed for Chest Pain., Disp: 25 Tab, Rfl: 11    pimecrolimus (ELIDEL) 1 % topical cream, Apply  to affected area two (2) times a day., Disp: , Rfl:     fluticasone (FLONASE) 50 mcg/actuation nasal spray, as needed. , Disp: , Rfl:     cholecalciferol, vitamin D3, (VITAMIN D3) 2,000 unit Tab, Take 2,000 Units by mouth daily. Indications: OSTEOPOROSIS, am, Disp: , Rfl:     Cetirizine (ZYRTEC) 10 mg Cap, Take 10 mg by mouth nightly. Indications: ALLERGIC RHINITIS, Disp: , Rfl:    Date Last Reviewed:  6-19-18   Number of Personal Factors/Comorbidities that affect the Plan of Care: 1-2: MODERATE COMPLEXITY   EXAMINATION:   6/27/2018  Observation/Orthostatic Postural Assessment: Not wearing sling. Palpation: Not assessed.       ROM:    n/t          L shoulder elevation active ROM: 118 degrees  Active ER to back of head with R UE         Strength:        R shoulder   Flexion: 4+/5  ABD: 4+/5              Body Structures Involved:  1. Nerves  2. Bones  3. Joints  4. Muscles Body Functions Affected:  1. Sensory/Pain  2. Neuromusculoskeletal  3. Movement Related  4. Skin Related Activities and Participation Affected:  1. Self Care  2. Domestic Life  3. Interpersonal Interactions and Relationships   Number of elements (examined above) that affect the Plan of Care: 4+: HIGH COMPLEXITY   CLINICAL PRESENTATION:   Presentation: Stable and uncomplicated: LOW COMPLEXITY   CLINICAL DECISION MAKING:   Outcome Measure: Tool Used: Disabilities of the Arm, Shoulder and Hand (DASH) Questionnaire - Quick Version  Score:  Initial: 34/55 or 52% disability  33/55 or 50% disability (5-30-18) Most recent: 30/55 or 43% limited (6-25-18)   Interpretation of Score: The DASH is designed to measure the activities of daily living in person's with upper extremity dysfunction or pain. Each section is scored on a 1-5 scale, 5 representing the greatest disability. The scores of each section are added together for a total score of 55. This number is divided by 11, followed by subtracting 1 and multiplying by 25 to get a percent score of disability. This value represents the percentage disability: 0-20% minimal disability; 20-40% moderate disability; 40-60% severe disability; % dependent for care or exaggerated symptom behavior. Minimal detectable change is 12%. Score 11 12-19 20-28 29-37 38-45 46-54 55   Modifier CH CI CJ CK CL CM CN ?    Carrying, Moving, and Handling Objects:     - CURRENT STATUS: CK - 40%-59% impaired, limited or restricted (5-30-18    - GOAL STATUS: CI - 1%-19% impaired, limited or restricted    - D/C STATUS:  ---------------To be determined---------------    Medical Necessity:   · Skilled intervention continues to be required due to reduced R shoulder range of motion, weakness, pain and decreased function s/p R reverse TSA. Reason for Services/Other Comments:  · Patient continues to require skilled intervention due to recent R reverse total shoulder arthroplasty and subsequent deficits in R UE function. Use of outcome tool(s) and clinical judgement create a POC that gives a: Clear prediction of patient's progress: LOW COMPLEXITY            TREATMENT:   (In addition to Assessment/Re-Assessment sessions the following treatments were rendered)  6/27/2018  . Patient arrived 10 minutes late for appointment     Pre-treatment Symptoms/Complaints:Reports that he saw Dr Devonte Villanueva who was pleased with his progress, and will see again in one month. Reports that his R shoulder is sore. Pain: Initial:   No numeric value provided Post Session:  No numeric value provided    Manual therapy (14 minutes) to improve R shoulder ROM and reduce discomfort. PROM progressed to  grade 3-4 physiologic mobilizations to increase R shoulder forward elevation and external rotation range of motion. R scapula manually blocked by PT to prevent upward elevation of scapula when performing passive forward elevation. Therapeutic Exercise: ( 26 minutes) for improved R shoulder strength and function.      Date:  6-13-18 Date:  6-14-18 Date:  6-19-18 Date  6-25-18 Date  6-27-18   Activity/Exercise Parameters Parameters Parameters     Supine press 3# 3 x 10 3# 3 x 10 3# 3 x 10 R 3# x 10 R  4# 2 x 10 R 4# 3 x 10 R   Prone middle trapezius 3 x 10 R 3 x 10 R Bentover, 2 x 10 R Prone, 1# 3 x 8 Standing, green band pull aparts 3 x 10   Scapular retractions Cable row, 7# 3 x 10 R Cable row 7# 3 x 10 R    Bentover row  5# 2 x 10 Cable row 10# 2 x 6     Bentover row 5# 3 x 10 Cable row 10# 3 x 10    Prone row, 5# 3 x 10 R Cable row 10# 3 x 10 R    Prone row 5# 3 x 10 R   Shoulder abduction Standing scaption, 2 x 10 Standing scaption, 3 x 10  Standing scaption, 1# 2 x 10  Side-lying, 3# 3 x 10   Shoulder flexion  Standing ,1# 2 x 10  Standing, 1# 3 x 10 Standing, 1# 3 x 10 R Standing 2# 2 x 10 R   UE [place] 2 x 12 flexion 2 x 15 R flexion 2 x 12 flexion  -   Wall slides Flexion, D1, D2 x 10 ea D1 and D1 flex 2 x 10 ea D1 and D2, x 20 ea Flexion, D1 and D2, 2 x 10 ea Flexion, D1 flex and D2 flex, 2 x 10 ea   Lower trap Prone Y, 3 x 5 with min A to elevate arm through ROM Prone, 3 x 8  - - -   Shoulder extensions  Standing, cable 3# 3 x 10   -   Triceps extensions   Standing, blue x 10; green 2 x 10  7# 3 x 10 R   Bicep curls    3# 3 x 10 R 3# 3 x 12 R         HEP: No changes today    Treatment/Session Assessment:    · Response to Treatment: Patient tolerated strengthening exercises well and increased resistance. Continues to be stiff with forward elevation and external rotation. · Compliance with Program/Exercises: Appears to be complaint. · Recommendations/Intent for next treatment session: \"Next visit will focus on improving scapular stability and R shoulder ROM to maximize funcitonal abilities.   Total Treatment Duration: 40 Minutes  PT Patient Time In/Time Out  Time In: 1440  Time Out: [facility], PT

## 2018-07-03 ENCOUNTER — HOSPITAL ENCOUNTER (OUTPATIENT)
Dept: PHYSICAL THERAPY | Age: 73
Discharge: HOME | End: 2018-07-03
Payer: MEDICARE

## 2018-07-03 PROCEDURE — 97140 MANUAL THERAPY 1/> REGIONS: CPT

## 2018-07-03 PROCEDURE — 97110 THERAPEUTIC EXERCISES: CPT

## 2018-07-03 NOTE — PROGRESS NOTES
Ivis Marquez  : 1945  [place] / [place] / Product Type: Medicare /  [address]  at [facility] RENITA REGALADO  [facility], [address] [place] [address],[address], [facility] [address].  [phone]   [phone]       OUTPATIENT PHYSICAL THERAPY:Daily Note 7/3/2018     ICD-10: Treatment Diagnosis: Stiffness of right shoulder, not elsewhere classified (M25.611),  Aftercare following joint replacement surgery (Z47.1)  Precautions/Allergies:   Celecoxib; Ibuprofen; Lescol [fluvastatin]; Nsaids (non-steroidal anti-inflammatory drug); Sulfa (sulfonamide antibiotics); and Uloric [febuxostat]   Fall Risk Score: 1 (? 5 = High Risk)  MD Orders: Evaluate and treat, HEP, , Strengthening, ROM, \"full ROM/full strength\" (18) MEDICAL/REFERRING DIAGNOSIS: s/p  R reverse TSA w/ BT   DATE OF ONSET: 18  REFERRING PHYSICIAN: Kathryn Saeed MD  RETURN PHYSICIAN APPOINTMENT: 18     PROGRESS ASSESSMENT (18):  Mr. Luisa Ritter is now nearly 2 months s/p R reverse total shoulder arthroplasty with a Delta Xtend prostheses, biceps tenodesis, latissimus dorsi and teres major tendon transfer. Patient is making progress with his forward elevation range of motion and strength, but will benefit from continued PT to improve R shoulder strength and function. Patient continues to report some pain in R shoulder and remains limited with external rotation range of motion. PROBLEM LIST (Impacting functional limitations):  1. Decreased [place] with Home Exercise Program  2. Post-op R shoulder pain and swelling  3. Decreased R shoulder ROM   4. Weakness R shoulder INTERVENTIONS PLANNED:  1. Thermal and electric modalities, manual therapies for pain   2. Manual therapies, therapeutic exercises, HEP for ROM    3. Therapeutic exercises and HEP for strength   TREATMENT PLAN:  Effective Dates: 2018 TO 18 .  Frequency/Duration: 2-3 visits per week for 90 Days (with assumption that MD will authorize more visits at subsequent appointments)  GOALS: (Goals have been discussed and agreed upon with patient.)  Short-Term Functional Goals: Time Frame: 6 weeks  1. Report no more than 0-1/10 intermittent pain to R shoulder with compensatory use during basic functional activities, and score less than 30% on the DASH. Ongoing 6-25-18  2. R shoulder PROM forward elevation greater than 120 degrees and external rotation greater than 60 degrees to progress into functional ranges. Progressed, ongoing 6-25-18  3. Demonstrate good R shoulder isometric strength with manual testing to progress into strength phase. Met 5-30-18  4. Independent with initial HEP. Met 5-30-18  Discharge Goals: Time Frame: 12 weeks  1. No more than 1-2/10 intermittent pain R shoulder with return to normalized household and work activities, and score less than 15% on the DASH. 2. R shoulder AROM forward elevation greater than 120 degrees, external rotation greater than 45 degrees, and strength to shoulder are grossly WNL's for safe use with normalized activities. 3. Demonstrate good functional shoulder strength and endurance for return to normalized household and work activities. 4. Independent with advanced shoulder HEP for continued self-management. Rehabilitation Potential For Stated Goals: Good              The information in this section was collected on 5-8-18 (except where otherwise noted). HISTORY:   History of Present Injury/Illness (Reason for Referral): Patient underwent a reverse total shoulder arthroplasty on 4/20/18 secondary to reports of ongoing shoulder pain and instability. Patient states that his shoulder frequently \"went out\" if he moved it in certain positions. Patient received home health following discharge from hospital after having R reverse TSA. Past Medical History/Comorbidities: Mr. Mike Rondon  has a past medical history of Allergic rhinitis;  Arthritis; CAD (coronary artery disease); CVA (cerebral vascular accident) [facility]) (08/2017); Diabetes mellitus type 2, controlled ([facility] [address] Dyslipidemia; ED (erectile dysfunction); Encephalitis (1962); GERD (gastroesophageal reflux disease); Gout; Hypertension; Lacunar infarct, acute ([facility] [address] Lumbago; Memory loss; Mitral valve regurgitation (7/14/12); ROBERTO (obstructive sleep apnea); PAF (paroxysmal atrial fibrillation) ([facility] [address] [facility] Psoriasis; SBO (small bowel obstruction) ([facility] (2011, 2015, 2016); and Stage 2 chronic kidney disease. Mr. Ena Arreola  has a past surgical history that includes pr left heart cath,percutaneous (7/2012); hx colonoscopy (1998, 2010); pr abdomen surgery proc unlisted (1967); hx hernia repair (Bilateral, 2012); hx appendectomy (age 49); hx radical prostatectomy ( ); pr prostate biopsy, needle, saturation sampling; hx cataract removal (Bilateral, 2013); hx coronary artery bypass graft (2009); vascular surgery procedure unlist (12/29/2017); and hx splenectomy (1951). Social History/Living Environment:  Patient lives with his spouse in a 2-story house (main level and basement) with 1 step to enter. Prior Level of Function/Work/Activity: Patient is retired. States that he does perform some  work but is unable to currently. Dominant Side:         RIGHT  Current Medications:  Tramadol. prn     Current Outpatient Prescriptions:     metFORMIN ER (GLUCOPHAGE XR) 500 mg tablet, Take 2 Tabs by mouth daily (with dinner). , Disp: 180 Tab, Rfl: 1    allopurinol (ZYLOPRIM) 300 mg tablet, Take 1 Tab by mouth daily. , Disp: 90 Tab, Rfl: 1    metoprolol tartrate (LOPRESSOR) 50 mg tablet, Take 1 Tab by mouth two (2) times a day., Disp: 180 Tab, Rfl: 1    gabapentin (NEURONTIN) 100 mg capsule, Take 1 Cap by mouth two (2) times a day., Disp: 180 Cap, Rfl: 1    pravastatin (PRAVACHOL) 40 mg tablet, Take 1 Tab by mouth nightly., Disp: 90 Tab, Rfl: 1    ranolazine ER (RANEXA) 500 mg SR tablet, TAKE 1 TABLET TWICE A DAY, Disp: 180 Tab, Rfl: 1    dilTIAZem ER (CARDIZEM LA) 360 mg Tb24 tablet, Take 1 Tab by mouth daily. , Disp: 90 Tab, Rfl: 1    losartan (COZAAR) 100 mg tablet, Take 1 Tab by mouth daily. , Disp: 90 Tab, Rfl: 1    traMADol (ULTRAM) 50 mg tablet, Take 1 Tab by mouth every six (6) hours as needed for Pain. Max Daily Amount: 200 mg., Disp: 60 Tab, Rfl: 1    aspirin delayed-release 81 mg tablet, Take 81 mg by mouth nightly., Disp: , Rfl:     esomeprazole (NEXIUM) 40 mg capsule, Take 1 Cap by mouth daily. Indications: am (Patient taking differently: Take 40 mg by mouth daily.), Disp: 90 Cap, Rfl: 1    apixaban (ELIQUIS) 5 mg tablet, Take 1 Tab by mouth two (2) times a day., Disp: 180 Tab, Rfl: 1    glucose blood VI test strips (BLOOD GLUCOSE TEST) strip, Test once to twice daily DX: E11.8, Disp: 300 Strip, Rfl: 3    Lancets misc, Test once to twice daily DX: E11.8, Disp: 300 Each, Rfl: 3    triamcinolone acetonide (KENALOG) 0.1 % ointment, , Disp: , Rfl:     tacrolimus (PROTOPIC) 0.1 % ointment, , Disp: , Rfl:     mometasone (ELOCON) 0.1 % topical cream, , Disp: , Rfl:     nitroglycerin (NITROSTAT) 0.4 mg SL tablet, 1 Tab by SubLINGual route every five (5) minutes as needed for Chest Pain., Disp: 25 Tab, Rfl: 11    pimecrolimus (ELIDEL) 1 % topical cream, Apply  to affected area two (2) times a day., Disp: , Rfl:     fluticasone (FLONASE) 50 mcg/actuation nasal spray, as needed. , Disp: , Rfl:     cholecalciferol, vitamin D3, (VITAMIN D3) 2,000 unit Tab, Take 2,000 Units by mouth daily. Indications: OSTEOPOROSIS, am, Disp: , Rfl:     Cetirizine (ZYRTEC) 10 mg Cap, Take 10 mg by mouth nightly. Indications: ALLERGIC RHINITIS, Disp: , Rfl:    Date Last Reviewed:  6-19-18   Number of Personal Factors/Comorbidities that affect the Plan of Care: 1-2: MODERATE COMPLEXITY   EXAMINATION:   7/3/2018  Observation/Orthostatic Postural Assessment: Not wearing sling. Palpation: Not assessed.       ROM:    n/t          L shoulder elevation active ROM: 118 degrees  Active ER to back of head with R UE         Strength:        R shoulder   Flexion: 4+/5  ABD: 4+/5              Body Structures Involved:  1. Nerves  2. Bones  3. Joints  4. Muscles Body Functions Affected:  1. Sensory/Pain  2. Neuromusculoskeletal  3. Movement Related  4. Skin Related Activities and Participation Affected:  1. Self Care  2. Domestic Life  3. Interpersonal Interactions and Relationships   Number of elements (examined above) that affect the Plan of Care: 4+: HIGH COMPLEXITY   CLINICAL PRESENTATION:   Presentation: Stable and uncomplicated: LOW COMPLEXITY   CLINICAL DECISION MAKING:   Outcome Measure: Tool Used: Disabilities of the Arm, Shoulder and Hand (DASH) Questionnaire - Quick Version  Score:  Initial: 34/55 or 52% disability  33/55 or 50% disability (5-30-18) Most recent: 30/55 or 43% limited (6-25-18)   Interpretation of Score: The DASH is designed to measure the activities of daily living in person's with upper extremity dysfunction or pain. Each section is scored on a 1-5 scale, 5 representing the greatest disability. The scores of each section are added together for a total score of 55. This number is divided by 11, followed by subtracting 1 and multiplying by 25 to get a percent score of disability. This value represents the percentage disability: 0-20% minimal disability; 20-40% moderate disability; 40-60% severe disability; % dependent for care or exaggerated symptom behavior. Minimal detectable change is 12%. Score 11 12-19 20-28 29-37 38-45 46-54 55   Modifier CH CI CJ CK CL CM CN ?    Carrying, Moving, and Handling Objects:     - CURRENT STATUS: CK - 40%-59% impaired, limited or restricted (5-30-18    - GOAL STATUS: CI - 1%-19% impaired, limited or restricted    - D/C STATUS:  ---------------To be determined---------------    Medical Necessity:   · Skilled intervention continues to be required due to reduced R shoulder range of motion, weakness, pain and decreased function s/p R reverse TSA. Reason for Services/Other Comments:  · Patient continues to require skilled intervention due to recent R reverse total shoulder arthroplasty and subsequent deficits in R UE function. Use of outcome tool(s) and clinical judgement create a POC that gives a: Clear prediction of patient's progress: LOW COMPLEXITY            TREATMENT:   (In addition to Assessment/Re-Assessment sessions the following treatments were rendered)  7/3/2018  . R shoulder flexion AROM ` 120 degrees with scapula stabilized. Pre-treatment Symptoms/Complaints: no new reports. Pain: Initial:   0/10 until he moves arm Post Session: 0/10 unless he elevates arm- pain is mild he states    Manual therapy (15 minutes) to improve R shoulder ROM and reduce discomfort. PROM progressed to  grade 3-4 physiologic mobilizations to increase R shoulder forward elevation and external rotation range of motion. R scapula manually blocked by PT to prevent upward elevation of scapula when performing passive forward elevation. Focused also on doing contract-relax at end range for both ER and flexion. Also did  myofascial release to R lat dorsi. Therapeutic Exercise: ( 25 minutes) for improved R shoulder strength and function.      Date:  6-13-18 Date:  6-14-18 Date:  6-19-18 Date  6-25-18 Date  6-27-18 Date  7/3/18   Activity/Exercise Parameters Parameters Parameters      Supine press 3# 3 x 10 3# 3 x 10 3# 3 x 10 R 3# x 10 R  4# 2 x 10 R 4# 3 x 10 R 4# 3 x 10 R   Prone middle trapezius 3 x 10 R 3 x 10 R Bentover, 2 x 10 R Prone, 1# 3 x 8 Standing, green band pull aparts 3 x 10 Prone, 1# 2x10   Scapular retractions Cable row, 7# 3 x 10 R Cable row 7# 3 x 10 R    Bentover row  5# 2 x 10 Cable row 10# 2 x 6     Bentover row 5# 3 x 10 Cable row 10# 3 x 10    Prone row, 5# 3 x 10 R Cable row 10# 3 x 10 R    Prone row 5# 3 x 10 R    Shoulder abduction Standing scaption, 2 x 10 Standing scaption, 3 x 10  Standing scaption, 1# 2 x 10  Side-lying, 3# 3 x 10 Side-lying, 3# 3 x 10   Shoulder flexion  Standing ,1# 2 x 10  Standing, 1# 3 x 10 Standing, 1# 3 x 10 R Standing 2# 2 x 10 R Standing 2# 2 x 10 R   UE Elizabeth 2 x 12 flexion 2 x 15 R flexion 2 x 12 flexion  -    Wall slides Flexion, D1, D2 x 10 ea D1 and D1 flex 2 x 10 ea D1 and D2, x 20 ea Flexion, D1 and D2, 2 x 10 ea Flexion, D1 flex and D2 flex, 2 x 10 ea Flexion, D1 flex and D2 flex, 2 x 10 ea   Lower trap Prone Y, 3 x 5 with min A to elevate arm through ROM Prone, 3 x 8  - - - -   Shoulder extensions  Standing, cable 3# 3 x 10   - Prone 1# x10   Triceps extensions   Standing, blue x 10; green 2 x 10  7# 3 x 10 R 7# 3 x 10 R   Bicep curls    3# 3 x 10 R 3# 3 x 12 R 3# 3 x 12 R   Wall push ups      10x   mild pressure                  HEP: No changes today    Treatment/Session Assessment:    · Response to Treatment: Patient had difficulty with controlling mid trap and sustaining contraction with prone exercises. . Continues to be stiff with forward elevation and external rotation. · Compliance with Program/Exercises: Appears to be complaint. · Recommendations/Intent for next treatment session: \"Next visit will focus on improving scapular stability and R shoulder ROM to maximize funcitonal abilities.   Total Treatment Duration: 40 Minutes  PT Patient Time In/Time Out  Time In: 1318  Time Out: Michelle 113, PT

## 2018-07-09 PROBLEM — L60.0 INGROWN LEFT BIG TOENAIL: Status: RESOLVED | Noted: 2017-01-12 | Resolved: 2018-07-09

## 2018-07-09 PROBLEM — M79.674 GREAT TOE PAIN, RIGHT: Status: RESOLVED | Noted: 2017-12-05 | Resolved: 2018-07-09

## 2018-07-09 PROBLEM — E87.6 HYPOKALEMIA: Status: RESOLVED | Noted: 2018-04-21 | Resolved: 2018-07-09

## 2018-07-09 PROBLEM — M54.42 ACUTE LEFT-SIDED LOW BACK PAIN WITH LEFT-SIDED SCIATICA: Status: RESOLVED | Noted: 2017-07-06 | Resolved: 2018-07-09

## 2018-07-10 ENCOUNTER — HOSPITAL ENCOUNTER (OUTPATIENT)
Dept: PHYSICAL THERAPY | Age: 73
Discharge: HOME | End: 2018-07-10
Payer: MEDICARE

## 2018-07-10 PROCEDURE — 97110 THERAPEUTIC EXERCISES: CPT

## 2018-07-10 PROCEDURE — 97140 MANUAL THERAPY 1/> REGIONS: CPT

## 2018-07-10 NOTE — PROGRESS NOTES
Carolyn Rogel  : 1945  [place] / [place] / Product Type: Medicare /  [address]  at [facility], [facility] [place]ENITA [place]ALADO  [address] [place] [address], [address] [place] [address],[address], [address] [facility]  [phone]   [phone]       OUTPATIENT PHYSICAL THERAPY:Daily Note 7/10/2018     ICD-10: Treatment Diagnosis: Stiffness of right shoulder, not elsewhere classified (M25.611),  Aftercare following joint replacement surgery (Z47.1)  Precautions/Allergies:   Celecoxib; Ibuprofen; Lescol [fluvastatin]; Nsaids (non-steroidal anti-inflammatory drug); Sulfa (sulfonamide antibiotics); and Uloric [febuxostat]   Fall Risk Score: 1 (? 5 = High Risk)  MD Orders: Evaluate and treat, HEP, , Strengthening, ROM, \"full ROM/full strength\" (18) MEDICAL/REFERRING DIAGNOSIS: s/p  R reverse TSA w/ BT   DATE OF ONSET: 18  REFERRING PHYSICIAN: Gus Carpenter MD  RETURN PHYSICIAN APPOINTMENT: 18     PROGRESS ASSESSMENT (18):  Mr. Lashonda Downey is now nearly 2 months s/p R reverse total shoulder arthroplasty with a Delta Xtend prostheses, biceps tenodesis, latissimus dorsi and teres major tendon transfer. Patient is making progress with his forward elevation range of motion and strength, but will benefit from continued PT to improve R shoulder strength and function. Patient continues to report some pain in R shoulder and remains limited with external rotation range of motion. PROBLEM LIST (Impacting functional limitations):  1. Decreased [place] with Home Exercise Program  2. Post-op R shoulder pain and swelling  3. Decreased R shoulder ROM   4. Weakness R shoulder INTERVENTIONS PLANNED:  1. Thermal and electric modalities, manual therapies for pain   2. Manual therapies, therapeutic exercises, HEP for ROM    3. Therapeutic exercises and HEP for strength   TREATMENT PLAN:  Effective Dates: 2018 TO 18 .  Frequency/Duration: 2-3 visits per week for 90 Days (with assumption that MD will authorize more visits at subsequent appointments)  GOALS: (Goals have been discussed and agreed upon with patient.)  Short-Term Functional Goals: Time Frame: 6 weeks  1. Report no more than 0-1/10 intermittent pain to R shoulder with compensatory use during basic functional activities, and score less than 30% on the DASH. Ongoing 6-25-18  2. R shoulder PROM forward elevation greater than 120 degrees and external rotation greater than 60 degrees to progress into functional ranges. Progressed, ongoing 6-25-18  3. Demonstrate good R shoulder isometric strength with manual testing to progress into strength phase. Met 5-30-18  4. Independent with initial HEP. Met 5-30-18  Discharge Goals: Time Frame: 12 weeks  1. No more than 1-2/10 intermittent pain R shoulder with return to normalized household and work activities, and score less than 15% on the DASH. 2. R shoulder AROM forward elevation greater than 120 degrees, external rotation greater than 45 degrees, and strength to shoulder are grossly WNL's for safe use with normalized activities. 3. Demonstrate good functional shoulder strength and endurance for return to normalized household and work activities. 4. Independent with advanced shoulder HEP for continued self-management. Rehabilitation Potential For Stated Goals: Good              The information in this section was collected on 5-8-18 (except where otherwise noted). HISTORY:   History of Present Injury/Illness (Reason for Referral): Patient underwent a reverse total shoulder arthroplasty on 4/20/18 secondary to reports of ongoing shoulder pain and instability. Patient states that his shoulder frequently \"went out\" if he moved it in certain positions. Patient received home health following discharge from hospital after having R reverse TSA. Past Medical History/Comorbidities: Mr. Graeme Montero  has a past medical history of Allergic rhinitis;  Arthritis; CAD (coronary artery disease); CVA (cerebral vascular accident) [facility]) (08/2017); Diabetes mellitus type 2, controlled ([facility] [address] Dyslipidemia; ED (erectile dysfunction); Encephalitis (1962); GERD (gastroesophageal reflux disease); Gout; Hypertension; Lacunar infarct, acute ([facility] [address] Lumbago; Memory loss; Mitral valve regurgitation (7/14/12); ROBERTO (obstructive sleep apnea); PAF (paroxysmal atrial fibrillation) ([facility] [address] Prostate cancer ([facility] [address] Psoriasis; [facility] obstruction[facility] (2011, 2015, 2016); and Stage 2 chronic kidney disease. Mr. Hawk Mitchell  has a past surgical history that includes pr left heart cath,percutaneous (7/2012); hx colonoscopy (1998, 2010); pr abdomen surgery proc unlisted (1967); hx hernia repair (Bilateral, 2012); hx appendectomy (age 49); hx radical prostatectomy ( ); pr prostate biopsy, needle, saturation sampling; hx cataract removal (Bilateral, 2013); hx coronary artery bypass graft (2009); vascular surgery procedure unlist (12/29/2017); hx splenectomy (1951); and hx shoulder replacement (Right, 2018). Social History/Living Environment:  Patient lives with his spouse in a 2-story house (main level and basement) with 1 step to enter. Prior Level of Function/Work/Activity: Patient is retired. States that he does perform some  work but is unable to currently. Dominant Side:         RIGHT  Current Medications:  Tramadol. prn     Current Outpatient Prescriptions:     metFORMIN ER (GLUCOPHAGE XR) 500 mg tablet, Take 2 Tabs by mouth daily (with dinner). , Disp: 180 Tab, Rfl: 1    allopurinol (ZYLOPRIM) 300 mg tablet, Take 1 Tab by mouth daily. , Disp: 90 Tab, Rfl: 1    metoprolol tartrate (LOPRESSOR) 50 mg tablet, Take 1 Tab by mouth two (2) times a day., Disp: 180 Tab, Rfl: 1    gabapentin (NEURONTIN) 100 mg capsule, Take 1 Cap by mouth two (2) times a day., Disp: 180 Cap, Rfl: 1    pravastatin (PRAVACHOL) 40 mg tablet, Take 1 Tab by mouth nightly., Disp: 90 Tab, Rfl: 1    ranolazine ER (RANEXA) 500 mg SR tablet, TAKE 1 TABLET TWICE A DAY, Disp: 180 Tab, Rfl: 1    dilTIAZem ER (CARDIZEM LA) 360 mg Tb24 tablet, Take 1 Tab by mouth daily. , Disp: 90 Tab, Rfl: 1    losartan (COZAAR) 100 mg tablet, Take 1 Tab by mouth daily. , Disp: 90 Tab, Rfl: 1    traMADol (ULTRAM) 50 mg tablet, Take 1 Tab by mouth every six (6) hours as needed for Pain. Max Daily Amount: 200 mg., Disp: 60 Tab, Rfl: 1    aspirin delayed-release 81 mg tablet, Take 81 mg by mouth nightly., Disp: , Rfl:     esomeprazole (NEXIUM) 40 mg capsule, Take 1 Cap by mouth daily. Indications: am (Patient taking differently: Take 40 mg by mouth daily.), Disp: 90 Cap, Rfl: 1    apixaban (ELIQUIS) 5 mg tablet, Take 1 Tab by mouth two (2) times a day., Disp: 180 Tab, Rfl: 1    glucose blood VI test strips (BLOOD GLUCOSE TEST) strip, Test once to twice daily DX: E11.8, Disp: 300 Strip, Rfl: 3    Lancets misc, Test once to twice daily DX: E11.8, Disp: 300 Each, Rfl: 3    triamcinolone acetonide (KENALOG) 0.1 % ointment, , Disp: , Rfl:     tacrolimus (PROTOPIC) 0.1 % ointment, , Disp: , Rfl:     mometasone (ELOCON) 0.1 % topical cream, , Disp: , Rfl:     nitroglycerin (NITROSTAT) 0.4 mg SL tablet, 1 Tab by SubLINGual route every five (5) minutes as needed for Chest Pain., Disp: 25 Tab, Rfl: 11    pimecrolimus (ELIDEL) 1 % topical cream, Apply  to affected area two (2) times a day., Disp: , Rfl:     fluticasone (FLONASE) 50 mcg/actuation nasal spray, as needed. , Disp: , Rfl:     cholecalciferol, vitamin D3, (VITAMIN D3) 2,000 unit Tab, Take 2,000 Units by mouth daily. Indications: OSTEOPOROSIS, am, Disp: , Rfl:     Cetirizine (ZYRTEC) 10 mg Cap, Take 10 mg by mouth nightly. Indications: ALLERGIC RHINITIS, Disp: , Rfl:    Date Last Reviewed:  6-19-18   Number of Personal Factors/Comorbidities that affect the Plan of Care: 1-2: MODERATE COMPLEXITY   EXAMINATION:   7/10/2018  Observation/Orthostatic Postural Assessment: Not wearing sling. Palpation: Not assessed.       ROM:    n/t          L shoulder elevation active ROM: 118 degrees  Active ER to back of head with R UE         Strength:        R shoulder   Flexion: 4+/5  ABD: 4+/5              Body Structures Involved:  1. Nerves  2. Bones  3. Joints  4. Muscles Body Functions Affected:  1. Sensory/Pain  2. Neuromusculoskeletal  3. Movement Related  4. Skin Related Activities and Participation Affected:  1. Self Care  2. Domestic Life  3. Interpersonal Interactions and Relationships   Number of elements (examined above) that affect the Plan of Care: 4+: HIGH COMPLEXITY   CLINICAL PRESENTATION:   Presentation: Stable and uncomplicated: LOW COMPLEXITY   CLINICAL DECISION MAKING:   Outcome Measure: Tool Used: Disabilities of the Arm, Shoulder and Hand (DASH) Questionnaire - Quick Version  Score:  Initial: 34/55 or 52% disability  33/55 or 50% disability (5-30-18) Most recent: 30/55 or 43% limited (6-25-18)   Interpretation of Score: The DASH is designed to measure the activities of daily living in person's with upper extremity dysfunction or pain. Each section is scored on a 1-5 scale, 5 representing the greatest disability. The scores of each section are added together for a total score of 55. This number is divided by 11, followed by subtracting 1 and multiplying by 25 to get a percent score of disability. This value represents the percentage disability: 0-20% minimal disability; 20-40% moderate disability; 40-60% severe disability; % dependent for care or exaggerated symptom behavior. Minimal detectable change is 12%. Score 11 12-19 20-28 29-37 38-45 46-54 55   Modifier CH CI CJ CK CL CM CN ?    Carrying, Moving, and Handling Objects:     - CURRENT STATUS: CK - 40%-59% impaired, limited or restricted (5-30-18    - GOAL STATUS: CI - 1%-19% impaired, limited or restricted    - D/C STATUS:  ---------------To be determined---------------    Medical Necessity:   · Skilled intervention continues to be required due to reduced R shoulder range of motion, weakness, pain and decreased function s/p R reverse TSA. Reason for Services/Other Comments:  · Patient continues to require skilled intervention due to recent R reverse total shoulder arthroplasty and subsequent deficits in R UE function. Use of outcome tool(s) and clinical judgement create a POC that gives a: Clear prediction of patient's progress: LOW COMPLEXITY            TREATMENT:   (In addition to Assessment/Re-Assessment sessions the following treatments were rendered)  7/10/2018  Patient arrived 10 minutes late to appointment today    Pre-treatment Symptoms/Complaints: Reports that his shoulder is doing well, but it is just sore from time to time. Pain: Initial:   2-3/10  Post Session: No change in pain    Manual therapy (12 minutes) to improve R shoulder ROM and reduce discomfort. PROM progressed to  grade 3-4 physiologic mobilizations to increase R shoulder forward elevation and external rotation range of motion. R scapula manually blocked by PT to prevent upward elevation of scapula when performing passive forward elevation. Therapeutic Exercise: ( 23 minutes) for improved R shoulder strength and function.       Date:  6-13-18 Date:  6-14-18 Date:  6-19-18 Date  6-25-18 Date  6-27-18 Date  7/3/18 Date  7-10-18    Activity/Exercise Parameters Parameters Parameters       Supine press 3# 3 x 10 3# 3 x 10 3# 3 x 10 R 3# x 10 R  4# 2 x 10 R 4# 3 x 10 R 4# 3 x 10 R 4# x 10, 5# 2 x 10 R   Prone middle trapezius 3 x 10 R 3 x 10 R Bentover, 2 x 10 R Prone, 1# 3 x 8 Standing, green band pull aparts 3 x 10 Prone, 1# 2x10 Prone 1# 2 x 10   Scapular retractions Cable row, 7# 3 x 10 R Cable row 7# 3 x 10 R    Bentover row  5# 2 x 10 Cable row 10# 2 x 6     Bentover row 5# 3 x 10 Cable row 10# 3 x 10    Prone row, 5# 3 x 10 R Cable row 10# 3 x 10 R    Prone row 5# 3 x 10 R  Cable row 10# R 3 x 10    Prone row 5# 3 x 10 R   Shoulder abduction Standing scaption, 2 x 10 Standing scaption, 3 x 10  Standing scaption, 1# 2 x 10  Side-lying, 3# 3 x 10 Side-lying, 3# 3 x 10 Standing scaption, 2# 3 x 10   Shoulder flexion  Standing ,1# 2 x 10  Standing, 1# 3 x 10 Standing, 1# 3 x 10 R Standing 2# 2 x 10 R Standing 2# 2 x 10 R Standing, 2# 3 x 10 R   UE [place] 2 x 12 flexion 2 x 15 R flexion 2 x 12 flexion  -     Wall slides Flexion, D1, D2 x 10 ea D1 and D1 flex 2 x 10 ea D1 and D2, x 20 ea Flexion, D1 and D2, 2 x 10 ea Flexion, D1 flex and D2 flex, 2 x 10 ea Flexion, D1 flex and D2 flex, 2 x 10 ea D1 and D2 flexion, 2 x 10 ea   Lower trap Prone Y, 3 x 5 with min A to elevate arm through ROM Prone, 3 x 8  - - - -    Shoulder extensions  Standing, cable 3# 3 x 10   - Prone 1# x10    Triceps extensions   Standing, blue x 10; green 2 x 10  7# 3 x 10 R 7# 3 x 10 R 7# 3 x 10 R   Bicep curls    3# 3 x 10 R 3# 3 x 12 R 3# 3 x 12 R 3# x 12, 4# 2 x 12   Wall push ups      10x   mild pressure 10 x 2                   HEP: No changes today    Treatment/Session Assessment:    · Response to Treatment: Patient demonstrated good improvement with middle trap raises and overall R shoulder ROM. Minimal discomfort with performance of therapeutic exercises  · Compliance with Program/Exercises: Appears to be complaint. · Recommendations/Intent for next treatment session: \"Next visit will focus on improving scapular stability and R shoulder ROM to maximize funcitonal abilities. Will see Dr Sami Norman next week.   Total Treatment Duration: 35 Minutes  PT Patient Time In/Time Out  Time In: 1410  Time Out: 1309 [facility], PT

## 2018-07-12 ENCOUNTER — HOSPITAL ENCOUNTER (OUTPATIENT)
Dept: PHYSICAL THERAPY | Age: 73
Discharge: HOME | End: 2018-07-12
Payer: MEDICARE

## 2018-07-12 PROCEDURE — 97110 THERAPEUTIC EXERCISES: CPT

## 2018-07-12 PROCEDURE — 97140 MANUAL THERAPY 1/> REGIONS: CPT

## 2018-07-12 NOTE — PROGRESS NOTES
Colby Miguel  : 1945  [place] / [place] / Product Type: Medicare /  [address] at [facility] RENITA ERGALADO  [address] [place] [address], [address], [place] [address].  [phone]   [phone]       OUTPATIENT PHYSICAL THERAPY:Daily Note 2018     ICD-10: Treatment Diagnosis: Stiffness of right shoulder, not elsewhere classified (M25.611),  Aftercare following joint replacement surgery (Z47.1)  Precautions/Allergies:   Celecoxib; Ibuprofen; Lescol [fluvastatin]; Nsaids (non-steroidal anti-inflammatory drug); Sulfa (sulfonamide antibiotics); and Uloric [febuxostat]   Fall Risk Score: 1 (? 5 = High Risk)  MD Orders: Evaluate and treat, HEP, , Strengthening, ROM, \"full ROM/full strength\" () MEDICAL/REFERRING DIAGNOSIS: s/p  R reverse TSA w/ BT   DATE OF ONSET: 18  REFERRING PHYSICIAN: Guerda Howe MD  RETURN PHYSICIAN APPOINTMENT: 18     PROGRESS ASSESSMENT (18):  Mr. Dina Remy is now nearly 2 months s/p R reverse total shoulder arthroplasty with a Delta Xtend prostheses, biceps tenodesis, latissimus dorsi and teres major tendon transfer. Patient is making progress with his forward elevation range of motion and strength, but will benefit from continued PT to improve R shoulder strength and function. Patient continues to report some pain in R shoulder and remains limited with external rotation range of motion. PROBLEM LIST (Impacting functional limitations):  1. Decreased [place] with Home Exercise Program  2. Post-op R shoulder pain and swelling  3. Decreased R shoulder ROM   4. Weakness R shoulder INTERVENTIONS PLANNED:  1. Thermal and electric modalities, manual therapies for pain   2. Manual therapies, therapeutic exercises, HEP for ROM    3. Therapeutic exercises and HEP for strength   TREATMENT PLAN:  Effective Dates: 2018 TO 18 .  Frequency/Duration: 2-3 visits per week for 90 Days (with assumption that MD will authorize more visits at subsequent appointments)  GOALS: (Goals have been discussed and agreed upon with patient.)  Short-Term Functional Goals: Time Frame: 6 weeks  1. Report no more than 0-1/10 intermittent pain to R shoulder with compensatory use during basic functional activities, and score less than 30% on the DASH. Ongoing 6-25-18  2. R shoulder PROM forward elevation greater than 120 degrees and external rotation greater than 60 degrees to progress into functional ranges. Progressed, ongoing 6-25-18  3. Demonstrate good R shoulder isometric strength with manual testing to progress into strength phase. Met 5-30-18  4. Independent with initial HEP. Met 5-30-18  Discharge Goals: Time Frame: 12 weeks  1. No more than 1-2/10 intermittent pain R shoulder with return to normalized household and work activities, and score less than 15% on the DASH. 2. R shoulder AROM forward elevation greater than 120 degrees, external rotation greater than 45 degrees, and strength to shoulder are grossly WNL's for safe use with normalized activities. 3. Demonstrate good functional shoulder strength and endurance for return to normalized household and work activities. 4. Independent with advanced shoulder HEP for continued self-management. Rehabilitation Potential For Stated Goals: Good              The information in this section was collected on 5-8-18 (except where otherwise noted). HISTORY:   History of Present Injury/Illness (Reason for Referral): Patient underwent a reverse total shoulder arthroplasty on 4/20/18 secondary to reports of ongoing shoulder pain and instability. Patient states that his shoulder frequently \"went out\" if he moved it in certain positions. Patient received home health following discharge from hospital after having R reverse TSA. Past Medical History/Comorbidities: From EMR:    Mr. Josseline Das  has a past medical history of Allergic rhinitis; Arthritis; CAD (coronary artery disease);  CVA (cerebral vascular accident) ([facility] (08/2017); Diabetes mellitus type 2, controlled ([facility] Dyslipidemia; ED (erectile dysfunction); Encephalitis (1962); GERD (gastroesophageal reflux disease); Gout; Hypertension; Lacunar infarct, acute ([facility] Lumbago; Memory loss; Mitral valve regurgitation (7/14/12); ROBERTO (obstructive sleep apnea); PAF (paroxysmal atrial fibrillation) ([facility] Prostate cancer ([facility] Psoriasis; SBO (small bowel obstruction) ([facility] (2011, 2015, 2016); and Stage 2 chronic kidney disease. Mr. Cassandra Ritter  has a past surgical history that includes pr left heart cath,percutaneous (7/2012); hx colonoscopy (1998, 2010); pr abdomen surgery proc unlisted (1967); hx hernia repair (Bilateral, 2012); hx appendectomy (age 49); hx radical prostatectomy ( ); pr prostate biopsy, needle, saturation sampling; hx cataract removal (Bilateral, 2013); hx coronary artery bypass graft (2009); vascular surgery procedure unlist (12/29/2017); hx splenectomy (1951); and hx shoulder replacement (Right, 2018). Social History/Living Environment:  Patient lives with his spouse in a 2-story house (main level and basement) with 1 step to enter. Prior Level of Function/Work/Activity: Patient is retired. States that he does perform some  work but is unable to currently. Dominant Side:         RIGHT  Current Medications:  Tramadol. prn     Current Outpatient Prescriptions: from EMR    metFORMIN ER (GLUCOPHAGE XR) 500 mg tablet, Take 2 Tabs by mouth daily (with dinner). , Disp: 180 Tab, Rfl: 1    allopurinol (ZYLOPRIM) 300 mg tablet, Take 1 Tab by mouth daily. , Disp: 90 Tab, Rfl: 1    metoprolol tartrate (LOPRESSOR) 50 mg tablet, Take 1 Tab by mouth two (2) times a day., Disp: 180 Tab, Rfl: 1    gabapentin (NEURONTIN) 100 mg capsule, Take 1 Cap by mouth two (2) times a day., Disp: 180 Cap, Rfl: 1    pravastatin (PRAVACHOL) 40 mg tablet, Take 1 Tab by mouth nightly., Disp: 90 Tab, Rfl: 1    ranolazine ER (RANEXA) 500 mg SR tablet, TAKE 1 TABLET TWICE A DAY, Disp: 180 Tab, Rfl: 1    dilTIAZem ER (CARDIZEM LA) 360 mg Tb24 tablet, Take 1 Tab by mouth daily. , Disp: 90 Tab, Rfl: 1    losartan (COZAAR) 100 mg tablet, Take 1 Tab by mouth daily. , Disp: 90 Tab, Rfl: 1    traMADol (ULTRAM) 50 mg tablet, Take 1 Tab by mouth every six (6) hours as needed for Pain. Max Daily Amount: 200 mg., Disp: 60 Tab, Rfl: 1    aspirin delayed-release 81 mg tablet, Take 81 mg by mouth nightly., Disp: , Rfl:     esomeprazole (NEXIUM) 40 mg capsule, Take 1 Cap by mouth daily. Indications: am (Patient taking differently: Take 40 mg by mouth daily.), Disp: 90 Cap, Rfl: 1    apixaban (ELIQUIS) 5 mg tablet, Take 1 Tab by mouth two (2) times a day., Disp: 180 Tab, Rfl: 1    glucose blood VI test strips (BLOOD GLUCOSE TEST) strip, Test once to twice daily DX: E11.8, Disp: 300 Strip, Rfl: 3    Lancets misc, Test once to twice daily DX: E11.8, Disp: 300 Each, Rfl: 3    triamcinolone acetonide (KENALOG) 0.1 % ointment, , Disp: , Rfl:     tacrolimus (PROTOPIC) 0.1 % ointment, , Disp: , Rfl:     mometasone (ELOCON) 0.1 % topical cream, , Disp: , Rfl:     nitroglycerin (NITROSTAT) 0.4 mg SL tablet, 1 Tab by SubLINGual route every five (5) minutes as needed for Chest Pain., Disp: 25 Tab, Rfl: 11    pimecrolimus (ELIDEL) 1 % topical cream, Apply  to affected area two (2) times a day., Disp: , Rfl:     fluticasone (FLONASE) 50 mcg/actuation nasal spray, as needed. , Disp: , Rfl:     cholecalciferol, vitamin D3, (VITAMIN D3) 2,000 unit Tab, Take 2,000 Units by mouth daily. Indications: OSTEOPOROSIS, am, Disp: , Rfl:     Cetirizine (ZYRTEC) 10 mg Cap, Take 10 mg by mouth nightly. Indications: ALLERGIC RHINITIS, Disp: , Rfl:    Date Last Reviewed:  7/12-18   EXAMINATION:   7/12/2018  Observation/Orthostatic Postural Assessment: Not wearing sling. Palpation: Not assessed.       ROM: n/t  Strength: n/t       Body Structures Involved:  1. Nerves  2. Bones  3. Joints  4. Muscles Body Functions Affected:  1. Sensory/Pain  2. Neuromusculoskeletal  3. Movement Related  4. Skin Related Activities and Participation Affected:  1. Self Care  2. Domestic Life  3. Interpersonal Interactions and Relationships   CLINICAL DECISION MAKING:   Outcome Measure: Tool Used: Disabilities of the Arm, Shoulder and Hand (DASH) Questionnaire - Quick Version  Score:  Initial: 34/55 or 52% disability  33/55 or 50% disability (5-30-18) Most recent: 30/55 or 43% limited (6-25-18)   Interpretation of Score: The DASH is designed to measure the activities of daily living in person's with upper extremity dysfunction or pain. Each section is scored on a 1-5 scale, 5 representing the greatest disability. The scores of each section are added together for a total score of 55. This number is divided by 11, followed by subtracting 1 and multiplying by 25 to get a percent score of disability. This value represents the percentage disability: 0-20% minimal disability; 20-40% moderate disability; 40-60% severe disability; % dependent for care or exaggerated symptom behavior. Minimal detectable change is 12%. Score 11 12-19 20-28 29-37 38-45 46-54 55   Modifier CH CI CJ CK CL CM CN ? Carrying, Moving, and Handling Objects:     - CURRENT STATUS: CK - 40%-59% impaired, limited or restricted (2-28-08    - GOAL STATUS: CI - 1%-19% impaired, limited or restricted    - D/C STATUS:  ---------------To be determined---------------    Medical Necessity:   · Skilled intervention continues to be required due to reduced R shoulder range of motion, weakness, pain and decreased function s/p R reverse TSA. Reason for Services/Other Comments:  · Patient continues to require skilled intervention due to recent R reverse total shoulder arthroplasty and subsequent deficits in R UE function.             TREATMENT:   (In addition to Assessment/Re-Assessment sessions the following treatments were rendered)  Pre-treatment Symptoms/Complaints: Reports that his shoulder is doing well. No new problems. Pain: Initial: Pain Intensity 1: 2 2-3/10  Post Session: Pain not rated at end of session, but patient said shoulder was \"okay\"    Manual therapy (15 minutes) to improve R shoulder ROM. Grade 2 to 4- physio mobs R shoulder flex, abduct, IR and ER. Therapeutic Exercise: (25 Minutes) for improved R shoulder strength and function. Progressed complexity and resistance as indicated.       Date  6-25-18 Date  6-27-18 Date  7/3/18 Date  7-10-18  Date  7/12/18   Activity/Exercise        Supine press 3# x 10 R  4# 2 x 10 R 4# 3 x 10 R 4# 3 x 10 R 4# x 10, 5# 2 x 10 R 4# 3x10   Prone middle trapezius Prone, 1# 3 x 8 Standing, green band pull aparts 3 x 10 Prone, 1# 2x10 Prone 1# 2 x 10 Prone 1# 2x10   Prone low trap     Prone 1# 2x10   Scapular retractions Cable row 10# 3 x 10    Prone row, 5# 3 x 10 R Cable row 10# 3 x 10 R    Prone row 5# 3 x 10 R  Cable row 10# R 3 x 10    Prone row 5# 3 x 10 R Cable row 10# B 2x15   Shoulder abduction  Side-lying, 3# 3 x 10 Side-lying, 3# 3 x 10 Standing scaption, 2# 3 x 10    Shoulder flexion  Standing, 1# 3 x 10 R Standing 2# 2 x 10 R Standing 2# 2 x 10 R Standing, 2# 3 x 10 R Standing, B 2# 2x10   Wall slides Flexion, D1 and D2, 2 x 10 ea Flexion, D1 flex and D2 flex, 2 x 10 ea Flexion, D1 flex and D2 flex, 2 x 10 ea D1 and D2 flexion, 2 x 10 ea    Shoulder extensions  - Prone 1# x10  -   Triceps extensions  7# 3 x 10 R 7# 3 x 10 R 7# 3 x 10 R -   Bicep curls 3# 3 x 10 R 3# 3 x 12 R 3# 3 x 12 R 3# x 12, 4# 2 x 12 4# 2x15   Wall push ups   10x   mild pressure 10 x 2 -   Cable press     3# B 2x15   B Lat pull downs     10# 2x15   IR with band     Red 2x15   ER with band     Red 2x15         HEP: No changes today    Treatment/Session Assessment:    · Response to Treatment: Patient does exercises as asked, but appears to be somewhat weak for almost 3 months post op. · Compliance with Program/Exercises: Appears to be complaint. · Recommendations/Intent for next treatment session: \"Next visit will focus on improving scapular stability and R shoulder ROM to maximize funcitonal abilities. Will see Dr Bowman Payment next week.   Total Treatment Duration: 40 Minutes  PT Patient Time In/Time Out  Time In: 1035  Time Out: [address] Vanessa Connolly

## 2018-07-16 ENCOUNTER — HOSPITAL ENCOUNTER (OUTPATIENT)
Dept: PHYSICAL THERAPY | Age: 73
Discharge: HOME | End: 2018-07-16
Payer: MEDICARE

## 2018-07-16 PROCEDURE — 97140 MANUAL THERAPY 1/> REGIONS: CPT

## 2018-07-16 PROCEDURE — 97110 THERAPEUTIC EXERCISES: CPT

## 2018-07-16 NOTE — PROGRESS NOTES
Adria Car  : 1945  [place] / [place] / Product Type: Medicare /  [address]  at [address] [place] [address] Rd  [address] [facility], [address] [place] [address],[address], [place] [address].  [phone]   [phone]       OUTPATIENT PHYSICAL THERAPY:Daily Note 2018     ICD-10: Treatment Diagnosis: Stiffness of right shoulder, not elsewhere classified (M25.611),  Aftercare following joint replacement surgery (Z47.1)  Precautions/Allergies:   Celecoxib; Ibuprofen; Lescol [fluvastatin]; Nsaids (non-steroidal anti-inflammatory drug); Sulfa (sulfonamide antibiotics); and Uloric [febuxostat]   Fall Risk Score: 1 (? 5 = High Risk)  MD Orders: Evaluate and treat, HEP, , Strengthening, ROM, \"full ROM/full strength\" (74) MEDICAL/REFERRING DIAGNOSIS: s/p  R reverse TSA w/ BT   DATE OF ONSET: 18  REFERRING PHYSICIAN: Namrata Meza MD  RETURN PHYSICIAN APPOINTMENT: 18     PROGRESS ASSESSMENT (18):  Mr. Javed Terry is now nearly 2 months s/p R reverse total shoulder arthroplasty with a Delta Xtend prostheses, biceps tenodesis, latissimus dorsi and teres major tendon transfer. Patient is making progress with his forward elevation range of motion and strength, but will benefit from continued PT to improve R shoulder strength and function. Patient continues to report some pain in R shoulder and remains limited with external rotation range of motion. PROBLEM LIST (Impacting functional limitations):  1. Decreased [place] with Home Exercise Program  2. Post-op R shoulder pain and swelling  3. Decreased R shoulder ROM   4. Weakness R shoulder INTERVENTIONS PLANNED:  1. Thermal and electric modalities, manual therapies for pain   2. Manual therapies, therapeutic exercises, HEP for ROM    3. Therapeutic exercises and HEP for strength   TREATMENT PLAN:  Effective Dates: 2018 TO 18 .  Frequency/Duration: 2-3 visits per week for 90 Days (with assumption that MD will authorize more visits at subsequent appointments)  GOALS: (Goals have been discussed and agreed upon with patient.)  Short-Term Functional Goals: Time Frame: 6 weeks  1. Report no more than 0-1/10 intermittent pain to R shoulder with compensatory use during basic functional activities, and score less than 30% on the DASH. Ongoing 6-25-18  2. R shoulder PROM forward elevation greater than 120 degrees and external rotation greater than 60 degrees to progress into functional ranges. Progressed, ongoing 6-25-18  3. Demonstrate good R shoulder isometric strength with manual testing to progress into strength phase. Met 5-30-18  4. Independent with initial HEP. Met 5-30-18  Discharge Goals: Time Frame: 12 weeks  1. No more than 1-2/10 intermittent pain R shoulder with return to normalized household and work activities, and score less than 15% on the DASH. 2. R shoulder AROM forward elevation greater than 120 degrees, external rotation greater than 45 degrees, and strength to shoulder are grossly WNL's for safe use with normalized activities. 3. Demonstrate good functional shoulder strength and endurance for return to normalized household and work activities. 4. Independent with advanced shoulder HEP for continued self-management. Rehabilitation Potential For Stated Goals: Good              The information in this section was collected on 5-8-18 (except where otherwise noted). HISTORY:   History of Present Injury/Illness (Reason for Referral): Patient underwent a reverse total shoulder arthroplasty on 4/20/18 secondary to reports of ongoing shoulder pain and instability. Patient states that his shoulder frequently \"went out\" if he moved it in certain positions. Patient received home health following discharge from hospital after having R reverse TSA. Past Medical History/Comorbidities: From EMR:    Mr. Brittni Delacruz  has a past medical history of Allergic rhinitis; Arthritis; CAD (coronary artery disease);  CVA (cerebral vascular accident) ([facility] (08/2017); Diabetes mellitus type 2, controlled ([facility] Dyslipidemia; ED (erectile dysfunction); Encephalitis (1962); GERD (gastroesophageal reflux disease); Gout; Hypertension; Lacunar infarct, acute ([facility] Lumbago; Memory loss; Mitral valve regurgitation (7/14/12); ROBERTO (obstructive sleep apnea); PAF (paroxysmal atrial fibrillation) ([facility] Prostate cancer ([facility] Psoriasis; SBO (small bowel obstruction) ([facility] (2011, 2015, 2016); and Stage 2 chronic kidney disease. Mr. Mike Rondon  has a past surgical history that includes pr left heart cath,percutaneous (7/2012); hx colonoscopy (1998, 2010); pr abdomen surgery proc unlisted (1967); hx hernia repair (Bilateral, 2012); hx appendectomy (age 49); hx radical prostatectomy ( ); pr prostate biopsy, needle, saturation sampling; hx cataract removal (Bilateral, 2013); hx coronary artery bypass graft (2009); vascular surgery procedure unlist (12/29/2017); hx splenectomy (1951); and hx shoulder replacement (Right, 2018). Social History/Living Environment:  Patient lives with his spouse in a 2-story house (main level and basement) with 1 step to enter. Prior Level of Function/Work/Activity: Patient is retired. States that he does perform some  work but is unable to currently. Dominant Side:         RIGHT  Current Medications:  Tramadol. prn     Current Outpatient Prescriptions: from EMR    metFORMIN ER (GLUCOPHAGE XR) 500 mg tablet, Take 2 Tabs by mouth daily (with dinner). , Disp: 180 Tab, Rfl: 1    allopurinol (ZYLOPRIM) 300 mg tablet, Take 1 Tab by mouth daily. , Disp: 90 Tab, Rfl: 1    metoprolol tartrate (LOPRESSOR) 50 mg tablet, Take 1 Tab by mouth two (2) times a day., Disp: 180 Tab, Rfl: 1    gabapentin (NEURONTIN) 100 mg capsule, Take 1 Cap by mouth two (2) times a day., Disp: 180 Cap, Rfl: 1    pravastatin (PRAVACHOL) 40 mg tablet, Take 1 Tab by mouth nightly., Disp: 90 Tab, Rfl: 1    ranolazine ER (RANEXA) 500 mg SR tablet, TAKE 1 TABLET TWICE A DAY, Disp: 180 Tab, Rfl: 1    dilTIAZem ER (CARDIZEM LA) 360 mg Tb24 tablet, Take 1 Tab by mouth daily. , Disp: 90 Tab, Rfl: 1    losartan (COZAAR) 100 mg tablet, Take 1 Tab by mouth daily. , Disp: 90 Tab, Rfl: 1    traMADol (ULTRAM) 50 mg tablet, Take 1 Tab by mouth every six (6) hours as needed for Pain. Max Daily Amount: 200 mg., Disp: 60 Tab, Rfl: 1    aspirin delayed-release 81 mg tablet, Take 81 mg by mouth nightly., Disp: , Rfl:     esomeprazole (NEXIUM) 40 mg capsule, Take 1 Cap by mouth daily. Indications: am (Patient taking differently: Take 40 mg by mouth daily.), Disp: 90 Cap, Rfl: 1    apixaban (ELIQUIS) 5 mg tablet, Take 1 Tab by mouth two (2) times a day., Disp: 180 Tab, Rfl: 1    glucose blood VI test strips (BLOOD GLUCOSE TEST) strip, Test once to twice daily DX: E11.8, Disp: 300 Strip, Rfl: 3    Lancets misc, Test once to twice daily DX: E11.8, Disp: 300 Each, Rfl: 3    triamcinolone acetonide (KENALOG) 0.1 % ointment, , Disp: , Rfl:     tacrolimus (PROTOPIC) 0.1 % ointment, , Disp: , Rfl:     mometasone (ELOCON) 0.1 % topical cream, , Disp: , Rfl:     nitroglycerin (NITROSTAT) 0.4 mg SL tablet, 1 Tab by SubLINGual route every five (5) minutes as needed for Chest Pain., Disp: 25 Tab, Rfl: 11    pimecrolimus (ELIDEL) 1 % topical cream, Apply  to affected area two (2) times a day., Disp: , Rfl:     fluticasone (FLONASE) 50 mcg/actuation nasal spray, as needed. , Disp: , Rfl:     cholecalciferol, vitamin D3, (VITAMIN D3) 2,000 unit Tab, Take 2,000 Units by mouth daily. Indications: OSTEOPOROSIS, am, Disp: , Rfl:     Cetirizine (ZYRTEC) 10 mg Cap, Take 10 mg by mouth nightly. Indications: ALLERGIC RHINITIS, Disp: , Rfl:    Date Last Reviewed:  7/16/18   EXAMINATION:   7/16/2018  Observation/Orthostatic Postural Assessment: Not wearing sling. Palpation: Not assessed.       ROM: n/t  Strength: n/t       Body Structures Involved:  1. Nerves  2. Bones  3. Joints  4. Muscles Body Functions Affected:  1. Sensory/Pain  2. Neuromusculoskeletal  3. Movement Related  4. Skin Related Activities and Participation Affected:  1. Self Care  2. Domestic Life  3. Interpersonal Interactions and Relationships   CLINICAL DECISION MAKING:   Outcome Measure: Tool Used: Disabilities of the Arm, Shoulder and Hand (DASH) Questionnaire - Quick Version  Score:  Initial: 34/55 or 52% disability  33/55 or 50% disability (5-30-18) Most recent: 30/55 or 43% limited (6-25-18)   Interpretation of Score: The DASH is designed to measure the activities of daily living in person's with upper extremity dysfunction or pain. Each section is scored on a 1-5 scale, 5 representing the greatest disability. The scores of each section are added together for a total score of 55. This number is divided by 11, followed by subtracting 1 and multiplying by 25 to get a percent score of disability. This value represents the percentage disability: 0-20% minimal disability; 20-40% moderate disability; 40-60% severe disability; % dependent for care or exaggerated symptom behavior. Minimal detectable change is 12%. Score 11 12-19 20-28 29-37 38-45 46-54 55   Modifier CH CI CJ CK CL CM CN ? Carrying, Moving, and Handling Objects:     - CURRENT STATUS: CK - 40%-59% impaired, limited or restricted (1-88-58    - GOAL STATUS: CI - 1%-19% impaired, limited or restricted    - D/C STATUS:  ---------------To be determined---------------    Medical Necessity:   · Skilled intervention continues to be required due to reduced R shoulder range of motion, weakness, pain and decreased function s/p R reverse TSA. Reason for Services/Other Comments:  · Patient continues to require skilled intervention due to recent R reverse total shoulder arthroplasty and subsequent deficits in R UE function.             TREATMENT:   (In addition to Assessment/Re-Assessment sessions the following treatments were rendered)  Pre-treatment Symptoms/Complaints: Reports that his shoulder is doing well. His foot is bothering him more than the shoulder. .   Pain: Initial: Pain Intensity 1: 2    Post Session: No change in pain, per patient. Manual therapy (15 minutes) to improve R shoulder ROM. Grade 2 to 4- physio mobs R shoulder flex, abduct, IR and ER. Therapeutic Exercise: (25 Minutes) for improved R shoulder strength and function. Progressed as indicated.       Date  6-25-18 Date  6-27-18 Date  7/3/18 Date  7-10-18  Date  7/12/18 Date  7/16/18   Activity/Exercise         Supine press 3# x 10 R  4# 2 x 10 R 4# 3 x 10 R 4# 3 x 10 R 4# x 10, 5# 2 x 10 R 4# 3x10 4# 2x15   Prone middle trapezius Prone, 1# 3 x 8 Standing, green band pull aparts 3 x 10 Prone, 1# 2x10 Prone 1# 2 x 10 Prone 1# 2x10 Prone 1# 2x12   Prone low trap     Prone 1# 2x10 Prone 1# 2x12   Scapular retractions Cable row 10# 3 x 10    Prone row, 5# 3 x 10 R Cable row 10# 3 x 10 R    Prone row 5# 3 x 10 R  Cable row 10# R 3 x 10    Prone row 5# 3 x 10 R Cable row 10# B 2x15 Cable row 10#  B 2x12   Shoulder abduction  Side-lying, 3# 3 x 10 Side-lying, 3# 3 x 10 Standing scaption, 2# 3 x 10  -   Shoulder flexion  Standing, 1# 3 x 10 R Standing 2# 2 x 10 R Standing 2# 2 x 10 R Standing, 2# 3 x 10 R Standing, B 2# 2x10 Standing B 2# 2x10   Wall slides Flexion, D1 and D2, 2 x 10 ea Flexion, D1 flex and D2 flex, 2 x 10 ea Flexion, D1 flex and D2 flex, 2 x 10 ea D1 and D2 flexion, 2 x 10 ea  -   Shoulder extensions  - Prone 1# x10  - -   Triceps extensions  7# 3 x 10 R 7# 3 x 10 R 7# 3 x 10 R - -   Bicep curls 3# 3 x 10 R 3# 3 x 12 R 3# 3 x 12 R 3# x 12, 4# 2 x 12 4# 2x15 4# 2x15   Wall push ups   10x   mild pressure 10 x 2 - -   Cable press     3# B 2x15 3# B 2x15   B Lat pull downs     10# 2x15 10# 1x15  7# 1x15   IR with band     Red 2x15 Red 2x15   ER with band     Red 2x15 Red 2x15         HEP: No changes today    Treatment/Session Assessment:    · Response to Treatment: Patient does exercises as asked,, but needs rest breaks. He seemed tired today and did exercises slowly. · Compliance with Program/Exercises: Appears to be complaint. · Recommendations/Intent for next treatment session: \"Next visit will focus on progression of exercises.    Total Treatment Duration: 40 Minutes  PT Patient Time In/Time Out  Time In: 1121  Time Out: [address] Elsie Ramon, PT

## 2018-07-18 ENCOUNTER — HOSPITAL ENCOUNTER (OUTPATIENT)
Dept: PHYSICAL THERAPY | Age: 73
Discharge: HOME | End: 2018-07-18
Payer: MEDICARE

## 2018-07-18 PROCEDURE — 97140 MANUAL THERAPY 1/> REGIONS: CPT

## 2018-07-18 PROCEDURE — 97110 THERAPEUTIC EXERCISES: CPT

## 2018-07-18 NOTE — PROGRESS NOTES
Sujey Olga  : 1945  [place] / [place] / Product Type: Medicare /  [address]  at [facility]  [address] [place] [address], [address] [place] [address],[address], [address] [facility], [place]  [phone]   [phone]       OUTPATIENT PHYSICAL THERAPY:Daily Note 2018     ICD-10: Treatment Diagnosis: Stiffness of right shoulder, not elsewhere classified (M25.611),  Aftercare following joint replacement surgery (Z47.1)  Precautions/Allergies:   Celecoxib; Ibuprofen; Lescol [fluvastatin]; Nsaids (non-steroidal anti-inflammatory drug); Sulfa (sulfonamide antibiotics); and Uloric [febuxostat]   Fall Risk Score: 1 (? 5 = High Risk)  MD Orders: Evaluate and treat, HEP, , Strengthening, ROM, \"full ROM/full strength\" () MEDICAL/REFERRING DIAGNOSIS: s/p  R reverse TSA w/ BT   DATE OF ONSET: 18  REFERRING PHYSICIAN: Quan Glez MD  RETURN PHYSICIAN APPOINTMENT: 18     PROGRESS ASSESSMENT (18):  Mr. Rafia Dietrich is now nearly 2 months s/p R reverse total shoulder arthroplasty with a Delta Xtend prostheses, biceps tenodesis, latissimus dorsi and teres major tendon transfer. Patient is making progress with his forward elevation range of motion and strength, but will benefit from continued PT to improve R shoulder strength and function. Patient continues to report some pain in R shoulder and remains limited with external rotation range of motion. PROBLEM LIST (Impacting functional limitations):  1. Decreased [place] with Home Exercise Program  2. Post-op R shoulder pain and swelling  3. Decreased R shoulder ROM   4. Weakness R shoulder INTERVENTIONS PLANNED:  1. Thermal and electric modalities, manual therapies for pain   2. Manual therapies, therapeutic exercises, HEP for ROM    3. Therapeutic exercises and HEP for strength   TREATMENT PLAN:  Effective Dates: 2018 TO 18 .  Frequency/Duration: 2-3 visits per week for 90 Days (with assumption that MD will authorize more visits at subsequent appointments)  GOALS: (Goals have been discussed and agreed upon with patient.)  Short-Term Functional Goals: Time Frame: 6 weeks  1. Report no more than 0-1/10 intermittent pain to R shoulder with compensatory use during basic functional activities, and score less than 30% on the DASH. Ongoing 6-25-18  2. R shoulder PROM forward elevation greater than 120 degrees and external rotation greater than 60 degrees to progress into functional ranges. Progressed, ongoing 6-25-18  3. Demonstrate good R shoulder isometric strength with manual testing to progress into strength phase. Met 5-30-18  4. Independent with initial HEP. Met 5-30-18  Discharge Goals: Time Frame: 12 weeks  1. No more than 1-2/10 intermittent pain R shoulder with return to normalized household and work activities, and score less than 15% on the DASH. 2. R shoulder AROM forward elevation greater than 120 degrees, external rotation greater than 45 degrees, and strength to shoulder are grossly WNL's for safe use with normalized activities. 3. Demonstrate good functional shoulder strength and endurance for return to normalized household and work activities. 4. Independent with advanced shoulder HEP for continued self-management. Rehabilitation Potential For Stated Goals: Good              The information in this section was collected on 5-8-18 (except where otherwise noted). HISTORY:   History of Present Injury/Illness (Reason for Referral): Patient underwent a reverse total shoulder arthroplasty on 4/20/18 secondary to reports of ongoing shoulder pain and instability. Patient states that his shoulder frequently \"went out\" if he moved it in certain positions. Patient received home health following discharge from hospital after having R reverse TSA. Past Medical History/Comorbidities: From EMR:    Mr. Kym Lion  has a past medical history of Allergic rhinitis; Arthritis; CAD (coronary artery disease);  CVA (cerebral vascular accident) ([facility] (08/2017); Diabetes mellitus type 2, controlled ([facility] Dyslipidemia; ED (erectile dysfunction); Encephalitis (1962); GERD (gastroesophageal reflux disease); Gout; Hypertension; Lacunar infarct, acute ([facility] Lumbago; Memory loss; Mitral valve regurgitation (7/14/12); ROBERTO (obstructive sleep apnea); PAF (paroxysmal atrial fibrillation) ([facility] Prostate cancer ([facility] Psoriasis; SBO (small bowel obstruction) ([facility] (2011, 2015, 2016); and Stage 2 chronic kidney disease. Mr. Vikash Avalos  has a past surgical history that includes pr left heart cath,percutaneous (7/2012); hx colonoscopy (1998, 2010); pr abdomen surgery proc unlisted (1967); hx hernia repair (Bilateral, 2012); hx appendectomy (age 49); hx radical prostatectomy ( ); pr prostate biopsy, needle, saturation sampling; hx cataract removal (Bilateral, 2013); hx coronary artery bypass graft (2009); vascular surgery procedure unlist (12/29/2017); hx splenectomy (1951); and hx shoulder replacement (Right, 2018). Social History/Living Environment:  Patient lives with his spouse in a 2-story house (main level and basement) with 1 step to enter. Prior Level of Function/Work/Activity: Patient is retired. States that he does perform some  work but is unable to currently. Dominant Side:         RIGHT  Current Medications:  Tramadol. prn     Current Outpatient Prescriptions: from EMR    metFORMIN ER (GLUCOPHAGE XR) 500 mg tablet, Take 2 Tabs by mouth daily (with dinner). , Disp: 180 Tab, Rfl: 1    allopurinol (ZYLOPRIM) 300 mg tablet, Take 1 Tab by mouth daily. , Disp: 90 Tab, Rfl: 1    metoprolol tartrate (LOPRESSOR) 50 mg tablet, Take 1 Tab by mouth two (2) times a day., Disp: 180 Tab, Rfl: 1    gabapentin (NEURONTIN) 100 mg capsule, Take 1 Cap by mouth two (2) times a day., Disp: 180 Cap, Rfl: 1    pravastatin (PRAVACHOL) 40 mg tablet, Take 1 Tab by mouth nightly., Disp: 90 Tab, Rfl: 1    ranolazine ER (RANEXA) 500 mg SR tablet, TAKE 1 TABLET TWICE A DAY, Disp: 180 Tab, Rfl: 1    dilTIAZem ER (CARDIZEM LA) 360 mg Tb24 tablet, Take 1 Tab by mouth daily. , Disp: 90 Tab, Rfl: 1    losartan (COZAAR) 100 mg tablet, Take 1 Tab by mouth daily. , Disp: 90 Tab, Rfl: 1    traMADol (ULTRAM) 50 mg tablet, Take 1 Tab by mouth every six (6) hours as needed for Pain. Max Daily Amount: 200 mg., Disp: 60 Tab, Rfl: 1    aspirin delayed-release 81 mg tablet, Take 81 mg by mouth nightly., Disp: , Rfl:     esomeprazole (NEXIUM) 40 mg capsule, Take 1 Cap by mouth daily. Indications: am (Patient taking differently: Take 40 mg by mouth daily.), Disp: 90 Cap, Rfl: 1    apixaban (ELIQUIS) 5 mg tablet, Take 1 Tab by mouth two (2) times a day., Disp: 180 Tab, Rfl: 1    glucose blood VI test strips (BLOOD GLUCOSE TEST) strip, Test once to twice daily DX: E11.8, Disp: 300 Strip, Rfl: 3    Lancets misc, Test once to twice daily DX: E11.8, Disp: 300 Each, Rfl: 3    triamcinolone acetonide (KENALOG) 0.1 % ointment, , Disp: , Rfl:     tacrolimus (PROTOPIC) 0.1 % ointment, , Disp: , Rfl:     mometasone (ELOCON) 0.1 % topical cream, , Disp: , Rfl:     nitroglycerin (NITROSTAT) 0.4 mg SL tablet, 1 Tab by SubLINGual route every five (5) minutes as needed for Chest Pain., Disp: 25 Tab, Rfl: 11    pimecrolimus (ELIDEL) 1 % topical cream, Apply  to affected area two (2) times a day., Disp: , Rfl:     fluticasone (FLONASE) 50 mcg/actuation nasal spray, as needed. , Disp: , Rfl:     cholecalciferol, vitamin D3, (VITAMIN D3) 2,000 unit Tab, Take 2,000 Units by mouth daily. Indications: OSTEOPOROSIS, am, Disp: , Rfl:     Cetirizine (ZYRTEC) 10 mg Cap, Take 10 mg by mouth nightly. Indications: ALLERGIC RHINITIS, Disp: , Rfl:    Date Last Reviewed:  7/16/18   EXAMINATION:   7/18/2018  Observation/Orthostatic Postural Assessment: Not wearing sling. Palpation: Not assessed.       ROM: n/t  Strength: n/t       Body Structures Involved:  1. Nerves  2. Bones  3. Joints  4. Muscles Body Functions Affected:  1. Sensory/Pain  2. Neuromusculoskeletal  3. Movement Related  4. Skin Related Activities and Participation Affected:  1. Self Care  2. Domestic Life  3. Interpersonal Interactions and Relationships   CLINICAL DECISION MAKING:   Outcome Measure: Tool Used: Disabilities of the Arm, Shoulder and Hand (DASH) Questionnaire - Quick Version  Score:  Initial: 34/55 or 52% disability  33/55 or 50% disability (5-30-18) Most recent: 30/55 or 43% limited (6-25-18)   Interpretation of Score: The DASH is designed to measure the activities of daily living in person's with upper extremity dysfunction or pain. Each section is scored on a 1-5 scale, 5 representing the greatest disability. The scores of each section are added together for a total score of 55. This number is divided by 11, followed by subtracting 1 and multiplying by 25 to get a percent score of disability. This value represents the percentage disability: 0-20% minimal disability; 20-40% moderate disability; 40-60% severe disability; % dependent for care or exaggerated symptom behavior. Minimal detectable change is 12%. Score 11 12-19 20-28 29-37 38-45 46-54 55   Modifier CH CI CJ CK CL CM CN ? Carrying, Moving, and Handling Objects:     - CURRENT STATUS: CK - 40%-59% impaired, limited or restricted (7-23-83    - GOAL STATUS: CI - 1%-19% impaired, limited or restricted    - D/C STATUS:  ---------------To be determined---------------    Medical Necessity:   · Skilled intervention continues to be required due to reduced R shoulder range of motion, weakness, pain and decreased function s/p R reverse TSA. Reason for Services/Other Comments:  · Patient continues to require skilled intervention due to recent R reverse total shoulder arthroplasty and subsequent deficits in R UE function.             TREATMENT:   (In addition to Assessment/Re-Assessment sessions the following treatments were rendered)  Pre-treatment Symptoms/Complaints: Reports that his foot continues to bother him. Shoulder has been a little sore. Sees Dr Louisa Garibay later today. Pain: Initial:     None upon arrival in shoulder Post Session: No change in pain, per patient. Manual therapy (15 minutes) to improve R shoulder ROM. Grade 3 physio mobilizations to improve R shoulder flexion, abduction, IR and ER. Therapeutic Exercise: ( 24 minutes) for improved R shoulder strength and function. Progressed as indicated.       Date  6-25-18 Date  6-27-18 Date  7/3/18 Date  7-10-18  Date  7/12/18 Date  7/16/18 Date  7-18-18   Activity/Exercise          Supine press 3# x 10 R  4# 2 x 10 R 4# 3 x 10 R 4# 3 x 10 R 4# x 10, 5# 2 x 10 R 4# 3x10 4# 2x15 4# 3 x 10   Prone middle trapezius Prone, 1# 3 x 8 Standing, green band pull aparts 3 x 10 Prone, 1# 2x10 Prone 1# 2 x 10 Prone 1# 2x10 Prone 1# 2x12 Prone, 1# 3 x 10   Prone low trap     Prone 1# 2x10 Prone 1# 2x12 Prone 1# 2 x 10   Scapular retractions Cable row 10# 3 x 10    Prone row, 5# 3 x 10 R Cable row 10# 3 x 10 R    Prone row 5# 3 x 10 R  Cable row 10# R 3 x 10    Prone row 5# 3 x 10 R Cable row 10# B 2x15 Cable row 10#  B 2x12 Cable row, 7# x 10, 10# 2 x 10   Shoulder abduction  Side-lying, 3# 3 x 10 Side-lying, 3# 3 x 10 Standing scaption, 2# 3 x 10  - Standing, 2# 2 x 10 R   Shoulder flexion  Standing, 1# 3 x 10 R Standing 2# 2 x 10 R Standing 2# 2 x 10 R Standing, 2# 3 x 10 R Standing, B 2# 2x10 Standing B 2# 2x10 Standing, 2# 2 x 12 R   Wall slides Flexion, D1 and D2, 2 x 10 ea Flexion, D1 flex and D2 flex, 2 x 10 ea Flexion, D1 flex and D2 flex, 2 x 10 ea D1 and D2 flexion, 2 x 10 ea  -    Shoulder extensions  - Prone 1# x10  - -    Triceps extensions  7# 3 x 10 R 7# 3 x 10 R 7# 3 x 10 R - -    Bicep curls 3# 3 x 10 R 3# 3 x 12 R 3# 3 x 12 R 3# x 12, 4# 2 x 12 4# 2x15 4# 2x15 4# 2 x 15 R   Wall push ups   10x   mild pressure 10 x 2 - -    Cable press     3# B 2x15 3# B 2x15 3# 3 x 10   B Lat pull downs     10# 2x15 10# 1x15  7# 1x15 -   IR with band     Red 2x15 Red 2x15    ER with band     Red 2x15 Red 2x15          HEP: Provided with handout for wall slides, resisted shoulder flexion, resisted shoulder abduction, resisted scapular retractions, and band-resisted bicep curls. Patient verbalized understanding and was provided with theraband. Treatment/Session Assessment:    · Response to Treatment: Good performance with therapeutic exercises. · Compliance with Program/Exercises: Appears to be complaint. · Recommendations/Intent for next treatment session: \"Next visit will focus on progression of exercises, pending MD decision today. May be discharged due to progress.   Total Treatment Duration: 39 Minutes  PT Patient Time In/Time Out  Time In: 1115  Time Out: 107 Joaquín Bowie, PT

## 2018-07-20 ENCOUNTER — HOSPITAL ENCOUNTER (OUTPATIENT)
Dept: PHYSICAL THERAPY | Age: 73
Discharge: HOME | End: 2018-07-20
Payer: MEDICARE

## 2018-07-20 PROCEDURE — 97140 MANUAL THERAPY 1/> REGIONS: CPT

## 2018-07-20 PROCEDURE — 97110 THERAPEUTIC EXERCISES: CPT

## 2018-07-20 NOTE — PROGRESS NOTES
Raphael Willis  : 1945  [place] / [place] / Product Type: Medicare /  [address]  at [facility] RENITA REGALADO  [address] [facility], [address] [place] [address],[address], [facility] [address]  [phone]   [phone]       OUTPATIENT PHYSICAL THERAPY:Daily Note 2018     ICD-10: Treatment Diagnosis: Stiffness of right shoulder, not elsewhere classified (M25.611),  Aftercare following joint replacement surgery (Z47.1)  Precautions/Allergies:   Celecoxib; Ibuprofen; Lescol [fluvastatin]; Nsaids (non-steroidal anti-inflammatory drug); Sulfa (sulfonamide antibiotics); and Uloric [febuxostat]   Fall Risk Score: 1 (? 5 = High Risk)  MD Orders: Evaluate and treat, HEP, , Strengthening, ROM, \"full ROM/full strength\" (0-57-36) MEDICAL/REFERRING DIAGNOSIS: s/p  R reverse TSA w/ BT   DATE OF ONSET: 18  REFERRING PHYSICIAN: Katlin Pal MD  RETURN PHYSICIAN APPOINTMENT: 18     PROGRESS ASSESSMENT (18):  Mr. Sigifredo Cárdenas is now nearly 2 months s/p R reverse total shoulder arthroplasty with a Delta Xtend prostheses, biceps tenodesis, latissimus dorsi and teres major tendon transfer. Patient is making progress with his forward elevation range of motion and strength, but will benefit from continued PT to improve R shoulder strength and function. Patient continues to report some pain in R shoulder and remains limited with external rotation range of motion. PROBLEM LIST (Impacting functional limitations):  1. Decreased [place] with Home Exercise Program  2. Post-op R shoulder pain and swelling  3. Decreased R shoulder ROM   4. Weakness R shoulder INTERVENTIONS PLANNED:  1. Thermal and electric modalities, manual therapies for pain   2. Manual therapies, therapeutic exercises, HEP for ROM    3. Therapeutic exercises and HEP for strength   TREATMENT PLAN:  Effective Dates: 2018 TO 18 .  Frequency/Duration: 2-3 visits per week for 90 Days (with assumption that MD will authorize more visits at subsequent appointments)  GOALS: (Goals have been discussed and agreed upon with patient.)  Short-Term Functional Goals: Time Frame: 6 weeks  1. Report no more than 0-1/10 intermittent pain to R shoulder with compensatory use during basic functional activities, and score less than 30% on the DASH. Ongoing 6-25-18  2. R shoulder PROM forward elevation greater than 120 degrees and external rotation greater than 60 degrees to progress into functional ranges. Progressed, ongoing 6-25-18  3. Demonstrate good R shoulder isometric strength with manual testing to progress into strength phase. Met 5-30-18  4. Independent with initial HEP. Met 5-30-18  Discharge Goals: Time Frame: 12 weeks  1. No more than 1-2/10 intermittent pain R shoulder with return to normalized household and work activities, and score less than 15% on the DASH. 2. R shoulder AROM forward elevation greater than 120 degrees, external rotation greater than 45 degrees, and strength to shoulder are grossly WNL's for safe use with normalized activities. 3. Demonstrate good functional shoulder strength and endurance for return to normalized household and work activities. 4. Independent with advanced shoulder HEP for continued self-management. Rehabilitation Potential For Stated Goals: Good              The information in this section was collected on 5-8-18 (except where otherwise noted). HISTORY:   History of Present Injury/Illness (Reason for Referral): Patient underwent a reverse total shoulder arthroplasty on 4/20/18 secondary to reports of ongoing shoulder pain and instability. Patient states that his shoulder frequently \"went out\" if he moved it in certain positions. Patient received home health following discharge from hospital after having R reverse TSA. Past Medical History/Comorbidities: From EMR:    Mr. Hawk Mitchell  has a past medical history of Allergic rhinitis; Arthritis; CAD (coronary artery disease);  CVA (cerebral vascular accident) ([facility] (08/2017); Diabetes mellitus type 2, controlled ([facility] Dyslipidemia; ED (erectile dysfunction); Encephalitis (1962); GERD (gastroesophageal reflux disease); Gout; Hypertension; Lacunar infarct, acute ([facility] Lumbago; Memory loss; Mitral valve regurgitation (7/14/12); ROBERTO (obstructive sleep apnea); PAF (paroxysmal atrial fibrillation) ([facility] Prostate cancer ([facility] Psoriasis; SBO (small bowel obstruction) ([facility] (2011, 2015, 2016); and Stage 2 chronic kidney disease. Mr. Cassandra Ritter  has a past surgical history that includes pr left heart cath,percutaneous (7/2012); hx colonoscopy (1998, 2010); pr abdomen surgery proc unlisted (1967); hx hernia repair (Bilateral, 2012); hx appendectomy (age 49); hx radical prostatectomy ( ); pr prostate biopsy, needle, saturation sampling; hx cataract removal (Bilateral, 2013); hx coronary artery bypass graft (2009); vascular surgery procedure unlist (12/29/2017); hx splenectomy (1951); and hx shoulder replacement (Right, 2018). Social History/Living Environment:  Patient lives with his spouse in a 2-story house (main level and basement) with 1 step to enter. Prior Level of Function/Work/Activity: Patient is retired. States that he does perform some  work but is unable to currently. Dominant Side:         RIGHT  Current Medications:  Tramadol. prn     Current Outpatient Prescriptions: from EMR    metFORMIN ER (GLUCOPHAGE XR) 500 mg tablet, Take 2 Tabs by mouth daily (with dinner). , Disp: 180 Tab, Rfl: 1    allopurinol (ZYLOPRIM) 300 mg tablet, Take 1 Tab by mouth daily. , Disp: 90 Tab, Rfl: 1    metoprolol tartrate (LOPRESSOR) 50 mg tablet, Take 1 Tab by mouth two (2) times a day., Disp: 180 Tab, Rfl: 1    gabapentin (NEURONTIN) 100 mg capsule, Take 1 Cap by mouth two (2) times a day., Disp: 180 Cap, Rfl: 1    pravastatin (PRAVACHOL) 40 mg tablet, Take 1 Tab by mouth nightly., Disp: 90 Tab, Rfl: 1    ranolazine ER (RANEXA) 500 mg SR tablet, TAKE 1 TABLET TWICE A DAY, Disp: 180 Tab, Rfl: 1    dilTIAZem ER (CARDIZEM LA) 360 mg Tb24 tablet, Take 1 Tab by mouth daily. , Disp: 90 Tab, Rfl: 1    losartan (COZAAR) 100 mg tablet, Take 1 Tab by mouth daily. , Disp: 90 Tab, Rfl: 1    traMADol (ULTRAM) 50 mg tablet, Take 1 Tab by mouth every six (6) hours as needed for Pain. Max Daily Amount: 200 mg., Disp: 60 Tab, Rfl: 1    aspirin delayed-release 81 mg tablet, Take 81 mg by mouth nightly., Disp: , Rfl:     esomeprazole (NEXIUM) 40 mg capsule, Take 1 Cap by mouth daily. Indications: am (Patient taking differently: Take 40 mg by mouth daily.), Disp: 90 Cap, Rfl: 1    apixaban (ELIQUIS) 5 mg tablet, Take 1 Tab by mouth two (2) times a day., Disp: 180 Tab, Rfl: 1    glucose blood VI test strips (BLOOD GLUCOSE TEST) strip, Test once to twice daily DX: E11.8, Disp: 300 Strip, Rfl: 3    Lancets misc, Test once to twice daily DX: E11.8, Disp: 300 Each, Rfl: 3    triamcinolone acetonide (KENALOG) 0.1 % ointment, , Disp: , Rfl:     tacrolimus (PROTOPIC) 0.1 % ointment, , Disp: , Rfl:     mometasone (ELOCON) 0.1 % topical cream, , Disp: , Rfl:     nitroglycerin (NITROSTAT) 0.4 mg SL tablet, 1 Tab by SubLINGual route every five (5) minutes as needed for Chest Pain., Disp: 25 Tab, Rfl: 11    pimecrolimus (ELIDEL) 1 % topical cream, Apply  to affected area two (2) times a day., Disp: , Rfl:     fluticasone (FLONASE) 50 mcg/actuation nasal spray, as needed. , Disp: , Rfl:     cholecalciferol, vitamin D3, (VITAMIN D3) 2,000 unit Tab, Take 2,000 Units by mouth daily. Indications: OSTEOPOROSIS, am, Disp: , Rfl:     Cetirizine (ZYRTEC) 10 mg Cap, Take 10 mg by mouth nightly. Indications: ALLERGIC RHINITIS, Disp: , Rfl:    Date Last Reviewed:  7/16/18   EXAMINATION:   7/20/2018  Observation/Orthostatic Postural Assessment: Not wearing sling. Palpation: Not assessed.       ROM: n/t  Strength: n/t       Body Structures Involved:  1. Nerves  2. Bones  3. Joints  4. Muscles Body Functions Affected:  1. Sensory/Pain  2. Neuromusculoskeletal  3. Movement Related  4. Skin Related Activities and Participation Affected:  1. Self Care  2. Domestic Life  3. Interpersonal Interactions and Relationships   CLINICAL DECISION MAKING:   Outcome Measure: Tool Used: Disabilities of the Arm, Shoulder and Hand (DASH) Questionnaire - Quick Version  Score:  Initial: 34/55 or 52% disability  33/55 or 50% disability (5-30-18) Most recent: 30/55 or 43% limited (6-25-18)   Interpretation of Score: The DASH is designed to measure the activities of daily living in person's with upper extremity dysfunction or pain. Each section is scored on a 1-5 scale, 5 representing the greatest disability. The scores of each section are added together for a total score of 55. This number is divided by 11, followed by subtracting 1 and multiplying by 25 to get a percent score of disability. This value represents the percentage disability: 0-20% minimal disability; 20-40% moderate disability; 40-60% severe disability; % dependent for care or exaggerated symptom behavior. Minimal detectable change is 12%. Score 11 12-19 20-28 29-37 38-45 46-54 55   Modifier CH CI CJ CK CL CM CN ? Carrying, Moving, and Handling Objects:     - CURRENT STATUS: CK - 40%-59% impaired, limited or restricted (2-11-17    - GOAL STATUS: CI - 1%-19% impaired, limited or restricted    - D/C STATUS:  ---------------To be determined---------------    Medical Necessity:   · Skilled intervention continues to be required due to reduced R shoulder range of motion, weakness, pain and decreased function s/p R reverse TSA. Reason for Services/Other Comments:  · Patient continues to require skilled intervention due to recent R reverse total shoulder arthroplasty and subsequent deficits in R UE function.             TREATMENT:   (In addition to Assessment/Re-Assessment sessions the following treatments were rendered)  Pre-treatment Symptoms/Complaints: Sees Dr Dylon Rob next week, was mistaken that it was this week. Patient reports that he is tired but shoulder is doing well. Pain: Initial:     No numeric value upon arrival in shoulder Post Session: No numeric value reported    Manual therapy (16 minutes) to improve R shoulder ROM. Grade 3 physio mobilizations to improve R shoulder flexion, abduction, IR and ER. Therapeutic Exercise: ( 24 minutes) for improved R shoulder strength and function. Progressed as indicated.       Date  6-25-18 Date  6-27-18 Date  7/3/18 Date  7-10-18  Date  7/12/18 Date  7/16/18 Date  7-18-18 Date  7-20-18   Activity/Exercise           Supine press 3# x 10 R  4# 2 x 10 R 4# 3 x 10 R 4# 3 x 10 R 4# x 10, 5# 2 x 10 R 4# 3x10 4# 2x15 4# 3 x 10 5# 3 x 10    Inclined press, 2# x 10, 3# 2 x 10   Prone middle trapezius Prone, 1# 3 x 8 Standing, green band pull aparts 3 x 10 Prone, 1# 2x10 Prone 1# 2 x 10 Prone 1# 2x10 Prone 1# 2x12 Prone, 1# 3 x 10 Prone, 1# 2 x 10 bilateral   Prone low trap     Prone 1# 2x10 Prone 1# 2x12 Prone 1# 2 x 10 -   Scapular retractions Cable row 10# 3 x 10    Prone row, 5# 3 x 10 R Cable row 10# 3 x 10 R    Prone row 5# 3 x 10 R  Cable row 10# R 3 x 10    Prone row 5# 3 x 10 R Cable row 10# B 2x15 Cable row 10#  B 2x12 Cable row, 7# x 10, 10# 2 x 10 Cable row 10# 3 x 10   Shoulder abduction  Side-lying, 3# 3 x 10 Side-lying, 3# 3 x 10 Standing scaption, 2# 3 x 10  - Standing, 2# 2 x 10 R Standing, 2# 2 x 10    Shoulder flexion  Standing, 1# 3 x 10 R Standing 2# 2 x 10 R Standing 2# 2 x 10 R Standing, 2# 3 x 10 R Standing, B 2# 2x10 Standing B 2# 2x10 Standing, 2# 2 x 12 R Standing 2# 2 x 10   Wall slides Flexion, D1 and D2, 2 x 10 ea Flexion, D1 flex and D2 flex, 2 x 10 ea Flexion, D1 flex and D2 flex, 2 x 10 ea D1 and D2 flexion, 2 x 10 ea  -  D1 and D2 2  x10 ea   Shoulder extensions  - Prone 1# x10  - -  7# 2 x 10   Triceps extensions  7# 3 x 10 R 7# 3 x 10 R 7# 3 x 10 R - -  7# 2 x 10   Bicep curls 3# 3 x 10 R 3# 3 x 12 R 3# 3 x 12 R 3# x 12, 4# 2 x 12 4# 2x15 4# 2x15 4# 2 x 15 R 4# 3 x 10   Wall push ups   10x   mild pressure 10 x 2 - -     Cable press     3# B 2x15 3# B 2x15 3# 3 x 10 -   B Lat pull downs     10# 2x15 10# 1x15  7# 1x15 -    IR with band     Red 2x15 Red 2x15     ER with band     Red 2x15 Red 2x15           HEP: No changes today. Treatment/Session Assessment:    · Response to Treatment: Patient tolerated treatment well without increased pain. Fatigued after PT but no pain. · Compliance with Program/Exercises: Appears to be complaint. · Recommendations/Intent for next treatment session: \"Next visit will focus on progression of exercises, pending MD decision next Tuesday. May be discharged due to progress.   Total Treatment Duration:40 Minutes  PT Patient Time In/Time Out  Time In: 9740  Time Out: 107 [facility], PT

## 2018-07-23 ENCOUNTER — HOSPITAL ENCOUNTER (OUTPATIENT)
Dept: PHYSICAL THERAPY | Age: 73
Discharge: HOME | End: 2018-07-23
Payer: MEDICARE

## 2018-07-23 PROCEDURE — 97110 THERAPEUTIC EXERCISES: CPT

## 2018-07-23 PROCEDURE — 97140 MANUAL THERAPY 1/> REGIONS: CPT

## 2018-07-23 NOTE — PROGRESS NOTES
Sadia Da Silva  : 1945  [place] / [place] / Product Type: Medicare /  [address]  at [address] [place] [address] Rd  [address] [facility], [address] [place] [address],[address], [place] [address].  [phone]   [phone]       OUTPATIENT PHYSICAL THERAPY:Daily Note 2018     ICD-10: Treatment Diagnosis: Stiffness of right shoulder, not elsewhere classified (M25.611),  Aftercare following joint replacement surgery (Z47.1)  Precautions/Allergies:   Celecoxib; Ibuprofen; Lescol [fluvastatin]; Nsaids (non-steroidal anti-inflammatory drug); Sulfa (sulfonamide antibiotics); and Uloric [febuxostat]   Fall Risk Score: 1 (? 5 = High Risk)  MD Orders: Evaluate and treat, HEP, , Strengthening, ROM, \"full ROM/full strength\" (0-18-13) MEDICAL/REFERRING DIAGNOSIS: s/p  R reverse TSA w/ BT   DATE OF ONSET: 18  REFERRING PHYSICIAN: Liam Herrera MD  RETURN PHYSICIAN APPOINTMENT: 18     PROGRESS ASSESSMENT (18):  Mr. Klaus Gilliam is now nearly 2 months s/p R reverse total shoulder arthroplasty with a Delta Xtend prostheses, biceps tenodesis, latissimus dorsi and teres major tendon transfer. Patient is making progress with his forward elevation range of motion and strength, but will benefit from continued PT to improve R shoulder strength and function. Patient continues to report some pain in R shoulder and remains limited with external rotation range of motion. PROBLEM LIST (Impacting functional limitations):  1. Decreased [place] with Home Exercise Program  2. Post-op R shoulder pain and swelling  3. Decreased R shoulder ROM   4. Weakness R shoulder INTERVENTIONS PLANNED:  1. Thermal and electric modalities, manual therapies for pain   2. Manual therapies, therapeutic exercises, HEP for ROM    3. Therapeutic exercises and HEP for strength   TREATMENT PLAN:  Effective Dates: 2018 TO 18 .  Frequency/Duration: 2-3 visits per week for 90 Days (with assumption that MD will authorize more visits at subsequent appointments)  GOALS: (Goals have been discussed and agreed upon with patient.)  Short-Term Functional Goals: Time Frame: 6 weeks  1. Report no more than 0-1/10 intermittent pain to R shoulder with compensatory use during basic functional activities, and score less than 30% on the DASH. Ongoing 6-25-18  2. R shoulder PROM forward elevation greater than 120 degrees and external rotation greater than 60 degrees to progress into functional ranges. Progressed, ongoing 6-25-18  3. Demonstrate good R shoulder isometric strength with manual testing to progress into strength phase. Met 5-30-18  4. Independent with initial HEP. Met 5-30-18  Discharge Goals: Time Frame: 12 weeks  1. No more than 1-2/10 intermittent pain R shoulder with return to normalized household and work activities, and score less than 15% on the DASH. 2. R shoulder AROM forward elevation greater than 120 degrees, external rotation greater than 45 degrees, and strength to shoulder are grossly WNL's for safe use with normalized activities. 3. Demonstrate good functional shoulder strength and endurance for return to normalized household and work activities. 4. Independent with advanced shoulder HEP for continued self-management. Rehabilitation Potential For Stated Goals: Good              The information in this section was collected on 5-8-18 (except where otherwise noted). HISTORY:   History of Present Injury/Illness (Reason for Referral): Patient underwent a reverse total shoulder arthroplasty on 4/20/18 secondary to reports of ongoing shoulder pain and instability. Patient states that his shoulder frequently \"went out\" if he moved it in certain positions. Patient received home health following discharge from hospital after having R reverse TSA. Past Medical History/Comorbidities: From EMR:    Mr. Sigifredo Cárdenas  has a past medical history of Allergic rhinitis; Arthritis; CAD (coronary artery disease);  CVA (cerebral vascular accident) ([facility] (08/2017); Diabetes mellitus type 2, controlled ([facility] Dyslipidemia; ED (erectile dysfunction); Encephalitis (1962); GERD (gastroesophageal reflux disease); Gout; Hypertension; Lacunar infarct, acute ([facility] Lumbago; Memory loss; Mitral valve regurgitation (7/14/12); ROBERTO (obstructive sleep apnea); PAF (paroxysmal atrial fibrillation) ([facility] Prostate cancer ([facility] Psoriasis; SBO (small bowel obstruction) ([facility] (2011, 2015, 2016); and Stage 2 chronic kidney disease. Mr. Mariano Story  has a past surgical history that includes pr left heart cath,percutaneous (7/2012); hx colonoscopy (1998, 2010); pr abdomen surgery proc unlisted (1967); hx hernia repair (Bilateral, 2012); hx appendectomy (age 49); hx radical prostatectomy ( ); pr prostate biopsy, needle, saturation sampling; hx cataract removal (Bilateral, 2013); hx coronary artery bypass graft (2009); vascular surgery procedure unlist (12/29/2017); hx splenectomy (1951); and hx shoulder replacement (Right, 2018). Social History/Living Environment:  Patient lives with his spouse in a 2-story house (main level and basement) with 1 step to enter. Prior Level of Function/Work/Activity: Patient is retired. States that he does perform some  work but is unable to currently. Dominant Side:         RIGHT  Current Medications:  Tramadol. prn     Current Outpatient Prescriptions: from EMR    metFORMIN ER (GLUCOPHAGE XR) 500 mg tablet, Take 2 Tabs by mouth daily (with dinner). , Disp: 180 Tab, Rfl: 1    allopurinol (ZYLOPRIM) 300 mg tablet, Take 1 Tab by mouth daily. , Disp: 90 Tab, Rfl: 1    metoprolol tartrate (LOPRESSOR) 50 mg tablet, Take 1 Tab by mouth two (2) times a day., Disp: 180 Tab, Rfl: 1    gabapentin (NEURONTIN) 100 mg capsule, Take 1 Cap by mouth two (2) times a day., Disp: 180 Cap, Rfl: 1    pravastatin (PRAVACHOL) 40 mg tablet, Take 1 Tab by mouth nightly., Disp: 90 Tab, Rfl: 1    ranolazine ER (RANEXA) 500 mg SR tablet, TAKE 1 TABLET TWICE A DAY, Disp: 180 Tab, Rfl: 1    dilTIAZem ER (CARDIZEM LA) 360 mg Tb24 tablet, Take 1 Tab by mouth daily. , Disp: 90 Tab, Rfl: 1    losartan (COZAAR) 100 mg tablet, Take 1 Tab by mouth daily. , Disp: 90 Tab, Rfl: 1    traMADol (ULTRAM) 50 mg tablet, Take 1 Tab by mouth every six (6) hours as needed for Pain. Max Daily Amount: 200 mg., Disp: 60 Tab, Rfl: 1    aspirin delayed-release 81 mg tablet, Take 81 mg by mouth nightly., Disp: , Rfl:     esomeprazole (NEXIUM) 40 mg capsule, Take 1 Cap by mouth daily. Indications: am (Patient taking differently: Take 40 mg by mouth daily.), Disp: 90 Cap, Rfl: 1    apixaban (ELIQUIS) 5 mg tablet, Take 1 Tab by mouth two (2) times a day., Disp: 180 Tab, Rfl: 1    glucose blood VI test strips (BLOOD GLUCOSE TEST) strip, Test once to twice daily DX: E11.8, Disp: 300 Strip, Rfl: 3    Lancets misc, Test once to twice daily DX: E11.8, Disp: 300 Each, Rfl: 3    triamcinolone acetonide (KENALOG) 0.1 % ointment, , Disp: , Rfl:     tacrolimus (PROTOPIC) 0.1 % ointment, , Disp: , Rfl:     mometasone (ELOCON) 0.1 % topical cream, , Disp: , Rfl:     nitroglycerin (NITROSTAT) 0.4 mg SL tablet, 1 Tab by SubLINGual route every five (5) minutes as needed for Chest Pain., Disp: 25 Tab, Rfl: 11    pimecrolimus (ELIDEL) 1 % topical cream, Apply  to affected area two (2) times a day., Disp: , Rfl:     fluticasone (FLONASE) 50 mcg/actuation nasal spray, as needed. , Disp: , Rfl:     cholecalciferol, vitamin D3, (VITAMIN D3) 2,000 unit Tab, Take 2,000 Units by mouth daily. Indications: OSTEOPOROSIS, am, Disp: , Rfl:     Cetirizine (ZYRTEC) 10 mg Cap, Take 10 mg by mouth nightly. Indications: ALLERGIC RHINITIS, Disp: , Rfl:    Date Last Reviewed:  7/16/18   EXAMINATION:   7/23/2018  Observation/Orthostatic Postural Assessment: Not wearing sling. Palpation: Not assessed.       ROM: n/t  Strength: n/t       Body Structures Involved:  1. Nerves  2. Bones  3. Joints  4. Muscles Body Functions Affected:  1. Sensory/Pain  2. Neuromusculoskeletal  3. Movement Related  4. Skin Related Activities and Participation Affected:  1. Self Care  2. Domestic Life  3. Interpersonal Interactions and Relationships   CLINICAL DECISION MAKING:   Outcome Measure: Tool Used: Disabilities of the Arm, Shoulder and Hand (DASH) Questionnaire - Quick Version  Score:  Initial: 34/55 or 52% disability  33/55 or 50% disability (5-30-18) Most recent: 30/55 or 43% limited (6-25-18)   Interpretation of Score: The DASH is designed to measure the activities of daily living in person's with upper extremity dysfunction or pain. Each section is scored on a 1-5 scale, 5 representing the greatest disability. The scores of each section are added together for a total score of 55. This number is divided by 11, followed by subtracting 1 and multiplying by 25 to get a percent score of disability. This value represents the percentage disability: 0-20% minimal disability; 20-40% moderate disability; 40-60% severe disability; % dependent for care or exaggerated symptom behavior. Minimal detectable change is 12%. Score 11 12-19 20-28 29-37 38-45 46-54 55   Modifier CH CI CJ CK CL CM CN ? Carrying, Moving, and Handling Objects:     - CURRENT STATUS: CK - 40%-59% impaired, limited or restricted (3-16-33    - GOAL STATUS: CI - 1%-19% impaired, limited or restricted    - D/C STATUS:  ---------------To be determined---------------    Medical Necessity:   · Skilled intervention continues to be required due to reduced R shoulder range of motion, weakness, pain and decreased function s/p R reverse TSA. Reason for Services/Other Comments:  · Patient continues to require skilled intervention due to recent R reverse total shoulder arthroplasty and subsequent deficits in R UE function.             TREATMENT:   (In addition to Assessment/Re-Assessment sessions the following treatments were rendered)  Pre-treatment Symptoms/Complaints: Patient reports no discomfort upon arrival. R shoulder fatigued but not painful. Pain: Initial:     No numeric value upon arrival in shoulder Post Session: No numeric value reported    Manual therapy (17 minutes) to improve R shoulder ROM. Grade 3 physio mobilizations to improve R shoulder flexion, abduction, IR and ER with patient in supine. Therapeutic Exercise: ( 18 minutes) for improved R shoulder strength and function.       Date  6-25-18 Date  6-27-18 Date  7/3/18 Date  7-10-18  Date  7/12/18 Date  7/16/18 Date  7-18-18 Date  7-20-18 Date  7-23-18   Activity/Exercise            Supine press 3# x 10 R  4# 2 x 10 R 4# 3 x 10 R 4# 3 x 10 R 4# x 10, 5# 2 x 10 R 4# 3x10 4# 2x15 4# 3 x 10 5# 3 x 10    Inclined press, 2# x 10, 3# 2 x 10 5# 3 x 10 R       Prone middle trapezius Prone, 1# 3 x 8 Standing, green band pull aparts 3 x 10 Prone, 1# 2x10 Prone 1# 2 x 10 Prone 1# 2x10 Prone 1# 2x12 Prone, 1# 3 x 10 Prone, 1# 2 x 10 bilateral -   Prone low trap     Prone 1# 2x10 Prone 1# 2x12 Prone 1# 2 x 10 - -   Scapular retractions Cable row 10# 3 x 10    Prone row, 5# 3 x 10 R Cable row 10# 3 x 10 R    Prone row 5# 3 x 10 R  Cable row 10# R 3 x 10    Prone row 5# 3 x 10 R Cable row 10# B 2x15 Cable row 10#  B 2x12 Cable row, 7# x 10, 10# 2 x 10 Cable row 10# 3 x 10 Cable row 10# 3 x 10   Shoulder abduction  Side-lying, 3# 3 x 10 Side-lying, 3# 3 x 10 Standing scaption, 2# 3 x 10  - Standing, 2# 2 x 10 R Standing, 2# 2 x 10     Shoulder flexion  Standing, 1# 3 x 10 R Standing 2# 2 x 10 R Standing 2# 2 x 10 R Standing, 2# 3 x 10 R Standing, B 2# 2x10 Standing B 2# 2x10 Standing, 2# 2 x 12 R Standing 2# 2 x 10    Wall slides Flexion, D1 and D2, 2 x 10 ea Flexion, D1 flex and D2 flex, 2 x 10 ea Flexion, D1 flex and D2 flex, 2 x 10 ea D1 and D2 flexion, 2 x 10 ea  -  D1 and D2 2  x10 ea D1 and D2, 3 x 10 ea   Shoulder extensions  - Prone 1# x10  - -  7# 2 x 10    Triceps extensions  7# 3 x 10 R 7# 3 x 10 R 7# 3 x 10 R - -  7# 2 x 10 7# 3 x 10    Bicep curls 3# 3 x 10 R 3# 3 x 12 R 3# 3 x 12 R 3# x 12, 4# 2 x 12 4# 2x15 4# 2x15 4# 2 x 15 R 4# 3 x 10 4# 3 x 10   Wall push ups   10x   mild pressure 10 x 2 - -   2 x 10    Cable press     3# B 2x15 3# B 2x15 3# 3 x 10 -    B Lat pull downs     10# 2x15 10# 1x15  7# 1x15 -  10# 3 x 10 bilateral   IR with band     Red 2x15 Red 2x15      ER with band     Red 2x15 Red 2x15            HEP: No changes today. Treatment/Session Assessment:    · Response to Treatment: Pt doing well with recover from R reverse TSA. Patient did not experience increased pain with PT today, just fatigued. · Compliance with Program/Exercises: Appears to be complaint. · Recommendations/Intent for next treatment session: \"Next visit will focus on progression of exercises.   Total Treatment Duration:35 Minutes  PT Patient Time In/Time Out  Time In: 1125  Time Out: 250 [address], PT

## 2018-07-25 ENCOUNTER — HOSPITAL ENCOUNTER (OUTPATIENT)
Dept: PHYSICAL THERAPY | Age: 73
Discharge: HOME | End: 2018-07-25
Payer: MEDICARE

## 2018-07-25 PROCEDURE — 97110 THERAPEUTIC EXERCISES: CPT

## 2018-07-25 PROCEDURE — 97140 MANUAL THERAPY 1/> REGIONS: CPT

## 2018-07-25 NOTE — PROGRESS NOTES
Ysabel Gunn  : 1945  [place] / [place] / Product Type: Medicare /  [address]  at [facility]  [address] [place] [address], [address], [place] [address].  [phone]   [phone]       OUTPATIENT PHYSICAL THERAPY:Daily Note 2018     ICD-10: Treatment Diagnosis: Stiffness of right shoulder, not elsewhere classified (M25.611),  Aftercare following joint replacement surgery (Z47.1)  Precautions/Allergies:   Celecoxib; Ibuprofen; Lescol [fluvastatin]; Nsaids (non-steroidal anti-inflammatory drug); Sulfa (sulfonamide antibiotics); and Uloric [febuxostat]   Fall Risk Score: 1 (? 5 = High Risk)  MD Orders: Evaluate and treat, HEP, , Strengthening, ROM, \"full ROM/full strength\" (7-63-27) MEDICAL/REFERRING DIAGNOSIS: s/p  R reverse TSA w/ BT   DATE OF ONSET: 18  REFERRING PHYSICIAN: Tigist Pearson MD  RETURN PHYSICIAN APPOINTMENT: 18     PROGRESS ASSESSMENT (18):  Mr. Stephen Amor is now nearly 2 months s/p R reverse total shoulder arthroplasty with a Delta Xtend prostheses, biceps tenodesis, latissimus dorsi and teres major tendon transfer. Patient is making progress with his forward elevation range of motion and strength, but will benefit from continued PT to improve R shoulder strength and function. Patient continues to report some pain in R shoulder and remains limited with external rotation range of motion. PROBLEM LIST (Impacting functional limitations):  1. Decreased [place] with Home Exercise Program  2. Post-op R shoulder pain and swelling  3. Decreased R shoulder ROM   4. Weakness R shoulder INTERVENTIONS PLANNED:  1. Thermal and electric modalities, manual therapies for pain   2. Manual therapies, therapeutic exercises, HEP for ROM    3. Therapeutic exercises and HEP for strength   TREATMENT PLAN:  Effective Dates: 2018 TO 18 .  Frequency/Duration: 2-3 visits per week for 90 Days (with assumption that MD will authorize more visits at subsequent appointments)  GOALS: (Goals have been discussed and agreed upon with patient.)  Short-Term Functional Goals: Time Frame: 6 weeks  1. Report no more than 0-1/10 intermittent pain to R shoulder with compensatory use during basic functional activities, and score less than 30% on the DASH. Ongoing 6-25-18  2. R shoulder PROM forward elevation greater than 120 degrees and external rotation greater than 60 degrees to progress into functional ranges. Progressed, ongoing 6-25-18  3. Demonstrate good R shoulder isometric strength with manual testing to progress into strength phase. Met 5-30-18  4. Independent with initial HEP. Met 5-30-18  Discharge Goals: Time Frame: 12 weeks  1. No more than 1-2/10 intermittent pain R shoulder with return to normalized household and work activities, and score less than 15% on the DASH. 2. R shoulder AROM forward elevation greater than 120 degrees, external rotation greater than 45 degrees, and strength to shoulder are grossly WNL's for safe use with normalized activities. 3. Demonstrate good functional shoulder strength and endurance for return to normalized household and work activities. 4. Independent with advanced shoulder HEP for continued self-management. Rehabilitation Potential For Stated Goals: Good              The information in this section was collected on 5-8-18 (except where otherwise noted). HISTORY:   History of Present Injury/Illness (Reason for Referral): Patient underwent a reverse total shoulder arthroplasty on 4/20/18 secondary to reports of ongoing shoulder pain and instability. Patient states that his shoulder frequently \"went out\" if he moved it in certain positions. Patient received home health following discharge from hospital after having R reverse TSA. Past Medical History/Comorbidities: From EMR:    Mr. Sigifredo Cárdenas  has a past medical history of Allergic rhinitis; Arthritis; CAD (coronary artery disease);  CVA (cerebral vascular accident) ([facility] (08/2017); Diabetes mellitus type 2, controlled ([facility] Dyslipidemia; ED (erectile dysfunction); Encephalitis (1962); GERD (gastroesophageal reflux disease); Gout; Hypertension; Lacunar infarct, acute ([facility] Lumbago; Memory loss; Mitral valve regurgitation (7/14/12); ROBERTO (obstructive sleep apnea); PAF (paroxysmal atrial fibrillation) ([facility] Prostate cancer ([facility] Psoriasis; SBO (small bowel obstruction) ([facility] (2011, 2015, 2016); and Stage 2 chronic kidney disease. Mr. Cassandra Ritter  has a past surgical history that includes pr left heart cath,percutaneous (7/2012); hx colonoscopy (1998, 2010); pr abdomen surgery proc unlisted (1967); hx hernia repair (Bilateral, 2012); hx appendectomy (age 49); hx radical prostatectomy ( ); pr prostate biopsy, needle, saturation sampling; hx cataract removal (Bilateral, 2013); hx coronary artery bypass graft (2009); vascular surgery procedure unlist (12/29/2017); hx splenectomy (1951); and hx shoulder replacement (Right, 2018). Social History/Living Environment:  Patient lives with his spouse in a 2-story house (main level and basement) with 1 step to enter. Prior Level of Function/Work/Activity: Patient is retired. States that he does perform some  work but is unable to currently. Dominant Side:         RIGHT  Current Medications:  Tramadol. prn     Current Outpatient Prescriptions: from EMR    metFORMIN ER (GLUCOPHAGE XR) 500 mg tablet, Take 2 Tabs by mouth daily (with dinner). , Disp: 180 Tab, Rfl: 1    allopurinol (ZYLOPRIM) 300 mg tablet, Take 1 Tab by mouth daily. , Disp: 90 Tab, Rfl: 1    metoprolol tartrate (LOPRESSOR) 50 mg tablet, Take 1 Tab by mouth two (2) times a day., Disp: 180 Tab, Rfl: 1    gabapentin (NEURONTIN) 100 mg capsule, Take 1 Cap by mouth two (2) times a day., Disp: 180 Cap, Rfl: 1    pravastatin (PRAVACHOL) 40 mg tablet, Take 1 Tab by mouth nightly., Disp: 90 Tab, Rfl: 1    ranolazine ER (RANEXA) 500 mg SR tablet, TAKE 1 TABLET TWICE A DAY, Disp: 180 Tab, Rfl: 1    dilTIAZem ER (CARDIZEM LA) 360 mg Tb24 tablet, Take 1 Tab by mouth daily. , Disp: 90 Tab, Rfl: 1    losartan (COZAAR) 100 mg tablet, Take 1 Tab by mouth daily. , Disp: 90 Tab, Rfl: 1    traMADol (ULTRAM) 50 mg tablet, Take 1 Tab by mouth every six (6) hours as needed for Pain. Max Daily Amount: 200 mg., Disp: 60 Tab, Rfl: 1    aspirin delayed-release 81 mg tablet, Take 81 mg by mouth nightly., Disp: , Rfl:     esomeprazole (NEXIUM) 40 mg capsule, Take 1 Cap by mouth daily. Indications: am (Patient taking differently: Take 40 mg by mouth daily.), Disp: 90 Cap, Rfl: 1    apixaban (ELIQUIS) 5 mg tablet, Take 1 Tab by mouth two (2) times a day., Disp: 180 Tab, Rfl: 1    glucose blood VI test strips (BLOOD GLUCOSE TEST) strip, Test once to twice daily DX: E11.8, Disp: 300 Strip, Rfl: 3    Lancets misc, Test once to twice daily DX: E11.8, Disp: 300 Each, Rfl: 3    triamcinolone acetonide (KENALOG) 0.1 % ointment, , Disp: , Rfl:     tacrolimus (PROTOPIC) 0.1 % ointment, , Disp: , Rfl:     mometasone (ELOCON) 0.1 % topical cream, , Disp: , Rfl:     nitroglycerin (NITROSTAT) 0.4 mg SL tablet, 1 Tab by SubLINGual route every five (5) minutes as needed for Chest Pain., Disp: 25 Tab, Rfl: 11    pimecrolimus (ELIDEL) 1 % topical cream, Apply  to affected area two (2) times a day., Disp: , Rfl:     fluticasone (FLONASE) 50 mcg/actuation nasal spray, as needed. , Disp: , Rfl:     cholecalciferol, vitamin D3, (VITAMIN D3) 2,000 unit Tab, Take 2,000 Units by mouth daily. Indications: OSTEOPOROSIS, am, Disp: , Rfl:     Cetirizine (ZYRTEC) 10 mg Cap, Take 10 mg by mouth nightly. Indications: ALLERGIC RHINITIS, Disp: , Rfl:    Date Last Reviewed:  7/16/18   EXAMINATION:   7/25/2018  Observation/Orthostatic Postural Assessment: Not wearing sling. Palpation: Not assessed.       ROM: n/t  Strength: n/t       Body Structures Involved:  1. Nerves  2. Bones  3. Joints  4. Muscles Body Functions Affected:  1. Sensory/Pain  2. Neuromusculoskeletal  3. Movement Related  4. Skin Related Activities and Participation Affected:  1. Self Care  2. Domestic Life  3. Interpersonal Interactions and Relationships   CLINICAL DECISION MAKING:   Outcome Measure: Tool Used: Disabilities of the Arm, Shoulder and Hand (DASH) Questionnaire - Quick Version  Score:  Initial: 34/55 or 52% disability  33/55 or 50% disability (5-30-18) Most recent: 30/55 or 43% limited (6-25-18)   Interpretation of Score: The DASH is designed to measure the activities of daily living in person's with upper extremity dysfunction or pain. Each section is scored on a 1-5 scale, 5 representing the greatest disability. The scores of each section are added together for a total score of 55. This number is divided by 11, followed by subtracting 1 and multiplying by 25 to get a percent score of disability. This value represents the percentage disability: 0-20% minimal disability; 20-40% moderate disability; 40-60% severe disability; % dependent for care or exaggerated symptom behavior. Minimal detectable change is 12%. Score 11 12-19 20-28 29-37 38-45 46-54 55   Modifier CH CI CJ CK CL CM CN ? Carrying, Moving, and Handling Objects:     - CURRENT STATUS: CK - 40%-59% impaired, limited or restricted (1-98-84    - GOAL STATUS: CI - 1%-19% impaired, limited or restricted    - D/C STATUS:  ---------------To be determined---------------    Medical Necessity:   · Skilled intervention continues to be required due to reduced R shoulder range of motion, weakness, pain and decreased function s/p R reverse TSA. Reason for Services/Other Comments:  · Patient continues to require skilled intervention due to recent R reverse total shoulder arthroplasty and subsequent deficits in R UE function.             TREATMENT:   (In addition to Assessment/Re-Assessment sessions the following treatments were rendered)  Pre-treatment Symptoms/Complaints: Patient reports no pain today upon arrival to PT. States that he has to leave earlier than usual to get to another appointment. Pain: Initial:     No pain Post Session: No pain at end of PT    Manual therapy (15 minutes) to improve R shoulder ROM. Grade 3 physio mobilizations to improve R shoulder flexion, abduction, IR and ER with patient in supine. Therapeutic Exercise: ( 15 minutes) for improved R shoulder strength and function.       Date  6-25-18 Date  6-27-18 Date  7/3/18 Date  7-10-18  Date  7/12/18 Date  7/16/18 Date  7-18-18 Date  7-20-18 Date  7-23-18 Date  7-25-18   Activity/Exercise             Supine press 3# x 10 R  4# 2 x 10 R 4# 3 x 10 R 4# 3 x 10 R 4# x 10, 5# 2 x 10 R 4# 3x10 4# 2x15 4# 3 x 10 5# 3 x 10    Inclined press, 2# x 10, 3# 2 x 10 5# 3 x 10 R     5# x 10, 6# 2 x  10 R    Prone middle trapezius Prone, 1# 3 x 8 Standing, green band pull aparts 3 x 10 Prone, 1# 2x10 Prone 1# 2 x 10 Prone 1# 2x10 Prone 1# 2x12 Prone, 1# 3 x 10 Prone, 1# 2 x 10 bilateral -    Prone low trap     Prone 1# 2x10 Prone 1# 2x12 Prone 1# 2 x 10 - - -   Scapular retractions Cable row 10# 3 x 10    Prone row, 5# 3 x 10 R Cable row 10# 3 x 10 R    Prone row 5# 3 x 10 R  Cable row 10# R 3 x 10    Prone row 5# 3 x 10 R Cable row 10# B 2x15 Cable row 10#  B 2x12 Cable row, 7# x 10, 10# 2 x 10 Cable row 10# 3 x 10 Cable row 10# 3 x 10 Cable row 10# 3 x 10   Shoulder abduction  Side-lying, 3# 3 x 10 Side-lying, 3# 3 x 10 Standing scaption, 2# 3 x 10  - Standing, 2# 2 x 10 R Standing, 2# 2 x 10   Standing 3# 2 x 10   Shoulder flexion  Standing, 1# 3 x 10 R Standing 2# 2 x 10 R Standing 2# 2 x 10 R Standing, 2# 3 x 10 R Standing, B 2# 2x10 Standing B 2# 2x10 Standing, 2# 2 x 12 R Standing 2# 2 x 10  Standing 3# 2 x 10   Wall slides Flexion, D1 and D2, 2 x 10 ea Flexion, D1 flex and D2 flex, 2 x 10 ea Flexion, D1 flex and D2 flex, 2 x 10 ea D1 and D2 flexion, 2 x 10 ea  -  D1 and D2 2  x10 ea D1 and D2, 3 x 10 ea D1 and D2, 3 x 10 ea   Shoulder extensions  - Prone 1# x10  - -  7# 2 x 10  7# 3 x 10   Triceps extensions  7# 3 x 10 R 7# 3 x 10 R 7# 3 x 10 R - -  7# 2 x 10 7# 3 x 10     Bicep curls 3# 3 x 10 R 3# 3 x 12 R 3# 3 x 12 R 3# x 12, 4# 2 x 12 4# 2x15 4# 2x15 4# 2 x 15 R 4# 3 x 10 4# 3 x 10 5# 3 x 10   Wall push ups   10x   mild pressure 10 x 2 - -   2 x 10  2 x 15   Cable press     3# B 2x15 3# B 2x15 3# 3 x 10 -     B Lat pull downs     10# 2x15 10# 1x15  7# 1x15 -  10# 3 x 10 bilateral    IR with band     Red 2x15 Red 2x15       ER with band     Red 2x15 Red 2x15             HEP: No changes today. Treatment/Session Assessment:    · Response to Treatment: Pt doing well with range of motion. Patient to continue with 2x/week physical therapy. · Compliance with Program/Exercises: Appears to be complaint. · Recommendations/Intent for next treatment session: \"Next visit will focus on progression of exercises.   Total Treatment Duration:30 Minutes  PT Patient Time In/Time Out  Time In: 1115  Time Out: [address] PT

## 2018-07-27 ENCOUNTER — HOSPITAL ENCOUNTER (OUTPATIENT)
Dept: PHYSICAL THERAPY | Age: 73
Discharge: HOME | End: 2018-07-27
Payer: MEDICARE

## 2018-07-27 PROCEDURE — 97140 MANUAL THERAPY 1/> REGIONS: CPT

## 2018-07-27 PROCEDURE — G8985 CARRY GOAL STATUS: HCPCS

## 2018-07-27 PROCEDURE — G8984 CARRY CURRENT STATUS: HCPCS

## 2018-07-27 PROCEDURE — 97110 THERAPEUTIC EXERCISES: CPT

## 2018-07-27 NOTE — PROGRESS NOTES
Lysle Sandhoff  : 1945  [place] / [place] / Product Type: Medicare /  [address]  at [facility] RENITA REGALADO  [address] [place] [address], [address] [place] [address],[address], [place] [address].  [phone]   [phone]       OUTPATIENT PHYSICAL THERAPY:Daily Note and Progress Report 2018     ICD-10: Treatment Diagnosis: Stiffness of right shoulder, not elsewhere classified (M25.611),  Aftercare following joint replacement surgery (Z47.1)  Precautions/Allergies:   Celecoxib; Ibuprofen; Lescol [fluvastatin]; Nsaids (non-steroidal anti-inflammatory drug); Sulfa (sulfonamide antibiotics); and Uloric [febuxostat]   Fall Risk Score: 1 (? 5 = High Risk)  MD Orders: Evaluate and treat, HEP, , Strengthening, ROM, \"full ROM/full strength\" () MEDICAL/REFERRING DIAGNOSIS: s/p  R reverse TSA w/ BT   DATE OF ONSET: 18  REFERRING PHYSICIAN: Missy Flores MD  RETURN PHYSICIAN APPOINTMENT: 18     PROGRESS ASSESSMENT (18):  Mr. Josseline Das presents s/p R reverse total shoulder arthroplasty with a Delta Xtend prostheses, biceps tenodesis, latissimus dorsi and teres major tendon transfer. Patient is able to achieve functional forward elevation and has good R shoulder strength. Patient continues to have mild stiffness passively with IR, and is limited with reaching behind his back. Patient's progress appears sufficient enough that he will be released from PT services at his next MD follow-up but will benefit from ongoing PT to continue progressing with ROM until this time. PROBLEM LIST (Impacting functional limitations):  1. Decreased [place] with Home Exercise Program  2. Post-op R shoulder pain and swelling  3. Decreased R shoulder ROM   4. Weakness R shoulder INTERVENTIONS PLANNED:  1. Thermal and electric modalities, manual therapies for pain   2. Manual therapies, therapeutic exercises, HEP for ROM    3.  Therapeutic exercises and HEP for strength   TREATMENT PLAN:  Effective Dates: 5/8/2018 TO 8/6/18 . Frequency/Duration: 2-3 visits per week for 90 Days (with assumption that MD will authorize more visits at subsequent appointments)  GOALS: (Goals have been discussed and agreed upon with patient.)  Short-Term Functional Goals: Time Frame: 6 weeks  1. Report no more than 0-1/10 intermittent pain to R shoulder with compensatory use during basic functional activities, and score less than 30% on the DASH. Ongoing 6-25-18  2. R shoulder PROM forward elevation greater than 120 degrees and external rotation greater than 60 degrees to progress into functional ranges. Progressed, ongoing 6-25-18  3. Demonstrate good R shoulder isometric strength with manual testing to progress into strength phase. Met 5-30-18  4. Independent with initial HEP. Met 5-30-18  Discharge Goals: Time Frame: 12 weeks  1. No more than 1-2/10 intermittent pain R shoulder with return to normalized household and work activities, and score less than 15% on the DASH. 2. R shoulder AROM forward elevation greater than 120 degrees, external rotation greater than 45 degrees, and strength to shoulder are grossly WNL's for safe use with normalized activities. MET 7-27-18  3. Demonstrate good functional shoulder strength and endurance for return to normalized household and work activities. MET 7-27-18  4. Independent with advanced shoulder HEP for continued self-management. Rehabilitation Potential For Stated Goals: Good              The information in this section was collected on 5-8-18 (except where otherwise noted). HISTORY:   History of Present Injury/Illness (Reason for Referral): Patient underwent a reverse total shoulder arthroplasty on 4/20/18 secondary to reports of ongoing shoulder pain and instability. Patient states that his shoulder frequently \"went out\" if he moved it in certain positions. Patient received home health following discharge from hospital after having R reverse TSA.   Past Medical History/Comorbidities: From EMR:    Mr. Yuriy Valerio  has a past medical history of Allergic rhinitis; Arthritis; CAD (coronary artery disease); CVA (cerebral vascular accident) ([email or web address] (08/2017); Diabetes mellitus type 2, controlled ([email or web address] Dyslipidemia; ED (erectile dysfunction); Encephalitis (1962); GERD (gastroesophageal reflux disease); Gout; Hypertension; Lacunar infarct, acute ([email or web address] Lumbago; Memory loss; Mitral valve regurgitation (7/14/12); ROBERTO (obstructive sleep apnea); PAF (paroxysmal atrial fibrillation) ([email or web address] Prostate cancer ([email or web address] Psoriasis; SBO (small bowel obstruction) ([facility] (2011, 2015, 2016); and Stage 2 chronic kidney disease. Mr. Yuriy Valerio  has a past surgical history that includes pr left heart cath,percutaneous (7/2012); hx colonoscopy (1998, 2010); pr abdomen surgery proc unlisted (1967); hx hernia repair (Bilateral, 2012); hx appendectomy (age 49); hx radical prostatectomy ( ); pr prostate biopsy, needle, saturation sampling; hx cataract removal (Bilateral, 2013); hx coronary artery bypass graft (2009); vascular surgery procedure unlist (12/29/2017); hx splenectomy (1951); and hx shoulder replacement (Right, 2018). Social History/Living Environment:  Patient lives with his spouse in a 2-story house (main level and basement) with 1 step to enter. Prior Level of Function/Work/Activity: Patient is retired. States that he does perform some  work but is unable to currently. Dominant Side:         RIGHT  Current Medications:  Tramadol. prn     Current Outpatient Prescriptions: from EMR    metFORMIN ER (GLUCOPHAGE XR) 500 mg tablet, Take 2 Tabs by mouth daily (with dinner). , Disp: 180 Tab, Rfl: 1    allopurinol (ZYLOPRIM) 300 mg tablet, Take 1 Tab by mouth daily. , Disp: 90 Tab, Rfl: 1    metoprolol tartrate (LOPRESSOR) 50 mg tablet, Take 1 Tab by mouth two (2) times a day., Disp: 180 Tab, Rfl: 1    gabapentin (NEURONTIN) 100 mg capsule, Take 1 Cap by mouth two (2) times a day., Disp: 180 Cap, Rfl: 1    pravastatin (PRAVACHOL) 40 mg tablet, Take 1 Tab by mouth nightly., Disp: 90 Tab, Rfl: 1    ranolazine ER (RANEXA) 500 mg SR tablet, TAKE 1 TABLET TWICE A DAY, Disp: 180 Tab, Rfl: 1    dilTIAZem ER (CARDIZEM LA) 360 mg Tb24 tablet, Take 1 Tab by mouth daily. , Disp: 90 Tab, Rfl: 1    losartan (COZAAR) 100 mg tablet, Take 1 Tab by mouth daily. , Disp: 90 Tab, Rfl: 1    traMADol (ULTRAM) 50 mg tablet, Take 1 Tab by mouth every six (6) hours as needed for Pain. Max Daily Amount: 200 mg., Disp: 60 Tab, Rfl: 1    aspirin delayed-release 81 mg tablet, Take 81 mg by mouth nightly., Disp: , Rfl:     esomeprazole (NEXIUM) 40 mg capsule, Take 1 Cap by mouth daily. Indications: am (Patient taking differently: Take 40 mg by mouth daily.), Disp: 90 Cap, Rfl: 1    apixaban (ELIQUIS) 5 mg tablet, Take 1 Tab by mouth two (2) times a day., Disp: 180 Tab, Rfl: 1    glucose blood VI test strips (BLOOD GLUCOSE TEST) strip, Test once to twice daily DX: E11.8, Disp: 300 Strip, Rfl: 3    Lancets misc, Test once to twice daily DX: E11.8, Disp: 300 Each, Rfl: 3    triamcinolone acetonide (KENALOG) 0.1 % ointment, , Disp: , Rfl:     tacrolimus (PROTOPIC) 0.1 % ointment, , Disp: , Rfl:     mometasone (ELOCON) 0.1 % topical cream, , Disp: , Rfl:     nitroglycerin (NITROSTAT) 0.4 mg SL tablet, 1 Tab by SubLINGual route every five (5) minutes as needed for Chest Pain., Disp: 25 Tab, Rfl: 11    pimecrolimus (ELIDEL) 1 % topical cream, Apply  to affected area two (2) times a day., Disp: , Rfl:     fluticasone (FLONASE) 50 mcg/actuation nasal spray, as needed. , Disp: , Rfl:     cholecalciferol, vitamin D3, (VITAMIN D3) 2,000 unit Tab, Take 2,000 Units by mouth daily. Indications: OSTEOPOROSIS, am, Disp: , Rfl:     Cetirizine (ZYRTEC) 10 mg Cap, Take 10 mg by mouth nightly.     Indications: ALLERGIC RHINITIS, Disp: , Rfl:    Date Last Reviewed:  7/2718   EXAMINATION:   7/27/2018  Observation/Orthostatic Postural Assessment: Not wearing sling. Palpation: Not assessed. ROM: R shoulder elevation AROM: 128 degrees, R shoulder ER AROM (Apley reach) T2. Patient unable to reach behind back with R UE. Strength:R shoulder flexion and abduction 5/5       Body Structures Involved:  1. Nerves  2. Bones  3. Joints  4. Muscles Body Functions Affected:  1. Sensory/Pain  2. Neuromusculoskeletal  3. Movement Related  4. Skin Related Activities and Participation Affected:  1. Self Care  2. Domestic Life  3. Interpersonal Interactions and Relationships   CLINICAL DECISION MAKING:   Outcome Measure: Tool Used: Disabilities of the Arm, Shoulder and Hand (DASH) Questionnaire - Quick Version  Score:  Initial: 34/55 or 52% disability  33/55 or 50% disability (5-30-18) Most recent: 30/55 or 43% limited (6-25-18)   Interpretation of Score: The DASH is designed to measure the activities of daily living in person's with upper extremity dysfunction or pain. Each section is scored on a 1-5 scale, 5 representing the greatest disability. The scores of each section are added together for a total score of 55. This number is divided by 11, followed by subtracting 1 and multiplying by 25 to get a percent score of disability. This value represents the percentage disability: 0-20% minimal disability; 20-40% moderate disability; 40-60% severe disability; % dependent for care or exaggerated symptom behavior. Minimal detectable change is 12%. Score 11 12-19 20-28 29-37 38-45 46-54 55   Modifier CH CI CJ CK CL CM CN ? Carrying, Moving, and Handling Objects:     - CURRENT STATUS: CJ - 20%-39% impaired, limited or restricted     - GOAL STATUS: CI - 1%-19% impaired, limited or restricted    - D/C STATUS:  ---------------To be determined---------------    Medical Necessity:   · Skilled intervention continues to be required due to reduced R shoulder range of motion, weakness, pain and decreased function s/p R reverse TSA.   Reason for Services/Other Comments:  · Patient continues to require skilled intervention due to recent R reverse total shoulder arthroplasty and subsequent deficits in R UE function. TREATMENT:   (In addition to Assessment/Re-Assessment sessions the following treatments were rendered)  Pre-treatment Symptoms/Complaints: Patient has no pain today upon arrival to PT. Pain: Initial:     No pain Post Session: No pain at end of PT    Manual therapy (15 minutes) to improve R shoulder ROM. Grade 3 physio mobilizations to improve R shoulder flexion, abduction, IR and ER with patient in supine. Therapeutic Exercise: ( 21 minutes) for improved R shoulder strength and function.       Date  6-25-18 Date  6-27-18 Date  7/3/18 Date  7-10-18  Date  7/12/18 Date  7/16/18 Date  7-18-18 Date  7-20-18 Date  7-23-18 Date  7-25-18 Date  7-27-18   Activity/Exercise              Supine press 3# x 10 R  4# 2 x 10 R 4# 3 x 10 R 4# 3 x 10 R 4# x 10, 5# 2 x 10 R 4# 3x10 4# 2x15 4# 3 x 10 5# 3 x 10    Inclined press, 2# x 10, 3# 2 x 10 5# 3 x 10 R     5# x 10, 6# 2 x  10 R  6# 3 x 10 R   Prone middle trapezius Prone, 1# 3 x 8 Standing, green band pull aparts 3 x 10 Prone, 1# 2x10 Prone 1# 2 x 10 Prone 1# 2x10 Prone 1# 2x12 Prone, 1# 3 x 10 Prone, 1# 2 x 10 bilateral -  -   Prone low trap     Prone 1# 2x10 Prone 1# 2x12 Prone 1# 2 x 10 - - - Standing, wall slide + lift off 2 x 10   Scapular retractions Cable row 10# 3 x 10    Prone row, 5# 3 x 10 R Cable row 10# 3 x 10 R    Prone row 5# 3 x 10 R  Cable row 10# R 3 x 10    Prone row 5# 3 x 10 R Cable row 10# B 2x15 Cable row 10#  B 2x12 Cable row, 7# x 10, 10# 2 x 10 Cable row 10# 3 x 10 Cable row 10# 3 x 10 Cable row 10# 3 x 10 Cable row 10# x 10  13# 2 x 10    Bentover row  6# 3 x 10 R   Shoulder abduction  Side-lying, 3# 3 x 10 Side-lying, 3# 3 x 10 Standing scaption, 2# 3 x 10  - Standing, 2# 2 x 10 R Standing, 2# 2 x 10   Standing 3# 2 x 10 -   Shoulder flexion  Standing, 1# 3 x 10 R Standing 2# 2 x 10 R Standing 2# 2 x 10 R Standing, 2# 3 x 10 R Standing, B 2# 2x10 Standing B 2# 2x10 Standing, 2# 2 x 12 R Standing 2# 2 x 10  Standing 3# 2 x 10 -   Wall slides Flexion, D1 and D2, 2 x 10 ea Flexion, D1 flex and D2 flex, 2 x 10 ea Flexion, D1 flex and D2 flex, 2 x 10 ea D1 and D2 flexion, 2 x 10 ea  -  D1 and D2 2  x10 ea D1 and D2, 3 x 10 ea D1 and D2, 3 x 10 ea See above   Shoulder extensions  - Prone 1# x10  - -  7# 2 x 10  7# 3 x 10 7# 3 x 10 R   Triceps extensions  7# 3 x 10 R 7# 3 x 10 R 7# 3 x 10 R - -  7# 2 x 10 7# 3 x 10      Bicep curls 3# 3 x 10 R 3# 3 x 12 R 3# 3 x 12 R 3# x 12, 4# 2 x 12 4# 2x15 4# 2x15 4# 2 x 15 R 4# 3 x 10 4# 3 x 10 5# 3 x 10 6# 3 x 10 R   Wall push ups   10x   mild pressure 10 x 2 - -   2 x 10  2 x 15 3 x 10    Cable press     3# B 2x15 3# B 2x15 3# 3 x 10 -      B Lat pull downs     10# 2x15 10# 1x15  7# 1x15 -  10# 3 x 10 bilateral     IR with band     Red 2x15 Red 2x15        ER with band     Red 2x15 Red 2x15              HEP: No changes today. Treatment/Session Assessment:    · Response to Treatment: Pt making good progress. Remains mildly stiff but doing well. · Compliance with Program/Exercises: Appears to be complaint. · Recommendations/Intent for next treatment session: \"Next visit will focus on progression of exercises.  Continue to anticipate release from PT at next visit with MD.  Total Treatment Duration:36 Minutes  PT Patient Time In/Time Out  Time In: 1120  Time Out: [address], PT

## 2018-07-30 ENCOUNTER — HOSPITAL ENCOUNTER (OUTPATIENT)
Dept: PHYSICAL THERAPY | Age: 73
Discharge: HOME | End: 2018-07-30
Payer: MEDICARE

## 2018-07-30 NOTE — PROGRESS NOTES
Wilian Azul  : 1945  [place] / Product Type: Medicare /  [address] [place]  at [facility] RENITA REGALADO  [facility], [address] [place] [address],[address], [place] [address].  [phone]   [phone]       OUTPATIENT PHYSICAL THERAPY:Cancellation note 2018     ICD-10: Treatment Diagnosis: Stiffness of right shoulder, not elsewhere classified (M25.611),  Aftercare following joint replacement surgery (Z47.1)  Precautions/Allergies:   Celecoxib; Ibuprofen; Lescol [fluvastatin]; Nsaids (non-steroidal anti-inflammatory drug); Sulfa (sulfonamide antibiotics); and Uloric [febuxostat]   Fall Risk Score: 1 (? 5 = High Risk)  MD Orders: Evaluate and treat, HEP, , Strengthening, ROM, \"full ROM/full strength\" (1-82-14) MEDICAL/REFERRING DIAGNOSIS: s/p  R reverse TSA w/ BT   DATE OF ONSET: 18  REFERRING PHYSICIAN: Tonia Tam MD  RETURN PHYSICIAN APPOINTMENT: TBD       Mr. Jose Rg cancelled today's appointment without giving a reason. PT will resume plan of care at next appointment.     Ernst Marie, PT  2018

## 2018-08-01 ENCOUNTER — HOSPITAL ENCOUNTER (OUTPATIENT)
Dept: PHYSICAL THERAPY | Age: 73
Discharge: HOME | End: 2018-08-01
Payer: MEDICARE

## 2018-08-01 PROCEDURE — 97140 MANUAL THERAPY 1/> REGIONS: CPT

## 2018-08-01 PROCEDURE — 97110 THERAPEUTIC EXERCISES: CPT

## 2018-08-01 NOTE — PROGRESS NOTES
Yaya Alonzo  : 1945  [place] / [place] / Product Type: Medicare /  [address]  at [facility]  [facility], [address] [place] [address],[address], [address] [facility]  [phone]   [phone]       OUTPATIENT PHYSICAL THERAPY:Daily Note 2018     ICD-10: Treatment Diagnosis: Stiffness of right shoulder, not elsewhere classified (M25.611),  Aftercare following joint replacement surgery (Z47.1)  Precautions/Allergies:   Celecoxib; Ibuprofen; Lescol [fluvastatin]; Nsaids (non-steroidal anti-inflammatory drug); Sulfa (sulfonamide antibiotics); and Uloric [febuxostat]   Fall Risk Score: 1 (? 5 = High Risk)  MD Orders: Evaluate and treat, HEP, , Strengthening, ROM, \"full ROM/full strength\" (680) MEDICAL/REFERRING DIAGNOSIS: s/p  R reverse TSA w/ BT   DATE OF ONSET: 18  REFERRING PHYSICIAN: Ryan Boyle MD  RETURN PHYSICIAN APPOINTMENT: 18     PROGRESS ASSESSMENT (18):  Mr. Cassandra Ritter presents s/p R reverse total shoulder arthroplasty with a Delta Xtend prostheses, biceps tenodesis, latissimus dorsi and teres major tendon transfer. Patient is able to achieve functional forward elevation and has good R shoulder strength. Patient continues to have mild stiffness passively with IR, and is limited with reaching behind his back. Patient's progress appears sufficient enough that he will be released from PT services at his next MD follow-up but will benefit from ongoing PT to continue progressing with ROM until this time. PROBLEM LIST (Impacting functional limitations):  1. Decreased [place] with Home Exercise Program  2. Post-op R shoulder pain and swelling  3. Decreased R shoulder ROM   4. Weakness R shoulder INTERVENTIONS PLANNED:  1. Thermal and electric modalities, manual therapies for pain   2. Manual therapies, therapeutic exercises, HEP for ROM    3.  Therapeutic exercises and HEP for strength   TREATMENT PLAN:  Effective Dates: 5/8/2018 TO 8/6/18 . Frequency/Duration: 2-3 visits per week for 90 Days (with assumption that MD will authorize more visits at subsequent appointments)  GOALS: (Goals have been discussed and agreed upon with patient.)  Short-Term Functional Goals: Time Frame: 6 weeks  1. Report no more than 0-1/10 intermittent pain to R shoulder with compensatory use during basic functional activities, and score less than 30% on the DASH. Ongoing 6-25-18  2. R shoulder PROM forward elevation greater than 120 degrees and external rotation greater than 60 degrees to progress into functional ranges. Progressed, ongoing 6-25-18  3. Demonstrate good R shoulder isometric strength with manual testing to progress into strength phase. Met 5-30-18  4. Independent with initial HEP. Met 5-30-18  Discharge Goals: Time Frame: 12 weeks  1. No more than 1-2/10 intermittent pain R shoulder with return to normalized household and work activities, and score less than 15% on the DASH. 2. R shoulder AROM forward elevation greater than 120 degrees, external rotation greater than 45 degrees, and strength to shoulder are grossly WNL's for safe use with normalized activities. MET 7-27-18  3. Demonstrate good functional shoulder strength and endurance for return to normalized household and work activities. MET 7-27-18  4. Independent with advanced shoulder HEP for continued self-management. Rehabilitation Potential For Stated Goals: Good              The information in this section was collected on 5-8-18 (except where otherwise noted). HISTORY:   History of Present Injury/Illness (Reason for Referral): Patient underwent a reverse total shoulder arthroplasty on 4/20/18 secondary to reports of ongoing shoulder pain and instability. Patient states that his shoulder frequently \"went out\" if he moved it in certain positions. Patient received home health following discharge from hospital after having R reverse TSA.   Past Medical History/Comorbidities: From EMR:    Mr. Azul Moses  has a past medical history of Allergic rhinitis; Arthritis; CAD (coronary artery disease); CVA (cerebral vascular accident) ([facility] [address] (08/2017); Diabetes mellitus type 2, controlled ([facility] [facility] Dyslipidemia; ED (erectile dysfunction); Encephalitis (1962); GERD (gastroesophageal reflux disease); Gout; Hypertension; Lacunar infarct, acute ([facility] [facility] Lumbago; Memory loss; Mitral valve regurgitation (7/14/12); ROBERTO (obstructive sleep apnea); PAF (paroxysmal atrial fibrillation) ([facility] [facility] Prostate cancer ([facility] [address] Psoriasis; SBO [facility]small [facility] obstruction) [facility] (2011, 2015, 2016); and Stage 2 chronic kidney disease. Mr. Azul Moses  has a past surgical history that includes pr left heart cath,percutaneous (7/2012); hx colonoscopy (1998, 2010); pr abdomen surgery proc unlisted (1967); hx hernia repair (Bilateral, 2012); hx appendectomy (age 49); hx radical prostatectomy ( ); pr prostate biopsy, needle, saturation sampling; hx cataract removal (Bilateral, 2013); hx coronary artery bypass graft (2009); vascular surgery procedure unlist (12/29/2017); hx splenectomy (1951); and hx shoulder replacement (Right, 2018). Social History/Living Environment:  Patient lives with his spouse in a 2-story house (main level and basement) with 1 step to enter. Prior Level of Function/Work/Activity: Patient is retired. States that he does perform some  work but is unable to currently. Dominant Side:         RIGHT  Current Medications:  Tramadol. prn     Current Outpatient Prescriptions: from EMR    metFORMIN ER (GLUCOPHAGE XR) 500 mg tablet, Take 2 Tabs by mouth daily (with dinner). , Disp: 180 Tab, Rfl: 1    allopurinol (ZYLOPRIM) 300 mg tablet, Take 1 Tab by mouth daily. , Disp: 90 Tab, Rfl: 1    metoprolol tartrate (LOPRESSOR) 50 mg tablet, Take 1 Tab by mouth two (2) times a day., Disp: 180 Tab, Rfl: 1    gabapentin (NEURONTIN) 100 mg capsule, Take 1 Cap by mouth two (2) times a day., Disp: 180 Cap, Rfl: 1    pravastatin (PRAVACHOL) 40 mg tablet, Take 1 Tab by mouth nightly., Disp: 90 Tab, Rfl: 1    ranolazine ER (RANEXA) 500 mg SR tablet, TAKE 1 TABLET TWICE A DAY, Disp: 180 Tab, Rfl: 1    dilTIAZem ER (CARDIZEM LA) 360 mg Tb24 tablet, Take 1 Tab by mouth daily. , Disp: 90 Tab, Rfl: 1    losartan (COZAAR) 100 mg tablet, Take 1 Tab by mouth daily. , Disp: 90 Tab, Rfl: 1    traMADol (ULTRAM) 50 mg tablet, Take 1 Tab by mouth every six (6) hours as needed for Pain. Max Daily Amount: 200 mg., Disp: 60 Tab, Rfl: 1    aspirin delayed-release 81 mg tablet, Take 81 mg by mouth nightly., Disp: , Rfl:     esomeprazole (NEXIUM) 40 mg capsule, Take 1 Cap by mouth daily. Indications: am (Patient taking differently: Take 40 mg by mouth daily.), Disp: 90 Cap, Rfl: 1    apixaban (ELIQUIS) 5 mg tablet, Take 1 Tab by mouth two (2) times a day., Disp: 180 Tab, Rfl: 1    glucose blood VI test strips (BLOOD GLUCOSE TEST) strip, Test once to twice daily DX: E11.8, Disp: 300 Strip, Rfl: 3    Lancets misc, Test once to twice daily DX: E11.8, Disp: 300 Each, Rfl: 3    triamcinolone acetonide (KENALOG) 0.1 % ointment, , Disp: , Rfl:     tacrolimus (PROTOPIC) 0.1 % ointment, , Disp: , Rfl:     mometasone (ELOCON) 0.1 % topical cream, , Disp: , Rfl:     nitroglycerin (NITROSTAT) 0.4 mg SL tablet, 1 Tab by SubLINGual route every five (5) minutes as needed for Chest Pain., Disp: 25 Tab, Rfl: 11    pimecrolimus (ELIDEL) 1 % topical cream, Apply  to affected area two (2) times a day., Disp: , Rfl:     fluticasone (FLONASE) 50 mcg/actuation nasal spray, as needed. , Disp: , Rfl:     cholecalciferol, vitamin D3, (VITAMIN D3) 2,000 unit Tab, Take 2,000 Units by mouth daily. Indications: OSTEOPOROSIS, am, Disp: , Rfl:     Cetirizine (ZYRTEC) 10 mg Cap, Take 10 mg by mouth nightly. Indications: ALLERGIC RHINITIS, Disp: , Rfl:    Date Last Reviewed:  7/2718   EXAMINATION:   8/1/2018  Observation/Orthostatic Postural Assessment: Not wearing sling. Palpation: Not assessed. ROM: R shoulder elevation AROM: 128 degrees, R shoulder ER AROM (Apley reach) T2. Patient unable to reach behind back with R UE. Strength:R shoulder flexion and abduction 5/5       Body Structures Involved:  1. Nerves  2. Bones  3. Joints  4. Muscles Body Functions Affected:  1. Sensory/Pain  2. Neuromusculoskeletal  3. Movement Related  4. Skin Related Activities and Participation Affected:  1. Self Care  2. Domestic Life  3. Interpersonal Interactions and Relationships   CLINICAL DECISION MAKING:   Outcome Measure: Tool Used: Disabilities of the Arm, Shoulder and Hand (DASH) Questionnaire - Quick Version  Score:  Initial: 34/55 or 52% disability  33/55 or 50% disability (5-30-18) 30/55 or 43% limited (6-25-18) Most recent: 23/55 or 27% limited (8-1-18)   Interpretation of Score: The DASH is designed to measure the activities of daily living in person's with upper extremity dysfunction or pain. Each section is scored on a 1-5 scale, 5 representing the greatest disability. The scores of each section are added together for a total score of 55. This number is divided by 11, followed by subtracting 1 and multiplying by 25 to get a percent score of disability. This value represents the percentage disability: 0-20% minimal disability; 20-40% moderate disability; 40-60% severe disability; % dependent for care or exaggerated symptom behavior. Minimal detectable change is 12%. Score 11 12-19 20-28 29-37 38-45 46-54 55   Modifier CH CI CJ CK CL CM CN ? Carrying, Moving, and Handling Objects:     - CURRENT STATUS: CJ - 20%-39% impaired, limited or restricted     - GOAL STATUS: CI - 1%-19% impaired, limited or restricted    - D/C STATUS:  ---------------To be determined---------------    Medical Necessity:   · Skilled intervention continues to be required due to reduced R shoulder range of motion, weakness, pain and decreased function s/p R reverse TSA.   Reason for Services/Other Comments:  · Patient continues to require skilled intervention due to recent R reverse total shoulder arthroplasty and subsequent deficits in R UE function. TREATMENT:   (In addition to Assessment/Re-Assessment sessions the following treatments were rendered)  Pre-treatment Symptoms/Complaints: Patient reports no pain today, but that he is tired. Pain: Initial:     No pain Post Session: No pain at end of PT    Manual therapy (15 minutes) to improve R shoulder ROM. Grade 3-4 physio mobilizations to improve R shoulder flexion, abduction, IR and ER with patient in supine. Therapeutic Exercise: ( 25 minutes) for improved R shoulder strength and function. Minimal cueing required to keep scapula depressed with overhead movements.      Date  6-25-18 Date  6-27-18 Date  7/3/18 Date  7-10-18  Date  7/12/18 Date  7/16/18 Date  7-18-18 Date  7-20-18 Date  7-23-18 Date  7-25-18 Date  7-27-18 Date  8-1-18   Activity/Exercise               Supine press 3# x 10 R  4# 2 x 10 R 4# 3 x 10 R 4# 3 x 10 R 4# x 10, 5# 2 x 10 R 4# 3x10 4# 2x15 4# 3 x 10 5# 3 x 10    Inclined press, 2# x 10, 3# 2 x 10 5# 3 x 10 R     5# x 10, 6# 2 x  10 R  6# 3 x 10 R 7# 3 x 10 R   Prone middle trapezius Prone, 1# 3 x 8 Standing, green band pull aparts 3 x 10 Prone, 1# 2x10 Prone 1# 2 x 10 Prone 1# 2x10 Prone 1# 2x12 Prone, 1# 3 x 10 Prone, 1# 2 x 10 bilateral -  - -   Prone low trap     Prone 1# 2x10 Prone 1# 2x12 Prone 1# 2 x 10 - - - Standing, wall slide + lift off 2 x 10 -   Scapular retractions Cable row 10# 3 x 10    Prone row, 5# 3 x 10 R Cable row 10# 3 x 10 R    Prone row 5# 3 x 10 R  Cable row 10# R 3 x 10    Prone row 5# 3 x 10 R Cable row 10# B 2x15 Cable row 10#  B 2x12 Cable row, 7# x 10, 10# 2 x 10 Cable row 10# 3 x 10 Cable row 10# 3 x 10 Cable row 10# 3 x 10 Cable row 10# x 10  13# 2 x 10    Bentover row  6# 3 x 10 R Cable row 10# 3 x 10    Bentover row 6# 3 x 12 R   Shoulder abduction  Side-lying, 3# 3 x 10 Side-lying, 3# 3 x 10 Standing scaption, 2# 3 x 10  - Standing, 2# 2 x 10 R Standing, 2# 2 x 10   Standing 3# 2 x 10 -    Shoulder flexion  Standing, 1# 3 x 10 R Standing 2# 2 x 10 R Standing 2# 2 x 10 R Standing, 2# 3 x 10 R Standing, B 2# 2x10 Standing B 2# 2x10 Standing, 2# 2 x 12 R Standing 2# 2 x 10  Standing 3# 2 x 10 - Standing 3# 3 x 10    Wall slides Flexion, D1 and D2, 2 x 10 ea Flexion, D1 flex and D2 flex, 2 x 10 ea Flexion, D1 flex and D2 flex, 2 x 10 ea D1 and D2 flexion, 2 x 10 ea  -  D1 and D2 2  x10 ea D1 and D2, 3 x 10 ea D1 and D2, 3 x 10 ea See above Flexion, D1 and D2 flexion x 15 ea   Shoulder extensions  - Prone 1# x10  - -  7# 2 x 10  7# 3 x 10 7# 3 x 10 R 7# 3 x 10   Triceps extensions  7# 3 x 10 R 7# 3 x 10 R 7# 3 x 10 R - -  7# 2 x 10 7# 3 x 10    7# 3 x 10   Bicep curls 3# 3 x 10 R 3# 3 x 12 R 3# 3 x 12 R 3# x 12, 4# 2 x 12 4# 2x15 4# 2x15 4# 2 x 15 R 4# 3 x 10 4# 3 x 10 5# 3 x 10 6# 3 x 10 R 6# 3 x 10 R   Wall push ups   10x   mild pressure 10 x 2 - -   2 x 10  2 x 15 3 x 10  2 x 15   Cable press     3# B 2x15 3# B 2x15 3# 3 x 10 -       B Lat pull downs     10# 2x15 10# 1x15  7# 1x15 -  10# 3 x 10 bilateral   10# 3 x 10 bilateral   IR with band     Red 2x15 Red 2x15         ER with band     Red 2x15 Red 2x15               HEP: No changes today. Treatment/Session Assessment:    · Response to Treatment: Pt had no difficulties with therapeutic exercises today. Good effort. Remains limited with IR. · Compliance with Program/Exercises: Appears to be complaint. · Recommendations/Intent for next treatment session: \"Next visit will focus on progression of exercises.  Continue to anticipate release from PT at next visit with MD.  Total Treatment Duration:40 Minutes  PT Patient Time In/Time Out  Time In: 1115  Time Out: [address] [facility],[address], PT

## 2018-08-03 ENCOUNTER — APPOINTMENT (OUTPATIENT)
Dept: PHYSICAL THERAPY | Age: 73
End: 2018-08-03
Payer: MEDICARE

## 2018-08-06 ENCOUNTER — HOSPITAL ENCOUNTER (OUTPATIENT)
Dept: PHYSICAL THERAPY | Age: 73
Discharge: HOME | End: 2018-08-06
Payer: MEDICARE

## 2018-08-06 PROCEDURE — 97110 THERAPEUTIC EXERCISES: CPT

## 2018-08-06 PROCEDURE — 97140 MANUAL THERAPY 1/> REGIONS: CPT

## 2018-08-07 NOTE — PROGRESS NOTES
Vince Jones  : 1945  [place] / [place] / Product Type: Medicare /  [address]  at [facility] RENITA REGALADO  [address] [facility], [address] [place] [address],[address], [facility] [address]  [phone]   [phone]       OUTPATIENT PHYSICAL THERAPY:Daily Note 2018     ICD-10: Treatment Diagnosis: Stiffness of right shoulder, not elsewhere classified (M25.611),  Aftercare following joint replacement surgery (Z47.1)  Precautions/Allergies:   Celecoxib; Ibuprofen; Lescol [fluvastatin]; Nsaids (non-steroidal anti-inflammatory drug); Sulfa (sulfonamide antibiotics); and Uloric [febuxostat]   Fall Risk Score: 1 (? 5 = High Risk)  MD Orders: Evaluate and treat, HEP, , Strengthening, ROM, \"full ROM/full strength\" (51) MEDICAL/REFERRING DIAGNOSIS: s/p  R reverse TSA w/ BT   DATE OF ONSET: 18  REFERRING PHYSICIAN: Armani Alejandro MD  RETURN PHYSICIAN APPOINTMENT: 18     PROGRESS ASSESSMENT (18):  Mr. Yelena George presents s/p R reverse total shoulder arthroplasty with a Delta Xtend prostheses, biceps tenodesis, latissimus dorsi and teres major tendon transfer. Patient is able to achieve functional forward elevation and has good R shoulder strength. Patient continues to have mild stiffness passively with IR, and is limited with reaching behind his back. Patient's progress appears sufficient enough that he will be released from PT services at his next MD follow-up but will benefit from ongoing PT to continue progressing with ROM until this time. PROBLEM LIST (Impacting functional limitations):  1. Decreased Forrest with Home Exercise Program  2. Post-op R shoulder pain and swelling  3. Decreased R shoulder ROM   4. Weakness R shoulder INTERVENTIONS PLANNED:  1. Thermal and electric modalities, manual therapies for pain   2. Manual therapies, therapeutic exercises, HEP for ROM    3.  Therapeutic exercises and HEP for strength   TREATMENT PLAN:  Effective Dates: 5/8/2018 TO 8/6/18 . Frequency/Duration: 2-3 visits per week for 90 Days (with assumption that MD will authorize more visits at subsequent appointments)  GOALS: (Goals have been discussed and agreed upon with patient.)  Short-Term Functional Goals: Time Frame: 6 weeks  1. Report no more than 0-1/10 intermittent pain to R shoulder with compensatory use during basic functional activities, and score less than 30% on the DASH. Ongoing 6-25-18  2. R shoulder PROM forward elevation greater than 120 degrees and external rotation greater than 60 degrees to progress into functional ranges. Progressed, ongoing 6-25-18  3. Demonstrate good R shoulder isometric strength with manual testing to progress into strength phase. Met 5-30-18  4. Independent with initial HEP. Met 5-30-18  Discharge Goals: Time Frame: 12 weeks  1. No more than 1-2/10 intermittent pain R shoulder with return to normalized household and work activities, and score less than 15% on the DASH. 2. R shoulder AROM forward elevation greater than 120 degrees, external rotation greater than 45 degrees, and strength to shoulder are grossly WNL's for safe use with normalized activities. MET 7-27-18  3. Demonstrate good functional shoulder strength and endurance for return to normalized household and work activities. MET 7-27-18  4. Independent with advanced shoulder HEP for continued self-management. Rehabilitation Potential For Stated Goals: Good              The information in this section was collected on 5-8-18 (except where otherwise noted). HISTORY:   History of Present Injury/Illness (Reason for Referral): Patient underwent a reverse total shoulder arthroplasty on 4/20/18 secondary to reports of ongoing shoulder pain and instability. Patient states that his shoulder frequently \"went out\" if he moved it in certain positions. Patient received home health following discharge from hospital after having R reverse TSA.   Past Medical History/Comorbidities: From EMR:    Mr. Bay Jc  has a past medical history of Allergic rhinitis; Arthritis; CAD (coronary artery disease); CVA (cerebral vascular accident) ([facility] [address] (08/2017); Diabetes mellitus type 2, controlled ([facility] [address] [facility] Dyslipidemia; ED (erectile dysfunction); Encephalitis (1962); GERD (gastroesophageal reflux disease); Gout; Hypertension; Lacunar infarct, acute ([facility] [address] Lumbago; Memory loss; Mitral valve regurgitation (7/14/12); ROBERTO (obstructive sleep apnea); PAF (paroxysmal atrial fibrillation) ([facility] [address] [facility] Prostate cancer ([facility] [address] [facility] Psoriasis; SBO (small bowel obstruction) ([facility] [address] [facility] (2011, 2015, 2016); and Stage 2 chronic kidney disease. Mr. Bay Jc  has a past surgical history that includes pr left heart cath,percutaneous (7/2012); hx colonoscopy (1998, 2010); pr abdomen surgery proc unlisted (1967); hx hernia repair (Bilateral, 2012); hx appendectomy (age 49); hx radical prostatectomy ( ); pr prostate biopsy, needle, saturation sampling; hx cataract removal (Bilateral, 2013); hx coronary artery bypass graft (2009); vascular surgery procedure unlist (12/29/2017); hx splenectomy (1951); and hx shoulder replacement (Right, 2018). Social History/Living Environment:  Patient lives with his spouse in a 2-story house (main level and basement) with 1 step to enter. Prior Level of Function/Work/Activity: Patient is retired. States that he does perform some  work but is unable to currently. Dominant Side:         RIGHT  Current Medications:  Tramadol. prn     Current Outpatient Prescriptions: from EMR    metFORMIN ER (GLUCOPHAGE XR) 500 mg tablet, Take 2 Tabs by mouth daily (with dinner). , Disp: 180 Tab, Rfl: 1    allopurinol (ZYLOPRIM) 300 mg tablet, Take 1 Tab by mouth daily. , Disp: 90 Tab, Rfl: 1    metoprolol tartrate (LOPRESSOR) 50 mg tablet, Take 1 Tab by mouth two (2) times a day., Disp: 180 Tab, Rfl: 1    gabapentin (NEURONTIN) 100 mg capsule, Take 1 Cap by mouth two (2) times a day., Disp: 180 Cap, Rfl: 1    pravastatin (PRAVACHOL) 40 mg tablet, Take 1 Tab by mouth nightly., Disp: 90 Tab, Rfl: 1    ranolazine ER (RANEXA) 500 mg SR tablet, TAKE 1 TABLET TWICE A DAY, Disp: 180 Tab, Rfl: 1    dilTIAZem ER (CARDIZEM LA) 360 mg Tb24 tablet, Take 1 Tab by mouth daily. , Disp: 90 Tab, Rfl: 1    losartan (COZAAR) 100 mg tablet, Take 1 Tab by mouth daily. , Disp: 90 Tab, Rfl: 1    traMADol (ULTRAM) 50 mg tablet, Take 1 Tab by mouth every six (6) hours as needed for Pain. Max Daily Amount: 200 mg., Disp: 60 Tab, Rfl: 1    aspirin delayed-release 81 mg tablet, Take 81 mg by mouth nightly., Disp: , Rfl:     esomeprazole (NEXIUM) 40 mg capsule, Take 1 Cap by mouth daily. Indications: am (Patient taking differently: Take 40 mg by mouth daily.), Disp: 90 Cap, Rfl: 1    apixaban (ELIQUIS) 5 mg tablet, Take 1 Tab by mouth two (2) times a day., Disp: 180 Tab, Rfl: 1    glucose blood VI test strips (BLOOD GLUCOSE TEST) strip, Test once to twice daily DX: E11.8, Disp: 300 Strip, Rfl: 3    Lancets misc, Test once to twice daily DX: E11.8, Disp: 300 Each, Rfl: 3    triamcinolone acetonide (KENALOG) 0.1 % ointment, , Disp: , Rfl:     tacrolimus (PROTOPIC) 0.1 % ointment, , Disp: , Rfl:     mometasone (ELOCON) 0.1 % topical cream, , Disp: , Rfl:     nitroglycerin (NITROSTAT) 0.4 mg SL tablet, 1 Tab by SubLINGual route every five (5) minutes as needed for Chest Pain., Disp: 25 Tab, Rfl: 11    pimecrolimus (ELIDEL) 1 % topical cream, Apply  to affected area two (2) times a day., Disp: , Rfl:     fluticasone (FLONASE) 50 mcg/actuation nasal spray, as needed. , Disp: , Rfl:     cholecalciferol, vitamin D3, (VITAMIN D3) 2,000 unit Tab, Take 2,000 Units by mouth daily. Indications: OSTEOPOROSIS, am, Disp: , Rfl:     Cetirizine (ZYRTEC) 10 mg Cap, Take 10 mg by mouth nightly. Indications: ALLERGIC RHINITIS, Disp: , Rfl:    Date Last Reviewed:  7/2718   EXAMINATION:   8/7/2018  Observation/Orthostatic Postural Assessment: Not wearing sling. Palpation: Not assessed. ROM: R shoulder elevation AROM: 128 degrees, R shoulder ER AROM (Apley reach) T2. Patient unable to reach behind back with R UE. Strength:R shoulder flexion and abduction 5/5       Body Structures Involved:  1. Nerves  2. Bones  3. Joints  4. Muscles Body Functions Affected:  1. Sensory/Pain  2. Neuromusculoskeletal  3. Movement Related  4. Skin Related Activities and Participation Affected:  1. Self Care  2. Domestic Life  3. Interpersonal Interactions and Relationships   CLINICAL DECISION MAKING:   Outcome Measure: Tool Used: Disabilities of the Arm, Shoulder and Hand (DASH) Questionnaire - Quick Version  Score:  Initial: 34/55 or 52% disability  33/55 or 50% disability (5-30-18) 30/55 or 43% limited (6-25-18) Most recent: 23/55 or 27% limited (8-1-18)   Interpretation of Score: The DASH is designed to measure the activities of daily living in person's with upper extremity dysfunction or pain. Each section is scored on a 1-5 scale, 5 representing the greatest disability. The scores of each section are added together for a total score of 55. This number is divided by 11, followed by subtracting 1 and multiplying by 25 to get a percent score of disability. This value represents the percentage disability: 0-20% minimal disability; 20-40% moderate disability; 40-60% severe disability; % dependent for care or exaggerated symptom behavior. Minimal detectable change is 12%. Score 11 12-19 20-28 29-37 38-45 46-54 55   Modifier CH CI CJ CK CL CM CN ? Carrying, Moving, and Handling Objects:     - CURRENT STATUS: CJ - 20%-39% impaired, limited or restricted     - GOAL STATUS: CI - 1%-19% impaired, limited or restricted    - D/C STATUS:  ---------------To be determined---------------    Medical Necessity:   · Skilled intervention continues to be required due to reduced R shoulder range of motion, weakness, pain and decreased function s/p R reverse TSA.   Reason for Services/Other Comments:  · Patient continues to require skilled intervention due to recent R reverse total shoulder arthroplasty and subsequent deficits in R UE function. TREATMENT:   (In addition to Assessment/Re-Assessment sessions the following treatments were rendered)  Pre-treatment Symptoms/Complaints: Patient reports no pain today. Shoulder is doing well. Pain: Initial:     No pain Post Session: No pain at end of PT    Manual therapy (10 minutes) to improve R shoulder ROM. Grade 3-4 physio mobilizations to improve R shoulder flexion, abduction, IR and ER with patient in supine. Therapeutic Exercise: ( 20 minutes) for improved R shoulder strength and function. Minimal cueing required to keep scapula depressed with overhead movements.      Date  6-25-18 Date  6-27-18 Date  7/3/18 Date  7-10-18  Date  7/12/18 Date  7/16/18 Date  7-18-18 Date  7-20-18 Date  7-23-18 Date  7-25-18 Date  7-27-18 Date  8-1-18 Date  8-6-18   Activity/Exercise                Supine press 3# x 10 R  4# 2 x 10 R 4# 3 x 10 R 4# 3 x 10 R 4# x 10, 5# 2 x 10 R 4# 3x10 4# 2x15 4# 3 x 10 5# 3 x 10    Inclined press, 2# x 10, 3# 2 x 10 5# 3 x 10 R     5# x 10, 6# 2 x  10 R  6# 3 x 10 R 7# 3 x 10 R    Prone middle trapezius Prone, 1# 3 x 8 Standing, green band pull aparts 3 x 10 Prone, 1# 2x10 Prone 1# 2 x 10 Prone 1# 2x10 Prone 1# 2x12 Prone, 1# 3 x 10 Prone, 1# 2 x 10 bilateral -  - -    Prone low trap     Prone 1# 2x10 Prone 1# 2x12 Prone 1# 2 x 10 - - - Standing, wall slide + lift off 2 x 10 -    Scapular retractions Cable row 10# 3 x 10    Prone row, 5# 3 x 10 R Cable row 10# 3 x 10 R    Prone row 5# 3 x 10 R  Cable row 10# R 3 x 10    Prone row 5# 3 x 10 R Cable row 10# B 2x15 Cable row 10#  B 2x12 Cable row, 7# x 10, 10# 2 x 10 Cable row 10# 3 x 10 Cable row 10# 3 x 10 Cable row 10# 3 x 10 Cable row 10# x 10  13# 2 x 10    Bentover row  6# 3 x 10 R Cable row 10# 3 x 10    Bentover row 6# 3 x 12 R Cable row 13# 3 x 10 Shoulder abduction  Side-lying, 3# 3 x 10 Side-lying, 3# 3 x 10 Standing scaption, 2# 3 x 10  - Standing, 2# 2 x 10 R Standing, 2# 2 x 10   Standing 3# 2 x 10 -  Standing, 3# 2 x 10   Shoulder flexion  Standing, 1# 3 x 10 R Standing 2# 2 x 10 R Standing 2# 2 x 10 R Standing, 2# 3 x 10 R Standing, B 2# 2x10 Standing B 2# 2x10 Standing, 2# 2 x 12 R Standing 2# 2 x 10  Standing 3# 2 x 10 - Standing 3# 3 x 10  Standing, 3# 2 x 10   Wall slides Flexion, D1 and D2, 2 x 10 ea Flexion, D1 flex and D2 flex, 2 x 10 ea Flexion, D1 flex and D2 flex, 2 x 10 ea D1 and D2 flexion, 2 x 10 ea  -  D1 and D2 2  x10 ea D1 and D2, 3 x 10 ea D1 and D2, 3 x 10 ea See above Flexion, D1 and D2 flexion x 15 ea Flexion, D1 and D2 flexion x 15 ea   Shoulder extensions  - Prone 1# x10  - -  7# 2 x 10  7# 3 x 10 7# 3 x 10 R 7# 3 x 10 7# 3 x 10   Triceps extensions  7# 3 x 10 R 7# 3 x 10 R 7# 3 x 10 R - -  7# 2 x 10 7# 3 x 10    7# 3 x 10 7# 3 x 10   Bicep curls 3# 3 x 10 R 3# 3 x 12 R 3# 3 x 12 R 3# x 12, 4# 2 x 12 4# 2x15 4# 2x15 4# 2 x 15 R 4# 3 x 10 4# 3 x 10 5# 3 x 10 6# 3 x 10 R 6# 3 x 10 R    Wall push ups   10x   mild pressure 10 x 2 - -   2 x 10  2 x 15 3 x 10  2 x 15 X 20   Cable press     3# B 2x15 3# B 2x15 3# 3 x 10 -        B Lat pull downs     10# 2x15 10# 1x15  7# 1x15 -  10# 3 x 10 bilateral   10# 3 x 10 bilateral 10# unilateral 3 x 10   IR with band     Red 2x15 Red 2x15          ER with band     Red 2x15 Red 2x15                HEP: No changes today. Treatment/Session Assessment:    · Response to Treatment: Pt appears to be improving with internal rotation ROM during manual therapy. Patient has made good progress with ROM. Sees Dr Rupa Paris tomorrow. · Compliance with Program/Exercises: Appears to be complaint. · Recommendations/Intent for next treatment session: \"Next visit will focus on progression of exercises. Continue to anticipate release from PT tomorrow.   Total Treatment Duration:30 Minutes  PT Patient Time In/Time Out  Time In: 1120  Time Out: One [facility], PT

## 2018-08-08 ENCOUNTER — HOSPITAL ENCOUNTER (OUTPATIENT)
Dept: PHYSICAL THERAPY | Age: 73
Discharge: HOME | End: 2018-08-08
Payer: MEDICARE

## 2018-08-08 PROCEDURE — 97110 THERAPEUTIC EXERCISES: CPT

## 2018-08-08 PROCEDURE — 97140 MANUAL THERAPY 1/> REGIONS: CPT

## 2018-08-08 NOTE — PROGRESS NOTES
Aleyda De Los Santos  : 1945  [place] / [place] / Product Type: Medicare /  [address]  at [facility] [place]EN[place]O  [address], [address] [place] [address],[address]  [phone]   [phone]       OUTPATIENT PHYSICAL THERAPY:Daily Note 2018     ICD-10: Treatment Diagnosis: Stiffness of right shoulder, not elsewhere classified (M25.611),  Aftercare following joint replacement surgery (Z47.1)  Precautions/Allergies:   Celecoxib; Ibuprofen; Lescol [fluvastatin]; Nsaids (non-steroidal anti-inflammatory drug); Sulfa (sulfonamide antibiotics); and Uloric [febuxostat]   Fall Risk Score: 1 (? 5 = High Risk)  MD Orders: Evaluate and treat, HEP, , Strengthening, ROM, \"full ROM/full strength\" () MEDICAL/REFERRING DIAGNOSIS: s/p  R reverse TSA w/ BT   DATE OF ONSET: 18  REFERRING PHYSICIAN: Loida Urrutia MD  RETURN PHYSICIAN APPOINTMENT: 18     PROGRESS ASSESSMENT (18):  Mr. Kym Lion presents s/p R reverse total shoulder arthroplasty with a Delta Xtend prostheses, biceps tenodesis, latissimus dorsi and teres major tendon transfer. Patient is able to achieve functional forward elevation and has good R shoulder strength. Patient continues to have mild stiffness passively with IR, and is limited with reaching behind his back. Patient's progress appears sufficient enough that he will be released from PT services at his next MD follow-up but will benefit from ongoing PT to continue progressing with ROM until this time. PROBLEM LIST (Impacting functional limitations):  1. Decreased [place] with Home Exercise Program  2. Post-op R shoulder pain and swelling  3. Decreased R shoulder ROM   4. Weakness R shoulder INTERVENTIONS PLANNED:  1. Thermal and electric modalities, manual therapies for pain   2. Manual therapies, therapeutic exercises, HEP for ROM    3.  Therapeutic exercises and HEP for strength   TREATMENT PLAN:  Effective Dates: 5/8/2018 TO 8/6/18 . Frequency/Duration: 2-3 visits per week for 90 Days (with assumption that MD will authorize more visits at subsequent appointments)  GOALS: (Goals have been discussed and agreed upon with patient.)  Short-Term Functional Goals: Time Frame: 6 weeks  1. Report no more than 0-1/10 intermittent pain to R shoulder with compensatory use during basic functional activities, and score less than 30% on the DASH. Ongoing 6-25-18  2. R shoulder PROM forward elevation greater than 120 degrees and external rotation greater than 60 degrees to progress into functional ranges. Progressed, ongoing 6-25-18  3. Demonstrate good R shoulder isometric strength with manual testing to progress into strength phase. Met 5-30-18  4. Independent with initial HEP. Met 5-30-18  Discharge Goals: Time Frame: 12 weeks  1. No more than 1-2/10 intermittent pain R shoulder with return to normalized household and work activities, and score less than 15% on the DASH. 2. R shoulder AROM forward elevation greater than 120 degrees, external rotation greater than 45 degrees, and strength to shoulder are grossly WNL's for safe use with normalized activities. MET 7-27-18  3. Demonstrate good functional shoulder strength and endurance for return to normalized household and work activities. MET 7-27-18  4. Independent with advanced shoulder HEP for continued self-management. Rehabilitation Potential For Stated Goals: Good              The information in this section was collected on 5-8-18 (except where otherwise noted). HISTORY:   History of Present Injury/Illness (Reason for Referral): Patient underwent a reverse total shoulder arthroplasty on 4/20/18 secondary to reports of ongoing shoulder pain and instability. Patient states that his shoulder frequently \"went out\" if he moved it in certain positions. Patient received home health following discharge from hospital after having R reverse TSA.   Past Medical History/Comorbidities: From EMR:    Mr. Dina Remy  has a past medical history of Allergic rhinitis; Arthritis; CAD (coronary artery disease); CVA (cerebral vascular accident) ([facility] [address] (08/2017); Diabetes mellitus type 2, controlled ([facility] [address] Dyslipidemia; ED (erectile dysfunction); Encephalitis (1962); GERD (gastroesophageal reflux disease); Gout; Hypertension; Lacunar infarct, acute ([facility] [address] Lumbago; Memory loss; Mitral valve regurgitation (7/14/12); ROBERTO (obstructive sleep apnea); PAF (paroxysmal atrial fibrillation) ([facility] [address] Prostate cancer ([facility] [address] Psoriasis; SBO (small bowel obstruction) ([facility] [address] (2011, 2015, 2016); and Stage 2 chronic kidney disease. Mr. Dina Remy  has a past surgical history that includes pr left heart cath,percutaneous (7/2012); hx colonoscopy (1998, 2010); pr abdomen surgery proc unlisted (1967); hx hernia repair (Bilateral, 2012); hx appendectomy (age 49); hx radical prostatectomy ( ); pr prostate biopsy, needle, saturation sampling; hx cataract removal (Bilateral, 2013); hx coronary artery bypass graft (2009); vascular surgery procedure unlist (12/29/2017); hx splenectomy (1951); and hx shoulder replacement (Right, 2018). Social History/Living Environment:  Patient lives with his spouse in a 2-story house (main level and basement) with 1 step to enter. Prior Level of Function/Work/Activity: Patient is retired. States that he does perform some  work but is unable to currently. Dominant Side:         RIGHT  Current Medications:  Tramadol. prn     Current Outpatient Prescriptions: from EMR    metFORMIN ER (GLUCOPHAGE XR) 500 mg tablet, Take 2 Tabs by mouth daily (with dinner). , Disp: 180 Tab, Rfl: 1    allopurinol (ZYLOPRIM) 300 mg tablet, Take 1 Tab by mouth daily. , Disp: 90 Tab, Rfl: 1    metoprolol tartrate (LOPRESSOR) 50 mg tablet, Take 1 Tab by mouth two (2) times a day., Disp: 180 Tab, Rfl: 1    gabapentin (NEURONTIN) 100 mg capsule, Take 1 Cap by mouth two (2) times a day., Disp: 180 Cap, Rfl: 1    pravastatin (PRAVACHOL) 40 mg tablet, Take 1 Tab by mouth nightly., Disp: 90 Tab, Rfl: 1    ranolazine ER (RANEXA) 500 mg SR tablet, TAKE 1 TABLET TWICE A DAY, Disp: 180 Tab, Rfl: 1    dilTIAZem ER (CARDIZEM LA) 360 mg Tb24 tablet, Take 1 Tab by mouth daily. , Disp: 90 Tab, Rfl: 1    losartan (COZAAR) 100 mg tablet, Take 1 Tab by mouth daily. , Disp: 90 Tab, Rfl: 1    traMADol (ULTRAM) 50 mg tablet, Take 1 Tab by mouth every six (6) hours as needed for Pain. Max Daily Amount: 200 mg., Disp: 60 Tab, Rfl: 1    aspirin delayed-release 81 mg tablet, Take 81 mg by mouth nightly., Disp: , Rfl:     esomeprazole (NEXIUM) 40 mg capsule, Take 1 Cap by mouth daily. Indications: am (Patient taking differently: Take 40 mg by mouth daily.), Disp: 90 Cap, Rfl: 1    apixaban (ELIQUIS) 5 mg tablet, Take 1 Tab by mouth two (2) times a day., Disp: 180 Tab, Rfl: 1    glucose blood VI test strips (BLOOD GLUCOSE TEST) strip, Test once to twice daily DX: E11.8, Disp: 300 Strip, Rfl: 3    Lancets misc, Test once to twice daily DX: E11.8, Disp: 300 Each, Rfl: 3    triamcinolone acetonide (KENALOG) 0.1 % ointment, , Disp: , Rfl:     tacrolimus (PROTOPIC) 0.1 % ointment, , Disp: , Rfl:     mometasone (ELOCON) 0.1 % topical cream, , Disp: , Rfl:     nitroglycerin (NITROSTAT) 0.4 mg SL tablet, 1 Tab by SubLINGual route every five (5) minutes as needed for Chest Pain., Disp: 25 Tab, Rfl: 11    pimecrolimus (ELIDEL) 1 % topical cream, Apply  to affected area two (2) times a day., Disp: , Rfl:     fluticasone (FLONASE) 50 mcg/actuation nasal spray, as needed. , Disp: , Rfl:     cholecalciferol, vitamin D3, (VITAMIN D3) 2,000 unit Tab, Take 2,000 Units by mouth daily. Indications: OSTEOPOROSIS, am, Disp: , Rfl:     Cetirizine (ZYRTEC) 10 mg Cap, Take 10 mg by mouth nightly. Indications: ALLERGIC RHINITIS, Disp: , Rfl:    Date Last Reviewed:  7/2718   EXAMINATION:   8/8/2018  Observation/Orthostatic Postural Assessment: Not wearing sling. Palpation: Not assessed. ROM: R shoulder elevation AROM: 128 degrees, R shoulder ER AROM (Apley reach) T2. Patient unable to reach behind back with R UE. Strength:R shoulder flexion and abduction 5/5       Body Structures Involved:  1. Nerves  2. Bones  3. Joints  4. Muscles Body Functions Affected:  1. Sensory/Pain  2. Neuromusculoskeletal  3. Movement Related  4. Skin Related Activities and Participation Affected:  1. Self Care  2. Domestic Life  3. Interpersonal Interactions and Relationships   CLINICAL DECISION MAKING:   Outcome Measure: Tool Used: Disabilities of the Arm, Shoulder and Hand (DASH) Questionnaire - Quick Version  Score:  Initial: 34/55 or 52% disability  33/55 or 50% disability (5-30-18) 30/55 or 43% limited (6-25-18) Most recent: 23/55 or 27% limited (8-1-18)   Interpretation of Score: The DASH is designed to measure the activities of daily living in person's with upper extremity dysfunction or pain. Each section is scored on a 1-5 scale, 5 representing the greatest disability. The scores of each section are added together for a total score of 55. This number is divided by 11, followed by subtracting 1 and multiplying by 25 to get a percent score of disability. This value represents the percentage disability: 0-20% minimal disability; 20-40% moderate disability; 40-60% severe disability; % dependent for care or exaggerated symptom behavior. Minimal detectable change is 12%. Score 11 12-19 20-28 29-37 38-45 46-54 55   Modifier CH CI CJ CK CL CM CN ? Carrying, Moving, and Handling Objects:     - CURRENT STATUS: CJ - 20%-39% impaired, limited or restricted     - GOAL STATUS: CI - 1%-19% impaired, limited or restricted    - D/C STATUS:  ---------------To be determined---------------    Medical Necessity:   · Skilled intervention continues to be required due to reduced R shoulder range of motion, weakness, pain and decreased function s/p R reverse TSA.   Reason for Services/Other Comments:  · Patient continues to require skilled intervention due to recent R reverse total shoulder arthroplasty and subsequent deficits in R UE function. TREATMENT:   (In addition to Assessment/Re-Assessment sessions the following treatments were rendered)  Pre-treatment Symptoms/Complaints: Patient reports no pain today. Shoulder is doing well. Patient is ready to be completed with PT. Pain: Initial:     No pain Post Session: No pain at end of PT    Manual therapy (10 minutes) to improve R shoulder ROM. Grade 3-4 physio mobilizations to improve R shoulder flexion, abduction, IR and ER with patient in supine. Therapeutic Exercise: ( 20 minutes) for improved R shoulder strength and function. Minimal cueing required to keep scapula depressed with overhead movements.      Date  6-25-18 Date  6-27-18 Date  7/3/18 Date  7-10-18  Date  7/12/18 Date  7/16/18 Date  7-18-18 Date  7-20-18 Date  7-23-18 Date  7-25-18 Date  7-27-18 Date  8-1-18 Date  8-6-18 Date  8-8-18   Activity/Exercise                 Supine press 3# x 10 R  4# 2 x 10 R 4# 3 x 10 R 4# 3 x 10 R 4# x 10, 5# 2 x 10 R 4# 3x10 4# 2x15 4# 3 x 10 5# 3 x 10    Inclined press, 2# x 10, 3# 2 x 10 5# 3 x 10 R     5# x 10, 6# 2 x  10 R  6# 3 x 10 R 7# 3 x 10 R  Cable press, 3# 3 x 10 R   Prone middle trapezius Prone, 1# 3 x 8 Standing, green band pull aparts 3 x 10 Prone, 1# 2x10 Prone 1# 2 x 10 Prone 1# 2x10 Prone 1# 2x12 Prone, 1# 3 x 10 Prone, 1# 2 x 10 bilateral -  - -     Prone low trap     Prone 1# 2x10 Prone 1# 2x12 Prone 1# 2 x 10 - - - Standing, wall slide + lift off 2 x 10 -     Scapular retractions Cable row 10# 3 x 10    Prone row, 5# 3 x 10 R Cable row 10# 3 x 10 R    Prone row 5# 3 x 10 R  Cable row 10# R 3 x 10    Prone row 5# 3 x 10 R Cable row 10# B 2x15 Cable row 10#  B 2x12 Cable row, 7# x 10, 10# 2 x 10 Cable row 10# 3 x 10 Cable row 10# 3 x 10 Cable row 10# 3 x 10 Cable row 10# x 10  13# 2 x 10    Bentover row  6# 3 x 10 R Cable row 10# 3 x 10    Bentover row 6# 3 x 12 R Cable row 13# 3 x 10 Cable 13# 3 x 10   Shoulder abduction  Side-lying, 3# 3 x 10 Side-lying, 3# 3 x 10 Standing scaption, 2# 3 x 10  - Standing, 2# 2 x 10 R Standing, 2# 2 x 10   Standing 3# 2 x 10 -  Standing, 3# 2 x 10    Shoulder flexion  Standing, 1# 3 x 10 R Standing 2# 2 x 10 R Standing 2# 2 x 10 R Standing, 2# 3 x 10 R Standing, B 2# 2x10 Standing B 2# 2x10 Standing, 2# 2 x 12 R Standing 2# 2 x 10  Standing 3# 2 x 10 - Standing 3# 3 x 10  Standing, 3# 2 x 10 Stannding  3# 3 x 10   Wall slides Flexion, D1 and D2, 2 x 10 ea Flexion, D1 flex and D2 flex, 2 x 10 ea Flexion, D1 flex and D2 flex, 2 x 10 ea D1 and D2 flexion, 2 x 10 ea  -  D1 and D2 2  x10 ea D1 and D2, 3 x 10 ea D1 and D2, 3 x 10 ea See above Flexion, D1 and D2 flexion x 15 ea Flexion, D1 and D2 flexion x 15 ea Flexion, D1 and D2 flexion,  2 x 10 ea   Shoulder extensions  - Prone 1# x10  - -  7# 2 x 10  7# 3 x 10 7# 3 x 10 R 7# 3 x 10 7# 3 x 10 7# 3 x 10   Triceps extensions  7# 3 x 10 R 7# 3 x 10 R 7# 3 x 10 R - -  7# 2 x 10 7# 3 x 10    7# 3 x 10 7# 3 x 10 7# 2 x 10  10# x 10   Bicep curls 3# 3 x 10 R 3# 3 x 12 R 3# 3 x 12 R 3# x 12, 4# 2 x 12 4# 2x15 4# 2x15 4# 2 x 15 R 4# 3 x 10 4# 3 x 10 5# 3 x 10 6# 3 x 10 R 6# 3 x 10 R  6# 3 x 10 R   Wall push ups   10x   mild pressure 10 x 2 - -   2 x 10  2 x 15 3 x 10  2 x 15 X 20    Cable press     3# B 2x15 3# B 2x15 3# 3 x 10 -      See above   B Lat pull downs     10# 2x15 10# 1x15  7# 1x15 -  10# 3 x 10 bilateral   10# 3 x 10 bilateral 10# unilateral 3 x 10 10# 2 x 10  13# x 10   IR with band     Red 2x15 Red 2x15           ER with band     Red 2x15 Red 2x15                 HEP: No changes today. Treatment/Session Assessment:    · Response to Treatment: Pt doing well. Sees Dr Zaida Fierro today, and not as previously planned on 8/7/18. Will likely to be discharged at this time. No issues or concerns with his recovery.   · Compliance with Program/Exercises: Appears to be complaint. · Recommendations/Intent for next treatment session: \"Next visit will focus on progression of exercises. Continue to anticipate release from PT pending MD visit.   Total Treatment Duration:30 Minutes  PT Patient Time In/Time Out  Time In: 1115  Time Out: 4[address] PERAZA PT

## 2018-08-10 ENCOUNTER — HOSPITAL ENCOUNTER (OUTPATIENT)
Dept: PHYSICAL THERAPY | Age: 73
End: 2018-08-10
Payer: MEDICARE

## 2018-11-08 PROBLEM — R53.83 OTHER FATIGUE: Status: ACTIVE | Noted: 2018-11-08

## 2019-02-14 PROBLEM — D53.9 MACROCYTIC ANEMIA: Status: ACTIVE | Noted: 2019-02-14

## 2019-03-17 ENCOUNTER — HOSPITAL ENCOUNTER (EMERGENCY)
Age: 74
Discharge: ANOTHER HOSPITAL | DRG: 246 | End: 2019-03-17
Attending: EMERGENCY MEDICINE | Admitting: EMERGENCY MEDICINE
Payer: MEDICARE

## 2019-03-17 ENCOUNTER — HOSPITAL ENCOUNTER (INPATIENT)
Age: 74
LOS: 2 days | Discharge: HOME | DRG: 246 | End: 2019-03-19
Attending: INTERNAL MEDICINE | Admitting: INTERNAL MEDICINE
Payer: MEDICARE

## 2019-03-17 VITALS
HEART RATE: 87 BPM | RESPIRATION RATE: 21 BRPM | SYSTOLIC BLOOD PRESSURE: 187 MMHG | BODY MASS INDEX: 26.48 KG/M2 | HEIGHT: 70 IN | DIASTOLIC BLOOD PRESSURE: 99 MMHG | OXYGEN SATURATION: 100 % | WEIGHT: 185 LBS

## 2019-03-17 DIAGNOSIS — I21.19 ACUTE ST ELEVATION MYOCARDIAL INFARCTION (STEMI) INVOLVING OTHER CORONARY ARTERY OF INFERIOR WALL (HCC): Primary | ICD-10-CM

## 2019-03-17 PROBLEM — I21.9 ACUTE MYOCARDIAL INFARCTION (HCC): Status: ACTIVE | Noted: 2019-03-17

## 2019-03-17 PROBLEM — I21.3 ACUTE ST ELEVATION MYOCARDIAL INFARCTION (STEMI) (HCC): Status: ACTIVE | Noted: 2019-03-17

## 2019-03-17 PROBLEM — I49.01 VENTRICULAR FIBRILLATION (HCC): Status: ACTIVE | Noted: 2019-03-17

## 2019-03-17 LAB
ACT BLD: 147 SECS (ref 70–128)
ACT BLD: 406 SECS (ref 70–128)
ALBUMIN SERPL-MCNC: 3.7 G/DL (ref 3.2–4.6)
ALBUMIN/GLOB SERPL: 0.9 {RATIO}
ALP SERPL-CCNC: 86 U/L (ref 50–136)
ALT SERPL-CCNC: 27 U/L (ref 12–65)
ANION GAP SERPL CALC-SCNC: 11 MMOL/L
ANION GAP SERPL CALC-SCNC: 11 MMOL/L (ref 7–16)
AST SERPL-CCNC: 31 U/L (ref 15–37)
ATRIAL RATE: 57 BPM
ATRIAL RATE: 65 BPM
ATRIAL RATE: 72 BPM
BASOPHILS # BLD: 0 K/UL (ref 0–0.2)
BASOPHILS NFR BLD: 0 % (ref 0–2)
BILIRUB SERPL-MCNC: 0.2 MG/DL (ref 0.2–1.1)
BUN SERPL-MCNC: 17 MG/DL (ref 8–23)
BUN SERPL-MCNC: 18 MG/DL (ref 8–23)
CALCIUM SERPL-MCNC: 10.2 MG/DL (ref 8.3–10.4)
CALCIUM SERPL-MCNC: 9.1 MG/DL (ref 8.3–10.4)
CALCULATED P AXIS, ECG09: 66 DEGREES
CALCULATED P AXIS, ECG09: 71 DEGREES
CALCULATED P AXIS, ECG09: 74 DEGREES
CALCULATED R AXIS, ECG10: 19 DEGREES
CALCULATED R AXIS, ECG10: 23 DEGREES
CALCULATED R AXIS, ECG10: 59 DEGREES
CALCULATED T AXIS, ECG11: 30 DEGREES
CALCULATED T AXIS, ECG11: 83 DEGREES
CALCULATED T AXIS, ECG11: 97 DEGREES
CHLORIDE SERPL-SCNC: 109 MMOL/L (ref 98–107)
CHLORIDE SERPL-SCNC: 110 MMOL/L (ref 98–107)
CO2 SERPL-SCNC: 20 MMOL/L (ref 21–32)
CO2 SERPL-SCNC: 22 MMOL/L (ref 21–32)
CREAT SERPL-MCNC: 1.51 MG/DL (ref 0.8–1.5)
CREAT SERPL-MCNC: 1.65 MG/DL (ref 0.8–1.5)
DIAGNOSIS, 93000: NORMAL
DIFFERENTIAL METHOD BLD: ABNORMAL
EOSINOPHIL # BLD: 0.1 K/UL (ref 0–0.8)
EOSINOPHIL NFR BLD: 1 % (ref 0.5–7.8)
ERYTHROCYTE [DISTWIDTH] IN BLOOD BY AUTOMATED COUNT: 12.8 % (ref 11.9–14.6)
EST. AVERAGE GLUCOSE BLD GHB EST-MCNC: 128 MG/DL
GLOBULIN SER CALC-MCNC: 4.2 G/DL (ref 2.3–3.5)
GLUCOSE BLD STRIP.AUTO-MCNC: 133 MG/DL (ref 65–100)
GLUCOSE BLD STRIP.AUTO-MCNC: 134 MG/DL (ref 65–100)
GLUCOSE BLD STRIP.AUTO-MCNC: 157 MG/DL (ref 65–100)
GLUCOSE BLD STRIP.AUTO-MCNC: 174 MG/DL (ref 65–100)
GLUCOSE SERPL-MCNC: 125 MG/DL (ref 65–100)
GLUCOSE SERPL-MCNC: 174 MG/DL (ref 65–100)
HBA1C MFR BLD: 6.1 % (ref 4.8–6)
HCT VFR BLD AUTO: 32.3 % (ref 41.1–50.3)
HGB BLD-MCNC: 11.5 G/DL (ref 13.6–17.2)
IMM GRANULOCYTES # BLD AUTO: 0 K/UL (ref 0–0.5)
IMM GRANULOCYTES NFR BLD AUTO: 0 % (ref 0–5)
LYMPHOCYTES # BLD: 3.2 K/UL (ref 0.5–4.6)
LYMPHOCYTES NFR BLD: 47 % (ref 13–44)
MAGNESIUM SERPL-MCNC: 2 MG/DL (ref 1.8–2.4)
MCH RBC QN AUTO: 35 PG (ref 26.1–32.9)
MCHC RBC AUTO-ENTMCNC: 35.6 G/DL (ref 31.4–35)
MCV RBC AUTO: 98.2 FL (ref 79.6–97.8)
MONOCYTES # BLD: 0.7 K/UL (ref 0.1–1.3)
MONOCYTES NFR BLD: 11 % (ref 4–12)
NEUTS SEG # BLD: 2.7 K/UL (ref 1.7–8.2)
NEUTS SEG NFR BLD: 40 % (ref 43–78)
NRBC # BLD: 0 K/UL (ref 0–0.2)
P-R INTERVAL, ECG05: 182 MS
P-R INTERVAL, ECG05: 220 MS
P-R INTERVAL, ECG05: 224 MS
PLATELET # BLD AUTO: 219 K/UL (ref 150–450)
PMV BLD AUTO: 10.8 FL (ref 9.4–12.3)
POTASSIUM SERPL-SCNC: 3.4 MMOL/L (ref 3.5–5.1)
POTASSIUM SERPL-SCNC: 3.5 MMOL/L (ref 3.5–5.1)
PROT SERPL-MCNC: 7.9 G/DL
Q-T INTERVAL, ECG07: 408 MS
Q-T INTERVAL, ECG07: 426 MS
Q-T INTERVAL, ECG07: 448 MS
QRS DURATION, ECG06: 80 MS
QRS DURATION, ECG06: 80 MS
QRS DURATION, ECG06: 94 MS
QTC CALCULATION (BEZET), ECG08: 436 MS
QTC CALCULATION (BEZET), ECG08: 443 MS
QTC CALCULATION (BEZET), ECG08: 446 MS
RBC # BLD AUTO: 3.29 M/UL (ref 4.23–5.6)
SODIUM SERPL-SCNC: 141 MMOL/L (ref 136–145)
SODIUM SERPL-SCNC: 142 MMOL/L (ref 136–145)
TROPONIN I BLD-MCNC: 0 NG/ML (ref 0.02–0.05)
TROPONIN I SERPL-MCNC: 3.1 NG/ML (ref 0.02–0.05)
TROPONIN I SERPL-MCNC: >40 NG/ML (ref 0.02–0.05)
TROPONIN I SERPL-MCNC: >40 NG/ML (ref 0.02–0.05)
VENTRICULAR RATE, ECG03: 57 BPM
VENTRICULAR RATE, ECG03: 65 BPM
VENTRICULAR RATE, ECG03: 72 BPM
WBC # BLD AUTO: 6.7 K/UL (ref 4.3–11.1)

## 2019-03-17 PROCEDURE — 74011250636 HC RX REV CODE- 250/636

## 2019-03-17 PROCEDURE — C1887 CATHETER, GUIDING: HCPCS

## 2019-03-17 PROCEDURE — 93005 ELECTROCARDIOGRAM TRACING: CPT | Performed by: INTERNAL MEDICINE

## 2019-03-17 PROCEDURE — 96374 THER/PROPH/DIAG INJ IV PUSH: CPT | Performed by: EMERGENCY MEDICINE

## 2019-03-17 PROCEDURE — 80053 COMPREHEN METABOLIC PANEL: CPT

## 2019-03-17 PROCEDURE — C1874 STENT, COATED/COV W/DEL SYS: HCPCS

## 2019-03-17 PROCEDURE — 84484 ASSAY OF TROPONIN QUANT: CPT

## 2019-03-17 PROCEDURE — 99152 MOD SED SAME PHYS/QHP 5/>YRS: CPT

## 2019-03-17 PROCEDURE — 77030004559 HC CATH ANGI DX SUPT CARD -B

## 2019-03-17 PROCEDURE — 99153 MOD SED SAME PHYS/QHP EA: CPT

## 2019-03-17 PROCEDURE — 75810000275 HC EMERGENCY DEPT VISIT NO LEVEL OF CARE: Performed by: INTERNAL MEDICINE

## 2019-03-17 PROCEDURE — C1725 CATH, TRANSLUMIN NON-LASER: HCPCS

## 2019-03-17 PROCEDURE — 65610000001 HC ROOM ICU GENERAL

## 2019-03-17 PROCEDURE — 77030013794 HC KT TRNSDUC BLD EDWD -B

## 2019-03-17 PROCEDURE — 74011250636 HC RX REV CODE- 250/636: Performed by: NURSE PRACTITIONER

## 2019-03-17 PROCEDURE — B2181ZZ FLUOROSCOPY OF LEFT INTERNAL MAMMARY BYPASS GRAFT USING LOW OSMOLAR CONTRAST: ICD-10-PCS | Performed by: INTERNAL MEDICINE

## 2019-03-17 PROCEDURE — B2151ZZ FLUOROSCOPY OF LEFT HEART USING LOW OSMOLAR CONTRAST: ICD-10-PCS | Performed by: INTERNAL MEDICINE

## 2019-03-17 PROCEDURE — 77010033678 HC OXYGEN DAILY

## 2019-03-17 PROCEDURE — 77030020263 HC SOL INJ SOD CL0.9% LFCR 1000ML

## 2019-03-17 PROCEDURE — 77030018579 HC SUT TICRN1 COVD -B

## 2019-03-17 PROCEDURE — 74011250637 HC RX REV CODE- 250/637: Performed by: INTERNAL MEDICINE

## 2019-03-17 PROCEDURE — 4A023N7 MEASUREMENT OF CARDIAC SAMPLING AND PRESSURE, LEFT HEART, PERCUTANEOUS APPROACH: ICD-10-PCS | Performed by: INTERNAL MEDICINE

## 2019-03-17 PROCEDURE — 93459 L HRT ART/GRFT ANGIO: CPT

## 2019-03-17 PROCEDURE — 027035Z DILATION OF CORONARY ARTERY, ONE ARTERY WITH TWO DRUG-ELUTING INTRALUMINAL DEVICES, PERCUTANEOUS APPROACH: ICD-10-PCS | Performed by: INTERNAL MEDICINE

## 2019-03-17 PROCEDURE — 85347 COAGULATION TIME ACTIVATED: CPT

## 2019-03-17 PROCEDURE — 36415 COLL VENOUS BLD VENIPUNCTURE: CPT

## 2019-03-17 PROCEDURE — 74011250637 HC RX REV CODE- 250/637: Performed by: PHYSICIAN ASSISTANT

## 2019-03-17 PROCEDURE — 99285 EMERGENCY DEPT VISIT HI MDM: CPT | Performed by: EMERGENCY MEDICINE

## 2019-03-17 PROCEDURE — B2131ZZ FLUOROSCOPY OF MULTIPLE CORONARY ARTERY BYPASS GRAFTS USING LOW OSMOLAR CONTRAST: ICD-10-PCS | Performed by: INTERNAL MEDICINE

## 2019-03-17 PROCEDURE — 74011250637 HC RX REV CODE- 250/637: Performed by: EMERGENCY MEDICINE

## 2019-03-17 PROCEDURE — 83735 ASSAY OF MAGNESIUM: CPT

## 2019-03-17 PROCEDURE — 74011250636 HC RX REV CODE- 250/636: Performed by: INTERNAL MEDICINE

## 2019-03-17 PROCEDURE — 74011636320 HC RX REV CODE- 636/320: Performed by: INTERNAL MEDICINE

## 2019-03-17 PROCEDURE — 93005 ELECTROCARDIOGRAM TRACING: CPT | Performed by: EMERGENCY MEDICINE

## 2019-03-17 PROCEDURE — 74011636637 HC RX REV CODE- 636/637: Performed by: PHYSICIAN ASSISTANT

## 2019-03-17 PROCEDURE — 74011000258 HC RX REV CODE- 258: Performed by: PHYSICIAN ASSISTANT

## 2019-03-17 PROCEDURE — 80048 BASIC METABOLIC PNL TOTAL CA: CPT

## 2019-03-17 PROCEDURE — 83036 HEMOGLOBIN GLYCOSYLATED A1C: CPT

## 2019-03-17 PROCEDURE — 82962 GLUCOSE BLOOD TEST: CPT

## 2019-03-17 PROCEDURE — 74011000250 HC RX REV CODE- 250: Performed by: INTERNAL MEDICINE

## 2019-03-17 PROCEDURE — 92960 CARDIOVERSION ELECTRIC EXT: CPT | Performed by: EMERGENCY MEDICINE

## 2019-03-17 PROCEDURE — 96375 TX/PRO/DX INJ NEW DRUG ADDON: CPT | Performed by: EMERGENCY MEDICINE

## 2019-03-17 PROCEDURE — C1769 GUIDE WIRE: HCPCS

## 2019-03-17 PROCEDURE — B2111ZZ FLUOROSCOPY OF MULTIPLE CORONARY ARTERIES USING LOW OSMOLAR CONTRAST: ICD-10-PCS | Performed by: INTERNAL MEDICINE

## 2019-03-17 PROCEDURE — 74011250636 HC RX REV CODE- 250/636: Performed by: EMERGENCY MEDICINE

## 2019-03-17 PROCEDURE — 85025 COMPLETE CBC W/AUTO DIFF WBC: CPT

## 2019-03-17 PROCEDURE — 92941 PRQ TRLML REVSC TOT OCCL AMI: CPT

## 2019-03-17 PROCEDURE — 74011250636 HC RX REV CODE- 250/636: Performed by: PHYSICIAN ASSISTANT

## 2019-03-17 PROCEDURE — 74011000258 HC RX REV CODE- 258: Performed by: EMERGENCY MEDICINE

## 2019-03-17 PROCEDURE — C8929 TTE W OR WO FOL WCON,DOPPLER: HCPCS

## 2019-03-17 RX ORDER — POTASSIUM CHLORIDE 20 MEQ/1
20 TABLET, EXTENDED RELEASE ORAL
Status: COMPLETED | OUTPATIENT
Start: 2019-03-17 | End: 2019-03-17

## 2019-03-17 RX ORDER — LOSARTAN POTASSIUM 50 MG/1
100 TABLET ORAL DAILY
Status: DISCONTINUED | OUTPATIENT
Start: 2019-03-18 | End: 2019-03-18

## 2019-03-17 RX ORDER — AMIODARONE HYDROCHLORIDE 150 MG/3ML
INJECTION, SOLUTION INTRAVENOUS
Status: DISCONTINUED
Start: 2019-03-17 | End: 2019-03-17 | Stop reason: HOSPADM

## 2019-03-17 RX ORDER — CLOPIDOGREL BISULFATE 75 MG/1
75 TABLET ORAL DAILY
Status: DISCONTINUED | OUTPATIENT
Start: 2019-03-17 | End: 2019-03-19 | Stop reason: HOSPADM

## 2019-03-17 RX ORDER — ALLOPURINOL 300 MG/1
300 TABLET ORAL DAILY
Status: DISCONTINUED | OUTPATIENT
Start: 2019-03-17 | End: 2019-03-19 | Stop reason: HOSPADM

## 2019-03-17 RX ORDER — GUAIFENESIN 100 MG/5ML
81 LIQUID (ML) ORAL DAILY
Status: DISCONTINUED | OUTPATIENT
Start: 2019-03-17 | End: 2019-03-17 | Stop reason: SDUPTHER

## 2019-03-17 RX ORDER — MAG HYDROX/ALUMINUM HYD/SIMETH 200-200-20
30 SUSPENSION, ORAL (FINAL DOSE FORM) ORAL AS NEEDED
Status: DISCONTINUED | OUTPATIENT
Start: 2019-03-17 | End: 2019-03-19 | Stop reason: HOSPADM

## 2019-03-17 RX ORDER — HEPARIN SODIUM 5000 [USP'U]/ML
4000 INJECTION, SOLUTION INTRAVENOUS; SUBCUTANEOUS
Status: COMPLETED | OUTPATIENT
Start: 2019-03-17 | End: 2019-03-17

## 2019-03-17 RX ORDER — ISOSORBIDE MONONITRATE 30 MG/1
30 TABLET, EXTENDED RELEASE ORAL DAILY
Status: DISCONTINUED | OUTPATIENT
Start: 2019-03-18 | End: 2019-03-17

## 2019-03-17 RX ORDER — MIDAZOLAM HYDROCHLORIDE 1 MG/ML
.5-5 INJECTION, SOLUTION INTRAMUSCULAR; INTRAVENOUS
Status: DISCONTINUED | OUTPATIENT
Start: 2019-03-17 | End: 2019-03-17

## 2019-03-17 RX ORDER — HEPARIN SODIUM 200 [USP'U]/100ML
3 INJECTION, SOLUTION INTRAVENOUS CONTINUOUS
Status: DISCONTINUED | OUTPATIENT
Start: 2019-03-17 | End: 2019-03-17

## 2019-03-17 RX ORDER — AMLODIPINE BESYLATE 5 MG/1
5 TABLET ORAL DAILY
Status: DISCONTINUED | OUTPATIENT
Start: 2019-03-17 | End: 2019-03-17

## 2019-03-17 RX ORDER — SODIUM CHLORIDE 9 MG/ML
50 INJECTION, SOLUTION INTRAVENOUS CONTINUOUS
Status: DISCONTINUED | OUTPATIENT
Start: 2019-03-17 | End: 2019-03-17

## 2019-03-17 RX ORDER — LOSARTAN POTASSIUM 50 MG/1
100 TABLET ORAL DAILY
Status: DISCONTINUED | OUTPATIENT
Start: 2019-03-17 | End: 2019-03-17

## 2019-03-17 RX ORDER — HYDROCODONE BITARTRATE AND ACETAMINOPHEN 7.5; 325 MG/1; MG/1
1 TABLET ORAL
Status: DISCONTINUED | OUTPATIENT
Start: 2019-03-17 | End: 2019-03-19 | Stop reason: HOSPADM

## 2019-03-17 RX ORDER — PANTOPRAZOLE SODIUM 40 MG/1
40 TABLET, DELAYED RELEASE ORAL
Status: DISCONTINUED | OUTPATIENT
Start: 2019-03-17 | End: 2019-03-19 | Stop reason: HOSPADM

## 2019-03-17 RX ORDER — ONDANSETRON 2 MG/ML
4 INJECTION INTRAMUSCULAR; INTRAVENOUS
Status: COMPLETED | OUTPATIENT
Start: 2019-03-17 | End: 2019-03-17

## 2019-03-17 RX ORDER — MORPHINE SULFATE 2 MG/ML
2 INJECTION, SOLUTION INTRAMUSCULAR; INTRAVENOUS ONCE
Status: COMPLETED | OUTPATIENT
Start: 2019-03-17 | End: 2019-03-17

## 2019-03-17 RX ORDER — EZETIMIBE 10 MG/1
10 TABLET ORAL DAILY
Status: DISCONTINUED | OUTPATIENT
Start: 2019-03-17 | End: 2019-03-19 | Stop reason: HOSPADM

## 2019-03-17 RX ORDER — ONDANSETRON 2 MG/ML
INJECTION INTRAMUSCULAR; INTRAVENOUS
Status: DISCONTINUED
Start: 2019-03-17 | End: 2019-03-17 | Stop reason: HOSPADM

## 2019-03-17 RX ORDER — FENTANYL CITRATE 50 UG/ML
25-100 INJECTION, SOLUTION INTRAMUSCULAR; INTRAVENOUS
Status: DISCONTINUED | OUTPATIENT
Start: 2019-03-17 | End: 2019-03-17 | Stop reason: HOSPADM

## 2019-03-17 RX ORDER — GUAIFENESIN 100 MG/5ML
162 LIQUID (ML) ORAL
Status: DISCONTINUED | OUTPATIENT
Start: 2019-03-17 | End: 2019-03-17 | Stop reason: SDUPTHER

## 2019-03-17 RX ORDER — MORPHINE SULFATE 2 MG/ML
2 INJECTION, SOLUTION INTRAMUSCULAR; INTRAVENOUS
Status: DISCONTINUED | OUTPATIENT
Start: 2019-03-17 | End: 2019-03-19 | Stop reason: HOSPADM

## 2019-03-17 RX ORDER — ISOSORBIDE MONONITRATE 30 MG/1
30 TABLET, EXTENDED RELEASE ORAL DAILY
Status: DISCONTINUED | OUTPATIENT
Start: 2019-03-17 | End: 2019-03-19 | Stop reason: HOSPADM

## 2019-03-17 RX ORDER — NITROGLYCERIN 0.4 MG/1
0.4 TABLET SUBLINGUAL
Status: COMPLETED | OUTPATIENT
Start: 2019-03-17 | End: 2019-03-17

## 2019-03-17 RX ORDER — NITROGLYCERIN 0.4 MG/1
0.4 TABLET SUBLINGUAL
Status: DISCONTINUED | OUTPATIENT
Start: 2019-03-17 | End: 2019-03-19 | Stop reason: HOSPADM

## 2019-03-17 RX ORDER — EPTIFIBATIDE 0.75 MG/ML
1 INJECTION, SOLUTION INTRAVENOUS CONTINUOUS
Status: DISCONTINUED | OUTPATIENT
Start: 2019-03-17 | End: 2019-03-17 | Stop reason: SDUPTHER

## 2019-03-17 RX ORDER — SODIUM CHLORIDE 0.9 % (FLUSH) 0.9 %
5-40 SYRINGE (ML) INJECTION EVERY 8 HOURS
Status: DISCONTINUED | OUTPATIENT
Start: 2019-03-17 | End: 2019-03-19 | Stop reason: HOSPADM

## 2019-03-17 RX ORDER — GUAIFENESIN 100 MG/5ML
162 LIQUID (ML) ORAL
Status: COMPLETED | OUTPATIENT
Start: 2019-03-17 | End: 2019-03-17

## 2019-03-17 RX ORDER — HEPARIN SODIUM 5000 [USP'U]/ML
4000 INJECTION, SOLUTION INTRAVENOUS; SUBCUTANEOUS ONCE
Status: DISCONTINUED | OUTPATIENT
Start: 2019-03-17 | End: 2019-03-17 | Stop reason: SDUPTHER

## 2019-03-17 RX ORDER — SODIUM CHLORIDE 0.9 % (FLUSH) 0.9 %
5-40 SYRINGE (ML) INJECTION AS NEEDED
Status: DISCONTINUED | OUTPATIENT
Start: 2019-03-17 | End: 2019-03-17 | Stop reason: SDUPTHER

## 2019-03-17 RX ORDER — CLOPIDOGREL BISULFATE 75 MG/1
600 TABLET ORAL ONCE
Status: COMPLETED | OUTPATIENT
Start: 2019-03-17 | End: 2019-03-17

## 2019-03-17 RX ORDER — POTASSIUM CHLORIDE 20 MEQ/1
40 TABLET, EXTENDED RELEASE ORAL
Status: COMPLETED | OUTPATIENT
Start: 2019-03-17 | End: 2019-03-17

## 2019-03-17 RX ORDER — ATORVASTATIN CALCIUM 40 MG/1
80 TABLET, FILM COATED ORAL
Status: DISCONTINUED | OUTPATIENT
Start: 2019-03-17 | End: 2019-03-19 | Stop reason: HOSPADM

## 2019-03-17 RX ORDER — INSULIN LISPRO 100 [IU]/ML
0-10 INJECTION, SOLUTION INTRAVENOUS; SUBCUTANEOUS
Status: DISCONTINUED | OUTPATIENT
Start: 2019-03-17 | End: 2019-03-19 | Stop reason: HOSPADM

## 2019-03-17 RX ORDER — METOPROLOL TARTRATE 50 MG/1
50 TABLET ORAL 2 TIMES DAILY
Status: DISCONTINUED | OUTPATIENT
Start: 2019-03-17 | End: 2019-03-18

## 2019-03-17 RX ORDER — EPTIFIBATIDE 0.75 MG/ML
1 INJECTION, SOLUTION INTRAVENOUS CONTINUOUS
Status: DISCONTINUED | OUTPATIENT
Start: 2019-03-17 | End: 2019-03-17

## 2019-03-17 RX ORDER — SODIUM CHLORIDE 0.9 % (FLUSH) 0.9 %
5-40 SYRINGE (ML) INJECTION AS NEEDED
Status: DISCONTINUED | OUTPATIENT
Start: 2019-03-17 | End: 2019-03-19 | Stop reason: HOSPADM

## 2019-03-17 RX ORDER — FUROSEMIDE 10 MG/ML
40 INJECTION INTRAMUSCULAR; INTRAVENOUS 2 TIMES DAILY
Status: DISCONTINUED | OUTPATIENT
Start: 2019-03-17 | End: 2019-03-17

## 2019-03-17 RX ORDER — NITROGLYCERIN 0.4 MG/1
0.4 TABLET SUBLINGUAL
Status: DISCONTINUED | OUTPATIENT
Start: 2019-03-17 | End: 2019-03-17 | Stop reason: SDUPTHER

## 2019-03-17 RX ORDER — LORAZEPAM 1 MG/1
1 TABLET ORAL
Status: DISCONTINUED | OUTPATIENT
Start: 2019-03-17 | End: 2019-03-19 | Stop reason: HOSPADM

## 2019-03-17 RX ORDER — ASPIRIN 81 MG/1
81 TABLET ORAL
Status: DISCONTINUED | OUTPATIENT
Start: 2019-03-17 | End: 2019-03-19 | Stop reason: HOSPADM

## 2019-03-17 RX ORDER — HEPARIN SODIUM 10000 [USP'U]/ML
40-80 INJECTION, SOLUTION INTRAVENOUS; SUBCUTANEOUS
Status: DISCONTINUED | OUTPATIENT
Start: 2019-03-17 | End: 2019-03-17

## 2019-03-17 RX ORDER — LIDOCAINE HYDROCHLORIDE 10 MG/ML
5-40 INJECTION INFILTRATION; PERINEURAL
Status: DISCONTINUED | OUTPATIENT
Start: 2019-03-17 | End: 2019-03-17

## 2019-03-17 RX ORDER — SODIUM CHLORIDE 9 MG/ML
75 INJECTION, SOLUTION INTRAVENOUS CONTINUOUS
Status: DISCONTINUED | OUTPATIENT
Start: 2019-03-17 | End: 2019-03-17

## 2019-03-17 RX ORDER — LORATADINE 10 MG/1
10 TABLET ORAL DAILY
Status: DISCONTINUED | OUTPATIENT
Start: 2019-03-17 | End: 2019-03-19 | Stop reason: HOSPADM

## 2019-03-17 RX ORDER — GABAPENTIN 300 MG/1
300 CAPSULE ORAL 4 TIMES DAILY
Status: DISCONTINUED | OUTPATIENT
Start: 2019-03-17 | End: 2019-03-19 | Stop reason: HOSPADM

## 2019-03-17 RX ORDER — EPTIFIBATIDE 2 MG/ML
180 INJECTION, SOLUTION INTRAVENOUS
Status: DISCONTINUED | OUTPATIENT
Start: 2019-03-17 | End: 2019-03-17

## 2019-03-17 RX ORDER — ACETAMINOPHEN 325 MG/1
650 TABLET ORAL
Status: DISCONTINUED | OUTPATIENT
Start: 2019-03-17 | End: 2019-03-19 | Stop reason: HOSPADM

## 2019-03-17 RX ORDER — ACETAMINOPHEN 325 MG/1
650 TABLET ORAL
Status: DISCONTINUED | OUTPATIENT
Start: 2019-03-17 | End: 2019-03-17 | Stop reason: SDUPTHER

## 2019-03-17 RX ORDER — HYDRALAZINE HYDROCHLORIDE 20 MG/ML
10 INJECTION INTRAMUSCULAR; INTRAVENOUS
Status: DISCONTINUED | OUTPATIENT
Start: 2019-03-17 | End: 2019-03-19 | Stop reason: HOSPADM

## 2019-03-17 RX ADMIN — NITROGLYCERIN 0.4 MG: 0.4 TABLET, ORALLY DISINTEGRATING SUBLINGUAL at 13:54

## 2019-03-17 RX ADMIN — HEPARIN SODIUM 3 ML/HR: 5000 INJECTION, SOLUTION INTRAVENOUS; SUBCUTANEOUS at 02:18

## 2019-03-17 RX ADMIN — SODIUM CHLORIDE 50 ML/HR: 900 INJECTION, SOLUTION INTRAVENOUS at 02:28

## 2019-03-17 RX ADMIN — MORPHINE SULFATE 2 MG: 2 INJECTION, SOLUTION INTRAMUSCULAR; INTRAVENOUS at 08:04

## 2019-03-17 RX ADMIN — ALLOPURINOL 300 MG: 300 TABLET ORAL at 09:47

## 2019-03-17 RX ADMIN — EPTIFIBATIDE 15.1 MG: 2 INJECTION, SOLUTION INTRAVENOUS at 01:18

## 2019-03-17 RX ADMIN — NITROGLYCERIN 0.4 MG: 0.4 TABLET, ORALLY DISINTEGRATING SUBLINGUAL at 00:28

## 2019-03-17 RX ADMIN — EPTIFIBATIDE 15.1 MG: 2 INJECTION, SOLUTION INTRAVENOUS at 01:28

## 2019-03-17 RX ADMIN — INSULIN LISPRO 2 UNITS: 100 INJECTION, SOLUTION INTRAVENOUS; SUBCUTANEOUS at 16:40

## 2019-03-17 RX ADMIN — GABAPENTIN 300 MG: 300 CAPSULE ORAL at 12:50

## 2019-03-17 RX ADMIN — EPTIFIBATIDE 1 MCG/KG/MIN: 0.75 INJECTION INTRAVENOUS at 06:37

## 2019-03-17 RX ADMIN — NITROGLYCERIN 0.4 MG: 0.4 TABLET, ORALLY DISINTEGRATING SUBLINGUAL at 00:23

## 2019-03-17 RX ADMIN — MIDAZOLAM HYDROCHLORIDE 1 MG: 1 INJECTION, SOLUTION INTRAMUSCULAR; INTRAVENOUS at 01:06

## 2019-03-17 RX ADMIN — PERFLUTREN 1 ML: 6.52 INJECTION, SUSPENSION INTRAVENOUS at 08:00

## 2019-03-17 RX ADMIN — MORPHINE SULFATE 2 MG: 2 INJECTION, SOLUTION INTRAMUSCULAR; INTRAVENOUS at 11:57

## 2019-03-17 RX ADMIN — METOPROLOL TARTRATE 50 MG: 50 TABLET ORAL at 04:52

## 2019-03-17 RX ADMIN — ONDANSETRON 4 MG: 2 INJECTION INTRAMUSCULAR; INTRAVENOUS at 00:45

## 2019-03-17 RX ADMIN — PANTOPRAZOLE SODIUM 40 MG: 40 TABLET, DELAYED RELEASE ORAL at 09:48

## 2019-03-17 RX ADMIN — NITROGLYCERIN 0.4 MG: 0.4 TABLET, ORALLY DISINTEGRATING SUBLINGUAL at 13:46

## 2019-03-17 RX ADMIN — LORATADINE 10 MG: 10 TABLET ORAL at 09:48

## 2019-03-17 RX ADMIN — EPTIFIBATIDE 1 MCG/KG/MIN: 0.75 INJECTION INTRAVENOUS at 01:20

## 2019-03-17 RX ADMIN — GABAPENTIN 300 MG: 300 CAPSULE ORAL at 17:21

## 2019-03-17 RX ADMIN — ATORVASTATIN CALCIUM 80 MG: 40 TABLET, FILM COATED ORAL at 02:47

## 2019-03-17 RX ADMIN — Medication 10 ML: at 14:10

## 2019-03-17 RX ADMIN — AMIODARONE HYDROCHLORIDE 150 MG: 50 INJECTION, SOLUTION INTRAVENOUS at 00:40

## 2019-03-17 RX ADMIN — CLOPIDOGREL BISULFATE 600 MG: 75 TABLET ORAL at 02:00

## 2019-03-17 RX ADMIN — LOSARTAN POTASSIUM 100 MG: 50 TABLET ORAL at 03:44

## 2019-03-17 RX ADMIN — METOPROLOL TARTRATE 50 MG: 50 TABLET ORAL at 17:21

## 2019-03-17 RX ADMIN — ASPIRIN 81 MG: 81 TABLET, COATED ORAL at 04:00

## 2019-03-17 RX ADMIN — NITROGLYCERIN 0.4 MG: 0.4 TABLET, ORALLY DISINTEGRATING SUBLINGUAL at 00:34

## 2019-03-17 RX ADMIN — AMIODARONE HYDROCHLORIDE 1 MG/MIN: 50 INJECTION, SOLUTION INTRAVENOUS at 03:04

## 2019-03-17 RX ADMIN — CLOPIDOGREL BISULFATE 75 MG: 75 TABLET, FILM COATED ORAL at 09:48

## 2019-03-17 RX ADMIN — HYDRALAZINE HYDROCHLORIDE 10 MG: 20 INJECTION INTRAMUSCULAR; INTRAVENOUS at 20:51

## 2019-03-17 RX ADMIN — POTASSIUM CHLORIDE 20 MEQ: 20 TABLET, EXTENDED RELEASE ORAL at 02:47

## 2019-03-17 RX ADMIN — NITROGLYCERIN 0.4 MG: 0.4 TABLET, ORALLY DISINTEGRATING SUBLINGUAL at 07:37

## 2019-03-17 RX ADMIN — POTASSIUM CHLORIDE 40 MEQ: 20 TABLET, EXTENDED RELEASE ORAL at 11:30

## 2019-03-17 RX ADMIN — HEPARIN SODIUM 3000 UNITS: 10000 INJECTION INTRAVENOUS; SUBCUTANEOUS at 01:15

## 2019-03-17 RX ADMIN — ASPIRIN 81 MG: 81 TABLET, COATED ORAL at 02:47

## 2019-03-17 RX ADMIN — GABAPENTIN 300 MG: 300 CAPSULE ORAL at 09:48

## 2019-03-17 RX ADMIN — INSULIN LISPRO 2 UNITS: 100 INJECTION, SOLUTION INTRAVENOUS; SUBCUTANEOUS at 11:56

## 2019-03-17 RX ADMIN — LIDOCAINE HYDROCHLORIDE 10 ML: 10 INJECTION, SOLUTION INFILTRATION; PERINEURAL at 01:10

## 2019-03-17 RX ADMIN — NITROGLYCERIN 0.4 MG: 0.4 TABLET, ORALLY DISINTEGRATING SUBLINGUAL at 07:22

## 2019-03-17 RX ADMIN — HEPARIN SODIUM 3 ML/HR: 5000 INJECTION, SOLUTION INTRAVENOUS; SUBCUTANEOUS at 01:10

## 2019-03-17 RX ADMIN — IOPAMIDOL 210 ML: 755 INJECTION, SOLUTION INTRAVENOUS at 02:00

## 2019-03-17 RX ADMIN — ALUMINUM HYDROXIDE, MAGNESIUM HYDROXIDE, AND SIMETHICONE 30 ML: 200; 200; 20 SUSPENSION ORAL at 02:00

## 2019-03-17 RX ADMIN — ASPIRIN 81 MG 162 MG: 81 TABLET ORAL at 00:18

## 2019-03-17 RX ADMIN — FENTANYL CITRATE 25 MCG: 50 INJECTION, SOLUTION INTRAMUSCULAR; INTRAVENOUS at 01:06

## 2019-03-17 RX ADMIN — ISOSORBIDE MONONITRATE 30 MG: 30 TABLET, EXTENDED RELEASE ORAL at 14:57

## 2019-03-17 RX ADMIN — GABAPENTIN 300 MG: 300 CAPSULE ORAL at 22:13

## 2019-03-17 RX ADMIN — Medication 10 ML: at 22:14

## 2019-03-17 RX ADMIN — HEPARIN SODIUM 4000 UNITS: 5000 INJECTION INTRAVENOUS; SUBCUTANEOUS at 00:20

## 2019-03-17 RX ADMIN — MORPHINE SULFATE 2 MG: 2 INJECTION, SOLUTION INTRAMUSCULAR; INTRAVENOUS at 07:20

## 2019-03-17 RX ADMIN — ATORVASTATIN CALCIUM 80 MG: 40 TABLET, FILM COATED ORAL at 22:13

## 2019-03-17 RX ADMIN — EZETIMIBE 10 MG: 10 TABLET ORAL at 09:58

## 2019-03-17 NOTE — PROCEDURES
Brief Cardiac Procedure Note    Patient: Steph Segal MRN: [id number]  SSN: [id number]    YOB: 1945  Age: 68 y.o. Sex: male      Date of Procedure: 3/17/2019     Pre-procedure Diagnosis: STEMI    Post-procedure Diagnosis: Coronary Artery Disease    Reason for Procedure: ACS < or = 24 Hours    Procedure: Left Heart Catheterization with Percutaneous Coronary Intervention    Brief Description of Procedure: lhc via right fem, pci svg to om, manual    Performed By: Seferino Lainez MD     Assistants: none    Anesthesia: Moderate Sedation    Estimated Blood Loss: Less than 10 mL      Specimens: None    Implants: None    Findings: kami svg om- ef 50, 2/4 patent grafts    Complications: None    Recommendations: Continue medical therapy.     Signed By: Seferino Lainez MD     March 17, 2019            '

## 2019-03-17 NOTE — PROGRESS NOTES
SBP maintaining 160s, orders received from ALISSON Woods to give 0900 dose of 100 mg cozaar now and if SBP > 150 in 1 hr, give 0900 50mg lopressor

## 2019-03-17 NOTE — ED NOTES
Pt transferred to EMS stretcher and cardiac rhythm changed to v fib. 2 shocks administered and bolus of 150mg Amiodarone given. Pt transferred out with RN and EMS crew.

## 2019-03-17 NOTE — PROGRESS NOTES
# 6 [place] sheath removed from right femoral artery. Hemostasis achieved after 20 mins of hand held pressure holding. Covered with gauze pressing and Tegaderm. Sandbag placed over site. Tolerated with no problems.

## 2019-03-17 NOTE — PROGRESS NOTES
.Bedside and Verbal shift change [facility] by Mary Kay Johnson RN (offgoing nurse). Report included the following information SBAR, Kardex, ED Summary, OR Summary, Procedure Summary, Intake/Output, MAR, Recent Results, Med Rec Status and Cardiac Rhythm SR 71.

## 2019-03-17 NOTE — PROGRESS NOTES
TRANSFER - IN REPORT:    Verbal report received from [place], RN(name) on [employer]  being received from Cath Lab(unit) for routine post - op      Report consisted of patients Situation, Background, Assessment and   Recommendations(SBAR). Information from the following report(s) SBAR, Kardex, Procedure Summary, MAR, Med Rec Status and Cardiac Rhythm NSR was reviewed with the receiving nurse. Opportunity for questions and clarification was provided. Assessment completed upon patients arrival to unit and care assumed. Pt admitted to 3109 from Cath lab. Drowsy on arrival, oriented x4. Currently experiencing aching chest pain 3/10 (states it is improving and declines any intervention). NSR with 1st degree AVB on monitor. 131/60. Temp 95, given several warm blankets and will monitor. Sheath site to R groin C/D/I, no bleeding or signs of hematoma. All peripheral pulses palpated. Dual skin assessment completed with Ceferino Martinez RN. No signs of pressure injury noted. Allevyn applied to sacrum. Wife and Daughter at bedside, no questions at this time. Pt given call light, instructed to keep leg straight and HOB flat, voices understanding.

## 2019-03-17 NOTE — PROCEDURES
[facility]  CARDIAC CATH    Name:  Nate Philip  MR#:  [id number]  :  1945  ACCOUNT #:  [de-identified]  DATE OF SERVICE:  2019      CLINICAL INFORMATION:  The patient had an inferior ST elevation myocardial infarction brought for emergent catheterization. PROCEDURE IN DETAIL:  After informed consent was obtained, a 6-Yemeni sheath was placed in the right femoral artery. A 6-Yemeni JL4, JR4, multipurpose IM catheter were used for diagnostic angiography. Angioplasty and stenting of the vein graft to the obtuse marginal branch was performed with a 6-Yemeni AL1 guide, a Prowater wire. Heparin and Integrilin for anticoagulation. Manual pressure used to gain hemostasis at the cath site. Conscious sedation was given by Jose Sheridan under my direct supervision beginning at 01:06 and concluding at 02:00 a.m. A total of 1 mg Versed, 25 mcg of fentanyl were given. Vital signs and saturation were stable throughout. FINDINGS:  The right coronary is a dominant vessel, normal anatomic position, 30% mid-vessel narrowing, 40-50% distal narrowing. The PDA is a very small vessel, diffusely diseased up to 90%. There is a 90% narrowing just after the takeoff of the PDA. This is unchanged from previous cath films. The vein graft to the second obtuse marginal is occluded. The vein graft to the first obtuse marginal is occluded. The vein graft to the RCA is occluded. LIMA to LAD is widely patent. Good proximal distal anastomosis with a small runoff vessel. Left ventriculogram reveals normal LV systolic function with moderate mitral regurgitation. Ejection fraction is 50%. Left ventricular end-diastolic pressure 27 mmHg with an opening aortic pressure of 110/60. No gradient across the valve. After appropriate anticoagulation was given, a Prowater wire was placed in the distal vessel. The proximal lesion was dilated with a 2.5 x 15 NC Trek.   The distal lesion was also dilated with the same balloon. A 3.0 x 30 Junior stent was placed distally. The proximal infarct-related lesion was treated with a 3.0 x 22 [place] stent. The distal lesion was 90% with a 0% residual.  Proximal lesion had 100% initial stenosis with a 0% residual.  Both stents were postdilated with a 3.25 x 15 NC Trek with inflations to 18 atmospheres. There is 0% residual and SANDER-3 flow. CONCLUSIONS:  1. Successful angioplasty and stenting of occluded vein graft to the proximal OM. 2.  Two of four patent bypass grafts. 3.  Preserved LV systolic function. 4.  Severe diffuse small vessel disease including the obtuse marginal branch. The vein graft was diffusely diseased and each native limb having diffuse narrowing up to 90%.       Debra Zamarripa MD      [id number]  D:  03/17/2019 2:03  T:  03/17/2019 6:16  JOB #:  [id number]  CC:

## 2019-03-17 NOTE — PROGRESS NOTES
TRANSFER - OUT REPORT:    Verbal report given to JOSE MARTIN Hampton(name) on [employer]  being transferred to ICU(unit) for routine progression of care       Report consisted of patients Situation, Background, Assessment and   Recommendations(SBAR). Information from the following report(s) SBAR was reviewed with the receiving nurse. is allergic to celecoxib; ibuprofen; lescol [fluvastatin]; nsaids (non-steroidal anti-inflammatory drug); sulfa (sulfonamide antibiotics); and uloric [febuxostat]. Opportunity for questions and clarification was provided. Procedure Summary:Pt had LHC for STEMI with 2 stents placed in svg to OM via R groin. 6 fr sheath left in place and connected to arterial line, site covered with clear dsg.     Med Administration    Versed:  1 mg  Fentanyl: 25 mcg  Heparin: 3000 units  Integrilin: gtt and bolus x 2    Visit Vitals  BP 90/52 (BP 1 Location: Right arm, BP Patient Position: Supine)   Pulse 66   Resp 18   SpO2 98%     Past Medical History:   Diagnosis Date    Allergic rhinitis     Arthritis     CAD (coronary artery disease)     CABG '09; troponin elevated 7/14/12 (\"likely due to supply/demand mismatch in setting of fever and increased metabolic demand\"-per cardiac MD note 8/20/12)-had cath, echo, EKG-all WNL except cath showed 2 of the bypasses with blockages- \"occluded SVG to PDA & SVG to LISA\"; EF=55%    CVA (cerebral vascular accident) ([facility] [address] 08/2017    Left- no residual weakness    Diabetes mellitus type 2, controlled ([facility] [address]     restarted oral med (metformin 3/2018), does not check BG on regular basis, last hgba1c- 8 (3/12/18)    Dyslipidemia     ED (erectile dysfunction)     Encephalitis 1962    x 2/hospitalized as teenager    GERD (gastroesophageal reflux disease)     controlled with Nexium    Gout     hx of    Hypertension     Lacunar infarct, acute     possible embolic, remote a-fib- on eliquis    Lumbago     Memory loss     Mitral valve regurgitation 7/14/12 \"moderate\" on echo    ROBERTO (obstructive sleep apnea)     mild per patient, does not use CPAP    PAF (paroxysmal [facility] fibrillation) ([facility] [address]     Prostate cancer ([facility] [address]     followed by urology    Psoriasis     topical creams    SBO (small bowel obstruction) ([facility] [address] 2011, 2015, 2016    Stage 2 chronic kidney disease            Peripheral IV 03/17/19 Right Forearm (Active)   Site Assessment Clean, dry, & intact 3/17/2019 12:15 AM   Phlebitis Assessment 0 3/17/2019 12:15 AM   Infiltration Assessment 0 3/17/2019 12:15 AM   Dressing Status Clean, dry, & intact 3/17/2019 12:15 AM   Dressing Type Transparent 3/17/2019 12:15 AM   Hub Color/Line Status Patent; Flushed 3/17/2019 12:15 AM   Action Taken Blood drawn 3/17/2019 12:15 AM       Peripheral IV 03/17/19 Left Antecubital (Active)   Site Assessment Clean, dry, & intact 3/17/2019 12:25 AM   Phlebitis Assessment 0 3/17/2019 12:25 AM   Infiltration Assessment 0 3/17/2019 12:25 AM   Dressing Status Clean, dry, & intact 3/17/2019 12:25 AM   Dressing Type Transparent 3/17/2019 12:25 AM   Hub Color/Line Status Patent; Flushed 3/17/2019 12:25 AM

## 2019-03-17 NOTE — PROGRESS NOTES
Dr Kota Clemons notified of persistent complaints of chest pain despite treatment. New orders received.

## 2019-03-17 NOTE — H&P
[facility] CARDIOLOGY History &Physical                 Primary Cardiologist: Dr Mari Elkins    Primary Care Physician: Dr Karri Gonzalez    Admitting Physician: Dr Hobbs Number    Subjective:     Patient is a 68 y.o. male who presents with inferior STEMI. He has a h/o CABG in 2009 with [facility] 7-2012 w LIMA to LAD and VG to d OM patent, occluded SVG to pOM and RCA. Pt smoked 1 ppd x 15 years and quit in 1975, mom with CAD. The patient has a h/o PAF but thinks he has run out of his Eliquis, DM, and htn (also w orthostatic hypotension in the past). 8-2017 echo with EF 50-55% with mild MR, TR. He began having CP Saturday morning which came and went all day, lasting about 10 minutes at a time. Pain became worse, lasted two hours and was a severe chest heaviness, not improved with nitro x 2, was associated with dyspnea and diaphoresis and he came to the ER at [address] to be evaluated and was noted to have inferior STEMI. /69, HR 62. Pt was being transferred downtown emergently and as he was being loaded into the ambulance was poorly responsive and noted to be in v fib, CPR started and he was shocked twice with ROSC. Amiodarone was started.        Past Medical History:   Diagnosis Date    Allergic rhinitis     Arthritis     CAD (coronary artery disease)     CABG '09; troponin elevated 7/14/12 (\"likely due to supply/demand mismatch in setting of fever and increased metabolic demand\"-per cardiac MD note 8/20/12)-had cath, echo, EKG-all WNL except cath showed 2 of the bypasses with blockages- \"occluded SVG to PDA & SVG to LISA\"; EF=55%    CVA (cerebral vascular accident) ([facility] 08/2017    Left- no residual weakness    Diabetes mellitus type 2, controlled ([facility] [facility]     restarted oral med (metformin 3/2018), does not check BG on regular basis, last hgba1c- 8 (3/12/18)    Dyslipidemia     ED (erectile dysfunction)     Encephalitis 1962    x 2/hospitalized as teenager    GERD (gastroesophageal reflux disease)     controlled with Nexium    Gout     hx of    Hypertension     Lacunar infarct, acute     possible embolic, remote a-fib- on eliquis    Lumbago     Memory loss     Mitral valve regurgitation 12    \"moderate\" on echo    ROBERTO (obstructive sleep apnea)     mild per patient, does not use CPAP    PA[facility]oxysmal [facility] fibrillation) [facility]     Prostate cancer ([facility] [facility]     followed by urology    Psoriasis     topical creams    SBO (small bowel obstruction) ([facility] , , 2016    Stage 2 chronic kidney disease       Past Surgical History:   Procedure Laterality Date    ABDOMEN SURGERY PROC UNLISTED  1967    exp lap    HX APPENDECTOMY  age 49        Nye Emigdio CATARACT REMOVAL Bilateral 2013    iol     HX COLONOSCOPY  , 2010    HX CORONARY ARTERY BYPASS GRAFT  2009    x4 bypass    HX HERNIA REPAIR Bilateral 2012    HX RADICAL PROSTATECTOMY       HX SHOULDER REPLACEMENT Right 2018    Reverse R TSA    HX SPLENECTOMY  195    [place] LEFT HEART CATH,PERCUTANEOUS  2012    no intervention    [place] PROSTATE BIOPSY, NEEDLE, SATURATION SAMPLING      and ultrasound    VASCULAR SURGERY PROCEDURE UNLIST  2017    aortogram, w/cass LE runoff,R-LE arteriogram      Allergies   Allergen Reactions    Celecoxib Swelling     Hands    Ibuprofen Unknown (comments)     Patient unsure    Lescol [Fluvastatin] Unknown (comments)     Other reaction(s): Unknown (comments)    Nsaids (Non-Steroidal Anti-Inflammatory Drug) Other (comments)     Elevated serum creatinine    Sulfa (Sulfonamide Antibiotics) Rash    Uloric [Febuxostat] Other (comments)     Joint Pain     Social History     Tobacco Use    Smoking status: Former Smoker     Packs/day: 1.00     Years: 15.00     Pack years: 15.00     Last attempt to quit: 1975     Years since quittin.7    Smokeless tobacco: Former User   Substance Use Topics    Alcohol use: Yes     Comment: rarely- once/month      FH:   Family History   Problem Relation Age of Onset    Hypertension Mother     Gout Mother     Arthritis-osteo Mother     Heart Disease Mother          of Heart Disease    Coronary Artery Disease Mother     Diabetes Father     Cancer Father     Heart Disease Father     Bleeding Prob Paternal Grandmother     Hypertension Brother     Cancer Maternal Uncle         Prostate        Review of Systems  General: no weight change, no weakness, fever or chills  Skin: no rashes, lumps, or other skin changes  HEENT: no headache, dizziness, lightheadedness, vision changes, hearing changes, tinnitus, vertigo, sinus pressure/pain, bleeding gums, sore throat, or hoarseness  Neck: no swollen glands, goiter, pain or stiffness  Respiratory: no cough, sputum, hemoptysis, + dyspnea w CP, no wheezing  Cardiovascular: + as per HPI  Gastrointestinal: + reflux, no constipation, diarrhea, liver problems, GI bleeding  Urinary: no frequency, urgency , hematuria, burning/pain with urination, recent flank pain, polyuria, nocturia, or difficulty urinating  Peripheral Vascular: no claudication, leg cramps, prior DVTs, swelling of calves, legs, or feet, color change, or swelling with redness or tenderness  Musculoskeletal: + gout, DJD  Psychiatric: no depression or excessive stress  Neurological: no sensory or motor loss, seizures, syncope, tremors, numbness, tingling, no changes in mood, attention, or speech, no changes in orientation, memory, insight, or judgment. Hematologic: no anemia, easy bruising or bleeding  Endocrine: no thyroid problems, no heat or cold intolerance, excessive sweating, polyuria, polydipsia, + diabetes. Objective: There were no vitals taken for this visit. No intake/output data recorded. No intake/output data recorded.     Physical Exam:  General: Well Developed, Well Nourished, No Acute Distress  HEENT: pupils equal and round, no abnormalities noted  Neck: supple, no JVD, no carotid bruits  Heart: S1S2 with RRR without murmurs or gallops  Lungs: Clear throughout auscultation bilaterally without adventitious sounds  Abd: soft, nontender, nondistended, with good bowel sounds  Ext: warm, no edema, calves supple/nontender, pulses 2+ bilaterally  Skin: warm and dry  Psychiatric: Normal mood and affect  Neurologic: Alert and oriented X 3      ECG: SB w rate 57 w inferior STEMI and anterior ST depression     Data Review:      Recent Results (from the past 24 hour(s))   POC TROPONIN-I    Collection Time: 03/17/19 12:17 AM   Result Value Ref Range    Troponin-I (POC) 0 (L) 0.02 - 0.05 ng/ml   CBC WITH AUTOMATED DIFF    Collection Time: 03/17/19 12:20 AM   Result Value Ref Range    WBC 6.7 4.3 - 11.1 K/uL    RBC 3.29 (L) 4.23 - 5.6 M/uL    HGB 11.5 (L) 13.6 - 17.2 g/dL    HCT 32.3 (L) 41.1 - 50.3 %    MCV 98.2 (H) 79.6 - 97.8 FL    MCH 35.0 (H) 26.1 - 32.9 PG    MCHC 35.6 (H) 31.4 - 35.0 g/dL    RDW 12.8 11.9 - 14.6 %    PLATELET 228 695 - 769 K/uL    MPV 10.8 9.4 - 12.3 FL    ABSOLUTE NRBC 0.00 0.0 - 0.2 K/uL    DF AUTOMATED      NEUTROPHILS 40 (L) 43 - 78 %    LYMPHOCYTES 47 (H) 13 - 44 %    MONOCYTES 11 4.0 - 12.0 %    EOSINOPHILS 1 0.5 - 7.8 %    BASOPHILS 0 0.0 - 2.0 %    IMMATURE GRANULOCYTES 0 0.0 - 5.0 %    ABS. NEUTROPHILS 2.7 1.7 - 8.2 K/UL    ABS. LYMPHOCYTES 3.2 0.5 - 4.6 K/UL    ABS. MONOCYTES 0.7 0.1 - 1.3 K/UL    ABS. EOSINOPHILS 0.1 0.0 - 0.8 K/UL    ABS. BASOPHILS 0.0 0.0 - 0.2 K/UL    ABS. IMM.  GRANS. 0.0 0.0 - 0.5 K/UL   METABOLIC PANEL, COMPREHENSIVE    Collection Time: 03/17/19 12:20 AM   Result Value Ref Range    Sodium 141 136 - 145 mmol/L    Potassium 3.4 (L) 3.5 - 5.1 mmol/L    Chloride 110 (H) 98 - 107 mmol/L    CO2 20 (L) 21 - 32 mmol/L    Anion gap 11 mmol/L    Glucose 125 (H) 65 - 100 mg/dL    BUN 18 8 - 23 MG/DL    Creatinine 1.65 (H) 0.8 - 1.5 MG/DL    GFR est AA 53 (L) >60 ml/min/1.73m2    GFR est non-AA 44 ml/min/1.73m2    Calcium 10.2 8.3 - 10.4 MG/DL    Bilirubin, total 0.2 0.2 - 1.1 MG/DL    ALT (SGPT) 27 12 - 65 U/L    AST (SGOT) 31 15 - 37 U/L Alk. phosphatase 86 50 - 136 U/L    Protein, total 7.9 g/dL    Albumin 3.7 3.2 - 4.6 g/dL    Globulin 4.2 (H) 2.3 - 3.5 g/dL    A-G Ratio 0.9           Assessment/Plan:   Acute ST elevation myocardial infarction (STEMI) of inferior wall ([facility]) (3/17/2019)- Pt emergently to cath lab for LHC, cont ASA, statin, ARB, BB, check labs, echo    Coronary atherosclerosis of native coronary artery (3/6/2009)- CABG in 2009 with [facility] 7-2012 w LIMA to LAD and VG to d OM patent, occluded SVG to pOM and RCA- as above    PAF (paroxysmal atrial fibrillation) ([facility] [address] (3/13/2009)- Currently in NSR, on amiodarone    HTN (hypertension) (7/14/2012)- will hold cardizem w variable BP, cont cozaar (hold for SBP less than 110), lopressor (hold for HR less than 45)    CKD (chronic kidney disease) stage 3, GFR 30-59 ml/min ([facility]) (2/24/2016)- Daily BMP, hydrate after cath     Type 2 diabetes with nephropathy ([facility] [address] (6/12/2018)- Sliding scale insulin    Ventricular fibrillation ([facility] [address] (3/17/2019)- V fib arrest w ROSC after shock x 2- cont amidoarone, monitor in [address] [place] [address]-C  3/17/2019  1:02 AM

## 2019-03-17 NOTE — PROGRESS NOTES
3/17/2019 12:19 PM    Admit Date: 3/17/2019    Admit Diagnosis: Acute ST elevation myocardial infarction (STEMI) involving other coronary artery of inferior wall ([facility]) [I21.19]      Subjective:   No cp or sob      Objective:      Visit Vitals  /86   Pulse 65   Temp 96 °F (35.6 °C)   Resp 12   Wt 90.7 kg (199 lb 15.3 oz)   SpO2 99%   BMI 28.69 kg/m²       Physical Exam:  Elo Lighter, Well Nourished, No Acute Distress, Alert & Oriented x 3, appropriate mood. Neck- supple, no JVD  CV- regular rate and rhythm no MRG  Lung- clear bilaterally  Abd- soft, nontender, nondistended  Ext- no edema bilaterally. Skin- warm and dry        Data Review:   Recent Labs     03/17/19  0408 03/17/19  0020    141   K 3.5 3.4*   BUN 17 18   CREA 1.51* 1.65*   WBC  --  6.7   HGB  --  11.5*   HCT  --  32.3*   PLT  --  219       Assessment/Plan:     Principal Problem:    Acute ST elevation myocardial infarction (STEMI) ([facility] [address] (3/17/2019)Improved with current therapy.  Will continue medications      Active Problems:    Coronary atherosclerosis of native coronary artery (3/6/2009)      PAF (paroxysmal atrial fibrillation) ([facility] [address] (3/13/2009)      HTN (hypertension) (7/14/2012)worse- add imdur for small vessel cad as well as bp      CKD (chronic kidney disease) stage 3, GFR 30-59 ml/min ([facility]) (2/24/2016)      Type 2 diabetes with nephropathy ([facility] [address] (6/12/2018)      Ventricular fibrillation ([facility] (3/17/2019)- resolved- stop amio      Acute myocardial infarction ([facility] [address] (3/17/2019)

## 2019-03-17 NOTE — ED PROVIDER NOTES
Patient with previous CABG. Also with high blood pressure and diabetes. Presents with sternal chest heaviness starting around the hour ago. Tried two nitroglycerin with no relief. Came here. The history is provided by the patient. No  was used. Chest Pain    This is a new problem. The current episode started less than 1 hour ago. The problem has been gradually worsening. The problem occurs constantly. The pain is associated with normal activity. Pain location: sternal. The pain is moderate. The quality of the pain is described as heavy. The pain does not radiate. Associated symptoms include diaphoresis and shortness of breath. Pertinent negatives include no abdominal pain, no back pain, no cough, no dizziness, no exertional chest pressure, no fever, no headaches, no irregular heartbeat, no leg pain, no lower extremity edema, no malaise/fatigue, no nausea, no numbness, no vomiting and no weakness. He has tried nitroglycerin for the symptoms. The treatment provided no relief. Risk factors include male gender, hypertension and cardiac disease. His past medical history is significant for DM and HTN. Procedural history includes CABG.        Past Medical History:   Diagnosis Date    Allergic rhinitis     Arthritis     CAD (coronary artery disease)     CABG '09; troponin elevated 7/14/12 (\"likely due to supply/demand mismatch in setting of fever and increased metabolic demand\"-per cardiac MD note 8/20/12)-had cath, echo, EKG-all WNL except cath showed 2 of the bypasses with blockages- \"occluded SVG to PDA & SVG to LISA\"; EF=55%    CVA (cerebral vascular accident) ([facility] [address] 08/2017    Left- no residual weakness    Diabetes mellitus type 2, controlled ([address]     restarted oral med (metformin 3/2018), does not check BG on regular basis, last hgba1c- 8 (3/12/18)    Dyslipidemia     ED (erectile dysfunction)     Encephalitis 1962    x 2/hospitalized as teenager    GERD (gastroesophageal reflux disease)     controlled with Nexium    Gout     hx of    Hypertension     Lacunar infarct, acute     possible embolic, remote a-fib- on eliquis    Lumbago     Memory loss     Mitral valve regurgitation 12    \"moderate\" on echo    ROBERTO (obstructive sleep apnea)     mild per patient, does not use CPAP    PAF (paroxysmal atrial fibrillation) ([facility] [address]     Prostate cancer ([facility] [address]     followed by urology    Psoriasis     topical creams    SBO (small bowel obstruction) ([facility] [address] , ,     Stage 2 chronic kidney disease        Past Surgical History:   Procedure Laterality Date    ABDOMEN SURGERY PROC UNLISTED  1967    exp lap    HX APPENDECTOMY  age 49        Mele Specking CATARACT REMOVAL Bilateral 2013    iol     HX COLONOSCOPY  , 2010    HX CORONARY ARTERY BYPASS GRAFT  2009    x4 bypass    HX HERNIA REPAIR Bilateral 2012    HX RADICAL PROSTATECTOMY       HX SHOULDER REPLACEMENT Right 2018    Reverse R TSA    HX SPLENECTOMY      [place] LEFT HEART CATH,PERCUTANEOUS  2012    no intervention    DE PROSTATE BIOPSY, NEEDLE, SATURATION SAMPLING      and ultrasound    VASCULAR SURGERY PROCEDURE UNLIST  2017    aortogram, w/cass LE runoff,R-LE arteriogram         Family History:   Problem Relation Age of Onset    Hypertension Mother    [facility] Gout Mother     Arthritis-osteo Mother     Heart Disease Mother          of Heart Disease    Coronary Artery Disease Mother     Diabetes Father     Cancer Father     Heart Disease Father     Bleeding Prob Paternal Grandmother     Hypertension Brother     Cancer Maternal Uncle         Prostate       Social History     Socioeconomic History    Marital status:      Spouse name: Not on file    Number of children: Not on file    Years of education: Not on file    Highest education level: Not on file   Social Needs    Financial resource strain: Not on file    Food insecurity - worry: Not on file    Food insecurity - inability: Not on file   [facility] Transportation needs - medical: Not on file   [employer] needs - non-medical: Not on file   Occupational History    Not on file   Tobacco Use    Smoking status: Former Smoker     Packs/day: 1.00     Years: 15.00     Pack years: 15.00     Last attempt to quit: 1975     Years since quittin.7    Smokeless tobacco: Former User   Substance and Sexual Activity    Alcohol use: Yes     Comment: rarely- once/month    Drug use: No    Sexual activity: Yes     Partners: Female   Other Topics Concern    Dental Braces Not Asked    Endoscopic Camera Pill Not Asked    Metallic Foreign Body Not Asked    Medication Patches Not Asked    Taking Feraheme Not Asked    Claustrophobic Not Asked    Removable Dental Work Not Asked    Hearing Aids Not Asked    Body Piercing Not Asked    Radiation Seeds Not Asked    Pregnant or Breast Feeding Not Asked    Wounded by Shrapnel or Bullet Not Asked    Other-See Comment Not Asked    Other Implant-See Comment Not Asked   Social History Narrative    Not on file         ALLERGIES: Celecoxib; Ibuprofen; Lescol [fluvastatin]; Nsaids (non-steroidal anti-inflammatory drug); Sulfa (sulfonamide antibiotics); and Uloric [febuxostat]    Review of Systems   Constitutional: Positive for diaphoresis. Negative for chills, fever and malaise/fatigue. HENT: Negative for rhinorrhea and sore throat. Eyes: Negative for pain and redness. Respiratory: Positive for shortness of breath. Negative for cough, chest tightness and wheezing. Cardiovascular: Positive for chest pain. Negative for leg swelling. Gastrointestinal: Negative for abdominal pain, diarrhea, nausea and vomiting. Genitourinary: Negative for dysuria and hematuria. Musculoskeletal: Negative for back pain, gait problem, neck pain and neck stiffness. Skin: Negative for color change and rash. Neurological: Negative for dizziness, weakness, numbness and headaches.        Vitals:    19 0010 19 0011   BP: 143/69    Pulse: 62    Resp: 18    Weight:  83.9 kg (185 lb)   Height:  5' 10\" (1.778 m)            Physical Exam   Constitutional: He is oriented to person, place, and time. He appears well-developed and well-nourished. He appears distressed. HENT:   Head: Normocephalic and atraumatic. Neck: Normal range of motion. Neck supple. Cardiovascular: Normal rate and regular rhythm. Pulmonary/Chest: Effort normal and breath sounds normal. He exhibits no tenderness. Abdominal: Soft. Bowel sounds are normal. There is no tenderness. Musculoskeletal: Normal range of motion. He exhibits no edema. Neurological: He is alert and oriented to person, place, and time. Skin: Skin is warm. He is diaphoretic. MDM  Number of Diagnoses or Management Options  Acute ST elevation myocardial infarction (STEMI) involving other coronary artery of inferior wall [facility]):   Diagnosis management comments: Patient with inferior STEMI being transferred to [facility] ER with Dr. Elba Fontanez accepting. 12:41 AM  While patient was being loaded onto EMS stretcher they noticed he became poorly responsive and on the monitor noticed ventricular fibrillation. He received two different ablation shocks. He then developed pulses again and started to come around speaking in. Amiodarone bolus was given. Cardiology updated on his status and proceeded with transport with orders for amiodarone drip to be started in the truck. Amount and/or Complexity of Data Reviewed  Clinical lab tests: ordered and reviewed  Tests in the medicine section of CPT®: ordered and reviewed    Patient Progress  Patient progress: stable         Procedures           EKG: normal sinus rhythm, ST elevation in II, III, aVf.

## 2019-03-17 NOTE — ED NOTES
[facility] ambulance here for urgent transfer to [facility].

## 2019-03-18 LAB
ANION GAP SERPL CALC-SCNC: 9 MMOL/L (ref 7–16)
ATRIAL RATE: 73 BPM
BASOPHILS # BLD: 0 K/UL (ref 0–0.2)
BASOPHILS NFR BLD: 0 % (ref 0–2)
BUN SERPL-MCNC: 13 MG/DL (ref 8–23)
CALCIUM SERPL-MCNC: 9.2 MG/DL (ref 8.3–10.4)
CALCULATED P AXIS, ECG09: 75 DEGREES
CALCULATED R AXIS, ECG10: 21 DEGREES
CALCULATED T AXIS, ECG11: -91 DEGREES
CHLORIDE SERPL-SCNC: 110 MMOL/L (ref 98–107)
CHOLEST SERPL-MCNC: 189 MG/DL
CO2 SERPL-SCNC: 23 MMOL/L (ref 21–32)
CREAT SERPL-MCNC: 1.3 MG/DL (ref 0.8–1.5)
DIAGNOSIS, 93000: NORMAL
DIFFERENTIAL METHOD BLD: ABNORMAL
EOSINOPHIL # BLD: 0 K/UL (ref 0–0.8)
EOSINOPHIL NFR BLD: 0 % (ref 0.5–7.8)
ERYTHROCYTE [DISTWIDTH] IN BLOOD BY AUTOMATED COUNT: 13.2 % (ref 11.9–14.6)
GLUCOSE BLD STRIP.AUTO-MCNC: 121 MG/DL (ref 65–100)
GLUCOSE BLD STRIP.AUTO-MCNC: 137 MG/DL (ref 65–100)
GLUCOSE BLD STRIP.AUTO-MCNC: 154 MG/DL (ref 65–100)
GLUCOSE BLD STRIP.AUTO-MCNC: 161 MG/DL (ref 65–100)
GLUCOSE SERPL-MCNC: 120 MG/DL (ref 65–100)
HCT VFR BLD AUTO: 33.1 % (ref 41.1–50.3)
HDLC SERPL-MCNC: 50 MG/DL (ref 40–60)
HDLC SERPL: 3.8 {RATIO}
HGB BLD-MCNC: 11 G/DL (ref 13.6–17.2)
IMM GRANULOCYTES # BLD AUTO: 0 K/UL (ref 0–0.5)
IMM GRANULOCYTES NFR BLD AUTO: 0 % (ref 0–5)
LDLC SERPL CALC-MCNC: 117.8 MG/DL
LIPID PROFILE,FLP: ABNORMAL
LYMPHOCYTES # BLD: 0.9 K/UL (ref 0.5–4.6)
LYMPHOCYTES NFR BLD: 12 % (ref 13–44)
MAGNESIUM SERPL-MCNC: 2.2 MG/DL (ref 1.8–2.4)
MCH RBC QN AUTO: 34.1 PG (ref 26.1–32.9)
MCHC RBC AUTO-ENTMCNC: 33.2 G/DL (ref 31.4–35)
MCV RBC AUTO: 102.5 FL (ref 79.6–97.8)
MONOCYTES # BLD: 1.1 K/UL (ref 0.1–1.3)
MONOCYTES NFR BLD: 14 % (ref 4–12)
NEUTS SEG # BLD: 5.4 K/UL (ref 1.7–8.2)
NEUTS SEG NFR BLD: 73 % (ref 43–78)
NRBC # BLD: 0 K/UL (ref 0–0.2)
P-R INTERVAL, ECG05: 164 MS
PLATELET # BLD AUTO: 172 K/UL (ref 150–450)
PMV BLD AUTO: 11.2 FL (ref 9.4–12.3)
POTASSIUM SERPL-SCNC: 4.2 MMOL/L (ref 3.5–5.1)
Q-T INTERVAL, ECG07: 416 MS
QRS DURATION, ECG06: 70 MS
QTC CALCULATION (BEZET), ECG08: 458 MS
RBC # BLD AUTO: 3.23 M/UL (ref 4.23–5.6)
SODIUM SERPL-SCNC: 142 MMOL/L (ref 136–145)
TRIGL SERPL-MCNC: 106 MG/DL (ref 35–150)
VENTRICULAR RATE, ECG03: 73 BPM
VLDLC SERPL CALC-MCNC: 21.2 MG/DL (ref 6–23)
WBC # BLD AUTO: 7.4 K/UL (ref 4.3–11.1)

## 2019-03-18 PROCEDURE — 83735 ASSAY OF MAGNESIUM: CPT

## 2019-03-18 PROCEDURE — 82962 GLUCOSE BLOOD TEST: CPT

## 2019-03-18 PROCEDURE — 74011250637 HC RX REV CODE- 250/637: Performed by: PHYSICIAN ASSISTANT

## 2019-03-18 PROCEDURE — 80048 BASIC METABOLIC PNL TOTAL CA: CPT

## 2019-03-18 PROCEDURE — 74011250637 HC RX REV CODE- 250/637: Performed by: INTERNAL MEDICINE

## 2019-03-18 PROCEDURE — 77010033678 HC OXYGEN DAILY

## 2019-03-18 PROCEDURE — 65660000000 HC RM CCU STEPDOWN

## 2019-03-18 PROCEDURE — 80061 LIPID PANEL: CPT

## 2019-03-18 PROCEDURE — 74011636637 HC RX REV CODE- 636/637: Performed by: PHYSICIAN ASSISTANT

## 2019-03-18 PROCEDURE — 85025 COMPLETE CBC W/AUTO DIFF WBC: CPT

## 2019-03-18 PROCEDURE — 93005 ELECTROCARDIOGRAM TRACING: CPT | Performed by: INTERNAL MEDICINE

## 2019-03-18 PROCEDURE — 36415 COLL VENOUS BLD VENIPUNCTURE: CPT

## 2019-03-18 RX ORDER — AMLODIPINE BESYLATE 5 MG/1
5 TABLET ORAL DAILY
Status: DISCONTINUED | OUTPATIENT
Start: 2019-03-18 | End: 2019-03-19 | Stop reason: HOSPADM

## 2019-03-18 RX ORDER — CARVEDILOL 12.5 MG/1
12.5 TABLET ORAL 2 TIMES DAILY WITH MEALS
Status: DISCONTINUED | OUTPATIENT
Start: 2019-03-18 | End: 2019-03-19 | Stop reason: HOSPADM

## 2019-03-18 RX ADMIN — GABAPENTIN 300 MG: 300 CAPSULE ORAL at 21:29

## 2019-03-18 RX ADMIN — CLOPIDOGREL BISULFATE 75 MG: 75 TABLET, FILM COATED ORAL at 08:15

## 2019-03-18 RX ADMIN — Medication 5 ML: at 06:00

## 2019-03-18 RX ADMIN — GABAPENTIN 300 MG: 300 CAPSULE ORAL at 13:47

## 2019-03-18 RX ADMIN — ISOSORBIDE MONONITRATE 30 MG: 30 TABLET, EXTENDED RELEASE ORAL at 08:15

## 2019-03-18 RX ADMIN — ATORVASTATIN CALCIUM 80 MG: 40 TABLET, FILM COATED ORAL at 21:29

## 2019-03-18 RX ADMIN — INSULIN LISPRO 2 UNITS: 100 INJECTION, SOLUTION INTRAVENOUS; SUBCUTANEOUS at 21:29

## 2019-03-18 RX ADMIN — PANTOPRAZOLE SODIUM 40 MG: 40 TABLET, DELAYED RELEASE ORAL at 08:15

## 2019-03-18 RX ADMIN — Medication 10 ML: at 13:48

## 2019-03-18 RX ADMIN — GABAPENTIN 300 MG: 300 CAPSULE ORAL at 08:15

## 2019-03-18 RX ADMIN — ALLOPURINOL 300 MG: 300 TABLET ORAL at 08:15

## 2019-03-18 RX ADMIN — Medication 10 ML: at 21:32

## 2019-03-18 RX ADMIN — LORATADINE 10 MG: 10 TABLET ORAL at 08:15

## 2019-03-18 RX ADMIN — AMLODIPINE BESYLATE 5 MG: 5 TABLET ORAL at 11:35

## 2019-03-18 RX ADMIN — ASPIRIN 81 MG: 81 TABLET, COATED ORAL at 21:29

## 2019-03-18 RX ADMIN — GABAPENTIN 300 MG: 300 CAPSULE ORAL at 17:34

## 2019-03-18 RX ADMIN — LOSARTAN POTASSIUM 100 MG: 50 TABLET ORAL at 08:15

## 2019-03-18 RX ADMIN — INSULIN LISPRO 2 UNITS: 100 INJECTION, SOLUTION INTRAVENOUS; SUBCUTANEOUS at 11:35

## 2019-03-18 RX ADMIN — CARVEDILOL 12.5 MG: 12.5 TABLET, FILM COATED ORAL at 17:34

## 2019-03-18 RX ADMIN — METOPROLOL TARTRATE 50 MG: 50 TABLET ORAL at 08:15

## 2019-03-18 RX ADMIN — EZETIMIBE 10 MG: 10 TABLET ORAL at 08:15

## 2019-03-18 NOTE — PROGRESS NOTES
Bedside and Verbal shift change report given to self (oncoming nurse) by Oriana Roberts (offgoing nurse). Report included the following information SBAR, Kardex, Intake/Output and MAR.

## 2019-03-18 NOTE — PROGRESS NOTES
Report received from [facility]. Pt alert and oriented x4, following commands. Denies angina or pain of any other kind. SR with 1st degree AV block on monitor with HR 70s-80s. Respirations even and unlabored with SpO2 % on 2L NC. Afebrile. Pt moves all extremities purposefully and spontaneously with generalized weakness noted. Urinal, call light within reach. Per MD, to transfer to tele today.

## 2019-03-18 NOTE — PROGRESS NOTES
[facility] CARDIOLOGY PROGRESS NOTE    3/18/2019 10:36 AM    Admit Date: 3/17/2019    Admit Diagnosis: Acute ST elevation myocardial infarction (STEMI) involving other coronary artery of inferior wall (HCC) [I21.19]      Subjective:   Stable overnight without angina, CHF, or palpitations. Vitals stable and controlled. No other complaints overnight. Tolerating meds well. Objective:      Vitals:    03/18/19 0502 03/18/19 3171 03/18/19 0702 03/18/19 0815   BP: 156/85 164/88 146/79 134/78   Pulse: 76 77 75 86   Resp: 8 13 11    Temp:   98.7 °F (37.1 °C)    SpO2: 99% 99% 100%    Weight:           Physical Exam:  Neck- supple, no JVD  CV- regular rate and rhythm no MRG  Lung- clear bilaterally  Abd- soft, nontender, nondistended  Ext- no edema, right groin CDI  Skin- warm and dry    Data Review:   Recent Labs     03/18/19  0334 03/17/19  0408 03/17/19  0020    142 141   K 4.2 3.5 3.4*   MG 2.2 2.0  --    BUN 13 17 18   CREA 1.30 1.51* 1.65*   * 174* 125*   WBC 7.4  --  6.7   HGB 11.0*  --  11.5*   HCT 33.1*  --  32.3*     --  219   CHOL 189  --   --    TRIGL 106  --   --    HDL 50  --   --        Assessment and Plan:     Principal Problem:    Acute ST elevation myocardial infarction (STEMI) ([facility] [address] [address] (3/17/2019)- stable, no angina or CHF, DAPT on board. To floor today, ? Home tomorrow. BMP, CBC, Mg in AM.     Active Problems:    Coronary atherosclerosis of native coronary artery- stable, to floor today      PAF (paroxysmal atrial fibrillation) ([facility]) - stable, follow tele. HTN (hypertension)- uncontrolled, add norvasc 5mg daily today, continue other meds      CKD (chronic kidney disease) stage 3, GFR 30-59 ml/min ([facility])- stable, continue meds- recheck BMP in AM      Type 2 diabetes with nephropathy ([facility]) - stable, continue meds- titrate as needed      Acute systolic CHF- 40-57% s/p MI- convert lopressor to coreg 12.5mg BID today    WILLOW Sanno MD  [facility] Cardiology  Pager [phone]

## 2019-03-18 NOTE — INTERDISCIPLINARY ROUNDS
Interdisciplinary team rounds were held 3/18/2019 with the following team members:Care Management, Nursing, Nurse Practitioner, Palliative Care, Pastoral Care, Pharmacy, Physical Therapy, Physician, Respiratory Therapy and Clinical Coordinator. Plan of care discussed. See clinical pathway and/or care plan for interventions and desired outcomes.

## 2019-03-18 NOTE — PROGRESS NOTES
TRANSFER - OUT REPORT:    Verbal report given to Pilar Her on Hanh Grant  being transferred to Telemetry [facility] for routine progression of care       Report consisted of patients Situation, Background, Assessment and   Recommendations(SBAR). Information from the following report(s) SBAR, Kardex, OR Summary, Procedure Summary, Intake/Output, MAR, Med Rec Status and Cardiac Rhythm SR with 1st degree AV block was reviewed with the receiving nurse. Lines:   Peripheral IV 03/17/19 Left Antecubital (Active)   Site Assessment Clean, dry, & intact 3/18/2019  3:02 PM   Phlebitis Assessment 0 3/18/2019  3:02 PM   Infiltration Assessment 0 3/18/2019  3:02 PM   Dressing Status Clean, dry, & intact 3/18/2019  3:02 PM   Dressing Type Transparent;Tape 3/18/2019  3:02 PM   Hub Color/Line Status Pink;Patent; Flushed 3/18/2019  3:02 PM   Alcohol Cap Used No 3/18/2019  3:02 PM       Peripheral IV 03/17/19 Left Forearm (Active)   Site Assessment Clean, dry, & intact 3/18/2019  3:02 PM   Phlebitis Assessment 0 3/18/2019  3:02 PM   Infiltration Assessment 0 3/18/2019  3:02 PM   Dressing Status Clean, dry, & intact 3/18/2019  3:02 PM   Dressing Type Transparent;Tape 3/18/2019  3:02 PM   Hub Color/Line Status Pink;Patent; Flushed 3/18/2019  3:02 PM   Alcohol Cap Used No 3/18/2019  3:02 PM        Opportunity for questions and clarification was provided.       Patient transported with:   Monitor  Tech

## 2019-03-18 NOTE — PROGRESS NOTES
TRANSFER - IN REPORT:    Verbal report received from [facility] on Thomas Alvarez being received from ICU for routine progression of care. Report consisted of patients Situation, Background, Assessment and Recommendations(SBAR). Information from the following report(s) SBAR, Kardex, Procedure Summary, Intake/Output, MAR and Cardiac Rhythm Sinus Rhythm was reviewed. Opportunity for questions and clarification was provided. Assessment will be completed upon patients arrival to unit and care assumed.

## 2019-03-18 NOTE — PROGRESS NOTES
Pt seen in ICU s/p admission STEMI and heart cath. Alert and oriented. Confirms demographics. Grandson at bedside currently. Pt is a  that served in [employer]. No needs voiced at present for d/c. Encouraged follow up with cardiology and taking medication prescribed. Verbalized understanding. CM to follow for any d/c needs per MD.     Care Management Interventions  PCP Verified by CM: Yes(confirms Dr. Michael Garcia)  Mode of Transport at Discharge:  Other (see comment)  Transition of Care Consult (CM Consult): Discharge Planning  Discharge Durable Medical Equipment: (glucometer)  Current Support Network: Lives with Spouse, Own Home(lives with wife, Lilibeth [phone] and son, Augie Dangelo and Teresita Watson. )  Confirm Follow Up Transport: Self(drives self)  Plan discussed with Pt/Family/Caregiver: Yes  Freedom of Choice Offered: Yes  [employer] Information Provided?: (confirms MCR/-able to get rx)  Discharge Location  Discharge Placement: Home

## 2019-03-19 VITALS
WEIGHT: 181.3 LBS | RESPIRATION RATE: 20 BRPM | TEMPERATURE: 98.3 F | DIASTOLIC BLOOD PRESSURE: 74 MMHG | OXYGEN SATURATION: 94 % | HEART RATE: 60 BPM | BODY MASS INDEX: 26.01 KG/M2 | SYSTOLIC BLOOD PRESSURE: 129 MMHG

## 2019-03-19 LAB
ANION GAP SERPL CALC-SCNC: 8 MMOL/L (ref 7–16)
BUN SERPL-MCNC: 19 MG/DL (ref 8–23)
CALCIUM SERPL-MCNC: 9 MG/DL (ref 8.3–10.4)
CHLORIDE SERPL-SCNC: 110 MMOL/L (ref 98–107)
CO2 SERPL-SCNC: 24 MMOL/L (ref 21–32)
CREAT SERPL-MCNC: 1.38 MG/DL (ref 0.8–1.5)
GLUCOSE BLD STRIP.AUTO-MCNC: 115 MG/DL (ref 65–100)
GLUCOSE SERPL-MCNC: 107 MG/DL (ref 65–100)
POTASSIUM SERPL-SCNC: 4 MMOL/L (ref 3.5–5.1)
SODIUM SERPL-SCNC: 142 MMOL/L (ref 136–145)

## 2019-03-19 PROCEDURE — 36415 COLL VENOUS BLD VENIPUNCTURE: CPT

## 2019-03-19 PROCEDURE — 74011250637 HC RX REV CODE- 250/637: Performed by: INTERNAL MEDICINE

## 2019-03-19 PROCEDURE — 74011250637 HC RX REV CODE- 250/637: Performed by: NURSE PRACTITIONER

## 2019-03-19 PROCEDURE — 74011250637 HC RX REV CODE- 250/637: Performed by: PHYSICIAN ASSISTANT

## 2019-03-19 PROCEDURE — 82962 GLUCOSE BLOOD TEST: CPT

## 2019-03-19 PROCEDURE — 80048 BASIC METABOLIC PNL TOTAL CA: CPT

## 2019-03-19 RX ORDER — ISOSORBIDE MONONITRATE 30 MG/1
30 TABLET, EXTENDED RELEASE ORAL DAILY
Qty: 30 TAB | Refills: 11 | Status: SHIPPED | OUTPATIENT
Start: 2019-03-19 | End: 2019-09-26

## 2019-03-19 RX ORDER — ATORVASTATIN CALCIUM 80 MG/1
80 TABLET, FILM COATED ORAL
Qty: 30 TAB | Refills: 11 | Status: SHIPPED | OUTPATIENT
Start: 2019-03-19 | End: 2019-07-16

## 2019-03-19 RX ORDER — CARVEDILOL 12.5 MG/1
12.5 TABLET ORAL 2 TIMES DAILY WITH MEALS
Qty: 60 TAB | Refills: 11 | Status: ON HOLD | OUTPATIENT
Start: 2019-03-19 | End: 2019-09-26 | Stop reason: SDUPTHER

## 2019-03-19 RX ORDER — CLOPIDOGREL BISULFATE 75 MG/1
75 TABLET ORAL DAILY
Qty: 30 TAB | Refills: 11 | Status: ON HOLD | OUTPATIENT
Start: 2019-03-19 | End: 2019-09-26 | Stop reason: SDUPTHER

## 2019-03-19 RX ORDER — DOCUSATE SODIUM 100 MG/1
100 CAPSULE, LIQUID FILLED ORAL
Status: DISCONTINUED | OUTPATIENT
Start: 2019-03-19 | End: 2019-03-19 | Stop reason: HOSPADM

## 2019-03-19 RX ORDER — AMLODIPINE BESYLATE 5 MG/1
5 TABLET ORAL DAILY
Qty: 30 TAB | Refills: 11 | Status: SHIPPED | OUTPATIENT
Start: 2019-03-19 | End: 2019-09-26

## 2019-03-19 RX ADMIN — DOCUSATE SODIUM 100 MG: 100 CAPSULE ORAL at 00:06

## 2019-03-19 RX ADMIN — ISOSORBIDE MONONITRATE 30 MG: 30 TABLET, EXTENDED RELEASE ORAL at 09:22

## 2019-03-19 RX ADMIN — AMLODIPINE BESYLATE 5 MG: 5 TABLET ORAL at 09:21

## 2019-03-19 RX ADMIN — PANTOPRAZOLE SODIUM 40 MG: 40 TABLET, DELAYED RELEASE ORAL at 09:22

## 2019-03-19 RX ADMIN — CLOPIDOGREL BISULFATE 75 MG: 75 TABLET, FILM COATED ORAL at 09:21

## 2019-03-19 RX ADMIN — ALLOPURINOL 300 MG: 300 TABLET ORAL at 09:20

## 2019-03-19 RX ADMIN — LORATADINE 10 MG: 10 TABLET ORAL at 09:21

## 2019-03-19 RX ADMIN — EZETIMIBE 10 MG: 10 TABLET ORAL at 09:20

## 2019-03-19 RX ADMIN — CARVEDILOL 12.5 MG: 12.5 TABLET, FILM COATED ORAL at 09:21

## 2019-03-19 RX ADMIN — GABAPENTIN 300 MG: 300 CAPSULE ORAL at 09:21

## 2019-03-19 RX ADMIN — Medication 10 ML: at 05:54

## 2019-03-19 NOTE — DISCHARGE INSTRUCTIONS
DISPOSITION: The patient is being discharged home in stable condition on a low saturated fat, low cholesterol and low salt diet. The patient is instructed to advance activities as tolerated to the limit of fatigue or shortness of breath. The patient is instructed to avoid all heavy lifting, straining, stooping or squatting for 3-5 days. The patient is instructed to watch the cath site for bleeding/oozing; if seen, the patient is instructed to apply firm pressure with a clean cloth and call [facility] at [phone]. The patient is instructed to watch for signs of infection which include: increasing area of redness, fever/hot to touch or purulent drainage at the catheterization site. The patient is instructed not to soak in a bathtub for 7-10 days, but is cleared to shower. The patient is instructed to call the office or return to the ER for immediate evaluation for any shortness of breath or chest pain not relieved by NTG. Heart Attack: Care Instructions  Your Care Instructions    A heart attack (myocardial infarction, or MI) occurs when one or more of the coronary arteries, which supply the heart with oxygen-rich blood, is blocked. A blockage usually occurs when plaque inside the artery breaks open and a blood clot forms in the artery. After a heart attack, you may be worried about your future. Over the next several weeks, your heart will start to heal. Though it can be hard to break old habits, you can prevent another heart attack by making some lifestyle changes and by taking medicines. You may use this information for ideas about what to do at home to speed your recovery. Follow-up care is a key part of your treatment and safety. Be sure to make and go to all appointments, and call your doctor if you are having problems. It's also a good idea to know your test results and keep a list of the medicines you take. How can you care for yourself at home?   Activity    · Until your doctor says it is okay, do not do strenuous exercise. And do not lift, pull, or push anything heavy. Ask your doctor what types of activities are safe for you.     · If your doctor has not set you up with a cardiac rehabilitation (rehab) program, talk to him or her about whether that is right for you. Cardiac rehab includes supervised exercise. It also includes help with diet and lifestyle changes and emotional support. It may reduce your risk of future heart problems.     · Increase your activities slowly. Take short rest breaks when you get tired.     · If your doctor recommends it, get more exercise. Walking is a good choice. Bit by bit, increase the amount you walk every day. Try for at least 30 minutes on most days of the week. You also may want to swim, bike, or do other activities. Talk with your doctor before you start an exercise program to make sure it is safe for you.     · Ask your doctor when you can drive, go back to work, and do other daily activities again.     · You can have sex as soon as you feel ready for it. Often this means when you can easily walk around or climb stairs. Talk with your doctor if you have any concerns. If you are taking nitroglycerin, do not take erection-enhancing medicine such as sildenafil (Viagra), tadalafil (Cialis), or vardenafil (Levitra) .    Lifestyle changes    · Do not smoke. Smoking increases your risk of another heart attack. If you need help quitting, talk to your doctor about stop-smoking programs and medicines. These can increase your chances of quitting for good.     · Eat a heart-healthy diet that is low in saturated fat and salt, and is full of fruits, vegetables and whole-grains. Eat at least two servings of fish each week. You may get more details about how to eat healthy. But these tips can help you get started.     · Stay at a healthy weight, or lose weight if you need to. Medicines    · Be safe with medicines. Take your medicines exactly as prescribed.  Call your doctor if you think you are having a problem with your medicine. You will get more details on the specific medicines your doctor prescribes. Do not stop taking your medicine unless your doctor tells you to. Not taking your medicine might raise your risk of having another heart attack.     · You may need several medicines to help lower your risk of another heart attack. These include:  ? Blood pressure medicines such as angiotensin-converting enzyme (ACE) inhibitors, ARBs (angiotensin II receptor blockers), and beta-blockers. ? Cholesterol medicine called statins. ? Aspirin and other blood thinners. These prevent blood clots that can cause a heart attack.     · If your doctor has given you nitroglycerin, keep it with you at all times. If you have angina symptoms, such as chest pain or pressure, sit down and rest. Take the first dose of nitroglycerin as directed. If symptoms get worse or are not getting better within 5 minutes, call 911 right away. Stay on the phone. The emergency  will tell you what to do.     · Do not take any over-the-counter medicines, vitamins, or herbal products without talking to your doctor first.    Staying healthy    · Manage other health conditions such as high blood pressure and diabetes.     · Avoid colds and flu. Get a pneumococcal vaccine shot. If you have had one before, ask your doctor whether you need another dose. Get the flu vaccine every year. If you must be around people with colds or flu, wash your hands often.     · Be sure to tell your doctor about any angina symptoms you have had, even if they went away. Pay attention to your angina symptoms. Know what is typical for you and learn how to control it. Know when to call for help.     · Talk to your family, friends, or a counselor about your feelings. It is normal to feel frightened, angry, hopeless, helpless, and even guilty. Talking openly about bad feelings can help you cope.  If you have symptoms of depression, talk to your doctor. When should you call for help? Call 911 anytime you think you may need emergency care. For example, call if:    · You have symptoms of a heart attack. These may include:  ? Chest pain or pressure, or a strange feeling in the chest.  ? Sweating. ? Shortness of breath. ? Nausea or vomiting. ? Pain, pressure, or a strange feeling in the back, neck, jaw, or upper belly or in one or both shoulders or arms. ? Lightheadedness or sudden weakness. ? A fast or irregular heartbeat. After you call 911, the  may tell you to chew 1 adult-strength or 2 to 4 low-dose aspirin. Wait for an ambulance. Do not try to drive yourself.     · You have angina symptoms (such as chest pain or pressure) that do not go away with rest or are not getting better within 5 minutes after you take a dose of nitroglycerin.     · You passed out (lost consciousness).     · You feel like you are having another heart attack.    Call your doctor now or seek immediate medical care if:    · You are having angina symptoms, such as chest pain or pressure, more often than usual, or the symptoms are different or worse than usual.     · You have new or increased shortness of breath.     · You are dizzy or lightheaded, or you feel like you may faint.    Watch closely for changes in your health, and be sure to contact your doctor if you have any problems. Where can you learn more? Go to http://maykel-xander.info/. Enter [id number] in the search box to learn more about \"Heart Attack: Care Instructions. \"  Current as of: July 22, 2018  Content Version: 11.9  © 5730-9809 [employer]. Care instructions adapted under license by [employer] (which disclaims liability or warranty for this information).  If you have questions about a medical condition or this instruction, always ask your healthcare professional. [address] any warranty or liability for your use of this information. Reducing Heart Attack Risk With Daily Medicine: Care Instructions  Your Care Instructions    Heart disease is the number one cause of death. If you are at risk for heart disease, there are many medicines that can reduce your risk. These include:  · ACE inhibitors or ARBs. These are types of blood pressure medicines. They can reduce the risk of heart attacks and strokes if you are at high risk. · Statin medicines. These lower cholesterol. They can also reduce the risk of heart disease and strokes. · Aspirin and other antiplatelets. These prevent blood clots. They can help certain people lower their risk of a heart attack or stroke. · Beta-blocker medicines. These are a type of blood pressure and heart medicine. They can reduce the chance of early death if you have had a heart attack. All medicines can cause side effects. So it is important to understand the pros and cons of any medicine you take. It is also important to take your medicines exactly as your doctor tells you to. Follow-up care is a key part of your treatment and safety. Be sure to make and go to all appointments, and call your doctor if you are having problems. It's also a good idea to know your test results and keep a list of the medicines you take. ACE inhibitors  ACE (angiotensin-converting enzyme) inhibitors are used for three main reasons. They lower blood pressure, protect the kidneys, and prevent heart attacks and strokes. Examples include benazepril (Lotensin), lisinopril (Prinivil, Zestril), and ramipril (Altace). An angiotensin II receptor blocker (ARB) may be used instead of an ACE inhibitor. ARBs help you in the same ways as ACE inhibitors. Examples include candesartan (Atacand), irbesartan (Avapro), and losartan (Cozaar). Before you start taking an ACE inhibitor or an ARB, make sure your doctor knows if:  · You are taking a water pill (diuretic). · You are taking potassium pills or using salt substitutes.   · You are pregnant or breastfeeding. · You have had a kidney transplant or other kidney problems. ACE inhibitors and ARBs can cause side effects. Call your doctor right away if you have:  · Trouble breathing. · Swelling in your face, head, neck, or tongue. · Dizziness or lightheadedness. · A dry cough. Statins  Statins lower cholesterol. Examples include atorvastatin (Lipitor), lovastatin (Mevacor), pravastatin (Pravachol), and simvastatin (Zocor). Before you start taking a statin, make sure your doctor knows if:  · You have had a kidney transplant or other kidney problems. · You have liver disease. · You take any other prescription medicine, over-the-counter medicine, vitamins, supplements, or herbal remedies. · You are pregnant or breastfeeding. Statins can cause side effects. Call your doctor right away if you have:  · New, severe muscle aches. · Brown urine. Aspirin  Taking an aspirin every day can lower your risk for a heart attack. A heart attack occurs when a blood vessel in the heart gets blocked. When this happens, oxygen can't get to the heart muscle, and part of the heart dies. Aspirin can help prevent blood clots that can block the blood vessels. Talk to your doctor before you start taking aspirin every day. He or she may recommend that you take one low-dose aspirin (81 mg) tablet each day, with a meal and a full glass of water. Taking aspirin isn't right for everyone. This is because it can cause serious bleeding. And you may not be able to use aspirin if you:  · Have asthma. · Have an ulcer or other stomach problem. · Take some other medicine (called a blood thinner) that prevents blood clots. · Are allergic to aspirin. Before having a surgery or procedure, tell your doctor or dentist that you take aspirin. He or she will tell you if you should stop taking aspirin beforehand. Make sure that you understand exactly what your doctor wants you to do. Aspirin can cause side effects.  Call your doctor right away if you have:  · Unusual bleeding or bruising. · Nausea, vomiting, or heartburn. · Black or bloody stools. Beta-blockers  Beta-blockers are used for three main reasons. They lower blood pressure, relieve angina symptoms (such as chest pain or pressure), and reduce the chances of a second heart attack. They include atenolol (Tenormin), carvedilol (Coreg), and metoprolol (Lopressor). Before you start taking a beta-blocker, make sure your doctor knows if you have:  · Severe asthma or frequent asthma attacks. · A very slow pulse (less than 55 beats a minute). Beta-blockers can cause side effects. Call your doctor right away if you have:  · Wheezing or trouble breathing. · Dizziness or lightheadedness. · Asthma that gets worse. When should you call for help? Watch closely for changes in your health, and be sure to contact your doctor if you have any problems. Where can you learn more? Go to http://maykel-xander.info/. Enter R428 in the search box to learn more about \"Reducing Heart Attack Risk With Daily Medicine: Care Instructions. \"  Current as of: July 22, 2018  Content Version: 11.9  © 9602-7696 [employer]. Care instructions adapted under license by [employer] (which disclaims liability or warranty for this information). If you have questions about a medical condition or this instruction, always ask your healthcare professional. [place] [address] any warranty or liability for your use of this information. Percutaneous Coronary Intervention: What to Expect at [facility]    Percutaneous coronary intervention (PCI) is the name for procedures that are used to open a narrowed or blocked coronary artery. The two most common PCI procedures are coronary angioplasty and coronary stent placement. Your groin or arm may have a bruise and feel sore for a day or two after a percutaneous coronary intervention (PCI).  You can do light activities around the house, but nothing strenuous for several days. This care sheet gives you a general idea about how long it will take for you to recover. But each person recovers at a different pace. Follow the steps below to get better as quickly as possible. How can you care for yourself at home? Activity    · If the doctor gave you a sedative:  ? For 24 hours, don't do anything that requires attention to detail. It takes time for the medicine's effects to completely wear off.  ? For your safety, do not drive or operate any machinery that could be dangerous. Wait until the medicine wears off and you can think clearly and react easily.     · Do not do strenuous exercise and do not lift, pull, or push anything heavy until your doctor says it is okay. This may be for a day or two. You can walk around the house and do light activity, such as cooking.     · If the catheter was placed in your groin, try not to walk up stairs for the first couple of days.     · If the catheter was placed in your arm near your wrist, do not bend your wrist deeply for the first couple of days. Be careful using your hand to get into and out of a chair or bed.     · Carry your stent identification card with you at all times.     · If your doctor recommends it, get more exercise. Walking is a good choice. Bit by bit, increase the amount you walk every day. Try for at least 30 minutes on most days of the week. Diet    · Drink plenty of fluids to help your body flush out the dye. If you have kidney, heart, or liver disease and have to limit fluids, talk with your doctor before you increase the amount of fluids you drink.     · Keep eating a heart-healthy diet that has lots of fruits, vegetables, and whole grains. If you have not been eating this way, talk to your doctor. You also may want to talk to a dietitian. This expert can help you to learn about healthy foods and plan meals.    Medicines    · Your doctor will tell you if and when you can restart your medicines. He or she will also give you instructions about taking any new medicines.     · If you take blood thinners, such as warfarin (Coumadin), clopidogrel (Plavix), or aspirin, be sure to talk to your doctor. He or she will tell you if and when to start taking those medicines again. Make sure that you understand exactly what your doctor wants you to do.     · Your doctor will prescribe blood-thinning medicines. You will likely take aspirin plus another antiplatelet, such as clopidogrel (Plavix). It is very important that you take these medicines exactly as directed. These medicines help keep the coronary artery open and reduce your risk of a heart attack.     · Call your doctor if you think you are having a problem with your medicine.    Care of the catheter site    · For 1 or 2 days, keep a bandage over the spot where the catheter was inserted. The bandage probably will fall off in this time.     · Put ice or a cold pack on the area for 10 to 20 minutes at a time to help with soreness or swelling. Put a thin cloth between the ice and your skin.     · You may shower 24 to 48 hours after the procedure, if your doctor okays it. Pat the incision dry.     · Do not soak the catheter site until it is healed. Don't take a bath for 1 week, or until your doctor tells you it is okay.     · If you are bleeding, lie down and press on the area for 15 minutes to try to make it stop. If the bleeding does not stop, call your doctor or seek immediate medical care. Follow-up care is a key part of your treatment and safety. Be sure to make and go to all appointments, and call your doctor if you are having problems. It's also a good idea to know your test results and keep a list of the medicines you take. When should you call for help? Call 911 anytime you think you may need emergency care.  For example, call if:    · You passed out (lost consciousness).     · You have severe trouble breathing.     · You have sudden chest pain and shortness of breath, or you cough up blood.     · You have symptoms of a heart attack, such as:  ? Chest pain or pressure. ? Sweating. ? Shortness of breath. ? Nausea or vomiting. ? Pain that spreads from the chest to the neck, jaw, or one or both shoulders or arms. ? Dizziness or lightheadedness. ? A fast or uneven pulse. After calling 911, chew 1 adult-strength aspirin. Wait for an ambulance. Do not try to drive yourself.     · You have been diagnosed with angina, and you have angina symptoms that do not go away with rest or are not getting better within 5 minutes after you take one dose of nitroglycerin.    Call your doctor now or seek immediate medical care if:    · You are bleeding from the area where the catheter was put in your artery.     · You have a fast-growing, painful lump at the catheter site.     · You have signs of infection, such as:  ? Increased pain, swelling, warmth, or redness. ? Red streaks leading from the catheter site. ? Pus draining from the catheter site. ? A fever.     · Your leg or arm looks blue or feels cold, numb, or tingly.    Watch closely for changes in your health, and be sure to contact your doctor if you have any problems. Where can you learn more? Go to http://maykel-xander.info/. Enter R273 in the search box to learn more about \"Percutaneous Coronary Intervention: What to Expect at Home. \"  Current as of: July 22, 2018  Content Version: 11.9  © 4443-7739 [employer]. Care instructions adapted under license by [employer] (which disclaims liability or warranty for this information). If you have questions about a medical condition or this instruction, always ask your healthcare professional. [place] [address] any warranty or liability for your use of this information.                      Cardiac Catheterization/Angiography Discharge Instructions    *Check the puncture site frequently for swelling or bleeding. If you see any bleeding, lie down and apply pressure over the area with a clean town or washcloth. Notify your doctor for any redness, swelling, drainage or oozing from the puncture site. Notify your doctor for any fever or chills. *If the leg or arm with the puncture becomes cold, numb or painful, call [facility] at  [phone]. *Activity should be limited for the next 48 hours. Climb stairs as little as possible and avoid any stooping, bending or strenuous activity for 48 hours. No heavy lifting (anything over 10 pounds) for three days. *Do not drive for 48 hours. *You may resume your usual diet. Drink more fluids than usual.    *Have a responsible person drive you home and stay with you for at least 24 hours after your heart catheterization/angiography. *You may remove the bandage from your Right Groin in 24 hours. You may shower in 24 hours. No tub baths, hot tubs or swimming for one week. Do not place any lotions, creams, powders, ointments over the puncture site for one week. You may place a clean band-aid over the puncture site each day for 5 days. Change this daily. Heart-Healthy Diet: Care Instructions  Your Care Instructions    A heart-healthy diet has lots of vegetables, fruits, nuts, beans, and whole grains, and is low in salt. It limits foods that are high in saturated fat, such as meats, cheeses, and fried foods. It may be hard to change your diet, but even small changes can lower your risk of heart attack and heart disease. Follow-up care is a key part of your treatment and safety. Be sure to make and go to all appointments, and call your doctor if you are having problems. It's also a good idea to know your test results and keep a list of the medicines you take. How can you care for yourself at home? Watch your portions  · Learn what a serving is. A \"serving\" and a \"portion\" are not always the same thing.  Make sure that you are not eating larger portions than are recommended. For example, a serving of pasta is ½ cup. A serving size of meat is 2 to 3 ounces. A 3-ounce serving is about the size of a deck of cards. Measure serving sizes until you are good at [place]" them. Keep in mind that restaurants often serve portions that are 2 or 3 times the size of one serving. · To keep your energy level up and keep you from feeling hungry, eat often but in smaller portions. · Eat only the number of calories you need to stay at a healthy weight. If you need to lose weight, eat fewer calories than your body burns (through exercise and other physical activity). Eat more fruits and vegetables  · Eat a variety of fruit and vegetables every day. Dark green, deep orange, red, or yellow fruits and vegetables are especially good for you. Examples include spinach, carrots, peaches, and berries. · Keep carrots, celery, and other veggies handy for snacks. Buy fruit that is in season and store it where you can see it so that you will be tempted to eat it. · Cook dishes that have a lot of veggies in them, such as stir-fries and soups. Limit saturated and trans fat  · Read food labels, and try to avoid saturated and trans fats. They increase your risk of heart disease. Trans fat is found in many processed foods such as cookies and crackers. · Use olive or canola oil when you cook. Try cholesterol-lowering spreads, such as Benecol or Take Control. · Bake, broil, grill, or steam foods instead of frying them. · Choose lean meats instead of high-fat meats such as hot dogs and sausages. Cut off all visible fat when you prepare meat. · Eat fish, skinless poultry, and meat alternatives such as soy products instead of high-fat meats. Soy products, such as tofu, may be especially good for your heart. · Choose low-fat or fat-free milk and dairy products. Eat fish  · Eat at least two servings of fish a week.  Certain fish, such as salmon and tuna, contain omega-3 fatty acids, which may help reduce your risk of heart attack. Eat foods high in fiber  · Eat a variety of grain products every day. Include whole-grain foods that have lots of fiber and nutrients. Examples of whole-grain foods include oats, whole wheat bread, and brown rice. · Buy whole-grain breads and cereals, instead of white bread or pastries. Limit salt and sodium  · Limit how much salt and sodium you eat to help lower your blood pressure. · Taste food before you salt it. Add only a little salt when you think you need it. With time, your taste buds will adjust to less salt. · Eat fewer snack items, fast foods, and other high-salt, processed foods. Check food labels for the amount of sodium in packaged foods. · Choose low-sodium versions of canned goods (such as soups, vegetables, and beans). Limit sugar  · Limit drinks and foods with added sugar. These include candy, desserts, and soda pop. Limit alcohol  · Limit alcohol to no more than 2 drinks a day for men and 1 drink a day for women. Too much alcohol can cause health problems. When should you call for help? Watch closely for changes in your health, and be sure to contact your doctor if:    · You would like help planning heart-healthy meals. Where can you learn more? Go to http://maykel-xander.info/. Enter V137 in the search box to learn more about \"Heart-Healthy Diet: Care Instructions. \"  Current as of: July 22, 2018  Content Version: 11.9  © 2176-4667 [employer]. Care instructions adapted under license by [employer] (which disclaims liability or warranty for this information). If you have questions about a medical condition or this instruction, always ask your healthcare professional. [place] [address] any warranty or liability for your use of this information.       DISCHARGE SUMMARY from Nurse    PATIENT INSTRUCTIONS:    After general anesthesia or intravenous sedation, for 24 hours or while taking prescription Narcotics:  · Limit your activities  · Do not drive and operate hazardous machinery  · Do not make important personal or business decisions  · Do  not drink alcoholic beverages  · If you have not urinated within 8 hours after discharge, please contact your surgeon on call. Report the following to your surgeon:  · Excessive pain, swelling, redness or odor of or around the surgical area  · Temperature over 100.5  · Nausea and vomiting lasting longer than 4 hours or if unable to take medications  · Any signs of decreased circulation or nerve impairment to extremity: change in color, persistent  numbness, tingling, coldness or increase pain  · Any questions    What to do at Home:    *  Please give a list of your current medications to your Primary Care Provider. *  Please update this list whenever your medications are discontinued, doses are      changed, or new medications (including over-the-counter products) are added. *  Please carry medication information at all times in case of emergency situations. These are general instructions for a healthy lifestyle:    No smoking/ No tobacco products/ Avoid exposure to second hand smoke  Surgeon General's Warning:  Quitting smoking now greatly reduces serious risk to your health. Obesity, smoking, and sedentary lifestyle greatly increases your risk for illness    A healthy diet, regular physical exercise & weight monitoring are important for maintaining a healthy lifestyle    You may be retaining fluid if you have a history of heart failure or if you experience any of the following symptoms:  Weight gain of 3 pounds or more overnight or 5 pounds in a week, increased swelling in our hands or feet or shortness of breath while lying flat in bed. Please call your doctor as soon as you notice any of these symptoms; do not wait until your next office visit.     Recognize signs and symptoms of STROKE:    F-face looks uneven    A-arms unable to move or move unevenly    S-speech slurred or non-existent    T-time-call 911 as soon as signs and symptoms begin-DO NOT go       Back to bed or wait to see if you get better-TIME IS BRAIN. Warning Signs of HEART ATTACK     Call 911 if you have these symptoms:   Chest discomfort. Most heart attacks involve discomfort in the center of the chest that lasts more than a few minutes, or that goes away and comes back. It can feel like uncomfortable pressure, squeezing, fullness, or pain.  Discomfort in other areas of the upper body. Symptoms can include pain or discomfort in one or both arms, the back, neck, jaw, or stomach.  Shortness of breath with or without chest discomfort.  Other signs may include breaking out in a cold sweat, nausea, or lightheadedness. Don't wait more than five minutes to call 911 - MINUTES MATTER! Fast action can save your life. Calling 911 is almost always the fastest way to get lifesaving treatment. Emergency Medical Services staff can begin treatment when they arrive -- up to an hour sooner than if someone gets to the hospital by car. The discharge information has been reviewed with the patient. The patient verbalized understanding. Discharge medications reviewed with the patient and appropriate educational materials and side effects teaching were provided.   ___________________________________________________________________________________________________________________________________

## 2019-03-19 NOTE — DISCHARGE SUMMARY
[facility] Cardiology Discharge Summary     Patient ID:  Sammie Joy  [id number]  4 y.o.  1945    Admit date: 3/17/2019    Discharge date:  03/19/2019    Admitting Physician: Michelle Browne MD     Discharge Physician: Seth Wick NP/Dr. 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 [facility]    Nocturnal hypoxemia    Anemia associated with acute blood loss    Osteoarthritis of right glenohumeral joint    Type 2 diabetes, controlled, with neuropathy ([facility]    CKD (chronic kidney disease) stage 3, GFR 30-59 ml/min ([facility])    HTN (hypertension)    ROBERTO (obstructive sleep apnea)    PAF (paroxysmal atrial fibrillation) ([facility])    S/P CABG (coronary artery bypass graft)    Coronary atherosclerosis of native coronary artery    Dyslipidemia       Cardiology Procedures this admission:  Left heart catheterization with PCI  EchoCardiogram  Consults: None    Hospital Course: Patient presented to the ER with c/o chest pain that began the day prior to admission, but the following day the pain became worse lasting 2 hours. The pain was described as severe chest heaviness with associated diaphoresis and dyspnea. Patient presented to  ED for evaluation. EKG in the ED showed inferior MI and he was transferred to [facility] for emergent LHC. As patient was being prepared for transport to [facility] he became unresponsive and found to be in V fib. CPR was started and he was defibrillated x 2 with return of ROSC. Amiodarone was initiated. Patient underwent cardiac catheterization by Dr. Joanna Faith.  Patient was found to have 100% occlusion of the SVG to prox OM that was stented with a Junior BOGDAN x 2 3.0x30 and 3.0x22 with 0% residual stenosis. Patient tolerated the procedure well and was taken to the CCU for recovery. No reoccurrence of VFib, amiodarone was stopped. Patient's vital signs were stable and he was transferred to the telemetry floor. Echo results:     Left ventricle: Systolic function was mildly reduced. Ejection fraction   Was estimated in the range of 40 % to 45 %. There was hypokinesis of the   basal-mid inferolateral, apical lateral, and apical wall(s). Wall thickness was mildly  increased. -  Right ventricle: Systolic function was mildly reduced. -  Left atrium: The atrium was mildly dilated. -  Inferior vena cava, hepatic veins: The respirophasic change in diameter   Was more than 50%. -  Mitral valve: There was mild regurgitation. For maximum medical therapy for CHF, EF of 40-45%,  will continue BB, no ACE/ARB due to ANABELLA. Defer to primary cardiology as to when and if to resume ACE or ARB. The morning of discharge, patient was up feeling well without any complaints of chest pain or shortness of breath. Patient's right femoral cath site was clean, dry and intact without hematoma or bruit. Patient's labs were WNL. Patient was seen and examined by Dr. Phylis Dakins and determined stable and ready for discharge. Patient was instructed on the importance of medication compliance including taking Eliquis and Plavix everyday without missing a dose. No ASA due to increased risk of bleeding. After receiving drug eluting stents, the patient will remain on dual anti-platelet therapy for 1 year. For maximized medical therapy for CAD, patient will continue BB and statin as well. No ACE/ARB due to ANABELLA. The patient will follow up with [facility] -- Dr. Ponce Shown on 3-27-19 @ 9372 in the [place] office. Patient has been referred to cardiac rehab. DISPOSITION: The patient is being discharged home in stable condition on a low saturated fat, low cholesterol and low salt diet.  The patient is instructed to advance activities as tolerated to the limit of fatigue or shortness of breath. The patient is instructed to avoid all heavy lifting, straining, stooping or squatting for 3-5 days. The patient is instructed to watch the cath site for bleeding/oozing; if seen, the patient is instructed to apply firm pressure with a clean cloth and call [facility] at [phone]. The patient is instructed to watch for signs of infection which include: increasing area of redness, fever/hot to touch or purulent drainage at the catheterization site. The patient is instructed not to soak in a bathtub for 7-10 days, but is cleared to shower. The patient is instructed to call the office or return to the ER for immediate evaluation for any shortness of breath or chest pain not relieved by NTG.         Discharge Exam:   Visit Vitals  /82 (BP 1 Location: Left arm, BP Patient Position: Supine)   Pulse 88   Temp 97.6 °F (36.4 °C)   Resp 18   Wt 82.2 kg (181 lb 4.8 oz)   SpO2 97%   BMI 26.01 kg/m²       Recent Results (from the past 24 hour(s))   GLUCOSE, POC    Collection Time: 03/18/19 11:30 AM   Result Value Ref Range    Glucose (POC) 161 (H) 65 - 100 mg/dL   GLUCOSE, POC    Collection Time: 03/18/19  5:27 PM   Result Value Ref Range    Glucose (POC) 137 (H) 65 - 100 mg/dL   GLUCOSE, POC    Collection Time: 03/18/19  8:37 PM   Result Value Ref Range    Glucose (POC) 154 (H) 65 - 574 mg/dL   METABOLIC PANEL, BASIC    Collection Time: 03/19/19  3:34 AM   Result Value Ref Range    Sodium 142 136 - 145 mmol/L    Potassium 4.0 3.5 - 5.1 mmol/L    Chloride 110 (H) 98 - 107 mmol/L    CO2 24 21 - 32 mmol/L    Anion gap 8 7 - 16 mmol/L    Glucose 107 (H) 65 - 100 mg/dL    BUN 19 8 - 23 MG/DL    Creatinine 1.38 0.8 - 1.5 MG/DL    GFR est AA >60 >60 ml/min/1.73m2    GFR est non-AA 54 (L) >60 ml/min/1.73m2    Calcium 9.0 8.3 - 10.4 MG/DL   GLUCOSE, POC    Collection Time: 03/19/19  6:26 AM   Result Value Ref Range    Glucose (POC) 115 (H) 65 - 100 mg/dL         Patient Instructions:     Current Discharge Medication List      START taking these medications    Details   carvedilol (COREG) 12.5 mg tablet Take 1 Tab by mouth two (2) times daily (with meals). Qty: 60 Tab, Refills: 11      atorvastatin (LIPITOR) 80 mg tablet Take 1 Tab by mouth nightly. Qty: 30 Tab, Refills: 11      clopidogrel (PLAVIX) 75 mg tab Take 1 Tab by mouth daily. Qty: 30 Tab, Refills: 11      isosorbide mononitrate ER (IMDUR) 30 mg tablet Take 1 Tab by mouth daily. Qty: 30 Tab, Refills: 11      amLODIPine (NORVASC) 5 mg tablet Take 1 Tab by mouth daily. Qty: 30 Tab, Refills: 11         CONTINUE these medications which have NOT CHANGED    Details   allopurinol (ZYLOPRIM) 300 mg tablet Take 1 Tab by mouth daily. Qty: 90 Tab, Refills: 1    Associated Diagnoses: Idiopathic gout, unspecified chronicity, unspecified site      ezetimibe (ZETIA) 10 mg tablet Take 1 Tab by mouth daily. Qty: 30 Tab, Refills: 3      nitroglycerin (NITROSTAT) 0.4 mg SL tablet 1 Tab by SubLINGual route every five (5) minutes as needed for Chest Pain. Qty: 25 Tab, Refills: 11    Associated Diagnoses: Other chest pain      gabapentin (NEURONTIN) 300 mg capsule Take 1 Cap by mouth four (4) times daily. Qty: 120 Cap, Refills: 3    Associated Diagnoses: Type 2 diabetes, controlled, with neuropathy (HCC)      apixaban (ELIQUIS) 5 mg tablet Take 1 Tab by mouth two (2) times a day. Qty: 180 Tab, Refills: 1    Associated Diagnoses: PAF (paroxysmal atrial fibrillation) (HCC)      esomeprazole (NEXIUM) 40 mg capsule Take 1 Cap by mouth daily.  Indications: am  Qty: 90 Cap, Refills: 1    Associated Diagnoses: Gastroesophageal reflux disease without esophagitis      glucose blood VI test strips (BLOOD GLUCOSE TEST) strip Test once to twice daily DX: E11.8  Qty: 300 Strip, Refills: 3    Associated Diagnoses: Type 2 diabetes mellitus with complication, unspecified long term insulin use status Lancets misc Test once to twice daily DX: E11.8  Qty: 300 Each, Refills: 3    Associated Diagnoses: Type 2 diabetes mellitus with complication, unspecified long term insulin use status      triamcinolone acetonide (KENALOG) 0.1 % ointment       tacrolimus (PROTOPIC) 0.1 % ointment       mometasone (ELOCON) 0.1 % topical cream       pimecrolimus (ELIDEL) 1 % topical cream Apply  to affected area two (2) times a day. cholecalciferol, vitamin D3, (VITAMIN D3) 2,000 unit Tab Take 2,000 Units by mouth daily. Indications: OSTEOPOROSIS, am      Cetirizine (ZYRTEC) 10 mg Cap Take 10 mg by mouth nightly.     Indications: ALLERGIC RHINITIS         STOP taking these medications       metoprolol tartrate (LOPRESSOR) 50 mg tablet Comments:   Reason for Stopping:         pravastatin (PRAVACHOL) 40 mg tablet Comments:   Reason for Stopping:         ranolazine ER (RANEXA) 500 mg SR tablet Comments:   Reason for Stopping:         dilTIAZem ER (CARDIZEM LA) 360 mg Tb24 tablet Comments:   Reason for Stopping:         losartan (COZAAR) 100 mg tablet Comments:   Reason for Stopping:         aspirin delayed-release 81 mg tablet Comments:   Reason for Stopping:         fluticasone (FLONASE) 50 mcg/actuation nasal spray Comments:   Reason for Stopping:               Signed:  Ysabel Menezes NP  3/19/2019  7:37 AM

## 2019-03-19 NOTE — PROGRESS NOTES
Cardiac Rehab: Spoke with patient regarding referral to cardiac rehab. Patient meets admission criteria based on STEMI with PCI (03/17/19). Written information about Cardiac Rehab given and reviewed with patient. Discussed lifestyle modifications to promote cardiac wellness. Patient indicated that he wants to participate in the cardiac rehab program and his orientation has been scheduled. His Cardiologist is Dr. Shahida Romo.       Thank you,  ONUR BarbozaN, RN  Cardiopulmonary Rehabilitation Nurse Liaison  [facility]

## 2019-03-19 NOTE — PROGRESS NOTES
Care Management Interventions  PCP Verified by CM: Yes(confirms Dr. María Kent)  Palliative Care Criteria Met (RRAT>21 & CHF Dx)?: No(RRAT 41 Dx STEMI)  Mode of Transport at Discharge: Other (see comment)(family)  Transition of Care Consult (CM Consult): Discharge Planning  Discharge Durable Medical Equipment: (glucometer)  Current Support Network: Lives with Spouse  Confirm Follow Up Transport: Self  Plan discussed with Pt/Family/Caregiver: Yes  Freedom of Choice Offered: Yes  [place] Resource Information Provided?: (confirms MCR/-able to get rx)  Discharge Location  Discharge Placement: Home  Met with patient for d/c planning. Patient alert and oriented x 3, independent of ADL's and lives with his wife. DME includes glucometer and he is able to drive. He has Medicare and  for Life and obtains medications at [facility] and [facility] [phone] or if long term medications will go with [address]. He voices no concerns or needs for d/c. Patient to d/c home with wife.

## 2019-03-19 NOTE — PROGRESS NOTES
Bedside and verbal report given to [place] by Angélica Maldonado. Report included the following information SBAR, Kardex, Intake/Output and MAR. Oncoming RN assumed care of the patient.

## 2019-03-19 NOTE — PROGRESS NOTES
Skin assessment completed. Scars to mid sternal area and abdomen. Skin is warm, dry, and intact. Patient also has a R groin post cath site. No swelling, bruising, or hematoma noted.

## 2019-03-19 NOTE — PROGRESS NOTES
Bedside and Verbal shift report received from Lenny Biswas RN. Included SBAR, MAR, Kardex, and recent results.

## 2019-03-19 NOTE — PROGRESS NOTES
Initial visit made to patient and a prayer was provided. A  card was left.  met the patient's grandson as he was coming into the room. Patient was dozing in and out of sleep.         [employer]

## 2019-03-19 NOTE — PROGRESS NOTES
Discharge instructions were reviewed with patient. An opportunity was given for questions. All medications were reviewed, and information was given on the new medications - coreg, lipitor, plavix, imdur, norvasc. Patient verbalized understanding, and has no questions at this time. IV and telemetry monitor removed by primary RN.

## 2019-03-28 NOTE — PROGRESS NOTES
Leeanne Solo  : 1945  [place] / [place] / Product Type: Medicare /  [address] at [facility] RENITA REGALADO  [address] [place] [address], [address] [place] [address],[address], [facility].  [phone]   [phone]       OUTPATIENT PHYSICAL THERAPY:Discontinuation Summary 3/28/2019     ICD-10: Treatment Diagnosis: Stiffness of right shoulder, not elsewhere classified (M25.611),  Aftercare following joint replacement surgery (Z47.1)  Precautions/Allergies:   Celecoxib; Ibuprofen; Lescol [fluvastatin]; Nsaids (non-steroidal anti-inflammatory drug); Sulfa (sulfonamide antibiotics); and Uloric [febuxostat]   Fall Risk Score: 1 (? 5 = High Risk)  MD Orders: Evaluate and treat, HEP, , Strengthening, ROM, \"full ROM/full strength\" (18) MEDICAL/REFERRING DIAGNOSIS: s/p  R reverse TSA w/ BT   DATE OF ONSET: 18  REFERRING PHYSICIAN: Verito Kingston MD  RETURN PHYSICIAN APPOINTMENT: 18     Leeanne Solo has been seen in physical therapy from 18 to 18 for 31 visits. Treatment has been discontinued at this time due to patient failing to return for additional treatment. The below goals were met prior to discontinuation. Some goals were not met due to continued pain. Thank you for this referral.        PROBLEM LIST (Impacting functional limitations):  1. Decreased [place] with Home Exercise Program  2. Post-op R shoulder pain and swelling  3. Decreased R shoulder ROM   4. Weakness R shoulder INTERVENTIONS PLANNED:  1. Thermal and electric modalities, manual therapies for pain   2. Manual therapies, therapeutic exercises, HEP for ROM    3. Therapeutic exercises and HEP for strength     GOALS: (Goals have been discussed and agreed upon with patient.)  Short-Term Functional Goals  1. Report no more than 0-1/10 intermittent pain to R shoulder with compensatory use during basic functional activities, and score less than 30% on the DASH.  MET  2. R shoulder PROM forward elevation greater than 120 degrees and external rotation greater than 60 degrees to progress into functional ranges. MET  3. Demonstrate good R shoulder isometric strength with manual testing to progress into strength phase. Met 5-30-18  4. Independent with initial HEP. Met 5-30-18  Discharge Goals  1. No more than 1-2/10 intermittent pain R shoulder with return to normalized household and work activities, and score less than 15% on the DASH. Not met  2. R shoulder AROM forward elevation greater than 120 degrees, external rotation greater than 45 degrees, and strength to shoulder are grossly WNL's for safe use with normalized activities. MET 7-27-18  3. Demonstrate good functional shoulder strength and endurance for return to normalized household and work activities. MET 7-27-18  4. Independent with advanced shoulder HEP for continued self-management. The information in this section was collected on 5-8-18 (except where otherwise noted). HISTORY:   History of Present Injury/Illness (Reason for Referral): Patient underwent a reverse total shoulder arthroplasty on 4/20/18 secondary to reports of ongoing shoulder pain and instability. Patient states that his shoulder frequently \"went out\" if he moved it in certain positions. Patient received home health following discharge from hospital after having R reverse TSA.   Past Medical History/Comorbidities: From EMR:    Mr. Rodríguez Delcid  has a past medical history of Allergic rhinitis, Arthritis, CAD (coronary artery disease), CVA (cerebral vascular accident) ([address] (08/2017), Diabetes mellitus type 2, controlled ([address] Dyslipidemia, ED (erectile dysfunction), Encephalitis (1962), GERD (gastroesophageal reflux disease), Gout, Hypertension, Lacunar infarct, acute, Lumbago, Memory loss, Mitral valve regurgitation (7/14/12), ROBERTO (obstructive sleep apnea), PAF (paroxysmal atrial fibrillation) ([address] Prostate cancer ([address] Psoriasis, SBO (small bowel obstruction) ([address] (2011, 2015, 2016), and Stage 2 chronic kidney disease. Mr. Cassandra Ritter  has a past surgical history that includes pr left heart cath,percutaneous (7/2012); hx colonoscopy (1998, 2010); pr abdomen surgery proc unlisted (1967); hx hernia repair (Bilateral, 2012); hx appendectomy (age 49); hx radical prostatectomy ( ); pr prostate biopsy, needle, saturation sampling; hx cataract removal (Bilateral, 2013); hx coronary artery bypass graft (2009); vascular surgery procedure unlist (12/29/2017); hx splenectomy (1951); and hx shoulder replacement (Right, 2018). Social History/Living Environment:  Patient lives with his spouse in a 2-story house (main level and basement) with 1 step to enter. Prior Level of Function/Work/Activity: Patient is retired. States that he does perform some  work but is unable to currently. Dominant Side:         RIGHT  Current Medications:  Tramadol. prn     Current Outpatient Prescriptions: from EMR    metFORMIN ER (GLUCOPHAGE XR) 500 mg tablet, Take 2 Tabs by mouth daily (with dinner). , Disp: 180 Tab, Rfl: 1    allopurinol (ZYLOPRIM) 300 mg tablet, Take 1 Tab by mouth daily. , Disp: 90 Tab, Rfl: 1    metoprolol tartrate (LOPRESSOR) 50 mg tablet, Take 1 Tab by mouth two (2) times a day., Disp: 180 Tab, Rfl: 1    gabapentin (NEURONTIN) 100 mg capsule, Take 1 Cap by mouth two (2) times a day., Disp: 180 Cap, Rfl: 1    pravastatin (PRAVACHOL) 40 mg tablet, Take 1 Tab by mouth nightly., Disp: 90 Tab, Rfl: 1    ranolazine ER (RANEXA) 500 mg SR tablet, TAKE 1 TABLET TWICE A DAY, Disp: 180 Tab, Rfl: 1    dilTIAZem ER (CARDIZEM LA) 360 mg Tb24 tablet, Take 1 Tab by mouth daily. , Disp: 90 Tab, Rfl: 1    losartan (COZAAR) 100 mg tablet, Take 1 Tab by mouth daily. , Disp: 90 Tab, Rfl: 1    traMADol (ULTRAM) 50 mg tablet, Take 1 Tab by mouth every six (6) hours as needed for Pain.  Max Daily Amount: 200 mg., Disp: 60 Tab, Rfl: 1    aspirin delayed-release 81 mg tablet, Take 81 mg by mouth nightly., Disp: , Rfl:     esomeprazole (NEXIUM) 40 mg capsule, Take 1 Cap by mouth daily. Indications: am (Patient taking differently: Take 40 mg by mouth daily.), Disp: 90 Cap, Rfl: 1    apixaban (ELIQUIS) 5 mg tablet, Take 1 Tab by mouth two (2) times a day., Disp: 180 Tab, Rfl: 1    glucose blood VI test strips (BLOOD GLUCOSE TEST) strip, Test once to twice daily DX: E11.8, Disp: 300 Strip, Rfl: 3    Lancets misc, Test once to twice daily DX: E11.8, Disp: 300 Each, Rfl: 3    triamcinolone acetonide (KENALOG) 0.1 % ointment, , Disp: , Rfl:     tacrolimus (PROTOPIC) 0.1 % ointment, , Disp: , Rfl:     mometasone (ELOCON) 0.1 % topical cream, , Disp: , Rfl:     nitroglycerin (NITROSTAT) 0.4 mg SL tablet, 1 Tab by SubLINGual route every five (5) minutes as needed for Chest Pain., Disp: 25 Tab, Rfl: 11    pimecrolimus (ELIDEL) 1 % topical cream, Apply  to affected area two (2) times a day., Disp: , Rfl:     fluticasone (FLONASE) 50 mcg/actuation nasal spray, as needed. , Disp: , Rfl:     cholecalciferol, vitamin D3, (VITAMIN D3) 2,000 unit Tab, Take 2,000 Units by mouth daily. Indications: OSTEOPOROSIS, am, Disp: , Rfl:     Cetirizine (ZYRTEC) 10 mg Cap, Take 10 mg by mouth nightly. Indications: ALLERGIC RHINITIS, Disp: , Rfl:    Date Last Reviewed:  7/2718   EXAMINATION:   3/28/2019  Observation/Orthostatic Postural Assessment: Not wearing sling. Palpation: Not assessed. ROM: R shoulder elevation AROM: 128 degrees, R shoulder ER AROM (Apley reach) T2. Patient unable to reach behind back with R UE. Strength:R shoulder flexion and abduction 5/5       Body Structures Involved:  1. Nerves  2. Bones  3. Joints  4. Muscles Body Functions Affected:  1. Sensory/Pain  2. Neuromusculoskeletal  3. Movement Related  4. Skin Related Activities and Participation Affected:  1. Self Care  2. Domestic Life  3. Interpersonal Interactions and Relationships   CLINICAL DECISION MAKING:   Outcome Measure:    Tool Used: Disabilities of the Arm, Shoulder and Hand (DASH) Questionnaire - Quick Version  Score:  Initial: 34/55 or 52% disability  33/55 or 50% disability (5-30-18) 30/55 or 43% limited (6-25-18) Most recent: 23/55 or 27% limited (8-1-18)   Interpretation of Score: The DASH is designed to measure the activities of daily living in person's with upper extremity dysfunction or pain. Each section is scored on a 1-5 scale, 5 representing the greatest disability. The scores of each section are added together for a total score of 55. This number is divided by 11, followed by subtracting 1 and multiplying by 25 to get a percent score of disability. This value represents the percentage disability: 0-20% minimal disability; 20-40% moderate disability; 40-60% severe disability; % dependent for care or exaggerated symptom behavior. Minimal detectable change is 12%. Score 11 12-19 20-28 29-37 38-45 46-54 55   Modifier CH CI CJ CK CL CM CN ?    Carrying, Moving, and Handling Objects:     - CURRENT STATUS: CJ - 20%-39% impaired, limited or restricted     - GOAL STATUS: CI - 1%-19% impaired, limited or restricted    - D/C STATUS:  N/A

## 2019-03-31 ENCOUNTER — HOSPITAL ENCOUNTER (EMERGENCY)
Age: 74
Discharge: HOME | End: 2019-03-31
Attending: EMERGENCY MEDICINE
Payer: MEDICARE

## 2019-03-31 ENCOUNTER — APPOINTMENT (OUTPATIENT)
Dept: GENERAL RADIOLOGY | Age: 74
End: 2019-03-31
Attending: EMERGENCY MEDICINE
Payer: MEDICARE

## 2019-03-31 VITALS
DIASTOLIC BLOOD PRESSURE: 78 MMHG | RESPIRATION RATE: 18 BRPM | OXYGEN SATURATION: 98 % | HEART RATE: 74 BPM | BODY MASS INDEX: 25.2 KG/M2 | TEMPERATURE: 97.9 F | WEIGHT: 180 LBS | SYSTOLIC BLOOD PRESSURE: 116 MMHG | HEIGHT: 71 IN

## 2019-03-31 DIAGNOSIS — R55 VASOVAGAL EPISODE: ICD-10-CM

## 2019-03-31 DIAGNOSIS — R55 NEAR SYNCOPE: Primary | ICD-10-CM

## 2019-03-31 LAB
ALBUMIN SERPL-MCNC: 3.1 G/DL (ref 3.2–4.6)
ALBUMIN/GLOB SERPL: 0.7 {RATIO} (ref 1.2–3.5)
ALP SERPL-CCNC: 82 U/L (ref 50–136)
ALT SERPL-CCNC: 37 U/L (ref 12–65)
ANION GAP SERPL CALC-SCNC: 7 MMOL/L (ref 7–16)
AST SERPL-CCNC: 33 U/L (ref 15–37)
BASOPHILS # BLD: 0 K/UL (ref 0–0.2)
BASOPHILS NFR BLD: 1 % (ref 0–2)
BILIRUB SERPL-MCNC: 0.6 MG/DL (ref 0.2–1.1)
BUN SERPL-MCNC: 14 MG/DL (ref 8–23)
CALCIUM SERPL-MCNC: 9.4 MG/DL (ref 8.3–10.4)
CHLORIDE SERPL-SCNC: 107 MMOL/L (ref 98–107)
CO2 SERPL-SCNC: 26 MMOL/L (ref 21–32)
CREAT SERPL-MCNC: 1.57 MG/DL (ref 0.8–1.5)
DIFFERENTIAL METHOD BLD: ABNORMAL
EOSINOPHIL # BLD: 0.2 K/UL (ref 0–0.8)
EOSINOPHIL NFR BLD: 3 % (ref 0.5–7.8)
ERYTHROCYTE [DISTWIDTH] IN BLOOD BY AUTOMATED COUNT: 12.6 % (ref 11.9–14.6)
GLOBULIN SER CALC-MCNC: 4.2 G/DL (ref 2.3–3.5)
GLUCOSE SERPL-MCNC: 198 MG/DL (ref 65–100)
HCT VFR BLD AUTO: 30.5 % (ref 41.1–50.3)
HGB BLD-MCNC: 10.3 G/DL (ref 13.6–17.2)
IMM GRANULOCYTES # BLD AUTO: 0 K/UL (ref 0–0.5)
IMM GRANULOCYTES NFR BLD AUTO: 0 % (ref 0–5)
LYMPHOCYTES # BLD: 0.8 K/UL (ref 0.5–4.6)
LYMPHOCYTES NFR BLD: 16 % (ref 13–44)
MCH RBC QN AUTO: 34.3 PG (ref 26.1–32.9)
MCHC RBC AUTO-ENTMCNC: 33.8 G/DL (ref 31.4–35)
MCV RBC AUTO: 101.7 FL (ref 79.6–97.8)
MONOCYTES # BLD: 0.7 K/UL (ref 0.1–1.3)
MONOCYTES NFR BLD: 13 % (ref 4–12)
NEUTS SEG # BLD: 3.5 K/UL (ref 1.7–8.2)
NEUTS SEG NFR BLD: 67 % (ref 43–78)
NRBC # BLD: 0 K/UL (ref 0–0.2)
PLATELET # BLD AUTO: 220 K/UL (ref 150–450)
PMV BLD AUTO: 11.5 FL (ref 9.4–12.3)
POTASSIUM SERPL-SCNC: 4.6 MMOL/L (ref 3.5–5.1)
PROT SERPL-MCNC: 7.3 G/DL (ref 6.3–8.2)
RBC # BLD AUTO: 3 M/UL (ref 4.23–5.6)
SODIUM SERPL-SCNC: 140 MMOL/L (ref 136–145)
TROPONIN I BLD-MCNC: 0.03 NG/ML (ref 0.02–0.05)
WBC # BLD AUTO: 5.2 K/UL (ref 4.3–11.1)

## 2019-03-31 PROCEDURE — 93005 ELECTROCARDIOGRAM TRACING: CPT | Performed by: EMERGENCY MEDICINE

## 2019-03-31 PROCEDURE — 80053 COMPREHEN METABOLIC PANEL: CPT

## 2019-03-31 PROCEDURE — 85025 COMPLETE CBC W/AUTO DIFF WBC: CPT

## 2019-03-31 PROCEDURE — 99284 EMERGENCY DEPT VISIT MOD MDM: CPT | Performed by: EMERGENCY MEDICINE

## 2019-03-31 PROCEDURE — 71045 X-RAY EXAM CHEST 1 VIEW: CPT

## 2019-03-31 PROCEDURE — 84484 ASSAY OF TROPONIN QUANT: CPT

## 2019-03-31 NOTE — DISCHARGE INSTRUCTIONS
Patient Education        Lightheadedness or Faintness: Care Instructions  Your Care Instructions  Lightheadedness is a feeling that you are about to faint or \"pass out. \" You do not feel as if you or your surroundings are moving. It is different from vertigo, which is the feeling that you or things around you are spinning or tilting. Lightheadedness usually goes away or gets better when you lie down. If lightheadedness gets worse, it can lead to a fainting spell. It is common to feel lightheaded from time to time. Lightheadedness usually is not caused by a serious problem. It often is caused by a short-lasting drop in blood pressure and blood flow to your head that occurs when you get up too quickly from a seated or lying position. Follow-up care is a key part of your treatment and safety. Be sure to make and go to all appointments, and call your doctor if you are having problems. It's also a good idea to know your test results and keep a list of the medicines you take. How can you care for yourself at home? · Lie down for 1 or 2 minutes when you feel lightheaded. After lying down, sit up slowly and remain sitting for 1 to 2 minutes before slowly standing up. · Avoid movements, positions, or activities that have made you lightheaded in the past.  · Get plenty of rest, especially if you have a cold or flu, which can cause lightheadedness. · Make sure you drink plenty of fluids, especially if you have a fever or have been sweating. · Do not drive or put yourself and others in danger while you feel lightheaded. When should you call for help? Call 911 anytime you think you may need emergency care. For example, call if:    · You have symptoms of a stroke. These may include:  ? Sudden numbness, tingling, weakness, or loss of movement in your face, arm, or leg, especially on only one side of your body. ? Sudden vision changes. ? Sudden trouble speaking.   ? Sudden confusion or trouble understanding simple statements. ? Sudden problems with walking or balance. ? A sudden, severe headache that is different from past headaches.     · You have symptoms of a heart attack. These may include:  ? Chest pain or pressure, or a strange feeling in the chest.  ? Sweating. ? Shortness of breath. ? Nausea or vomiting. ? Pain, pressure, or a strange feeling in the back, neck, jaw, or upper belly or in one or both shoulders or arms. ? Lightheadedness or sudden weakness. ? A fast or irregular heartbeat. After you call 911, the  may tell you to chew 1 adult-strength or 2 to 4 low-dose aspirin. Wait for an ambulance. Do not try to drive yourself.    Watch closely for changes in your health, and be sure to contact your doctor if:    · Your lightheadedness gets worse or does not get better with home care. Where can you learn more? Go to http://maykelInsideSales.comxander.info/. Enter E231 in the search box to learn more about \"Lightheadedness or Faintness: Care Instructions. \"  Current as of: September 23, 2018  Content Version: 11.9  © 7209-1421 [employer]. Care instructions adapted under license by [employer] (which disclaims liability or warranty for this information). If you have questions about a medical condition or this instruction, always ask your healthcare professional. [place] [address] any warranty or liability for your use of this information. Patient Education        Vasovagal Syncope: Care Instructions  Your Care Instructions    Vasovagal syncope (say \"emo-ytr-XVJ-troyl PHPD-qqi-xjx\")is sudden dizziness or fainting that can be set off by things such as pain, stress, fear, or trauma. You may sweat or feel lightheaded, sick to your stomach, or tingly. The problem causes the heart rate to slow and the blood vessels to widen, or dilate, for a short time. When this happens, blood pools in the lower body, and less blood goes to the brain.   You can usually get relief by lying down with your legs raised (elevated). This helps more blood to flow to your brain and may help relieve symptoms like feeling dizzy. Some doctors may recommend a technique that involves tensing your fists and arms. This type of fainting is often easy to predict. For example, it happens to some people when they see blood or have to get a shot. They may feel symptoms before they faint. An episode of vasovagal syncope usually responds well to self-care. Other treatment often isn't needed. But if the fainting keeps happening, your doctor may suggest further treatments. Follow-up care is a key part of your treatment and safety. Be sure to make and go to all appointments, and call your doctor if you are having problems. It's also a good idea to know your test results and keep a list of the medicines you take. How can you care for yourself at home? · Drink plenty of fluids to prevent dehydration. If you have kidney, heart, or liver disease and have to limit fluids, talk with your doctor before you increase your fluid intake. · Try to avoid things that you think may set off vasovagal syncope. · Talk to your doctor about any medicines you take. Some medicines may increase the chance of this condition occurring. · If you feel symptoms, lie down with your legs raised. Talk to your doctor about what to do if your symptoms come back. When should you call for help? Call 911 anytime you think you may need emergency care. For example, call if:    · You have symptoms of a heart problem. These may include:  ? Chest pain or pressure. ? Severe trouble breathing. ? A fast or irregular heartbeat.    Watch closely for changes in your health, and be sure to contact your doctor if:    · You have more episodes of fainting at home.     · You do not get better as expected. Where can you learn more? Go to http://maykel-xander.info/.   Enter L754 in the search box to learn more about \"Vasovagal Syncope: Care Instructions. \"  Current as of: September 23, 2018  Content Version: 11.9  © 7202-3889 [employer]. Care instructions adapted under license by [employer] (which disclaims liability or warranty for this information). If you have questions about a medical condition or this instruction, always ask your healthcare professional. [address] any warranty or liability for your use of this information.

## 2019-03-31 NOTE — ED TRIAGE NOTES
Pt was at [employer] and started not feeling well  With nausea and sweating and unsteady gait. Pt had BM and had near syncopal episode after that. Pt had MI 2 weeks ago and another prior to that with stents on both occassions. Pt denies cp, n/v/d, or SOB

## 2019-03-31 NOTE — ED PROVIDER NOTES
Patient got to the department for evaluation after near syncopal episode at [employer] today. The patient stated he was seated when he began feeling flushed in the face became diaphoretic and felt as though he was going to pass out. He was assisted to his feet and began walking out of the [employer] when he almost collapsed. With assistance he was placed in a seated position and eventually recovered without loss of consciousness. He has no complaints at this time. He patient had a recent heart catheter with stenting and has a history of coronary artery disease but he denies any chest pain or palpitations preceding the episode. He does have chronic pain in the feet and states the pain was getting more severe at the onset of symptoms and he has not this morning. The history is provided by the patient and the spouse. Syncope This is a new problem. The current episode started 1 to 2 hours ago. Episode frequency: no history of syncope. The problem has been resolved. There was no loss of consciousness. Associated with: Severe pain in the feet and fasting state. Associated symptoms include light-headedness. Pertinent negatives include no visual change, no chest pain, no palpitations, no clumsiness, no confusion, no fever, no malaise/fatigue, no abdominal pain, no bowel incontinence, no bladder incontinence, no congestion, no headaches, no back pain, no focal weakness, no seizures, no slurred speech, no melena, no anal bleeding and no head injury. He has tried position for the symptoms. The treatment provided significant relief. Past Medical History:  
Diagnosis Date  Allergic rhinitis  Arthritis  CAD (coronary artery disease)  CABG '09; troponin elevated 7/14/12 (\"likely due to supply/demand mismatch in setting of fever and increased metabolic demand\"-per cardiac MD note 8/20/12)-had cath, echo, EKG-all WNL except cath showed 2 of the bypasses with blockages- \"occluded SVG to PDA & SVG to LISA\"; EF=55%  CVA (cerebral vascular accident) ([address] 2017 Left- no residual weakness  Diabetes mellitus type 2, controlled ([address]   
 restarted oral med (metformin 3/2018), does not check BG on regular basis, last hgba1c- 8 (3/12/18)  Dyslipidemia  ED (erectile dysfunction)  Encephalitis 1962  
 x 2/hospitalized as teenager  GERD (gastroesophageal reflux disease)   
 controlled with Nexium  Gout   
 hx of  
 Hypertension  Lacunar infarct, acute   
 possible embolic, remote a-fib- on eliquis  Lumbago  Memory loss  Mitral valve regurgitation 12 \"moderate\" on echo  ROBERTO (obstructive sleep apnea)   
 mild per patient, does not use CPAP  
 PAF (paroxysmal atrial fibrillation) ([address]  Prostate cancer ([address] followed by urology  Psoriasis   
 topical creams  SBO (small bowel obstruction) ([address] , , 2016  Stage 2 chronic kidney disease Past Surgical History:  
Procedure Laterality Date [address]  
 exp lap  HX APPENDECTOMY  age 49  HX CATARACT REMOVAL Bilateral   
 iol [address] ,   HX CORONARY ARTERY BYPASS GRAFT  2009  
 x4 bypass  HX HERNIA REPAIR Bilateral   HX RADICAL PROSTATECTOMY  HX SHOULDER REPLACEMENT Right 2018 Reverse R TSA [facility]  IA LEFT HEART CATH,PERCUTANEOUS  2012  
 no intervention  IA PROSTATE BIOPSY, NEEDLE, SATURATION SAMPLING    
 and ultrasound  VASCULAR SURGERY PROCEDURE UNLIST  2017  
 aortogram, w/cass LE runoff,R-LE arteriogram  
 
   
Family History:  
Problem Relation Age of Onset  Hypertension Mother  Gout Mother [facility] Arthritis-osteo Mother  Heart Disease Mother  of Heart Disease  Coronary Artery Disease Mother  Diabetes Father  Cancer Father  Heart Disease Father  Bleeding Prob Paternal Grandmother  Hypertension Brother  Cancer Maternal Uncle Prostate Social History Socioeconomic History  Marital status:  Spouse name: Not on file  Number of children: Not on file  Years of education: Not on file  Highest education level: Not on file Occupational History  Not on file Social Needs  Financial resource strain: Not on file  Food insecurity:  
  Worry: Not on file Inability: Not on file  Transportation needs:  
  Medical: Not on file Non-medical: Not on file Tobacco Use  Smoking status: Former Smoker Packs/day: 1.00 Years: 15.00 Pack years: 15.00 Last attempt to quit: 1975 Years since quittin.7  Smokeless tobacco: Former User Substance and Sexual Activity  Alcohol use: Yes Comment: rarely- once/month  Drug use: No  
 Sexual activity: Yes  
  Partners: Female Lifestyle  Physical activity:  
  Days per week: Not on file Minutes per session: Not on file  Stress: Not on file Relationships  Social connections:  
  Talks on phone: Not on file Gets together: Not on file Attends Sabianist service: Not on file Active member of club or organization: Not on file Attends meetings of clubs or organizations: Not on file Relationship status: Not on file  Intimate partner violence:  
  Fear of current or ex partner: Not on file Emotionally abused: Not on file Physically abused: Not on file Forced sexual activity: Not on file Other Topics Concern  Dental Braces Not Asked  Endoscopic Camera Pill Not Asked  Metallic Foreign Body Not Asked  Medication Patches Not Asked  Taking Feraheme Not Asked  Claustrophobic Not Asked  Removable Dental Work Not Asked  Hearing Aids Not Asked  Body Piercing Not Asked  Radiation Seeds Not Asked  Pregnant or Breast Feeding Not Asked  Wounded by Shrapnel or Bullet Not Asked  Other-See Comment Not Asked  Other Implant-See Comment Not Asked Social History Narrative  Not on file ALLERGIES: Celecoxib; Ibuprofen; Lescol [fluvastatin]; Nsaids (non-steroidal anti-inflammatory drug); Sulfa (sulfonamide antibiotics); and Uloric [febuxostat] Review of Systems Constitutional: Negative for fever and malaise/fatigue. HENT: Negative for congestion. Cardiovascular: Positive for syncope. Negative for chest pain and palpitations. Gastrointestinal: Negative for abdominal pain, anal bleeding, bowel incontinence and melena. Genitourinary: Negative for bladder incontinence. Musculoskeletal: Negative for back pain. Neurological: Positive for light-headedness. Negative for focal weakness, seizures and headaches. Psychiatric/Behavioral: Negative for confusion. All other systems reviewed and are negative. Vitals:  
 03/31/19 1140 03/31/19 1214 03/31/19 1216 03/31/19 1218 BP: 113/65 118/67 108/63 115/73 Pulse: 67 63 80 86 Resp: 16 Temp: 97.9 °F (36.6 °C) SpO2: 97% Weight: 81.6 kg (180 lb) Height: 5' 11\" (1.803 m) Physical Exam  
Constitutional: He is oriented to person, place, and time. He appears well-developed and well-nourished. No distress. HENT:  
Head: Normocephalic and atraumatic. Nose: Nose normal.  
Mouth/Throat: No oropharyngeal exudate. Eyes: Pupils are equal, round, and reactive to light. Conjunctivae and EOM are normal.  
Neck: Normal range of motion. Neck supple. Cardiovascular: Normal rate, regular rhythm, normal heart sounds and intact distal pulses. Pulmonary/Chest: Effort normal and breath sounds normal.  
Abdominal: Soft. Bowel sounds are normal.  
Musculoskeletal: Normal range of motion. He exhibits no edema. Neurological: He is alert and oriented to person, place, and time. Skin: Skin is warm and dry. Capillary refill takes less than 2 seconds.  He is not diaphoretic. Nursing note and vitals reviewed. MDM Number of Diagnoses or Management Options Near syncope:  
Vasovagal episode:  
  
Amount and/or Complexity of Data Reviewed Clinical lab tests: ordered and reviewed Tests in the radiology section of CPT®: ordered and reviewed Independent visualization of images, tracings, or specimens: yes (EKG at 1237: As a normal sinus rhythm rate of 71, no ectopy or arrhythmia noted no acute ischemic changes) Patient Progress Patient progress: stable Procedures

## 2019-03-31 NOTE — ED NOTES
I have reviewed discharge instructions with the patient. The patient verbalized understanding. Patient left ED via Discharge Method: wheelchair to Home with family. Opportunity for questions and clarification provided. Patient given 0 scripts. To continue your aftercare when you leave the hospital, you may receive an automated call from our care team to check in on how you are doing. This is a free service and part of our promise to provide the best care and service to meet your aftercare needs.  If you have questions, or wish to unsubscribe from this service please call [phone]. Thank you for Choosing our [facility].

## 2019-04-07 LAB
CALCULATED P AXIS, ECG09: 85 DEGREES
CALCULATED R AXIS, ECG10: 35 DEGREES
CALCULATED T AXIS, ECG11: -94 DEGREES
DIAGNOSIS, 93000: NORMAL
P-R INTERVAL, ECG05: 166 MS
Q-T INTERVAL, ECG07: 460 MS
QRS DURATION, ECG06: 72 MS
QTC CALCULATION (BEZET), ECG08: 500 MS
VENTRICULAR RATE, ECG03: 71 BPM

## 2019-05-06 PROBLEM — S91.102A OPEN WOUND OF LEFT GREAT TOE: Status: ACTIVE | Noted: 2019-05-06

## 2019-06-13 ENCOUNTER — HOSPITAL ENCOUNTER (EMERGENCY)
Age: 74
Discharge: HOME | End: 2019-06-13
Payer: MEDICARE

## 2019-06-13 VITALS
OXYGEN SATURATION: 100 % | HEART RATE: 74 BPM | TEMPERATURE: 97.8 F | SYSTOLIC BLOOD PRESSURE: 184 MMHG | RESPIRATION RATE: 15 BRPM | DIASTOLIC BLOOD PRESSURE: 90 MMHG

## 2019-06-13 DIAGNOSIS — R60.0 BILATERAL LOWER EXTREMITY EDEMA: Primary | ICD-10-CM

## 2019-06-13 LAB
ALBUMIN SERPL-MCNC: 3.4 G/DL (ref 3.2–4.6)
ALBUMIN/GLOB SERPL: 0.8 {RATIO} (ref 1.2–3.5)
ALP SERPL-CCNC: 99 U/L (ref 50–136)
ALT SERPL-CCNC: 30 U/L (ref 12–65)
ANION GAP SERPL CALC-SCNC: 8 MMOL/L (ref 7–16)
AST SERPL-CCNC: 25 U/L (ref 15–37)
BASOPHILS # BLD: 0 K/UL (ref 0–0.2)
BASOPHILS NFR BLD: 1 % (ref 0–2)
BILIRUB SERPL-MCNC: 0.2 MG/DL (ref 0.2–1.1)
BNP SERPL-MCNC: 30 PG/ML
BUN SERPL-MCNC: 20 MG/DL (ref 8–23)
CALCIUM SERPL-MCNC: 8.9 MG/DL (ref 8.3–10.4)
CHLORIDE SERPL-SCNC: 107 MMOL/L (ref 98–107)
CK MB CFR SERPL CALC: 1.9 %
CK MB SERPL-MCNC: 2.3 NG/ML (ref 1.5–3.6)
CK SERPL-CCNC: 121 U/L (ref 21–215)
CO2 SERPL-SCNC: 26 MMOL/L (ref 21–32)
CREAT SERPL-MCNC: 1.49 MG/DL (ref 0.8–1.5)
DIFFERENTIAL METHOD BLD: ABNORMAL
EOSINOPHIL # BLD: 0.1 K/UL (ref 0–0.8)
EOSINOPHIL NFR BLD: 3 % (ref 0.5–7.8)
ERYTHROCYTE [DISTWIDTH] IN BLOOD BY AUTOMATED COUNT: 13 % (ref 11.9–14.6)
GLOBULIN SER CALC-MCNC: 4.3 G/DL (ref 2.3–3.5)
GLUCOSE SERPL-MCNC: 176 MG/DL (ref 65–100)
HCT VFR BLD AUTO: 33.7 % (ref 41.1–50.3)
HGB BLD-MCNC: 11.7 G/DL (ref 13.6–17.2)
IMM GRANULOCYTES # BLD AUTO: 0 K/UL (ref 0–0.5)
IMM GRANULOCYTES NFR BLD AUTO: 1 % (ref 0–5)
LYMPHOCYTES # BLD: 1.1 K/UL (ref 0.5–4.6)
LYMPHOCYTES NFR BLD: 26 % (ref 13–44)
MAGNESIUM SERPL-MCNC: 2.1 MG/DL (ref 1.8–2.4)
MCH RBC QN AUTO: 31 PG (ref 26.1–32.9)
MCHC RBC AUTO-ENTMCNC: 34.7 G/DL (ref 31.4–35)
MCV RBC AUTO: 89.2 FL (ref 79.6–97.8)
MONOCYTES # BLD: 0.5 K/UL (ref 0.1–1.3)
MONOCYTES NFR BLD: 11 % (ref 4–12)
NEUTS SEG # BLD: 2.4 K/UL (ref 1.7–8.2)
NEUTS SEG NFR BLD: 60 % (ref 43–78)
NRBC # BLD: 0 K/UL (ref 0–0.2)
PLATELET # BLD AUTO: 216 K/UL (ref 150–450)
PMV BLD AUTO: 11 FL (ref 9.4–12.3)
POTASSIUM SERPL-SCNC: 4 MMOL/L (ref 3.5–5.1)
PROT SERPL-MCNC: 7.7 G/DL (ref 6.3–8.2)
RBC # BLD AUTO: 3.78 M/UL (ref 4.23–5.6)
SODIUM SERPL-SCNC: 141 MMOL/L (ref 136–145)
WBC # BLD AUTO: 4.1 K/UL (ref 4.3–11.1)

## 2019-06-13 PROCEDURE — 74011250637 HC RX REV CODE- 250/637

## 2019-06-13 PROCEDURE — 83880 ASSAY OF NATRIURETIC PEPTIDE: CPT

## 2019-06-13 PROCEDURE — 83735 ASSAY OF MAGNESIUM: CPT

## 2019-06-13 PROCEDURE — 82550 ASSAY OF CK (CPK): CPT

## 2019-06-13 PROCEDURE — 80053 COMPREHEN METABOLIC PANEL: CPT

## 2019-06-13 PROCEDURE — 99284 EMERGENCY DEPT VISIT MOD MDM: CPT

## 2019-06-13 PROCEDURE — 85025 COMPLETE CBC W/AUTO DIFF WBC: CPT

## 2019-06-13 RX ORDER — FUROSEMIDE 40 MG/1
40 TABLET ORAL DAILY
Qty: 3 TAB | Refills: 0 | Status: SHIPPED | OUTPATIENT
Start: 2019-06-13 | End: 2019-06-16

## 2019-06-13 RX ORDER — HYDROCODONE BITARTRATE AND ACETAMINOPHEN 7.5; 325 MG/1; MG/1
1 TABLET ORAL
Status: COMPLETED | OUTPATIENT
Start: 2019-06-13 | End: 2019-06-13

## 2019-06-13 RX ORDER — FUROSEMIDE 40 MG/1
40 TABLET ORAL
Status: COMPLETED | OUTPATIENT
Start: 2019-06-13 | End: 2019-06-13

## 2019-06-13 RX ORDER — FUROSEMIDE 40 MG/1
40 TABLET ORAL DAILY
Qty: 3 TAB | Refills: 0 | Status: SHIPPED | OUTPATIENT
Start: 2019-06-13 | End: 2019-06-13

## 2019-06-13 RX ORDER — HYDROCODONE BITARTRATE AND ACETAMINOPHEN 7.5; 325 MG/1; MG/1
1 TABLET ORAL
Qty: 6 TAB | Refills: 0 | Status: SHIPPED | OUTPATIENT
Start: 2019-06-13 | End: 2019-06-15

## 2019-06-13 RX ADMIN — FUROSEMIDE 40 MG: 40 TABLET ORAL at 01:20

## 2019-06-13 RX ADMIN — HYDROCODONE BITARTRATE AND ACETAMINOPHEN 1 TABLET: 7.5; 325 TABLET ORAL at 01:20

## 2019-06-13 NOTE — ED TRIAGE NOTES
Pt to er c/o pain in both legs for several wks on and off.  sts he has been to his doctor several times for the pain and was told he has neuropathy

## 2019-06-13 NOTE — ED NOTES
I have reviewed discharge instructions with the patient and caregiver. The patient and caregiver verbalized understanding. Patient left ED via Discharge Method: ambulatory to Home with (insert name of family/friend, self, transport family). Opportunity for questions and clarification provided. Patient given 2 scripts. To continue your aftercare when you leave the hospital, you may receive an automated call from our care team to check in on how you are doing. This is a free service and part of our promise to provide the best care and service to meet your aftercare needs.  If you have questions, or wish to unsubscribe from this service please call [phone]. Thank you for Choosing our [facility].

## 2019-06-13 NOTE — DISCHARGE INSTRUCTIONS
Patient Education   Patient Education        Low Sodium Diet (2,000 Milligram): Care Instructions  Your Care Instructions    Too much sodium causes your body to hold on to extra water. This can raise your blood pressure and force your heart and kidneys to work harder. In very serious cases, this could cause you to be put in the hospital. It might even be life-threatening. By limiting sodium, you will feel better and lower your risk of serious problems. The most common source of sodium is salt. People get most of the salt in their diet from canned, prepared, and packaged foods. Fast food and restaurant meals also are very high in sodium. Your doctor will probably limit your sodium to less than 2,000 milligrams (mg) a day. This limit counts all the sodium in prepared and packaged foods and any salt you add to your food. Follow-up care is a key part of your treatment and safety. Be sure to make and go to all appointments, and call your doctor if you are having problems. It's also a good idea to know your test results and keep a list of the medicines you take. How can you care for yourself at home? Read food labels  · Read labels on cans and food packages. The labels tell you how much sodium is in each serving. Make sure that you look at the serving size. If you eat more than the serving size, you have eaten more sodium. · Food labels also tell you the Percent Daily Value for sodium. Choose products with low Percent Daily Values for sodium. · Be aware that sodium can come in forms other than salt, including monosodium glutamate (MSG), sodium citrate, and sodium bicarbonate (baking soda). MSG is often added to Asian food. When you eat out, you can sometimes ask for food without MSG or added salt. Buy low-sodium foods  · Buy foods that are labeled \"unsalted\" (no salt added), \"sodium-free\" (less than 5 mg of sodium per serving), or \"low-sodium\" (less than 140 mg of sodium per serving).  Foods labeled \"reduced-sodium\" and \"light sodium\" may still have too much sodium. Be sure to read the label to see how much sodium you are getting. · Buy fresh vegetables, or frozen vegetables without added sauces. Buy low-sodium versions of canned vegetables, soups, and other canned goods. Prepare low-sodium meals  · Cut back on the amount of salt you use in cooking. This will help you adjust to the taste. Do not add salt after cooking. One teaspoon of salt has about 2,300 mg of sodium. · Take the salt shaker off the table. · Flavor your food with garlic, lemon juice, onion, vinegar, herbs, and spices. Do not use soy sauce, lite soy sauce, steak sauce, onion salt, garlic salt, celery salt, mustard, or ketchup on your food. · Use low-sodium salad dressings, sauces, and ketchup. Or make your own salad dressings and sauces without adding salt. · Use less salt (or none) when recipes call for it. You can often use half the salt a recipe calls for without losing flavor. Other foods such as rice, pasta, and grains do not need added salt. · Rinse canned vegetables, and cook them in fresh water. This removes some--but not all--of the salt. · Avoid water that is naturally high in sodium or that has been treated with water softeners, which add sodium. Call your local water company to find out the sodium content of your water supply. If you buy bottled water, read the label and choose a sodium-free brand. Avoid high-sodium foods  · Avoid eating:  ? Smoked, cured, salted, and canned meat, fish, and poultry. ? Ham, amaro, hot dogs, and luncheon meats. ? Regular, hard, and processed cheese and regular peanut butter. ? Crackers with salted tops, and other salted snack foods such as pretzels, chips, and salted popcorn. ? Frozen prepared meals, unless labeled low-sodium. ? Canned and dried soups, broths, and bouillon, unless labeled sodium-free or low-sodium. ? Canned vegetables, unless labeled sodium-free or low-sodium. ?  [place] fries, pizza, tacos, and other fast foods. ? Pickles, olives, ketchup, and other condiments, especially soy sauce, unless labeled sodium-free or low-sodium. Where can you learn more? Go to http://maykel-xander.info/. Enter I792 in the search box to learn more about \"Low Sodium Diet (2,000 Milligram): Care Instructions. \"  Current as of: March 28, 2018  Content Version: 11.9  © 4679-7897 [employer]. Care instructions adapted under license by [employer] (which disclaims liability or warranty for this information). If you have questions about a medical condition or this instruction, always ask your healthcare professional. [place] [address] any warranty or liability for your use of this information. Leg and Ankle Edema: Care Instructions  Your Care Instructions  Swelling in the legs, ankles, and feet is called edema. It is common after you sit or stand for a while. Long plane flights or car rides often cause swelling in the legs and feet. You may also have swelling if you have to stand for long periods of time at your job. Problems with the veins in the legs (varicose veins) and changes in hormones can also cause swelling. Sometimes the swelling in the ankles and feet is caused by a more serious problem, such as heart failure, infection, blood clots, or liver or kidney disease. Follow-up care is a key part of your treatment and safety. Be sure to make and go to all appointments, and call your doctor if you are having problems. It's also a good idea to know your test results and keep a list of the medicines you take. How can you care for yourself at home? · If your doctor gave you medicine, take it as prescribed. Call your doctor if you think you are having a problem with your medicine. · Whenever you are resting, raise your legs up. Try to keep the swollen area higher than the level of your heart.   · Take breaks from standing or sitting in one position. ? Walk around to increase the blood flow in your lower legs. ? Move your feet and ankles often while you stand, or tighten and relax your leg muscles. · Wear support stockings. Put them on in the morning, before swelling gets worse. · Eat a balanced diet. Lose weight if you need to. · Limit the amount of salt (sodium) in your diet. Salt holds fluid in the body and may increase swelling. When should you call for help? Call 911 anytime you think you may need emergency care. For example, call if:    · You have symptoms of a blood clot in your lung (called a pulmonary embolism). These may include:  ? Sudden chest pain. ? Trouble breathing. ? Coughing up blood.    Call your doctor now or seek immediate medical care if:    · You have signs of a blood clot, such as:  ? Pain in your calf, back of the knee, thigh, or groin. ? Redness and swelling in your leg or groin.     · You have symptoms of infection, such as:  ? Increased pain, swelling, warmth, or redness. ? Red streaks or pus. ? A fever.    Watch closely for changes in your health, and be sure to contact your doctor if:    · Your swelling is getting worse.     · You have new or worsening pain in your legs.     · You do not get better as expected. Where can you learn more? Go to http://maykel-xander.info/. Enter U476 in the search box to learn more about \"Leg and Ankle Edema: Care Instructions. \"  Current as of: September 23, 2018  Content Version: 11.9  © 1564-9179 [employer]. Care instructions adapted under license by [employer] (which disclaims liability or warranty for this information). If you have questions about a medical condition or this instruction, always ask your healthcare professional. [address] any warranty or liability for your use of this information.

## 2019-06-13 NOTE — ED PROVIDER NOTES
73-year-old man complaining of lower extremity pain patient spent several doctors including his primary care physician but the pain continues to difficult for him to rest.  Patient has an extensive cardiac history. Also a diabetic. Patient was started on gabapentin for chronic diabetic neuropathy. The history is provided by the patient. Leg Pain    This is a new problem. The pain is present in the left knee, left ankle, right knee, right ankle, right foot and left foot. The quality of the pain is described as aching. The pain is severe. Associated symptoms include numbness and tingling. He has tried arthritis medications for the symptoms. The treatment provided no relief. There has been no history of extremity trauma. Family history is significant for gout.         Past Medical History:   Diagnosis Date    Allergic rhinitis     Arthritis     CAD (coronary artery disease)     CABG '09; troponin elevated 7/14/12 (\"likely due to supply/demand mismatch in setting of fever and increased metabolic demand\"-per cardiac MD note 8/20/12)-had cath, echo, EKG-all WNL except cath showed 2 of the bypasses with blockages- \"occluded SVG to PDA & SVG to LISA\"; EF=55%    CVA (cerebral vascular accident) ([address] 08/2017    Left- no residual weakness    Diabetes mellitus type 2, controlled ([address]     restarted oral med (metformin 3/2018), does not check BG on regular basis, last hgba1c- 8 (3/12/18)    Dyslipidemia     ED (erectile dysfunction)     Encephalitis 1962    x 2/hospitalized as teenager    GERD (gastroesophageal reflux disease)     controlled with Nexium    Gout     hx of    Hypertension     Lacunar infarct, acute ([address]     possible embolic, remote a-fib- on eliquis    Lumbago     Memory loss     Mitral valve regurgitation 7/14/12    \"moderate\" on echo    ROBERTO (obstructive sleep apnea)     mild per patient, does not use CPAP    PAF (paroxysmal atrial fibrillation) (HCC)     Prostate cancer ([address] followed by urology    Psoriasis     topical creams    SBO (small bowel obstruction) ([facility] , , 2016    Stage 2 chronic kidney disease        Past Surgical History:   Procedure Laterality Date    ABDOMEN SURGERY PROC UNLISTED  1967    exp lap    HX APPENDECTOMY  age 49         HX CATARACT REMOVAL Bilateral 2013    iol     HX COLONOSCOPY  , 2010    HX CORONARY ARTERY BYPASS GRAFT  2009    x4 bypass    HX HERNIA REPAIR Bilateral 2012    HX RADICAL PROSTATECTOMY       HX SHOULDER REPLACEMENT Right 2018    Reverse R TSA    HX SPLENECTOMY  1951    ME LEFT HEART CATH,PERCUTANEOUS  2012    no intervention    ME PROSTATE BIOPSY, NEEDLE, SATURATION SAMPLING      and ultrasound    VASCULAR SURGERY PROCEDURE UNLIST  2017    aortogram, w/cass LE runoff,R-LE arteriogram         Family History:   Problem Relation Age of Onset    Hypertension Mother    [facility] Gout Mother     Arthritis-osteo Mother     Heart Disease Mother          of Heart Disease    Coronary Artery Disease Mother     Diabetes Father     Cancer Father     Heart Disease Father     Bleeding Prob Paternal Grandmother     Hypertension Brother     Cancer Maternal Uncle         Prostate       Social History     Socioeconomic History    Marital status:      Spouse name: Not on file    Number of children: Not on file    Years of education: Not on file    Highest education level: Not on file   Occupational History    Not on file   Social Needs    Financial resource strain: Not on file    Food insecurity:     Worry: Not on file     Inability: Not on file    Transportation needs:     Medical: Not on file     Non-medical: Not on file   Tobacco Use    Smoking status: Former Smoker     Packs/day: 1.00     Years: 15.00     Pack years: 15.00     Last attempt to quit: 1975     Years since quittin.9    Smokeless tobacco: Former User   Substance and Sexual Activity    Alcohol use: Yes     Comment: rarely- once/month    Drug use: No    Sexual activity: Yes     Partners: Female   Lifestyle    Physical activity:     Days per week: Not on file     Minutes per session: Not on file    Stress: Not on file   Relationships    Social connections:     Talks on phone: Not on file     Gets together: Not on file     Attends [employer] service: Not on file     Active member of club or organization: Not on file     Attends meetings of clubs or organizations: Not on file     Relationship status: Not on file    Intimate partner violence:     Fear of current or ex partner: Not on file     Emotionally abused: Not on file     Physically abused: Not on file     Forced sexual activity: Not on file   Other Topics Concern    Dental Braces Not Asked    Endoscopic Camera Pill Not Asked    Metallic Foreign Body Not Asked    Medication Patches Not Asked    Taking Feraheme Not Asked    Claustrophobic Not Asked    Removable Dental Work Not Asked    Hearing Aids Not Asked    Body Piercing Not Asked    Radiation Seeds Not Asked    Pregnant or Breast Feeding Not Asked    Wounded by Shrapnel or Bullet Not Asked    Other-See Comment Not Asked    Other Implant-See Comment Not Asked   Social History Narrative    Not on file         ALLERGIES: Celecoxib; Ibuprofen; Lescol [fluvastatin]; Nsaids (non-steroidal anti-inflammatory drug); Sulfa (sulfonamide antibiotics); and Uloric [febuxostat]    Review of Systems   Constitutional: Negative. Negative for activity change. HENT: Negative. Eyes: Negative. Respiratory: Negative. Cardiovascular: Negative. Gastrointestinal: Negative. Genitourinary: Negative. Musculoskeletal: Negative. Skin: Negative. Neurological: Positive for tingling and numbness. Psychiatric/Behavioral: Negative. All other systems reviewed and are negative.       Vitals:    06/13/19 0101 06/13/19 0116 06/13/19 0131 06/13/19 0146   BP: 138/82 (!) 149/92 143/76 (!) 194/104   Resp:       Temp: SpO2: 100% 99% 100% 100%            Physical Exam   Constitutional: He is oriented to person, place, and time. He appears well-developed and well-nourished. No distress. HENT:   Head: Normocephalic and atraumatic. Right Ear: External ear normal.   Left Ear: External ear normal.   Nose: Nose normal.   Mouth/Throat: Oropharynx is clear and moist. No oropharyngeal exudate. Eyes: Pupils are equal, round, and reactive to light. Conjunctivae and EOM are normal. Right eye exhibits no discharge. Left eye exhibits no discharge. No scleral icterus. Neck: Normal range of motion. Neck supple. No JVD present. No tracheal deviation present. Cardiovascular: Normal rate, regular rhythm and intact distal pulses. Pulmonary/Chest: Effort normal and breath sounds normal. No stridor. No respiratory distress. He has no wheezes. He exhibits no tenderness. Abdominal: Soft. Bowel sounds are normal. He exhibits no distension and no mass. There is no tenderness. Musculoskeletal: Normal range of motion. He exhibits edema. He exhibits no tenderness. Right ankle: He exhibits swelling. Left ankle: He exhibits swelling. Feet:    Neurological: He is alert and oriented to person, place, and time. No cranial nerve deficit. Skin: Skin is warm and dry. No rash noted. He is not diaphoretic. No erythema. No pallor. Psychiatric: He has a normal mood and affect. His behavior is normal. Thought content normal.   Nursing note and vitals reviewed. MDM  Number of Diagnoses or Management Options  Bilateral lower extremity edema:   Diagnosis management comments: Assessment: Neuropathic pain from diabetes versus pain from edema of the lower extremities. Treat him with a few days of Lasix with improved pain is to contact his primary care physician today for further instructions and treatment.        Amount and/or Complexity of Data Reviewed  Clinical lab tests: ordered and reviewed  Tests in the medicine section of CPT®: ordered and reviewed           Procedures

## 2019-07-03 ENCOUNTER — APPOINTMENT (OUTPATIENT)
Dept: GENERAL RADIOLOGY | Age: 74
End: 2019-07-03
Attending: EMERGENCY MEDICINE
Payer: MEDICARE

## 2019-07-03 ENCOUNTER — HOSPITAL ENCOUNTER (EMERGENCY)
Age: 74
Discharge: HOME | End: 2019-07-03
Attending: EMERGENCY MEDICINE
Payer: MEDICARE

## 2019-07-03 VITALS
TEMPERATURE: 98.9 F | WEIGHT: 185 LBS | DIASTOLIC BLOOD PRESSURE: 77 MMHG | HEART RATE: 87 BPM | HEIGHT: 71 IN | RESPIRATION RATE: 16 BRPM | OXYGEN SATURATION: 97 % | BODY MASS INDEX: 25.9 KG/M2 | SYSTOLIC BLOOD PRESSURE: 143 MMHG

## 2019-07-03 DIAGNOSIS — R55 VASOVAGAL SYNCOPE: ICD-10-CM

## 2019-07-03 DIAGNOSIS — M25.561 ARTHRALGIA OF RIGHT KNEE: Primary | ICD-10-CM

## 2019-07-03 LAB
ANION GAP SERPL CALC-SCNC: 6 MMOL/L (ref 7–16)
BASOPHILS # BLD: 0 K/UL (ref 0–0.2)
BASOPHILS NFR BLD: 0 % (ref 0–2)
BUN SERPL-MCNC: 18 MG/DL (ref 8–23)
CALCIUM SERPL-MCNC: 9.1 MG/DL (ref 8.3–10.4)
CHLORIDE SERPL-SCNC: 108 MMOL/L (ref 98–107)
CO2 SERPL-SCNC: 23 MMOL/L (ref 21–32)
CREAT SERPL-MCNC: 1.8 MG/DL (ref 0.8–1.5)
DIFFERENTIAL METHOD BLD: ABNORMAL
EOSINOPHIL # BLD: 0.1 K/UL (ref 0–0.8)
EOSINOPHIL NFR BLD: 1 % (ref 0.5–7.8)
ERYTHROCYTE [DISTWIDTH] IN BLOOD BY AUTOMATED COUNT: 13.5 % (ref 11.9–14.6)
GLUCOSE SERPL-MCNC: 186 MG/DL (ref 65–100)
HCT VFR BLD AUTO: 33.8 % (ref 41.1–50.3)
HGB BLD-MCNC: 11.7 G/DL (ref 13.6–17.2)
IMM GRANULOCYTES # BLD AUTO: 0 K/UL (ref 0–0.5)
IMM GRANULOCYTES NFR BLD AUTO: 1 % (ref 0–5)
LYMPHOCYTES # BLD: 1.2 K/UL (ref 0.5–4.6)
LYMPHOCYTES NFR BLD: 17 % (ref 13–44)
MCH RBC QN AUTO: 30.3 PG (ref 26.1–32.9)
MCHC RBC AUTO-ENTMCNC: 34.6 G/DL (ref 31.4–35)
MCV RBC AUTO: 87.6 FL (ref 79.6–97.8)
MONOCYTES # BLD: 1.1 K/UL (ref 0.1–1.3)
MONOCYTES NFR BLD: 15 % (ref 4–12)
NEUTS SEG # BLD: 4.8 K/UL (ref 1.7–8.2)
NEUTS SEG NFR BLD: 67 % (ref 43–78)
NRBC # BLD: 0 K/UL (ref 0–0.2)
PLATELET # BLD AUTO: 229 K/UL (ref 150–450)
PMV BLD AUTO: 11 FL (ref 9.4–12.3)
POTASSIUM SERPL-SCNC: 3.7 MMOL/L (ref 3.5–5.1)
RBC # BLD AUTO: 3.86 M/UL (ref 4.23–5.6)
SODIUM SERPL-SCNC: 137 MMOL/L (ref 136–145)
TROPONIN I BLD-MCNC: 0 NG/ML (ref 0.02–0.05)
WBC # BLD AUTO: 7.2 K/UL (ref 4.3–11.1)

## 2019-07-03 PROCEDURE — 73562 X-RAY EXAM OF KNEE 3: CPT

## 2019-07-03 PROCEDURE — 80048 BASIC METABOLIC PNL TOTAL CA: CPT

## 2019-07-03 PROCEDURE — 96372 THER/PROPH/DIAG INJ SC/IM: CPT | Performed by: EMERGENCY MEDICINE

## 2019-07-03 PROCEDURE — 84484 ASSAY OF TROPONIN QUANT: CPT

## 2019-07-03 PROCEDURE — 99285 EMERGENCY DEPT VISIT HI MDM: CPT | Performed by: EMERGENCY MEDICINE

## 2019-07-03 PROCEDURE — 74011250636 HC RX REV CODE- 250/636: Performed by: EMERGENCY MEDICINE

## 2019-07-03 PROCEDURE — 85025 COMPLETE CBC W/AUTO DIFF WBC: CPT

## 2019-07-03 PROCEDURE — 96360 HYDRATION IV INFUSION INIT: CPT | Performed by: EMERGENCY MEDICINE

## 2019-07-03 PROCEDURE — 74011250636 HC RX REV CODE- 250/636

## 2019-07-03 RX ORDER — MORPHINE SULFATE 4 MG/ML
4 INJECTION INTRAVENOUS
Status: COMPLETED | OUTPATIENT
Start: 2019-07-03 | End: 2019-07-03

## 2019-07-03 RX ORDER — ONDANSETRON 2 MG/ML
INJECTION INTRAMUSCULAR; INTRAVENOUS
Status: COMPLETED
Start: 2019-07-03 | End: 2019-07-03

## 2019-07-03 RX ORDER — MORPHINE SULFATE 4 MG/ML
4 INJECTION INTRAVENOUS
Status: DISCONTINUED | OUTPATIENT
Start: 2019-07-03 | End: 2019-07-03

## 2019-07-03 RX ORDER — HYDROCODONE BITARTRATE AND ACETAMINOPHEN 5; 325 MG/1; MG/1
1 TABLET ORAL
Qty: 12 TAB | Refills: 0 | Status: SHIPPED | OUTPATIENT
Start: 2019-07-03 | End: 2019-07-06

## 2019-07-03 RX ORDER — MORPHINE SULFATE 4 MG/ML
INJECTION INTRAVENOUS
Status: COMPLETED
Start: 2019-07-03 | End: 2019-07-03

## 2019-07-03 RX ADMIN — SODIUM CHLORIDE 1000 ML: 900 INJECTION, SOLUTION INTRAVENOUS at 03:16

## 2019-07-03 RX ADMIN — ONDANSETRON 4 MG: 2 INJECTION INTRAMUSCULAR; INTRAVENOUS at 02:26

## 2019-07-03 RX ADMIN — MORPHINE SULFATE 4 MG: 4 INJECTION INTRAVENOUS at 01:18

## 2019-07-03 RX ADMIN — MORPHINE SULFATE: 4 INJECTION INTRAVENOUS at 01:14

## 2019-07-03 NOTE — DISCHARGE INSTRUCTIONS
Ice and elevate the knee. Take pain medication as needed. Follow-up with orthopedics. Your creatinine level is slightly elevated compared to previous lab tests. Please follow-up with your doctor for checkup.

## 2019-07-03 NOTE — ED NOTES
I have reviewed discharge instructions with the patient. The patient verbalized understanding. Patient left ED via Discharge Method: ambulatory to Home with wife. Opportunity for questions and clarification provided. Patient given 1 scripts. To continue your aftercare when you leave the hospital, you may receive an automated call from our care team to check in on how you are doing. This is a free service and part of our promise to provide the best care and service to meet your aftercare needs.  If you have questions, or wish to unsubscribe from this service please call [phone]. Thank you for Choosing our [facility] Department.

## 2019-07-03 NOTE — ED NOTES
Pt presents to the ED for swollen right knee. States that he is very uncomfortable and somewhat agitated.   Pt having difficulty standing and walkling

## 2019-07-03 NOTE — ED TRIAGE NOTES
Right knee pain that started yesterday.   Wife states that she gave him some cymbalta and he is very agitated and and right knee is more swollen than earlier today

## 2019-07-03 NOTE — ED NOTES
While discharging pt, I assisted him to sitting position on side of bed. Pt began to feel dizzy, swoon and pass out. Pt assisted back into bed and md called. ekg done, iv placed and labs drawn.
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
## 2019-09-24 ENCOUNTER — HOSPITAL ENCOUNTER (EMERGENCY)
Age: 74
Discharge: OTHER INSTITUTION | DRG: 251 | End: 2019-09-24
Attending: EMERGENCY MEDICINE
Payer: MEDICARE

## 2019-09-24 ENCOUNTER — HOSPITAL ENCOUNTER (INPATIENT)
Age: 74
LOS: 2 days | Discharge: HOME | DRG: 251 | End: 2019-09-26
Attending: INTERNAL MEDICINE | Admitting: INTERNAL MEDICINE
Payer: MEDICARE

## 2019-09-24 ENCOUNTER — APPOINTMENT (OUTPATIENT)
Dept: GENERAL RADIOLOGY | Age: 74
DRG: 251 | End: 2019-09-24
Attending: EMERGENCY MEDICINE
Payer: MEDICARE

## 2019-09-24 VITALS
SYSTOLIC BLOOD PRESSURE: 135 MMHG | TEMPERATURE: 98 F | HEIGHT: 71 IN | HEART RATE: 68 BPM | BODY MASS INDEX: 25.9 KG/M2 | DIASTOLIC BLOOD PRESSURE: 76 MMHG | OXYGEN SATURATION: 99 % | WEIGHT: 185 LBS | RESPIRATION RATE: 14 BRPM

## 2019-09-24 DIAGNOSIS — Z00.6 RESEARCH EXAM: Primary | ICD-10-CM

## 2019-09-24 DIAGNOSIS — K21.9 GASTROESOPHAGEAL REFLUX DISEASE WITHOUT ESOPHAGITIS: ICD-10-CM

## 2019-09-24 DIAGNOSIS — R07.9 CHEST PAIN, UNSPECIFIED TYPE: Primary | ICD-10-CM

## 2019-09-24 DIAGNOSIS — R07.89 OTHER CHEST PAIN: ICD-10-CM

## 2019-09-24 PROBLEM — I21.4 NSTEMI (NON-ST ELEVATED MYOCARDIAL INFARCTION) (HCC): Status: ACTIVE | Noted: 2019-09-24

## 2019-09-24 PROBLEM — I20.0 UNSTABLE ANGINA (HCC): Status: ACTIVE | Noted: 2019-09-24

## 2019-09-24 PROBLEM — Z01.810 PREOP CARDIOVASCULAR EXAM: Status: RESOLVED | Noted: 2018-03-20 | Resolved: 2019-09-24

## 2019-09-24 LAB
ALBUMIN SERPL-MCNC: 3.2 G/DL (ref 3.2–4.6)
ALBUMIN/GLOB SERPL: 0.7 {RATIO} (ref 1.2–3.5)
ALP SERPL-CCNC: 79 U/L (ref 50–136)
ALT SERPL-CCNC: 15 U/L (ref 12–65)
ANION GAP SERPL CALC-SCNC: 12 MMOL/L (ref 7–16)
APTT PPP: 56.2 SEC (ref 24.7–39.8)
AST SERPL-CCNC: 25 U/L (ref 15–37)
ATRIAL RATE: 82 BPM
BASOPHILS # BLD: 0 K/UL (ref 0–0.2)
BASOPHILS NFR BLD: 0 % (ref 0–2)
BILIRUB SERPL-MCNC: 0.4 MG/DL (ref 0.2–1.1)
BUN SERPL-MCNC: 15 MG/DL (ref 8–23)
CALCIUM SERPL-MCNC: 9.8 MG/DL (ref 8.3–10.4)
CALCULATED P AXIS, ECG09: 63 DEGREES
CALCULATED R AXIS, ECG10: 32 DEGREES
CALCULATED T AXIS, ECG11: 79 DEGREES
CHLORIDE SERPL-SCNC: 107 MMOL/L (ref 98–107)
CO2 SERPL-SCNC: 22 MMOL/L (ref 21–32)
CREAT SERPL-MCNC: 1.51 MG/DL (ref 0.8–1.5)
DIAGNOSIS, 93000: NORMAL
DIFFERENTIAL METHOD BLD: ABNORMAL
EOSINOPHIL # BLD: 0 K/UL (ref 0–0.8)
EOSINOPHIL NFR BLD: 0 % (ref 0.5–7.8)
ERYTHROCYTE [DISTWIDTH] IN BLOOD BY AUTOMATED COUNT: 13.6 % (ref 11.9–14.6)
GLOBULIN SER CALC-MCNC: 4.8 G/DL (ref 2.3–3.5)
GLUCOSE BLD STRIP.AUTO-MCNC: 123 MG/DL (ref 65–100)
GLUCOSE SERPL-MCNC: 206 MG/DL (ref 65–100)
HCT VFR BLD AUTO: 36.7 % (ref 41.1–50.3)
HGB BLD-MCNC: 12.4 G/DL (ref 13.6–17.2)
IMM GRANULOCYTES # BLD AUTO: 0 K/UL (ref 0–0.5)
IMM GRANULOCYTES NFR BLD AUTO: 0 % (ref 0–5)
LYMPHOCYTES # BLD: 0.6 K/UL (ref 0.5–4.6)
LYMPHOCYTES NFR BLD: 8 % (ref 13–44)
MCH RBC QN AUTO: 30.3 PG (ref 26.1–32.9)
MCHC RBC AUTO-ENTMCNC: 33.8 G/DL (ref 31.4–35)
MCV RBC AUTO: 89.7 FL (ref 79.6–97.8)
MONOCYTES # BLD: 0.3 K/UL (ref 0.1–1.3)
MONOCYTES NFR BLD: 4 % (ref 4–12)
NEUTS SEG # BLD: 7.4 K/UL (ref 1.7–8.2)
NEUTS SEG NFR BLD: 88 % (ref 43–78)
NRBC # BLD: 0 K/UL (ref 0–0.2)
P-R INTERVAL, ECG05: 160 MS
PLATELET # BLD AUTO: 238 K/UL (ref 150–450)
PMV BLD AUTO: 11.1 FL (ref 9.4–12.3)
POTASSIUM SERPL-SCNC: 3.9 MMOL/L (ref 3.5–5.1)
PROT SERPL-MCNC: 8 G/DL (ref 6.3–8.2)
Q-T INTERVAL, ECG07: 374 MS
QRS DURATION, ECG06: 84 MS
QTC CALCULATION (BEZET), ECG08: 436 MS
RBC # BLD AUTO: 4.09 M/UL (ref 4.23–5.6)
SODIUM SERPL-SCNC: 141 MMOL/L (ref 136–145)
TROPONIN I SERPL-MCNC: 0.1 NG/ML (ref 0.02–0.05)
TROPONIN I SERPL-MCNC: 0.15 NG/ML (ref 0.02–0.05)
TROPONIN I SERPL-MCNC: 0.19 NG/ML (ref 0.02–0.05)
VENTRICULAR RATE, ECG03: 82 BPM
WBC # BLD AUTO: 8.4 K/UL (ref 4.3–11.1)

## 2019-09-24 PROCEDURE — 99284 EMERGENCY DEPT VISIT MOD MDM: CPT | Performed by: EMERGENCY MEDICINE

## 2019-09-24 PROCEDURE — 74011250636 HC RX REV CODE- 250/636: Performed by: EMERGENCY MEDICINE

## 2019-09-24 PROCEDURE — 65270000029 HC RM PRIVATE

## 2019-09-24 PROCEDURE — 84484 ASSAY OF TROPONIN QUANT: CPT

## 2019-09-24 PROCEDURE — 74011250637 HC RX REV CODE- 250/637: Performed by: NURSE PRACTITIONER

## 2019-09-24 PROCEDURE — 74011250636 HC RX REV CODE- 250/636: Performed by: INTERNAL MEDICINE

## 2019-09-24 PROCEDURE — 80053 COMPREHEN METABOLIC PANEL: CPT

## 2019-09-24 PROCEDURE — 85025 COMPLETE CBC W/AUTO DIFF WBC: CPT

## 2019-09-24 PROCEDURE — 36415 COLL VENOUS BLD VENIPUNCTURE: CPT

## 2019-09-24 PROCEDURE — 96374 THER/PROPH/DIAG INJ IV PUSH: CPT | Performed by: EMERGENCY MEDICINE

## 2019-09-24 PROCEDURE — 85730 THROMBOPLASTIN TIME PARTIAL: CPT

## 2019-09-24 PROCEDURE — 93005 ELECTROCARDIOGRAM TRACING: CPT | Performed by: EMERGENCY MEDICINE

## 2019-09-24 PROCEDURE — 93005 ELECTROCARDIOGRAM TRACING: CPT | Performed by: INTERNAL MEDICINE

## 2019-09-24 PROCEDURE — 74011250637 HC RX REV CODE- 250/637: Performed by: EMERGENCY MEDICINE

## 2019-09-24 PROCEDURE — 82962 GLUCOSE BLOOD TEST: CPT

## 2019-09-24 PROCEDURE — 71046 X-RAY EXAM CHEST 2 VIEWS: CPT

## 2019-09-24 RX ORDER — SODIUM CHLORIDE 0.9 % (FLUSH) 0.9 %
5-40 SYRINGE (ML) INJECTION EVERY 8 HOURS
Status: DISCONTINUED | OUTPATIENT
Start: 2019-09-24 | End: 2019-09-25 | Stop reason: SDUPTHER

## 2019-09-24 RX ORDER — MORPHINE SULFATE 4 MG/ML
4 INJECTION INTRAVENOUS
Status: DISCONTINUED | OUTPATIENT
Start: 2019-09-24 | End: 2019-09-24

## 2019-09-24 RX ORDER — PANTOPRAZOLE SODIUM 40 MG/1
40 TABLET, DELAYED RELEASE ORAL
Status: DISCONTINUED | OUTPATIENT
Start: 2019-09-25 | End: 2019-09-26 | Stop reason: HOSPADM

## 2019-09-24 RX ORDER — ACETAMINOPHEN 325 MG/1
650 TABLET ORAL
Status: DISCONTINUED | OUTPATIENT
Start: 2019-09-24 | End: 2019-09-25 | Stop reason: SDUPTHER

## 2019-09-24 RX ORDER — GABAPENTIN 300 MG/1
300 CAPSULE ORAL 3 TIMES DAILY
Status: DISCONTINUED | OUTPATIENT
Start: 2019-09-24 | End: 2019-09-26 | Stop reason: HOSPADM

## 2019-09-24 RX ORDER — CLOPIDOGREL BISULFATE 75 MG/1
75 TABLET ORAL DAILY
Status: DISCONTINUED | OUTPATIENT
Start: 2019-09-25 | End: 2019-09-26 | Stop reason: HOSPADM

## 2019-09-24 RX ORDER — GUAIFENESIN 100 MG/5ML
324 LIQUID (ML) ORAL
Status: COMPLETED | OUTPATIENT
Start: 2019-09-24 | End: 2019-09-24

## 2019-09-24 RX ORDER — HEPARIN SODIUM 5000 [USP'U]/100ML
12-25 INJECTION, SOLUTION INTRAVENOUS
Status: DISCONTINUED | OUTPATIENT
Start: 2019-09-24 | End: 2019-09-24 | Stop reason: HOSPADM

## 2019-09-24 RX ORDER — MORPHINE SULFATE 2 MG/ML
2 INJECTION, SOLUTION INTRAMUSCULAR; INTRAVENOUS
Status: DISCONTINUED | OUTPATIENT
Start: 2019-09-24 | End: 2019-09-26 | Stop reason: HOSPADM

## 2019-09-24 RX ORDER — AMLODIPINE BESYLATE 5 MG/1
5 TABLET ORAL DAILY
Status: DISCONTINUED | OUTPATIENT
Start: 2019-09-25 | End: 2019-09-25

## 2019-09-24 RX ORDER — NITROGLYCERIN 0.4 MG/1
0.4 TABLET SUBLINGUAL
Status: DISCONTINUED | OUTPATIENT
Start: 2019-09-24 | End: 2019-09-25 | Stop reason: SDUPTHER

## 2019-09-24 RX ORDER — CARVEDILOL 12.5 MG/1
12.5 TABLET ORAL 2 TIMES DAILY WITH MEALS
Status: DISCONTINUED | OUTPATIENT
Start: 2019-09-24 | End: 2019-09-25

## 2019-09-24 RX ORDER — EZETIMIBE 10 MG/1
10 TABLET ORAL DAILY
Status: DISCONTINUED | OUTPATIENT
Start: 2019-09-25 | End: 2019-09-24

## 2019-09-24 RX ORDER — HEPARIN SODIUM 5000 [USP'U]/ML
35 INJECTION, SOLUTION INTRAVENOUS; SUBCUTANEOUS ONCE
Status: COMPLETED | OUTPATIENT
Start: 2019-09-24 | End: 2019-09-24

## 2019-09-24 RX ORDER — SODIUM CHLORIDE 0.9 % (FLUSH) 0.9 %
5-40 SYRINGE (ML) INJECTION AS NEEDED
Status: DISCONTINUED | OUTPATIENT
Start: 2019-09-24 | End: 2019-09-25 | Stop reason: SDUPTHER

## 2019-09-24 RX ORDER — HEPARIN SODIUM 5000 [USP'U]/100ML
12-25 INJECTION, SOLUTION INTRAVENOUS
Status: DISCONTINUED | OUTPATIENT
Start: 2019-09-24 | End: 2019-09-26 | Stop reason: HOSPADM

## 2019-09-24 RX ORDER — ALLOPURINOL 300 MG/1
300 TABLET ORAL DAILY
Status: DISCONTINUED | OUTPATIENT
Start: 2019-09-24 | End: 2019-09-26 | Stop reason: HOSPADM

## 2019-09-24 RX ORDER — GUAIFENESIN 100 MG/5ML
81 LIQUID (ML) ORAL DAILY
Status: DISCONTINUED | OUTPATIENT
Start: 2019-09-25 | End: 2019-09-25 | Stop reason: SDUPTHER

## 2019-09-24 RX ORDER — ALLOPURINOL 300 MG/1
300 TABLET ORAL DAILY
Status: DISCONTINUED | OUTPATIENT
Start: 2019-09-25 | End: 2019-09-24

## 2019-09-24 RX ORDER — TACROLIMUS 1 MG/G
OINTMENT TOPICAL 2 TIMES DAILY
Status: DISCONTINUED | OUTPATIENT
Start: 2019-09-24 | End: 2019-09-24

## 2019-09-24 RX ORDER — SODIUM CHLORIDE 9 MG/ML
100 INJECTION, SOLUTION INTRAVENOUS CONTINUOUS
Status: DISCONTINUED | OUTPATIENT
Start: 2019-09-25 | End: 2019-09-26 | Stop reason: HOSPADM

## 2019-09-24 RX ADMIN — HEPARIN SODIUM 12 UNITS/KG/HR: 5000 INJECTION, SOLUTION INTRAVENOUS at 14:04

## 2019-09-24 RX ADMIN — CARVEDILOL 12.5 MG: 12.5 TABLET, FILM COATED ORAL at 17:46

## 2019-09-24 RX ADMIN — NITROGLYCERIN 0.4 MG: 0.4 TABLET SUBLINGUAL at 18:32

## 2019-09-24 RX ADMIN — GABAPENTIN 300 MG: 300 CAPSULE ORAL at 21:39

## 2019-09-24 RX ADMIN — GABAPENTIN 300 MG: 300 CAPSULE ORAL at 17:46

## 2019-09-24 RX ADMIN — HEPARIN SODIUM 2800 UNITS: 5000 INJECTION INTRAVENOUS; SUBCUTANEOUS at 22:12

## 2019-09-24 RX ADMIN — NITROGLYCERIN 1 INCH: 20 OINTMENT TOPICAL at 17:47

## 2019-09-24 RX ADMIN — ALLOPURINOL 300 MG: 300 TABLET ORAL at 20:55

## 2019-09-24 RX ADMIN — ASPIRIN 81 MG 324 MG: 81 TABLET ORAL at 13:32

## 2019-09-24 RX ADMIN — ACETAMINOPHEN 650 MG: 325 TABLET, FILM COATED ORAL at 21:39

## 2019-09-24 RX ADMIN — NITROGLYCERIN 0.4 MG: 0.4 TABLET SUBLINGUAL at 19:13

## 2019-09-24 RX ADMIN — Medication 10 ML: at 21:39

## 2019-09-24 NOTE — PROGRESS NOTES
MD Merrilee Cabot and Mirella Bailey NP made aware that the patient has been taking plavix as ordered. Patient wife states they have not missed or held any doses.

## 2019-09-24 NOTE — PROGRESS NOTES
Chart accessed to assist primary RN. Patient complaining of chest pain, 6/10 pressure across entire chest.  Patient placed on 2l nasal canula, 02 99%, 's/60's one nitro tablet given, patient states feeling relief. Primary Rn notified.

## 2019-09-24 NOTE — PROGRESS NOTES
TRANSFER - IN REPORT:    Verbal report received from [facility]) on [employer]  being received from [place] ED(unit) for routine progression of care      Report consisted of patients Situation, Background, Assessment and   Recommendations(SBAR). Information from the following report(s) SBAR, Kardex and MAR was reviewed with the receiving nurse. Opportunity for questions and clarification was provided. Will assume care when patient arrives on unit.

## 2019-09-24 NOTE — PROGRESS NOTES
Shift change report received from Marcelle Shelley RN (off going nurse). Plan of care reviewed with patient at bedside. Opportunity to ask questions provided. Denies needs at this time. Patient verbalized understanding about intake and output documentation and daily weight. Patient verbalized understand to use the urinal and to be mindful of PO intake. Pumps cleared and documented. Bed low and locked with call light within reach. Patient instructed to call for assistance. Patient verbalized understanding. Heparin gtt verified at bedside. Pt experiencing CP during shift change. Nitro pacth moved to mid-sternum. Nitro sublingual administered. Vitals taken. EKG ordered. No changes form previous EKG. CP relieved by nitro. Will continue to monitor.

## 2019-09-24 NOTE — PROGRESS NOTES
Patient arrived on unit, Ana Celestin NP at bedside. Patient denies any chest pain or shortness of breath at this time. Pt assisted to bed and placed on heart monitor. Per NP plan for [facility] tomorrow, heparin gtt infusing on admission. Dual skin assessment completed on admission. Heels and sacrum intact. Skin discoloration and raised areas noted on elbows bilat, pt states related to psoriasis. Pt has scars on chest and abdomen all CDI from previous surgeries. 2 bandaids in place on lower back that patient states were placed this week s/p injection from pain management doctor. No other impairments noted at this time.

## 2019-09-24 NOTE — ED NOTES
TRANSFER - OUT REPORT:    Verbal report given to [facility]) on [place]  being transferred to [facility](unit) for routine progression of care       Report consisted of patients Situation, Background, Assessment and   Recommendations(SBAR). Information from the following report(s) ED Summary was reviewed with the receiving nurse. Lines:   Peripheral IV 09/24/19 Right Hand (Active)   Site Assessment Clean, dry, & intact 9/24/2019 12:14 PM   Phlebitis Assessment 0 9/24/2019 12:14 PM   Infiltration Assessment 0 9/24/2019 12:14 PM   Dressing Status Clean, dry, & intact 9/24/2019 12:14 PM   Dressing Type Transparent 9/24/2019 12:14 PM   Hub Color/Line Status Blue 9/24/2019 12:14 PM   Action Taken Blood drawn 9/24/2019 12:14 PM       Peripheral IV 09/24/19 Left Wrist (Active)   Site Assessment Clean, dry, & intact 9/24/2019  2:00 PM   Phlebitis Assessment 0 9/24/2019  2:00 PM   Infiltration Assessment 0 9/24/2019  2:00 PM   Dressing Status Clean, dry, & intact 9/24/2019  2:00 PM   Dressing Type Tape;Transparent 9/24/2019  2:00 PM   Hub Color/Line Status Blue 9/24/2019  2:00 PM   Action Taken Blood drawn 9/24/2019  2:00 PM        Opportunity for questions and clarification was provided.       Patient transported with:   Hanh Harrell

## 2019-09-24 NOTE — ED TRIAGE NOTES
Pt in states mid chest pain starting yesterday after having pain medication injection at pain management yesterday. States pain went away and came back worse this morning. denies n/v/d/sob/cough.   Pt also c/o chronic bilateral foot pain

## 2019-09-24 NOTE — H&P
[address] Cardiology H&P    Admitting Cardiologist:Dr. Ronel Viveros    Primary Cardiologist:Dr. DONNA Martin     Primary Care Physician:DR Yadiel Shannon    Subjective:     José Manuel Ward is a 69 y.o. male with known hx of CAD, prior CABG, HTN, DM, last cath in March 2019 with 2/4 grafts patent and PCI performed to native proximal OM. He presented to  ER today with new onset of chest pain starting last night and recurring again today. He had no associated sob, nausea, vomiting or diaphoresis. Initial ECG notes NSR with nonspecific st/ t wave changes. Troponin is slightly elevated at 0.10. Heparin has been initiated on pt and he is accepted in transfer.  He is chest pain free upon arrival.     Past Medical History:   Diagnosis Date    Allergic rhinitis     Arthritis     CAD (coronary artery disease)     CABG '09; troponin elevated 7/14/12 (\"likely due to supply/demand mismatch in setting of fever and increased metabolic demand\"-per cardiac MD note 8/20/12)-had cath, echo, EKG-all WNL except cath showed 2 of the bypasses with blockages- \"occluded SVG to PDA & SVG to LISA\"; EF=55%    CVA (cerebral vascular accident) ([address] 08/2017    Left- no residual weakness    Diabetes mellitus type 2, controlled ([address]     restarted oral med (metformin 3/2018), does not check BG on regular basis, last hgba1c- 8 (3/12/18)    Dyslipidemia     ED (erectile dysfunction)     Encephalitis 1962    x 2/hospitalized as teenager    GERD (gastroesophageal reflux disease)     controlled with Nexium    Gout     hx of    Hypertension     Lacunar infarct, acute ([address]     possible embolic, remote a-fib- on eliquis    Lumbago     Memory loss     Mitral valve regurgitation 7/14/12    \"moderate\" on echo    ROBERTO (obstructive sleep apnea)     mild per patient, does not use CPAP    PAF (paroxysmal atrial fibrillation) ([address]     Prostate cancer ([address]     followed by urology    Psoriasis     topical creams    SBO (small bowel obstruction) ([facility] 2011, 2015, 2016    Stage 2 chronic kidney disease       Past Surgical History:   Procedure Laterality Date    ABDOMEN SURGERY PROC UNLISTED  5250    exp lap    HX APPENDECTOMY  age 49         HX CATARACT REMOVAL Bilateral 2013    iol     HX COLONOSCOPY  1998, 2010    HX CORONARY ARTERY BYPASS GRAFT  2009    x4 bypass    HX HERNIA REPAIR Bilateral 2012    HX RADICAL PROSTATECTOMY       HX SHOULDER REPLACEMENT Right 2018    Reverse R TSA    HX SPLENECTOMY  1951    MA LEFT HEART CATH,PERCUTANEOUS  7/2012    no intervention    MA PROSTATE BIOPSY, NEEDLE, SATURATION SAMPLING      and ultrasound    VASCULAR SURGERY PROCEDURE UNLIST  12/29/2017    aortogram, w/cass LE runoff,R-LE arteriogram      No current facility-administered medications for this encounter. Current Outpatient Medications   Medication Sig    ezetimibe (ZETIA) 10 mg tablet TAKE 1 TABLET BY MOUTH DAILY    sitagliptin phosphate (JANUVIA PO) Take  by mouth.  carvedilol (COREG) 12.5 mg tablet Take 1 Tab by mouth two (2) times daily (with meals).  clopidogrel (PLAVIX) 75 mg tab Take 1 Tab by mouth daily.  isosorbide mononitrate ER (IMDUR) 30 mg tablet Take 1 Tab by mouth daily.  amLODIPine (NORVASC) 5 mg tablet Take 1 Tab by mouth daily.  nitroglycerin (NITROSTAT) 0.4 mg SL tablet 1 Tab by SubLINGual route every five (5) minutes as needed for Chest Pain.  allopurinol (ZYLOPRIM) 300 mg tablet Take 1 Tab by mouth daily.  gabapentin (NEURONTIN) 300 mg capsule Take 1 Cap by mouth four (4) times daily. (Patient taking differently: Take 300 mg by mouth three (3) times daily.)    esomeprazole (NEXIUM) 40 mg capsule Take 1 Cap by mouth daily. Indications: am (Patient taking differently: Take 40 mg by mouth daily.  As needed)    glucose blood VI test strips (BLOOD GLUCOSE TEST) strip Test once to twice daily DX: E11.8    Lancets misc Test once to twice daily DX: E11.8    triamcinolone acetonide (KENALOG) 0.1 % ointment  tacrolimus (PROTOPIC) 0.1 % ointment     cholecalciferol, vitamin D3, (VITAMIN D3) 2,000 unit Tab Take 2,000 Units by mouth daily. Indications: OSTEOPOROSIS, am    Cetirizine (ZYRTEC) 10 mg Cap Take 10 mg by mouth nightly. As needed  Indications: inflammation of the nose due to an allergy     Facility-Administered Medications Ordered in Other Encounters   Medication Dose Route Frequency    heparin 25,000 units in dextrose 500 mL infusion  12-25 Units/kg/hr IntraVENous TITRATE     Allergies   Allergen Reactions    Celecoxib Swelling     Hands    Ibuprofen Unknown (comments)     Patient unsure    Lescol [Fluvastatin] Unknown (comments)     Other reaction(s): Unknown (comments)    Nsaids (Non-Steroidal Anti-Inflammatory Drug) Other (comments)     Elevated serum creatinine    Sulfa (Sulfonamide Antibiotics) Rash    Uloric [Febuxostat] Other (comments)     Joint Pain      Social History     Tobacco Use    Smoking status: Former Smoker     Packs/day: 1.00     Years: 15.00     Pack years: 15.00     Last attempt to quit: 1975     Years since quittin.2    Smokeless tobacco: Never Used   Substance Use Topics    Alcohol use: Yes     Comment: rarely- once/month      Family History   Problem Relation Age of Onset    Hypertension Mother    [address] [facility] Gout Mother     Arthritis-osteo Mother     Heart Disease Mother          of Heart Disease    Coronary Artery Disease Mother     Diabetes Father     Cancer Father     Heart Disease Father     Bleeding Prob Paternal Grandmother     Hypertension Brother     Cancer Maternal Uncle         Prostate        Review of Systems  Gen: Denies fever, chills, malaise or fatigue. Appetite good.    HEENT: Denies frequent headaches, dizzyness, visual disturbances, Neck pain or swallowing difficulty  Lungs: Denies shortness of breath, hx of COPD, breathing problems  Cardiovascular:as above   GI: Denies hememesis, dark tarry stools, No prior Hx of GI bleed, Denies constipation  : Denies dysuria, no complaints of frequency, nocturia  Heme: No prior bleeding disorders, no prior Cancer  Neuro: Denies prior CVA, TIA. Endocrine: no diabetes, thyroid disorders  Psychiatric: Denies anxiety, or other psychiatric illnesses. Objective:     General:Alert, cooperative, no distress, appears stated age  Head: Normocephalic, without obvious abnormality, atraumatic. Eyes: Conjunctivae/corneas clear. PERRL, EOMs intact  Nose:Nares normal. Septum midline. Mucosa normal. No drainage or sinus tenderness. Throat: Lips, mucosa, and tongue normal. Teeth and gums normal.   Neck: Supple, symmetrical, trachea midline,  no carotid bruit and no JVD. Lungs:Clear to auscultation bilaterally. Chest wall: No tenderness or deformity. Heart: Regular rate and rhythm, S1, S2 normal, no murmur, click, rub or gallop. Abdomen:Soft, non-tender. Bowel sounds normal. No masses, No organomegaly. Extremities: Extremities normal, atraumatic, no cyanosis or edema. Pulses: 2+ and symmetric all extremities. Skin: Skin color, texture, turgor normal. No rashes or lesions  Lymph nodes: Cervical, supraclavicular, and axillary nodes normal  Neurologic:No focal deficits identified                 ECG:NSr with nonspecific st/ twave changes.      Data Review:     Recent Results (from the past 24 hour(s))   EKG, 12 LEAD, INITIAL    Collection Time: 09/24/19 11:19 AM   Result Value Ref Range    Ventricular Rate 82 BPM    Atrial Rate 82 BPM    P-R Interval 160 ms    QRS Duration 84 ms    Q-T Interval 374 ms    QTC Calculation (Bezet) 436 ms    Calculated P Axis 63 degrees    Calculated R Axis 32 degrees    Calculated T Axis 79 degrees    Diagnosis       !!! Poor data quality, interpretation may be adversely affected  Normal sinus rhythm  Nonspecific T wave abnormality  Abnormal ECG  When compared with ECG of 31-MAR-2019 12:37,  No significant change was found  Confirmed by ST JIMY DOUGLASS MD (), DARLIN MICHEL ([id number]) on 9/24/2019 1:11:27 PM     CBC WITH AUTOMATED DIFF    Collection Time: 09/24/19 12:12 PM   Result Value Ref Range    WBC 8.4 4.3 - 11.1 K/uL    RBC 4.09 (L) 4.23 - 5.6 M/uL    HGB 12.4 (L) 13.6 - 17.2 g/dL    HCT 36.7 (L) 41.1 - 50.3 %    MCV 89.7 79.6 - 97.8 FL    MCH 30.3 26.1 - 32.9 PG    MCHC 33.8 31.4 - 35.0 g/dL    RDW 13.6 11.9 - 14.6 %    PLATELET 397 561 - 858 K/uL    MPV 11.1 9.4 - 12.3 FL    ABSOLUTE NRBC 0.00 0.0 - 0.2 K/uL    DF AUTOMATED      NEUTROPHILS 88 (H) 43 - 78 %    LYMPHOCYTES 8 (L) 13 - 44 %    MONOCYTES 4 4.0 - 12.0 %    EOSINOPHILS 0 (L) 0.5 - 7.8 %    BASOPHILS 0 0.0 - 2.0 %    IMMATURE GRANULOCYTES 0 0.0 - 5.0 %    ABS. NEUTROPHILS 7.4 1.7 - 8.2 K/UL    ABS. LYMPHOCYTES 0.6 0.5 - 4.6 K/UL    ABS. MONOCYTES 0.3 0.1 - 1.3 K/UL    ABS. EOSINOPHILS 0.0 0.0 - 0.8 K/UL    ABS. BASOPHILS 0.0 0.0 - 0.2 K/UL    ABS. IMM. GRANS. 0.0 0.0 - 0.5 K/UL   TROPONIN I    Collection Time: 09/24/19 12:12 PM   Result Value Ref Range    Troponin-I, Qt. 0.10 (HH) 0.02 - 6.39 NG/ML   METABOLIC PANEL, COMPREHENSIVE    Collection Time: 09/24/19 12:12 PM   Result Value Ref Range    Sodium 141 136 - 145 mmol/L    Potassium 3.9 3.5 - 5.1 mmol/L    Chloride 107 98 - 107 mmol/L    CO2 22 21 - 32 mmol/L    Anion gap 12 7 - 16 mmol/L    Glucose 206 (H) 65 - 100 mg/dL    BUN 15 8 - 23 MG/DL    Creatinine 1.51 (H) 0.8 - 1.5 MG/DL    GFR est AA 59 (L) >60 ml/min/1.73m2    GFR est non-AA 48 (L) >60 ml/min/1.73m2    Calcium 9.8 8.3 - 10.4 MG/DL    Bilirubin, total 0.4 0.2 - 1.1 MG/DL    ALT (SGPT) 15 12 - 65 U/L    AST (SGOT) 25 15 - 37 U/L    Alk. phosphatase 79 50 - 136 U/L    Protein, total 8.0 6.3 - 8.2 g/dL    Albumin 3.2 3.2 - 4.6 g/dL    Globulin 4.8 (H) 2.3 - 3.5 g/dL    A-G Ratio 0.7 (L) 1.2 - 3.5           Assessment / Plan     Principal Problem:    Unstable angina (HCC) (9/24/2019)--with known hx of CAD, CABG, elevated troponin.  IV heparin initiated, will plan for [facility] possible PCI in am. Pt is uncertain if he was taking plavix. Will check with wife. Active Problems:    Coronary atherosclerosis of native coronary artery (3/6/2009)-continue plavix/ asa       Dyslipidemia (3/6/2009)--statin       S/P CABG (coronary artery bypass graft) (3/10/2009)      Overview: taken off statins after an episode of idiopathic jaundice, lipids followed       by PCP       3/9/09: LIMA to LAD, SVGs to OM1, OM2 and PDA. Keloid former, required       dermatologist care      Cath 7/17/12: Occluded SVG to PDA, Occluded SVG to OM1. Patent LIMA       feeding a small vessel. Patent SVG to OM2, small target vessel. Native       Cx small in the AV groove, diffusely small vessels. Sep 2015 -  Stress MPI for chest discomfort - 6 and a half minutes, chest       discomfort (declined NTG), non diagnostic EKG changes, normal perfusion       and LVEF. Hypertensive response to 222/88       Apr 2018 - vasodilator perfusion study normal LVEF, perfusion      HTN (hypertension) (7/14/2012)--continue home meds, titrate as needed. CKD (chronic kidney disease) stage 3, GFR 30-59 ml/min ([facility]) (2/24/2016)--monitor closely       Type 2 diabetes with nephropathy ([facility] (6/12/2018)--continue home meds, titrate as needed.                Oumar Suggs, NP

## 2019-09-24 NOTE — PROGRESS NOTES
Maribell Zhao NP made aware of critical troponin 0.19. Pt had one episode of chest pain relieved by 1 SL nitro tab. Pt has nitro paste applied to R arm. Pt chest pain free at this time. Heparin gtt infusing, [facility] planned for tomorrow. No new orders at this time.

## 2019-09-24 NOTE — ED PROVIDER NOTES
Patient is a 68 yo male who presents with chest pain. States pressure across the center of his chest. States began last night and improved and then again this morning. No cough or shortness of breath, no fevers or chills, no nausea or vomiting. Patient with history of  CAD s/p stents placed earlier this year.            Past Medical History:   Diagnosis Date    Allergic rhinitis     Arthritis     CAD (coronary artery disease)     CABG '09; troponin elevated 7/14/12 (\"likely due to supply/demand mismatch in setting of fever and increased metabolic demand\"-per cardiac MD note 8/20/12)-had cath, echo, EKG-all WNL except cath showed 2 of the bypasses with blockages- \"occluded SVG to PDA & SVG to LISA\"; EF=55%    CVA (cerebral vascular accident) ([address] 08/2017    Left- no residual weakness    Diabetes mellitus type 2, controlled ([address]     restarted oral med (metformin 3/2018), does not check BG on regular basis, last hgba1c- 8 (3/12/18)    Dyslipidemia     ED (erectile dysfunction)     Encephalitis 1962    x 2/hospitalized as teenager    GERD (gastroesophageal reflux disease)     controlled with Nexium    Gout     hx of    Hypertension     Lacunar infarct, acute ([address]     possible embolic, remote a-fib- on eliquis    Lumbago     Memory loss     Mitral valve regurgitation 7/14/12    \"moderate\" on echo    ROBERTO (obstructive sleep apnea)     mild per patient, does not use CPAP    PAF (paroxysmal atrial fibrillation) ([address]     Prostate cancer ([address]     followed by urology    Psoriasis     topical creams    SBO (small bowel obstruction) ([address] 2011, 2015, 2016    Stage 2 chronic kidney disease        Past Surgical History:   Procedure Laterality Date    ABDOMEN SURGERY PROC UNLISTED  1967    exp lap    HX APPENDECTOMY  age 48         HX CATARACT REMOVAL Bilateral 2013    iol     HX COLONOSCOPY  1998, 2010    HX CORONARY ARTERY BYPASS GRAFT  2009    x4 bypass    HX HERNIA REPAIR Bilateral 2012    HX RADICAL PROSTATECTOMY       HX SHOULDER REPLACEMENT Right 2018    Reverse R TSA    HX SPLENECTOMY  1951    VA LEFT HEART CATH,PERCUTANEOUS  2012    no intervention    VA PROSTATE BIOPSY, NEEDLE, SATURATION SAMPLING      and ultrasound    VASCULAR SURGERY PROCEDURE UNLIST  2017    aortogram, w/cass LE runoff,R-LE arteriogram         Family History:   Problem Relation Age of Onset    Hypertension Mother    [facility] Gout Mother     Arthritis-osteo Mother     Heart Disease Mother          of Heart Disease    Coronary Artery Disease Mother     Diabetes Father     Cancer Father     Heart Disease Father     Bleeding Prob Paternal Grandmother     Hypertension Brother     Cancer Maternal Uncle         Prostate       Social History     Socioeconomic History    Marital status:      Spouse name: Not on file    Number of children: Not on file    Years of education: Not on file    Highest education level: Not on file   Occupational History    Not on file   Social Needs    Financial resource strain: Not on file    Food insecurity:     Worry: Not on file     Inability: Not on file    Transportation needs:     Medical: Not on file     Non-medical: Not on file   Tobacco Use    Smoking status: Former Smoker     Packs/day: 1.00     Years: 15.00     Pack years: 15.00     Last attempt to quit: 1975     Years since quittin.2    Smokeless tobacco: Never Used   Substance and Sexual Activity    Alcohol use: Yes     Comment: rarely- once/month    Drug use: No    Sexual activity: Yes     Partners: Female   Lifestyle    Physical activity:     Days per week: Not on file     Minutes per session: Not on file    Stress: Not on file   Relationships    Social connections:     Talks on phone: Not on file     Gets together: Not on file     Attends Yarsani service: Not on file     Active member of club or organization: Not on file     Attends meetings of clubs or organizations: Not on file Relationship status: Not on file    Intimate partner violence:     Fear of current or ex partner: Not on file     Emotionally abused: Not on file     Physically abused: Not on file     Forced sexual activity: Not on file   Other Topics Concern    Dental Braces Not Asked    Endoscopic Camera Pill Not Asked    Metallic Foreign Body Not Asked    Medication Patches Not Asked    Taking Feraheme Not Asked    Claustrophobic Not Asked    Removable Dental Work Not Asked    Hearing Aids Not Asked    Body Piercing Not Asked    Radiation Seeds Not Asked    Pregnant or Breast Feeding Not Asked    Wounded by Shrapnel or Bullet Not Asked    Other-See Comment Not Asked    Other Implant-See Comment Not Asked   Social History Narrative    Not on file         ALLERGIES: Celecoxib; Ibuprofen; Lescol [fluvastatin]; Nsaids (non-steroidal anti-inflammatory drug); Sulfa (sulfonamide antibiotics); and Uloric [febuxostat]    Review of Systems   Constitutional: Negative for chills and fever. HENT: Negative for rhinorrhea and sore throat. Eyes: Negative for visual disturbance. Respiratory: Negative for cough and shortness of breath. Cardiovascular: Positive for chest pain. Negative for leg swelling. Gastrointestinal: Negative for abdominal pain, diarrhea, nausea and vomiting. Genitourinary: Negative for dysuria. Musculoskeletal: Negative for back pain and neck pain. Skin: Negative for rash. Neurological: Negative for weakness and headaches. Psychiatric/Behavioral: The patient is not nervous/anxious. Vitals:    09/24/19 1124   BP: 184/87   Pulse: 82   Resp: 18   Temp: 98 °F (36.7 °C)   SpO2: 96%   Weight: 83.9 kg (185 lb)   Height: 5' 10.5\" (1.791 m)            Physical Exam   Constitutional: He is oriented to person, place, and time. He appears well-developed and well-nourished. HENT:   Head: Normocephalic.    Right Ear: External ear normal.   Left Ear: External ear normal.   Eyes: Pupils are equal, round, and reactive to light. Conjunctivae and EOM are normal.   Neck: Normal range of motion. Neck supple. No tracheal deviation present. Cardiovascular: Normal rate, regular rhythm, normal heart sounds and intact distal pulses. No murmur heard. Pulmonary/Chest: Effort normal and breath sounds normal. No respiratory distress. Abdominal: Soft. There is no tenderness. Musculoskeletal: Normal range of motion. Neurological: He is alert and oriented to person, place, and time. No cranial nerve deficit. Skin: No rash noted. Nursing note and vitals reviewed.        MDM  Number of Diagnoses or Management Options     Amount and/or Complexity of Data Reviewed  Clinical lab tests: ordered and reviewed  Tests in the radiology section of CPT®: ordered and reviewed  Tests in the medicine section of CPT®: ordered and reviewed  Review and summarize past medical records: yes    Risk of Complications, Morbidity, and/or Mortality  Presenting problems: high  Diagnostic procedures: high  Management options: high    Patient Progress  Patient progress: stable         Procedures  Recent Results (from the past 12 hour(s))   EKG, 12 LEAD, INITIAL    Collection Time: 09/24/19 11:19 AM   Result Value Ref Range    Ventricular Rate 82 BPM    Atrial Rate 82 BPM    P-R Interval 160 ms    QRS Duration 84 ms    Q-T Interval 374 ms    QTC Calculation (Bezet) 436 ms    Calculated P Axis 63 degrees    Calculated R Axis 32 degrees    Calculated T Axis 79 degrees    Diagnosis       !!! Poor data quality, interpretation may be adversely affected  Normal sinus rhythm  Nonspecific T wave abnormality  Abnormal ECG  When compared with ECG of 31-MAR-2019 12:37,  No significant change was found  Confirmed by ST JIMY DOUGLASS MD (), DARLIN MICHEL ([id number]) on 9/24/2019 1:11:27 PM     CBC WITH AUTOMATED DIFF    Collection Time: 09/24/19 12:12 PM   Result Value Ref Range    WBC 8.4 4.3 - 11.1 K/uL    RBC 4.09 (L) 4.23 - 5.6 M/uL    HGB 12.4 (L) 13.6 - 17.2 g/dL    HCT 36.7 (L) 41.1 - 50.3 %    MCV 89.7 79.6 - 97.8 FL    MCH 30.3 26.1 - 32.9 PG    MCHC 33.8 31.4 - 35.0 g/dL    RDW 13.6 11.9 - 14.6 %    PLATELET 388 384 - 184 K/uL    MPV 11.1 9.4 - 12.3 FL    ABSOLUTE NRBC 0.00 0.0 - 0.2 K/uL    DF AUTOMATED      NEUTROPHILS 88 (H) 43 - 78 %    LYMPHOCYTES 8 (L) 13 - 44 %    MONOCYTES 4 4.0 - 12.0 %    EOSINOPHILS 0 (L) 0.5 - 7.8 %    BASOPHILS 0 0.0 - 2.0 %    IMMATURE GRANULOCYTES 0 0.0 - 5.0 %    ABS. NEUTROPHILS 7.4 1.7 - 8.2 K/UL    ABS. LYMPHOCYTES 0.6 0.5 - 4.6 K/UL    ABS. MONOCYTES 0.3 0.1 - 1.3 K/UL    ABS. EOSINOPHILS 0.0 0.0 - 0.8 K/UL    ABS. BASOPHILS 0.0 0.0 - 0.2 K/UL    ABS. IMM. GRANS. 0.0 0.0 - 0.5 K/UL   TROPONIN I    Collection Time: 09/24/19 12:12 PM   Result Value Ref Range    Troponin-I, Qt. 0.10 (HH) 0.02 - 9.74 NG/ML   METABOLIC PANEL, COMPREHENSIVE    Collection Time: 09/24/19 12:12 PM   Result Value Ref Range    Sodium 141 136 - 145 mmol/L    Potassium 3.9 3.5 - 5.1 mmol/L    Chloride 107 98 - 107 mmol/L    CO2 22 21 - 32 mmol/L    Anion gap 12 7 - 16 mmol/L    Glucose 206 (H) 65 - 100 mg/dL    BUN 15 8 - 23 MG/DL    Creatinine 1.51 (H) 0.8 - 1.5 MG/DL    GFR est AA 59 (L) >60 ml/min/1.73m2    GFR est non-AA 48 (L) >60 ml/min/1.73m2    Calcium 9.8 8.3 - 10.4 MG/DL    Bilirubin, total 0.4 0.2 - 1.1 MG/DL    ALT (SGPT) 15 12 - 65 U/L    AST (SGOT) 25 15 - 37 U/L    Alk. phosphatase 79 50 - 136 U/L    Protein, total 8.0 6.3 - 8.2 g/dL    Albumin 3.2 3.2 - 4.6 g/dL    Globulin 4.8 (H) 2.3 - 3.5 g/dL    A-G Ratio 0.7 (L) 1.2 - 3.5       Xr Chest Pa Lat    Result Date: 9/24/2019  CHEST X-RAY, 2 views. HISTORY:  Midsternal chest pain. TECHNIQUE: PA and lateral views. COMPARISON: March 2019. FINDINGS: The lungs are clear. The heart size is normal. The costophrenic angles are sharp. The pulmonary vasculature is unremarkable. Included portion of the upper abdomen is unremarkable. There are sternal wires.      IMPRESSION: Negative for acute abnormality. 70 yo male with chest pain:    Positive troponin, EKG is NSR without acute ischemic changes however HEART score 5+ so discussed with Dr. Ira Bland from cardiology and will admit to cardiology and Dr. Ira Bland request we start heparin at this time.

## 2019-09-25 LAB
ANION GAP SERPL CALC-SCNC: 7 MMOL/L (ref 7–16)
APTT PPP: 126.8 SEC (ref 24.7–39.8)
ATRIAL RATE: 65 BPM
ATRIAL RATE: 76 BPM
BUN SERPL-MCNC: 17 MG/DL (ref 8–23)
CALCIUM SERPL-MCNC: 9.6 MG/DL (ref 8.3–10.4)
CALCULATED P AXIS, ECG09: 56 DEGREES
CALCULATED P AXIS, ECG09: 61 DEGREES
CALCULATED R AXIS, ECG10: 26 DEGREES
CALCULATED R AXIS, ECG10: 27 DEGREES
CALCULATED T AXIS, ECG11: 47 DEGREES
CALCULATED T AXIS, ECG11: 73 DEGREES
CHLORIDE SERPL-SCNC: 111 MMOL/L (ref 98–107)
CHOLEST SERPL-MCNC: 241 MG/DL
CO2 SERPL-SCNC: 24 MMOL/L (ref 21–32)
CREAT SERPL-MCNC: 1.28 MG/DL (ref 0.8–1.5)
DIAGNOSIS, 93000: NORMAL
DIAGNOSIS, 93000: NORMAL
ERYTHROCYTE [DISTWIDTH] IN BLOOD BY AUTOMATED COUNT: 13.2 % (ref 11.9–14.6)
EST. AVERAGE GLUCOSE BLD GHB EST-MCNC: 157 MG/DL
GLUCOSE SERPL-MCNC: 150 MG/DL (ref 65–100)
HBA1C MFR BLD: 7.1 % (ref 4.8–6)
HCT VFR BLD AUTO: 33 % (ref 41.1–50.3)
HDLC SERPL-MCNC: 52 MG/DL (ref 40–60)
HDLC SERPL: 4.6 {RATIO}
HGB BLD-MCNC: 11.3 G/DL (ref 13.6–17.2)
LDLC SERPL CALC-MCNC: 173.4 MG/DL
LIPID PROFILE,FLP: ABNORMAL
MCH RBC QN AUTO: 30.5 PG (ref 26.1–32.9)
MCHC RBC AUTO-ENTMCNC: 34.2 G/DL (ref 31.4–35)
MCV RBC AUTO: 88.9 FL (ref 79.6–97.8)
NRBC # BLD: 0 K/UL (ref 0–0.2)
P-R INTERVAL, ECG05: 158 MS
P-R INTERVAL, ECG05: 158 MS
PLATELET # BLD AUTO: 220 K/UL (ref 150–450)
PMV BLD AUTO: 11.8 FL (ref 9.4–12.3)
POTASSIUM SERPL-SCNC: 3.6 MMOL/L (ref 3.5–5.1)
Q-T INTERVAL, ECG07: 406 MS
Q-T INTERVAL, ECG07: 406 MS
QRS DURATION, ECG06: 76 MS
QRS DURATION, ECG06: 80 MS
QTC CALCULATION (BEZET), ECG08: 422 MS
QTC CALCULATION (BEZET), ECG08: 456 MS
RBC # BLD AUTO: 3.71 M/UL (ref 4.23–5.6)
SODIUM SERPL-SCNC: 142 MMOL/L (ref 136–145)
TRIGL SERPL-MCNC: 78 MG/DL (ref 35–150)
VENTRICULAR RATE, ECG03: 65 BPM
VENTRICULAR RATE, ECG03: 76 BPM
VLDLC SERPL CALC-MCNC: 15.6 MG/DL (ref 6–23)
WBC # BLD AUTO: 9.1 K/UL (ref 4.3–11.1)

## 2019-09-25 PROCEDURE — 74011000250 HC RX REV CODE- 250: Performed by: INTERNAL MEDICINE

## 2019-09-25 PROCEDURE — 36415 COLL VENOUS BLD VENIPUNCTURE: CPT

## 2019-09-25 PROCEDURE — 83036 HEMOGLOBIN GLYCOSYLATED A1C: CPT

## 2019-09-25 PROCEDURE — 93459 L HRT ART/GRFT ANGIO: CPT

## 2019-09-25 PROCEDURE — 74011000258 HC RX REV CODE- 258: Performed by: INTERNAL MEDICINE

## 2019-09-25 PROCEDURE — 85027 COMPLETE CBC AUTOMATED: CPT

## 2019-09-25 PROCEDURE — 74011250636 HC RX REV CODE- 250/636

## 2019-09-25 PROCEDURE — 93005 ELECTROCARDIOGRAM TRACING: CPT | Performed by: INTERNAL MEDICINE

## 2019-09-25 PROCEDURE — B2131ZZ FLUOROSCOPY OF MULTIPLE CORONARY ARTERY BYPASS GRAFTS USING LOW OSMOLAR CONTRAST: ICD-10-PCS | Performed by: INTERNAL MEDICINE

## 2019-09-25 PROCEDURE — 02703ZZ DILATION OF CORONARY ARTERY, ONE ARTERY, PERCUTANEOUS APPROACH: ICD-10-PCS | Performed by: INTERNAL MEDICINE

## 2019-09-25 PROCEDURE — 77030020263 HC SOL INJ SOD CL0.9% LFCR 1000ML

## 2019-09-25 PROCEDURE — 99152 MOD SED SAME PHYS/QHP 5/>YRS: CPT

## 2019-09-25 PROCEDURE — 65660000000 HC RM CCU STEPDOWN

## 2019-09-25 PROCEDURE — 80061 LIPID PANEL: CPT

## 2019-09-25 PROCEDURE — B2181ZZ FLUOROSCOPY OF LEFT INTERNAL MAMMARY BYPASS GRAFT USING LOW OSMOLAR CONTRAST: ICD-10-PCS | Performed by: INTERNAL MEDICINE

## 2019-09-25 PROCEDURE — 85730 THROMBOPLASTIN TIME PARTIAL: CPT

## 2019-09-25 PROCEDURE — 4A023N7 MEASUREMENT OF CARDIAC SAMPLING AND PRESSURE, LEFT HEART, PERCUTANEOUS APPROACH: ICD-10-PCS | Performed by: INTERNAL MEDICINE

## 2019-09-25 PROCEDURE — 80048 BASIC METABOLIC PNL TOTAL CA: CPT

## 2019-09-25 PROCEDURE — B2151ZZ FLUOROSCOPY OF LEFT HEART USING LOW OSMOLAR CONTRAST: ICD-10-PCS | Performed by: INTERNAL MEDICINE

## 2019-09-25 PROCEDURE — B2111ZZ FLUOROSCOPY OF MULTIPLE CORONARY ARTERIES USING LOW OSMOLAR CONTRAST: ICD-10-PCS | Performed by: INTERNAL MEDICINE

## 2019-09-25 PROCEDURE — 93306 TTE W/DOPPLER COMPLETE: CPT

## 2019-09-25 PROCEDURE — 92937 PRQ TRLUML REVSC CAB GRF 1: CPT

## 2019-09-25 PROCEDURE — 74011636320 HC RX REV CODE- 636/320: Performed by: INTERNAL MEDICINE

## 2019-09-25 PROCEDURE — 74011250636 HC RX REV CODE- 250/636: Performed by: NURSE PRACTITIONER

## 2019-09-25 PROCEDURE — 74011250637 HC RX REV CODE- 250/637: Performed by: INTERNAL MEDICINE

## 2019-09-25 PROCEDURE — 99153 MOD SED SAME PHYS/QHP EA: CPT

## 2019-09-25 PROCEDURE — 92920 PRQ TRLUML C ANGIOP 1ART&/BR: CPT

## 2019-09-25 PROCEDURE — 74011250636 HC RX REV CODE- 250/636: Performed by: INTERNAL MEDICINE

## 2019-09-25 PROCEDURE — 74011250637 HC RX REV CODE- 250/637: Performed by: NURSE PRACTITIONER

## 2019-09-25 RX ORDER — ROSUVASTATIN CALCIUM 20 MG/1
20 TABLET, COATED ORAL
Status: DISCONTINUED | OUTPATIENT
Start: 2019-09-25 | End: 2019-09-26 | Stop reason: HOSPADM

## 2019-09-25 RX ORDER — SODIUM CHLORIDE 9 MG/ML
75 INJECTION, SOLUTION INTRAVENOUS CONTINUOUS
Status: DISCONTINUED | OUTPATIENT
Start: 2019-09-25 | End: 2019-09-26 | Stop reason: HOSPADM

## 2019-09-25 RX ORDER — SODIUM CHLORIDE 0.9 % (FLUSH) 0.9 %
5-40 SYRINGE (ML) INJECTION EVERY 8 HOURS
Status: DISCONTINUED | OUTPATIENT
Start: 2019-09-25 | End: 2019-09-26 | Stop reason: HOSPADM

## 2019-09-25 RX ORDER — LORAZEPAM 1 MG/1
1 TABLET ORAL
Status: DISCONTINUED | OUTPATIENT
Start: 2019-09-25 | End: 2019-09-26 | Stop reason: HOSPADM

## 2019-09-25 RX ORDER — MIDAZOLAM HYDROCHLORIDE 1 MG/ML
.5-2 INJECTION, SOLUTION INTRAMUSCULAR; INTRAVENOUS
Status: DISCONTINUED | OUTPATIENT
Start: 2019-09-25 | End: 2019-09-25

## 2019-09-25 RX ORDER — HEPARIN SODIUM 200 [USP'U]/100ML
3 INJECTION, SOLUTION INTRAVENOUS CONTINUOUS
Status: DISCONTINUED | OUTPATIENT
Start: 2019-09-25 | End: 2019-09-26 | Stop reason: HOSPADM

## 2019-09-25 RX ORDER — SODIUM CHLORIDE 0.9 % (FLUSH) 0.9 %
5-40 SYRINGE (ML) INJECTION AS NEEDED
Status: DISCONTINUED | OUTPATIENT
Start: 2019-09-25 | End: 2019-09-26 | Stop reason: HOSPADM

## 2019-09-25 RX ORDER — ACETAMINOPHEN 325 MG/1
650 TABLET ORAL
Status: DISCONTINUED | OUTPATIENT
Start: 2019-09-25 | End: 2019-09-26 | Stop reason: HOSPADM

## 2019-09-25 RX ORDER — CARVEDILOL 25 MG/1
25 TABLET ORAL 2 TIMES DAILY WITH MEALS
Status: DISCONTINUED | OUTPATIENT
Start: 2019-09-25 | End: 2019-09-26 | Stop reason: HOSPADM

## 2019-09-25 RX ORDER — CLOPIDOGREL BISULFATE 75 MG/1
300 TABLET ORAL
Status: COMPLETED | OUTPATIENT
Start: 2019-09-25 | End: 2019-09-25

## 2019-09-25 RX ORDER — MAG HYDROX/ALUMINUM HYD/SIMETH 200-200-20
30 SUSPENSION, ORAL (FINAL DOSE FORM) ORAL ONCE
Status: COMPLETED | OUTPATIENT
Start: 2019-09-25 | End: 2019-09-25

## 2019-09-25 RX ORDER — LIDOCAINE HYDROCHLORIDE 10 MG/ML
10-200 INJECTION INFILTRATION; PERINEURAL ONCE
Status: COMPLETED | OUTPATIENT
Start: 2019-09-25 | End: 2019-09-25

## 2019-09-25 RX ORDER — GUAIFENESIN 100 MG/5ML
81 LIQUID (ML) ORAL DAILY
Status: DISCONTINUED | OUTPATIENT
Start: 2019-09-25 | End: 2019-09-26 | Stop reason: HOSPADM

## 2019-09-25 RX ORDER — ISOSORBIDE MONONITRATE 60 MG/1
60 TABLET, EXTENDED RELEASE ORAL DAILY
Status: DISCONTINUED | OUTPATIENT
Start: 2019-09-25 | End: 2019-09-26 | Stop reason: HOSPADM

## 2019-09-25 RX ORDER — NITROGLYCERIN 0.4 MG/1
0.4 TABLET SUBLINGUAL
Status: DISCONTINUED | OUTPATIENT
Start: 2019-09-25 | End: 2019-09-26 | Stop reason: HOSPADM

## 2019-09-25 RX ORDER — LISINOPRIL 20 MG/1
20 TABLET ORAL DAILY
Status: DISCONTINUED | OUTPATIENT
Start: 2019-09-25 | End: 2019-09-26 | Stop reason: HOSPADM

## 2019-09-25 RX ORDER — AMLODIPINE BESYLATE 10 MG/1
10 TABLET ORAL DAILY
Status: DISCONTINUED | OUTPATIENT
Start: 2019-09-26 | End: 2019-09-26 | Stop reason: HOSPADM

## 2019-09-25 RX ORDER — FENTANYL CITRATE 50 UG/ML
25-50 INJECTION, SOLUTION INTRAMUSCULAR; INTRAVENOUS
Status: DISCONTINUED | OUTPATIENT
Start: 2019-09-25 | End: 2019-09-26 | Stop reason: HOSPADM

## 2019-09-25 RX ADMIN — LIDOCAINE HYDROCHLORIDE 2 ML: 10 INJECTION, SOLUTION INFILTRATION; PERINEURAL at 08:36

## 2019-09-25 RX ADMIN — CARVEDILOL 12.5 MG: 12.5 TABLET, FILM COATED ORAL at 07:49

## 2019-09-25 RX ADMIN — NITROGLYCERIN 1 INCH: 20 OINTMENT TOPICAL at 00:23

## 2019-09-25 RX ADMIN — CLOPIDOGREL BISULFATE 75 MG: 75 TABLET ORAL at 07:48

## 2019-09-25 RX ADMIN — SODIUM CHLORIDE 75 ML/HR: 900 INJECTION, SOLUTION INTRAVENOUS at 11:04

## 2019-09-25 RX ADMIN — SODIUM CHLORIDE 100 ML/HR: 900 INJECTION, SOLUTION INTRAVENOUS at 00:12

## 2019-09-25 RX ADMIN — AMLODIPINE BESYLATE 5 MG: 5 TABLET ORAL at 07:52

## 2019-09-25 RX ADMIN — ALUMINUM HYDROXIDE, MAGNESIUM HYDROXIDE, AND SIMETHICONE 30 ML: 200; 200; 20 SUSPENSION ORAL at 09:06

## 2019-09-25 RX ADMIN — Medication 5 ML: at 06:04

## 2019-09-25 RX ADMIN — MIDAZOLAM HYDROCHLORIDE 2 MG: 1 INJECTION, SOLUTION INTRAMUSCULAR; INTRAVENOUS at 08:35

## 2019-09-25 RX ADMIN — Medication 5 ML: at 21:41

## 2019-09-25 RX ADMIN — GABAPENTIN 300 MG: 300 CAPSULE ORAL at 07:49

## 2019-09-25 RX ADMIN — ACETAMINOPHEN 650 MG: 325 TABLET, FILM COATED ORAL at 21:40

## 2019-09-25 RX ADMIN — HEPARIN SODIUM 2 ML: 10000 INJECTION, SOLUTION INTRAVENOUS; SUBCUTANEOUS at 08:36

## 2019-09-25 RX ADMIN — GABAPENTIN 300 MG: 300 CAPSULE ORAL at 17:29

## 2019-09-25 RX ADMIN — LISINOPRIL 20 MG: 20 TABLET ORAL at 12:20

## 2019-09-25 RX ADMIN — FENTANYL CITRATE 50 MCG: 50 INJECTION, SOLUTION INTRAMUSCULAR; INTRAVENOUS at 08:35

## 2019-09-25 RX ADMIN — ASPIRIN 81 MG 81 MG: 81 TABLET ORAL at 07:48

## 2019-09-25 RX ADMIN — IOPAMIDOL 95 ML: 755 INJECTION, SOLUTION INTRAVENOUS at 09:12

## 2019-09-25 RX ADMIN — NITROGLYCERIN 1 INCH: 20 OINTMENT TOPICAL at 06:15

## 2019-09-25 RX ADMIN — ISOSORBIDE MONONITRATE 60 MG: 60 TABLET, EXTENDED RELEASE ORAL at 12:19

## 2019-09-25 RX ADMIN — BIVALIRUDIN 1.75 MG/KG/HR: 250 INJECTION, POWDER, LYOPHILIZED, FOR SOLUTION INTRAVENOUS at 08:58

## 2019-09-25 RX ADMIN — HEPARIN SODIUM 3 ML/HR: 200 INJECTION, SOLUTION INTRAVENOUS at 08:15

## 2019-09-25 RX ADMIN — CLOPIDOGREL BISULFATE 600 MG: 75 TABLET ORAL at 09:06

## 2019-09-25 RX ADMIN — CARVEDILOL 25 MG: 25 TABLET, FILM COATED ORAL at 17:29

## 2019-09-25 RX ADMIN — GABAPENTIN 300 MG: 300 CAPSULE ORAL at 21:40

## 2019-09-25 RX ADMIN — PANTOPRAZOLE SODIUM 40 MG: 40 TABLET, DELAYED RELEASE ORAL at 06:41

## 2019-09-25 RX ADMIN — ROSUVASTATIN CALCIUM 20 MG: 20 TABLET, COATED ORAL at 21:40

## 2019-09-25 NOTE — PROGRESS NOTES
Nutrition   BPA for EN/TPN PTA    Pt endorses having a FT following a procedure when he was 8 or 9 years. Per pt, eating well PO PTA. RD to f/u per protocol or by MD consult prior.      Faisal Hernandez [address], [address] [place] [address], [address] [place] [address], [address] [place] [address] Ave.

## 2019-09-25 NOTE — PROGRESS NOTES
Radial compression band removed at 1300 after slowly reducing air from 12 cc to zero as per hospital protocol. No bleeding or hematoma noted. 2 x 2 gauze with tegaderm placed over puncture site. The affected extremity is warm and dry to the touch. Frequent vital signs printed and placed on bedside chart. Patient instructed to call if any bleeding noted on gauze. Patient verbalized understanding the nursing instructions.

## 2019-09-25 NOTE — PROGRESS NOTES
Problem: Falls - Risk of  Goal: *Absence of Falls  Description  Document Rebbecca Persons Fall Risk and appropriate interventions in the flowsheet.   Outcome: Progressing Towards Goal  Note:   Fall Risk Interventions:            Medication Interventions: Patient to call before getting OOB, Teach patient to arise slowly

## 2019-09-25 NOTE — PROCEDURES
[facility]  CARDIAC CATH    Name:  Micah Mota  MR#:  [id number]  :  1945  ACCOUNT #:  [de-identified]  DATE OF SERVICE:  2019    PRIMARY CARDIOLOGIST:  Aditya Manning MD    PRIMARY CARE PHYSICIAN:  Ana Vázquez MD    BRIEF HISTORY:  The patient is a 81-year-old gentleman with prior coronary bypass grafting admitted in March with a ST elevation myocardial infarction with PCI and stenting of the distal vein graft to the OM as well as the proximal vein graft to the OM. There is chronically occluded vein graft to the right coronary artery with severe small vessel disease involving the entire right posterior descending and posterolateral branches as well as the OM distal to the vein graft. He is readmitted with a non-ST-elevation myocardial infarction for cardiac catheterization. PREOPERATIVE DIAGNOSIS:  Non-ST-elevation myocardial infarction. POSTOPERATIVE DIAGNOSIS:  Coronary artery disease. PROCEDURES PERFORMED:  Bilateral selective coronary angiography, left ventricular pressure measurements, saphenous vein graft injection x2, left internal mammary artery injection, intervention of vein graft to the OM. SURGEON:  Efrain Borrero MD    ASSISTANT:  None. COMPLICATIONS:  None. ANESTHESIA:  Conscious sedation. Start time 08:32, end time 09:26. MEDICATIONS:  2 mg of Versed, 50 mcg of fentanyl. MONITORING RN:  Chapincito Hager. IMPLANTS:  None. SPECIMENS:  None. ESTIMATED BLOOD LOSS:  Less than 5 mL. PROCEDURE:  After informed consent, he was prepped and draped in the usual sterile fashion. Left wrist was infiltrated with lidocaine. Left radial artery was accessed. A 6-Malian sheath was advanced. A 6-Malian CONRAD catheter was utilized for left internal mammary artery injection. A 6-Malian JL3.5 was utilized for left coronary artery injection. A 6-Malian JR4 was utilized for right coronary injection.   A multipurpose catheter was utilized for saphenous vein graft injection of the right coronary artery and saphenous vein graft injection of the obtuse marginal as well as left ventricular pressure measurements. Left ventriculography was not obtained due to underlying chronic renal insufficiency. PCI was performed to the vein graft with a 6-Croatian AL1 guide. CONTRAST:  100 mL Omnipaque. FINDINGS:  1. Left ventricle:  Left ventriculogram not obtained. Left ventricular end-diastolic pressure measured 10 mmHg. There was no aortic valve gradient. 2.  Left main:  It is a moderate-sized, bifurcates into LAD and circumflex system, appears angiographically normal.  3.  Left anterior descending coronary: It is occluded proximally. 4.  Left circumflex coronary: It is occluded proximally. 5.  Right coronary: It is a moderate-sized dominant vessel. The main shaft to mid, prox, and distal remain widely patent. The ostium through the proximal part of the posterolateral branch has 90-95% stenosis. The PDA has diffuse pattern 70-95% stenosis throughout the prox, mid and distal portions of the vessel. 6.  Left internal mammary artery to the LAD:  It is a moderate-sized graft with 30% ostial stenosis. The distal anastomosis is widely patent, fills the LAD well. There is no significant distal LAD disease. 7.  Saphenous vein graft to the obtuse marginal:  The stent in the proximal portion has a 99% in-stent restenosis. The distal vein graft stent has 20-30% stenosis. This is anastomosed to a diffusely diseased circumflex system. Has three separate branches, all of which are diffusely diseased on the order of 50-70%. 8.  Saphenous vein graft to right coronary artery:  Occluded. PERCUTANEOUS CORONARY INTERVENTION:  Lesion:  Proximal body of the vein graft to the obtuse marginal.    Pre-stenosis 99%, post-stenosis 0%. DETAILS:  The patient was anticoagulated with Angiomax. A 6-Croatian AL1 guide was utilized.   A [employer] wire was advanced distally. Given the distal stent, distal protection was not utilized. A 3.5 x 15 AngioSculpt atherectomy balloon was inflated to 18 atmospheres on three separate inflations and a 3.5 x 15 NC Mundelein balloon was inflated to 24 atmospheres on three separate inflations. This yielded an excellent angiographic result with significant stent expansion and repeat stenting was not performed. The wires were removed and orthogonal views were obtained. 600 mg of additional clopidogrel were administered in the lab. Successful hemostasis with pneumatic radial band. CONCLUSIONS:  1. Successful PCI and stenting for severe focal in-stent restenosis of the proximal body of the vein graft to the obtuse marginal.  2.  Stable-appearing atherosclerotic coronary disease otherwise. 3.  The patient does have severe diffuse small vessel disease involving the obtuse marginal distal to the vein graft as well as the posterolateral and posterior descending branches. This appears stable when compared to prior disease, but unfortunately has a severe diffuse pattern, best managed medically. RECOMMENDATIONS:  Intensification of medical therapy due to poorly controlled hypertension, dyslipidemia and diabetes. We will initiate intensification of antihypertensive and antianginal therapy. We will restart statin therapy. If the patient is unable to tolerate statins, consideration could be given to a new alternative PCSK9 therapy. Thank you for allowing us to participate in the care of this patient. Any questions or concerns, please feel free to contact me.       Migdalia Purdy MD      [id number]  D:  09/25/2019 9:34  T:  09/25/2019 11:12  JOB #:  [id number]

## 2019-09-25 NOTE — PROGRESS NOTES
Report received from [facility] Cath Lab RN. Procedural findings communicated. Intra procedural  medication administration reviewed. Progression of care discussed.      Patient received into CPRU [place] post sheath removal.     Right Radial access site without bleeding or swelling     TR band dry and intact     Patient instructed to limit movement to left upper extremity    Routine post procedural vital signs and site assessment initiated

## 2019-09-25 NOTE — PROGRESS NOTES
Care Management Interventions  PCP Verified by CM: Yes  Last Visit to PCP: 08/29/19  Mode of Transport at Discharge: Other (see comment)(Adriano Morris Spouse [phone] )  Transition of Care Consult (CM Consult): Discharge Planning  Discharge Durable Medical Equipment: No  Physical Therapy Consult: No  Occupational Therapy Consult: No  Speech Therapy Consult: No  Current Support Network: Lives with Spouse, Own Home  Confirm Follow Up Transport: Family  Plan discussed with Pt/Family/Caregiver: Yes  Freedom of Choice Offered: Yes  Discharge Location  Discharge Placement: Home      Pt admitted to 3rd floor [facility] for NSTEMI. CM met with pt to discuss CM needs & DCP. Pt is A&Ox4. Pt is indep at home with all ADLS. Pt lives with spouse. Pt has no DME needs. Pt has no difficulty with obtaining medications or transport. DCP home with spouse. No further needs noted. CM to continue to monitor.

## 2019-09-25 NOTE — PROGRESS NOTES
TRANSFER - IN REPORT:    Verbal report received from [facility]) on [employer]  being received from cath lab(unit) for routine progression of care      Report consisted of patients Situation, Background, Assessment and   Recommendations(SBAR). Information from the following report(s) SBAR, Kardex and MAR was reviewed with the receiving nurse. Opportunity for questions and clarification was provided. Assessment completed upon patients arrival to unit and care assumed.

## 2019-09-25 NOTE — PROGRESS NOTES
[facility] CARDIOLOGY PROGRESS NOTE           9/25/2019 9:20 AM    Admit Date: 9/24/2019      Subjective:   Patient with NSTEMI and LHC with severe ISR pBody SVG-OM and stable diffuse small vessel CAD on OM distal to graft and in native PDA/RICHARDSON. He is s/p PCI of the SVG-OM. ROS:  Cardiovascular:  As noted above    Objective:      Vitals:    09/25/19 0433 09/25/19 0615 09/25/19 0749 09/25/19 0913   BP: 167/87 (!) 172/101 (!) 163/100 165/85   Pulse: 73 72 81 67   Resp: 18   18   Temp: 98.3 °F (36.8 °C)      SpO2: 97%   100%   Weight: 80.3 kg (177 lb)          Physical Exam:  General-No Acute Distress  Neck- supple, no JVD  CV- regular rate and rhythm no MRG  Lung- clear bilaterally  Abd- soft, nontender, nondistended  Ext- no edema bilaterally. Skin- warm and dry    Data Review:   Recent Labs     09/25/19  0512 09/24/19 2030 09/24/19  1738 09/24/19  1212     --   --  141   K 3.6  --   --  3.9   BUN 17  --   --  15   CREA 1.28  --   --  1.51*   *  --   --  206*   WBC 9.1  --   --  8.4   HGB 11.3*  --   --  12.4*   HCT 33.0*  --   --  36.7*     --   --  238   TROIQ  --  0.15* 0.19* 0.10*   CHOL 241*  --   --   --    LDLC 173.4*  --   --   --    HDL 52  --   --   --        Assessment/Plan:     Principal Problem:    Unstable angina (HCC) (9/24/2019)    S/P PCI of the SVG-OM. Medical therapy for severe small vessel CAD of the OM distal to graft and native PDA/RICHARDSON. Active Problems:    Coronary atherosclerosis of native coronary artery (3/6/2009)    As above      Dyslipidemia (3/6/2009)    Retry statin therapy with rosuvastatin      S/P CABG (coronary artery bypass graft) (3/10/2009)    As above       HTN (hypertension) (7/14/2012)    Increase amlodipine. Add lisinopril.         CKD (chronic kidney disease) stage 3, GFR 30-59 ml/min ([facility]) (2/24/2016)    IVF and monitor       Type 2 diabetes with nephropathy ([facility] (6/12/2018)    Medical therapy      NSTEMI (non-ST elevated myocardial infarction) ([facility] (9/24/2019)    As above          Senait Mendieta MD  9/25/2019 9:20 AM

## 2019-09-25 NOTE — PROGRESS NOTES
Bedside and Verbal shift change report given to [facility] (oncoming nurse) by self Tabitha tomas). Report included the following information SBAR, Kardex and MAR . L radial site CDI.

## 2019-09-25 NOTE — PROGRESS NOTES
TRANSFER - OUT REPORT:    Verbal report given to Kaleb Tyler on [facility]  being transferred to [facility] for routine progression of care       Report consisted of patients Situation, Background, Assessment and Recommendations(SBAR). Information from the following report(s) SBAR, Kardex, Procedure Summary and MAR was reviewed with the receiving nurse. Opportunity for questions and clarification was provided.       [facility] with Dr Herbert IYER SVANAND OM  angiomax stopped at 0920  600 plavix  30 mylanta  2 versed  50 fentanyl  Left radial Rband 12ml at 0915

## 2019-09-25 NOTE — PROGRESS NOTES
Bedside and Verbal shift change report given to self (oncoming nurse) by Vanessa Velazco RN (offgoing nurse). Report included the following information SBAR, Kardex and MAR.

## 2019-09-25 NOTE — PROGRESS NOTES
TRANSFER - OUT REPORT:    Verbal report given to Linda(name) on Ryan Moreno returning to room 327 (unit) for routine progression of care       Report consisted of patients Situation, Background, Assessment and   Recommendations(SBAR). Information from the following report(s) Procedure Summary was reviewed with the receiving nurse. Lines:   Peripheral IV 09/24/19 Right Hand (Active)   Site Assessment Clean, dry, & intact 9/25/2019  7:49 AM   Phlebitis Assessment 0 9/25/2019  7:49 AM   Infiltration Assessment 0 9/25/2019  7:49 AM   Dressing Status Clean, dry, & intact 9/25/2019  7:49 AM   Dressing Type Transparent;Tape 9/25/2019  7:49 AM   Hub Color/Line Status Patent 9/25/2019  7:49 AM   Action Taken Blood drawn 9/24/2019 12:14 PM   Alcohol Cap Used Yes 9/25/2019  4:35 AM       Peripheral IV 09/24/19 Left Wrist (Active)   Site Assessment Clean, dry, & intact 9/25/2019  7:49 AM   Phlebitis Assessment 0 9/25/2019  7:49 AM   Infiltration Assessment 0 9/25/2019  7:49 AM   Dressing Status Clean, dry, & intact 9/25/2019  7:49 AM   Dressing Type Transparent;Tape 9/25/2019  7:49 AM   Hub Color/Line Status Patent 9/25/2019  7:49 AM   Action Taken Blood drawn 9/24/2019  2:00 PM   Alcohol Cap Used No 9/25/2019  4:35 AM        Opportunity for questions and clarification was provided.       Patient transported with:   [id number] [facility] transport

## 2019-09-25 NOTE — PROCEDURES
Brief Cardiac Procedure Note    Patient: Jessika Perry MRN: [id number]  SSN: [id number]    YOB: 1945  Age: 69 y.o. Sex: male      Date of Procedure: 9/25/2019     Pre-procedure Diagnosis: NSTEMI    Post-procedure Diagnosis: Coronary Artery Disease    Procedure: Left Heart Catheterization with Percutaneous Coronary Intervention    Brief Description of Procedure: See note    Performed By: Shama Burgos MD     Assistants: None    Anesthesia: Moderate Sedation    Estimated Blood Loss: Less than 10 mL      Specimens: None    Implants: None    Findings:   LV:  EDP 13mmHg  LM:  NML  LAD:  NML  LCx:  NML  RCA:  90% small RICHARDSON, 80% diffuse PDA  LIMA-LAD:  Patent  SVG-OM:  99% proximal stent  SVG-PDA:  100%    PCI pBody SVG-OM  99-0%   3.5x15 Angiosculpt   3.5x15 NC [place]    (L)Radial    600mg clopidogrel    Complications: None    Recommendations: Continue medical therapy.     Signed By: Shama Burgos MD     September 25, 2019

## 2019-09-25 NOTE — PROGRESS NOTES
Bedside and Verbal shift change report given to Shilpi Howard RN (oncoming nurse) by self Johanne tomas). Report included the following information SBAR, Kardex, MAR and Recent Results. heparin gtt verified at bedside.

## 2019-09-26 ENCOUNTER — HOSPITAL ENCOUNTER (OUTPATIENT)
Dept: INFUSION THERAPY | Age: 74
Discharge: HOME | End: 2019-09-26
Payer: MEDICARE

## 2019-09-26 VITALS
TEMPERATURE: 98.1 F | OXYGEN SATURATION: 98 % | DIASTOLIC BLOOD PRESSURE: 80 MMHG | SYSTOLIC BLOOD PRESSURE: 147 MMHG | RESPIRATION RATE: 18 BRPM | HEART RATE: 75 BPM

## 2019-09-26 VITALS
RESPIRATION RATE: 18 BRPM | OXYGEN SATURATION: 99 % | SYSTOLIC BLOOD PRESSURE: 121 MMHG | DIASTOLIC BLOOD PRESSURE: 68 MMHG | HEART RATE: 70 BPM | WEIGHT: 179.1 LBS | TEMPERATURE: 98.2 F | BODY MASS INDEX: 25.33 KG/M2

## 2019-09-26 DIAGNOSIS — I20.0 UNSTABLE ANGINA (HCC): Primary | ICD-10-CM

## 2019-09-26 DIAGNOSIS — I21.4 NSTEMI (NON-ST ELEVATED MYOCARDIAL INFARCTION) (HCC): ICD-10-CM

## 2019-09-26 LAB
ALBUMIN SERPL-MCNC: 2.8 G/DL (ref 3.2–4.6)
ALBUMIN/GLOB SERPL: 0.8 {RATIO} (ref 1.2–3.5)
ALP SERPL-CCNC: 57 U/L (ref 50–136)
ALT SERPL-CCNC: 16 U/L (ref 12–65)
ANION GAP SERPL CALC-SCNC: 5 MMOL/L (ref 7–16)
ANION GAP SERPL CALC-SCNC: 5 MMOL/L (ref 7–16)
AST SERPL-CCNC: 15 U/L (ref 15–37)
ATRIAL RATE: 64 BPM
BASOPHILS # BLD: 0 K/UL (ref 0–0.2)
BASOPHILS NFR BLD: 0 % (ref 0–2)
BILIRUB DIRECT SERPL-MCNC: <0.1 MG/DL
BILIRUB SERPL-MCNC: 0.2 MG/DL (ref 0.2–1.1)
BUN SERPL-MCNC: 17 MG/DL (ref 8–23)
BUN SERPL-MCNC: 19 MG/DL (ref 8–23)
CALCIUM SERPL-MCNC: 8.8 MG/DL (ref 8.3–10.4)
CALCIUM SERPL-MCNC: 9.3 MG/DL (ref 8.3–10.4)
CALCULATED P AXIS, ECG09: 64 DEGREES
CALCULATED R AXIS, ECG10: 20 DEGREES
CALCULATED T AXIS, ECG11: 60 DEGREES
CHLORIDE SERPL-SCNC: 110 MMOL/L (ref 98–107)
CHLORIDE SERPL-SCNC: 111 MMOL/L (ref 98–107)
CO2 SERPL-SCNC: 25 MMOL/L (ref 21–32)
CO2 SERPL-SCNC: 27 MMOL/L (ref 21–32)
CREAT SERPL-MCNC: 1.37 MG/DL (ref 0.8–1.5)
CREAT SERPL-MCNC: 1.39 MG/DL (ref 0.8–1.5)
DIAGNOSIS, 93000: NORMAL
DIFFERENTIAL METHOD BLD: ABNORMAL
EOSINOPHIL # BLD: 0 K/UL (ref 0–0.8)
EOSINOPHIL NFR BLD: 0 % (ref 0.5–7.8)
ERYTHROCYTE [DISTWIDTH] IN BLOOD BY AUTOMATED COUNT: 13.7 % (ref 11.9–14.6)
GLOBULIN SER CALC-MCNC: 3.6 G/DL (ref 2.3–3.5)
GLUCOSE SERPL-MCNC: 141 MG/DL (ref 65–100)
GLUCOSE SERPL-MCNC: 183 MG/DL (ref 65–100)
HCT VFR BLD AUTO: 31.6 % (ref 41.1–50.3)
HGB BLD-MCNC: 10.6 G/DL (ref 13.6–17.2)
IMM GRANULOCYTES # BLD AUTO: 0 K/UL (ref 0–0.5)
IMM GRANULOCYTES NFR BLD AUTO: 0 % (ref 0–5)
LYMPHOCYTES # BLD: 0.9 K/UL (ref 0.5–4.6)
LYMPHOCYTES NFR BLD: 11 % (ref 13–44)
MCH RBC QN AUTO: 30.3 PG (ref 26.1–32.9)
MCHC RBC AUTO-ENTMCNC: 33.5 G/DL (ref 31.4–35)
MCV RBC AUTO: 90.3 FL (ref 79.6–97.8)
MONOCYTES # BLD: 0.6 K/UL (ref 0.1–1.3)
MONOCYTES NFR BLD: 7 % (ref 4–12)
NEUTS SEG # BLD: 6.7 K/UL (ref 1.7–8.2)
NEUTS SEG NFR BLD: 82 % (ref 43–78)
NRBC # BLD: 0 K/UL (ref 0–0.2)
P-R INTERVAL, ECG05: 150 MS
PLATELET # BLD AUTO: 207 K/UL (ref 150–450)
PMV BLD AUTO: 11.6 FL (ref 9.4–12.3)
POTASSIUM SERPL-SCNC: 3.8 MMOL/L (ref 3.5–5.1)
POTASSIUM SERPL-SCNC: 3.9 MMOL/L (ref 3.5–5.1)
PROT SERPL-MCNC: 6.4 G/DL (ref 6.3–8.2)
Q-T INTERVAL, ECG07: 388 MS
QRS DURATION, ECG06: 84 MS
QTC CALCULATION (BEZET), ECG08: 400 MS
RBC # BLD AUTO: 3.5 M/UL (ref 4.23–5.6)
SODIUM SERPL-SCNC: 141 MMOL/L (ref 136–145)
SODIUM SERPL-SCNC: 142 MMOL/L (ref 136–145)
VENTRICULAR RATE, ECG03: 64 BPM
WBC # BLD AUTO: 8.2 K/UL (ref 4.3–11.1)

## 2019-09-26 PROCEDURE — 74011250636 HC RX REV CODE- 250/636: Performed by: INTERNAL MEDICINE

## 2019-09-26 PROCEDURE — 96366 THER/PROPH/DIAG IV INF ADDON: CPT

## 2019-09-26 PROCEDURE — 74011250637 HC RX REV CODE- 250/637: Performed by: INTERNAL MEDICINE

## 2019-09-26 PROCEDURE — 85025 COMPLETE CBC W/AUTO DIFF WBC: CPT

## 2019-09-26 PROCEDURE — 74011250637 HC RX REV CODE- 250/637: Performed by: NURSE PRACTITIONER

## 2019-09-26 PROCEDURE — 36415 COLL VENOUS BLD VENIPUNCTURE: CPT

## 2019-09-26 PROCEDURE — 93005 ELECTROCARDIOGRAM TRACING: CPT | Performed by: INTERNAL MEDICINE

## 2019-09-26 PROCEDURE — 96365 THER/PROPH/DIAG IV INF INIT: CPT

## 2019-09-26 PROCEDURE — 82248 BILIRUBIN DIRECT: CPT

## 2019-09-26 PROCEDURE — 74011000250 HC RX REV CODE- 250: Performed by: INTERNAL MEDICINE

## 2019-09-26 PROCEDURE — 80053 COMPREHEN METABOLIC PANEL: CPT

## 2019-09-26 RX ORDER — LISINOPRIL 20 MG/1
20 TABLET ORAL DAILY
Qty: 30 TAB | Refills: 11 | Status: SHIPPED | OUTPATIENT
Start: 2019-09-26 | End: 2020-02-06

## 2019-09-26 RX ORDER — ISOSORBIDE MONONITRATE 60 MG/1
60 TABLET, EXTENDED RELEASE ORAL DAILY
Qty: 30 TAB | Refills: 11 | Status: SHIPPED | OUTPATIENT
Start: 2019-09-26 | End: 2019-10-02 | Stop reason: SDUPTHER

## 2019-09-26 RX ORDER — HYDROCORTISONE SODIUM SUCCINATE 100 MG/2ML
100 INJECTION, POWDER, FOR SOLUTION INTRAMUSCULAR; INTRAVENOUS AS NEEDED
Status: DISCONTINUED | OUTPATIENT
Start: 2019-09-26 | End: 2019-09-27 | Stop reason: HOSPADM

## 2019-09-26 RX ORDER — GUAIFENESIN 100 MG/5ML
81 LIQUID (ML) ORAL DAILY
Status: SHIPPED | COMMUNITY
Start: 2019-09-26

## 2019-09-26 RX ORDER — SODIUM CHLORIDE 9 MG/ML
10 INJECTION INTRAMUSCULAR; INTRAVENOUS; SUBCUTANEOUS AS NEEDED
Status: CANCELLED | OUTPATIENT
Start: 2019-09-26

## 2019-09-26 RX ORDER — EPINEPHRINE 1 MG/ML
0.3 INJECTION, SOLUTION, CONCENTRATE INTRAVENOUS AS NEEDED
Status: CANCELLED | OUTPATIENT
Start: 2019-09-26

## 2019-09-26 RX ORDER — SODIUM CHLORIDE 9 MG/ML
25 INJECTION, SOLUTION INTRAVENOUS CONTINUOUS
Status: DISCONTINUED | OUTPATIENT
Start: 2019-09-26 | End: 2019-09-27 | Stop reason: HOSPADM

## 2019-09-26 RX ORDER — ALBUTEROL SULFATE 0.83 MG/ML
2.5 SOLUTION RESPIRATORY (INHALATION) AS NEEDED
Status: DISCONTINUED | OUTPATIENT
Start: 2019-09-26 | End: 2019-09-27 | Stop reason: HOSPADM

## 2019-09-26 RX ORDER — DIPHENHYDRAMINE HYDROCHLORIDE 50 MG/ML
50 INJECTION, SOLUTION INTRAMUSCULAR; INTRAVENOUS AS NEEDED
Status: CANCELLED
Start: 2019-09-26

## 2019-09-26 RX ORDER — CARVEDILOL 12.5 MG/1
12.5 TABLET ORAL 2 TIMES DAILY WITH MEALS
Qty: 60 TAB | Refills: 11 | Status: SHIPPED | OUTPATIENT
Start: 2019-09-26 | End: 2021-02-08 | Stop reason: DRUGHIGH

## 2019-09-26 RX ORDER — HEPARIN 100 UNIT/ML
300-500 SYRINGE INTRAVENOUS AS NEEDED
Status: CANCELLED
Start: 2019-09-26

## 2019-09-26 RX ORDER — CLOPIDOGREL BISULFATE 75 MG/1
75 TABLET ORAL DAILY
Qty: 30 TAB | Refills: 11 | Status: SHIPPED | OUTPATIENT
Start: 2019-09-26 | End: 2021-11-17

## 2019-09-26 RX ORDER — NITROGLYCERIN 0.4 MG/1
0.4 TABLET SUBLINGUAL
Qty: 25 TAB | Refills: 11 | Status: SHIPPED | OUTPATIENT
Start: 2019-09-26 | End: 2020-12-03 | Stop reason: SDUPTHER

## 2019-09-26 RX ORDER — ACETAMINOPHEN 325 MG/1
650 TABLET ORAL
Status: SHIPPED | COMMUNITY
Start: 2019-09-26

## 2019-09-26 RX ORDER — ACETAMINOPHEN 325 MG/1
650 TABLET ORAL AS NEEDED
Status: CANCELLED
Start: 2019-09-26

## 2019-09-26 RX ORDER — PHENOL/SODIUM PHENOLATE
20 AEROSOL, SPRAY (ML) MUCOUS MEMBRANE DAILY
Qty: 30 TAB | Refills: 11 | Status: SHIPPED | OUTPATIENT
Start: 2019-09-26 | End: 2019-10-24

## 2019-09-26 RX ORDER — DIPHENHYDRAMINE HYDROCHLORIDE 50 MG/ML
50 INJECTION, SOLUTION INTRAMUSCULAR; INTRAVENOUS AS NEEDED
Status: DISCONTINUED | OUTPATIENT
Start: 2019-09-26 | End: 2019-09-27 | Stop reason: HOSPADM

## 2019-09-26 RX ORDER — AMLODIPINE BESYLATE 10 MG/1
10 TABLET ORAL DAILY
Qty: 30 TAB | Refills: 11 | Status: SHIPPED | OUTPATIENT
Start: 2019-09-26 | End: 2020-02-11

## 2019-09-26 RX ORDER — ROSUVASTATIN CALCIUM 20 MG/1
20 TABLET, COATED ORAL
Qty: 30 TAB | Refills: 11 | Status: SHIPPED | OUTPATIENT
Start: 2019-09-26 | End: 2021-07-13 | Stop reason: SDUPTHER

## 2019-09-26 RX ORDER — SODIUM CHLORIDE 0.9 % (FLUSH) 0.9 %
10 SYRINGE (ML) INJECTION AS NEEDED
Status: DISCONTINUED | OUTPATIENT
Start: 2019-09-26 | End: 2019-09-27 | Stop reason: HOSPADM

## 2019-09-26 RX ORDER — ONDANSETRON 2 MG/ML
8 INJECTION INTRAMUSCULAR; INTRAVENOUS AS NEEDED
Status: CANCELLED | OUTPATIENT
Start: 2019-09-26

## 2019-09-26 RX ADMIN — CARVEDILOL 25 MG: 25 TABLET, FILM COATED ORAL at 08:50

## 2019-09-26 RX ADMIN — AMLODIPINE BESYLATE 10 MG: 10 TABLET ORAL at 08:50

## 2019-09-26 RX ADMIN — PANTOPRAZOLE SODIUM 40 MG: 40 TABLET, DELAYED RELEASE ORAL at 05:52

## 2019-09-26 RX ADMIN — Medication 10 ML: at 14:20

## 2019-09-26 RX ADMIN — Medication 5 ML: at 05:52

## 2019-09-26 RX ADMIN — Medication 6000 MG: at 15:11

## 2019-09-26 RX ADMIN — ASPIRIN 81 MG 81 MG: 81 TABLET ORAL at 08:50

## 2019-09-26 RX ADMIN — ISOSORBIDE MONONITRATE 60 MG: 60 TABLET, EXTENDED RELEASE ORAL at 08:50

## 2019-09-26 RX ADMIN — ALLOPURINOL 300 MG: 300 TABLET ORAL at 08:49

## 2019-09-26 RX ADMIN — CLOPIDOGREL BISULFATE 75 MG: 75 TABLET ORAL at 08:50

## 2019-09-26 RX ADMIN — LISINOPRIL 20 MG: 20 TABLET ORAL at 08:49

## 2019-09-26 RX ADMIN — GABAPENTIN 300 MG: 300 CAPSULE ORAL at 08:50

## 2019-09-26 RX ADMIN — Medication 10 ML: at 17:20

## 2019-09-26 RX ADMIN — SODIUM CHLORIDE 25 ML/HR: 900 INJECTION, SOLUTION INTRAVENOUS at 16:52

## 2019-09-26 NOTE — PROGRESS NOTES
Discharge instructions reviewed with pt. Prescriptions given for new medications and med info sheets provided for all new medications. Opportunity for questions provided. pt voiced understanding of all discharge instructions. Iv and monitor removed. Pt waiting on stat lab orders for research trial before leaving.

## 2019-09-26 NOTE — PROGRESS NOTES
Problem: Falls - Risk of  Goal: *Absence of Falls  Description  Document Ammon Castellanos Fall Risk and appropriate interventions in the flowsheet.   Outcome: Progressing Towards Goal  Note:   Fall Risk Interventions:            Medication Interventions: Teach patient to arise slowly, Patient to call before getting OOB

## 2019-09-26 NOTE — PROGRESS NOTES
Arrived to the [facility]. [facility] Cardiology RN at chairside. Assessment complete. Labs drawn for research. INV GHN405/placebo completed. Patient tolerated without problems. Any issues or concerns during appointment: None. Patient aware of next infusion appointment on 10/3/2019 (date) at [phone] (time). Discharged ambulatory with spouse.

## 2019-09-26 NOTE — PROGRESS NOTES
Shift change report received from \[facility]\"" (off going nurse). Plan of care reviewed with patient at bedside. Opportunity to ask questions provided. Denies needs at this time. Bed low and locked with call light within reach. Patient instructed to call for assistance. Patient verbalized understanding.      L radial site CDI

## 2019-09-26 NOTE — DISCHARGE INSTRUCTIONS
Patient Education        Angina: Care Instructions  Your Care Instructions    You have a problem called angina. Angina happens when there is not enough blood flow to your heart muscle. Angina is a sign of coronary artery disease (CAD). CAD occurs when blood vessels that supply the heart become narrowed. Having CAD increases your risk of a heart attack. Chest pain or pressure is the most common symptom of angina. But some people have other symptoms, like:  · Pain, pressure, or a strange feeling in the back, neck, jaw, or upper belly, or in one or both shoulders or arms. · Shortness of breath. · Nausea or vomiting. · Lightheadedness or sudden weakness. · Fast or irregular heartbeat. Women are somewhat more likely than men to have angina symptoms like shortness of breath, nausea, and back or jaw pain. Angina can be dangerous. That's why it is important to pay attention to your symptoms. Know what is typical for you, learn how to control your symptoms, and understand when you need to get treatment. A change in your usual pattern of symptoms is an emergency. It may mean that you are having a heart attack. The doctor has checked you carefully, but problems can develop later. If you notice any problems or new symptoms, get medical treatment right away. Follow-up care is a key part of your treatment and safety. Be sure to make and go to all appointments, and call your doctor if you are having problems. It's also a good idea to know your test results and keep a list of the medicines you take. How can you care for yourself at home? Medicines    · If your doctor has given you nitroglycerin for angina symptoms, keep it with you at all times. If you have symptoms, sit down and rest, and take the first dose of nitroglycerin as directed. If your symptoms get worse or are not getting better within 5 minutes, call 911 right away. Stay on the phone.  The emergency  will give you further instructions.     · If your doctor advises it, take 1 low-dose aspirin a day to prevent heart attack.     · Be safe with medicines. Take your medicines exactly as prescribed. Call your doctor if you think you are having a problem with your medicine. You will get more details on the specific medicines your doctor prescribes.    Lifestyle changes    · Do not smoke. If you need help quitting, talk to your doctor about stop-smoking programs and medicines. These can increase your chances of quitting for good.     · Eat a heart-healthy diet that is low in saturated fat and salt, and is high in fiber. Talk to your doctor or a dietitian about healthy eating.     · Stay at a healthy weight. Or lose weight if you need to. Activity    · Talk to your doctor about a level of activity that is safe for you.     · If an activity causes angina symptoms, stop and rest.   When should you call for help? Call 911 anytime you think you may need emergency care. For example, call if:    · You passed out (lost consciousness).     · You have symptoms of a heart attack. These may include:  ? Chest pain or pressure, or a strange feeling in the chest.  ? Sweating. ? Shortness of breath. ? Nausea or vomiting. ? Pain, pressure, or a strange feeling in the back, neck, jaw, or upper belly or in one or both shoulders or arms. ? Lightheadedness or sudden weakness. ? A fast or irregular heartbeat. After you call 911, the  may tell you to chew 1 adult-strength or 2 to 4 low-dose aspirin. Wait for an ambulance.  Do not try to drive yourself.     · You have angina symptoms that do not go away with rest or are not getting better within 5 minutes after you take a dose of nitroglycerin.    Call your doctor now or seek immediate medical care if:    · You are having angina symptoms more often than usual, or they are different or worse than usual.     · You feel dizzy or lightheaded, or you feel like you may faint.    Watch closely for changes in your health, and be sure to contact your doctor if you have any problems. Where can you learn more? Go to http://maykel-xander.info/. Enter H129 in the search box to learn more about \"Angina: Care Instructions. \"  Current as of: April 9, 2019  Content Version: 12.2  © 0042-5565 [employer]. Care instructions adapted under license by [employer] (which disclaims liability or warranty for this information). If you have questions about a medical condition or this instruction, always ask your healthcare professional. [place] [address] any warranty or liability for your use of this information. Cardiac Catheterization/Angiography Discharge Instructions    *Check the puncture site frequently for swelling or bleeding. If you see any bleeding, lie down and apply pressure over the area with a clean town or washcloth. Notify your doctor for any redness, swelling, drainage or oozing from the puncture site. Notify your doctor for any fever or chills. *If the leg or arm with the puncture becomes cold, numb or painful, call [facility]iology at  [phone]    *Activity should be limited for the next 48 hours. Climb stairs as little as possible and avoid any stooping, bending or strenuous activity for 48 hours. No heavy lifting (anything over 10 pounds) for three days. *Do not drive for 48 hours. *You may resume your usual diet. Drink more fluids than usual.    *Have a responsible person drive you home and stay with you for at least 24 hours after your heart catheterization/angiography. *You may remove the bandage from your Right  Groin in 24 hours. You may shower in 24 hours. No tub baths, hot tubs or swimming for one week. Do not place any lotions, creams, powders, ointments over the puncture site for one week. You may place a clean band-aid over the puncture site each day for 5 days. Change this daily.

## 2019-09-26 NOTE — PROGRESS NOTES
Cardiac Rehab: Spoke with patient regarding referral to cardiac rehab. Patient meets admission criteria based on NSTEMI with PCI (09/25/19). Written information about Cardiac Rehab given and reviewed with patient. Discussed lifestyle modifications to promote cardiac wellness. Patient indicated that he wants to participate in the cardiac rehab program and his orientation has been scheduled.  His Cardiologist is Dr. Leonie Henson      Thank you,  AMADO Chaparro, RN  Cardiopulmonary Rehabilitation Nurse Liaison  [facility]

## 2019-09-26 NOTE — PROGRESS NOTES
Bedside and Verbal shift change report given to JOSE MARTIN Davenport (oncoming nurse) by self (offgoing nurse). Report included the following information SBAR, Kardex, MAR and Recent Results.

## 2019-09-26 NOTE — PROGRESS NOTES
Care Management Interventions  PCP Verified by CM: Yes  Last Visit to PCP: 08/29/19  Mode of Transport at Discharge:  Other (see comment)(Adriano Morris Spouse [phone] )  Transition of Care Consult (CM Consult): Discharge Planning  Discharge Durable Medical Equipment: No  Physical Therapy Consult: No  Occupational Therapy Consult: No  Speech Therapy Consult: No  Current Support Network: Lives with Spouse, Own Home  Confirm Follow Up Transport: Family  Plan discussed with Pt/Family/Caregiver: Yes  Freedom of Choice Offered: Yes  Discharge Location  Discharge Placement: Home      DC home

## 2019-09-26 NOTE — DISCHARGE SUMMARY
[facility] Cardiology Discharge Summary     Patient ID:  Thomas Tobias  [id number]  89 y.o.  1945    Admit date: 9/24/2019    Discharge date:  09/26/19    Admitting Physician: Martha Tijerina MD     Discharge Physician: Gregory Barriga NP/Dr. Paul Pineda    Admission Diagnoses: NSTEMI (non-ST elevated myocardial infarction) [facility]) [I21.4]    Discharge Diagnoses:    Diagnosis    Unstable angina ([address]    NSTEMI (non-ST elevated myocardial infarction) ([address]    Open wound of left great toe    Ventricular fibrillation ([address]    Acute myocardial infarction ([address]    Acute ST elevation myocardial infarction (STEMI) ([address]    Macrocytic anemia    Other fatigue    Type 2 diabetes with nephropathy (HCC)    Nocturnal hypoxemia    Anemia associated with acute blood loss    Osteoarthritis of right glenohumeral joint    Memory loss    Noncompliance with CPAP treatment    Ischemia of right lower extremity    Lacunar infarct, acute (HCC)    Cervicalgia    Type 2 diabetes, controlled, with neuropathy ([address]    Other chest pain    CKD (chronic kidney disease) stage 3, GFR 30-59 ml/min (HCC)    Arthralgia    Medicare annual wellness visit, subsequent    Non-seasonal allergic rhinitis due to pollen   [facility] ED (erectile dysfunction)    Psoriasis    Arthritis    Malignant neoplasm of prostate ([address]    Other specified disorder of male genital organs(608.89)    Elevated prostate specific antigen (PSA)    Lumbago    Impotence of organic origin    ANABELLA (acute kidney injury) ([address]    HTN (hypertension)    ROBERTO (obstructive sleep apnea)    Intestinal adhesions with obstruction (HCC)    PAF (paroxysmal atrial fibrillation) ([address]    S/P CABG (coronary artery bypass graft)    Coronary atherosclerosis of native coronary artery    Dyslipidemia    Gout       Cardiology Procedures this admission:  Diagnostic left heart catheterization  EchoCardiogram  Consults: None    Hospital Course: Patient presented to the ED for complaints of CP and SOB with a hx of CAD. His initial Trop I was 0.10 and was placed on a heparin drip without associated EKG changes. He was subsequently scheduled for a LHC at [facility] on  9/26/2019. Patient underwent cardiac catheterization by Dr. Pan Dempsey. Patient was found to have a 99% in stent stenosis of the SVG to the OM that received a PUBA and angiosculpt ballooning with 0% residual stenosis. Patient tolerated the procedure well and was taken to the telemetry floor for recovery. Echo Showed: SUMMARY:    -  Left ventricle: Systolic function was normal. Ejection fraction was  estimated in the range of 60 % to 65 %. There were no regional wall motion  abnormalities. There was moderate concentric hypertrophy. -  Ventricular septum: Thickness was moderately increased. -  Left atrium: The atrium was mildly dilated. -  Mitral valve: There was mild to moderate regurgitation. -  Tricuspid valve: There was mild regurgitation. Laura Dirk, systemic arteries: The root exhibited mild dilatation. The  morning of discharge the patient was up feeling well without any complaints of chest pain or shortness of breath. Patient's right femoral cath site was clean, dry and intact without hematoma or bruit. Patient's labs were WNL. Patient was seen and examined by Dr. Jeffrey Loving and determined stable and ready for discharge. Patient was instructed on the importance of medication compliance including taking aspirin and plavix everyday without missing a dose. After receiving drug eluting stents, the patient will remain on dual anti-platelet therapy for 1 year. For maximized medical therapy for CAD, patient will continue BB, ACE-I, and statin as well. Extensive time was taken to explain healthy lifestyle choices, diet, fitness, and the importants of medications. The patient will follow up with [facility] Cardiology for a TC7 appointment and has been referred to cardiac rehab.  The patient will have a BMP drawn in one week prior to his follow up. DISPOSITION: The patient is being discharged home in stable condition on a low saturated fat, low cholesterol and low salt diet. The patient is instructed to advance activities as tolerated to the limit of fatigue or shortness of breath. The patient is instructed to avoid all heavy lifting, straining, stooping or squatting for 3-5 days. The patient is instructed to watch the cath site for bleeding/oozing; if seen, the patient is instructed to apply firm pressure with a clean cloth and call [facility] Cardiology at [phone]. The patient is instructed to watch for signs of infection which include: increasing area of redness, fever/hot to touch or purulent drainage at the catheterization site. The patient is instructed not to soak in a bathtub for 7-10 days, but is cleared to shower. The patient is instructed to call the office or return to the ER for immediate evaluation for any shortness of breath or chest pain not relieved by NTG. Discharge Exam:   Visit Vitals  /67 (BP 1 Location: Left arm, BP Patient Position: At rest)   Pulse 68   Temp 98 °F (36.7 °C)   Resp 16   Wt 81.2 kg (179 lb 1.6 oz)   SpO2 98%   BMI 25.33 kg/m²     Patient has been seen by Dr. Delvis Swan: see his progress note for exam details.     Recent Results (from the past 24 hour(s))   EKG, 12 LEAD, INITIAL    Collection Time: 09/25/19 11:15 AM   Result Value Ref Range    Ventricular Rate 65 BPM    Atrial Rate 65 BPM    P-R Interval 158 ms    QRS Duration 80 ms    Q-T Interval 406 ms    QTC Calculation (Bezet) 422 ms    Calculated P Axis 61 degrees    Calculated R Axis 26 degrees    Calculated T Axis 47 degrees    Diagnosis       Normal sinus rhythm  Nonspecific ST abnormality  When compared with ECG of 24-SEP-2019 19:19,  Nonspecific T wave abnormality, improved in Lateral leads  Confirmed by Stephanie Carrera MD (), KYM HAGER ([id number]) on 9/25/2019 83:26:27 PM     METABOLIC PANEL, BASIC    Collection Time: 09/26/19  4:19 AM   Result Value Ref Range    Sodium 142 136 - 145 mmol/L    Potassium 3.9 3.5 - 5.1 mmol/L    Chloride 110 (H) 98 - 107 mmol/L    CO2 27 21 - 32 mmol/L    Anion gap 5 (L) 7 - 16 mmol/L    Glucose 141 (H) 65 - 100 mg/dL    BUN 19 8 - 23 MG/DL    Creatinine 1.37 0.8 - 1.5 MG/DL    GFR est AA >60 >60 ml/min/1.73m2    GFR est non-AA 54 (L) >60 ml/min/1.73m2    Calcium 9.3 8.3 - 10.4 MG/DL   CBC WITH AUTOMATED DIFF    Collection Time: 09/26/19  4:19 AM   Result Value Ref Range    WBC 8.2 4.3 - 11.1 K/uL    RBC 3.50 (L) 4.23 - 5.6 M/uL    HGB 10.6 (L) 13.6 - 17.2 g/dL    HCT 31.6 (L) 41.1 - 50.3 %    MCV 90.3 79.6 - 97.8 FL    MCH 30.3 26.1 - 32.9 PG    MCHC 33.5 31.4 - 35.0 g/dL    RDW 13.7 11.9 - 14.6 %    PLATELET 409 931 - 443 K/uL    MPV 11.6 9.4 - 12.3 FL    ABSOLUTE NRBC 0.00 0.0 - 0.2 K/uL    DF AUTOMATED      NEUTROPHILS 82 (H) 43 - 78 %    LYMPHOCYTES 11 (L) 13 - 44 %    MONOCYTES 7 4.0 - 12.0 %    EOSINOPHILS 0 (L) 0.5 - 7.8 %    BASOPHILS 0 0.0 - 2.0 %    IMMATURE GRANULOCYTES 0 0.0 - 5.0 %    ABS. NEUTROPHILS 6.7 1.7 - 8.2 K/UL    ABS. LYMPHOCYTES 0.9 0.5 - 4.6 K/UL    ABS. MONOCYTES 0.6 0.1 - 1.3 K/UL    ABS. EOSINOPHILS 0.0 0.0 - 0.8 K/UL    ABS. BASOPHILS 0.0 0.0 - 0.2 K/UL    ABS. IMM.  GRANS. 0.0 0.0 - 0.5 K/UL   EKG, 12 LEAD, INITIAL    Collection Time: 09/26/19  7:08 AM   Result Value Ref Range    Ventricular Rate 64 BPM    Atrial Rate 64 BPM    P-R Interval 150 ms    QRS Duration 84 ms    Q-T Interval 388 ms    QTC Calculation (Bezet) 400 ms    Calculated P Axis 64 degrees    Calculated R Axis 20 degrees    Calculated T Axis 60 degrees    Diagnosis       Normal sinus rhythm  Nonspecific T wave abnormality  Abnormal ECG  When compared with ECG of 25-SEP-2019 11:15,  Nonspecific T wave abnormality, worse in Lateral leads           Patient Instructions:     Current Discharge Medication List      START taking these medications    Details   acetaminophen (TYLENOL) 325 mg tablet Take 2 Tabs by mouth every four (4) hours as needed for Pain. rosuvastatin (CRESTOR) 20 mg tablet Take 1 Tab by mouth nightly. Qty: 30 Tab, Refills: 11      lisinopril (PRINIVIL, ZESTRIL) 20 mg tablet Take 1 Tab by mouth daily. Qty: 30 Tab, Refills: 11      aspirin 81 mg chewable tablet Take 1 Tab by mouth daily. Omeprazole delayed release (PRILOSEC D/R) 20 mg tablet Take 1 Tab by mouth daily. Qty: 30 Tab, Refills: 11    Associated Diagnoses: Gastroesophageal reflux disease without esophagitis         CONTINUE these medications which have CHANGED    Details   clopidogrel (PLAVIX) 75 mg tab Take 1 Tab by mouth daily. Qty: 30 Tab, Refills: 11      isosorbide mononitrate ER (IMDUR) 60 mg CR tablet Take 1 Tab by mouth daily. Qty: 30 Tab, Refills: 11      nitroglycerin (NITROSTAT) 0.4 mg SL tablet 1 Tab by SubLINGual route every five (5) minutes as needed for Chest Pain. Qty: 25 Tab, Refills: 11    Associated Diagnoses: Other chest pain      amLODIPine (NORVASC) 10 mg tablet Take 1 Tab by mouth daily. Qty: 30 Tab, Refills: 11      carvedilol (COREG) 12.5 mg tablet Take 1 Tab by mouth two (2) times daily (with meals). Qty: 60 Tab, Refills: 11         CONTINUE these medications which have NOT CHANGED    Details   sitagliptin phosphate (JANUVIA PO) Take  by mouth. allopurinol (ZYLOPRIM) 300 mg tablet Take 1 Tab by mouth daily. Qty: 90 Tab, Refills: 1    Associated Diagnoses: Idiopathic gout, unspecified chronicity, unspecified site      gabapentin (NEURONTIN) 300 mg capsule Take 1 Cap by mouth four (4) times daily.   Qty: 120 Cap, Refills: 3    Associated Diagnoses: Type 2 diabetes, controlled, with neuropathy (HCC)      glucose blood VI test strips (BLOOD GLUCOSE TEST) strip Test once to twice daily DX: E11.8  Qty: 300 Strip, Refills: 3    Associated Diagnoses: Type 2 diabetes mellitus with complication, unspecified long term insulin use status      Lancets misc Test once to twice daily DX: E11.8  Qty: 300 Each, Refills: 3    Associated Diagnoses: Type 2 diabetes mellitus with complication, unspecified long term insulin use status      cholecalciferol, vitamin D3, (VITAMIN D3) 2,000 unit Tab Take 2,000 Units by mouth daily. Indications: OSTEOPOROSIS, am      Cetirizine (ZYRTEC) 10 mg Cap Take 10 mg by mouth nightly. As needed  Indications: inflammation of the nose due to an allergy         STOP taking these medications       triamcinolone acetonide (KENALOG) 0.1 % ointment Comments:   Reason for Stopping:         esomeprazole (NEXIUM) 40 mg capsule Comments:   Reason for Stopping:               Signed:  WAGNER Lora  9/26/2019  8:26 AM  ATTENDING ADDENDUM:    Patient seen and examined by me. Agree with above note by physician extender. Key findings are:  No CP or SANTIAGO, left wrist CDI. Labs stable, ready to go home. CV- RRR without murmur  Lungs- Clear bilaterally  Abd- soft, nontender, nondistended  Ext- no edema, left wrist CDI    Plan: As above. The importance of compliance with dual antiplatelet therapy was emphasized. The risk of non-compliance, including stent thrombosis, acute MI, acute LV systolic dysfunction, and death was discussed and understood.        Asha Brooke MD  [address] Cardiology  Pager [phone]

## 2019-10-02 NOTE — CDMP QUERY
Patient was admitted with a NSTEMI. They underwent a cardiac catheterization which noted, \". Patient was found to have a 99% in stent stenosis of the SVG to the OM that received a PUBA and angiosculpt ballooning with 0% residual stenosis. Patient underwent angioplasty of the lesion After study, can the suspected etiology of the patient's NSTEMI 
be further specified as: 
NSTEMI due to in-stent stenosis NSTEMI due to progression of atherosclerosis Other________ Unable to determine Thanks. Jp Kunz, RN, BSN, CDS ( for [employer]) Clinical Documentation Management Program 
[facility] AT [address] 
[phone] [email or web address]

## 2019-10-03 ENCOUNTER — HOSPITAL ENCOUNTER (OUTPATIENT)
Dept: INFUSION THERAPY | Age: 74
Discharge: HOME | End: 2019-10-03
Payer: MEDICARE

## 2019-10-03 VITALS
DIASTOLIC BLOOD PRESSURE: 81 MMHG | SYSTOLIC BLOOD PRESSURE: 138 MMHG | OXYGEN SATURATION: 99 % | TEMPERATURE: 98.2 F | HEART RATE: 72 BPM | RESPIRATION RATE: 18 BRPM

## 2019-10-03 DIAGNOSIS — I20.0 UNSTABLE ANGINA (HCC): Primary | ICD-10-CM

## 2019-10-03 DIAGNOSIS — I21.4 NSTEMI (NON-ST ELEVATED MYOCARDIAL INFARCTION) (HCC): ICD-10-CM

## 2019-10-03 PROCEDURE — 74011250636 HC RX REV CODE- 250/636: Performed by: INTERNAL MEDICINE

## 2019-10-03 PROCEDURE — 74011000250 HC RX REV CODE- 250: Performed by: INTERNAL MEDICINE

## 2019-10-03 PROCEDURE — 96365 THER/PROPH/DIAG IV INF INIT: CPT

## 2019-10-03 PROCEDURE — 96366 THER/PROPH/DIAG IV INF ADDON: CPT

## 2019-10-03 RX ORDER — DIPHENHYDRAMINE HYDROCHLORIDE 50 MG/ML
50 INJECTION, SOLUTION INTRAMUSCULAR; INTRAVENOUS AS NEEDED
Status: CANCELLED
Start: 2019-10-03

## 2019-10-03 RX ORDER — SODIUM CHLORIDE 0.9 % (FLUSH) 0.9 %
10 SYRINGE (ML) INJECTION AS NEEDED
Status: CANCELLED
Start: 2019-10-03

## 2019-10-03 RX ORDER — HEPARIN 100 UNIT/ML
300-500 SYRINGE INTRAVENOUS AS NEEDED
Status: CANCELLED
Start: 2019-10-03

## 2019-10-03 RX ORDER — ONDANSETRON 2 MG/ML
8 INJECTION INTRAMUSCULAR; INTRAVENOUS AS NEEDED
Status: DISCONTINUED | OUTPATIENT
Start: 2019-10-03 | End: 2019-10-04 | Stop reason: HOSPADM

## 2019-10-03 RX ORDER — EPINEPHRINE 1 MG/ML
0.3 INJECTION, SOLUTION, CONCENTRATE INTRAVENOUS AS NEEDED
Status: CANCELLED | OUTPATIENT
Start: 2019-10-03

## 2019-10-03 RX ORDER — HYDROCORTISONE SODIUM SUCCINATE 100 MG/2ML
100 INJECTION, POWDER, FOR SOLUTION INTRAMUSCULAR; INTRAVENOUS AS NEEDED
Status: DISCONTINUED | OUTPATIENT
Start: 2019-10-03 | End: 2019-10-04 | Stop reason: HOSPADM

## 2019-10-03 RX ORDER — ALBUTEROL SULFATE 0.83 MG/ML
2.5 SOLUTION RESPIRATORY (INHALATION) AS NEEDED
Status: CANCELLED
Start: 2019-10-03

## 2019-10-03 RX ORDER — SODIUM CHLORIDE 9 MG/ML
10 INJECTION INTRAMUSCULAR; INTRAVENOUS; SUBCUTANEOUS AS NEEDED
Status: CANCELLED | OUTPATIENT
Start: 2019-10-03

## 2019-10-03 RX ORDER — SODIUM CHLORIDE 9 MG/ML
25 INJECTION, SOLUTION INTRAVENOUS CONTINUOUS
Status: DISCONTINUED | OUTPATIENT
Start: 2019-10-03 | End: 2019-10-04 | Stop reason: HOSPADM

## 2019-10-03 RX ORDER — DIPHENHYDRAMINE HYDROCHLORIDE 50 MG/ML
50 INJECTION, SOLUTION INTRAMUSCULAR; INTRAVENOUS AS NEEDED
Status: DISCONTINUED | OUTPATIENT
Start: 2019-10-03 | End: 2019-10-04 | Stop reason: HOSPADM

## 2019-10-03 RX ORDER — ACETAMINOPHEN 325 MG/1
650 TABLET ORAL AS NEEDED
Status: DISCONTINUED | OUTPATIENT
Start: 2019-10-03 | End: 2019-10-04 | Stop reason: HOSPADM

## 2019-10-03 RX ADMIN — Medication 6000 MG: at 15:42

## 2019-10-03 RX ADMIN — SODIUM CHLORIDE 25 ML/HR: 900 INJECTION, SOLUTION INTRAVENOUS at 15:45

## 2019-10-03 NOTE — PROGRESS NOTES
Arrived to the [facility]. INV AKY559/placebo completed. Patient tolerated without problems. Cardiac research nurse present for visit. Any issues or concerns during appointment: no.  Patient aware of next infusion appointment on 10/9/19 (date) at 1400 (time). Discharged ambulatory with family.

## 2019-10-04 ENCOUNTER — HOSPITAL ENCOUNTER (OUTPATIENT)
Dept: CARDIAC REHAB | Age: 74
Discharge: HOME | End: 2019-10-04

## 2019-10-04 NOTE — PROGRESS NOTES
Dear Dr. Arlette Smith,    Thank you for referring your patient,Mr. Sanjay Armstrong ( 1945), to the Cardiopulmonary Rehabilitation Program at [facility]. He is a good candidate for the Cardiac Rehab Program and should see improvements with regular participation. We will be addressing appropriate interventions for modifiable risk factors with your patient during the next 12 weeks. We will contact you with any issues or concerns that may arise, or you can follow your patient's progress through [address] [place] [address] at any time. A final summary will be sent to you when the program is completed. Again, thank you for the referral. If we can be of further assistance, please feel free to contact the Cardiopulmonary Rehab staff at [phone].     Sincerely,    AMADO Simms, RN  Cardiopulmonary Rehabilitation Nurse Liaison  [facility]

## 2019-10-07 RX ORDER — EPINEPHRINE 1 MG/ML
0.3 INJECTION, SOLUTION, CONCENTRATE INTRAVENOUS AS NEEDED
Status: CANCELLED | OUTPATIENT
Start: 2019-10-10

## 2019-10-07 RX ORDER — HYDROCORTISONE SODIUM SUCCINATE 100 MG/2ML
100 INJECTION, POWDER, FOR SOLUTION INTRAMUSCULAR; INTRAVENOUS AS NEEDED
Status: CANCELLED | OUTPATIENT
Start: 2019-10-10

## 2019-10-07 RX ORDER — ALBUTEROL SULFATE 0.83 MG/ML
2.5 SOLUTION RESPIRATORY (INHALATION) AS NEEDED
Status: CANCELLED
Start: 2019-10-10

## 2019-10-07 RX ORDER — HEPARIN 100 UNIT/ML
300-500 SYRINGE INTRAVENOUS AS NEEDED
Status: CANCELLED
Start: 2019-10-10

## 2019-10-07 RX ORDER — SODIUM CHLORIDE 9 MG/ML
10 INJECTION INTRAMUSCULAR; INTRAVENOUS; SUBCUTANEOUS AS NEEDED
Status: CANCELLED | OUTPATIENT
Start: 2019-10-10

## 2019-10-07 RX ORDER — DIPHENHYDRAMINE HYDROCHLORIDE 50 MG/ML
50 INJECTION, SOLUTION INTRAMUSCULAR; INTRAVENOUS AS NEEDED
Status: CANCELLED
Start: 2019-10-10

## 2019-10-07 RX ORDER — ACETAMINOPHEN 325 MG/1
650 TABLET ORAL AS NEEDED
Status: CANCELLED
Start: 2019-10-10

## 2019-10-09 ENCOUNTER — HOSPITAL ENCOUNTER (OUTPATIENT)
Dept: INFUSION THERAPY | Age: 74
Discharge: HOME | End: 2019-10-09
Payer: MEDICARE

## 2019-10-09 VITALS
HEART RATE: 67 BPM | RESPIRATION RATE: 16 BRPM | OXYGEN SATURATION: 99 % | WEIGHT: 179 LBS | TEMPERATURE: 97.9 F | DIASTOLIC BLOOD PRESSURE: 75 MMHG | SYSTOLIC BLOOD PRESSURE: 134 MMHG | BODY MASS INDEX: 25.68 KG/M2

## 2019-10-09 DIAGNOSIS — I21.4 NSTEMI (NON-ST ELEVATED MYOCARDIAL INFARCTION) (HCC): ICD-10-CM

## 2019-10-09 DIAGNOSIS — I20.0 UNSTABLE ANGINA (HCC): Primary | ICD-10-CM

## 2019-10-09 PROCEDURE — 96366 THER/PROPH/DIAG IV INF ADDON: CPT

## 2019-10-09 PROCEDURE — 74011250636 HC RX REV CODE- 250/636: Performed by: INTERNAL MEDICINE

## 2019-10-09 PROCEDURE — 74011000250 HC RX REV CODE- 250: Performed by: INTERNAL MEDICINE

## 2019-10-09 PROCEDURE — 96365 THER/PROPH/DIAG IV INF INIT: CPT

## 2019-10-09 RX ORDER — SODIUM CHLORIDE 9 MG/ML
25 INJECTION, SOLUTION INTRAVENOUS CONTINUOUS
Status: DISCONTINUED | OUTPATIENT
Start: 2019-10-09 | End: 2019-10-10 | Stop reason: HOSPADM

## 2019-10-09 RX ORDER — ONDANSETRON 2 MG/ML
8 INJECTION INTRAMUSCULAR; INTRAVENOUS AS NEEDED
Status: DISCONTINUED | OUTPATIENT
Start: 2019-10-09 | End: 2019-10-10 | Stop reason: HOSPADM

## 2019-10-09 RX ORDER — DIPHENHYDRAMINE HYDROCHLORIDE 50 MG/ML
50 INJECTION, SOLUTION INTRAMUSCULAR; INTRAVENOUS AS NEEDED
Status: DISCONTINUED | OUTPATIENT
Start: 2019-10-09 | End: 2019-10-10 | Stop reason: HOSPADM

## 2019-10-09 RX ORDER — SODIUM CHLORIDE 0.9 % (FLUSH) 0.9 %
10 SYRINGE (ML) INJECTION AS NEEDED
Status: DISCONTINUED | OUTPATIENT
Start: 2019-10-09 | End: 2019-10-10 | Stop reason: HOSPADM

## 2019-10-09 RX ADMIN — Medication 6000 MG: at 15:04

## 2019-10-09 RX ADMIN — Medication 10 ML: at 15:02

## 2019-10-09 RX ADMIN — SODIUM CHLORIDE 25 ML/HR: 900 INJECTION, SOLUTION INTRAVENOUS at 16:53

## 2019-10-09 NOTE — PROGRESS NOTES
Arrived to infusion. Cardiac research drug (INV VAN515/placebo) given per protocol and tolerated well. Denies new concerns. Cardiac research RN present throughout infusion.    Patient/wife aware of final infusion scheduled for  10/16/19 at 230pm.

## 2019-10-10 ENCOUNTER — HOSPITAL ENCOUNTER (OUTPATIENT)
Dept: CARDIAC REHAB | Age: 74
Discharge: HOME | End: 2019-10-10
Payer: MEDICARE

## 2019-10-10 VITALS — WEIGHT: 178.4 LBS | BODY MASS INDEX: 24.98 KG/M2 | HEIGHT: 71 IN

## 2019-10-10 PROCEDURE — 93798 PHYS/QHP OP CAR RHAB W/ECG: CPT

## 2019-10-11 ENCOUNTER — HOSPITAL ENCOUNTER (OUTPATIENT)
Dept: CARDIAC REHAB | Age: 74
Discharge: HOME | End: 2019-10-11
Payer: MEDICARE

## 2019-10-11 PROCEDURE — 93798 PHYS/QHP OP CAR RHAB W/ECG: CPT

## 2019-10-14 ENCOUNTER — HOSPITAL ENCOUNTER (OUTPATIENT)
Dept: CARDIAC REHAB | Age: 74
Discharge: HOME | End: 2019-10-14
Payer: MEDICARE

## 2019-10-14 PROCEDURE — 93798 PHYS/QHP OP CAR RHAB W/ECG: CPT

## 2019-10-15 RX ORDER — ACETAMINOPHEN 325 MG/1
650 TABLET ORAL AS NEEDED
Status: CANCELLED
Start: 2019-10-17

## 2019-10-15 RX ORDER — DIPHENHYDRAMINE HYDROCHLORIDE 50 MG/ML
50 INJECTION, SOLUTION INTRAMUSCULAR; INTRAVENOUS AS NEEDED
Status: CANCELLED
Start: 2019-10-17

## 2019-10-15 RX ORDER — ONDANSETRON 2 MG/ML
8 INJECTION INTRAMUSCULAR; INTRAVENOUS AS NEEDED
Status: CANCELLED | OUTPATIENT
Start: 2019-10-17

## 2019-10-15 RX ORDER — SODIUM CHLORIDE 9 MG/ML
10 INJECTION INTRAMUSCULAR; INTRAVENOUS; SUBCUTANEOUS AS NEEDED
Status: CANCELLED | OUTPATIENT
Start: 2019-10-17

## 2019-10-15 RX ORDER — HYDROCORTISONE SODIUM SUCCINATE 100 MG/2ML
100 INJECTION, POWDER, FOR SOLUTION INTRAMUSCULAR; INTRAVENOUS AS NEEDED
Status: CANCELLED | OUTPATIENT
Start: 2019-10-17

## 2019-10-15 RX ORDER — HEPARIN 100 UNIT/ML
300-500 SYRINGE INTRAVENOUS AS NEEDED
Status: CANCELLED
Start: 2019-10-17

## 2019-10-15 RX ORDER — ALBUTEROL SULFATE 0.83 MG/ML
2.5 SOLUTION RESPIRATORY (INHALATION) AS NEEDED
Status: CANCELLED
Start: 2019-10-17

## 2019-10-15 RX ORDER — EPINEPHRINE 1 MG/ML
0.3 INJECTION, SOLUTION, CONCENTRATE INTRAVENOUS AS NEEDED
Status: CANCELLED | OUTPATIENT
Start: 2019-10-17

## 2019-10-16 ENCOUNTER — HOSPITAL ENCOUNTER (OUTPATIENT)
Dept: CARDIAC REHAB | Age: 74
Discharge: HOME | End: 2019-10-16
Payer: MEDICARE

## 2019-10-16 ENCOUNTER — HOSPITAL ENCOUNTER (OUTPATIENT)
Dept: INFUSION THERAPY | Age: 74
Discharge: HOME | End: 2019-10-16
Payer: MEDICARE

## 2019-10-16 VITALS
SYSTOLIC BLOOD PRESSURE: 146 MMHG | DIASTOLIC BLOOD PRESSURE: 86 MMHG | TEMPERATURE: 97.9 F | HEART RATE: 88 BPM | OXYGEN SATURATION: 99 % | RESPIRATION RATE: 16 BRPM

## 2019-10-16 VITALS — BODY MASS INDEX: 25.41 KG/M2 | WEIGHT: 182.2 LBS

## 2019-10-16 DIAGNOSIS — I21.4 NSTEMI (NON-ST ELEVATED MYOCARDIAL INFARCTION) (HCC): ICD-10-CM

## 2019-10-16 DIAGNOSIS — I20.0 UNSTABLE ANGINA (HCC): Primary | ICD-10-CM

## 2019-10-16 PROCEDURE — 96366 THER/PROPH/DIAG IV INF ADDON: CPT

## 2019-10-16 PROCEDURE — 74011000250 HC RX REV CODE- 250: Performed by: INTERNAL MEDICINE

## 2019-10-16 PROCEDURE — 74011250636 HC RX REV CODE- 250/636: Performed by: INTERNAL MEDICINE

## 2019-10-16 PROCEDURE — 93798 PHYS/QHP OP CAR RHAB W/ECG: CPT

## 2019-10-16 PROCEDURE — 96365 THER/PROPH/DIAG IV INF INIT: CPT

## 2019-10-16 RX ORDER — SODIUM CHLORIDE 0.9 % (FLUSH) 0.9 %
10 SYRINGE (ML) INJECTION AS NEEDED
Status: DISCONTINUED | OUTPATIENT
Start: 2019-10-16 | End: 2019-10-17 | Stop reason: HOSPADM

## 2019-10-16 RX ORDER — SODIUM CHLORIDE 9 MG/ML
25 INJECTION, SOLUTION INTRAVENOUS CONTINUOUS
Status: DISCONTINUED | OUTPATIENT
Start: 2019-10-16 | End: 2019-10-17 | Stop reason: HOSPADM

## 2019-10-16 RX ADMIN — Medication 10 ML: at 15:30

## 2019-10-16 RX ADMIN — SODIUM CHLORIDE 25 ML/HR: 900 INJECTION, SOLUTION INTRAVENOUS at 17:39

## 2019-10-16 RX ADMIN — Medication 10 ML: at 18:06

## 2019-10-16 RX ADMIN — Medication 6000 MG: at 15:37

## 2019-10-16 NOTE — PROGRESS NOTES
Arrived to the [facility]. AEGIS completed. Patient tolerated well. Any issues or concerns during appointment: none. Discharged ambulatory.

## 2019-10-18 ENCOUNTER — HOSPITAL ENCOUNTER (OUTPATIENT)
Dept: CARDIAC REHAB | Age: 74
Discharge: HOME | End: 2019-10-18
Payer: MEDICARE

## 2019-10-18 PROCEDURE — 93798 PHYS/QHP OP CAR RHAB W/ECG: CPT

## 2019-10-21 ENCOUNTER — HOSPITAL ENCOUNTER (INPATIENT)
Age: 74
LOS: 3 days | Discharge: HOME | DRG: 388 | End: 2019-10-24
Attending: STUDENT IN AN ORGANIZED HEALTH CARE EDUCATION/TRAINING PROGRAM | Admitting: INTERNAL MEDICINE
Payer: MEDICARE

## 2019-10-21 ENCOUNTER — APPOINTMENT (OUTPATIENT)
Dept: CT IMAGING | Age: 74
DRG: 388 | End: 2019-10-21
Attending: STUDENT IN AN ORGANIZED HEALTH CARE EDUCATION/TRAINING PROGRAM
Payer: MEDICARE

## 2019-10-21 ENCOUNTER — HOSPITAL ENCOUNTER (OUTPATIENT)
Dept: CARDIAC REHAB | Age: 74
End: 2019-10-21
Payer: MEDICARE

## 2019-10-21 ENCOUNTER — APPOINTMENT (OUTPATIENT)
Dept: GENERAL RADIOLOGY | Age: 74
DRG: 388 | End: 2019-10-21
Attending: STUDENT IN AN ORGANIZED HEALTH CARE EDUCATION/TRAINING PROGRAM
Payer: MEDICARE

## 2019-10-21 DIAGNOSIS — K56.609 SMALL BOWEL OBSTRUCTION (HCC): Primary | ICD-10-CM

## 2019-10-21 LAB
ALBUMIN SERPL-MCNC: 3.9 G/DL (ref 3.2–4.6)
ALBUMIN/GLOB SERPL: 0.8 {RATIO} (ref 1.2–3.5)
ALP SERPL-CCNC: 93 U/L (ref 50–136)
ALT SERPL-CCNC: 21 U/L (ref 12–65)
ANION GAP SERPL CALC-SCNC: 8 MMOL/L (ref 7–16)
AST SERPL-CCNC: 35 U/L (ref 15–37)
ATRIAL RATE: 95 BPM
BASOPHILS # BLD: 0 K/UL (ref 0–0.2)
BASOPHILS NFR BLD: 0 % (ref 0–2)
BILIRUB SERPL-MCNC: 0.8 MG/DL (ref 0.2–1.1)
BUN SERPL-MCNC: 15 MG/DL (ref 8–23)
CALCIUM SERPL-MCNC: 10.3 MG/DL (ref 8.3–10.4)
CALCULATED P AXIS, ECG09: 70 DEGREES
CALCULATED R AXIS, ECG10: 35 DEGREES
CALCULATED T AXIS, ECG11: 57 DEGREES
CHLORIDE SERPL-SCNC: 104 MMOL/L (ref 98–107)
CO2 SERPL-SCNC: 25 MMOL/L (ref 21–32)
CREAT SERPL-MCNC: 1.49 MG/DL (ref 0.8–1.5)
DIAGNOSIS, 93000: NORMAL
DIFFERENTIAL METHOD BLD: ABNORMAL
EOSINOPHIL # BLD: 0 K/UL (ref 0–0.8)
EOSINOPHIL NFR BLD: 0 % (ref 0.5–7.8)
ERYTHROCYTE [DISTWIDTH] IN BLOOD BY AUTOMATED COUNT: 14.1 % (ref 11.9–14.6)
GLOBULIN SER CALC-MCNC: 4.7 G/DL (ref 2.3–3.5)
GLUCOSE BLD STRIP.AUTO-MCNC: 107 MG/DL (ref 65–100)
GLUCOSE BLD STRIP.AUTO-MCNC: 164 MG/DL (ref 65–100)
GLUCOSE SERPL-MCNC: 216 MG/DL (ref 65–100)
HCT VFR BLD AUTO: 41.5 % (ref 41.1–50.3)
HGB BLD-MCNC: 13.9 G/DL (ref 13.6–17.2)
IMM GRANULOCYTES # BLD AUTO: 0 K/UL (ref 0–0.5)
IMM GRANULOCYTES NFR BLD AUTO: 0 % (ref 0–5)
LACTATE BLD-SCNC: 2.78 MMOL/L (ref 0.5–1.9)
LACTATE SERPL-SCNC: 1.3 MMOL/L (ref 0.4–2)
LACTATE SERPL-SCNC: 1.5 MMOL/L (ref 0.4–2)
LACTATE SERPL-SCNC: 2.4 MMOL/L (ref 0.4–2)
LIPASE SERPL-CCNC: 131 U/L (ref 73–393)
LYMPHOCYTES # BLD: 0.4 K/UL (ref 0.5–4.6)
LYMPHOCYTES NFR BLD: 7 % (ref 13–44)
MCH RBC QN AUTO: 30 PG (ref 26.1–32.9)
MCHC RBC AUTO-ENTMCNC: 33.5 G/DL (ref 31.4–35)
MCV RBC AUTO: 89.4 FL (ref 79.6–97.8)
MONOCYTES # BLD: 0.6 K/UL (ref 0.1–1.3)
MONOCYTES NFR BLD: 9 % (ref 4–12)
NEUTS SEG # BLD: 5.1 K/UL (ref 1.7–8.2)
NEUTS SEG NFR BLD: 83 % (ref 43–78)
NRBC # BLD: 0 K/UL (ref 0–0.2)
P-R INTERVAL, ECG05: 156 MS
PLATELET # BLD AUTO: 199 K/UL (ref 150–450)
PMV BLD AUTO: 11.7 FL (ref 9.4–12.3)
POTASSIUM SERPL-SCNC: 4.5 MMOL/L (ref 3.5–5.1)
PROT SERPL-MCNC: 8.6 G/DL (ref 6.3–8.2)
Q-T INTERVAL, ECG07: 348 MS
QRS DURATION, ECG06: 76 MS
QTC CALCULATION (BEZET), ECG08: 437 MS
RBC # BLD AUTO: 4.64 M/UL (ref 4.23–5.6)
SODIUM SERPL-SCNC: 137 MMOL/L (ref 136–145)
TROPONIN I BLD-MCNC: 0 NG/ML (ref 0.02–0.05)
VENTRICULAR RATE, ECG03: 95 BPM
WBC # BLD AUTO: 6.1 K/UL (ref 4.3–11.1)

## 2019-10-21 PROCEDURE — 36415 COLL VENOUS BLD VENIPUNCTURE: CPT

## 2019-10-21 PROCEDURE — 71046 X-RAY EXAM CHEST 2 VIEWS: CPT

## 2019-10-21 PROCEDURE — 83690 ASSAY OF LIPASE: CPT

## 2019-10-21 PROCEDURE — 74011000258 HC RX REV CODE- 258: Performed by: STUDENT IN AN ORGANIZED HEALTH CARE EDUCATION/TRAINING PROGRAM

## 2019-10-21 PROCEDURE — 84484 ASSAY OF TROPONIN QUANT: CPT

## 2019-10-21 PROCEDURE — 74011250636 HC RX REV CODE- 250/636: Performed by: INTERNAL MEDICINE

## 2019-10-21 PROCEDURE — 96374 THER/PROPH/DIAG INJ IV PUSH: CPT | Performed by: STUDENT IN AN ORGANIZED HEALTH CARE EDUCATION/TRAINING PROGRAM

## 2019-10-21 PROCEDURE — 77030020263 HC SOL INJ SOD CL0.9% LFCR 1000ML

## 2019-10-21 PROCEDURE — 74011636637 HC RX REV CODE- 636/637: Performed by: INTERNAL MEDICINE

## 2019-10-21 PROCEDURE — 96361 HYDRATE IV INFUSION ADD-ON: CPT | Performed by: STUDENT IN AN ORGANIZED HEALTH CARE EDUCATION/TRAINING PROGRAM

## 2019-10-21 PROCEDURE — 83605 ASSAY OF LACTIC ACID: CPT

## 2019-10-21 PROCEDURE — 99285 EMERGENCY DEPT VISIT HI MDM: CPT | Performed by: STUDENT IN AN ORGANIZED HEALTH CARE EDUCATION/TRAINING PROGRAM

## 2019-10-21 PROCEDURE — 96375 TX/PRO/DX INJ NEW DRUG ADDON: CPT | Performed by: STUDENT IN AN ORGANIZED HEALTH CARE EDUCATION/TRAINING PROGRAM

## 2019-10-21 PROCEDURE — 65270000029 HC RM PRIVATE

## 2019-10-21 PROCEDURE — 82962 GLUCOSE BLOOD TEST: CPT

## 2019-10-21 PROCEDURE — 80053 COMPREHEN METABOLIC PANEL: CPT

## 2019-10-21 PROCEDURE — 94760 N-INVAS EAR/PLS OXIMETRY 1: CPT

## 2019-10-21 PROCEDURE — 74011636320 HC RX REV CODE- 636/320: Performed by: STUDENT IN AN ORGANIZED HEALTH CARE EDUCATION/TRAINING PROGRAM

## 2019-10-21 PROCEDURE — 85025 COMPLETE CBC W/AUTO DIFF WBC: CPT

## 2019-10-21 PROCEDURE — 74011250636 HC RX REV CODE- 250/636: Performed by: STUDENT IN AN ORGANIZED HEALTH CARE EDUCATION/TRAINING PROGRAM

## 2019-10-21 PROCEDURE — 74177 CT ABD & PELVIS W/CONTRAST: CPT

## 2019-10-21 PROCEDURE — 93005 ELECTROCARDIOGRAM TRACING: CPT | Performed by: STUDENT IN AN ORGANIZED HEALTH CARE EDUCATION/TRAINING PROGRAM

## 2019-10-21 RX ORDER — DEXTROSE 40 %
15 GEL (GRAM) ORAL AS NEEDED
Status: DISCONTINUED | OUTPATIENT
Start: 2019-10-21 | End: 2019-10-24 | Stop reason: HOSPADM

## 2019-10-21 RX ORDER — HYDRALAZINE HYDROCHLORIDE 20 MG/ML
20 INJECTION INTRAMUSCULAR; INTRAVENOUS
Status: DISCONTINUED | OUTPATIENT
Start: 2019-10-21 | End: 2019-10-22

## 2019-10-21 RX ORDER — ONDANSETRON 2 MG/ML
4 INJECTION INTRAMUSCULAR; INTRAVENOUS
Status: COMPLETED | OUTPATIENT
Start: 2019-10-21 | End: 2019-10-21

## 2019-10-21 RX ORDER — SODIUM CHLORIDE 9 MG/ML
100 INJECTION, SOLUTION INTRAVENOUS CONTINUOUS
Status: DISCONTINUED | OUTPATIENT
Start: 2019-10-21 | End: 2019-10-23

## 2019-10-21 RX ORDER — NALOXONE HYDROCHLORIDE 0.4 MG/ML
0.4 INJECTION, SOLUTION INTRAMUSCULAR; INTRAVENOUS; SUBCUTANEOUS AS NEEDED
Status: DISCONTINUED | OUTPATIENT
Start: 2019-10-21 | End: 2019-10-24 | Stop reason: HOSPADM

## 2019-10-21 RX ORDER — SODIUM CHLORIDE 0.9 % (FLUSH) 0.9 %
10 SYRINGE (ML) INJECTION
Status: COMPLETED | OUTPATIENT
Start: 2019-10-21 | End: 2019-10-21

## 2019-10-21 RX ORDER — SODIUM CHLORIDE 0.9 % (FLUSH) 0.9 %
5-40 SYRINGE (ML) INJECTION AS NEEDED
Status: DISCONTINUED | OUTPATIENT
Start: 2019-10-21 | End: 2019-10-24 | Stop reason: HOSPADM

## 2019-10-21 RX ORDER — SODIUM CHLORIDE 0.9 % (FLUSH) 0.9 %
5-40 SYRINGE (ML) INJECTION EVERY 8 HOURS
Status: DISCONTINUED | OUTPATIENT
Start: 2019-10-21 | End: 2019-10-24 | Stop reason: HOSPADM

## 2019-10-21 RX ORDER — MORPHINE SULFATE 4 MG/ML
2-4 INJECTION INTRAVENOUS
Status: DISCONTINUED | OUTPATIENT
Start: 2019-10-21 | End: 2019-10-24 | Stop reason: HOSPADM

## 2019-10-21 RX ORDER — ONDANSETRON 2 MG/ML
4 INJECTION INTRAMUSCULAR; INTRAVENOUS
Status: DISCONTINUED | OUTPATIENT
Start: 2019-10-21 | End: 2019-10-24 | Stop reason: HOSPADM

## 2019-10-21 RX ORDER — INSULIN LISPRO 100 [IU]/ML
INJECTION, SOLUTION INTRAVENOUS; SUBCUTANEOUS EVERY 6 HOURS
Status: DISCONTINUED | OUTPATIENT
Start: 2019-10-21 | End: 2019-10-22

## 2019-10-21 RX ORDER — MORPHINE SULFATE 4 MG/ML
4 INJECTION INTRAVENOUS
Status: COMPLETED | OUTPATIENT
Start: 2019-10-21 | End: 2019-10-21

## 2019-10-21 RX ORDER — HEPARIN SODIUM 5000 [USP'U]/ML
5000 INJECTION, SOLUTION INTRAVENOUS; SUBCUTANEOUS EVERY 8 HOURS
Status: DISCONTINUED | OUTPATIENT
Start: 2019-10-21 | End: 2019-10-22

## 2019-10-21 RX ORDER — DEXTROSE 50 % IN WATER (D50W) INTRAVENOUS SYRINGE
25-50 AS NEEDED
Status: DISCONTINUED | OUTPATIENT
Start: 2019-10-21 | End: 2019-10-24 | Stop reason: HOSPADM

## 2019-10-21 RX ADMIN — MORPHINE SULFATE 4 MG: 4 INJECTION INTRAVENOUS at 07:32

## 2019-10-21 RX ADMIN — Medication 5 ML: at 23:10

## 2019-10-21 RX ADMIN — SODIUM CHLORIDE 1000 ML: 900 INJECTION, SOLUTION INTRAVENOUS at 07:40

## 2019-10-21 RX ADMIN — SODIUM CHLORIDE 75 ML/HR: 900 INJECTION, SOLUTION INTRAVENOUS at 10:35

## 2019-10-21 RX ADMIN — SODIUM CHLORIDE 100 ML: 900 INJECTION, SOLUTION INTRAVENOUS at 08:33

## 2019-10-21 RX ADMIN — ONDANSETRON 4 MG: 2 INJECTION INTRAMUSCULAR; INTRAVENOUS at 07:31

## 2019-10-21 RX ADMIN — HEPARIN SODIUM 5000 UNITS: 5000 INJECTION INTRAVENOUS; SUBCUTANEOUS at 19:48

## 2019-10-21 RX ADMIN — MORPHINE SULFATE 2 MG: 4 INJECTION INTRAVENOUS at 20:49

## 2019-10-21 RX ADMIN — INSULIN LISPRO 2 UNITS: 100 INJECTION, SOLUTION INTRAVENOUS; SUBCUTANEOUS at 12:42

## 2019-10-21 RX ADMIN — IOPAMIDOL 100 ML: 755 INJECTION, SOLUTION INTRAVENOUS at 08:32

## 2019-10-21 RX ADMIN — Medication 10 ML: at 08:33

## 2019-10-21 RX ADMIN — HEPARIN SODIUM 5000 UNITS: 5000 INJECTION INTRAVENOUS; SUBCUTANEOUS at 12:45

## 2019-10-21 RX ADMIN — SODIUM CHLORIDE 75 ML/HR: 900 INJECTION, SOLUTION INTRAVENOUS at 23:08

## 2019-10-21 NOTE — ROUTINE PROCESS
TRANSFER - OUT REPORT: 
 
Verbal report given to Min Donato RN on Clearance Jaja  being transferred to  for routine progression of care Report consisted of patients Situation, Background, Assessment and  
Recommendations(SBAR). Information from the following report(s) ED Summary was reviewed with the receiving nurse. Lines:  
Peripheral IV 10/21/19 Right Hand (Active) Site Assessment Clean, dry, & intact 10/21/2019  7:58 AM  
Phlebitis Assessment 0 10/21/2019  7:58 AM  
Infiltration Assessment 0 10/21/2019  7:58 AM  
Dressing Status Clean, dry, & intact 10/21/2019  7:58 AM  
  
 
Opportunity for questions and clarification was provided. Patient transported with: transport

## 2019-10-21 NOTE — H&P
HOSPITALIST H&P/CONSULT  NAME:  Layo Fatima   Age:  69 y.o.  :   1945   MRN:   [id number]  PCP: Jose Serrato MD  Consulting MD:  Treatment Team: Attending Provider: Renate Bowie MD  HPI:   Pt is a 69 y.o. male with CAD (s/p NSTEMI ), severe PAD, chronic systolic heart failure, DM2, prostate CA, CKD 3, h/o SBO who presents to the ED with abd pain, N/V. Pt reports onset of abd pain, vomiting on day PTA. Abd pain is cramping and diffuse in nature. ~4 episodes of nonbloody emesis. Last BM earlier yesterday morning. H/o SBO with similar presentation. Multiple abdominal surgeries in past. Denies F/C, CP, SOB. In ED, VSS, afebrile. Labs remarkable for LA 2.78, Cr stable around baseline, CT abd/pelv revealing acute SBO with transition point in left upper pelvis near site of small L inguinal hernia. NGT was placed in ED.     10 point ROS done and is as stated in HPI or otherwise negative  Past Medical History:   Diagnosis Date    Allergic rhinitis     Arthritis     CAD (coronary artery disease)     CABG '09; troponin elevated 12 (\"likely due to supply/demand mismatch in setting of fever and increased metabolic demand\"-per cardiac MD note 12)-had cath, echo, EKG-all WNL except cath showed 2 of the bypasses with blockages- \"occluded SVG to PDA & SVG to LISA\"; EF=55%    Chronic kidney disease     CVA (cerebral vascular accident) ([facility] [address] 2017    Left- no residual weakness    Diabetes mellitus type 2, controlled ([address]     restarted oral med (metformin 3/2018), does not check BG on regular basis, last hgba1c- 8 (3/12/18)    Dyslipidemia     ED (erectile dysfunction)     Encephalitis 1962    x 2/hospitalized as teenager    GERD (gastroesophageal reflux disease)     controlled with Nexium    Gout     hx of    Hypertension     Lacunar infarct, acute ([address]     possible embolic, remote a-fib- on eliquis    Lumbago     Memory loss     Mitral valve regurgitation 12 \"moderate\" on echo    ROBERTO (obstructive sleep apnea)     mild per patient, does not use CPAP    PAF ([facility]oxysmal atrial fibrillation) [facility]     Prostate cancer ([facility] [address]     followed by urology    Psoriasis     topical creams    SBO (small bowel obstruction) ([facility] [address] 2011, 2015, 2016    Stage 2 chronic kidney disease       Past Surgical History:   Procedure Laterality Date    ABDOMEN SURGERY PROC UNLISTED  1967    exp lap    HX APPENDECTOMY  age 49        [de-identified] CATARACT REMOVAL Bilateral 2013    iol     HX COLONOSCOPY  1998, 2010    HX CORONARY ARTERY BYPASS GRAFT  2009    x4 bypass    HX HERNIA REPAIR Bilateral 2012    HX RADICAL PROSTATECTOMY       HX SHOULDER REPLACEMENT Right 2018    Reverse R TSA    HX SPLENECTOMY  1951    CA LEFT HEART CATH,PERCUTANEOUS  7/2012    no intervention    CA PROSTATE BIOPSY, NEEDLE, SATURATION SAMPLING      and ultrasound    VASCULAR SURGERY PROCEDURE UNLIST  12/29/2017    aortogram, w/cass LE runoff,R-LE arteriogram      Prior to Admission Medications   Prescriptions Last Dose Informant Patient Reported? Taking? Cetirizine (ZYRTEC) 10 mg Cap   Yes No   Sig: Take 10 mg by mouth nightly. As needed  Indications: inflammation of the nose due to an allergy   Lancets misc   No No   Sig: Test once to twice daily DX: E11.8   Omeprazole delayed release (PRILOSEC D/R) 20 mg tablet   No No   Sig: Take 1 Tab by mouth daily. acetaminophen (TYLENOL) 325 mg tablet   Yes No   Sig: Take 2 Tabs by mouth every four (4) hours as needed for Pain. allopurinol (ZYLOPRIM) 300 mg tablet   No No   Sig: Take 1 Tab by mouth daily. amLODIPine (NORVASC) 10 mg tablet   No No   Sig: Take 1 Tab by mouth daily. aspirin 81 mg chewable tablet   Yes No   Sig: Take 1 Tab by mouth daily. carvedilol (COREG) 12.5 mg tablet   No No   Sig: Take 1 Tab by mouth two (2) times daily (with meals). cholecalciferol, vitamin D3, (VITAMIN D3) 2,000 unit Tab   Yes No   Sig: Take 2,000 Units by mouth daily. Indications: OSTEOPOROSIS, am   clopidogrel (PLAVIX) 75 mg tab   No No   Sig: Take 1 Tab by mouth daily. gabapentin (NEURONTIN) 300 mg capsule   No No   Sig: Take 1 Cap by mouth four (4) times daily. Patient taking differently: Take 300 mg by mouth three (3) times daily. glucose blood VI test strips (BLOOD GLUCOSE TEST) strip   No No   Sig: Test once to twice daily DX: E11.8   isosorbide mononitrate ER (IMDUR) 60 mg CR tablet   No No   Sig: Take 1 Tab by mouth daily. lisinopril (PRINIVIL, ZESTRIL) 20 mg tablet   No No   Sig: Take 1 Tab by mouth daily. nitroglycerin (NITROSTAT) 0.4 mg SL tablet   No No   Si Tab by SubLINGual route every five (5) minutes as needed for Chest Pain. rosuvastatin (CRESTOR) 20 mg tablet   No No   Sig: Take 1 Tab by mouth nightly. sitagliptin phosphate (JANUVIA PO)   Yes No   Sig: Take  by mouth.       Facility-Administered Medications: None     Allergies   Allergen Reactions    Celecoxib Swelling     Hands    Ibuprofen Unknown (comments)     Patient unsure    Lescol [Fluvastatin] Unknown (comments)     Other reaction(s): Unknown (comments)    Nsaids (Non-Steroidal Anti-Inflammatory Drug) Other (comments)     Elevated serum creatinine    Sulfa (Sulfonamide Antibiotics) Rash    Uloric [Febuxostat] Other (comments)     Joint Pain      Social History     Tobacco Use    Smoking status: Former Smoker     Packs/day: 1.00     Years: 15.00     Pack years: 15.00     Last attempt to quit: 1975     Years since quittin.3    Smokeless tobacco: Never Used   Substance Use Topics    Alcohol use: Yes     Comment: rarely- once/month      Family History   Problem Relation Age of Onset    Hypertension Mother    [place] [facility] Gout Mother     Arthritis-osteo Mother     Heart Disease Mother          of Heart Disease    Coronary Artery Disease Mother     Diabetes Father     Cancer Father     Heart Disease Father     Bleeding Prob Paternal Grandmother     Hypertension Brother  Cancer Maternal Uncle         Prostate      All history personally reviewed  Objective:     Patient Vitals for the past 24 hrs:   Temp Pulse Resp BP SpO2   10/21/19 1013 99.1 °F (37.3 °C) 95 16 151/89 98 %   10/21/19 0948 98 °F (36.7 °C) (!) 106 15 148/89 95 %   10/21/19 0904  96 10 (!) 172/96 95 %   10/21/19 0847 98.1 °F (36.7 °C)       10/21/19 0845    165/80    10/21/19 0806 98.1 °F (36.7 °C) 97 16 182/88 95 %   10/21/19 0755     97 %   10/21/19 0708 98.4 °F (36.9 °C) 99 16 150/84 99 %     Oxygen Therapy  O2 Sat (%): 98 % (10/21/19 1013)  Pulse via Oximetry: 95 beats per minute (10/21/19 0904)  O2 Device: Room air (10/21/19 0948)  Physical Exam:  General:    Alert, cooperative, no distress, appears stated age. Head:   Normocephalic, without obvious abnormality, atraumatic. Nose:  Nares normal. No drainage or sinus tenderness. Lungs:   Clear to auscultation bilaterally. No Wheezing or Rhonchi. No rales. Heart:   Regular rate and rhythm,  no murmur, rub or gallop. Abdomen:   Soft, non-tender. Not distended. Hypoactive BS on L  :  Small reducible L inguinal hernia  Extremities: No cyanosis. No edema. No clubbing  Skin:     Texture, turgor normal. No rashes or lesions. Not Jaundiced  Neurologic: Alert and oriented x 3, no focal deficits   Data Review:   Recent Results (from the past 24 hour(s))   EKG, 12 LEAD, INITIAL    Collection Time: 10/21/19  7:23 AM   Result Value Ref Range    Ventricular Rate 95 BPM    Atrial Rate 95 BPM    P-R Interval 156 ms    QRS Duration 76 ms    Q-T Interval 348 ms    QTC Calculation (Bezet) 437 ms    Calculated P Axis 70 degrees    Calculated R Axis 35 degrees    Calculated T Axis 57 degrees    Diagnosis       !! AGE AND GENDER SPECIFIC ECG ANALYSIS !! Normal sinus rhythm  Nonspecific ST abnormality  When compared with ECG of 26-SEP-2019 07:08,  Vent.  rate has increased BY  31 BPM  Nonspecific T wave abnormality no longer evident in Lateral leads  Confirmed by ST JIMY DOUGLASS MD (), DARLIN MICHEL ([id number]) on 10/21/2019 8:49:59 AM     CBC WITH AUTOMATED DIFF    Collection Time: 10/21/19  7:31 AM   Result Value Ref Range    WBC 6.1 4.3 - 11.1 K/uL    RBC 4.64 4.23 - 5.6 M/uL    HGB 13.9 13.6 - 17.2 g/dL    HCT 41.5 41.1 - 50.3 %    MCV 89.4 79.6 - 97.8 FL    MCH 30.0 26.1 - 32.9 PG    MCHC 33.5 31.4 - 35.0 g/dL    RDW 14.1 11.9 - 14.6 %    PLATELET 778 031 - 437 K/uL    MPV 11.7 9.4 - 12.3 FL    ABSOLUTE NRBC 0.00 0.0 - 0.2 K/uL    DF AUTOMATED      NEUTROPHILS 83 (H) 43 - 78 %    LYMPHOCYTES 7 (L) 13 - 44 %    MONOCYTES 9 4.0 - 12.0 %    EOSINOPHILS 0 (L) 0.5 - 7.8 %    BASOPHILS 0 0.0 - 2.0 %    IMMATURE GRANULOCYTES 0 0.0 - 5.0 %    ABS. NEUTROPHILS 5.1 1.7 - 8.2 K/UL    ABS. LYMPHOCYTES 0.4 (L) 0.5 - 4.6 K/UL    ABS. MONOCYTES 0.6 0.1 - 1.3 K/UL    ABS. EOSINOPHILS 0.0 0.0 - 0.8 K/UL    ABS. BASOPHILS 0.0 0.0 - 0.2 K/UL    ABS. IMM. GRANS. 0.0 0.0 - 0.5 K/UL   METABOLIC PANEL, COMPREHENSIVE    Collection Time: 10/21/19  7:31 AM   Result Value Ref Range    Sodium 137 136 - 145 mmol/L    Potassium 4.5 3.5 - 5.1 mmol/L    Chloride 104 98 - 107 mmol/L    CO2 25 21 - 32 mmol/L    Anion gap 8 7 - 16 mmol/L    Glucose 216 (H) 65 - 100 mg/dL    BUN 15 8 - 23 MG/DL    Creatinine 1.49 0.8 - 1.5 MG/DL    GFR est AA 59 (L) >60 ml/min/1.73m2    GFR est non-AA 49 (L) >60 ml/min/1.73m2    Calcium 10.3 8.3 - 10.4 MG/DL    Bilirubin, total 0.8 0.2 - 1.1 MG/DL    ALT (SGPT) 21 12 - 65 U/L    AST (SGOT) 35 15 - 37 U/L    Alk.  phosphatase 93 50 - 136 U/L    Protein, total 8.6 (H) 6.3 - 8.2 g/dL    Albumin 3.9 3.2 - 4.6 g/dL    Globulin 4.7 (H) 2.3 - 3.5 g/dL    A-G Ratio 0.8 (L) 1.2 - 3.5     LIPASE    Collection Time: 10/21/19  7:31 AM   Result Value Ref Range    Lipase 131 73 - 393 U/L   POC TROPONIN-I    Collection Time: 10/21/19  7:39 AM   Result Value Ref Range    Troponin-I (POC) 0 (L) 0.02 - 0.05 ng/ml   POC LACTIC ACID    Collection Time: 10/21/19  7:40 AM   Result Value Ref Range    Lactic Acid (POC) 2.78 (H) 0.5 - 1.9 mmol/L   LACTIC ACID    Collection Time: 10/21/19 10:54 AM   Result Value Ref Range    Lactic acid 2.4 (HH) 0.4 - 2.0 MMOL/L     Imaging /Procedures /Studies   Xr Chest Pa Lat    Result Date: 10/21/2019  IMPRESSION: 1. No acute airspace disease. 2. Previous CABG. Ct Abd Pelv W Cont    Result Date: 10/21/2019  IMPRESSION: 1. Acute mechanical small bowel obstruction evident with the transition point in the left upper pelvis near the site of a small left inguinal hernia. 2. Simple appearing hepatic and renal cysts. 3. Sigmoid colon diverticulosis without diverticulitis. Assessment and Plan: Active Hospital Problems    Diagnosis Date Noted    SBO (small bowel obstruction) ([facility] [address] 10/21/2019       SBO  - NGT to LIS  - NPO  - IVFs  - pain control  - if no improvement with conservative measures, will need gen surg eval    CAD  HLD  Recent NSTEMI 9/19 with angioplasty (no stents at that time). Last BOGDAN 3/19.  - holding antiplatelets    Severe PAD  - holding home meds while NPO    DM2  - SSI  - glucose q6h    Chronic systolic heart failure  EF 60-65% on ECHO 9/19. Euvolemic on exam.  - holding home meds    CKD stage 3  Cr stable around baseline.        Proph: heparin SQ  Code status: full code, wife Marcelina Decker is surrogate decision maker  Dispo: likely 2-3 days  Risk: high    Signed By: Kellie Carter MD     October 21, 2019

## 2019-10-21 NOTE — PROGRESS NOTES
10/21/19 1022   Dual Skin Pressure Injury Assessment   Dual Skin Pressure Injury Assessment WDL   Second Care Provider (Based on [address] [place] [address]) [facility].  RN

## 2019-10-21 NOTE — PROGRESS NOTES
TRANSFER - IN REPORT:    Verbal report received from [address] (name) on Crispin Dieter  being received from ER (unit) for routine progression of care      Report consisted of patients Situation, Background, Assessment and   Recommendations(SBAR). Information from the following report(s) SBAR, Kardex, Intake/Output, MAR and Recent Results was reviewed with the receiving nurse. Opportunity for questions and clarification was provided. Assessment completed upon patients arrival to unit and care assumed.

## 2019-10-21 NOTE — ED PROVIDER NOTES
67-year-old male patient presents to the emergency department with reports of nausea, vomiting and abdominal pain. Symptoms started last evening. States he attempted to eat dinner but became very nauseous and threw up the majority of this meal.  Is continued to dry heave since. Abdominal pain is described as diffuse and aching in nature. This pain is constant and nonradiating. Pain is worsened with movement and palpation. Patient denies shortness of breath or chest discomfort. Wife at bedside states patient did complain of some chest tightness last week but this is since resolved. No associated shortness of breath. There is no diaphoresis, fever or chills. Patient reports normal bowel movement yesterday. Extensive surgical history including appendectomy and splenectomy in the past.  Patient also has had previous bowel obstructions and a history of diverticulitis. Of note, patient is currently receiving experimental infusion weekly through the infusion center for ongoing angina.            Past Medical History:   Diagnosis Date    Allergic rhinitis     Arthritis     CAD (coronary artery disease)     CABG '09; troponin elevated 7/14/12 (\"likely due to supply/demand mismatch in setting of fever and increased metabolic demand\"-per cardiac MD note 8/20/12)-had cath, echo, EKG-all WNL except cath showed 2 of the bypasses with blockages- \"occluded SVG to PDA & SVG to LISA\"; EF=55%    Chronic kidney disease     CVA (cerebral vascular accident) ([address] 08/2017    Left- no residual weakness    Diabetes mellitus type 2, controlled ([address]     restarted oral med (metformin 3/2018), does not check BG on regular basis, last hgba1c- 8 (3/12/18)    Dyslipidemia     ED (erectile dysfunction)     Encephalitis 1962    x 2/hospitalized as teenager    GERD (gastroesophageal reflux disease)     controlled with Nexium    Gout     hx of    Hypertension     Lacunar infarct, acute ([address]     possible embolic, remote a-fib- on eliquis    Lumbago     Memory loss     Mitral valve regurgitation 12    \"moderate\" on echo    ROBERTO (obstructive sleep apnea)     mild per patient, does not use CPAP    PA[facility] ([facility]oxysmal atrial fibrillation) ([facility] [address]     Prostate cancer ([facility] [address]     followed by urology    Psoriasis     topical creams    SBO (small bowel obstruction) ([facility] [address] , ,     Stage 2 chronic kidney disease        Past Surgical History:   Procedure Laterality Date    ABDOMEN SURGERY PROC UNLISTED  1967    exp lap    HX APPENDECTOMY  age 49        [de-identified] CATARACT REMOVAL Bilateral 2013    iol     HX COLONOSCOPY  ,     HX CORONARY ARTERY BYPASS GRAFT  2009    x4 bypass    HX HERNIA REPAIR Bilateral 2012    HX RADICAL PROSTATECTOMY       HX SHOULDER REPLACEMENT Right 2018    Reverse R TSA    HX SPLENECTOMY      VT LEFT HEART CATH,PERCUTANEOUS  2012    no intervention    VT PROSTATE BIOPSY, NEEDLE, SATURATION SAMPLING      and ultrasound    VASCULAR SURGERY PROCEDURE UNLIST  2017    aortogram, w/cass LE runoff,R-LE arteriogram         Family History:   Problem Relation Age of Onset    Hypertension Mother    [facility] Gout Mother     Arthritis-osteo Mother     Heart Disease Mother          of Heart Disease    Coronary Artery Disease Mother     Diabetes Father     Cancer Father     Heart Disease Father     Bleeding Prob Paternal Grandmother     Hypertension Brother     Cancer Maternal Uncle         Prostate       Social History     Socioeconomic History    Marital status:      Spouse name: Not on file    Number of children: Not on file    Years of education: Not on file    Highest education level: Not on file   Occupational History    Not on file   Social Needs    Financial resource strain: Not on file    Food insecurity:     Worry: Not on file     Inability: Not on file    Transportation needs:     Medical: Not on file     Non-medical: Not on file   Tobacco Use    Smoking status: Former Smoker     Packs/day: 1.00     Years: 15.00     Pack years: 15.00     Last attempt to quit: 1975     Years since quittin.3    Smokeless tobacco: Never Used   Substance and Sexual Activity    Alcohol use: Yes     Comment: rarely- once/month    Drug use: No    Sexual activity: Yes     Partners: Female   Lifestyle    Physical activity:     Days per week: Not on file     Minutes per session: Not on file    Stress: Not on file   Relationships    Social connections:     Talks on phone: Not on file     Gets together: Not on file     Attends [employer] service: Not on file     Active member of club or organization: Not on file     Attends meetings of clubs or organizations: Not on file     Relationship status: Not on file    Intimate partner violence:     Fear of current or ex partner: Not on file     Emotionally abused: Not on file     Physically abused: Not on file     Forced sexual activity: Not on file   Other Topics Concern    Dental Braces Not Asked    Endoscopic Camera Pill Not Asked    Metallic Foreign Body Not Asked    Medication Patches Not Asked    Taking Feraheme Not Asked    Claustrophobic Not Asked    Removable Dental Work Not Asked    Hearing Aids Not Asked    Body Piercing Not Asked    Radiation Seeds Not Asked    Pregnant or Breast Feeding Not Asked    Wounded by Shrapnel or Bullet Not Asked    Other-See Comment Not Asked    Other Implant-See Comment Not Asked   [employer] Not Asked    Blood Transfusions Not Asked    Caffeine Concern Not Asked    Occupational Exposure Not Asked    Hobby Hazards Not Asked    Sleep Concern Not Asked    Stress Concern Not Asked    Weight Concern Not Asked    Special Diet Not Asked    Back Care Not Asked    Exercise Not Asked    Bike Helmet Not Asked    [address] Not Asked    Self-Exams Not Asked   Social History Narrative    Not on file         ALLERGIES: Celecoxib; Ibuprofen; Lescol [fluvastatin];  Nsaids (non-steroidal anti-inflammatory drug); Sulfa (sulfonamide antibiotics); and Uloric [febuxostat]    Review of Systems   Constitutional: Negative for chills, diaphoresis and fever. HENT: Negative for congestion, sneezing and sore throat. Eyes: Negative for visual disturbance. Respiratory: Negative for cough, chest tightness, shortness of breath and wheezing. Cardiovascular: Negative for chest pain and leg swelling. Gastrointestinal: Positive for abdominal pain, nausea and vomiting. Negative for blood in stool and diarrhea. Endocrine: Negative for polyuria. Genitourinary: Negative for difficulty urinating, dysuria, flank pain, hematuria and urgency. Musculoskeletal: Negative for back pain, myalgias, neck pain and neck stiffness. Skin: Negative for color change and rash. Neurological: Negative for dizziness, syncope, speech difficulty, weakness, light-headedness, numbness and headaches. Psychiatric/Behavioral: Negative for behavioral problems. All other systems reviewed and are negative. Vitals:    10/21/19 0708   BP: 150/84   Pulse: 99   Resp: 16   Temp: 98.4 °F (36.9 °C)   SpO2: 99%   Weight: 81.6 kg (180 lb)   Height: 5' 11\" (1.803 m)            Physical Exam   Constitutional: He is oriented to person, place, and time. He appears well-developed and well-nourished. No distress. Alert and oriented to person place and time. No acute distress, speaks in clear, fluid sentences. HENT:   Head: Normocephalic and atraumatic. Right Ear: External ear normal.   Left Ear: External ear normal.   Nose: Nose normal.   Eyes: Pupils are equal, round, and reactive to light. EOM are normal.   Neck: Normal range of motion. Cardiovascular: Normal rate, regular rhythm, normal heart sounds and intact distal pulses. Exam reveals no gallop and no friction rub. No murmur heard. Pulmonary/Chest: Effort normal and breath sounds normal. No stridor. No respiratory distress.  He has no decreased breath sounds. He has no wheezes. He has no rhonchi. He has no rales. He exhibits no tenderness. Abdominal: Soft. He exhibits no distension and no mass. There is no tenderness. There is no rebound and no guarding. No hernia. Multiple surgical scars overlie the abdomen which is tender throughout on light palpation. Bowel sounds decreased. Musculoskeletal: Normal range of motion. He exhibits no edema, tenderness or deformity. Neurological: He is alert and oriented to person, place, and time. No cranial nerve deficit. Skin: Skin is warm and dry. He is not diaphoretic. Nursing note and vitals reviewed. MDM  Number of Diagnoses or Management Options  Diagnosis management comments: Differential diagnosis includes diverticulitis, colitis, bowel obstruction, gastroenteritis. Will obtain basic labs, EKG and troponin given patient's history of cardiac disease. CT scan ordered as well. Lactic acid slightly elevated, no other findings to suggest underlying sepsis. Patient was however slightly tachycardic on arrival, this is improved after pain medicine and a small fluid bolus. EKG shows normal sinus rhythm without evidence of ischemia. Chest x-ray clear.   Troponin within normal limits  Remainder of laboratory evaluation is normal.       Amount and/or Complexity of Data Reviewed  Clinical lab tests: ordered and reviewed  Tests in the radiology section of CPT®: ordered and reviewed  Tests in the medicine section of CPT®: ordered and reviewed  Independent visualization of images, tracings, or specimens: yes    Risk of Complications, Morbidity, and/or Mortality  Presenting problems: moderate  Diagnostic procedures: low  Management options: moderate    Patient Progress  Patient progress: stable         Procedures

## 2019-10-21 NOTE — PROGRESS NOTES
Problem: Small Bowel Obstruction: Day 1  Goal: Activity/Safety  Outcome: Progressing Towards Goal  Goal: Diagnostic Test/Procedures  Outcome: Progressing Towards Goal  Goal: Nutrition/Diet  Outcome: Not Progressing Towards Goal  Note:   Pt NPO      Goal: Medications  Outcome: Progressing Towards Goal  Goal: Respiratory  Outcome: Progressing Towards Goal  Goal: Treatments/Interventions/Procedures  Outcome: Progressing Towards Goal  Goal: Psychosocial  Outcome: Progressing Towards Goal  Goal: *Optimal pain control at patient's stated goal  Outcome: Progressing Towards Goal  Goal: *Adequate urinary output (equal to or greater than 30 milliliters/hour)  Outcome: Progressing Towards Goal  Goal: *Hemodynamically stable  Outcome: Progressing Towards Goal  Goal: *Demonstrates progressive activity  Outcome: Progressing Towards Goal  Goal: *Absence of nausea/vomiting  Outcome: Progressing Towards Goal

## 2019-10-21 NOTE — PROGRESS NOTES
Received pt from ER in stable condition. Pt in bed with wife at bedside. Alert & oriented. Resp even & unlabored on room air; lungs clear. HR regular. Abdomen distended, semi-soft & slightly tender with active bowel sounds. NG tube to right nare in place with LIS. No c/o pain or nausea at this time. Oriented to room, call light; bed low & locked; no needs voiced.

## 2019-10-21 NOTE — ED TRIAGE NOTES
Pt states n/v since this morning. Had normal BM yesterday. He currently has tenderness with palpation of abdomen.

## 2019-10-22 ENCOUNTER — APPOINTMENT (OUTPATIENT)
Dept: GENERAL RADIOLOGY | Age: 74
DRG: 388 | End: 2019-10-22
Attending: INTERNAL MEDICINE
Payer: MEDICARE

## 2019-10-22 LAB
ANION GAP SERPL CALC-SCNC: 4 MMOL/L (ref 7–16)
BASOPHILS # BLD: 0 K/UL (ref 0–0.2)
BASOPHILS NFR BLD: 1 % (ref 0–2)
BUN SERPL-MCNC: 15 MG/DL (ref 8–23)
CALCIUM SERPL-MCNC: 9.8 MG/DL (ref 8.3–10.4)
CHLORIDE SERPL-SCNC: 110 MMOL/L (ref 98–107)
CO2 SERPL-SCNC: 30 MMOL/L (ref 21–32)
CREAT SERPL-MCNC: 1.19 MG/DL (ref 0.8–1.5)
DIFFERENTIAL METHOD BLD: ABNORMAL
EOSINOPHIL # BLD: 0 K/UL (ref 0–0.8)
EOSINOPHIL NFR BLD: 1 % (ref 0.5–7.8)
ERYTHROCYTE [DISTWIDTH] IN BLOOD BY AUTOMATED COUNT: 14 % (ref 11.9–14.6)
GLUCOSE BLD STRIP.AUTO-MCNC: 107 MG/DL (ref 65–100)
GLUCOSE BLD STRIP.AUTO-MCNC: 113 MG/DL (ref 65–100)
GLUCOSE BLD STRIP.AUTO-MCNC: 136 MG/DL (ref 65–100)
GLUCOSE BLD STRIP.AUTO-MCNC: 99 MG/DL (ref 65–100)
GLUCOSE SERPL-MCNC: 133 MG/DL (ref 65–100)
HCT VFR BLD AUTO: 38.2 % (ref 41.1–50.3)
HCT VFR BLD AUTO: 39.6 % (ref 41.1–50.3)
HCT VFR BLD AUTO: 42.6 % (ref 41.1–50.3)
HGB BLD-MCNC: 12.7 G/DL (ref 13.6–17.2)
HGB BLD-MCNC: 13.2 G/DL (ref 13.6–17.2)
HGB BLD-MCNC: 15.1 G/DL (ref 13.6–17.2)
IMM GRANULOCYTES # BLD AUTO: 0 K/UL (ref 0–0.5)
IMM GRANULOCYTES NFR BLD AUTO: 0 % (ref 0–5)
LYMPHOCYTES # BLD: 0.6 K/UL (ref 0.5–4.6)
LYMPHOCYTES NFR BLD: 17 % (ref 13–44)
MAGNESIUM SERPL-MCNC: 2 MG/DL (ref 1.8–2.4)
MCH RBC QN AUTO: 30.3 PG (ref 26.1–32.9)
MCHC RBC AUTO-ENTMCNC: 33.3 G/DL (ref 31.4–35)
MCV RBC AUTO: 91 FL (ref 79.6–97.8)
MONOCYTES # BLD: 0.8 K/UL (ref 0.1–1.3)
MONOCYTES NFR BLD: 22 % (ref 4–12)
NEUTS SEG # BLD: 2.2 K/UL (ref 1.7–8.2)
NEUTS SEG NFR BLD: 59 % (ref 43–78)
NRBC # BLD: 0 K/UL (ref 0–0.2)
PLATELET # BLD AUTO: 170 K/UL
PMV BLD AUTO: 11.6 FL (ref 9.4–12.3)
POTASSIUM SERPL-SCNC: 3.7 MMOL/L (ref 3.5–5.1)
RBC # BLD AUTO: 4.35 M/UL
SODIUM SERPL-SCNC: 144 MMOL/L (ref 136–145)
WBC # BLD AUTO: 3.7 K/UL

## 2019-10-22 PROCEDURE — 85018 HEMOGLOBIN: CPT

## 2019-10-22 PROCEDURE — 74011000250 HC RX REV CODE- 250: Performed by: INTERNAL MEDICINE

## 2019-10-22 PROCEDURE — 83735 ASSAY OF MAGNESIUM: CPT

## 2019-10-22 PROCEDURE — 36415 COLL VENOUS BLD VENIPUNCTURE: CPT

## 2019-10-22 PROCEDURE — 74019 RADEX ABDOMEN 2 VIEWS: CPT

## 2019-10-22 PROCEDURE — 74011250636 HC RX REV CODE- 250/636: Performed by: INTERNAL MEDICINE

## 2019-10-22 PROCEDURE — 85025 COMPLETE CBC W/AUTO DIFF WBC: CPT

## 2019-10-22 PROCEDURE — 77030020263 HC SOL INJ SOD CL0.9% LFCR 1000ML

## 2019-10-22 PROCEDURE — 65270000029 HC RM PRIVATE

## 2019-10-22 PROCEDURE — 80048 BASIC METABOLIC PNL TOTAL CA: CPT

## 2019-10-22 PROCEDURE — C9113 INJ PANTOPRAZOLE SODIUM, VIA: HCPCS | Performed by: INTERNAL MEDICINE

## 2019-10-22 PROCEDURE — 82962 GLUCOSE BLOOD TEST: CPT

## 2019-10-22 RX ORDER — INSULIN LISPRO 100 [IU]/ML
INJECTION, SOLUTION INTRAVENOUS; SUBCUTANEOUS
Status: DISCONTINUED | OUTPATIENT
Start: 2019-10-22 | End: 2019-10-24 | Stop reason: HOSPADM

## 2019-10-22 RX ORDER — HYDRALAZINE HYDROCHLORIDE 20 MG/ML
10 INJECTION INTRAMUSCULAR; INTRAVENOUS
Status: DISCONTINUED | OUTPATIENT
Start: 2019-10-22 | End: 2019-10-24 | Stop reason: HOSPADM

## 2019-10-22 RX ADMIN — SODIUM CHLORIDE 80 MG: 9 INJECTION, SOLUTION INTRAMUSCULAR; INTRAVENOUS; SUBCUTANEOUS at 11:26

## 2019-10-22 RX ADMIN — Medication 5 ML: at 05:35

## 2019-10-22 RX ADMIN — FAMOTIDINE 20 MG: 10 INJECTION, SOLUTION INTRAVENOUS at 08:50

## 2019-10-22 RX ADMIN — SODIUM CHLORIDE 100 ML/HR: 900 INJECTION, SOLUTION INTRAVENOUS at 13:02

## 2019-10-22 RX ADMIN — SODIUM CHLORIDE 100 ML/HR: 900 INJECTION, SOLUTION INTRAVENOUS at 22:22

## 2019-10-22 RX ADMIN — HEPARIN SODIUM 5000 UNITS: 5000 INJECTION INTRAVENOUS; SUBCUTANEOUS at 04:28

## 2019-10-22 RX ADMIN — SODIUM CHLORIDE 40 MG: 9 INJECTION, SOLUTION INTRAMUSCULAR; INTRAVENOUS; SUBCUTANEOUS at 22:23

## 2019-10-22 RX ADMIN — Medication 5 ML: at 22:27

## 2019-10-22 NOTE — PROGRESS NOTES
Shift assessment completed via doc flow sheet. Refer for details. Pt is alert and oriented x 4.  RR even and unlabored. Pt has an NGT to rt nare on low intermittent suction and draining brown fluids. Pt denies c/o pain at this time. Bed in low and locked position. Family at bedside. Call light within reach and pt is aware to call for assistance.

## 2019-10-22 NOTE — CONSULTS
[facility] Consult Note           Referring Provider:  Holly Meadows MD    Consult Date:  10/22/2019    Admit Date:  10/21/2019    Chief Complaint:  GIB    Subjective:     History of Present Illness:  Patient is a 69 y.o. male with PMH of recurrent SBO, who is seen in consultation at the request of Dr. Jasmin Guillaume for GI Bleeding. He has a history of recurrent SBO. He was admitted last pm secondary to 2d history of N/V. He was started on NG decompression. He was noted to have BRB in his suction tubing this am.  Currently there is brown CG material in suction tubing. There is a remote Hx PUD.     PMH:  Past Medical History:   Diagnosis Date    Allergic rhinitis     Arthritis     CAD (coronary artery disease)     CABG '09; troponin elevated 7/14/12 (\"likely due to supply/demand mismatch in setting of fever and increased metabolic demand\"-per cardiac MD note 8/20/12)-had cath, echo, EKG-all WNL except cath showed 2 of the bypasses with blockages- \"occluded SVG to PDA & SVG to LISA\"; EF=55%    Chronic kidney disease     CVA (cerebral vascular accident) ([address] 08/2017    Left- no residual weakness    Diabetes mellitus type 2, controlled ([address]     restarted oral med (metformin 3/2018), does not check BG on regular basis, last hgba1c- 8 (3/12/18)    Dyslipidemia     ED (erectile dysfunction)     Encephalitis 1962    x 2/hospitalized as teenager    GERD (gastroesophageal reflux disease)     controlled with Nexium    Gout     hx of    Hypertension     Lacunar infarct, acute ([address]     possible embolic, remote a-fib- on eliquis    Lumbago     Memory loss     Mitral valve regurgitation 7/14/12    \"moderate\" on echo    ROBERTO (obstructive sleep apnea)     mild per patient, does not use CPAP    PAF (paroxysmal atrial fibrillation) ([address]     Prostate cancer ([address]     followed by urology    Psoriasis     topical creams    SBO (small bowel obstruction) ([address] 2011, 2015, 2016    Stage 2 chronic kidney disease PSH:  Past Surgical History:   Procedure Laterality Date    ABDOMEN SURGERY PROC UNLISTED  6772    exp lap    HX APPENDECTOMY  age 49         HX CATARACT REMOVAL Bilateral 2013    iol     HX COLONOSCOPY  1998, 2010    HX CORONARY ARTERY BYPASS GRAFT  2009    x4 bypass    HX HERNIA REPAIR Bilateral 2012    HX RADICAL PROSTATECTOMY       HX SHOULDER REPLACEMENT Right 2018    Reverse R TSA    HX SPLENECTOMY  1951    FL LEFT HEART CATH,PERCUTANEOUS  7/2012    no intervention    FL PROSTATE BIOPSY, NEEDLE, SATURATION SAMPLING      and ultrasound    VASCULAR SURGERY PROCEDURE UNLIST  12/29/2017    aortogram, w/cass LE runoff,R-LE arteriogram       Allergies: Allergies   Allergen Reactions    Celecoxib Swelling     Hands    Ibuprofen Unknown (comments)     Patient unsure    Lescol [Fluvastatin] Unknown (comments)     Other reaction(s): Unknown (comments)    Nsaids (Non-Steroidal Anti-Inflammatory Drug) Other (comments)     Elevated serum creatinine    Sulfa (Sulfonamide Antibiotics) Rash    Uloric [Febuxostat] Other (comments)     Joint Pain       Home Medications:  Prior to Admission medications    Medication Sig Start Date End Date Taking? Authorizing Provider   isosorbide mononitrate ER (IMDUR) 60 mg CR tablet Take 1 Tab by mouth daily. 10/2/19   Suad Meyers MD   acetaminophen (TYLENOL) 325 mg tablet Take 2 Tabs by mouth every four (4) hours as needed for Pain. 9/26/19   Josefa Counts, NP   rosuvastatin (CRESTOR) 20 mg tablet Take 1 Tab by mouth nightly. 9/26/19   Josefa Counts, NP   lisinopril (PRINIVIL, ZESTRIL) 20 mg tablet Take 1 Tab by mouth daily. 9/26/19   Josefa Counts, NP   aspirin 81 mg chewable tablet Take 1 Tab by mouth daily. 9/26/19   Josefa Counts, NP   clopidogrel (PLAVIX) 75 mg tab Take 1 Tab by mouth daily. 9/26/19   Josefa Counts, NP   Omeprazole delayed release (PRILOSEC D/R) 20 mg tablet Take 1 Tab by mouth daily.  9/26/19   Josefa Counts, NP   nitroglycerin (NITROSTAT) 0.4 mg SL tablet 1 Tab by SubLINGual route every five (5) minutes as needed for Chest Pain. 9/26/19   Sebastian Ambrocio NP   amLODIPine (NORVASC) 10 mg tablet Take 1 Tab by mouth daily. 9/26/19   Sebastian Ambrocio NP   carvedilol (COREG) 12.5 mg tablet Take 1 Tab by mouth two (2) times daily (with meals). 9/26/19   Sebastian Ambrocio NP   sitagliptin phosphate (JANUVIA PO) Take  by mouth. Provider, Historical   allopurinol (ZYLOPRIM) 300 mg tablet Take 1 Tab by mouth daily. 10/18/18   Jose Serrato MD   gabapentin (NEURONTIN) 300 mg capsule Take 1 Cap by mouth four (4) times daily. Patient taking differently: Take 300 mg by mouth three (3) times daily. 10/18/18   Jose Serrato MD   glucose blood VI test strips (BLOOD GLUCOSE TEST) strip Test once to twice daily DX: E11.8 7/6/17   Jose Serrato MD   Lancets misc Test once to twice daily DX: E11.8 1/12/17   Jose Serrato MD   cholecalciferol, vitamin D3, (VITAMIN D3) 2,000 unit Tab Take 2,000 Units by mouth daily. Indications: OSTEOPOROSIS, am    Provider, Historical   Cetirizine (ZYRTEC) 10 mg Cap Take 10 mg by mouth nightly.  As needed  Indications: inflammation of the nose due to an allergy    Provider, Historical       Hospital Medications:  Current Facility-Administered Medications   Medication Dose Route Frequency    insulin lispro (HUMALOG) injection   SubCUTAneous TIDAC    pantoprazole (PROTONIX) 40 mg in sodium chloride 0.9% 10 mL injection  40 mg IntraVENous Q12H    sodium chloride (NS) flush 5-40 mL  5-40 mL IntraVENous Q8H    sodium chloride (NS) flush 5-40 mL  5-40 mL IntraVENous PRN    naloxone (NARCAN) injection 0.4 mg  0.4 mg IntraVENous PRN    ondansetron (ZOFRAN) injection 4 mg  4 mg IntraVENous Q4H PRN    dextrose 40% (GLUTOSE) oral gel 1 Tube  15 g Oral PRN    glucagon (GLUCAGEN) injection 1 mg  1 mg IntraMUSCular PRN    dextrose (D50W) injection syrg 12.5-25 g  25-50 mL IntraVENous PRN    0.9% sodium chloride infusion  100 mL/hr IntraVENous CONTINUOUS    morphine injection 2-4 mg  2-4 mg IntraVENous Q4H PRN    hydrALAZINE (APRESOLINE) 20 mg/mL injection 20 mg  20 mg IntraVENous Q6H PRN    influenza vaccine - (6 mos+)(PF) (FLUARIX/FLULAVAL/FLUZONE QUAD) injection 0.5 mL  0.5 mL IntraMUSCular PRIOR TO DISCHARGE       Social History:  Social History     Tobacco Use    Smoking status: Former Smoker     Packs/day: 1.00     Years: 15.00     Pack years: 15.00     Last attempt to quit: 1975     Years since quittin.3    Smokeless tobacco: Never Used   Substance Use Topics    Alcohol use: Yes     Comment: rarely- once/month       Pt denies any history of drug use, blood transfusions, or tattoos. Family History:  Family History   Problem Relation Age of Onset    Hypertension Mother    [address] [facility] Gout Mother    [address] [facility] Arthritis-osteo Mother     Heart Disease Mother          of Heart Disease    Coronary Artery Disease Mother     Diabetes Father     Cancer Father     Heart Disease Father     Bleeding Prob Paternal Grandmother     Hypertension Brother     Cancer Maternal Uncle         Prostate       Review of Systems:  A detailed 10 system ROS is obtained, with pertinent positives as listed above. All others are negative. Diet:      Objective:     Physical Exam:  Vitals:  Visit Vitals  /90 (BP 1 Location: Right arm, BP Patient Position: At rest;Supine)   Pulse 97   Temp 98.6 °F (37 °C)   Resp 20   Ht 5' 11\" (1.803 m)   Wt 81.6 kg (180 lb)   SpO2 97%   BMI 25.10 kg/m²     Gen:  Pt is alert, cooperative, no acute distress  Skin:  Extremities and face reveal no rashes. HEENT: Sclerae anicteric. Extra-occular muscles are intact. No oral ulcers. No abnormal pigmentation of the lips. The neck is supple. Cardiovascular: Regular rate and rhythm. No murmurs, gallops, or rubs. Respiratory:  Comfortable breathing with no accessory muscle use.  Clear breath sounds anteriorly with no wheezes, rales, or rhonchi. GI:  Abdomen mildly distended, soft, and nontender. Normal active bowel sounds. No enlargement of the liver or spleen. No masses palpable. Rectal:  Deferred  Musculoskeletal:  No pitting edema of the lower legs. Neurological:  Gross memory appears intact. Patient is alert and oriented. Psychiatric:  Mood appears appropriate with judgement intact. Lymphatic:  No cervical or supraclavicular adenopathy. Laboratory:    Recent Labs     10/22/19  1137 10/22/19  0639 10/21/19  0731   WBC  --  3.7 6.1   HGB 12.7* 13.2* 13.9   HCT 38.2* 39.6* 41.5   PLT  --  170 199   MCV  --  91.0 89.4   NA  --  144 137   K  --  3.7 4.5   CL  --  110* 104   CO2  --  30 25   BUN  --  15 15   CREA  --  1.19 1.49   CA  --  9.8 10.3   MG  --  2.0  --    GLU  --  133* 216*   AP  --   --  93   SGOT  --   --  35   ALT  --   --  21   TBILI  --   --  0.8   ALB  --   --  3.9   TP  --   --  8.6*   LPSE  --   --  131          Assessment:     Principal Problem:    SBO (small bowel obstruction) ([facility] (10/21/2019)        Plan:   Hemodynamically Insignificant GI bleed-  Likely secondary to NG trauma. We will utilize IV PPi until he is able to take po.   Serial Hgb

## 2019-10-22 NOTE — PROGRESS NOTES
Spoke with Dr. Evie Birmingham about brownish colored fluid in NG container. 800ml from last night in container. Changed the container and upper tubing. No new bloody fluid at this time. Dr. Evie Birmingham stated that if there was the presence of bloody fluids to cut suction off for an hour and then to restart suction.

## 2019-10-22 NOTE — PROGRESS NOTES
Interdisciplinary team rounds were held 10/22/2019 with the following team members:Care Management, Nursing, Nutrition, Pharmacy, Physical Therapy and Physician. Plan of care discussed. See clinical pathway and/or care plan for interventions and desired outcomes.

## 2019-10-22 NOTE — PROGRESS NOTES
Shift Assessment - patient is alert and oriented x4. No complaint of pain at this time. NG tube output is very brown/coffee ground looking. MD notified. Resp even and unlabored. NGT shanika to low intermittent suction. Currently resting in a low, locked bed. Call light in reach.

## 2019-10-22 NOTE — PROGRESS NOTES
Progress Note    Patient: Marilu Simmonds MRN: [id number]  SSN: [id number]    YOB: 1945  Age: 69 y.o. Sex: male      Admit Date: 10/21/2019    LOS: 1 day     Subjective:   He was found in mild distress due to abdominal pain. This morning he had around 800 ml of coffee ground fluid over canister mixed with bright red blood. The patient has been hemodynamically stable. HH stable so far. He does have a prior history of PUD. Objective:     Vitals:    10/21/19 1748 10/21/19 2021 10/21/19 2351 10/22/19 0315   BP: (!) 151/100 (!) 164/96 (!) 158/96 (!) 136/94   Pulse: 97 80 100 (!) 103   Resp: 18 18 18 18   Temp: 99.1 °F (37.3 °C) 98.5 °F (36.9 °C) 99.3 °F (37.4 °C) 98.4 °F (36.9 °C)   SpO2: 95% 98% 94% 99%   Weight:       Height:            Intake and Output:  Current Shift: No intake/output data recorded. Last three shifts: 10/20 1901 - 10/22 0700  In: 640 [I.V.:640]  Out: 2775 [Urine:1775]    Physical Exam:   GENERAL: alert, cooperative, no distress, appears stated age  EYE: negative  LYMPHATIC: Cervical, supraclavicular, and axillary nodes normal.   THROAT & NECK: normal and no erythema or exudates noted. NGT: coffee ground emesis   LUNG: clear to auscultation bilaterally  HEART: regular rate and rhythm, S1, S2 normal, no murmur, click, rub or gallop  ABDOMEN: soft, non-tender. Bowel sounds normal. No masses,  no organomegaly  EXTREMITIES:  extremities normal, atraumatic, no cyanosis or edema  SKIN: Normal.  NEUROLOGIC: negative  PSYCHIATRIC: non focal    Lab/Data Review: All lab results for the last 24 hours reviewed. Assessment:     Principal Problem:    SBO (small bowel obstruction) ([address] (10/21/2019)        Plan:   -SBO:  Hx multiple abdominal surgeries   Mild distension. Mild abdominal pain.  No nausea, no vomiting  Foul smelling breath  Continue NGT   KUB today showed no dilated loops  Daily chemistry  KUB in am. If stable may be able to try clear liquids   No surgery eval at the moment     -UGIB: possible related to NG trauma / gastritis / PUD  Normal hemodynamics  Started on iv ppi this morning, continue  IVFs  Serial HH  Transfuse prn   GI on board     -CAD  -HLD  -Recent NSTEMI 9/19 with angioplasty (no stents at that time). Last BOGDAN 3/19.  holding antiplatelets due to bleeding      -Severe PAD: hold antiplatelets      -DM: HISS      -Chronic systolic heart failure: holding meds     -CKD stage 3: stable  Avoid nephrotoxic meds         DVT ppx: compression stockings.  Discontinue heparin    Code status: full code, wife Hardeep Guadarrama is surrogate decision maker    Disposition: home once medically ready     Signed By: Javier Farmer MD     October 22, 2019

## 2019-10-23 ENCOUNTER — HOSPITAL ENCOUNTER (OUTPATIENT)
Dept: CARDIAC REHAB | Age: 74
End: 2019-10-23
Payer: MEDICARE

## 2019-10-23 PROBLEM — K92.2 GI BLEEDING: Status: ACTIVE | Noted: 2019-10-23

## 2019-10-23 LAB
ANION GAP SERPL CALC-SCNC: 7 MMOL/L (ref 7–16)
BUN SERPL-MCNC: 17 MG/DL (ref 8–23)
CALCIUM SERPL-MCNC: 9.8 MG/DL (ref 8.3–10.4)
CHLORIDE SERPL-SCNC: 110 MMOL/L (ref 98–107)
CO2 SERPL-SCNC: 28 MMOL/L (ref 21–32)
CREAT SERPL-MCNC: 1.19 MG/DL (ref 0.8–1.5)
GLUCOSE BLD STRIP.AUTO-MCNC: 101 MG/DL (ref 65–100)
GLUCOSE BLD STRIP.AUTO-MCNC: 113 MG/DL (ref 65–100)
GLUCOSE BLD STRIP.AUTO-MCNC: 171 MG/DL (ref 65–100)
GLUCOSE SERPL-MCNC: 110 MG/DL (ref 65–100)
HCT VFR BLD AUTO: 37.1 % (ref 41.1–50.3)
HCT VFR BLD AUTO: 38.3 % (ref 41.1–50.3)
HCT VFR BLD AUTO: 39.6 % (ref 41.1–50.3)
HGB BLD-MCNC: 12.4 G/DL (ref 13.6–17.2)
HGB BLD-MCNC: 12.9 G/DL (ref 13.6–17.2)
HGB BLD-MCNC: 13 G/DL (ref 13.6–17.2)
POTASSIUM SERPL-SCNC: 3.2 MMOL/L (ref 3.5–5.1)
SODIUM SERPL-SCNC: 145 MMOL/L (ref 136–145)

## 2019-10-23 PROCEDURE — 74011000250 HC RX REV CODE- 250: Performed by: INTERNAL MEDICINE

## 2019-10-23 PROCEDURE — 85018 HEMOGLOBIN: CPT

## 2019-10-23 PROCEDURE — C9113 INJ PANTOPRAZOLE SODIUM, VIA: HCPCS | Performed by: INTERNAL MEDICINE

## 2019-10-23 PROCEDURE — 82962 GLUCOSE BLOOD TEST: CPT

## 2019-10-23 PROCEDURE — 74011636637 HC RX REV CODE- 636/637: Performed by: INTERNAL MEDICINE

## 2019-10-23 PROCEDURE — 36415 COLL VENOUS BLD VENIPUNCTURE: CPT

## 2019-10-23 PROCEDURE — 77030020263 HC SOL INJ SOD CL0.9% LFCR 1000ML

## 2019-10-23 PROCEDURE — 74011250636 HC RX REV CODE- 250/636: Performed by: INTERNAL MEDICINE

## 2019-10-23 PROCEDURE — 74011250637 HC RX REV CODE- 250/637: Performed by: INTERNAL MEDICINE

## 2019-10-23 PROCEDURE — 65270000029 HC RM PRIVATE

## 2019-10-23 PROCEDURE — 80048 BASIC METABOLIC PNL TOTAL CA: CPT

## 2019-10-23 RX ORDER — CARVEDILOL 6.25 MG/1
12.5 TABLET ORAL 2 TIMES DAILY WITH MEALS
Status: DISCONTINUED | OUTPATIENT
Start: 2019-10-23 | End: 2019-10-24 | Stop reason: HOSPADM

## 2019-10-23 RX ORDER — CARVEDILOL 6.25 MG/1
12.5 TABLET ORAL 2 TIMES DAILY WITH MEALS
Status: DISCONTINUED | OUTPATIENT
Start: 2019-10-23 | End: 2019-10-23

## 2019-10-23 RX ORDER — PANTOPRAZOLE SODIUM 40 MG/1
40 TABLET, DELAYED RELEASE ORAL
Status: DISCONTINUED | OUTPATIENT
Start: 2019-10-23 | End: 2019-10-24 | Stop reason: HOSPADM

## 2019-10-23 RX ORDER — LISINOPRIL 20 MG/1
20 TABLET ORAL DAILY
Status: DISCONTINUED | OUTPATIENT
Start: 2019-10-23 | End: 2019-10-24 | Stop reason: HOSPADM

## 2019-10-23 RX ORDER — POTASSIUM CHLORIDE 14.9 MG/ML
20 INJECTION INTRAVENOUS ONCE
Status: COMPLETED | OUTPATIENT
Start: 2019-10-23 | End: 2019-10-23

## 2019-10-23 RX ORDER — ROSUVASTATIN CALCIUM 20 MG/1
20 TABLET, COATED ORAL
Status: DISCONTINUED | OUTPATIENT
Start: 2019-10-23 | End: 2019-10-24 | Stop reason: HOSPADM

## 2019-10-23 RX ORDER — NITROGLYCERIN 0.4 MG/1
0.4 TABLET SUBLINGUAL
Status: DISCONTINUED | OUTPATIENT
Start: 2019-10-23 | End: 2019-10-24 | Stop reason: HOSPADM

## 2019-10-23 RX ORDER — LISINOPRIL 20 MG/1
20 TABLET ORAL DAILY
Status: DISCONTINUED | OUTPATIENT
Start: 2019-10-24 | End: 2019-10-23

## 2019-10-23 RX ADMIN — INSULIN LISPRO 2 UNITS: 100 INJECTION, SOLUTION INTRAVENOUS; SUBCUTANEOUS at 17:52

## 2019-10-23 RX ADMIN — ROSUVASTATIN CALCIUM 20 MG: 20 TABLET, FILM COATED ORAL at 21:22

## 2019-10-23 RX ADMIN — PANTOPRAZOLE SODIUM 40 MG: 40 TABLET, DELAYED RELEASE ORAL at 23:35

## 2019-10-23 RX ADMIN — Medication 10 ML: at 21:24

## 2019-10-23 RX ADMIN — LISINOPRIL 20 MG: 20 TABLET ORAL at 14:35

## 2019-10-23 RX ADMIN — POTASSIUM CHLORIDE 20 MEQ: 14.9 INJECTION, SOLUTION INTRAVENOUS at 08:26

## 2019-10-23 RX ADMIN — SODIUM CHLORIDE 40 MG: 9 INJECTION, SOLUTION INTRAMUSCULAR; INTRAVENOUS; SUBCUTANEOUS at 11:51

## 2019-10-23 RX ADMIN — CARVEDILOL 12.5 MG: 6.25 TABLET, FILM COATED ORAL at 14:35

## 2019-10-23 NOTE — PROGRESS NOTES
Shift assessment completed via doc flow sheet. Refer for details. Pt is alert and oriented x 4. No acute distress noted at this time. Pt has an NGT to low intermittent suction draining light brown fluids. Pt denies pain and other needs at this time. Bed in low and locked position. Call light within reach. Pt is instructed to call for assistance.

## 2019-10-23 NOTE — PROGRESS NOTES
[facility] Progress Note         Admit Date:  10/21/2019    Today's Date:  10/23/2019    CC:  GI bleed    Subjective:     Patient feels well minimal CG in NG aspirate. NGT discontinued, tolerating diet    Medications:   Current Facility-Administered Medications   Medication Dose Route Frequency    pantoprazole (PROTONIX) tablet 40 mg  40 mg Oral ACB&D    rosuvastatin (CRESTOR) tablet 20 mg  20 mg Oral QHS    nitroglycerin (NITROSTAT) tablet 0.4 mg  0.4 mg SubLINGual Q5MIN PRN    carvedilol (COREG) tablet 12.5 mg  12.5 mg Oral BID WITH MEALS    lisinopril (PRINIVIL, ZESTRIL) tablet 20 mg  20 mg Oral DAILY    insulin lispro (HUMALOG) injection   SubCUTAneous TIDAC    hydrALAZINE (APRESOLINE) 20 mg/mL injection 10 mg  10 mg IntraVENous Q6H PRN    sodium chloride (NS) flush 5-40 mL  5-40 mL IntraVENous Q8H    sodium chloride (NS) flush 5-40 mL  5-40 mL IntraVENous PRN    naloxone (NARCAN) injection 0.4 mg  0.4 mg IntraVENous PRN    ondansetron (ZOFRAN) injection 4 mg  4 mg IntraVENous Q4H PRN    dextrose 40% (GLUTOSE) oral gel 1 Tube  15 g Oral PRN    glucagon (GLUCAGEN) injection 1 mg  1 mg IntraMUSCular PRN    dextrose (D50W) injection syrg 12.5-25 g  25-50 mL IntraVENous PRN    morphine injection 2-4 mg  2-4 mg IntraVENous Q4H PRN    influenza vaccine 2019-20 (6 mos+)(PF) (FLUARIX/FLULAVAL/FLUZONE QUAD) injection 0.5 mL  0.5 mL IntraMUSCular PRIOR TO DISCHARGE       Review of Systems:  ROS was obtained, with pertinent positives as listed above. No chest pain or SOB.     Diet:      Objective:   Vitals:  Visit Vitals  /70 (BP 1 Location: Right arm, BP Patient Position: At rest;Supine)   Pulse 88   Temp 98.8 °F (37.1 °C)   Resp 16   Ht 5' 11\" (1.803 m)   Wt 81.6 kg (180 lb)   SpO2 99%   BMI 25.10 kg/m²     Intake/Output:  10/23 0701 - 10/23 1900  In: -   Out: 1000 [Urine:1000]  10/21 1901 - 10/23 0700  In: -   Out: 2900 [Urine:1650]  Exam:  General appearance: alert, cooperative, no distress  Lungs: clear to auscultation bilaterally anteriorly  Heart: regular rate and rhythm  Abdomen: soft, non-tender. Bowel sounds normal. No masses, no organomegaly  Extremities: extremities normal, atraumatic, no cyanosis or edema  Neuro:  alert and oriented    Data Review (Labs):    Recent Labs     10/23/19  1158 10/23/19  0458 10/23/19  0050 10/22/19  1756 10/22/19  1137 10/22/19  0639 10/21/19  0731   WBC  --   --   --   --   --  3.7 6.1   HGB 13.0* 12.9* 12.4* 15.1 12.7* 13.2* 13.9   HCT 39.6* 38.3* 37.1* 42.6 38.2* 39.6* 41.5   PLT  --   --   --   --   --  170 199   MCV  --   --   --   --   --  91.0 89.4   NA  --  145  --   --   --  144 137   K  --  3.2*  --   --   --  3.7 4.5   CL  --  110*  --   --   --  110* 104   CO2  --  28  --   --   --  30 25   BUN  --  17  --   --   --  15 15   CREA  --  1.19  --   --   --  1.19 1.49   CA  --  9.8  --   --   --  9.8 10.3   MG  --   --   --   --   --  2.0  --    GLU  --  110*  --   --   --  133* 216*   AP  --   --   --   --   --   --  93   SGOT  --   --   --   --   --   --  35   ALT  --   --   --   --   --   --  21   TBILI  --   --   --   --   --   --  0.8   ALB  --   --   --   --   --   --  3.9   TP  --   --   --   --   --   --  8.6*   LPSE  --   --   --   --   --   --  131       Assessment:     Principal Problem:    SBO (small bowel obstruction) ([facility]) (10/21/2019)    Active Problems:    S/P CABG (coronary artery bypass graft) (3/10/2009)      Overview: taken off statins after an episode of idiopathic jaundice, lipids followed       by PCP       3/9/09: LIMA to LAD, SVGs to OM1, OM2 and PDA. Keloid former, required       dermatologist care      Cath 7/17/12: Occluded SVG to PDA, Occluded SVG to OM1. Patent LIMA       feeding a small vessel. Patent SVG to OM2, small target vessel. Native       Cx small in the AV groove, diffusely small vessels.        Sep 2015 -  Stress MPI for chest discomfort - 6 and a half minutes, chest       discomfort (declined NTG), non diagnostic EKG changes, normal perfusion       and LVEF. Hypertensive response to 222/88       Apr 2018 - vasodilator perfusion study normal LVEF, perfusion      HTN (hypertension) (7/14/2012)      CKD (chronic kidney disease) stage 3, GFR 30-59 ml/min (HCC) (2/24/2016)      Type 2 diabetes, controlled, with neuropathy ([facility] (7/6/2017)      GI bleeding (10/23/2019)        Plan:     Upper GI Bleed-  Secondary to NGT trauma, continue to treat medically, no need for inpatient EGD.   We will sign off

## 2019-10-23 NOTE — PROGRESS NOTES
Progress Note    Patient: Adam Geronimo MRN: [id number]  SSN: [id number]    YOB: 1945  Age: 69 y.o. Sex: male      Admit Date: 10/21/2019    LOS: 2 days     Subjective:   He is doing better. Denies nausea, vomiting or abdominal pain. He has not  Passed gas. He stated his abdominal distension has resolved. NGT with clear bile fluid. The patient would like to eat. Objective:     Vitals:    10/22/19 1943 10/23/19 0029 10/23/19 0318 10/23/19 0716   BP: 153/74 169/90 164/86 (!) 170/96   Pulse: 99 100 100 (!) 102   Resp: 20 18 18 18   Temp: 98.8 °F (37.1 °C) 99 °F (37.2 °C) 99 °F (37.2 °C) 98.1 °F (36.7 °C)   SpO2: 96% 95% 95% 99%   Weight:       Height:            Intake and Output:  Current Shift: 10/23 0701 - 10/23 1900  In: -   Out: 500 [Urine:500]  Last three shifts: 10/21 1901 - 10/23 0700  In: -   Out: 2900 [Urine:1650]    Physical Exam:   GENERAL: alert, cooperative, no distress, appears stated age  EYE: negative  LYMPHATIC: Cervical, supraclavicular, and axillary nodes normal.   THROAT & NECK: normal and no erythema or exudates noted. NGT: clear bile looking   LUNG: clear to auscultation bilaterally  HEART: regular rate and rhythm, S1, S2 normal, no murmur, click, rub or gallop  ABDOMEN: soft, non-tender. Bowel sounds normal. No masses,  no organomegaly, no rebound   EXTREMITIES:  extremities normal, atraumatic, no cyanosis or edema  SKIN: Normal.  NEUROLOGIC: negative  PSYCHIATRIC: non focal    Lab/Data Review: All lab results for the last 24 hours reviewed.        Assessment:     Active Hospital Problems    Diagnosis Date Noted    GI bleeding 10/23/2019    SBO (small bowel obstruction) ([facility] 10/21/2019    Type 2 diabetes, controlled, with neuropathy ([facility] 07/06/2017    CKD (chronic kidney disease) stage 3, GFR 30-59 ml/min ([facility]) 02/24/2016    HTN (hypertension) 07/14/2012    S/P CABG (coronary artery bypass graft) 03/10/2009     taken off statins after an episode of idiopathic jaundice, lipids followed by PCP   3/9/09: LIMA to LAD, SVGs to OM1, OM2 and PDA. Keloid former, required dermatologist care  Cath 7/17/12: Occluded SVG to PDA, Occluded SVG to OM1. Patent LIMA feeding a small vessel. Patent SVG to OM2, small target vessel. Native Cx small in the AV groove, diffusely small vessels. Sep 2015 -  Stress MPI for chest discomfort - 6 and a half minutes, chest discomfort (declined NTG), non diagnostic EKG changes, normal perfusion and LVEF. Hypertensive response to 222/88   Apr 2018 - vasodilator perfusion study normal LVEF, perfusion           Plan:   -SBO: resolved   Hx multiple abdominal surgeries   NGT with clear bile. Denies vomiting, abdominal pain. He feels hungry and would like to eat   Discontinue NGT  Stop IVFs  Start clear liquid diet  Monitor abdominal status   He was encouraged early ambulation     -UGIB: possible related to NG trauma / gastritis / PUD: resolved   No more coffee ground emesis  HH is stable  Continue PPI bid. Change to po     -CAD  -HLD  -Recent NSTEMI 9/19 with angioplasty (no stents at that time). Last BOGDAN 3/19.  holding antiplatelets due to bleeding      -Severe PAD: hold antiplatelets      -DM: HISS      -Chronic systolic heart failure: resume home meds     -CKD stage 3: stable  Avoid nephrotoxic meds         DVT ppx: compression stockings.      Code status: full     Disposition: home once medically ready , possible within 1-2 days     Signed By: Winona Bosworth, MD     October 23, 2019

## 2019-10-23 NOTE — PROGRESS NOTES
Shift Assessment - patient is alert and oriented x4. No complaint of pain at this time. Up with assist to bathroom. Currently resting in a low, locked bed. Call light in reach.

## 2019-10-24 VITALS
WEIGHT: 180 LBS | HEIGHT: 71 IN | SYSTOLIC BLOOD PRESSURE: 132 MMHG | OXYGEN SATURATION: 96 % | TEMPERATURE: 98.2 F | DIASTOLIC BLOOD PRESSURE: 81 MMHG | HEART RATE: 69 BPM | BODY MASS INDEX: 25.2 KG/M2 | RESPIRATION RATE: 18 BRPM

## 2019-10-24 LAB
ANION GAP SERPL CALC-SCNC: 7 MMOL/L (ref 7–16)
BASOPHILS # BLD: 0 K/UL (ref 0–0.2)
BASOPHILS NFR BLD: 0 % (ref 0–2)
BUN SERPL-MCNC: 15 MG/DL (ref 8–23)
CALCIUM SERPL-MCNC: 9.3 MG/DL (ref 8.3–10.4)
CHLORIDE SERPL-SCNC: 110 MMOL/L (ref 98–107)
CO2 SERPL-SCNC: 27 MMOL/L (ref 21–32)
CREAT SERPL-MCNC: 1.16 MG/DL (ref 0.8–1.5)
DIFFERENTIAL METHOD BLD: ABNORMAL
EOSINOPHIL # BLD: 0.1 K/UL (ref 0–0.8)
EOSINOPHIL NFR BLD: 1 % (ref 0.5–7.8)
ERYTHROCYTE [DISTWIDTH] IN BLOOD BY AUTOMATED COUNT: 13.5 % (ref 11.9–14.6)
GLUCOSE BLD STRIP.AUTO-MCNC: 101 MG/DL (ref 65–100)
GLUCOSE BLD STRIP.AUTO-MCNC: 170 MG/DL (ref 65–100)
GLUCOSE SERPL-MCNC: 109 MG/DL (ref 65–100)
HCT VFR BLD AUTO: 36.8 % (ref 41.1–50.3)
HGB BLD-MCNC: 12.4 G/DL (ref 13.6–17.2)
IMM GRANULOCYTES # BLD AUTO: 0 K/UL (ref 0–0.5)
IMM GRANULOCYTES NFR BLD AUTO: 0 % (ref 0–5)
LYMPHOCYTES # BLD: 0.9 K/UL (ref 0.5–4.6)
LYMPHOCYTES NFR BLD: 18 % (ref 13–44)
MCH RBC QN AUTO: 30.2 PG (ref 26.1–32.9)
MCHC RBC AUTO-ENTMCNC: 33.7 G/DL (ref 31.4–35)
MCV RBC AUTO: 89.5 FL (ref 79.6–97.8)
MONOCYTES # BLD: 0.7 K/UL (ref 0.1–1.3)
MONOCYTES NFR BLD: 14 % (ref 4–12)
NEUTS SEG # BLD: 3.2 K/UL (ref 1.7–8.2)
NEUTS SEG NFR BLD: 66 % (ref 43–78)
NRBC # BLD: 0 K/UL (ref 0–0.2)
PLATELET # BLD AUTO: 165 K/UL (ref 150–450)
PMV BLD AUTO: 11.8 FL (ref 9.4–12.3)
POTASSIUM SERPL-SCNC: 3 MMOL/L (ref 3.5–5.1)
RBC # BLD AUTO: 4.11 M/UL (ref 4.23–5.6)
SODIUM SERPL-SCNC: 144 MMOL/L (ref 136–145)
WBC # BLD AUTO: 4.8 K/UL (ref 4.3–11.1)

## 2019-10-24 PROCEDURE — 85025 COMPLETE CBC W/AUTO DIFF WBC: CPT

## 2019-10-24 PROCEDURE — 74011636637 HC RX REV CODE- 636/637: Performed by: INTERNAL MEDICINE

## 2019-10-24 PROCEDURE — 80048 BASIC METABOLIC PNL TOTAL CA: CPT

## 2019-10-24 PROCEDURE — 82962 GLUCOSE BLOOD TEST: CPT

## 2019-10-24 PROCEDURE — 36415 COLL VENOUS BLD VENIPUNCTURE: CPT

## 2019-10-24 PROCEDURE — 74011250637 HC RX REV CODE- 250/637: Performed by: INTERNAL MEDICINE

## 2019-10-24 RX ORDER — POTASSIUM CHLORIDE 20 MEQ/1
40 TABLET, EXTENDED RELEASE ORAL
Status: COMPLETED | OUTPATIENT
Start: 2019-10-24 | End: 2019-10-24

## 2019-10-24 RX ORDER — PANTOPRAZOLE SODIUM 40 MG/1
40 TABLET, DELAYED RELEASE ORAL
Qty: 60 TAB | Refills: 1 | Status: SHIPPED | OUTPATIENT
Start: 2019-10-24 | End: 2019-11-23

## 2019-10-24 RX ADMIN — LISINOPRIL 20 MG: 20 TABLET ORAL at 08:53

## 2019-10-24 RX ADMIN — CARVEDILOL 12.5 MG: 6.25 TABLET, FILM COATED ORAL at 08:53

## 2019-10-24 RX ADMIN — INSULIN LISPRO 2 UNITS: 100 INJECTION, SOLUTION INTRAVENOUS; SUBCUTANEOUS at 11:45

## 2019-10-24 RX ADMIN — POTASSIUM CHLORIDE 40 MEQ: 1500 TABLET, EXTENDED RELEASE ORAL at 08:53

## 2019-10-24 RX ADMIN — PANTOPRAZOLE SODIUM 40 MG: 40 TABLET, DELAYED RELEASE ORAL at 08:53

## 2019-10-24 NOTE — PROGRESS NOTES
Interdisciplinary team rounds were held 10/24/2019 with the following team members:Care Management, Nursing, Nutrition, Pharmacy, Physical Therapy and Physician. Plan of care discussed. See clinical pathway and/or care plan for interventions and desired outcomes.

## 2019-10-24 NOTE — PROGRESS NOTES
A.M assessment complete; pt in bed resting. Alert & oriented. Resp even & unlabored on room air lungs clear. HR regular. Abdomen soft & non-tender with active bowel sounds x4 quads. Skin intact. No c/o pain or nausea at this time. Bed low & locked; call light in reach; no needs voiced.

## 2019-10-24 NOTE — DISCHARGE INSTRUCTIONS
DISCHARGE SUMMARY from Nurse    PATIENT INSTRUCTIONS:    After general anesthesia or intravenous sedation, for 24 hours or while taking prescription Narcotics:  · Limit your activities  · Do not drive and operate hazardous machinery  · Do not make important personal or business decisions  · Do  not drink alcoholic beverages  · If you have not urinated within 8 hours after discharge, please contact your surgeon on call. Report the following to your surgeon:  · Excessive pain, swelling, redness or odor of or around the surgical area  · Temperature over 100.5  · Nausea and vomiting lasting longer than 4 hours or if unable to take medications  · Any signs of decreased circulation or nerve impairment to extremity: change in color, persistent  numbness, tingling, coldness or increase pain  · Any questions    What to do at Home:    *  Please give a list of your current medications to your Primary Care Provider. *  Please update this list whenever your medications are discontinued, doses are      changed, or new medications (including over-the-counter products) are added. *  Please carry medication information at all times in case of emergency situations. These are general instructions for a healthy lifestyle:    No smoking/ No tobacco products/ Avoid exposure to second hand smoke  Surgeon General's Warning:  Quitting smoking now greatly reduces serious risk to your health. Obesity, smoking, and sedentary lifestyle greatly increases your risk for illness    A healthy diet, regular physical exercise & weight monitoring are important for maintaining a healthy lifestyle    You may be retaining fluid if you have a history of heart failure or if you experience any of the following symptoms:  Weight gain of 3 pounds or more overnight or 5 pounds in a week, increased swelling in our hands or feet or shortness of breath while lying flat in bed.   Please call your doctor as soon as you notice any of these symptoms; do not wait until your next office visit. The discharge information has been reviewed with the patient. The patient verbalized understanding. Discharge medications reviewed with the patient and appropriate educational materials and side effects teaching were provided.   ___________________________________________________________________________________________________________________________________

## 2019-10-24 NOTE — PROGRESS NOTES
Problem: Small Bowel Obstruction: Day 3  Goal: Activity/Safety  Outcome: Progressing Towards Goal  Goal: Nutrition/Diet  Outcome: Progressing Towards Goal  Goal: Respiratory  Outcome: Progressing Towards Goal  Goal: *Hemodynamically stable  Outcome: Progressing Towards Goal

## 2019-10-24 NOTE — DISCHARGE SUMMARY
Discharge Summary     Patient: Lon Fernandes MRN: [id number]  SSN: [id number]    YOB: 1945  Age: 69 y.o.   Sex: male       Admit Date: 10/21/2019    Discharge Date: 10/24/2019      Admission Diagnoses: SBO (small bowel obstruction) ([place] [address] [K56.609]    Discharge Diagnoses:   Problem List as of 10/24/2019 Date Reviewed: 10/2/2019          Codes Class Noted - Resolved    GI bleeding ICD-10-CM: K92.2  ICD-9-CM: 578.9  10/23/2019 - Present        * (Principal) SBO (small bowel obstruction) ([place] [address] ICD-10-CM: K56.609  ICD-9-CM: 560.9  10/21/2019 - Present        Unstable angina ([place] [address] ICD-10-CM: I20.0  ICD-9-CM: 411.1  9/24/2019 - Present        NSTEMI (non-ST elevated myocardial infarction) ([place] [address] ICD-10-CM: I21.4  ICD-9-CM: 410.70  9/24/2019 - Present        Open wound of left great toe ICD-10-CM: S91.102A  ICD-9-CM: 893.0  5/6/2019 - Present        Ventricular fibrillation ([place] [address] ICD-10-CM: I49.01  ICD-9-CM: 427.41  3/17/2019 - Present        Acute myocardial infarction [facility]) ICD-10-CM: I21.9  ICD-9-CM: 410.90  3/17/2019 - Present        Acute ST elevation myocardial infarction (STEMI) ([facility] ICD-10-CM: I21.3  ICD-9-CM: 410.90  3/17/2019 - Present        Macrocytic anemia ICD-10-CM: D53.9  ICD-9-CM: 281.9  2/14/2019 - Present        Other fatigue ICD-10-CM: R53.83  ICD-9-CM: 780.79  11/8/2018 - Present        Type 2 diabetes with nephropathy ([facility] ICD-10-CM: E11.21  ICD-9-CM: 250.40, 583.81  6/12/2018 - Present        Nocturnal hypoxemia ICD-10-CM: G47.34  ICD-9-CM: 327.24  6/12/2018 - Present        Anemia associated with acute blood loss ICD-10-CM: D62  ICD-9-CM: 285.1  4/19/2018 - Present        Osteoarthritis of right glenohumeral joint ICD-10-CM: M19.011  ICD-9-CM: 715.91  4/18/2018 - Present        Memory loss ICD-10-CM: R41.3  ICD-9-CM: 780.93  4/9/2018 - Present        Noncompliance with CPAP treatment ICD-10-CM: Z91.14  ICD-9-CM: V15.81  3/8/2018 - Present        Ischemia of right lower extremity ICD-10-CM: I99.8  ICD-9-CM: 459.9  1/4/2018 - Present    Overview Signed 1/4/2018 11:13 AM by Demario Helms NP     12/29/17 (Dr. Frankel) 1. Aortogram, with bilateral lower extremity runoff imaging. 2.  Selective right lower extremity arteriogram (third-order). 3.  Ultrasound-guided left femoral artery access.              Lacunar infarct, acute ([facility] ICD-10-CM: I63.81  ICD-9-CM: 434.91  8/29/2017 - Present        Cervicalgia ICD-10-CM: M54.2  ICD-9-CM: 723.1  8/28/2017 - Present        Type 2 diabetes, controlled, with neuropathy ([facility] ICD-10-CM: E11.40  ICD-9-CM: 250.60, 357.2  7/6/2017 - Present        Other chest pain ICD-10-CM: R07.89  ICD-9-CM: 786.59  11/16/2016 - Present        CKD (chronic kidney disease) stage 3, GFR 30-59 ml/min ([facility]) (Chronic) ICD-10-CM: N18.3  ICD-9-CM: 585.3  2/24/2016 - Present        Arthralgia ICD-10-CM: M25.50  ICD-9-CM: 719.40  2/2/2016 - Present        Non-seasonal allergic rhinitis due to pollen ICD-10-CM: J30.1  ICD-9-CM: 477.0  7/16/2015 - Present        ED (erectile dysfunction) ICD-10-CM: N52.9  ICD-9-CM: 607.84  7/16/2015 - Present        Psoriasis ICD-10-CM: L40.9  ICD-9-CM: 696.1  7/16/2015 - Present        Arthritis ICD-10-CM: M19.90  ICD-9-CM: 716.90  7/16/2015 - Present        Malignant neoplasm of prostate ([facility] ICD-10-CM: C61  ICD-9-CM: 185  Unknown - Present        Other specified disorder of male genital organs(608.89) ICD-10-CM: N50.89  ICD-9-CM: 608.89  Unknown - Present        Elevated prostate specific antigen (PSA) ICD-10-CM: R97.20  ICD-9-CM: 790.93  Unknown - Present        Lumbago ICD-10-CM: M54.5  ICD-9-CM: 724.2  Unknown - Present        Impotence of organic origin ICD-10-CM: N52.9  ICD-9-CM: 607.84  Unknown - Present        ANABELLA (acute kidney injury) ([facility] ICD-10-CM: N17.9  ICD-9-CM: 584.9  7/14/2012 - Present        HTN (hypertension) (Chronic) ICD-10-CM: I10  ICD-9-CM: 401.9  7/14/2012 - Present        ROBERTO (obstructive sleep apnea) (Chronic) ICD-10-CM: G47.33  ICD-9-CM: 327.23  7/14/2012 - Present        Intestinal adhesions with obstruction ([address] ICD-10-CM: K56.50  ICD-9-CM: 560.81  11/9/2011 - Present        PAF (paroxysmal atrial fibrillation) (HCC) ICD-10-CM: I48.0  ICD-9-CM: 427.31  3/13/2009 - Present        S/P CABG (coronary artery bypass graft) ICD-10-CM: Z95.1  ICD-9-CM: V45.81  3/10/2009 - Present    Overview Addendum 4/13/2018  2:36 PM by Tiffanie Douglas MD     taken off statins after an episode of idiopathic jaundice, lipids followed by PCP   3/9/09: LIMA to LAD, SVGs to OM1, OM2 and PDA. Keloid former, required dermatologist care  Cath 7/17/12: Occluded SVG to PDA, Occluded SVG to OM1. Patent LIMA feeding a small vessel. Patent SVG to OM2, small target vessel. Native Cx small in the AV groove, diffusely small vessels. Sep 2015 -  Stress MPI for chest discomfort - 6 and a half minutes, chest discomfort (declined NTG), non diagnostic EKG changes, normal perfusion and LVEF.   Hypertensive response to 222/88   Apr 2018 - vasodilator perfusion study normal LVEF, perfusion             Coronary atherosclerosis of native coronary artery ICD-10-CM: I25.10  ICD-9-CM: 414.01  3/6/2009 - Present        Dyslipidemia ICD-10-CM: E78.5  ICD-9-CM: 272.4  3/6/2009 - Present        Gout ICD-10-CM: M10.9  ICD-9-CM: 274.9  3/6/2009 - Present        RESOLVED: Hypokalemia ICD-10-CM: E87.6  ICD-9-CM: 276.8  4/21/2018 - 7/9/2018        RESOLVED: Anemia ICD-10-CM: D64.9  ICD-9-CM: 285.9  4/19/2018 - 6/12/2018        RESOLVED: Hypomagnesemia ICD-10-CM: E83.42  ICD-9-CM: 275.2  4/19/2018 - 4/21/2018        RESOLVED: Preop cardiovascular exam ICD-10-CM: Z01.810  ICD-9-CM: V72.81  3/20/2018 - 9/24/2019        RESOLVED: Toe infection ICD-10-CM: L08.9  ICD-9-CM: 686.9  1/4/2018 - 4/9/2018        RESOLVED: Great toe pain, right ICD-10-CM: Z16.538  ICD-9-CM: 729.5  12/5/2017 - 7/9/2018        RESOLVED: Acute left-sided low back pain with left-sided sciatica ICD-10-CM: M54.42  ICD-9-CM: 724.2, 724.3  7/6/2017 - 7/9/2018        RESOLVED: Ingrown left big toenail ICD-10-CM: L60.0  ICD-9-CM: 703.0  1/12/2017 - 7/9/2018        RESOLVED: Diabetes mellitus type 2, controlled ([place] [address] ICD-10-CM: E11.9  ICD-9-CM: 250.00  4/28/2016 - 10/4/2017        RESOLVED: Partial small bowel obstruction ([facility] ICD-10-CM: K56.600  ICD-9-CM: 560.9  2/22/2016 - 2/24/2016        RESOLVED: SBO (small bowel obstruction) ([facility] ICD-10-CM: U97.900  ICD-9-CM: 560.9  2/21/2016 - 2/24/2016        RESOLVED: Pain, foot ICD-10-CM: M79.673  ICD-9-CM: 729.5  10/9/2015 - 2/5/2019        RESOLVED: Medicare annual wellness visit, subsequent ICD-10-CM: Z00.00  ICD-9-CM: V70.0  10/9/2015 - 9/26/2019        RESOLVED: Essential hypertension ICD-10-CM: I10  ICD-9-CM: 401.9  10/1/2015 - 2/2/2016        RESOLVED: Absolute anemia ICD-10-CM: D64.9  ICD-9-CM: 285.9  10/1/2015 - 2/2/2016        RESOLVED: Prostate cancer ([facility] ICD-10-CM: C61  ICD-9-CM: 185  10/1/2015 - 4/28/2016        RESOLVED: CAD (coronary artery disease) ICD-10-CM: I25.10  ICD-9-CM: 414.00  7/16/2015 - 5/26/2016    Overview Addendum 3/25/2019 12:51 PM by Jaron Perez MD     taken off statins after an episode of idiopathic jaundice, lipids followed by PCP   3/9/09: LIMA to LAD, SVGs to OM1, OM2 and PDA. Keloid former, required dermatologist care  Cath 7/17/12: Occluded SVG to PDA, Occluded SVG to OM1. Patent LIMA feeding a small vessel. Patent SVG to OM2, small target vessel. Native Cx small in the AV groove, diffusely small vessels. Sep 2015 -  Stress MPI for chest discomfort - 6 and a half minutes, chest discomfort (declined NTG), non diagnostic EKG changes, normal perfusion and LVEF. Hypertensive response to 222/88  Mar 2019- acute IMI/STEMI. 100%  SVG to prox OM - Universal BOGDAN x 2 3.0x30 and 8.7O98 , MI complicated by VF arrest en route, transient amiodarone, stopped without recurrence. EF 40-45% with inferior HK. RESOLVED: Inguinal hernia with obstruction, without mention of gangrene, unilateral or unspecified, (not specified as recurrent) ICD-10-CM: K40.30  ICD-9-CM: 550.10  Unknown - 2/2/2016        RESOLVED: Other and unspecified hyperlipidemia ICD-10-CM: E78.5  ICD-9-CM: 272.4  Unknown - 11/16/2016        RESOLVED: Small bowel obstruction ([facility] ICD-10-CM: F23.975  ICD-9-CM: 560.9  4/10/2013 - 8/24/2016        RESOLVED: Elevated LFTs ICD-10-CM: R94.5  ICD-9-CM: 790.6  7/14/2012 - 2/2/2016        RESOLVED: Nausea & vomiting ICD-10-CM: R11.2  ICD-9-CM: 787.01  7/14/2012 - 2/2/2016        RESOLVED: Diabetes mellitus ([facility] (Chronic) ICD-10-CM: E11.9  ICD-9-CM: 250.00  7/14/2012 - 10/4/2017        RESOLVED: Elevated troponin ICD-10-CM: R79.89  ICD-9-CM: 790.6  7/14/2012 - 2/2/2016        RESOLVED: Hypokalemia ICD-10-CM: E87.6  ICD-9-CM: 276.8  11/9/2011 - 2/2/2016        RESOLVED: Anemia ICD-10-CM: D64.9  ICD-9-CM: 285.9  3/14/2009 - 2/2/2016        RESOLVED: Leukocytosis ICD-10-CM: D72.829  ICD-9-CM: 288.60  3/14/2009 - 2/2/2016        RESOLVED: Thrombocytopenia ([facility] ICD-10-CM: D69.6  ICD-9-CM: 287.5  3/11/2009 - 2/2/2016        RESOLVED: Hypernatremia ICD-10-CM: E87.0  ICD-9-CM: 276.0  3/10/2009 - 2/2/2016        RESOLVED: HTN ICD-9-CM: 401.9  3/6/2009 - 2/2/2016               Discharge Condition: [facility] Course:   Mr. Arlette Smith is a 69 y.o. male with CAD (s/p NSTEMI 9/19) on asa and plavix, severe PAD, chronic systolic heart failure, DM2, prostate CA, CKD 3, h/o SBO with multiple prior surgeries, who was admitted with SBO, related secondary to multiple abdominal surgeries. He was placed on NGT, IVFs, pain management. His clinical status was complicated by coffee ground emesis, with need to start iv PPi. GI consulted. Plan was to continue PPI and monitor serial HHs. His Hb remained stable, his bleeding stopped. Possible bleeding related to NGT trauma or gastritis. His abdominal pain resolved.  He was able to have bowel movements and tolerated diet. The patient will continue management as outpatient. He can resume asa and plavix for his CAD and PVD. PPI x bid to be continued.     Physical Exam:   GENERAL: alert, cooperative, no distress, appears stated age  EYE: negative  LYMPHATIC: Cervical, supraclavicular, and axillary nodes normal.   THROAT & NECK: normal and no erythema or exudates noted. NGT: coffee ground emesis   LUNG: clear to auscultation bilaterally  HEART: regular rate and rhythm, S1, S2 normal, no murmur, click, rub or gallop  ABDOMEN: soft, non-tender. Bowel sounds normal. No masses,  no organomegaly  EXTREMITIES:  extremities normal, atraumatic, no cyanosis or edema  SKIN: Normal.  NEUROLOGIC: negative  PSYCHIATRIC: non focal    Consults: Gastroenterology    Significant Diagnostic Studies: see note     Disposition: home    Discharge Medications:   Current Discharge Medication List      START taking these medications    Details   pantoprazole (PROTONIX) 40 mg tablet Take 1 Tab by mouth Before breakfast and dinner for 30 days. Qty: 60 Tab, Refills: 1         CONTINUE these medications which have NOT CHANGED    Details   isosorbide mononitrate ER (IMDUR) 60 mg CR tablet Take 1 Tab by mouth daily. Qty: 90 Tab, Refills: 3      acetaminophen (TYLENOL) 325 mg tablet Take 2 Tabs by mouth every four (4) hours as needed for Pain. rosuvastatin (CRESTOR) 20 mg tablet Take 1 Tab by mouth nightly. Qty: 30 Tab, Refills: 11      lisinopril (PRINIVIL, ZESTRIL) 20 mg tablet Take 1 Tab by mouth daily. Qty: 30 Tab, Refills: 11      aspirin 81 mg chewable tablet Take 1 Tab by mouth daily. clopidogrel (PLAVIX) 75 mg tab Take 1 Tab by mouth daily. Qty: 30 Tab, Refills: 11      nitroglycerin (NITROSTAT) 0.4 mg SL tablet 1 Tab by SubLINGual route every five (5) minutes as needed for Chest Pain.   Qty: 25 Tab, Refills: 11    Associated Diagnoses: Other chest pain      amLODIPine (NORVASC) 10 mg tablet Take 1 Tab by mouth daily. Qty: 30 Tab, Refills: 11      carvedilol (COREG) 12.5 mg tablet Take 1 Tab by mouth two (2) times daily (with meals). Qty: 60 Tab, Refills: 11      sitagliptin phosphate (JANUVIA PO) Take  by mouth. allopurinol (ZYLOPRIM) 300 mg tablet Take 1 Tab by mouth daily. Qty: 90 Tab, Refills: 1    Associated Diagnoses: Idiopathic gout, unspecified chronicity, unspecified site      gabapentin (NEURONTIN) 300 mg capsule Take 1 Cap by mouth four (4) times daily. Qty: 120 Cap, Refills: 3    Associated Diagnoses: Type 2 diabetes, controlled, with neuropathy (HCC)      glucose blood VI test strips (BLOOD GLUCOSE TEST) strip Test once to twice daily DX: E11.8  Qty: 300 Strip, Refills: 3    Associated Diagnoses: Type 2 diabetes mellitus with complication, unspecified long term insulin use status      Lancets misc Test once to twice daily DX: E11.8  Qty: 300 Each, Refills: 3    Associated Diagnoses: Type 2 diabetes mellitus with complication, unspecified long term insulin use status      cholecalciferol, vitamin D3, (VITAMIN D3) 2,000 unit Tab Take 2,000 Units by mouth daily. Indications: OSTEOPOROSIS, am      Cetirizine (ZYRTEC) 10 mg Cap Take 10 mg by mouth nightly. As needed  Indications: inflammation of the nose due to an allergy         STOP taking these medications       Omeprazole delayed release (PRILOSEC D/R) 20 mg tablet Comments:   Reason for Stopping:               Activity: Activity as tolerated  Diet: Regular Diet  Wound Care: None needed    Follow-up Appointments   Procedures    FOLLOW UP VISIT Appointment in: One Week pcp     pcp     Standing Status:   Standing     Number of Occurrences:   1     Order Specific Question:   Appointment in     Answer:    One Week       Signed By: Agustín Maldonado MD     October 24, 2019

## 2019-10-24 NOTE — PROGRESS NOTES
Discharge instructions reviewed with patient, opportunity for questions given. Rx given for Protonix.

## 2019-10-25 ENCOUNTER — APPOINTMENT (OUTPATIENT)
Dept: CARDIAC REHAB | Age: 74
End: 2019-10-25
Payer: MEDICARE

## 2019-10-25 ENCOUNTER — PATIENT OUTREACH (OUTPATIENT)
Dept: CASE MANAGEMENT | Age: 74
End: 2019-10-25

## 2019-10-25 NOTE — PROGRESS NOTES
This note will not be viewable in [address] Transition of Care Discharge Follow-up Questionnaire   Date/Time of Call:   10/25/19   1255pm   What was the patient hospitalized for? SBO     Does the patient understand his/her diagnosis and/or treatment and what happened during the hospitalization? Yes, spoke with patient, he states understanding of diagnosis and treatment; and is agreeable to call. Patient states he is doing well    Did the patient receive discharge instructions? Yes    CM Assessed Risk for Readmission:       Patient stated Risk for Readmission:      low r/t diagnosis and/or comorbidities       non stated   Review any discharge instructions (see discharge instructions/AVS in [facility]). Ask patient if they understand these. Do they have any questions? Reviewed, understanding is stated, no questions at this time       Were home services ordered (nursing, PT, OT, ST, etc.)? No       If so, has the first visit occurred? If not, why? (Assist with coordination of services if necessary.)   N/A   Was any DME ordered? No     If so, has it been received? If not, why?  (Assist patient in obtaining DME orders &/or equipment if necessary.) N/A   Complete a review of all medications (new, continued and discontinued meds per the D/C instructions and medication tab in [facility]). Completed  START taking:  pantoprazole 40 mg tablet (PROTONIX)  STOP taking:  Omeprazole delayed release 20 mg tablet (PRILOSEC  D/R)   Were all new prescriptions filled? If not, why?  (Assist patient in obtaining medications if necessary  escalate for CCM &/or SW if ongoing issues are verbalized by pt or anticipated)   Yes    Does the patient understand the purpose and dosing instructions for all medications? (If patient has questions, provide explanation and education.)   Yes     Does the patient have any problems in performing ADLs? (If patient is unable to perform ADLs  what is the limiting factor(s)?   Do they have a support system that can assist? If no support system is present, discuss possible assistance that they may be able to obtain. Escalate for CCM/SW if ongoing issues are verbalized by pt or anticipated)   Independent with ADLs       Does the patient have all follow-up appointments scheduled? 7 day f/up with PCP?   (f/up with PCP may be w/in 14 days if patient has a f/up with their specialist w/in 7 days)    7-14 day f/up with specialist?   (or per discharge instructions)    If f/up has not been made  what actions has the care coordinator made to accomplish this? Has transportation been arranged? Yes        Dr. Santiago Culver ([facility] 10/31/19                      Yes, no transportation needs identified at this time    Any other questions or concerns expressed by the patient? No other needs or concerns identified. Patient states his gratitude for follow up. Contact information for Care Coordinator was given, instructed to call with new questions or concerns. Schedule next appointment with ELO Monahan or refer to RN Case Manager/ per the workflow guidelines. When is care coordinators next follow-up call scheduled? If referred for CCM  what RN care manager was the referral assigned?    Care Coordinator will follow per workflow guidelines          Within 14 days   FRED Call Completed By: Yohan Yung LPN  Care Coordinator

## 2019-10-28 ENCOUNTER — HOSPITAL ENCOUNTER (OUTPATIENT)
Dept: CARDIAC REHAB | Age: 74
Discharge: HOME | End: 2019-10-28
Payer: MEDICARE

## 2019-10-28 VITALS — WEIGHT: 179.8 LBS | BODY MASS INDEX: 25.08 KG/M2

## 2019-10-28 PROCEDURE — 93798 PHYS/QHP OP CAR RHAB W/ECG: CPT

## 2019-10-29 NOTE — CDMP QUERY
Patient presented with a small-bowel obstruction and was treated with an NG tube. Progress notes mention, \"CT abd/pelv revealing acute SBO with transition point in left upper pelvis near site of small L inguinal hernia. Small reducible L inguinal hernia. \" After study, can the suspected etiology of the patient's small bowel obstruction be further specified as: 
 
Small bowel obstruction due to inguinal hernia Small bowel obstruction due to__________ Small bowel obstruction unable to further specified cause Other Unable to determine Thanks, Kylie Sharma, RN, BSN, CDS Clinical Documentation Improvement 
[phone]

## 2019-10-30 ENCOUNTER — HOSPITAL ENCOUNTER (OUTPATIENT)
Dept: CARDIAC REHAB | Age: 74
Discharge: HOME | End: 2019-10-30
Payer: MEDICARE

## 2019-10-30 VITALS — WEIGHT: 179 LBS | BODY MASS INDEX: 24.97 KG/M2

## 2019-10-30 PROCEDURE — 93798 PHYS/QHP OP CAR RHAB W/ECG: CPT

## 2019-11-04 ENCOUNTER — HOSPITAL ENCOUNTER (OUTPATIENT)
Dept: CARDIAC REHAB | Age: 74
Discharge: HOME | End: 2019-11-04
Payer: MEDICARE

## 2019-11-04 PROCEDURE — 93798 PHYS/QHP OP CAR RHAB W/ECG: CPT

## 2019-11-06 ENCOUNTER — HOSPITAL ENCOUNTER (OUTPATIENT)
Dept: CARDIAC REHAB | Age: 74
Discharge: HOME | End: 2019-11-06
Payer: MEDICARE

## 2019-11-06 VITALS — WEIGHT: 183 LBS | BODY MASS INDEX: 25.52 KG/M2

## 2019-11-06 PROCEDURE — 93798 PHYS/QHP OP CAR RHAB W/ECG: CPT

## 2019-11-08 ENCOUNTER — PATIENT OUTREACH (OUTPATIENT)
Dept: CASE MANAGEMENT | Age: 74
End: 2019-11-08

## 2019-11-08 ENCOUNTER — HOSPITAL ENCOUNTER (OUTPATIENT)
Dept: CARDIAC REHAB | Age: 74
Discharge: HOME | End: 2019-11-08
Payer: MEDICARE

## 2019-11-08 NOTE — PROGRESS NOTES
This note will not be viewable in [address] Transitions of Care  Follow up Outreach Note   Outreach type Phone call: spoke with patient  Home visit:   Date/Time of Outreach: 11/8/19 1150am     Has patient attended PCP or specialist follow-up appointments since last contact? What was outcome of appointment? When is next follow-up scheduled? Patient states he is doing well. Patient reports attending follow up as scheduled. Review medications. Any medication changes since last outreach? Does patient have any questions or issues related to their medications? None stated      Not at this time. Home health active? If yes  any issue? Progress? No        Referrals needed?  (CM, SW, HH, etc. )   No    Other issues/Miscellaneous? (Transportation, access to meals, ability to perform ADLs, adequate caregiver support, etc.) No other needs or concerns at this time. Patient states his gratitude for follow up. Next Outreach Scheduled?     Graduation from program?   N/A    Yes        Next Steps/Goals (if applicable):   N/A     Outreach completed by:   Tigist Olvera LPN  Care Coordinator

## 2019-11-11 ENCOUNTER — HOSPITAL ENCOUNTER (OUTPATIENT)
Dept: CARDIAC REHAB | Age: 74
Discharge: HOME | End: 2019-11-11
Payer: MEDICARE

## 2019-11-11 PROCEDURE — 93798 PHYS/QHP OP CAR RHAB W/ECG: CPT

## 2019-11-13 ENCOUNTER — HOSPITAL ENCOUNTER (OUTPATIENT)
Dept: CARDIAC REHAB | Age: 74
Discharge: HOME | End: 2019-11-13
Payer: MEDICARE

## 2019-11-13 VITALS — BODY MASS INDEX: 25.38 KG/M2 | WEIGHT: 182 LBS

## 2019-11-13 PROCEDURE — 93798 PHYS/QHP OP CAR RHAB W/ECG: CPT

## 2019-11-15 ENCOUNTER — HOSPITAL ENCOUNTER (OUTPATIENT)
Dept: CARDIAC REHAB | Age: 74
End: 2019-11-15
Payer: MEDICARE

## 2019-11-18 ENCOUNTER — HOSPITAL ENCOUNTER (OUTPATIENT)
Dept: CARDIAC REHAB | Age: 74
Discharge: HOME | End: 2019-11-18
Payer: MEDICARE

## 2019-11-18 PROCEDURE — 93798 PHYS/QHP OP CAR RHAB W/ECG: CPT

## 2019-11-20 ENCOUNTER — HOSPITAL ENCOUNTER (OUTPATIENT)
Dept: CARDIAC REHAB | Age: 74
Discharge: HOME | End: 2019-11-20
Payer: MEDICARE

## 2019-11-20 VITALS — BODY MASS INDEX: 26.08 KG/M2 | WEIGHT: 187 LBS

## 2019-11-20 PROCEDURE — 93798 PHYS/QHP OP CAR RHAB W/ECG: CPT

## 2019-11-20 NOTE — PROGRESS NOTES
69year old s/p PCI on 9/25/19 enrolled in cardiac rehabilitation, seen today for initial nutrition counseling. States fair energy level, intermittently feeling fatigued throughout the day. No concerns with appetite today. Stated Nutrition Goals: to improve strength and stamina, and learn how to bad cholesterol. Medical History: Arthritis, CAD, CKD, CVA, DM, GERD, gout, HTN, CA    Nutrition related medications/supplements: januvia, betablocker, prilosec, statin, Vitamin D3. Nutrition Related Labs: 10/24/19 GFR >60, 10/24/19 BS= 170 (H),   K= 3.0 (L) ,  9/25/19  (H), .4 (H). A1C= 7.1  Blood Sugar Monitoring: pt does not check his BS regularly. Social History: Pt is retired, lives with spouse. Physical Activity: Rehabilitation 3x per week, off days pt is sedentary. Food and Nutrition Intake History:   Spouse cooks meals at home, seldom does he eat out. Rarely considers sodium in foods and eats 0-1 cups of fruits and vegetables per day, and rarely eats fish. Eats 1 full meal per day he says. Ate a sandwich before coming to exercise today. Prefers processed meats such as sausage and fried as his cooking method and high sodium condiments such as barbeque sauce and ketchup. He knows he has to make a change but is not convinced he can do it. Stated 1 day diet recall includes   Breakfast: waffle + maple syrup, margarine and coffee. Lunch; Sausage in a hot dog bun with mustard and reconstituted lemonade (with added sugar and water). Snack: 2 slices of homemade blueberry cake  Dinner: 2 slices of pepperoni pizza (take out), with lemonade  PM snack: handful of grapes. Beverages: lemonade, water and  4 ounces of milk  Alcohol use: occasionally     GI Hx: /Digestive Issues: GERD, constipation. Anthropometric Data:  BMI: 25.38 kg/m²- BMI class overweight for age. Height: 5' 11\" (1.803 m). Weight: 82.6 kg (182 lb).     Waist measurement (inches): 37     Estimated Nutritional Needs:  Calories: MSJ x 1.2  (sedentary)= 1900kcal/day for weight maintenance:   Protein: 95g (20% of estimated energy low end needs)  CHO: 214g  (45% of estimated low end energy needs)  Stage of Behavior Change: Contemplation     Nutrition Diagnosis:    Excess intake of nutrients sodium and saturated fats related to food choices and nutrition related knowledge evidenced by food recall revealing intake of processed and fried foods/meats, and LDL > 100mg/dL    Nutrition Intervention:  1. Nutrition Education: Educated patient and spouse on cardioprotective meal pattern/Mediterranean meal pattern including   Guidelines for saturated fat (<7% total calories), sodium ( < 2000mg/day or follow MD recommendations), and added sugars ( < 25 grams for women/<35 grams for men) and high sources of each reviewed   Emphasis on cardioprotective benefits of daily intake of plant-focused eating pattern.  Demonstrated portions sizes   Sodium education ; \"salty six\" foods, food label reading, low sodium food sources and meal planning.  Healthy protein swap ideas provided today. 2. Reviewed lab/body comp results/goals, and nutrition modifications that will support improvements in body composition and lab values. 3. Goals Setting: Set specific, proximal goals in collaboration with patient, including personalized plan that patient can achieve his/her goals during cardiac rehabilitation. Handouts provided for home use:    Lab/body comp with values    DASH meal pattern with 1 day sample menu.  [employer] plate method   Tips for reducing sodium   Heart Healthy Protein ideas     Nutrition Goals:    To improve diet quality, by decreasing intake sodium and saturated fats and by increasing intake of f/v by end of cardiac/ pulmonary rehabilitation. Monitoring/Evaluation: RD to follow up with participant during rehab sessions for questions and assessment of progression toward goals.     Compliance: Pt agreeable \"to try to reduce fried and processed meats\" and switch to leaner meats, poultry, fish and plant protein options.    Barriers: None identified at this time     Carlos Rosa, MS, RD, LD  Cardiac/Pulmonary Rehab Dietitian

## 2019-11-21 ENCOUNTER — HOSPITAL ENCOUNTER (EMERGENCY)
Age: 74
Discharge: HOME | End: 2019-11-21
Attending: EMERGENCY MEDICINE
Payer: MEDICARE

## 2019-11-21 VITALS
BODY MASS INDEX: 25.2 KG/M2 | HEIGHT: 71 IN | RESPIRATION RATE: 18 BRPM | TEMPERATURE: 98.1 F | SYSTOLIC BLOOD PRESSURE: 143 MMHG | WEIGHT: 180 LBS | DIASTOLIC BLOOD PRESSURE: 76 MMHG | OXYGEN SATURATION: 99 % | HEART RATE: 89 BPM

## 2019-11-21 DIAGNOSIS — M79.671 PAIN IN BOTH FEET: Primary | ICD-10-CM

## 2019-11-21 DIAGNOSIS — M79.672 PAIN IN BOTH FEET: Primary | ICD-10-CM

## 2019-11-21 DIAGNOSIS — I73.9 PERIPHERAL ARTERIAL DISEASE (HCC): ICD-10-CM

## 2019-11-21 DIAGNOSIS — G63 POLYNEUROPATHY ASSOCIATED WITH UNDERLYING DISEASE (HCC): ICD-10-CM

## 2019-11-21 LAB
ALBUMIN SERPL-MCNC: 3.2 G/DL (ref 3.2–4.6)
ALBUMIN/GLOB SERPL: 0.8 {RATIO} (ref 1.2–3.5)
ALP SERPL-CCNC: 89 U/L (ref 50–136)
ALT SERPL-CCNC: 15 U/L (ref 12–65)
ANION GAP SERPL CALC-SCNC: 4 MMOL/L (ref 7–16)
AST SERPL-CCNC: 13 U/L (ref 15–37)
BASOPHILS # BLD: 0 K/UL (ref 0–0.2)
BASOPHILS NFR BLD: 1 % (ref 0–2)
BILIRUB SERPL-MCNC: 0.3 MG/DL (ref 0.2–1.1)
BUN SERPL-MCNC: 9 MG/DL (ref 8–23)
CALCIUM SERPL-MCNC: 9.1 MG/DL (ref 8.3–10.4)
CHLORIDE SERPL-SCNC: 108 MMOL/L (ref 98–107)
CO2 SERPL-SCNC: 30 MMOL/L (ref 21–32)
CREAT SERPL-MCNC: 1.31 MG/DL (ref 0.8–1.5)
DIFFERENTIAL METHOD BLD: ABNORMAL
EOSINOPHIL # BLD: 0.1 K/UL (ref 0–0.8)
EOSINOPHIL NFR BLD: 2 % (ref 0.5–7.8)
ERYTHROCYTE [DISTWIDTH] IN BLOOD BY AUTOMATED COUNT: 13.4 % (ref 11.9–14.6)
GLOBULIN SER CALC-MCNC: 3.9 G/DL (ref 2.3–3.5)
GLUCOSE SERPL-MCNC: 125 MG/DL (ref 65–100)
HCT VFR BLD AUTO: 35 % (ref 41.1–50.3)
HGB BLD-MCNC: 11.6 G/DL (ref 13.6–17.2)
IMM GRANULOCYTES # BLD AUTO: 0 K/UL (ref 0–0.5)
IMM GRANULOCYTES NFR BLD AUTO: 0 % (ref 0–5)
LYMPHOCYTES # BLD: 1.2 K/UL (ref 0.5–4.6)
LYMPHOCYTES NFR BLD: 22 % (ref 13–44)
MAGNESIUM SERPL-MCNC: 1.5 MG/DL (ref 1.8–2.4)
MCH RBC QN AUTO: 29.7 PG (ref 26.1–32.9)
MCHC RBC AUTO-ENTMCNC: 33.1 G/DL (ref 31.4–35)
MCV RBC AUTO: 89.7 FL (ref 79.6–97.8)
MONOCYTES # BLD: 0.7 K/UL (ref 0.1–1.3)
MONOCYTES NFR BLD: 12 % (ref 4–12)
NEUTS SEG # BLD: 3.6 K/UL (ref 1.7–8.2)
NEUTS SEG NFR BLD: 63 % (ref 43–78)
NRBC # BLD: 0 K/UL (ref 0–0.2)
PLATELET # BLD AUTO: 193 K/UL (ref 150–450)
PMV BLD AUTO: 11.3 FL (ref 9.4–12.3)
POTASSIUM SERPL-SCNC: 3.1 MMOL/L (ref 3.5–5.1)
PROT SERPL-MCNC: 7.1 G/DL (ref 6.3–8.2)
RBC # BLD AUTO: 3.9 M/UL (ref 4.23–5.6)
SODIUM SERPL-SCNC: 142 MMOL/L (ref 136–145)
URATE SERPL-MCNC: 3.8 MG/DL (ref 2.6–6)
WBC # BLD AUTO: 5.7 K/UL (ref 4.3–11.1)

## 2019-11-21 PROCEDURE — 85025 COMPLETE CBC W/AUTO DIFF WBC: CPT

## 2019-11-21 PROCEDURE — 84550 ASSAY OF BLOOD/URIC ACID: CPT

## 2019-11-21 PROCEDURE — 83735 ASSAY OF MAGNESIUM: CPT

## 2019-11-21 PROCEDURE — 74011250636 HC RX REV CODE- 250/636: Performed by: EMERGENCY MEDICINE

## 2019-11-21 PROCEDURE — 96375 TX/PRO/DX INJ NEW DRUG ADDON: CPT | Performed by: EMERGENCY MEDICINE

## 2019-11-21 PROCEDURE — 99284 EMERGENCY DEPT VISIT MOD MDM: CPT | Performed by: EMERGENCY MEDICINE

## 2019-11-21 PROCEDURE — 80053 COMPREHEN METABOLIC PANEL: CPT

## 2019-11-21 PROCEDURE — 74011250637 HC RX REV CODE- 250/637: Performed by: EMERGENCY MEDICINE

## 2019-11-21 PROCEDURE — 96374 THER/PROPH/DIAG INJ IV PUSH: CPT | Performed by: EMERGENCY MEDICINE

## 2019-11-21 RX ORDER — ONDANSETRON 2 MG/ML
4 INJECTION INTRAMUSCULAR; INTRAVENOUS
Status: COMPLETED | OUTPATIENT
Start: 2019-11-21 | End: 2019-11-21

## 2019-11-21 RX ORDER — POTASSIUM CHLORIDE 20 MEQ/1
40 TABLET, EXTENDED RELEASE ORAL
Status: COMPLETED | OUTPATIENT
Start: 2019-11-21 | End: 2019-11-21

## 2019-11-21 RX ORDER — MORPHINE SULFATE 4 MG/ML
4 INJECTION INTRAVENOUS
Status: COMPLETED | OUTPATIENT
Start: 2019-11-21 | End: 2019-11-21

## 2019-11-21 RX ORDER — TRAMADOL HYDROCHLORIDE 50 MG/1
50 TABLET ORAL
Qty: 20 TAB | Refills: 0 | Status: SHIPPED | OUTPATIENT
Start: 2019-11-21 | End: 2019-11-26

## 2019-11-21 RX ORDER — LANOLIN ALCOHOL/MO/W.PET/CERES
400 CREAM (GRAM) TOPICAL
Status: COMPLETED | OUTPATIENT
Start: 2019-11-21 | End: 2019-11-21

## 2019-11-21 RX ADMIN — ONDANSETRON 4 MG: 2 INJECTION INTRAMUSCULAR; INTRAVENOUS at 04:08

## 2019-11-21 RX ADMIN — POTASSIUM CHLORIDE 40 MEQ: 20 TABLET, EXTENDED RELEASE ORAL at 05:28

## 2019-11-21 RX ADMIN — MORPHINE SULFATE 4 MG: 4 INJECTION INTRAVENOUS at 04:08

## 2019-11-21 RX ADMIN — Medication 400 MG: at 05:28

## 2019-11-21 NOTE — DISCHARGE INSTRUCTIONS
Take medications as prescribed  Follow-up with your regular physician, you may need to have your potassium and magnesium rechecked  Return to the ER for any new or worsening symptoms    Foot Pain: Care Instructions  Your Care Instructions  Foot injuries that cause pain and swelling are fairly common. Almost all sports or home repair projects can cause a misstep that ends up as foot pain. Normal wear and tear, especially as you get older, also can cause foot pain. Most minor foot injuries will heal on their own, and home treatment is usually all you need to do. If you have a severe injury, you may need tests and treatment. Follow-up care is a key part of your treatment and safety. Be sure to make and go to all appointments, and call your doctor if you are having problems. It's also a good idea to know your test results and keep a list of the medicines you take. How can you care for yourself at home? · Take pain medicines exactly as directed. ? If the doctor gave you a prescription medicine for pain, take it as prescribed. ? If you are not taking a prescription pain medicine, ask your doctor if you can take an over-the-counter medicine. · Rest and protect your foot. Take a break from any activity that may cause pain. · Put ice or a cold pack on your foot for 10 to 20 minutes at a time. Put a thin cloth between the ice and your skin. · Prop up the sore foot on a pillow when you ice it or anytime you sit or lie down during the next 3 days. Try to keep it above the level of your heart. This will help reduce swelling. · Your doctor may recommend that you wrap your foot with an elastic bandage. Keep your foot wrapped for as long as your doctor advises. · If your doctor recommends crutches, use them as directed. · Wear roomy footwear. · As soon as pain and swelling end, begin gentle exercises of your foot. Your doctor can tell you which exercises will help. When should you call for help?   Call 911 anytime you think you may need emergency care. For example, call if:    · Your foot turns pale, white, blue, or cold.    Call your doctor now or seek immediate medical care if:    · You cannot move or stand on your foot.     · Your foot looks twisted or out of its normal position.     · Your foot is not stable when you step down.     · You have signs of infection, such as:  ? Increased pain, swelling, warmth, or redness. ? Red streaks leading from the sore area. ? Pus draining from a place on your foot. ? A fever.     · Your foot is numb or tingly.    Watch closely for changes in your health, and be sure to contact your doctor if:    · You do not get better as expected.     · You have bruises from an injury that last longer than 2 weeks. Where can you learn more? Go to http://maykel-xander.info/. Enter C703 in the search box to learn more about \"Foot Pain: Care Instructions. \"  Current as of: June 26, 2019  Content Version: 12.2  © 9151-2397 [employer] Care instructions adapted under license by [employer] (which disclaims liability or warranty for this information). If you have questions about a medical condition or this instruction, always ask your healthcare professional. [place] [address] any warranty or liability for your use of this information. Patient Education        Learning About Peripheral Arterial Disease of the Legs  What is peripheral arterial disease? Peripheral arterial disease (PAD) is narrowing or blockage of arteries in your arms and legs. The most common cause of PAD is the buildup of plaque on the inside of arteries. Plaque is made of extra cholesterol, calcium, and other material in your blood. Over time, plaque builds up in the walls of the arteries, including those that supply blood to your legs. This buildup leads to poor blood flow. When you have PAD, you have a risk of having plaque in other arteries in your body.  This raises your risk of a heart attack and stroke. This information focuses on peripheral arterial disease of the legs, the area where it is most common. When you have PAD of the legs and you walk or exercise, your leg muscles may not get enough blood. This may cause symptoms, such as leg pain during exercise. Peripheral arterial disease is also called peripheral vascular disease. What are the symptoms? Many people who have PAD do not have any symptoms. But if you have symptoms, you may have weak or tired legs, difficulty walking or balancing, or pain. If you have pain, you might feel a tight, aching, or squeezing pain in the calf, thigh, or buttock. This pain usually happens after you have walked a certain distance. The pain goes away if you stop walking. This pain is called intermittent claudication. If PAD gets worse, you may have other symptoms that are caused by poor blood flow to your legs and feet. You may have:  · Cold or numb feet or toes. · Sores that are slow to heal.  · Leg or foot pain while you are at rest.  · Feet and toes that become pale from exercise or when elevated. · Feet that turn red when dangled. · Blue or purple marks on your legs, feet, or toes. How can you prevent PAD? · Quit smoking. Quitting smoking is one of the best things you can do to help prevent PAD. If you need help quitting, talk to your doctor about stop-smoking programs and medicines. These can increase your chances of quitting for good. · Stay at a healthy weight. · Manage other health problems, including diabetes, high blood pressure, and high cholesterol. · Be physically active. Get at least 30 minutes of exercise on most days of the week. Walking is a good choice. You also may want to do other activities, such as running, swimming, cycling, or playing tennis or team sports. · Eat a variety of heart-healthy foods.   ? Eat fruits, vegetables, whole grains (like oatmeal), dried beans and peas, nuts and seeds, soy products (like tofu), and fat-free or low-fat dairy products. ? Replace butter, margarine, and hydrogenated or partially hydrogenated oils with olive and canola oils. (Canola oil margarine without trans fat is fine.)  ? Replace red meat with fish, poultry, and soy protein (like tofu). ? Limit processed and packaged foods like chips, crackers, and cookies. How is PAD treated? Your doctor may suggest ways to relieve symptoms and lower your risk of heart attack and stroke. These may include:  · Quitting smoking. It's one of the most important things you can do. If you need help quitting, talk to your doctor about stop-smoking programs and medicines. These can increase your chances of quitting for good. · Eating heart-healthy foods. · Staying at a healthy weight. Lose weight if you need to. · Regular exercise (if your doctor says it's safe). Try walking, swimming, or biking for at least 30 minutes on most, if not all, days of the week. If you have leg symptoms when you exercise, your doctor might recommend a specialized exercise program that may relieve symptoms. The goal is to be able to walk farther without pain. · Medicines that help manage other problems such as high blood pressure and high cholesterol. · Medicine, such as aspirin, that prevents blood clots which could cause a heart attack or stroke. · Procedures, such as opening narrowed or blocked arteries (angioplasty) or using healthy blood vessels to create detours around narrowed or blocked arteries (bypass surgery). Follow-up care is a key part of your treatment and safety. Be sure to make and go to all appointments, and call your doctor if you are having problems. It's also a good idea to know your test results and keep a list of the medicines you take. Where can you learn more? Go to http://maykel-xander.info/. Enter M828 in the search box to learn more about \"Learning About Peripheral Arterial Disease of the Legs. \"  Current as of: April 9, 2019  Content Version: 12.2  © 9278-7601 [employer]. Care instructions adapted under license by [employer] (which disclaims liability or warranty for this information). If you have questions about a medical condition or this instruction, always ask your healthcare professional. [address] any warranty or liability for your use of this information. Patient Education        Neuropathic Pain: Care Instructions  Your Care Instructions    Neuropathic pain is caused by pressure on or damage to your nerves. It's often simply called nerve pain. Some people feel this type of pain all the time. For others, it comes and goes. Diabetes, shingles, or an injury can cause nerve pain. Many people say the pain feels sharp, burning, or stabbing. But some people feel it as a dull ache. In some cases, it makes your skin very sensitive. So touch, pressure, and other sensations that did not hurt before may now cause pain. It's important to know that this kind of pain is real and can affect your quality of life. It's also important to know that treatment can help. Treatment includes pain medicines, exercise, and physical therapy. Medicines can help reduce the number of pain signals that travel over the nerves. This can make the painful areas less sensitive. It can also help you sleep better and improve your mood. But medicines are only one part of successful treatment. Most people do best with more than one kind of treatment. Your doctor may recommend that you try cognitive-behavioral therapy and stress management. Or, if needed, you may decide to try to quit smoking, lower your blood pressure, or better control blood sugar. These kinds of healthy changes can also make a difference. If you feel that your treatment is not working, talk to your doctor. And be sure to tell your doctor if you think you might be depressed or anxious.  These are common problems that can also be treated. Follow-up care is a key part of your treatment and safety. Be sure to make and go to all appointments, and call your doctor if you are having problems. It's also a good idea to know your test results and keep a list of the medicines you take. How can you care for yourself at home? · Be safe with medicines. Read and follow all instructions on the label. ? If the doctor gave you a prescription medicine for pain, take it as prescribed. ? If you are not taking a prescription pain medicine, ask your doctor if you can take an over-the-counter medicine. · Save hard tasks for days when you have less pain. Follow a hard task with an easy task. And remember to take breaks. · Relax, and reduce stress. You may want to try deep breathing or meditation. These can help. · Keep moving. Gentle, daily exercise can help reduce pain. Your doctor or physical therapist can tell you what type of exercise is best for you. This may include walking, swimming, and stationary biking. It may also include stretches and range-of-motion exercises. · Try heat, cold packs, and massage. · Get enough sleep. Constant pain can make you more tired. If the pain makes it hard to sleep, talk with your doctor. · Think positively. Your thoughts can affect your pain. Do fun things to distract yourself from the pain. See a movie, read a book, listen to music, or spend time with a friend. · Keep a pain diary. Try to write down how strong your pain is and what it feels like. Also try to notice and write down how your moods, thoughts, sleep, activities, and medicine affect your pain. These notes can help you and your doctor find the best ways to treat your pain. Reducing constipation caused by pain medicine  Pain medicines often cause constipation. To reduce constipation:  · Include fruits, vegetables, beans, and whole grains in your diet each day. These foods are high in fiber.   · Drink plenty of fluids, enough so that your urine is light yellow or clear like water. If you have kidney, heart, or liver disease and have to limit fluids, talk with your doctor before you increase the amount of fluids you drink. · Get some exercise every day. Build up slowly to 30 to 60 minutes a day on 5 or more days of the week. · Take a fiber supplement, such as Citrucel or Metamucil, every day if needed. Read and follow all instructions on the label. · Schedule time each day for a bowel movement. Having a daily routine may help. Take your time and do not strain when having a bowel movement. · Ask your doctor about a laxative. The goal is to have one easy bowel movement every 1 to 2 days. Do not let constipation go untreated for more than 3 days. When should you call for help? Call your doctor now or seek immediate medical care if:    · You feel sad, anxious, or hopeless for more than a few days. This could mean you are depressed. Depression is common in people who have a lot of pain. But it can be treated.     · You have trouble with bowel movements, such as:  ? No bowel movement in 3 days. ? Blood in the anal area, in your stool, or on the toilet paper. ? Diarrhea for more than 24 hours.    Watch closely for changes in your health, and be sure to contact your doctor if:    · Your pain is getting worse.     · You can't sleep because of pain.     · You are very worried or anxious about your pain.     · You have trouble taking your pain medicine.     · You have any concerns about your pain medicine or its side effects.     · You have vomiting or cramps for more than 2 hours. Where can you learn more? Go to http://maykel-xander.info/. Enter L079 in the search box to learn more about \"Neuropathic Pain: Care Instructions. \"  Current as of: March 28, 2019  Content Version: 12.2  © 8763-8051 [employer].  Care instructions adapted under license by [employer] (which disclaims liability or warranty for this information). If you have questions about a medical condition or this instruction, always ask your healthcare professional. [place] [address] any warranty or liability for your use of this information.

## 2019-11-21 NOTE — ED NOTES
I have reviewed discharge instructions with the patient. The patient verbalized understanding. Patient left ED via Discharge Method: ambulatory to Home with wife. Opportunity for questions and clarification provided. Patient given 1 scripts. To continue your aftercare when you leave the hospital, you may receive an automated call from our care team to check in on how you are doing. This is a free service and part of our promise to provide the best care and service to meet your aftercare needs.  If you have questions, or wish to unsubscribe from this service please call [phone]. Thank you for Choosing our [facility] Emergency Department.

## 2019-11-21 NOTE — ED PROVIDER NOTES
Patient presents the ER with complaint of bilateral leg and foot pain. Patient reports he has had an issue with this in the past however tonight reports worsening pain in his bilateral legs and feet. Denies any injury. Denies any swelling. Denies any fevers or chills. Known history of peripheral neuropathy as well as known history of peripheral vascular disease    The history is provided by the patient. Foot Pain    This is a recurrent problem. The current episode started more than 1 week ago. The problem has not changed since onset. The pain is present in the left lower leg, right lower leg, right foot and left foot. The quality of the pain is described as aching. The pain is at a severity of 5/10. The pain is moderate. Pertinent negatives include no back pain.         Past Medical History:   Diagnosis Date    Allergic rhinitis     Arthritis     CAD (coronary artery disease)     CABG '09; troponin elevated 7/14/12 (\"likely due to supply/demand mismatch in setting of fever and increased metabolic demand\"-per cardiac MD note 8/20/12)-had cath, echo, EKG-all WNL except cath showed 2 of the bypasses with blockages- \"occluded SVG to PDA & SVG to LISA\"; EF=55%    Chronic kidney disease     CVA (cerebral vascular accident) ([address] 08/2017    Left- no residual weakness    Diabetes mellitus type 2, controlled ([address]     restarted oral med (metformin 3/2018), does not check BG on regular basis, last hgba1c- 8 (3/12/18)    Dyslipidemia     ED (erectile dysfunction)     Encephalitis 1962    x 2/hospitalized as teenager    GERD (gastroesophageal reflux disease)     controlled with Nexium    Gout     hx of    Hypertension     Lacunar infarct, acute ([address]     possible embolic, remote a-fib- on eliquis    Lumbago     Memory loss     Mitral valve regurgitation 7/14/12    \"moderate\" on echo    ROBERTO (obstructive sleep apnea)     mild per patient, does not use CPAP    PAF (paroxysmal atrial fibrillation) ([address]     Prostate cancer [facility])     followed by urology    Psoriasis     topical creams    SBO (small bowel obstruction) ([facility] , , 2016    Stage 2 chronic kidney disease        Past Surgical History:   Procedure Laterality Date    ABDOMEN SURGERY PROC UNLISTED  1967    exp lap    HX APPENDECTOMY  age 49         HX CATARACT REMOVAL Bilateral 2013    iol     HX COLONOSCOPY  , 2010    HX CORONARY ARTERY BYPASS GRAFT  2009    x4 bypass    HX HERNIA REPAIR Bilateral 2012    HX RADICAL PROSTATECTOMY       HX SHOULDER REPLACEMENT Right 2018    Reverse R TSA    HX SPLENECTOMY  1951    IA LEFT HEART CATH,PERCUTANEOUS  2012    no intervention    IA PROSTATE BIOPSY, NEEDLE, SATURATION SAMPLING      and ultrasound    VASCULAR SURGERY PROCEDURE UNLIST  2017    aortogram, w/cass LE runoff,R-LE arteriogram         Family History:   Problem Relation Age of Onset    Hypertension Mother    [address] [facility] [address] Gout Mother     Arthritis-osteo Mother     Heart Disease Mother          of Heart Disease    Coronary Artery Disease Mother     Diabetes Father     Cancer Father     Heart Disease Father     Bleeding Prob Paternal Grandmother     Hypertension Brother     Cancer Maternal Uncle         Prostate       Social History     Socioeconomic History    Marital status:      Spouse name: Not on file    Number of children: Not on file    Years of education: Not on file    Highest education level: Not on file   Occupational History    Not on file   Social Needs    Financial resource strain: Not on file    Food insecurity:     Worry: Not on file     Inability: Not on file    Transportation needs:     Medical: Not on file     Non-medical: Not on file   Tobacco Use    Smoking status: Former Smoker     Packs/day: 1.00     Years: 15.00     Pack years: 15.00     Last attempt to quit: 1975     Years since quittin.3    Smokeless tobacco: Never Used   Substance and Sexual Activity    Alcohol use:  Yes Comment: rarely- once/month    Drug use: No    Sexual activity: Yes     Partners: Female   Lifestyle    Physical activity:     Days per week: Not on file     Minutes per session: Not on file    Stress: Not on file   Relationships    Social connections:     Talks on phone: Not on file     Gets together: Not on file     Attends Spiritism service: Not on file     Active member of club or organization: Not on file     Attends meetings of clubs or organizations: Not on file     Relationship status: Not on file    Intimate partner violence:     Fear of current or ex partner: Not on file     Emotionally abused: Not on file     Physically abused: Not on file     Forced sexual activity: Not on file   Other Topics Concern    Dental Braces Not Asked    Endoscopic Camera Pill Not Asked    Metallic Foreign Body Not Asked    Medication Patches Not Asked    Taking Feraheme Not Asked    Claustrophobic Not Asked    Removable Dental Work Not Asked    Hearing Aids Not Asked    Body Piercing Not Asked    Radiation Seeds Not Asked    Pregnant or Breast Feeding Not Asked    Wounded by Shrapnel or Bullet Not Asked    Other-See Comment Not Asked    Other Implant-See Comment Not Asked   [email or web address] Service Not Asked    Blood Transfusions Not Asked    Caffeine Concern Not Asked    Occupational Exposure Not Asked    Hobby Hazards Not Asked    Sleep Concern Not Asked    Stress Concern Not Asked    Weight Concern Not Asked    Special Diet Not Asked    Back Care Not Asked    Exercise Not Asked    Bike Helmet Not Asked   [address] Not Asked    Self-Exams Not Asked   Social History Narrative    Not on file         ALLERGIES: Celecoxib; Ibuprofen; Lescol [fluvastatin]; Nsaids (non-steroidal anti-inflammatory drug); Sulfa (sulfonamide antibiotics); and Uloric [febuxostat]    Review of Systems   Constitutional: Negative for fatigue, fever and unexpected weight change. HENT: Negative for congestion and drooling. Eyes: Negative for photophobia, redness and visual disturbance. Respiratory: Negative for choking and chest tightness. Cardiovascular: Negative for palpitations and leg swelling. Gastrointestinal: Negative for abdominal pain and anal bleeding. Genitourinary: Negative for decreased urine volume, flank pain, frequency and urgency. Musculoskeletal: Negative for back pain and gait problem. Skin: Negative for color change and pallor. Neurological: Negative for light-headedness. Psychiatric/Behavioral: Negative for behavioral problems and decreased concentration. All other systems reviewed and are negative. Vitals:    11/21/19 0349 11/21/19 0350   BP: 158/82 158/82   Pulse:  89   Resp:  18   Temp:  98.1 °F (36.7 °C)   SpO2:  98%   Weight:  81.6 kg (180 lb)   Height:  5' 11\" (1.803 m)            Physical Exam  Vitals signs and nursing note reviewed. Constitutional:       General: He is not in acute distress. Appearance: He is normal weight. He is not ill-appearing. HENT:      Head: Normocephalic and atraumatic. Nose: Nose normal. No congestion. Mouth/Throat:      Mouth: Mucous membranes are moist.      Pharynx: Oropharynx is clear. Eyes:      Extraocular Movements: Extraocular movements intact. Conjunctiva/sclera: Conjunctivae normal.      Pupils: Pupils are equal, round, and reactive to light. Neck:      Musculoskeletal: Normal range of motion. No neck rigidity. Cardiovascular:      Rate and Rhythm: Normal rate and regular rhythm. Pulses: Decreased pulses. Dorsalis pedis pulses are detected w/ Doppler on the right side and detected w/ Doppler on the left side. Posterior tibial pulses are detected w/ Doppler on the right side and detected w/ Doppler on the left side. Pulmonary:      Effort: Pulmonary effort is normal.      Breath sounds: Normal breath sounds. Abdominal:      General: Abdomen is flat.    Skin:     Coloration: Skin is not jaundiced or pale. Findings: No bruising or erythema. Neurological:      Mental Status: He is alert. MDM  Number of Diagnoses or Management Options  Pain in both feet:   Peripheral arterial disease ([facility]):   Polyneuropathy associated with underlying disease [facility]):   Diagnosis management comments: Symptoms are likely related to a combination of peripheral neuropathy as well with peripheral artery disease  Unable to palpate pulses but able to Doppler pulses in distal lower extremities  No signs of acute limb ischemia  We will check basic labs here, treat symptomatically otherwise    5:12 AM  Labs fairly stable. Hemoglobin stable. Potassium noted to be 3.1, magnesium 1.5. Given oral magnesium normal potassium here. Discussed with patient results of testing. Unfortunately his symptoms are likely related to his chronic disease without much resolution available given his multiple comorbidities    We will have him follow-up with his PCP, short course of tramadol. Amount and/or Complexity of Data Reviewed  Clinical lab tests: ordered and reviewed  Decide to obtain previous medical records or to obtain history from someone other than the patient: yes (Progress Notes  - documented in this encounter  Gagan Leija MD - 10/03/2019 10:00 AM EDT  Formatting of this note might be different from the original.  Subjective  Chief Complaint   Patient presents with   [facility] Follow-up   Heart attack     HPI    Hanh Harrington is a 69 y.o. male returns in follow up of another MI, this time NSTEMI. 99% in stent stenosis of the SVG to the OM that received a PUBA and angiosculpt ballooning, appeared possibly prior stent under expanded. He has had no trouble with chest pain shortness of breath or other problems since his hospitalization. He was seen in follow-up by cardiology yesterday. Returns today in follow-up of bilateral lower extremity discomfort.  He has a history of chronic peripheral neuropathy with associated pain as well as peripheral vascular disease. He was recently seen by vascular surgery who felt he was not a good surgical candidate as he had no good veins for grafting. He reports that his pain is worse when he lays down. He complains of hip and knee pain and upper thigh pain and calf pain. He also has pain across his foot. He has associated numbness.      Additionally he has significant arthritis involving knees, back and hips. He has seen orthopedic surgery has had at least one steroid injection into his knee which has not helped much. He has been referred for pain management and is seen on a couple occasions. As best as I can discern they have not changed his medicines. They did order an MRI of his lumbar spine which showed degenerative disc disease with disc bulging. Mild to severe central canal stenoses at multiple levels with right posterior L4-5 disc protrusion and distortion of the filling terminale at and above the L3-4 disc level. He had an appointment last week but had to go home after a small accident in the bathroom. He had been rescheduled for September 19 but is not sure he had the appt. He continues to have unrelenting lower extremity pain which is profoundly disabling.     He is followed for diabetes mellitus type 2 with social chronic kidney disease and neuropathy. He is not tracking his blood sugars 3 regularly. Recent A1c was elevated and is back on Januvia.     Past Medical History:   Diagnosis Date    Allergic rhinitis    Arthritis    Atrial fibrillation (CMS/[facility])    Chronic kidney disease (CKD) stage G1/A2, glomerular filtration rate (GFR) equal to or greater than 90 mL/min/1.73 square meter and albuminuria creatinine ratio between  mg/g    Coronary artery disease   post CABG 2009    CVA (cerebral vascular accident) (CMS/[facility]) 2017    Diabetes mellitus (CMS/[facility])   with chronic kidney disease    ED (erectile dysfunction)    Essential hypertension    GERD (gastroesophageal reflux disease)    Gout    History of encephalitis 1962   hospitalized ×2 as a teenager    History of small bowel obstruction 2011, 2015, 2016    Hyperlipidemia    Lumbago    Mitral valvular regurgitation 2012   \"moderate\" on echocardiogram    Myocardial infarction (CMS/[facility]) 2019   inferior wall-stented ×1. Note patient arrested in transfer to the hospital requiring defibrillation and resuscitation.  NSTEMI (non-ST elevated myocardial infarction) (CMS/[facility]) 2019   Due to in-stent restenosis-successfull PCI    Obstructive sleep apnea   mild-does not use CPAP    Prostate cancer (CMS/[facility])   followed by urology    Psoriasis    Small bowel obstruction (CMS/[facility])    Whooping cough   Past Surgical History:   Procedure Laterality Date    APPENDECTOMY    CARDIAC CATHETERIZATION 2012    CARDIAC CATHETERIZATION 2019   100% occlusion of the SVG to prox OM that was stented with a [place] BOGDAN x 2 3.0x30 and 3.0x22 with 0% residual stenosis.  CATARACT EXTRACTION W/ INTRAOCULAR LENS IMPLANT Bilateral 2013    COLONOSCOPY 1998, 2010    CORONARY ARTERY BYPASS GRAFT 2009   bypass ×4    EXPLORATORY LAPAROTOMY 1967    HERNIA REPAIR Bilateral 2012    PROSTATE BIOPSY    PROSTATECTOMY    SHOULDER SURGERY Right 2018   reverse total shoulder arthroplasty    SPLENECTOMY, TOTAL 1951    VASCULAR SURGERY 2017   aortogram, with bilateral extremity runoff, right-extremity arteriogram     Family History   Problem Relation Age of Onset    Diabetes type II Mother    Hypertension Father   Social History     Tobacco Use    Smoking status: Former Smoker   Packs/day: 1.00   Years: 15.00   Pack years: 15.00    Smokeless tobacco: Never Used   Substance Use Topics    Alcohol use: Yes   Alcohol/week: 1.0 standard drinks   Types: 1 Glasses of wine per week     I have reviewed and made changes where appropriate to the above histories. Review of Systems   Constitutional: Negative.  Negative for activity change, chills, fatigue and fever. HENT: Negative for congestion and postnasal drip. Respiratory: Negative for cough, chest tightness, shortness of breath and wheezing. Cardiovascular: Negative for chest pain, palpitations and leg swelling. Musculoskeletal: Positive for arthralgias. Negative for myalgias. Neurological: Negative for dizziness and headaches. Objective  Vitals:   10/03/19 0959   BP: 110/62   BP Location: Left arm   Patient Position: Sitting   Pulse: 50   Temp: 98.3 °F (36.8 °C)   TempSrc: Oral   Weight: 80.4 kg (177 lb 3.2 oz)   Height: 5' 9\" (1.753 m)     Physical Exam   Constitutional: He is oriented to person, place, and time. He appears well-developed and well-nourished. HENT:   Head: Normocephalic and atraumatic. Neck: Normal range of motion. Neck supple. Cardiovascular: Normal rate, regular rhythm and normal heart sounds. No murmur heard. Pulmonary/Chest: Effort normal and breath sounds normal. No respiratory distress. He has no wheezes. He has no rales. Musculoskeletal: He exhibits no edema. Neurological: He is alert and oriented to person, place, and time. Skin: Skin is warm and dry. Psychiatric: He has a normal mood and affect. Vitals reviewed. LABS/RADS:  Results for orders placed or performed during the hospital encounter of 09/10/19   Testosterone, total - REF LAB   Result Value Ref Range   Testosterone 351 264 - 916 ng/dL   PSA   Result Value Ref Range   PSA 0.04 0.00 - 4.00 ng/mL     Assessment/Plan  Francine Dickson was seen today for hospital follow-up. Diagnoses and associated orders for this visit:    #1 NSTEMI (non-ST elevated myocardial infarction) (CMS/[facility]) (Primary)    #2 Other polyneuropathy    #3 Pain in both lower extremities    #4 Type 2 diabetes mellitus with chronic kidney disease, without long-term current use of insulin, unspecified CKD stage (CMS/[facility])    #5 PVD (peripheral vascular disease) (CMS/[facility])    Note-not sure why is here.  Memory seems to be getting worse. I will discuss with his wife later today. Discontinued Medications   TRAMADOL (USE FOR ULTRAM) 50 MG TABLET Take 1 tablet (50 mg total) by mouth every 6 (six) hours as needed for moderate pain (score 5-7) or severe pain (score 8-10)     Return in about 3 months (around 1/3/2020). TYLER Reyna MD    [facility] [address], [facility], [facility] [address] [facility] [address]       )    Risk of Complications, Morbidity, and/or Mortality  Presenting problems: moderate  Diagnostic procedures: low  Management options: low    Patient Progress  Patient progress: stable         Procedures          Results Include:    Recent Results (from the past 24 hour(s))   CBC WITH AUTOMATED DIFF    Collection Time: 11/21/19  4:03 AM   Result Value Ref Range    WBC 5.7 4.3 - 11.1 K/uL    RBC 3.90 (L) 4.23 - 5.6 M/uL    HGB 11.6 (L) 13.6 - 17.2 g/dL    HCT 35.0 (L) 41.1 - 50.3 %    MCV 89.7 79.6 - 97.8 FL    MCH 29.7 26.1 - 32.9 PG    MCHC 33.1 31.4 - 35.0 g/dL    RDW 13.4 11.9 - 14.6 %    PLATELET 490 848 - 882 K/uL    MPV 11.3 9.4 - 12.3 FL    ABSOLUTE NRBC 0.00 0.0 - 0.2 K/uL    DF AUTOMATED      NEUTROPHILS 63 43 - 78 %    LYMPHOCYTES 22 13 - 44 %    MONOCYTES 12 4.0 - 12.0 %    EOSINOPHILS 2 0.5 - 7.8 %    BASOPHILS 1 0.0 - 2.0 %    IMMATURE GRANULOCYTES 0 0.0 - 5.0 %    ABS. NEUTROPHILS 3.6 1.7 - 8.2 K/UL    ABS. LYMPHOCYTES 1.2 0.5 - 4.6 K/UL    ABS. MONOCYTES 0.7 0.1 - 1.3 K/UL    ABS. EOSINOPHILS 0.1 0.0 - 0.8 K/UL    ABS. BASOPHILS 0.0 0.0 - 0.2 K/UL    ABS. IMM.  GRANS. 0.0 0.0 - 0.5 K/UL   METABOLIC PANEL, COMPREHENSIVE    Collection Time: 11/21/19  4:03 AM   Result Value Ref Range    Sodium 142 136 - 145 mmol/L    Potassium 3.1 (L) 3.5 - 5.1 mmol/L    Chloride 108 (H) 98 - 107 mmol/L    CO2 30 21 - 32 mmol/L    Anion gap 4 (L) 7 - 16 mmol/L    Glucose 125 (H) 65 - 100 mg/dL    BUN 9 8 - 23 MG/DL    Creatinine 1.31 0.8 - 1.5 MG/DL    GFR est AA >60 >60 ml/min/1.73m2    GFR est non-AA 57 (L) >60 ml/min/1.73m2    Calcium 9.1 8.3 - 10.4 MG/DL    Bilirubin, total 0.3 0.2 - 1.1 MG/DL    ALT (SGPT) 15 12 - 65 U/L    AST (SGOT) 13 (L) 15 - 37 U/L    Alk. phosphatase 89 50 - 136 U/L    Protein, total 7.1 6.3 - 8.2 g/dL    Albumin 3.2 3.2 - 4.6 g/dL    Globulin 3.9 (H) 2.3 - 3.5 g/dL    A-G Ratio 0.8 (L) 1.2 - 3.5     MAGNESIUM    Collection Time: 11/21/19  4:03 AM   Result Value Ref Range    Magnesium 1.5 (L) 1.8 - 2.4 mg/dL   URIC ACID    Collection Time: 11/21/19  4:03 AM   Result Value Ref Range    Uric acid 3.8 2.6 - 6.0 MG/DL     Voice dictation software was used during the making of this note. This software is not perfect and grammatical and other typographical errors may be present. This note has been proofread, but may still contain errors.   Perez Harris MD; 11/21/2019 @5:13 AM   ===================================================================

## 2019-11-22 ENCOUNTER — APPOINTMENT (OUTPATIENT)
Dept: CARDIAC REHAB | Age: 74
End: 2019-11-22
Payer: MEDICARE

## 2019-11-25 ENCOUNTER — HOSPITAL ENCOUNTER (OUTPATIENT)
Dept: CARDIAC REHAB | Age: 74
End: 2019-11-25
Payer: MEDICARE

## 2019-12-02 ENCOUNTER — HOSPITAL ENCOUNTER (OUTPATIENT)
Dept: CARDIAC REHAB | Age: 74
Discharge: HOME | End: 2019-12-02
Payer: MEDICARE

## 2019-12-02 PROCEDURE — 93798 PHYS/QHP OP CAR RHAB W/ECG: CPT

## 2019-12-04 ENCOUNTER — HOSPITAL ENCOUNTER (OUTPATIENT)
Dept: CARDIAC REHAB | Age: 74
Discharge: HOME | End: 2019-12-04
Payer: MEDICARE

## 2019-12-06 ENCOUNTER — APPOINTMENT (OUTPATIENT)
Dept: CARDIAC REHAB | Age: 74
End: 2019-12-06
Payer: MEDICARE

## 2019-12-09 ENCOUNTER — HOSPITAL ENCOUNTER (OUTPATIENT)
Dept: CARDIAC REHAB | Age: 74
Discharge: HOME | End: 2019-12-09
Payer: MEDICARE

## 2019-12-09 PROCEDURE — 93798 PHYS/QHP OP CAR RHAB W/ECG: CPT

## 2019-12-11 ENCOUNTER — APPOINTMENT (OUTPATIENT)
Dept: CARDIAC REHAB | Age: 74
End: 2019-12-11
Payer: MEDICARE

## 2019-12-13 ENCOUNTER — HOSPITAL ENCOUNTER (OUTPATIENT)
Dept: CARDIAC REHAB | Age: 74
Discharge: HOME | End: 2019-12-13
Payer: MEDICARE

## 2019-12-13 PROCEDURE — 93798 PHYS/QHP OP CAR RHAB W/ECG: CPT

## 2019-12-16 ENCOUNTER — HOSPITAL ENCOUNTER (OUTPATIENT)
Dept: CARDIAC REHAB | Age: 74
Discharge: HOME | End: 2019-12-16
Payer: MEDICARE

## 2019-12-16 PROCEDURE — 93798 PHYS/QHP OP CAR RHAB W/ECG: CPT

## 2019-12-18 ENCOUNTER — HOSPITAL ENCOUNTER (OUTPATIENT)
Dept: CARDIAC REHAB | Age: 74
Discharge: HOME | End: 2019-12-18
Payer: MEDICARE

## 2019-12-18 VITALS — WEIGHT: 182.2 LBS | BODY MASS INDEX: 25.41 KG/M2

## 2019-12-18 PROCEDURE — 93798 PHYS/QHP OP CAR RHAB W/ECG: CPT

## 2019-12-20 ENCOUNTER — HOSPITAL ENCOUNTER (OUTPATIENT)
Dept: CARDIAC REHAB | Age: 74
Discharge: HOME | End: 2019-12-20
Payer: MEDICARE

## 2019-12-20 PROCEDURE — 93798 PHYS/QHP OP CAR RHAB W/ECG: CPT

## 2019-12-23 ENCOUNTER — APPOINTMENT (OUTPATIENT)
Dept: CARDIAC REHAB | Age: 74
End: 2019-12-23
Payer: MEDICARE

## 2019-12-27 ENCOUNTER — HOSPITAL ENCOUNTER (OUTPATIENT)
Dept: CARDIAC REHAB | Age: 74
Discharge: HOME | End: 2019-12-27
Payer: MEDICARE

## 2019-12-27 PROCEDURE — 93798 PHYS/QHP OP CAR RHAB W/ECG: CPT

## 2019-12-30 ENCOUNTER — HOSPITAL ENCOUNTER (OUTPATIENT)
Dept: CARDIAC REHAB | Age: 74
Discharge: HOME | End: 2019-12-30
Payer: MEDICARE

## 2019-12-30 VITALS — BODY MASS INDEX: 25.24 KG/M2 | WEIGHT: 181 LBS

## 2019-12-30 PROCEDURE — 93798 PHYS/QHP OP CAR RHAB W/ECG: CPT

## 2020-01-03 ENCOUNTER — HOSPITAL ENCOUNTER (OUTPATIENT)
Dept: CARDIAC REHAB | Age: 75
Discharge: HOME | End: 2020-01-03
Payer: MEDICARE

## 2020-01-03 PROCEDURE — 93798 PHYS/QHP OP CAR RHAB W/ECG: CPT

## 2020-01-06 ENCOUNTER — HOSPITAL ENCOUNTER (OUTPATIENT)
Dept: CARDIAC REHAB | Age: 75
Discharge: HOME | End: 2020-01-06
Payer: MEDICARE

## 2020-01-06 PROCEDURE — 93798 PHYS/QHP OP CAR RHAB W/ECG: CPT

## 2020-01-08 ENCOUNTER — HOSPITAL ENCOUNTER (OUTPATIENT)
Dept: CARDIAC REHAB | Age: 75
Discharge: HOME | End: 2020-01-08
Payer: MEDICARE

## 2020-01-08 VITALS — BODY MASS INDEX: 25.38 KG/M2 | WEIGHT: 182 LBS

## 2020-01-08 PROCEDURE — 93798 PHYS/QHP OP CAR RHAB W/ECG: CPT

## 2020-01-10 ENCOUNTER — HOSPITAL ENCOUNTER (OUTPATIENT)
Dept: CARDIAC REHAB | Age: 75
Discharge: HOME | End: 2020-01-10
Payer: MEDICARE

## 2020-01-10 PROCEDURE — 93798 PHYS/QHP OP CAR RHAB W/ECG: CPT

## 2020-01-13 ENCOUNTER — HOSPITAL ENCOUNTER (OUTPATIENT)
Dept: CARDIAC REHAB | Age: 75
Discharge: HOME | End: 2020-01-13
Payer: MEDICARE

## 2020-01-13 PROCEDURE — 93798 PHYS/QHP OP CAR RHAB W/ECG: CPT

## 2020-01-15 ENCOUNTER — HOSPITAL ENCOUNTER (OUTPATIENT)
Dept: CARDIAC REHAB | Age: 75
Discharge: HOME | End: 2020-01-15
Payer: MEDICARE

## 2020-01-15 VITALS — BODY MASS INDEX: 24.99 KG/M2 | WEIGHT: 179.2 LBS

## 2020-01-15 PROCEDURE — 93798 PHYS/QHP OP CAR RHAB W/ECG: CPT

## 2020-01-17 ENCOUNTER — HOSPITAL ENCOUNTER (OUTPATIENT)
Dept: CARDIAC REHAB | Age: 75
Discharge: HOME | End: 2020-01-17
Payer: MEDICARE

## 2020-01-17 VITALS — BODY MASS INDEX: 24.99 KG/M2 | WEIGHT: 179.2 LBS

## 2020-01-17 PROCEDURE — 93798 PHYS/QHP OP CAR RHAB W/ECG: CPT

## 2020-01-20 ENCOUNTER — HOSPITAL ENCOUNTER (OUTPATIENT)
Dept: CARDIAC REHAB | Age: 75
Discharge: HOME | End: 2020-01-20
Payer: MEDICARE

## 2020-01-20 PROCEDURE — 93798 PHYS/QHP OP CAR RHAB W/ECG: CPT

## 2020-01-22 ENCOUNTER — HOSPITAL ENCOUNTER (OUTPATIENT)
Dept: CARDIAC REHAB | Age: 75
Discharge: HOME | End: 2020-01-22
Payer: MEDICARE

## 2020-01-22 VITALS — BODY MASS INDEX: 25.19 KG/M2 | WEIGHT: 180.6 LBS

## 2020-01-22 PROCEDURE — 93798 PHYS/QHP OP CAR RHAB W/ECG: CPT

## 2020-01-24 ENCOUNTER — HOSPITAL ENCOUNTER (OUTPATIENT)
Dept: CARDIAC REHAB | Age: 75
Discharge: HOME | End: 2020-01-24
Payer: MEDICARE

## 2020-01-24 PROCEDURE — 93798 PHYS/QHP OP CAR RHAB W/ECG: CPT

## 2020-01-27 ENCOUNTER — HOSPITAL ENCOUNTER (OUTPATIENT)
Dept: CARDIAC REHAB | Age: 75
Discharge: HOME | End: 2020-01-27
Payer: MEDICARE

## 2020-01-27 PROCEDURE — 93798 PHYS/QHP OP CAR RHAB W/ECG: CPT

## 2020-01-29 ENCOUNTER — HOSPITAL ENCOUNTER (OUTPATIENT)
Dept: CARDIAC REHAB | Age: 75
Discharge: HOME | End: 2020-01-29
Payer: MEDICARE

## 2020-01-29 VITALS — BODY MASS INDEX: 24.99 KG/M2 | WEIGHT: 179.2 LBS

## 2020-01-29 PROCEDURE — 93798 PHYS/QHP OP CAR RHAB W/ECG: CPT

## 2020-01-31 ENCOUNTER — HOSPITAL ENCOUNTER (OUTPATIENT)
Dept: CARDIAC REHAB | Age: 75
Discharge: HOME | End: 2020-01-31
Payer: MEDICARE

## 2020-01-31 PROCEDURE — 93798 PHYS/QHP OP CAR RHAB W/ECG: CPT

## 2020-02-03 ENCOUNTER — HOSPITAL ENCOUNTER (OUTPATIENT)
Dept: CARDIAC REHAB | Age: 75
Discharge: HOME | End: 2020-02-03
Payer: MEDICARE

## 2020-02-03 PROCEDURE — 93798 PHYS/QHP OP CAR RHAB W/ECG: CPT

## 2020-02-05 ENCOUNTER — HOSPITAL ENCOUNTER (OUTPATIENT)
Dept: CARDIAC REHAB | Age: 75
Discharge: HOME | End: 2020-02-05
Payer: MEDICARE

## 2020-02-05 ENCOUNTER — HOSPITAL ENCOUNTER (EMERGENCY)
Age: 75
Discharge: HOME | DRG: 251 | End: 2020-02-06
Payer: MEDICARE

## 2020-02-05 VITALS — WEIGHT: 180.8 LBS | BODY MASS INDEX: 25.22 KG/M2

## 2020-02-05 DIAGNOSIS — R07.89 ATYPICAL CHEST PAIN: Primary | ICD-10-CM

## 2020-02-05 PROCEDURE — 99284 EMERGENCY DEPT VISIT MOD MDM: CPT

## 2020-02-05 PROCEDURE — 93798 PHYS/QHP OP CAR RHAB W/ECG: CPT

## 2020-02-05 PROCEDURE — 93005 ELECTROCARDIOGRAM TRACING: CPT

## 2020-02-06 VITALS
OXYGEN SATURATION: 99 % | DIASTOLIC BLOOD PRESSURE: 68 MMHG | TEMPERATURE: 98 F | HEART RATE: 65 BPM | SYSTOLIC BLOOD PRESSURE: 132 MMHG | RESPIRATION RATE: 16 BRPM | WEIGHT: 180 LBS | BODY MASS INDEX: 25.1 KG/M2

## 2020-02-06 LAB
ALBUMIN SERPL-MCNC: 3.8 G/DL (ref 3.2–4.6)
ALBUMIN/GLOB SERPL: 0.9 {RATIO} (ref 1.2–3.5)
ALP SERPL-CCNC: 96 U/L (ref 50–136)
ALT SERPL-CCNC: 21 U/L (ref 12–65)
ANION GAP SERPL CALC-SCNC: 8 MMOL/L (ref 7–16)
AST SERPL-CCNC: 19 U/L (ref 15–37)
ATRIAL RATE: 63 BPM
BASOPHILS # BLD: 0 K/UL (ref 0–0.2)
BASOPHILS NFR BLD: 1 % (ref 0–2)
BILIRUB SERPL-MCNC: 0.4 MG/DL (ref 0.2–1.1)
BUN SERPL-MCNC: 13 MG/DL (ref 8–23)
CALCIUM SERPL-MCNC: 9.5 MG/DL (ref 8.3–10.4)
CALCULATED P AXIS, ECG09: 75 DEGREES
CALCULATED R AXIS, ECG10: 40 DEGREES
CALCULATED T AXIS, ECG11: 46 DEGREES
CHLORIDE SERPL-SCNC: 107 MMOL/L (ref 98–107)
CO2 SERPL-SCNC: 26 MMOL/L (ref 21–32)
CREAT SERPL-MCNC: 1.23 MG/DL (ref 0.8–1.5)
DIAGNOSIS, 93000: NORMAL
DIFFERENTIAL METHOD BLD: ABNORMAL
EOSINOPHIL # BLD: 0.1 K/UL (ref 0–0.8)
EOSINOPHIL NFR BLD: 2 % (ref 0.5–7.8)
ERYTHROCYTE [DISTWIDTH] IN BLOOD BY AUTOMATED COUNT: 13.3 % (ref 11.9–14.6)
GLOBULIN SER CALC-MCNC: 4.2 G/DL (ref 2.3–3.5)
GLUCOSE SERPL-MCNC: 139 MG/DL (ref 65–100)
HCT VFR BLD AUTO: 40.7 % (ref 41.1–50.3)
HGB BLD-MCNC: 13.8 G/DL (ref 13.6–17.2)
IMM GRANULOCYTES # BLD AUTO: 0 K/UL (ref 0–0.5)
IMM GRANULOCYTES NFR BLD AUTO: 0 % (ref 0–5)
LYMPHOCYTES # BLD: 1.1 K/UL (ref 0.5–4.6)
LYMPHOCYTES NFR BLD: 23 % (ref 13–44)
MCH RBC QN AUTO: 29.1 PG (ref 26.1–32.9)
MCHC RBC AUTO-ENTMCNC: 33.9 G/DL (ref 31.4–35)
MCV RBC AUTO: 85.9 FL (ref 79.6–97.8)
MONOCYTES # BLD: 0.5 K/UL (ref 0.1–1.3)
MONOCYTES NFR BLD: 10 % (ref 4–12)
NEUTS SEG # BLD: 3.2 K/UL (ref 1.7–8.2)
NEUTS SEG NFR BLD: 65 % (ref 43–78)
NRBC # BLD: 0 K/UL (ref 0–0.2)
P-R INTERVAL, ECG05: 170 MS
PLATELET # BLD AUTO: 200 K/UL (ref 150–450)
PMV BLD AUTO: 11.8 FL (ref 9.4–12.3)
POTASSIUM SERPL-SCNC: 3.3 MMOL/L (ref 3.5–5.1)
PROT SERPL-MCNC: 8 G/DL (ref 6.3–8.2)
Q-T INTERVAL, ECG07: 404 MS
QRS DURATION, ECG06: 90 MS
QTC CALCULATION (BEZET), ECG08: 413 MS
RBC # BLD AUTO: 4.74 M/UL (ref 4.23–5.6)
SODIUM SERPL-SCNC: 141 MMOL/L (ref 136–145)
TROPONIN I SERPL-MCNC: <0.02 NG/ML (ref 0.02–0.05)
VENTRICULAR RATE, ECG03: 63 BPM
WBC # BLD AUTO: 4.9 K/UL (ref 4.3–11.1)

## 2020-02-06 PROCEDURE — 84484 ASSAY OF TROPONIN QUANT: CPT

## 2020-02-06 PROCEDURE — 80053 COMPREHEN METABOLIC PANEL: CPT

## 2020-02-06 PROCEDURE — 85025 COMPLETE CBC W/AUTO DIFF WBC: CPT

## 2020-02-06 NOTE — DISCHARGE INSTRUCTIONS

## 2020-02-06 NOTE — ED NOTES
I have reviewed discharge instructions with the patient. The patient verbalized understanding. Patient left ED via Discharge Method: ambulatory to Home with wife and son. Opportunity for questions and clarification provided. Patient given 0 scripts. To continue your aftercare when you leave the hospital, you may receive an automated call from our care team to check in on how you are doing. This is a free service and part of our promise to provide the best care and service to meet your aftercare needs.  If you have questions, or wish to unsubscribe from this service please call [phone]. Thank you for Choosing our [address] [place] [address] Emergency Department.

## 2020-02-06 NOTE — ED PROVIDER NOTES
71-year-old male complaining of chest discomfort. Patient is vague about the onset of his symptoms and he stated this afternoon he was having some chest pain never really went away got worse tonight. By time he got to the emergency department the pain is subsided. He denies shortness of breath. No fevers chills cough cold or flulike symptoms. Patient has a history of a coronary artery bypass grafting in 2009 he has had intermittent periods of elevated troponin secondary to mismatch. Patient has ejection fraction of 55%. Patient also suffers from chronic renal failure diabetes type 2 dyslipidemiaHypertension. Patient suffered a lacunar infarct in the remote past.        Chest Pain    This is a new problem. The current episode started 3 to 5 hours ago. The problem has been resolved. The problem occurs constantly. The pain is associated with normal activity. The pain is present in the substernal region. The pain is at a severity of 0/10. The patient is experiencing no pain. The quality of the pain is described as pressure-like. The pain does not radiate. Pertinent negatives include no cough, no diaphoresis, no fever, no nausea, no shortness of breath and no vomiting. He has tried nothing for the symptoms. Risk factors include diabetes mellitus, cardiac disease, dyslipidemia, male gender and hypertension.         Past Medical History:   Diagnosis Date    Allergic rhinitis     Arthritis     CAD (coronary artery disease)     CABG '09; troponin elevated 7/14/12 (\"likely due to supply/demand mismatch in setting of fever and increased metabolic demand\"-per cardiac MD note 8/20/12)-had cath, echo, EKG-all WNL except cath showed 2 of the bypasses with blockages- \"occluded SVG to PDA & SVG to LISA\"; EF=55%    Chronic kidney disease     CVA (cerebral vascular accident) ([facility] [address] 08/2017    Left- no residual weakness    Diabetes mellitus type 2, controlled ([address]     restarted oral med (metformin 3/2018), does not check BG on regular basis, last hgba1c- 8 (3/12/18)    Dyslipidemia     ED (erectile dysfunction)     Encephalitis 1962    x 2/hospitalized as teenager    GERD (gastroesophageal reflux disease)     controlled with Nexium    Gout     hx of    Hypertension     Lacunar infarct, acute ([facility] [address]     possible embolic, remote a-fib- on eliquis    Lumbago     Memory loss     Mitral valve regurgitation 12    \"moderate\" on echo    ROBERTO (obstructive sleep apnea)     mild per patient, does not use CPAP    PAF (paroxysmal atrial fibrillation) ([facility] [address]     Prostate cancer ([facility] [address]     followed by urology    Psoriasis     topical creams    SBO (small bowel obstruction) ([facility] [address] , , 2016    Stage 2 chronic kidney disease        Past Surgical History:   Procedure Laterality Date    ABDOMEN SURGERY PROC UNLISTED      exp lap    HX APPENDECTOMY  age 49        Gopal Dupes CATARACT REMOVAL Bilateral 2013    iol     HX COLONOSCOPY  ,     HX CORONARY ARTERY BYPASS GRAFT  2009    x4 bypass    HX HERNIA REPAIR Bilateral 2012    HX RADICAL PROSTATECTOMY       HX SHOULDER REPLACEMENT Right 2018    Reverse R TSA    HX SPLENECTOMY  195    ID LEFT HEART CATH,PERCUTANEOUS  2012    no intervention    ID PROSTATE BIOPSY, NEEDLE, SATURATION SAMPLING      and ultrasound    VASCULAR SURGERY PROCEDURE UNLIST  2017    aortogram, w/cass LE runoff,R-LE arteriogram         Family History:   Problem Relation Age of Onset    Hypertension Mother    [facility] Hari Gout Mother     Arthritis-osteo Mother     Heart Disease Mother          of Heart Disease    Coronary Artery Disease Mother     Diabetes Father     Cancer Father     Heart Disease Father     Bleeding Prob Paternal Grandmother     Hypertension Brother     Cancer Maternal Uncle         Prostate       Social History     Socioeconomic History    Marital status:      Spouse name: Not on file    Number of children: Not on file    Years of education: Not on file   [address] [facility] Highest education level: Not on file   Occupational History    Not on file   Social Needs    Financial resource strain: Not on file    Food insecurity:     Worry: Not on file     Inability: Not on file    Transportation needs:     Medical: Not on file     Non-medical: Not on file   Tobacco Use    Smoking status: Former Smoker     Packs/day: 1.00     Years: 15.00     Pack years: 15.00     Last attempt to quit: 1975     Years since quittin.6    Smokeless tobacco: Never Used   Substance and Sexual Activity    Alcohol use: Yes     Comment: rarely- once/month    Drug use: No    Sexual activity: Yes     Partners: Female   Lifestyle    Physical activity:     Days per week: Not on file     Minutes per session: Not on file    Stress: Not on file   Relationships    Social connections:     Talks on phone: Not on file     Gets together: Not on file     Attends Cheondoism service: Not on file     Active member of club or organization: Not on file     Attends meetings of clubs or organizations: Not on file     Relationship status: Not on file    Intimate partner violence:     Fear of current or ex partner: Not on file     Emotionally abused: Not on file     Physically abused: Not on file     Forced sexual activity: Not on file   Other Topics Concern    Dental Braces Not Asked    Endoscopic Camera Pill Not Asked    Metallic Foreign Body Not Asked    Medication Patches Not Asked    Taking Feraheme Not Asked    Claustrophobic Not Asked    Removable Dental Work Not Asked    Hearing Aids Not Asked    Body Piercing Not Asked    Radiation Seeds Not Asked    Pregnant or Breast Feeding Not Asked    Wounded by Shrapnel or Bullet Not Asked    Other-See Comment Not Asked    Other Implant-See Comment Not Asked   [address] Service Not Asked    Blood Transfusions Not Asked    Caffeine Concern Not Asked    Occupational Exposure Not Asked    Hobby Hazards Not Asked    Sleep Concern Not Asked    Stress Concern Not Asked    Weight Concern Not Asked    Special Diet Not Asked    Back Care Not Asked    Exercise Not Asked    Bike Helmet Not Asked   [address] [facility] Not Asked    Self-Exams Not Asked   Social History Narrative    Not on file         ALLERGIES: Celecoxib; Ibuprofen; Lescol [fluvastatin]; Nsaids (non-steroidal anti-inflammatory drug); Sulfa (sulfonamide antibiotics); and Uloric [febuxostat]    Review of Systems   Constitutional: Negative. Negative for activity change, diaphoresis and fever. HENT: Negative. Eyes: Negative. Respiratory: Negative. Negative for cough and shortness of breath. Cardiovascular: Positive for chest pain. Gastrointestinal: Negative. Negative for nausea and vomiting. Genitourinary: Negative. Musculoskeletal: Negative. Skin: Negative. Neurological: Negative. Psychiatric/Behavioral: Negative. All other systems reviewed and are negative. Vitals:    02/05/20 2351   BP: 152/76   Pulse: 66   Resp: 16   Temp: 98 °F (36.7 °C)   SpO2: 99%   Weight: 81.6 kg (180 lb)            Physical Exam  Vitals signs and nursing note reviewed. Constitutional:       General: He is not in acute distress. Appearance: He is well-developed. He is not diaphoretic. HENT:      Head: Normocephalic and atraumatic. Right Ear: External ear normal.      Left Ear: External ear normal.      Nose: Nose normal.      Mouth/Throat:      Pharynx: No oropharyngeal exudate. Eyes:      General: No scleral icterus. Right eye: No discharge. Left eye: No discharge. Conjunctiva/sclera: Conjunctivae normal.      Pupils: Pupils are equal, round, and reactive to light. Neck:      Musculoskeletal: Normal range of motion and neck supple. Vascular: No JVD. Trachea: No tracheal deviation. Cardiovascular:      Rate and Rhythm: Normal rate and regular rhythm. Pulmonary:      Effort: Pulmonary effort is normal. No respiratory distress.       Breath sounds: Normal breath sounds. No stridor. No wheezing. Chest:      Chest wall: No tenderness. Abdominal:      General: Bowel sounds are normal. There is no distension. Palpations: Abdomen is soft. There is no mass. Tenderness: There is no abdominal tenderness. Musculoskeletal: Normal range of motion. General: No tenderness. Skin:     General: Skin is warm and dry. Coloration: Skin is not pale. Findings: No erythema or rash. Neurological:      Mental Status: He is alert and oriented to person, place, and time. Cranial Nerves: No cranial nerve deficit. Psychiatric:         Behavior: Behavior normal.         Thought Content: Thought content normal.          MDM  Number of Diagnoses or Management Options  Diagnosis management comments: Patient remained symptom-free serial troponins are negative we will refer him back to cardiology for further work-up.        Amount and/or Complexity of Data Reviewed  Clinical lab tests: ordered and reviewed  Tests in the radiology section of CPT®: ordered and reviewed  Tests in the medicine section of CPT®: ordered and reviewed    Risk of Complications, Morbidity, and/or Mortality  Presenting problems: high  Diagnostic procedures: high  Management options: high    Patient Progress  Patient progress: stable         Procedures

## 2020-02-06 NOTE — ED TRIAGE NOTES
Patient co chest pain that started about 20 min ago patient hx open heart surgery and prior heart attacks

## 2020-02-07 ENCOUNTER — APPOINTMENT (OUTPATIENT)
Dept: CARDIAC REHAB | Age: 75
End: 2020-02-07
Payer: MEDICARE

## 2020-02-08 ENCOUNTER — HOSPITAL ENCOUNTER (INPATIENT)
Age: 75
LOS: 3 days | Discharge: HOME | DRG: 251 | End: 2020-02-11
Attending: INTERNAL MEDICINE | Admitting: INTERNAL MEDICINE
Payer: MEDICARE

## 2020-02-08 PROBLEM — E11.40 TYPE 2 DIABETES, CONTROLLED, WITH NEUROPATHY (HCC): Chronic | Status: ACTIVE | Noted: 2017-07-06

## 2020-02-08 PROCEDURE — 93005 ELECTROCARDIOGRAM TRACING: CPT | Performed by: NURSE PRACTITIONER

## 2020-02-08 PROCEDURE — 65660000000 HC RM CCU STEPDOWN

## 2020-02-08 RX ORDER — CARVEDILOL 12.5 MG/1
12.5 TABLET ORAL 2 TIMES DAILY WITH MEALS
Status: DISCONTINUED | OUTPATIENT
Start: 2020-02-09 | End: 2020-02-11 | Stop reason: HOSPADM

## 2020-02-08 RX ORDER — HEPARIN SODIUM 5000 [USP'U]/100ML
12-25 INJECTION, SOLUTION INTRAVENOUS
Status: DISCONTINUED | OUTPATIENT
Start: 2020-02-09 | End: 2020-02-08 | Stop reason: SDUPTHER

## 2020-02-08 RX ORDER — INSULIN LISPRO 100 [IU]/ML
INJECTION, SOLUTION INTRAVENOUS; SUBCUTANEOUS
Status: DISCONTINUED | OUTPATIENT
Start: 2020-02-09 | End: 2020-02-11 | Stop reason: HOSPADM

## 2020-02-08 RX ORDER — POTASSIUM CHLORIDE 20 MEQ/1
40 TABLET, EXTENDED RELEASE ORAL
Status: COMPLETED | OUTPATIENT
Start: 2020-02-09 | End: 2020-02-09

## 2020-02-08 RX ORDER — SODIUM CHLORIDE 9 MG/ML
75 INJECTION, SOLUTION INTRAVENOUS CONTINUOUS
Status: DISCONTINUED | OUTPATIENT
Start: 2020-02-09 | End: 2020-02-09

## 2020-02-08 RX ORDER — MORPHINE SULFATE 2 MG/ML
2 INJECTION, SOLUTION INTRAMUSCULAR; INTRAVENOUS
Status: DISCONTINUED | OUTPATIENT
Start: 2020-02-08 | End: 2020-02-09

## 2020-02-08 RX ORDER — HEPARIN SODIUM 5000 [USP'U]/ML
4000 INJECTION, SOLUTION INTRAVENOUS; SUBCUTANEOUS ONCE
Status: DISCONTINUED | OUTPATIENT
Start: 2020-02-09 | End: 2020-02-08

## 2020-02-08 RX ORDER — MAG HYDROX/ALUMINUM HYD/SIMETH 200-200-20
30 SUSPENSION, ORAL (FINAL DOSE FORM) ORAL
Status: DISCONTINUED | OUTPATIENT
Start: 2020-02-08 | End: 2020-02-11 | Stop reason: HOSPADM

## 2020-02-08 RX ORDER — ONDANSETRON 2 MG/ML
4 INJECTION INTRAMUSCULAR; INTRAVENOUS
Status: DISCONTINUED | OUTPATIENT
Start: 2020-02-08 | End: 2020-02-11 | Stop reason: HOSPADM

## 2020-02-08 RX ORDER — GUAIFENESIN 100 MG/5ML
81 LIQUID (ML) ORAL DAILY
Status: DISCONTINUED | OUTPATIENT
Start: 2020-02-09 | End: 2020-02-11 | Stop reason: HOSPADM

## 2020-02-08 RX ORDER — LOSARTAN POTASSIUM 50 MG/1
50 TABLET ORAL DAILY
Status: DISCONTINUED | OUTPATIENT
Start: 2020-02-09 | End: 2020-02-11 | Stop reason: HOSPADM

## 2020-02-08 RX ORDER — SODIUM CHLORIDE 0.9 % (FLUSH) 0.9 %
5-40 SYRINGE (ML) INJECTION AS NEEDED
Status: DISCONTINUED | OUTPATIENT
Start: 2020-02-08 | End: 2020-02-11 | Stop reason: HOSPADM

## 2020-02-08 RX ORDER — NALOXONE HYDROCHLORIDE 0.4 MG/ML
0.4 INJECTION, SOLUTION INTRAMUSCULAR; INTRAVENOUS; SUBCUTANEOUS AS NEEDED
Status: DISCONTINUED | OUTPATIENT
Start: 2020-02-08 | End: 2020-02-11 | Stop reason: HOSPADM

## 2020-02-08 RX ORDER — GABAPENTIN 300 MG/1
300 CAPSULE ORAL 3 TIMES DAILY
Status: DISCONTINUED | OUTPATIENT
Start: 2020-02-09 | End: 2020-02-11 | Stop reason: HOSPADM

## 2020-02-08 RX ORDER — AMLODIPINE BESYLATE 10 MG/1
10 TABLET ORAL DAILY
Status: DISCONTINUED | OUTPATIENT
Start: 2020-02-09 | End: 2020-02-10

## 2020-02-08 RX ORDER — ACETAMINOPHEN 325 MG/1
650 TABLET ORAL
Status: DISCONTINUED | OUTPATIENT
Start: 2020-02-08 | End: 2020-02-09

## 2020-02-08 RX ORDER — DIPHENHYDRAMINE HCL 25 MG
25 CAPSULE ORAL
Status: DISCONTINUED | OUTPATIENT
Start: 2020-02-08 | End: 2020-02-11 | Stop reason: HOSPADM

## 2020-02-08 RX ORDER — ISOSORBIDE MONONITRATE 60 MG/1
60 TABLET, EXTENDED RELEASE ORAL DAILY
Status: DISCONTINUED | OUTPATIENT
Start: 2020-02-09 | End: 2020-02-11 | Stop reason: HOSPADM

## 2020-02-08 RX ORDER — ROSUVASTATIN CALCIUM 20 MG/1
20 TABLET, COATED ORAL
Status: DISCONTINUED | OUTPATIENT
Start: 2020-02-09 | End: 2020-02-09

## 2020-02-08 RX ORDER — LORATADINE 10 MG/1
10 TABLET ORAL DAILY
Status: DISCONTINUED | OUTPATIENT
Start: 2020-02-09 | End: 2020-02-11 | Stop reason: HOSPADM

## 2020-02-08 RX ORDER — NITROGLYCERIN 0.4 MG/1
0.4 TABLET SUBLINGUAL
Status: DISCONTINUED | OUTPATIENT
Start: 2020-02-08 | End: 2020-02-09

## 2020-02-08 RX ORDER — HEPARIN SODIUM 5000 [USP'U]/100ML
12-25 INJECTION, SOLUTION INTRAVENOUS
Status: DISCONTINUED | OUTPATIENT
Start: 2020-02-09 | End: 2020-02-09

## 2020-02-08 RX ORDER — SODIUM CHLORIDE 0.9 % (FLUSH) 0.9 %
5-40 SYRINGE (ML) INJECTION EVERY 8 HOURS
Status: DISCONTINUED | OUTPATIENT
Start: 2020-02-09 | End: 2020-02-11 | Stop reason: HOSPADM

## 2020-02-08 RX ORDER — CLOPIDOGREL BISULFATE 75 MG/1
75 TABLET ORAL DAILY
Status: DISCONTINUED | OUTPATIENT
Start: 2020-02-09 | End: 2020-02-11 | Stop reason: HOSPADM

## 2020-02-09 LAB
ACT BLD: 169 SECS (ref 70–128)
ACT BLD: 329 SECS (ref 70–128)
ANION GAP SERPL CALC-SCNC: 10 MMOL/L (ref 7–16)
ANION GAP SERPL CALC-SCNC: 7 MMOL/L (ref 7–16)
APTT PPP: 77.4 SEC (ref 24.3–35.4)
ATRIAL RATE: 59 BPM
ATRIAL RATE: 66 BPM
BASOPHILS # BLD: 0 K/UL (ref 0–0.2)
BASOPHILS NFR BLD: 1 % (ref 0–2)
BUN SERPL-MCNC: 10 MG/DL (ref 8–23)
BUN SERPL-MCNC: 12 MG/DL (ref 8–23)
CALCIUM SERPL-MCNC: 8.8 MG/DL (ref 8.3–10.4)
CALCIUM SERPL-MCNC: 9 MG/DL (ref 8.3–10.4)
CALCULATED P AXIS, ECG09: 68 DEGREES
CALCULATED P AXIS, ECG09: 85 DEGREES
CALCULATED R AXIS, ECG10: 1 DEGREES
CALCULATED R AXIS, ECG10: 60 DEGREES
CALCULATED T AXIS, ECG11: -106 DEGREES
CALCULATED T AXIS, ECG11: -53 DEGREES
CHLORIDE SERPL-SCNC: 109 MMOL/L (ref 98–107)
CHLORIDE SERPL-SCNC: 110 MMOL/L (ref 98–107)
CHOLEST SERPL-MCNC: 136 MG/DL
CO2 SERPL-SCNC: 24 MMOL/L (ref 21–32)
CO2 SERPL-SCNC: 24 MMOL/L (ref 21–32)
CREAT SERPL-MCNC: 1.11 MG/DL (ref 0.8–1.5)
CREAT SERPL-MCNC: 1.2 MG/DL (ref 0.8–1.5)
DIAGNOSIS, 93000: NORMAL
DIAGNOSIS, 93000: NORMAL
DIFFERENTIAL METHOD BLD: ABNORMAL
EOSINOPHIL # BLD: 0.1 K/UL (ref 0–0.8)
EOSINOPHIL NFR BLD: 2 % (ref 0.5–7.8)
ERYTHROCYTE [DISTWIDTH] IN BLOOD BY AUTOMATED COUNT: 12.9 % (ref 11.9–14.6)
ERYTHROCYTE [DISTWIDTH] IN BLOOD BY AUTOMATED COUNT: 13.2 % (ref 11.9–14.6)
EST. AVERAGE GLUCOSE BLD GHB EST-MCNC: 171 MG/DL
GLUCOSE BLD STRIP.AUTO-MCNC: 114 MG/DL (ref 65–100)
GLUCOSE BLD STRIP.AUTO-MCNC: 123 MG/DL (ref 65–100)
GLUCOSE BLD STRIP.AUTO-MCNC: 132 MG/DL (ref 65–100)
GLUCOSE BLD STRIP.AUTO-MCNC: 198 MG/DL (ref 65–100)
GLUCOSE SERPL-MCNC: 130 MG/DL (ref 65–100)
GLUCOSE SERPL-MCNC: 179 MG/DL (ref 65–100)
HBA1C MFR BLD: 7.6 % (ref 4.8–6)
HCT VFR BLD AUTO: 36.8 % (ref 41.1–50.3)
HCT VFR BLD AUTO: 38.3 % (ref 41.1–50.3)
HDLC SERPL-MCNC: 45 MG/DL (ref 40–60)
HDLC SERPL: 3 {RATIO}
HGB BLD-MCNC: 12.5 G/DL (ref 13.6–17.2)
HGB BLD-MCNC: 12.9 G/DL (ref 13.6–17.2)
IMM GRANULOCYTES # BLD AUTO: 0 K/UL (ref 0–0.5)
IMM GRANULOCYTES NFR BLD AUTO: 0 % (ref 0–5)
LDLC SERPL CALC-MCNC: 76 MG/DL
LIPID PROFILE,FLP: NORMAL
LYMPHOCYTES # BLD: 1.3 K/UL (ref 0.5–4.6)
LYMPHOCYTES NFR BLD: 28 % (ref 13–44)
MAGNESIUM SERPL-MCNC: 1.9 MG/DL (ref 1.8–2.4)
MCH RBC QN AUTO: 29.4 PG (ref 26.1–32.9)
MCH RBC QN AUTO: 29.7 PG (ref 26.1–32.9)
MCHC RBC AUTO-ENTMCNC: 33.7 G/DL (ref 31.4–35)
MCHC RBC AUTO-ENTMCNC: 34 G/DL (ref 31.4–35)
MCV RBC AUTO: 86.6 FL (ref 79.6–97.8)
MCV RBC AUTO: 88 FL (ref 79.6–97.8)
MONOCYTES # BLD: 0.7 K/UL (ref 0.1–1.3)
MONOCYTES NFR BLD: 14 % (ref 4–12)
NEUTS SEG # BLD: 2.5 K/UL (ref 1.7–8.2)
NEUTS SEG NFR BLD: 55 % (ref 43–78)
NRBC # BLD: 0 K/UL (ref 0–0.2)
NRBC # BLD: 0 K/UL (ref 0–0.2)
P-R INTERVAL, ECG05: 160 MS
P-R INTERVAL, ECG05: 164 MS
PLATELET # BLD AUTO: 177 K/UL (ref 150–450)
PLATELET # BLD AUTO: 181 K/UL (ref 150–450)
PMV BLD AUTO: 12 FL (ref 9.4–12.3)
PMV BLD AUTO: 12.2 FL (ref 9.4–12.3)
POTASSIUM SERPL-SCNC: 3.2 MMOL/L (ref 3.5–5.1)
POTASSIUM SERPL-SCNC: 4.4 MMOL/L (ref 3.5–5.1)
Q-T INTERVAL, ECG07: 432 MS
Q-T INTERVAL, ECG07: 476 MS
QRS DURATION, ECG06: 74 MS
QRS DURATION, ECG06: 78 MS
QTC CALCULATION (BEZET), ECG08: 452 MS
QTC CALCULATION (BEZET), ECG08: 471 MS
RBC # BLD AUTO: 4.25 M/UL (ref 4.23–5.6)
RBC # BLD AUTO: 4.35 M/UL (ref 4.23–5.6)
SODIUM SERPL-SCNC: 141 MMOL/L (ref 136–145)
SODIUM SERPL-SCNC: 143 MMOL/L (ref 136–145)
TRIGL SERPL-MCNC: 75 MG/DL (ref 35–150)
TROPONIN I SERPL-MCNC: 7.96 NG/ML (ref 0.02–0.05)
VENTRICULAR RATE, ECG03: 59 BPM
VENTRICULAR RATE, ECG03: 66 BPM
VLDLC SERPL CALC-MCNC: 15 MG/DL (ref 6–23)
WBC # BLD AUTO: 4.5 K/UL (ref 4.3–11.1)
WBC # BLD AUTO: 4.6 K/UL (ref 4.3–11.1)

## 2020-02-09 PROCEDURE — 74011250636 HC RX REV CODE- 250/636: Performed by: INTERNAL MEDICINE

## 2020-02-09 PROCEDURE — C1894 INTRO/SHEATH, NON-LASER: HCPCS

## 2020-02-09 PROCEDURE — 80061 LIPID PANEL: CPT

## 2020-02-09 PROCEDURE — 77030016699 HC CATH ANGI DX INFN1 CARD -A

## 2020-02-09 PROCEDURE — C1725 CATH, TRANSLUMIN NON-LASER: HCPCS

## 2020-02-09 PROCEDURE — 82962 GLUCOSE BLOOD TEST: CPT

## 2020-02-09 PROCEDURE — 80048 BASIC METABOLIC PNL TOTAL CA: CPT

## 2020-02-09 PROCEDURE — 02703ZZ DILATION OF CORONARY ARTERY, ONE ARTERY, PERCUTANEOUS APPROACH: ICD-10-PCS | Performed by: INTERNAL MEDICINE

## 2020-02-09 PROCEDURE — 74011250636 HC RX REV CODE- 250/636: Performed by: NURSE PRACTITIONER

## 2020-02-09 PROCEDURE — B240ZZ3 ULTRASONOGRAPHY OF SINGLE CORONARY ARTERY, INTRAVASCULAR: ICD-10-PCS | Performed by: INTERNAL MEDICINE

## 2020-02-09 PROCEDURE — 74011250637 HC RX REV CODE- 250/637: Performed by: NURSE PRACTITIONER

## 2020-02-09 PROCEDURE — 74011250637 HC RX REV CODE- 250/637: Performed by: INTERNAL MEDICINE

## 2020-02-09 PROCEDURE — 36415 COLL VENOUS BLD VENIPUNCTURE: CPT

## 2020-02-09 PROCEDURE — 92937 PRQ TRLUML REVSC CAB GRF 1: CPT

## 2020-02-09 PROCEDURE — B2111ZZ FLUOROSCOPY OF MULTIPLE CORONARY ARTERIES USING LOW OSMOLAR CONTRAST: ICD-10-PCS | Performed by: INTERNAL MEDICINE

## 2020-02-09 PROCEDURE — B2131ZZ FLUOROSCOPY OF MULTIPLE CORONARY ARTERY BYPASS GRAFTS USING LOW OSMOLAR CONTRAST: ICD-10-PCS | Performed by: INTERNAL MEDICINE

## 2020-02-09 PROCEDURE — 93459 L HRT ART/GRFT ANGIO: CPT

## 2020-02-09 PROCEDURE — 84484 ASSAY OF TROPONIN QUANT: CPT

## 2020-02-09 PROCEDURE — 93005 ELECTROCARDIOGRAM TRACING: CPT | Performed by: INTERNAL MEDICINE

## 2020-02-09 PROCEDURE — C1887 CATHETER, GUIDING: HCPCS

## 2020-02-09 PROCEDURE — 99153 MOD SED SAME PHYS/QHP EA: CPT

## 2020-02-09 PROCEDURE — C1753 CATH, INTRAVAS ULTRASOUND: HCPCS

## 2020-02-09 PROCEDURE — 4A023N7 MEASUREMENT OF CARDIAC SAMPLING AND PRESSURE, LEFT HEART, PERCUTANEOUS APPROACH: ICD-10-PCS | Performed by: INTERNAL MEDICINE

## 2020-02-09 PROCEDURE — B2151ZZ FLUOROSCOPY OF LEFT HEART USING LOW OSMOLAR CONTRAST: ICD-10-PCS | Performed by: INTERNAL MEDICINE

## 2020-02-09 PROCEDURE — 83036 HEMOGLOBIN GLYCOSYLATED A1C: CPT

## 2020-02-09 PROCEDURE — 65660000000 HC RM CCU STEPDOWN

## 2020-02-09 PROCEDURE — 85347 COAGULATION TIME ACTIVATED: CPT

## 2020-02-09 PROCEDURE — 85025 COMPLETE CBC W/AUTO DIFF WBC: CPT

## 2020-02-09 PROCEDURE — 74011636320 HC RX REV CODE- 636/320: Performed by: INTERNAL MEDICINE

## 2020-02-09 PROCEDURE — C1769 GUIDE WIRE: HCPCS

## 2020-02-09 PROCEDURE — 74011000250 HC RX REV CODE- 250: Performed by: INTERNAL MEDICINE

## 2020-02-09 PROCEDURE — B2181ZZ FLUOROSCOPY OF LEFT INTERNAL MAMMARY BYPASS GRAFT USING LOW OSMOLAR CONTRAST: ICD-10-PCS | Performed by: INTERNAL MEDICINE

## 2020-02-09 PROCEDURE — 99152 MOD SED SAME PHYS/QHP 5/>YRS: CPT

## 2020-02-09 PROCEDURE — 92978 ENDOLUMINL IVUS OCT C 1ST: CPT

## 2020-02-09 PROCEDURE — 77030029997 HC DEV COM RDL R BND TELE -B

## 2020-02-09 PROCEDURE — C8929 TTE W OR WO FOL WCON,DOPPLER: HCPCS

## 2020-02-09 PROCEDURE — 85730 THROMBOPLASTIN TIME PARTIAL: CPT

## 2020-02-09 PROCEDURE — 77030012468 HC VLV BLEEDBK CNTRL ABBT -B

## 2020-02-09 PROCEDURE — 77030004559 HC CATH ANGI DX SUPT CARD -B

## 2020-02-09 PROCEDURE — 83735 ASSAY OF MAGNESIUM: CPT

## 2020-02-09 PROCEDURE — 85027 COMPLETE CBC AUTOMATED: CPT

## 2020-02-09 RX ORDER — ACETAMINOPHEN 325 MG/1
650 TABLET ORAL
Status: DISCONTINUED | OUTPATIENT
Start: 2020-02-09 | End: 2020-02-11 | Stop reason: HOSPADM

## 2020-02-09 RX ORDER — CLOPIDOGREL BISULFATE 75 MG/1
300 TABLET ORAL ONCE
Status: COMPLETED | OUTPATIENT
Start: 2020-02-09 | End: 2020-02-09

## 2020-02-09 RX ORDER — SODIUM CHLORIDE 9 MG/ML
75 INJECTION, SOLUTION INTRAVENOUS CONTINUOUS
Status: DISCONTINUED | OUTPATIENT
Start: 2020-02-09 | End: 2020-02-09

## 2020-02-09 RX ORDER — SODIUM CHLORIDE 0.9 % (FLUSH) 0.9 %
5-40 SYRINGE (ML) INJECTION EVERY 8 HOURS
Status: DISCONTINUED | OUTPATIENT
Start: 2020-02-09 | End: 2020-02-09 | Stop reason: SDUPTHER

## 2020-02-09 RX ORDER — POTASSIUM CHLORIDE 20 MEQ/1
40 TABLET, EXTENDED RELEASE ORAL 2 TIMES DAILY
Status: COMPLETED | OUTPATIENT
Start: 2020-02-09 | End: 2020-02-09

## 2020-02-09 RX ORDER — POTASSIUM CHLORIDE 14.9 MG/ML
20 INJECTION INTRAVENOUS ONCE
Status: COMPLETED | OUTPATIENT
Start: 2020-02-09 | End: 2020-02-09

## 2020-02-09 RX ORDER — CEFAZOLIN SODIUM/WATER 2 G/20 ML
2 SYRINGE (ML) INTRAVENOUS ONCE
Status: DISCONTINUED | OUTPATIENT
Start: 2020-02-09 | End: 2020-02-09

## 2020-02-09 RX ORDER — DOCUSATE SODIUM 100 MG/1
100 CAPSULE, LIQUID FILLED ORAL DAILY
Status: DISCONTINUED | OUTPATIENT
Start: 2020-02-10 | End: 2020-02-09

## 2020-02-09 RX ORDER — SODIUM CHLORIDE 0.9 % (FLUSH) 0.9 %
5-40 SYRINGE (ML) INJECTION AS NEEDED
Status: DISCONTINUED | OUTPATIENT
Start: 2020-02-09 | End: 2020-02-11 | Stop reason: HOSPADM

## 2020-02-09 RX ORDER — HEPARIN SODIUM 10000 [USP'U]/ML
1000-10000 INJECTION, SOLUTION INTRAVENOUS; SUBCUTANEOUS
Status: DISCONTINUED | OUTPATIENT
Start: 2020-02-09 | End: 2020-02-11 | Stop reason: HOSPADM

## 2020-02-09 RX ORDER — LORAZEPAM 1 MG/1
1 TABLET ORAL
Status: DISCONTINUED | OUTPATIENT
Start: 2020-02-09 | End: 2020-02-11 | Stop reason: HOSPADM

## 2020-02-09 RX ORDER — BISACODYL 5 MG
5 TABLET, DELAYED RELEASE (ENTERIC COATED) ORAL DAILY PRN
Status: DISCONTINUED | OUTPATIENT
Start: 2020-02-09 | End: 2020-02-11 | Stop reason: HOSPADM

## 2020-02-09 RX ORDER — MIDAZOLAM HYDROCHLORIDE 1 MG/ML
.5-2 INJECTION, SOLUTION INTRAMUSCULAR; INTRAVENOUS
Status: DISCONTINUED | OUTPATIENT
Start: 2020-02-09 | End: 2020-02-09

## 2020-02-09 RX ORDER — LIDOCAINE HYDROCHLORIDE 10 MG/ML
1-30 INJECTION, SOLUTION EPIDURAL; INFILTRATION; INTRACAUDAL; PERINEURAL ONCE
Status: COMPLETED | OUTPATIENT
Start: 2020-02-09 | End: 2020-02-09

## 2020-02-09 RX ORDER — MORPHINE SULFATE 2 MG/ML
1 INJECTION, SOLUTION INTRAMUSCULAR; INTRAVENOUS
Status: DISCONTINUED | OUTPATIENT
Start: 2020-02-09 | End: 2020-02-11 | Stop reason: HOSPADM

## 2020-02-09 RX ORDER — NITROGLYCERIN 0.4 MG/1
0.4 TABLET SUBLINGUAL
Status: DISCONTINUED | OUTPATIENT
Start: 2020-02-09 | End: 2020-02-11 | Stop reason: HOSPADM

## 2020-02-09 RX ORDER — GUAIFENESIN 100 MG/5ML
81 LIQUID (ML) ORAL DAILY
Status: DISCONTINUED | OUTPATIENT
Start: 2020-02-09 | End: 2020-02-09 | Stop reason: SDUPTHER

## 2020-02-09 RX ORDER — ROSUVASTATIN CALCIUM 20 MG/1
40 TABLET, COATED ORAL
Status: DISCONTINUED | OUTPATIENT
Start: 2020-02-09 | End: 2020-02-11 | Stop reason: HOSPADM

## 2020-02-09 RX ORDER — DOCUSATE SODIUM 100 MG/1
100 CAPSULE, LIQUID FILLED ORAL DAILY
Status: DISCONTINUED | OUTPATIENT
Start: 2020-02-09 | End: 2020-02-11 | Stop reason: HOSPADM

## 2020-02-09 RX ORDER — HEPARIN SODIUM 200 [USP'U]/100ML
3 INJECTION, SOLUTION INTRAVENOUS CONTINUOUS
Status: DISCONTINUED | OUTPATIENT
Start: 2020-02-09 | End: 2020-02-09

## 2020-02-09 RX ADMIN — LIDOCAINE HYDROCHLORIDE 4 ML: 10 INJECTION, SOLUTION EPIDURAL; INFILTRATION; INTRACAUDAL; PERINEURAL at 09:45

## 2020-02-09 RX ADMIN — CARVEDILOL 12.5 MG: 12.5 TABLET, FILM COATED ORAL at 08:23

## 2020-02-09 RX ADMIN — DOCUSATE SODIUM 100 MG: 100 CAPSULE, LIQUID FILLED ORAL at 22:21

## 2020-02-09 RX ADMIN — Medication 10 ML: at 00:20

## 2020-02-09 RX ADMIN — GABAPENTIN 300 MG: 300 CAPSULE ORAL at 08:23

## 2020-02-09 RX ADMIN — Medication 10 ML: at 21:32

## 2020-02-09 RX ADMIN — ISOSORBIDE MONONITRATE 60 MG: 60 TABLET, EXTENDED RELEASE ORAL at 08:24

## 2020-02-09 RX ADMIN — LOSARTAN POTASSIUM 50 MG: 50 TABLET, FILM COATED ORAL at 08:23

## 2020-02-09 RX ADMIN — HEPARIN SODIUM 3 UNITS/HR: 200 INJECTION, SOLUTION INTRAVENOUS at 09:40

## 2020-02-09 RX ADMIN — CLOPIDOGREL BISULFATE 300 MG: 75 TABLET, FILM COATED ORAL at 10:40

## 2020-02-09 RX ADMIN — POTASSIUM CHLORIDE 40 MEQ: 20 TABLET, EXTENDED RELEASE ORAL at 00:19

## 2020-02-09 RX ADMIN — GABAPENTIN 300 MG: 300 CAPSULE ORAL at 00:20

## 2020-02-09 RX ADMIN — GABAPENTIN 300 MG: 300 CAPSULE ORAL at 21:22

## 2020-02-09 RX ADMIN — POTASSIUM CHLORIDE 40 MEQ: 20 TABLET, EXTENDED RELEASE ORAL at 07:08

## 2020-02-09 RX ADMIN — ROSUVASTATIN CALCIUM 20 MG: 20 TABLET, COATED ORAL at 00:19

## 2020-02-09 RX ADMIN — POTASSIUM CHLORIDE 20 MEQ: 200 INJECTION, SOLUTION INTRAVENOUS at 07:17

## 2020-02-09 RX ADMIN — MIDAZOLAM 2 MG: 1 INJECTION INTRAMUSCULAR; INTRAVENOUS at 09:40

## 2020-02-09 RX ADMIN — CARVEDILOL 12.5 MG: 12.5 TABLET, FILM COATED ORAL at 18:16

## 2020-02-09 RX ADMIN — Medication 10 ML: at 15:07

## 2020-02-09 RX ADMIN — SODIUM CHLORIDE 75 ML/HR: 900 INJECTION, SOLUTION INTRAVENOUS at 00:20

## 2020-02-09 RX ADMIN — ROSUVASTATIN CALCIUM 40 MG: 20 TABLET, COATED ORAL at 21:22

## 2020-02-09 RX ADMIN — NITROGLYCERIN 1 INCH: 20 OINTMENT TOPICAL at 05:36

## 2020-02-09 RX ADMIN — HEPARIN SODIUM 2 ML: 10000 INJECTION INTRAVENOUS; SUBCUTANEOUS at 09:50

## 2020-02-09 RX ADMIN — GABAPENTIN 300 MG: 300 CAPSULE ORAL at 18:16

## 2020-02-09 RX ADMIN — ASPIRIN 81 MG 81 MG: 81 TABLET ORAL at 08:23

## 2020-02-09 RX ADMIN — POTASSIUM CHLORIDE 40 MEQ: 20 TABLET, EXTENDED RELEASE ORAL at 18:16

## 2020-02-09 RX ADMIN — CLOPIDOGREL BISULFATE 75 MG: 75 TABLET, FILM COATED ORAL at 08:24

## 2020-02-09 RX ADMIN — LORATADINE 10 MG: 10 TABLET ORAL at 08:23

## 2020-02-09 RX ADMIN — BISACODYL 5 MG: 5 TABLET, COATED ORAL at 00:25

## 2020-02-09 RX ADMIN — NITROGLYCERIN 1 INCH: 20 OINTMENT TOPICAL at 00:20

## 2020-02-09 RX ADMIN — AMLODIPINE BESYLATE 10 MG: 10 TABLET ORAL at 08:23

## 2020-02-09 RX ADMIN — PERFLUTREN 1 ML: 6.52 INJECTION, SUSPENSION INTRAVENOUS at 13:00

## 2020-02-09 RX ADMIN — IOPAMIDOL 235 ML: 755 INJECTION, SOLUTION INTRAVENOUS at 10:38

## 2020-02-09 RX ADMIN — HEPARIN SODIUM 12 UNITS/KG/HR: 5000 INJECTION, SOLUTION INTRAVENOUS at 00:21

## 2020-02-09 RX ADMIN — SODIUM CHLORIDE 75 ML/HR: 900 INJECTION, SOLUTION INTRAVENOUS at 15:06

## 2020-02-09 RX ADMIN — HEPARIN SODIUM 7000 UNITS: 10000 INJECTION INTRAVENOUS; SUBCUTANEOUS at 10:08

## 2020-02-09 NOTE — ROUTINE PROCESS
Bedside and Verbal shift change report to be given to Mahesh Anaya (oncoming nurse) by  (offgoing nurse). Report included the following information SBAR, Kardex, Intake/Output, MAR and Recent Results. RN to assume care of patient. Heparin drip verified at bedside

## 2020-02-09 NOTE — PROGRESS NOTES
TRANSFER - IN REPORT:    Verbal report received from Lupe Newberry on Cal Spurling being received from [facility] ED for routine progression of care. Report consisted of patients Situation, Background, Assessment and Recommendations(SBAR). Information from the following report(s) ED Summary and Recent Results was reviewed. Opportunity for questions and clarification was provided. Assessment completed upon patients arrival to unit and care assumed. Patient received to room 303. Patient connected to monitor and assessment completed. Plan of care reviewed. Patient oriented to room and call light. Patient aware to use call light to communicate any chest pain or needs. Admission skin assessment completed with second RN and reveals the following: Patient sacrum intact with no breakdown. Bilateral heels visualized, heels dry and intact with no bogginess. Patient has a quarter size nodule on left elbow area. Mid sternal scar from cabg 10 years ago per patient. There is a right healed shoulder scar. In the left lower quadrant of the abdomen there is a heeled scar. No open wounds or breakdown noted at this time.

## 2020-02-09 NOTE — H&P
[facility] CARDIOLOGY   History & Physical                 Primary Cardiologist: Dr. Brian Becker    Primary Care Physician: Dr. Beverly Shipley    Admitting Physician: Dr. Latasha Martin:     Patient is a 69 y.o. AA male with PMHx of CAD, HTN, HLD, PAD, DM II, Neuropathy, CKD 3, CVA and Prostate CA who presented to the ED at [facility] with c/o CP. States he and wife were driving home from [facility] when he developed an heaviness in his chest. Non-radiating. No associated SOB, palpitations, dizziness, HA, syncope, N/V or diaphoresis. Wife was driving and due to the severity of the pain she called EMS and they picked him up in [place] and took him to [facility]. Per Pt he was given chewable ASA by EMS driver. In the ED: Initial trop 0.14 with repeat 1.03. He was given 4000 units IV heparin and started on a heparin gtt. He was transferred to [facility] for further Cardiology evaluation and management. Wife reports that Pt had a similar CP event on Fri PM while in [facility]. This resolved and he was able to go to bed and sleep. Pt states that he has had 4 CP events over the past week. He also reports chronic leg pain and is seeing Dr. Mike Krishnamurthy for possible intervention. Pt and wife are overall poor historians. Unsure what meds Pt takes but report compliance with the meds they have. Does report taking his ASA and Plavix. He is not taking Januvia or Allopurinol as the prescriptions \"ran out\" and they assumed these meds were no longer needed. Stopped these 2-3 months ago. He does not check his BS at home. Med list reports he is on Crestor -- but he has not been taking this med and wasn't aware he was supposed to be on cholesterol meds.     Cardiac Intervention Hx:  3/2009:  CABG x 4 w/ LIMA->LAD, SVG->PDA, SVG->OM1, SVG->OM2  Return to OR for tamponade and clot evacuation    7/2012:  [facility] with occluded SVG->PDA and SVG->OM1 -- no targets for revasc    3/2019:  AMI/STEMI with VF arrest, SVG->OM occluded -- stented    9/2019:  NSTEMI with 99% ISR SVG->OM treated with POBA    Past Medical History:   Diagnosis Date    Allergic rhinitis     Arthritis     CAD (coronary artery disease)     CABG '09; troponin elevated 7/14/12 (\"likely due to supply/demand mismatch in setting of fever and increased metabolic demand\"-per cardiac MD note 8/20/12)-had cath, echo, EKG-all WNL except cath showed 2 of the bypasses with blockages- \"occluded SVG to PDA & SVG to LISA\"; EF=55%    Chronic kidney disease     CVA (cerebral vascular accident) ([address] 08/2017    Left- no residual weakness    Diabetes mellitus type 2, controlled ([address]     restarted oral med (metformin 3/2018), does not check BG on regular basis, last hgba1c- 8 (3/12/18)    Dyslipidemia     ED (erectile dysfunction)     Encephalitis 1962    x 2/hospitalized as teenager    GERD (gastroesophageal reflux disease)     controlled with Nexium    Gout     hx of    Hypertension     Lacunar infarct, acute ([address]     possible embolic, remote a-fib- on eliquis    Lumbago     Memory loss     Mitral valve regurgitation 7/14/12    \"moderate\" on echo    ROBERTO (obstructive sleep apnea)     mild per patient, does not use CPAP    PAF (paroxysmal atrial fibrillation) ([address]     Prostate cancer ([address]     followed by urology    Psoriasis     topical creams    SBO (small bowel obstruction) ([address] 2011, 2015, 2016    Stage 2 chronic kidney disease       Past Surgical History:   Procedure Laterality Date    ABDOMEN SURGERY PROC UNLISTED  1967    exp lap    HX APPENDECTOMY  age 49        [employer] CATARACT REMOVAL Bilateral 2013    iol     HX COLONOSCOPY  1998, 2010    HX CORONARY ARTERY BYPASS GRAFT  2009    x4 bypass    HX HERNIA REPAIR Bilateral 2012    HX RADICAL PROSTATECTOMY       HX SHOULDER REPLACEMENT Right 2018    Reverse R TSA    HX SPLENECTOMY  1951    NC LEFT HEART CATH,PERCUTANEOUS  7/2012    no intervention    NC PROSTATE BIOPSY, NEEDLE, SATURATION SAMPLING      and ultrasound    VASCULAR SURGERY PROCEDURE UNLIST  2017    aortogram, w/cass LE runoff,R-LE arteriogram      Allergies   Allergen Reactions    Celecoxib Swelling     Hands    Ibuprofen Unknown (comments)     Patient unsure    Lescol [Fluvastatin] Unknown (comments)     Other reaction(s): Unknown (comments)    Nsaids (Non-Steroidal Anti-Inflammatory Drug) Other (comments)     Elevated serum creatinine    Sulfa (Sulfonamide Antibiotics) Rash    Uloric [Febuxostat] Other (comments)     Joint Pain     Social History     Tobacco Use    Smoking status: Former Smoker     Packs/day: 1.00     Years: 15.00     Pack years: 15.00     Last attempt to quit: 1975     Years since quittin.6    Smokeless tobacco: Never Used   Substance Use Topics    Alcohol use: Yes     Comment: rarely- once/month      FH:   Family History   Problem Relation Age of Onset    Hypertension Mother    [place] Gout Mother     Arthritis-osteo Mother     Heart Disease Mother          of Heart Disease    Coronary Artery Disease Mother     Diabetes Father     Cancer Father     Heart Disease Father     Bleeding Prob Paternal Grandmother     Hypertension Brother     Cancer Maternal Uncle         Prostate        Review of Systems  General: no weight change, no weakness, fever or chills  Skin: no rashes, lumps, or other skin changes  HEENT: no headache, dizziness, lightheadedness, vision changes, hearing changes, tinnitus, vertigo, sinus pressure/pain, bleeding gums, sore throat, or hoarseness  Neck: no swollen glands, goiter, pain or stiffness  Respiratory: no cough, sputum, hemoptysis, no dyspnea, no wheezing  Cardiovascular: + as per HPI  Gastrointestinal: no reflux, constipation, diarrhea, liver problems, GI bleeding, N/V  Urinary: no change in frequency or urgency, no hematuria, burning/pain with urination, recent flank pain, polyuria, or difficulty urinating  Peripheral Vascular: +claudication, and leg pain, no prior DVTs, swelling of calves, legs, or feet, color change, or swelling with redness or tenderness  Musculoskeletal: no muscle or joint pain/stiffness, joint swelling, erythema of joints, or back pain  Psychiatric: no depression or excessive stress  Neurological: no sensory or motor loss, seizures, syncope, tremors, numbness, tingling, no changes in mood, attention, or speech, no changes in orientation, memory, insight, or judgment. Hematologic: no anemia, easy bruising or bleeding  Endocrine: no thyroid problems, no heat or cold intolerance, excessive sweating, polyuria, polydipsia, +diabetes. Objective:       Visit Vitals  /87 (BP 1 Location: Right arm, BP Patient Position: Sitting)   Pulse 78   Temp 98.4 °F (36.9 °C)   Resp 16   Wt 79.6 kg (175 lb 7 oz)   SpO2 98%   BMI 24.47 kg/m²       No intake/output data recorded. No intake/output data recorded. Physical Exam:  General: well developed, well nourished, No Acute Distress  HEENT: pupils equal and round, no abnormalities noted, sclera clear  Neck: supple, no JVD, no carotid bruits  Heart: S1S2 with RRR without murmurs, rubs or gallops  Lungs: clear throughout auscultation bilaterally, normal effort on RA, no wheezing or rales  Abd: soft, nontender, nondistended, with good bowel sounds  Ext: warm, no edema, calves supple/nontender, pulses 2+ bilaterally  Skin: warm and dry  Psychiatric: Normal mood and affect, cooperative  Neurologic: Alert and oriented X 3, moves all 4, CNs intact, seems to have some memory deficit      ECG: SR 66, NSST/T wave changes    Data Review: Reviewed available labs from [facility]   No results found for this or any previous visit (from the past 24 hour(s)).        Assessment/Plan:   Principal Problem:    Unstable angina -- admit to tele, serial Catie, cont ASA, NTG paste, IV heparin, daily labs/lytes, NPO at [facility] with IVF for possible LHC on 2/9, check ECHO to eval for new WMA with new CP, EF had recovered on last study    Active Problems:    Coronary atherosclerosis of native coronary artery -- cont ASA, Plavix, BB, ARB and restart statin      Dyslipidemia -- restart statin, check lipids, per a notation on one of Dr. Swartz Self notes -- pt developed idiopathic jaundice after taking a statin -- Pt denies this and is unaware of any medication reaction he was started on Crestor 20mg after his NSTEMI 9/2019 with 11 refills given -- unsure where the confusion came re: this med      S/P CABG (coronary artery bypass graft) -- CABG x4 3009, known occluded SVG->RCA       HTN (hypertension) -- cont home meds, monitor BP and adjust as indicated -- PCP changed his Lisinopril -> Losartan due to a cough 11/2019       Malignant neoplasm of prostate -- completed treatment, followed at [facility]      CKD (chronic kidney disease) stage 3, GFR 30-59 ml/min -- daily labs/lytes, monitor I&O, pre-hydrate for LHC -- Cr 1.44 at [facility], appears baseline ~1.2      Type 2 diabetes, controlled, with neuropathy -- SSI PRN, not checking BS at home, will check A1C, need to be on meds ? ? Will re-eval this during his stay, will cont his Neurontin for his neuropathy      Pt and wife with confusion over his meds. He was admitted post-NSTEMI 9/2019, admitted for SBO 10/2019, had med changes by PCP 11/2019 and they are confused. When pills ran out they didn't question why. Asked wife to bring all current pill bottles to the hospital tomorrow for review. Also -- Pts PCP is through [facility] and when meds are changed on their list -- this does not upstate our list. Sara Stockton her to obtain print outs of Pts current meds after every appt to ensure that they obtain all prescribed meds for Pt. She v/u. PCP note indicated Pt having some memory/cognitive decline. On arrival he was asked by RN if he had a BM today and he states that he is not sure. Will monitor mentation during hospitalization.          Gurmeet Yusuf, NP-C  2/9/2020

## 2020-02-09 NOTE — ROUTINE PROCESS
TRANSFER - OUT REPORT: 
 
LHC/grafts with POBA Dr. Sigrid Enriquez LRA access Versed 2mg, heparin 7000 units iv, plavix 300mg POBA of SVG to OM 
IVUS 
R band placed @ 9065 with 14ml air Verbal report given to Jeffrey Khan RN(name) on Vada Bumpers  being transferred to [facility](unit) for routine progression of care Report consisted of patients Situation, Background, Assessment and  
Recommendations(SBAR). Information from the following report(s) Procedure Summary was reviewed with the receiving nurse. Lines:  
Peripheral IV 02/08/20 Posterior;Right Hand (Active) Site Assessment Clean, dry, & intact 2/9/2020  8:34 AM  
Phlebitis Assessment 0 2/9/2020  8:34 AM  
Infiltration Assessment 0 2/9/2020  8:34 AM  
Dressing Status Clean, dry, & intact 2/9/2020  8:34 AM  
Dressing Type Tape;Transparent 2/9/2020  8:34 AM  
Hub Color/Line Status Patent 2/9/2020  8:34 AM  
Alcohol Cap Used No 2/9/2020  8:34 AM  
   
Peripheral IV 02/09/20 Left Antecubital (Active) Site Assessment Clean, dry, & intact 2/9/2020  8:34 AM  
Phlebitis Assessment 0 2/9/2020  8:34 AM  
Infiltration Assessment 0 2/9/2020  8:34 AM  
Dressing Status Clean, dry, & intact 2/9/2020  8:34 AM  
Dressing Type Tape;Transparent 2/9/2020  8:34 AM  
Hub Color/Line Status Patent 2/9/2020  8:34 AM  
Alcohol Cap Used No 2/9/2020  8:34 AM  
  
 
Opportunity for questions and clarification was provided. Patient transported with: 
 [employer]

## 2020-02-10 ENCOUNTER — PATIENT OUTREACH (OUTPATIENT)
Dept: CASE MANAGEMENT | Age: 75
End: 2020-02-10

## 2020-02-10 ENCOUNTER — HOSPITAL ENCOUNTER (OUTPATIENT)
Dept: SURGERY | Age: 75
Discharge: HOME | End: 2020-02-10

## 2020-02-10 LAB
ANION GAP SERPL CALC-SCNC: 10 MMOL/L (ref 7–16)
ATRIAL RATE: 82 BPM
BASOPHILS # BLD: 0 K/UL (ref 0–0.2)
BASOPHILS NFR BLD: 1 % (ref 0–2)
BUN SERPL-MCNC: 11 MG/DL (ref 8–23)
CALCIUM SERPL-MCNC: 9 MG/DL (ref 8.3–10.4)
CALCULATED P AXIS, ECG09: 61 DEGREES
CALCULATED R AXIS, ECG10: 11 DEGREES
CALCULATED T AXIS, ECG11: -113 DEGREES
CHLORIDE SERPL-SCNC: 113 MMOL/L (ref 98–107)
CO2 SERPL-SCNC: 22 MMOL/L (ref 21–32)
CREAT SERPL-MCNC: 1.28 MG/DL (ref 0.8–1.5)
DIAGNOSIS, 93000: NORMAL
DIFFERENTIAL METHOD BLD: ABNORMAL
EOSINOPHIL # BLD: 0.1 K/UL (ref 0–0.8)
EOSINOPHIL NFR BLD: 1 % (ref 0.5–7.8)
ERYTHROCYTE [DISTWIDTH] IN BLOOD BY AUTOMATED COUNT: 13.2 % (ref 11.9–14.6)
GLUCOSE BLD STRIP.AUTO-MCNC: 103 MG/DL (ref 65–100)
GLUCOSE BLD STRIP.AUTO-MCNC: 118 MG/DL (ref 65–100)
GLUCOSE BLD STRIP.AUTO-MCNC: 154 MG/DL (ref 65–100)
GLUCOSE BLD STRIP.AUTO-MCNC: 209 MG/DL (ref 65–100)
GLUCOSE SERPL-MCNC: 110 MG/DL (ref 65–100)
HCT VFR BLD AUTO: 36.7 % (ref 41.1–50.3)
HGB BLD-MCNC: 12.5 G/DL (ref 13.6–17.2)
IMM GRANULOCYTES # BLD AUTO: 0 K/UL (ref 0–0.5)
IMM GRANULOCYTES NFR BLD AUTO: 0 % (ref 0–5)
LYMPHOCYTES # BLD: 1 K/UL (ref 0.5–4.6)
LYMPHOCYTES NFR BLD: 23 % (ref 13–44)
MAGNESIUM SERPL-MCNC: 1.9 MG/DL (ref 1.8–2.4)
MCH RBC QN AUTO: 29.6 PG (ref 26.1–32.9)
MCHC RBC AUTO-ENTMCNC: 34.1 G/DL (ref 31.4–35)
MCV RBC AUTO: 86.8 FL (ref 79.6–97.8)
MONOCYTES # BLD: 0.6 K/UL (ref 0.1–1.3)
MONOCYTES NFR BLD: 13 % (ref 4–12)
NEUTS SEG # BLD: 2.7 K/UL (ref 1.7–8.2)
NEUTS SEG NFR BLD: 62 % (ref 43–78)
NRBC # BLD: 0 K/UL (ref 0–0.2)
P-R INTERVAL, ECG05: 170 MS
PLATELET # BLD AUTO: 176 K/UL (ref 150–450)
PMV BLD AUTO: 12 FL (ref 9.4–12.3)
POTASSIUM SERPL-SCNC: 4 MMOL/L (ref 3.5–5.1)
Q-T INTERVAL, ECG07: 428 MS
QRS DURATION, ECG06: 80 MS
QTC CALCULATION (BEZET), ECG08: 500 MS
RBC # BLD AUTO: 4.23 M/UL (ref 4.23–5.6)
SODIUM SERPL-SCNC: 145 MMOL/L (ref 136–145)
VENTRICULAR RATE, ECG03: 82 BPM
WBC # BLD AUTO: 4.4 K/UL (ref 4.3–11.1)

## 2020-02-10 PROCEDURE — 65660000000 HC RM CCU STEPDOWN

## 2020-02-10 PROCEDURE — 74011250637 HC RX REV CODE- 250/637: Performed by: NURSE PRACTITIONER

## 2020-02-10 PROCEDURE — 83735 ASSAY OF MAGNESIUM: CPT

## 2020-02-10 PROCEDURE — 74011250637 HC RX REV CODE- 250/637: Performed by: INTERNAL MEDICINE

## 2020-02-10 PROCEDURE — 93005 ELECTROCARDIOGRAM TRACING: CPT | Performed by: INTERNAL MEDICINE

## 2020-02-10 PROCEDURE — 74011636637 HC RX REV CODE- 636/637: Performed by: NURSE PRACTITIONER

## 2020-02-10 PROCEDURE — 36415 COLL VENOUS BLD VENIPUNCTURE: CPT

## 2020-02-10 PROCEDURE — 80048 BASIC METABOLIC PNL TOTAL CA: CPT

## 2020-02-10 PROCEDURE — 82962 GLUCOSE BLOOD TEST: CPT

## 2020-02-10 PROCEDURE — 85025 COMPLETE CBC W/AUTO DIFF WBC: CPT

## 2020-02-10 RX ADMIN — LORATADINE 10 MG: 10 TABLET ORAL at 09:00

## 2020-02-10 RX ADMIN — CARVEDILOL 12.5 MG: 12.5 TABLET, FILM COATED ORAL at 08:23

## 2020-02-10 RX ADMIN — ISOSORBIDE MONONITRATE 60 MG: 60 TABLET, EXTENDED RELEASE ORAL at 08:22

## 2020-02-10 RX ADMIN — GABAPENTIN 300 MG: 300 CAPSULE ORAL at 08:22

## 2020-02-10 RX ADMIN — ASPIRIN 81 MG 81 MG: 81 TABLET ORAL at 08:22

## 2020-02-10 RX ADMIN — CLOPIDOGREL BISULFATE 75 MG: 75 TABLET, FILM COATED ORAL at 08:22

## 2020-02-10 RX ADMIN — Medication 10 ML: at 22:25

## 2020-02-10 RX ADMIN — INSULIN LISPRO 4 UNITS: 100 INJECTION, SOLUTION INTRAVENOUS; SUBCUTANEOUS at 11:34

## 2020-02-10 RX ADMIN — LOSARTAN POTASSIUM 50 MG: 50 TABLET, FILM COATED ORAL at 08:22

## 2020-02-10 RX ADMIN — CARVEDILOL 12.5 MG: 12.5 TABLET, FILM COATED ORAL at 17:00

## 2020-02-10 RX ADMIN — GABAPENTIN 300 MG: 300 CAPSULE ORAL at 17:00

## 2020-02-10 RX ADMIN — GABAPENTIN 300 MG: 300 CAPSULE ORAL at 22:19

## 2020-02-10 RX ADMIN — Medication 5 ML: at 06:15

## 2020-02-10 RX ADMIN — Medication 5 ML: at 14:26

## 2020-02-10 RX ADMIN — ROSUVASTATIN CALCIUM 40 MG: 20 TABLET, COATED ORAL at 22:19

## 2020-02-10 NOTE — PROGRESS NOTES
[facility] CARDIOLOGY PROGRESS NOTE           2/10/2020 7:47 AM    Admit Date: 2/8/2020      Subjective:   Patient denies any chest pain or dyspnea. BP low at times. ROS:  Cardiovascular:  As noted above    Objective:      Vitals:    02/10/20 0001 02/10/20 0015 02/10/20 0416 02/10/20 0420   BP:  99/63  103/72   Pulse: 80 86 80 85   Resp:  16  16   Temp:  98.1 °F (36.7 °C)  98.7 °F (37.1 °C)   SpO2:  100%  99%   Weight:    79 kg (174 lb 2 oz)       Physical Exam:  General-No Acute Distress  Neck- supple, no JVD  CV- regular rate and rhythm no MRG  Lung- clear bilaterally  Abd- soft, nontender, nondistended  Ext- no edema bilaterally. Skin- warm and dry      Data Review:   Recent Labs     02/10/20  0409 02/09/20  1205 02/09/20  0416    141 143   K 4.0 4.4 3.2*   MG 1.9  --  1.9   BUN 11 10 12   CREA 1.28 1.11 1.20   * 130* 179*   WBC 4.4 4.5 4.6   HGB 12.5* 12.9* 12.5*   HCT 36.7* 38.3* 36.8*    177 181   TRIGL  --   --  75   HDL  --   --  45      No results found for: NELSY Pozo     Echo (2/9/20): -  Left ventricle: Systolic function was mildly reduced. Ejection fraction was estimated in the range of 45 % to 50 %. There was hypokinesis of the mid inferolateral, mid anterolateral, and apical lateral wall(s). Wall thickness  was mildly increased. The E/e' ratio was 9.25. There was Grade I Diastolic Dysfunction.   -  Left atrium: The atrium was mildly dilated.   -  Mitral valve: There was mild regurgitation.   -  Tricuspid valve: There was mild regurgitation.   -  Aorta, systemic arteries: The root exhibited mild dilatation. Assessment/Plan:     Principal Problem:  NSTEMI  Patient admitted with NSTEMI. Had POBA of ISR of distal SVG to OM. ON ASA and Plavix. Mild LV dysfunction on echo. ON Coreg and Losartan. Continue telemetry to evaluate for arrhythmias     Active Problems:    Coronary atherosclerosis of native coronary artery (3/6/2009)    See above. Dyslipidemia (3/6/2009)    On Crestor 40 mg PO QHS. S/P CABG (coronary artery bypass graft) (3/10/2009)    See above. HTN (hypertension) (7/14/2012)    BP low. Hold norvasc today. CKD (chronic kidney disease) stage 3, GFR 30-59 ml/min ([facility]) (2/24/2016)    Renal function stable post PCI      Type 2 diabetes, controlled, with neuropathy ([facility] [address] (7/6/2017)    SSI. Hypokalemia (4/21/2018)    Improved.                    Alondra Guerra MD  2/10/2020 7:47 AM

## 2020-02-10 NOTE — PROCEDURES
[facility]  CARDIAC CATH    Name:  Cristobal Johnson  MR#:  [id number]  :  1945  ACCOUNT #:  [de-identified]  DATE OF SERVICE:  2020      PREPROCEDURE DIAGNOSIS:  Non-ST-elevation myocardial infarction. POSTPROCEDURE DIAGNOSES:  Coronary artery disease, bypass grafts native heart with stable angina. PROCEDURE PERFORMED:  Cardiac catherization. SURGEON:  Radha Giordano. Clearance MD Darnell    ASSISTANT:  None. COMPLICATIONS:  None. ANESTHESIA:  The patient received 2 mg of Versed and was monitored for conscious sedation beginning at 09:44 and ending at 10:47 by nurse Lizzy Salomon. SPECIMENS REMOVED:  None. IMPLANTS:  None. ESTIMATED BLOOD LOSS:  Less than 5 mL. PROCEDURE:  After informed consent, the patient was prepped and draped in the usual sterile fashion. The left wrist was infiltrated with lidocaine. The left radial artery was accessed by the modified Seldinger technique with 6-Scottish sheath. A total of 235 mL of Isovue contrast were used for the entire procedure. Terumo band was used for hemostasis. CATHETERS USED:  Include a 6-Scottish JR-4, 6-Scottish JL-4, 6-Scottish LCB, 6-Scottish angled pigtail catheter, a 6-Scottish AL-1 guide. FINDINGS:  Left ventricle:  Left ventriculogram was completed and showed an ejection fraction of 50-55% with lateral wall hypokinesis. LVEDP:  5 mmHg. Left main:  Normal.    Left anterior descending artery was 100% occluded proximally. Circumflex artery had a 70% proximal occlusion with obtuse marginal-1 100% occluded. Right coronary artery had a 90% stenosis in the far distal AV groove, RCA before the takeoff of PLV branch. The PDA and PLV branches were very heavily diseased and small vessels with serial 70% and 90% lesions in these vessels. GRAFT ANATOMY:  WETZEL to LAD was patent. The native LAD was heavily diseased throughout its entire course.     Saphenous vein graft to right coronary artery was not imaged but is known to be occluded. Saphenous vein graft to obtuse marginal 1 had a 99% subtotal occlusion in a distal stent, a proximal stent had 30% in-stent restenosis. There was heavily diseased throughout the obtuse marginal beyond the anastomosis. INTERVENTION:  It was decided to intervene upon the distal saphenous vein graft body in-stent restenosis lesion. The patient was given adequate heparin dose to achieve an ACT greater than 300. An AL-1 guide was taken down and used to engage the ostium of the saphenous vein graft. A BMW wire was passed down but would not cross the lesion, so a  50 wire was then taken down and easily crossed the lesion. Because there was no room for landing, a filter wire was not used for this procedure. A 2.5 x 15 NC balloon was used to predilate the lesion and an IVUS catheter was taken down and showed the stent was slightly malposed so a 3 x 20 NC balloon was taken down and used to postdilate the previously placed stent and then a 3.5 x 15 noncompliant balloon was used to postdilate the entire length of the stent. At the conclusion, there was an excellent angiographic result. The patient tolerated the procedure without issue. He was loaded with 300 mg of Plavix and TR band was used for hemostasis.       Duncan Obrien MD      [id number]  D:  02/09/2020 10:56  T:  02/10/2020 4:30  JOB #:  [id number]

## 2020-02-10 NOTE — PROGRESS NOTES
Bedside and Verbal shift change report given to be given to Tracey Hendrickson RN (oncoming nurse) by self Prince Foster tomas). Report included the following information SBAR, Kardex and MAR.

## 2020-02-10 NOTE — PROGRESS NOTES
Bedside and Verbal shift change report given to be given to self (oncoming nurse) by Abraham Stephenson RN (offgoing nurse). Report included the following information SBAR, Kardex and MAR.

## 2020-02-10 NOTE — PROGRESS NOTES
Cardiac Rehab: Spoke with patient regarding referral to cardiac rehab. Patient meets admission criteria based on NSTEMI (02/08/20)with POBA (02/09/20). Written information about Cardiac Rehab given and reviewed with patient. Discussed lifestyle modifications to promote cardiac wellness. Patient is currently participating in the cardiac rehab program and plans to resume when appropriate. Eryn Everett His Cardiologist is Dr. Vanessa Pandey.       Thank you,  AMADO Ga, RN  Cardiopulmonary Rehabilitation Nurse Liaison  [facility]

## 2020-02-10 NOTE — DISCHARGE SUMMARY
[facility] Cardiology Discharge Summary     Patient ID:  Marino Case  [id number]  17 y.o.  1945    Admit date: 2/8/2020    Discharge date:  2/11/2020    Admitting Physician: Aidee Jasmine MD     Discharge Physician: Dr. Oz Gil    Admission Diagnoses: Unstable angina ([address] [I20.0]    Discharge Diagnoses:    Diagnosis    Unstable angina     NSTEMI (non-ST elevated myocardial infarction)     Type 2 diabetes with nephropathy     Hypokalemia    Memory loss    Noncompliance with CPAP treatment    Type 2 diabetes, controlled, with neuropathy     CKD (chronic kidney disease) stage 3, GFR 30-59 ml/min     HTN (hypertension)    ROBERTO (obstructive sleep apnea)    S/P CABG (coronary artery bypass graft)    Coronary atherosclerosis of native coronary artery    Dyslipidemia    Gout       Cardiology Procedures this admission:  Left heart catheterization with PCI  EchoCardiogram  Consults: None    Hospital Course: Patient presented in transfer from [facility] with CP and elevated troponin. He was admitted and was subsequently scheduled for a LHC at [facility] on 2/9/20. His initial troponin #2 was 7.96 consistent with NSTEMI. Patient underwent cardiac catheterization by Dr. Lucia Fernández. Patient was found to have a 95% ISR stenosis of the distal stent in the SVG->OM1 that was treated with POBA with 0% residual stenosis. Patient tolerated the procedure well and was taken to the telemetry floor for recovery. An echocardiogram showed -  Left ventricle: Systolic function was mildly reduced. Ejection fraction was estimated in the range of 45 % to 50 %. There was hypokinesis of the mid inferolateral, mid anterolateral, and apical lateral wall(s). Wall thickness was mildly increased. The E/e' ratio was 9.25. There was Grade I Diastolic  Dysfunction. -  Left atrium: The atrium was mildly dilated. -  Mitral valve: There was mild regurgitation.-  Tricuspid valve:  There was mild regurgitation.-  Aorta, systemic arteries: The root exhibited mild dilatation. The  morning of 2/11/2020, the patient was up feeling well without any complaints of chest pain or shortness of breath. Patient's left radial cath site was clean, dry and intact without hematoma or bruit. Patient's labs were stable. Patient was seen and examined by Dr. Deneen Levy and determined stable and ready for discharge. Patient was instructed on the importance of medication compliance including taking Aspirin and Plavix everyday without missing a dose. After receiving drug eluting stents, the patient will remain on dual anti-platelet therapy for 1 year. For maximized medical therapy for CAD, patient will continue BB, ARB, and statin as well. The patient's Norvasc was stopped and ARB was added during his hospital stay. The patient will follow up with [facility] ([facility]) Dr. Kristin Trevino on 2/18/2020 at [address] [place] [address] in [facility]. The patient has been referred to cardiac rehab. DISPOSITION: The patient is being discharged home in stable condition on a low saturated fat, low cholesterol and low salt diet. The patient is instructed to advance activities as tolerated to the limit of fatigue or shortness of breath. The patient is instructed to avoid all heavy lifting, straining, stooping or squatting for 3-5 days. The patient is instructed to watch the cath site for bleeding/oozing; if seen, the patient is instructed to apply firm pressure with a clean cloth and call [facility] at [phone]. The patient is instructed to watch for signs of infection which include: increasing area of redness, fever/hot to touch or purulent drainage at the catheterization site. The patient is instructed not to soak in a bathtub for 7-10 days, but is cleared to shower. The patient is instructed to call the office or return to the ER for immediate evaluation for any shortness of breath or chest pain not relieved by NTG.         Discharge Exam:   Visit Vitals  /81 (BP 1 Location: Right arm, BP Patient Position: At rest)   Pulse 75   Temp 98.7 °F (37.1 °C)   Resp 18   Wt 79.2 kg (174 lb 11.2 oz)   SpO2 99%   BMI 24.37 kg/m²       Patient has been seen by Dr. Brooks Silver: see his progress note for exam details. Recent Results (from the past 24 hour(s))   GLUCOSE, POC    Collection Time: 02/10/20 11:22 AM   Result Value Ref Range    Glucose (POC) 209 (H) 65 - 100 mg/dL   GLUCOSE, POC    Collection Time: 02/10/20  4:42 PM   Result Value Ref Range    Glucose (POC) 118 (H) 65 - 100 mg/dL   GLUCOSE, POC    Collection Time: 02/10/20  9:32 PM   Result Value Ref Range    Glucose (POC) 154 (H) 65 - 100 mg/dL   MAGNESIUM    Collection Time: 02/11/20  3:54 AM   Result Value Ref Range    Magnesium 2.0 1.8 - 2.4 mg/dL   CBC W/O DIFF    Collection Time: 02/11/20  3:54 AM   Result Value Ref Range    WBC 4.5 4.3 - 11.1 K/uL    RBC 4.08 (L) 4.23 - 5.6 M/uL    HGB 12.1 (L) 13.6 - 17.2 g/dL    HCT 35.7 (L) 41.1 - 50.3 %    MCV 87.5 79.6 - 97.8 FL    MCH 29.7 26.1 - 32.9 PG    MCHC 33.9 31.4 - 35.0 g/dL    RDW 13.5 11.9 - 14.6 %    PLATELET 832 827 - 246 K/uL    MPV 11.9 9.4 - 12.3 FL    ABSOLUTE NRBC 0.00 0.0 - 0.2 K/uL   METABOLIC PANEL, BASIC    Collection Time: 02/11/20  3:54 AM   Result Value Ref Range    Sodium 145 136 - 145 mmol/L    Potassium 3.7 3.5 - 5.1 mmol/L    Chloride 115 (H) 98 - 107 mmol/L    CO2 24 21 - 32 mmol/L    Anion gap 6 (L) 7 - 16 mmol/L    Glucose 102 (H) 65 - 100 mg/dL    BUN 17 8 - 23 MG/DL    Creatinine 1.42 0.8 - 1.5 MG/DL    GFR est AA >60 >60 ml/min/1.73m2    GFR est non-AA 52 (L) >60 ml/min/1.73m2    Calcium 8.9 8.3 - 10.4 MG/DL   GLUCOSE, POC    Collection Time: 02/11/20  6:12 AM   Result Value Ref Range    Glucose (POC) 111 (H) 65 - 100 mg/dL         Patient Instructions:       Current Discharge Medication List      START taking these medications    Details   losartan (COZAAR) 50 mg tablet Take 1 Tab by mouth daily.   Qty: 30 Tab, Refills: 5         CONTINUE these medications which have NOT CHANGED    Details   isosorbide mononitrate ER (IMDUR) 60 mg CR tablet Take 1 Tab by mouth daily. Qty: 90 Tab, Refills: 3      acetaminophen (TYLENOL) 325 mg tablet Take 2 Tabs by mouth every four (4) hours as needed for Pain. aspirin 81 mg chewable tablet Take 1 Tab by mouth daily. clopidogrel (PLAVIX) 75 mg tab Take 1 Tab by mouth daily. Qty: 30 Tab, Refills: 11      carvedilol (COREG) 12.5 mg tablet Take 1 Tab by mouth two (2) times daily (with meals). Qty: 60 Tab, Refills: 11      gabapentin (NEURONTIN) 300 mg capsule Take 1 Cap by mouth four (4) times daily. Qty: 120 Cap, Refills: 3    Associated Diagnoses: Type 2 diabetes, controlled, with neuropathy (HCC)      rosuvastatin (CRESTOR) 20 mg tablet Take 1 Tab by mouth nightly. Qty: 30 Tab, Refills: 11      nitroglycerin (NITROSTAT) 0.4 mg SL tablet 1 Tab by SubLINGual route every five (5) minutes as needed for Chest Pain. Qty: 25 Tab, Refills: 11    Associated Diagnoses: Other chest pain      glucose blood VI test strips (BLOOD GLUCOSE TEST) strip Test once to twice daily DX: E11.8  Qty: 300 Strip, Refills: 3    Associated Diagnoses: Type 2 diabetes mellitus with complication, unspecified long term insulin use status      Lancets misc Test once to twice daily DX: E11.8  Qty: 300 Each, Refills: 3    Associated Diagnoses: Type 2 diabetes mellitus with complication, unspecified long term insulin use status      cholecalciferol, vitamin D3, (VITAMIN D3) 2,000 unit Tab Take 2,000 Units by mouth daily. Indications: OSTEOPOROSIS, am      Cetirizine (ZYRTEC) 10 mg Cap Take 10 mg by mouth nightly.  As needed  Indications: inflammation of the nose due to an allergy         STOP taking these medications       amLODIPine (NORVASC) 10 mg tablet Comments:   Reason for Stopping:

## 2020-02-10 NOTE — PROGRESS NOTES
Report on patient given to Silvestre Martines, [facility].

## 2020-02-10 NOTE — PROGRESS NOTES
Problem: Falls - Risk of  Goal: *Absence of Falls  Description  Document Anjelica Ramirez Fall Risk and appropriate interventions in the flowsheet.   Outcome: Progressing Towards Goal  Note: Fall Risk Interventions:            Medication Interventions: Teach patient to arise slowly

## 2020-02-10 NOTE — PROGRESS NOTES
This note will not be viewable in [address] Care Transition Nurse Hospital Outreach      Date/Time:  2/10/2020 12:04 PM    Patient is identified as High Risk for Readmission. Medical History: NStemi, dm2, non compliant with CPAP, CKD, HTN, CABG      Advanced Care Planning:    Does patient have an Advance Directive:  reviewed and current          Inpatient RUR score: 45  Was this a readmission? no       Care Transition Nurse (CTN) introduced self to the patient. Verified name and  with patient as identifiers. Provided explanation of the CTN role. Patients top risk factors for readmission:  medication management, depression, financial, functional cognitive ability, functional physical ability, ineffective coping, lack of knowledge about disease, level of motivation, medical condition, medication management, multi health system providers, PCP relationship, polypharmacy, stages of grief, support system, transportation, utilization of services    Medication Reconciliation:   Medication reconciliation was performed with patient, who verbalizes understanding of administration of home medications. There were barriers to obtaining medications identified at this time. Patient uses a delivery system for medications. Stated he has confusion with what his regimen is and would like to review after d/c. May need to fill meds in town at pharmacy after d/c    No current facility-administered medications for this visit. No current outpatient medications on file.      Facility-Administered Medications Ordered in Other Visits   Medication Dose Route Frequency    bisacodyL (DULCOLAX) tablet 5 mg  5 mg Oral DAILY PRN    heparin (porcine) 10,000 unit/mL injection 1,000-10,000 Units  1,000-10,000 Units IntraVENous Multiple    sodium chloride (NS) flush 5-40 mL  5-40 mL IntraVENous PRN    acetaminophen (TYLENOL) tablet 650 mg  650 mg Oral Q4H PRN    morphine injection 1 mg  1 mg IntraVENous Q4H PRN    nitroglycerin (NITROSTAT) tablet 0.4 mg  0.4 mg SubLINGual Q5MIN PRN    LORazepam (ATIVAN) tablet 1 mg  1 mg Oral Q6H PRN    rosuvastatin (CRESTOR) tablet 40 mg  40 mg Oral QHS    sodium chloride (NS) flush 5-40 mL  5-40 mL IntraVENous PRN    docusate sodium (COLACE) capsule 100 mg  100 mg Oral DAILY    aspirin chewable tablet 81 mg  81 mg Oral DAILY    carvediloL (COREG) tablet 12.5 mg  12.5 mg Oral BID WITH MEALS    loratadine (CLARITIN) tablet 10 mg  10 mg Oral DAILY    clopidogreL (PLAVIX) tablet 75 mg  75 mg Oral DAILY    gabapentin (NEURONTIN) capsule 300 mg  300 mg Oral TID    isosorbide mononitrate ER (IMDUR) tablet 60 mg  60 mg Oral DAILY    sodium chloride (NS) flush 5-40 mL  5-40 mL IntraVENous Q8H    sodium chloride (NS) flush 5-40 mL  5-40 mL IntraVENous PRN    ondansetron (ZOFRAN) injection 4 mg  4 mg IntraVENous Q4H PRN    naloxone (NARCAN) injection 0.4 mg  0.4 mg IntraVENous PRN    insulin lispro (HUMALOG) injection   SubCUTAneous AC&HS    alum-mag hydroxide-simeth (MYLANTA) oral suspension 30 mL  30 mL Oral Q4H PRN    diphenhydrAMINE (BENADRYL) capsule 25 mg  25 mg Oral Q6H PRN    losartan (COZAAR) tablet 50 mg  50 mg Oral DAILY       Discharge Plan: patient independent prior to admit. Patient current with cardiac rehab and plans to continue after d/c. Patient has confusion with medications and is unaware of what he takes at home. Plan to review at [facility] call. Goals      Identification of barriers to adherence to a plan of care such as inability to afford medications, lack of insurance, lack of transportation, etc.      Assess barriers to safe and effective d/c AEB    Patient able to obtain medicine after d/c    Patient able to verbalize medicine [facility]    Patient is aware and attends follow up appointments s/p d/c.

## 2020-02-11 VITALS
RESPIRATION RATE: 18 BRPM | WEIGHT: 174.7 LBS | DIASTOLIC BLOOD PRESSURE: 55 MMHG | BODY MASS INDEX: 24.37 KG/M2 | HEART RATE: 68 BPM | SYSTOLIC BLOOD PRESSURE: 93 MMHG | OXYGEN SATURATION: 97 % | TEMPERATURE: 97.8 F

## 2020-02-11 LAB
ANION GAP SERPL CALC-SCNC: 6 MMOL/L (ref 7–16)
BUN SERPL-MCNC: 17 MG/DL (ref 8–23)
CALCIUM SERPL-MCNC: 8.9 MG/DL (ref 8.3–10.4)
CHLORIDE SERPL-SCNC: 115 MMOL/L (ref 98–107)
CO2 SERPL-SCNC: 24 MMOL/L (ref 21–32)
CREAT SERPL-MCNC: 1.42 MG/DL (ref 0.8–1.5)
ERYTHROCYTE [DISTWIDTH] IN BLOOD BY AUTOMATED COUNT: 13.5 % (ref 11.9–14.6)
GLUCOSE BLD STRIP.AUTO-MCNC: 111 MG/DL (ref 65–100)
GLUCOSE BLD STRIP.AUTO-MCNC: 155 MG/DL (ref 65–100)
GLUCOSE SERPL-MCNC: 102 MG/DL (ref 65–100)
HCT VFR BLD AUTO: 35.7 % (ref 41.1–50.3)
HGB BLD-MCNC: 12.1 G/DL (ref 13.6–17.2)
MAGNESIUM SERPL-MCNC: 2 MG/DL (ref 1.8–2.4)
MCH RBC QN AUTO: 29.7 PG (ref 26.1–32.9)
MCHC RBC AUTO-ENTMCNC: 33.9 G/DL (ref 31.4–35)
MCV RBC AUTO: 87.5 FL (ref 79.6–97.8)
NRBC # BLD: 0 K/UL (ref 0–0.2)
PLATELET # BLD AUTO: 170 K/UL (ref 150–450)
PMV BLD AUTO: 11.9 FL (ref 9.4–12.3)
POTASSIUM SERPL-SCNC: 3.7 MMOL/L (ref 3.5–5.1)
RBC # BLD AUTO: 4.08 M/UL (ref 4.23–5.6)
SODIUM SERPL-SCNC: 145 MMOL/L (ref 136–145)
WBC # BLD AUTO: 4.5 K/UL (ref 4.3–11.1)

## 2020-02-11 PROCEDURE — 83735 ASSAY OF MAGNESIUM: CPT

## 2020-02-11 PROCEDURE — 80048 BASIC METABOLIC PNL TOTAL CA: CPT

## 2020-02-11 PROCEDURE — 36415 COLL VENOUS BLD VENIPUNCTURE: CPT

## 2020-02-11 PROCEDURE — 85027 COMPLETE CBC AUTOMATED: CPT

## 2020-02-11 PROCEDURE — 74011250637 HC RX REV CODE- 250/637: Performed by: NURSE PRACTITIONER

## 2020-02-11 PROCEDURE — 82962 GLUCOSE BLOOD TEST: CPT

## 2020-02-11 RX ORDER — LOSARTAN POTASSIUM 50 MG/1
50 TABLET ORAL DAILY
Qty: 30 TAB | Refills: 5 | Status: SHIPPED | OUTPATIENT
Start: 2020-02-11 | End: 2021-07-23 | Stop reason: SDUPTHER

## 2020-02-11 RX ADMIN — CLOPIDOGREL BISULFATE 75 MG: 75 TABLET, FILM COATED ORAL at 08:24

## 2020-02-11 RX ADMIN — ISOSORBIDE MONONITRATE 60 MG: 60 TABLET, EXTENDED RELEASE ORAL at 08:24

## 2020-02-11 RX ADMIN — LORATADINE 10 MG: 10 TABLET ORAL at 08:24

## 2020-02-11 RX ADMIN — LOSARTAN POTASSIUM 50 MG: 50 TABLET, FILM COATED ORAL at 08:24

## 2020-02-11 RX ADMIN — CARVEDILOL 12.5 MG: 12.5 TABLET, FILM COATED ORAL at 08:24

## 2020-02-11 RX ADMIN — Medication 10 ML: at 06:00

## 2020-02-11 RX ADMIN — ASPIRIN 81 MG 81 MG: 81 TABLET ORAL at 08:24

## 2020-02-11 RX ADMIN — DOCUSATE SODIUM 100 MG: 100 CAPSULE, LIQUID FILLED ORAL at 08:24

## 2020-02-11 RX ADMIN — GABAPENTIN 300 MG: 300 CAPSULE ORAL at 08:24

## 2020-02-11 NOTE — PROGRESS NOTES
Care Management Interventions  PCP Verified by CM: Yes  Last Visit to PCP: 11/27/19  Mode of Transport at Discharge:  Other (see comment)(Lisa Suárez Spouse [phone] )  Transition of Care Consult (CM Consult): Discharge Planning  Discharge Durable Medical Equipment: No  Physical Therapy Consult: No  Occupational Therapy Consult: No  Current Support Network: Lives with Spouse, Own Home  Confirm Follow Up Transport: Family  Discharge Location  Discharge Placement: Home

## 2020-02-11 NOTE — PROGRESS NOTES
Discharge instructions reviewed with patient and family. Prescriptions given for cozaar and med info sheets provided for all new medications. Opportunity for questions provided. Patient and family voiced understanding of all discharge instructions. L radial cath site CDI, pt voiced understanding of catheter site care.

## 2020-02-11 NOTE — DISCHARGE INSTRUCTIONS
Patient Education       DISCHARGE SUMMARY from Nurse    PATIENT INSTRUCTIONS:    After general anesthesia or intravenous sedation, for 24 hours or while taking prescription Narcotics:  · Limit your activities  · Do not drive and operate hazardous machinery  · Do not make important personal or business decisions  · Do  not drink alcoholic beverages  · If you have not urinated within 8 hours after discharge, please contact your surgeon on call. Report the following to your surgeon:  · Excessive pain, swelling, redness or odor of or around the surgical area  · Temperature over 100.5  · Nausea and vomiting lasting longer than 4 hours or if unable to take medications  · Any signs of decreased circulation or nerve impairment to extremity: change in color, persistent  numbness, tingling, coldness or increase pain  · Any questions    What to do at Home:  Recommended activity: No lifting, Driving, or Strenuous exercise for 5 days    If you experience any of the following symptoms chest pain, shortness of breath, drainage at puncture site please follow up with [facility]. *  Please give a list of your current medications to your Primary Care Provider. *  Please update this list whenever your medications are discontinued, doses are      changed, or new medications (including over-the-counter products) are added. *  Please carry medication information at all times in case of emergency situations. These are general instructions for a healthy lifestyle:    No smoking/ No tobacco products/ Avoid exposure to second hand smoke  Surgeon General's Warning:  Quitting smoking now greatly reduces serious risk to your health.     Obesity, smoking, and sedentary lifestyle greatly increases your risk for illness    A healthy diet, regular physical exercise & weight monitoring are important for maintaining a healthy lifestyle    You may be retaining fluid if you have a history of heart failure or if you experience any of the following symptoms:  Weight gain of 3 pounds or more overnight or 5 pounds in a week, increased swelling in our hands or feet or shortness of breath while lying flat in bed. Please call your doctor as soon as you notice any of these symptoms; do not wait until your next office visit. The discharge information has been reviewed with the patient. The patient verbalized understanding. Discharge medications reviewed with the patient and appropriate educational materials and side effects teaching were provided. ___________________________________________________________________________________________________________________________________  Learning About Coronary Artery Disease (CAD)  What is coronary artery disease? Coronary artery disease (CAD) occurs when plaque builds up in the arteries that bring oxygen-rich blood to your heart. Plaque is a fatty substance made of cholesterol, calcium, and other substances in the blood. This process is called hardening of the arteries, or atherosclerosis. What happens when you have coronary artery disease? · Plaque may narrow the coronary arteries. Narrowed arteries cause poor blood flow. This can lead to angina symptoms such as chest pain or discomfort. If blood flow is completely blocked, you could have a heart attack. · You can slow CAD and reduce the risk of future problems by making changes in your lifestyle. These include quitting smoking and eating heart-healthy foods. · Treatments for CAD, along with changes in your lifestyle, can help you live a longer and healthier life. How can you prevent coronary artery disease? · Do not smoke. It may be the best thing you can do to prevent heart disease. If you need help quitting, talk to your doctor about stop-smoking programs and medicines. These can increase your chances of quitting for good. · Be active. Get at least 30 minutes of exercise on most days of the week. Walking is a good choice.  You also may want to do other activities, such as running, swimming, cycling, or playing tennis or team sports. · Eat heart-healthy foods. Eat more fruits and vegetables and less foods that contain saturated and trans fats. Limit alcohol, sodium, and sweets. · Stay at a healthy weight. Lose weight if you need to. · Manage other health problems such as diabetes, high blood pressure, and high cholesterol. How is coronary artery disease treated? · Your doctor will suggest that you make lifestyle changes. For example, your doctor may ask you to eat healthy foods, quit smoking, lose extra weight, and be more active. · You will have to take medicines. · Your doctor may suggest a procedure to open narrowed or blocked arteries. This is called angioplasty. Or your doctor may suggest using healthy blood vessels to create detours around narrowed or blocked arteries. This is called bypass surgery. Follow-up care is a key part of your treatment and safety. Be sure to make and go to all appointments, and call your doctor if you are having problems. It's also a good idea to know your test results and keep a list of the medicines you take. Where can you learn more? Go to http://maykel-xander.info/. Enter (15) [phone] in the search box to learn more about \"Learning About Coronary Artery Disease (CAD). \"  Current as of: April 9, 2019  Content Version: 12.2  © 1305-5800 [employer]. Care instructions adapted under license by [employer] (which disclaims liability or warranty for this information). If you have questions about a medical condition or this instruction, always ask your healthcare professional. [place] [address] any warranty or liability for your use of this information.

## 2020-02-13 ENCOUNTER — PATIENT OUTREACH (OUTPATIENT)
Dept: CASE MANAGEMENT | Age: 75
End: 2020-02-13

## 2020-02-13 NOTE — PROGRESS NOTES
CTN reached out to patient for [facility] call and request to review medications. Patient stated he was doing well and has picked up a prescription but would prefer CTN call back when spouse was able to speak about medications as she handles patients medication regimen. CTN to return call later this afternoon.

## 2020-02-17 ENCOUNTER — HOSPITAL ENCOUNTER (OUTPATIENT)
Dept: SURGERY | Age: 75
Discharge: HOME | End: 2020-02-17

## 2020-02-17 ENCOUNTER — PATIENT OUTREACH (OUTPATIENT)
Dept: CASE MANAGEMENT | Age: 75
End: 2020-02-17

## 2020-02-17 NOTE — PERIOP NOTES
Noted in cc pt had went [facility]-- had mi with stent on 2/10/20-- called pt-- sttaes he was unsure about surg with dr Collin Fuentes-- no one had mentioned anything--- spoke to dr Sprague Heading over the phone-- states he would like to wait for arteriogram x 6 months if possible-- for me to call office and tell them unless its an emergency-- to moved surg out x 6 months-- called office-- left message for  line-- also left message that they needed to call pt to let him know their plans

## 2020-02-18 ENCOUNTER — HOSPITAL ENCOUNTER (OUTPATIENT)
Dept: CARDIAC REHAB | Age: 75
Discharge: HOME | End: 2020-02-18

## 2020-02-18 ENCOUNTER — PATIENT OUTREACH (OUTPATIENT)
Dept: CASE MANAGEMENT | Age: 75
End: 2020-02-18

## 2020-02-18 NOTE — PROGRESS NOTES
This note will not be viewable in [address] Patient at cardiology appt at time of call, patient attend cardiac rehab today as well.  CTN to follow up and review on 2/20 if able to contact patient

## 2020-02-18 NOTE — CARDIO/PULMONARY
Dear Dr. Shakira Crook: Thank you for referring your patient, Dean Ferreira (: 1945), to the Cardiopulmonary Rehabilitation Program at [facility].  Mr. Rafia Dietrich is a good candidate for the Cardiac Rehab Program and should see improvements with regular participation. We will be addressing appropriate interventions for modifiable risk factors with your patient during the next 12 weeks. We will contact you with any issues or concerns that may arise, or you can follow your patients progress through [facility] at any time. A final summary will be available on [facility] when the program is completed. Again, thank you for your referral. If we can be of further assistance, please feel free to contact the Cardiopulmonary Rehab staff at [phone].     Sincerely,    AMADO White, RN  Cardiopulmonary Rehabilitation Nurse  [facility]

## 2020-02-20 ENCOUNTER — PATIENT OUTREACH (OUTPATIENT)
Dept: CASE MANAGEMENT | Age: 75
End: 2020-02-20

## 2020-02-20 NOTE — PROGRESS NOTES
This note will not be viewable in [address] Transition of Care Hospital Discharge Follow-Up      Date/Time:  2020 9:54 AM    Patient was admitted to [facility] on  and discharged on  for NSTEMI. The physician discharge summary was available at the time of outreach. Patient was contacted within 1 business days of discharge. Advance Care Planning:   Does patient have an Advance Directive:  reviewed and current          Inpatient RUR score: 38%  Was this a readmission? no   Patient stated reason for the readmission: heart attack    Care Transition Nurse (CTN) contacted the patient by telephone to perform post hospital discharge assessment. Verified name and  with patient as identifiers. Provided introduction to self, and explanation of the CTN role. Patient received hospital discharge instructions. CTN reviewed discharge instructions and red flags with patient who verbalized understanding. Patient given an opportunity to ask questions and does not have any further questions or concerns at this time. The patient agrees to contact the PCP office for questions related to their healthcare. CTN provided contact information for future reference. Disease Specific:   NSTEMI    Patients top risk factors for readmission:  medical condition, medication management  Home Health orders at discharge: [address]: na  Date of initial visit: na    Durable Medical Equipment ordered at discharge: [id number]  received: na    Medication(s):   Medications at Discharge: yes    Medication reconciliation was performed with patient, who verbalizes understanding of administration of home medications. There were no barriers to obtaining medications identified at this time. Current Outpatient Medications   Medication Sig    losartan (COZAAR) 50 mg tablet Take 1 Tab by mouth daily.     isosorbide mononitrate ER (IMDUR) 60 mg CR tablet Take 1 Tab by mouth daily.  acetaminophen (TYLENOL) 325 mg tablet Take 2 Tabs by mouth every four (4) hours as needed for Pain.  rosuvastatin (CRESTOR) 20 mg tablet Take 1 Tab by mouth nightly.  aspirin 81 mg chewable tablet Take 1 Tab by mouth daily.  clopidogrel (PLAVIX) 75 mg tab Take 1 Tab by mouth daily.  nitroglycerin (NITROSTAT) 0.4 mg SL tablet 1 Tab by SubLINGual route every five (5) minutes as needed for Chest Pain.  carvedilol (COREG) 12.5 mg tablet Take 1 Tab by mouth two (2) times daily (with meals).  gabapentin (NEURONTIN) 300 mg capsule Take 1 Cap by mouth four (4) times daily. (Patient taking differently: Take 300 mg by mouth three (3) times daily.)    glucose blood VI test strips (BLOOD GLUCOSE TEST) strip Test once to twice daily DX: E11.8    Lancets misc Test once to twice daily DX: E11.8    cholecalciferol, vitamin D3, (VITAMIN D3) 2,000 unit Tab Take 2,000 Units by mouth daily. Indications: OSTEOPOROSIS, am    Cetirizine (ZYRTEC) 10 mg Cap Take 10 mg by mouth nightly. As needed  Indications: inflammation of the nose due to an allergy     No current facility-administered medications for this visit. There are no discontinued medications.     [facility] follow up appointment(s):   Future Appointments   Date Time Provider Panchito Flood   2/21/2020 11:00 AM Mally Dill, JOSE MARTIN SISTER [facility]   2/24/2020  1:00 PM Yonatan Bile [facility]   2/26/2020  1:00 PM Yonatan Bile [facility]   2/28/2020  1:00 PM Yonatan Bile [facility]   3/2/2020  1:00 PM Yonatan Bile [facility]   3/4/2020  1:00 PM Yonatan Bile [facility]   3/6/2020  1:00 PM Yonatan Bile [facility]   3/9/2020  1:00 PM Yonatan Bile [facility]   3/11/2020  1:00 PM Yonatan Bile [facility]   3/13/2020  1:00 PM [facility]   3/16/2020  1:00 PM Panchito Calabrese, [address] [facility]   3/18/2020  1:00 PM Carlie Rinne [facility]   3/20/2020  1:00 PM Carlie Rinne [facility]   3/23/2020  1:00 PM Carlie Rinne [facility]   3/25/2020  1:00 PM Violet Fosterne [facility]   3/27/2020  1:00 PM Carlie Rinne [facility]   3/30/2020  1:00 PM Carlie Rinne [facility]   4/1/2020  1:00 PM Carlie Rinne [facility]   4/3/2020  1:00 PM Carlie Rinne [facility]   4/6/2020  1:00 PM Carlie Rinne [facility]   4/8/2020  1:00 PM Carlie Rinne [facility]   4/13/2020  1:00 PM Carlie Rinne [facility]   4/15/2020  1:00 PM Carlie Rinne [facility]   4/17/2020  1:00 PM Carlie Rinne [facility]   4/20/2020  1:00 PM Carlie Rinne [facility]   4/22/2020  9:45 AM [id number] BLOOD DRAW [id number] PGU   4/22/2020  1:00 PM Carlie Rinne [facility]   4/24/2020  1:00 PM Carlie Rinne [facility]   4/27/2020  1:00 PM Carlie Rinne [facility]   4/29/2020  8:45 AM Carl Patel MD [id number] PGU   4/29/2020  1:00 PM Carlie Rinne [facility]   5/1/2020  1:00 PM Carlie Rinne [facility]   5/4/2020  1:00 PM Carlie Rinne [facility]   5/6/2020  1:00 PM Carlie Rinne [facility]   5/8/2020  1:00 PM Carlie Rinne [facility]   5/18/2020  9:45 AM Michael Manning MD SSA [facility]      Non-[facility] follow up appointment(s): chaim  7 Day follow up with PCP or Specialist: 2/18 cardiology visit -attended  Transportation: yes  Patient has attended his cardiology follow up. Patient has also attended cardiac rehab and is scheduled to start his 3x a week routine tomorrow. Patient stated he was feeling well and looks forward to starting activity. Patient compliant with medications and follow up.           Goals      Identification of barriers to adherence to a plan of care such as inability to afford medications, lack of insurance, lack of transportation, etc.      Assess barriers to safe and effective d/c AEB    Patient able to obtain medicine after d/c    Patient able to verbalize medicine cahnges    Patient is aware and attends follow up appointments s/p d/c. Next Outreach Scheduled: 2 weeks- contact information left with patient and cardiology number given as well.

## 2020-02-21 ENCOUNTER — HOSPITAL ENCOUNTER (OUTPATIENT)
Dept: CARDIAC REHAB | Age: 75
End: 2020-02-21

## 2020-02-24 ENCOUNTER — HOSPITAL ENCOUNTER (OUTPATIENT)
Dept: CARDIAC REHAB | Age: 75
Discharge: HOME | End: 2020-02-24
Payer: MEDICARE

## 2020-02-24 ENCOUNTER — APPOINTMENT (OUTPATIENT)
Dept: CARDIAC REHAB | Age: 75
End: 2020-02-24
Payer: MEDICARE

## 2020-02-24 VITALS — WEIGHT: 184.4 LBS | BODY MASS INDEX: 25.81 KG/M2 | HEIGHT: 71 IN

## 2020-02-24 PROCEDURE — 93798 PHYS/QHP OP CAR RHAB W/ECG: CPT

## 2020-02-26 ENCOUNTER — HOSPITAL ENCOUNTER (OUTPATIENT)
Dept: CARDIAC REHAB | Age: 75
Discharge: HOME | End: 2020-02-26
Payer: MEDICARE

## 2020-02-26 VITALS — WEIGHT: 184.4 LBS | BODY MASS INDEX: 25.72 KG/M2

## 2020-02-26 PROCEDURE — 93798 PHYS/QHP OP CAR RHAB W/ECG: CPT

## 2020-02-28 ENCOUNTER — APPOINTMENT (OUTPATIENT)
Dept: CARDIAC REHAB | Age: 75
End: 2020-02-28
Payer: MEDICARE

## 2020-03-02 ENCOUNTER — HOSPITAL ENCOUNTER (OUTPATIENT)
Dept: CARDIAC REHAB | Age: 75
Discharge: HOME | End: 2020-03-02
Payer: MEDICARE

## 2020-03-02 PROCEDURE — 93798 PHYS/QHP OP CAR RHAB W/ECG: CPT

## 2020-03-03 ENCOUNTER — PATIENT OUTREACH (OUTPATIENT)
Dept: CASE MANAGEMENT | Age: 75
End: 2020-03-03

## 2020-03-03 NOTE — PROGRESS NOTES
This note will not be viewable in [address] CTN called for follow up on [facility]. Patient was resting but spouse answered the call. Patient has been to cardiology appt 2/18, and has been attending cardiac rehab over the past few weeks. Spouse stated that patient has been doing well with cardiac rehab. Patient continues to have vascular issues and is awaiting a follow up appt with Dr Mra Escobedo that was rescheduled while patient was in hospital. Patient saw PCP yesterday without changes or acute issues. Patient to see Vascular 3/11 and has transportation. Patient has been compliant with medications. CTN left contact number and encouraged to call with any questions. Spouse did not have any questions or concerns at this time. CTN to close episode and remove self from care team 30 days s/p d/c.

## 2020-03-04 ENCOUNTER — HOSPITAL ENCOUNTER (OUTPATIENT)
Dept: CARDIAC REHAB | Age: 75
Discharge: HOME | End: 2020-03-04
Payer: MEDICARE

## 2020-03-04 VITALS — WEIGHT: 182.2 LBS | BODY MASS INDEX: 25.41 KG/M2

## 2020-03-04 PROCEDURE — 93798 PHYS/QHP OP CAR RHAB W/ECG: CPT

## 2020-03-04 NOTE — PROGRESS NOTES
69year old s/p PCI on 9/25/19, and NSTEMI/PCI on 2/8/20 enrolled in cardiac rehabilitation, seen today for  nutrition counseling accompanied by his spouse Alysha Engle.     Stated Nutrition Goals: to improve strength and stamina, and learn how to bad cholesterol.      Medical History: Arthritis, CAD, CKD, CVA, DM, GERD, gout, HTN, CA     Nutrition related medications/supplements: betablocker, prilosec, statin, Vitamin D3.      Nutrition Related Labs: 10/24/19 GFR >60, 10/24/19 BS= 170 (H),   K= 3.0 (L) ,  9/25/19  (H), .4 (H). A1C= 7.1  Blood Sugar Monitoring: pt does not check his BS regularly.      Social History: Pt is retired, lives with spouse Alysha Engle.     Physical Activity: Rehabilitation 3x per week, off days pt is sedentary.      Food and Nutrition Intake History:   Spouse cooks meals at home, eats out 1x every other week, however will bring home take out. Rarely considers sodium in foods and eats 0-1 cups of fruits and vegetables per day, and rarely eats fish. Eats 2 full meal per day he says. Ate a sandwich before coming to exercise today. Prefers processed meats such as sausage and fried as his cooking method and high sodium condiments such as barbeque sauce and ketchup. He knows he has to make a change but is not convinced he can do it. Stated 1 day diet recall includes   Breakfast: Whole grain cereal + milk, coffee with 1 tablespoon sugar. Snack: 1 pack of club crackers  Lunch; pork sandwich on white bread, and reconstituted lemonade (with added sugar and water). Snack: deli meat with lemonade  Dinner: 2 slices of pepperoni pizza (take out), with lemonade  PM snack: handful of grapes.     Beverages: lemonade, water and  4 ounces of milk  Alcohol use: occasionally 1x per week.     GI Hx: /Digestive Issues: GERD, constipation.      Anthropometric Data:  BMI: 25.38 kg/m²- BMI class overweight for age. Height: 5' 11\" (1.803 m). Weight: 82.6 kg (182 lb).      Waist measurement (inches): 37    Estimated Nutritional Needs:  Calories: MSJ x 1.2  (sedentary)= 1900kcal/day for weight maintenance:   Protein: 95g (20% of estimated energy low end needs)  CHO: 214g  (45% of estimated low end energy needs)  Stage of Behavior Change: Contemplation      Nutrition Diagnosis:    Excess intake of nutrients sodium and added sugars related to food choices and nutrition related knowledge evidenced by food recall revealing intake of processed carbohydrates/added sugars and elevated A1C     Nutrition Intervention:  1. Nutrition Education: Educated patient and spouse on cardioprotective meal pattern/Mediterranean meal pattern including  · Guidelines for saturated fat (<7% total calories), sodium ( < 2000mg/day or follow MD recommendations), and added sugars ( < 25 grams for women/<35 grams for men) and high sources of each reviewed  · Emphasis on cardioprotective benefits of daily intake of plant-focused eating pattern. · Demonstrated portions sizes  · Sodium education ; \"salty six\" foods, food label reading, low sodium food sources and meal planning. · Healthy protein swap ideas provided today.         2. Goals Setting: Set specific, proximal goals in collaboration with patient, including personalized plan that patient can achieve his/her goals during cardiac rehabilitation.         Handouts provided for home use:   · Lab/body comp with values   · Mediterranean eal pattern with 1 day sample menu.   · [employer] plate method  · Tips for reducing sodium  · Heart Healthy Protein ideas      Nutrition Goals:   · To improve diet quality, by decreasing intake of sodium and added sugars and by increasing intake of f/v by end of cardiac/ pulmonary rehabilitation.      Monitoring/Evaluation: RD to follow up with participant during rehab sessions for questions and assessment of progression toward goals.     Compliance: Pt agreeable \"to reduce added sugars from sweetened drinks\" and sodium from snacks and deli meats/processed meats. Barriers: confidence- feels overwhelmed by the changes he needs to make.   We spoke about small, gradual changes to build confidence.      Ilya Egan, MS, RD, LD  Cardiac/Pulmonary Rehab Dietitian

## 2020-03-06 ENCOUNTER — HOSPITAL ENCOUNTER (OUTPATIENT)
Dept: CARDIAC REHAB | Age: 75
Discharge: HOME | End: 2020-03-06
Payer: MEDICARE

## 2020-03-06 PROCEDURE — 93798 PHYS/QHP OP CAR RHAB W/ECG: CPT

## 2020-03-09 ENCOUNTER — HOSPITAL ENCOUNTER (OUTPATIENT)
Dept: CARDIAC REHAB | Age: 75
Discharge: HOME | End: 2020-03-09
Payer: MEDICARE

## 2020-03-09 PROCEDURE — 93798 PHYS/QHP OP CAR RHAB W/ECG: CPT

## 2020-03-10 ENCOUNTER — PATIENT OUTREACH (OUTPATIENT)
Dept: CASE MANAGEMENT | Age: 75
End: 2020-03-10

## 2020-03-10 NOTE — PROGRESS NOTES
This note will not be viewable in [address] CNT to close FRED episode. Goals met, patient has been attending Cardiac rehab, compliant with medications and md follow ups. No barriers assessed at this time.  Complete episode and remove CTN from care team

## 2020-03-11 ENCOUNTER — HOSPITAL ENCOUNTER (OUTPATIENT)
Dept: CARDIAC REHAB | Age: 75
Discharge: HOME | End: 2020-03-11
Payer: MEDICARE

## 2020-03-11 VITALS — WEIGHT: 183 LBS | BODY MASS INDEX: 25.52 KG/M2

## 2020-03-11 PROCEDURE — 93798 PHYS/QHP OP CAR RHAB W/ECG: CPT

## 2020-03-13 ENCOUNTER — HOSPITAL ENCOUNTER (OUTPATIENT)
Dept: CARDIAC REHAB | Age: 75
Discharge: HOME | End: 2020-03-13
Payer: MEDICARE

## 2020-03-13 PROCEDURE — 93798 PHYS/QHP OP CAR RHAB W/ECG: CPT

## 2020-03-16 ENCOUNTER — HOSPITAL ENCOUNTER (OUTPATIENT)
Dept: CARDIAC REHAB | Age: 75
Discharge: HOME | End: 2020-03-16
Payer: MEDICARE

## 2020-03-16 PROCEDURE — 93798 PHYS/QHP OP CAR RHAB W/ECG: CPT

## 2020-04-15 ENCOUNTER — APPOINTMENT (OUTPATIENT)
Dept: CARDIAC REHAB | Age: 75
End: 2020-04-15

## 2020-04-17 ENCOUNTER — APPOINTMENT (OUTPATIENT)
Dept: CARDIAC REHAB | Age: 75
End: 2020-04-17

## 2020-04-20 ENCOUNTER — APPOINTMENT (OUTPATIENT)
Dept: CARDIAC REHAB | Age: 75
End: 2020-04-20

## 2020-04-22 ENCOUNTER — APPOINTMENT (OUTPATIENT)
Dept: CARDIAC REHAB | Age: 75
End: 2020-04-22

## 2020-04-24 ENCOUNTER — APPOINTMENT (OUTPATIENT)
Dept: CARDIAC REHAB | Age: 75
End: 2020-04-24

## 2020-04-27 ENCOUNTER — APPOINTMENT (OUTPATIENT)
Dept: CARDIAC REHAB | Age: 75
End: 2020-04-27

## 2020-04-29 ENCOUNTER — APPOINTMENT (OUTPATIENT)
Dept: CARDIAC REHAB | Age: 75
End: 2020-04-29

## 2020-04-30 ENCOUNTER — TELEPHONE (OUTPATIENT)
Dept: CARDIAC REHAB | Age: 75
End: 2020-04-30

## 2020-04-30 NOTE — TELEPHONE ENCOUNTER
Called patient to see if he is interested in restarting cardiac rehab. States he would like to come back to exercise. Will call him next week to confirm time.

## 2020-05-08 ENCOUNTER — APPOINTMENT (OUTPATIENT)
Dept: CARDIAC REHAB | Age: 75
End: 2020-05-08
Payer: MEDICARE

## 2020-05-11 ENCOUNTER — HOSPITAL ENCOUNTER (OUTPATIENT)
Dept: CARDIAC REHAB | Age: 75
Discharge: HOME | End: 2020-05-11
Payer: MEDICARE

## 2020-05-11 VITALS — BODY MASS INDEX: 25.61 KG/M2 | WEIGHT: 183.6 LBS

## 2020-05-11 PROCEDURE — 93798 PHYS/QHP OP CAR RHAB W/ECG: CPT

## 2020-05-13 ENCOUNTER — HOSPITAL ENCOUNTER (OUTPATIENT)
Dept: CARDIAC REHAB | Age: 75
Discharge: HOME | End: 2020-05-13
Payer: MEDICARE

## 2020-05-13 PROCEDURE — 93798 PHYS/QHP OP CAR RHAB W/ECG: CPT

## 2020-05-15 ENCOUNTER — HOSPITAL ENCOUNTER (OUTPATIENT)
Dept: CARDIAC REHAB | Age: 75
Discharge: HOME | End: 2020-05-15
Payer: MEDICARE

## 2020-05-15 PROCEDURE — 93798 PHYS/QHP OP CAR RHAB W/ECG: CPT

## 2020-05-18 ENCOUNTER — APPOINTMENT (OUTPATIENT)
Dept: CARDIAC REHAB | Age: 75
End: 2020-05-18
Payer: MEDICARE

## 2020-05-20 ENCOUNTER — HOSPITAL ENCOUNTER (OUTPATIENT)
Dept: CARDIAC REHAB | Age: 75
Discharge: HOME | End: 2020-05-20
Payer: MEDICARE

## 2020-05-20 VITALS — WEIGHT: 182.8 LBS | BODY MASS INDEX: 25.5 KG/M2

## 2020-05-20 PROCEDURE — 93798 PHYS/QHP OP CAR RHAB W/ECG: CPT

## 2020-05-22 ENCOUNTER — HOSPITAL ENCOUNTER (OUTPATIENT)
Dept: CARDIAC REHAB | Age: 75
Discharge: HOME | End: 2020-05-22
Payer: MEDICARE

## 2020-05-22 PROCEDURE — 93798 PHYS/QHP OP CAR RHAB W/ECG: CPT

## 2020-05-27 ENCOUNTER — HOSPITAL ENCOUNTER (OUTPATIENT)
Dept: CARDIAC REHAB | Age: 75
Discharge: HOME | End: 2020-05-27
Payer: MEDICARE

## 2020-05-27 VITALS — BODY MASS INDEX: 25.1 KG/M2 | WEIGHT: 180 LBS

## 2020-05-27 PROCEDURE — 93798 PHYS/QHP OP CAR RHAB W/ECG: CPT

## 2020-05-29 ENCOUNTER — HOSPITAL ENCOUNTER (OUTPATIENT)
Dept: CARDIAC REHAB | Age: 75
Discharge: HOME | End: 2020-05-29
Payer: MEDICARE

## 2020-05-29 PROBLEM — I50.20 HFREF (HEART FAILURE WITH REDUCED EJECTION FRACTION) (HCC): Status: ACTIVE | Noted: 2020-05-29

## 2020-05-29 PROCEDURE — 93798 PHYS/QHP OP CAR RHAB W/ECG: CPT

## 2020-06-01 ENCOUNTER — HOSPITAL ENCOUNTER (OUTPATIENT)
Dept: CARDIAC REHAB | Age: 75
Discharge: HOME | End: 2020-06-01
Payer: MEDICARE

## 2020-06-01 PROCEDURE — 93798 PHYS/QHP OP CAR RHAB W/ECG: CPT

## 2020-06-03 ENCOUNTER — HOSPITAL ENCOUNTER (OUTPATIENT)
Dept: CARDIAC REHAB | Age: 75
Discharge: HOME | End: 2020-06-03
Payer: MEDICARE

## 2020-06-03 PROBLEM — I73.9 PERIPHERAL ARTERY DISEASE (HCC): Status: ACTIVE | Noted: 2020-06-03

## 2020-06-03 PROCEDURE — 93798 PHYS/QHP OP CAR RHAB W/ECG: CPT

## 2020-06-05 ENCOUNTER — HOSPITAL ENCOUNTER (OUTPATIENT)
Dept: CARDIAC REHAB | Age: 75
Discharge: HOME | End: 2020-06-05
Payer: MEDICARE

## 2020-06-05 VITALS — WEIGHT: 181.2 LBS | BODY MASS INDEX: 25.27 KG/M2

## 2020-06-05 PROCEDURE — 93798 PHYS/QHP OP CAR RHAB W/ECG: CPT

## 2020-06-08 ENCOUNTER — HOSPITAL ENCOUNTER (OUTPATIENT)
Dept: CARDIAC REHAB | Age: 75
Discharge: HOME | End: 2020-06-08
Payer: MEDICARE

## 2020-06-08 PROCEDURE — 93798 PHYS/QHP OP CAR RHAB W/ECG: CPT

## 2020-06-10 ENCOUNTER — APPOINTMENT (OUTPATIENT)
Dept: CARDIAC REHAB | Age: 75
End: 2020-06-10
Payer: MEDICARE

## 2020-06-12 ENCOUNTER — HOSPITAL ENCOUNTER (OUTPATIENT)
Dept: CARDIAC REHAB | Age: 75
Discharge: HOME | End: 2020-06-12
Payer: MEDICARE

## 2020-06-12 PROCEDURE — 93798 PHYS/QHP OP CAR RHAB W/ECG: CPT

## 2020-06-15 ENCOUNTER — APPOINTMENT (OUTPATIENT)
Dept: CARDIAC REHAB | Age: 75
End: 2020-06-15
Payer: MEDICARE

## 2020-06-17 ENCOUNTER — HOSPITAL ENCOUNTER (OUTPATIENT)
Dept: CARDIAC REHAB | Age: 75
Discharge: HOME | End: 2020-06-17
Payer: MEDICARE

## 2020-06-17 PROCEDURE — 93798 PHYS/QHP OP CAR RHAB W/ECG: CPT

## 2020-06-19 ENCOUNTER — HOSPITAL ENCOUNTER (OUTPATIENT)
Dept: CARDIAC REHAB | Age: 75
Discharge: HOME | End: 2020-06-19
Payer: MEDICARE

## 2020-06-19 VITALS — WEIGHT: 181.8 LBS | BODY MASS INDEX: 25.36 KG/M2

## 2020-06-19 PROCEDURE — 93798 PHYS/QHP OP CAR RHAB W/ECG: CPT

## 2020-06-22 ENCOUNTER — HOSPITAL ENCOUNTER (OUTPATIENT)
Dept: CARDIAC REHAB | Age: 75
Discharge: HOME | End: 2020-06-22
Payer: MEDICARE

## 2020-06-22 PROCEDURE — 93798 PHYS/QHP OP CAR RHAB W/ECG: CPT

## 2020-06-24 ENCOUNTER — HOSPITAL ENCOUNTER (OUTPATIENT)
Dept: CARDIAC REHAB | Age: 75
Discharge: HOME | End: 2020-06-24
Payer: MEDICARE

## 2020-06-24 VITALS — BODY MASS INDEX: 25.36 KG/M2 | WEIGHT: 181.8 LBS

## 2020-06-24 PROCEDURE — 93798 PHYS/QHP OP CAR RHAB W/ECG: CPT

## 2020-06-26 ENCOUNTER — HOSPITAL ENCOUNTER (OUTPATIENT)
Dept: CARDIAC REHAB | Age: 75
Discharge: HOME | End: 2020-06-26
Payer: MEDICARE

## 2020-06-26 PROCEDURE — 93798 PHYS/QHP OP CAR RHAB W/ECG: CPT

## 2020-06-29 ENCOUNTER — HOSPITAL ENCOUNTER (OUTPATIENT)
Dept: CARDIAC REHAB | Age: 75
Discharge: HOME | End: 2020-06-29
Payer: MEDICARE

## 2020-06-29 PROCEDURE — 93798 PHYS/QHP OP CAR RHAB W/ECG: CPT

## 2020-07-01 ENCOUNTER — HOSPITAL ENCOUNTER (OUTPATIENT)
Dept: CARDIAC REHAB | Age: 75
Discharge: HOME | End: 2020-07-01
Payer: MEDICARE

## 2020-07-01 VITALS — BODY MASS INDEX: 25.44 KG/M2 | WEIGHT: 182.4 LBS

## 2020-07-01 PROCEDURE — 93798 PHYS/QHP OP CAR RHAB W/ECG: CPT

## 2020-07-03 ENCOUNTER — APPOINTMENT (OUTPATIENT)
Dept: CARDIAC REHAB | Age: 75
End: 2020-07-03
Payer: MEDICARE

## 2020-07-06 ENCOUNTER — HOSPITAL ENCOUNTER (OUTPATIENT)
Dept: CARDIAC REHAB | Age: 75
Discharge: HOME | End: 2020-07-06
Payer: MEDICARE

## 2020-07-06 PROCEDURE — 93798 PHYS/QHP OP CAR RHAB W/ECG: CPT

## 2020-07-08 ENCOUNTER — HOSPITAL ENCOUNTER (OUTPATIENT)
Dept: CARDIAC REHAB | Age: 75
Discharge: HOME | End: 2020-07-08
Payer: MEDICARE

## 2020-07-08 VITALS — WEIGHT: 183 LBS | BODY MASS INDEX: 25.52 KG/M2

## 2020-07-08 PROCEDURE — 93798 PHYS/QHP OP CAR RHAB W/ECG: CPT

## 2020-07-10 ENCOUNTER — APPOINTMENT (OUTPATIENT)
Dept: CARDIAC REHAB | Age: 75
End: 2020-07-10
Payer: MEDICARE

## 2020-07-13 ENCOUNTER — HOSPITAL ENCOUNTER (OUTPATIENT)
Dept: CARDIAC REHAB | Age: 75
Discharge: HOME | End: 2020-07-13
Payer: MEDICARE

## 2020-07-13 VITALS — WEIGHT: 179.2 LBS | BODY MASS INDEX: 24.99 KG/M2

## 2020-07-13 PROCEDURE — 93798 PHYS/QHP OP CAR RHAB W/ECG: CPT

## 2020-07-15 ENCOUNTER — HOSPITAL ENCOUNTER (OUTPATIENT)
Dept: CARDIAC REHAB | Age: 75
Discharge: HOME | End: 2020-07-15
Payer: MEDICARE

## 2020-07-15 PROCEDURE — 93798 PHYS/QHP OP CAR RHAB W/ECG: CPT

## 2020-07-17 ENCOUNTER — HOSPITAL ENCOUNTER (OUTPATIENT)
Dept: CARDIAC REHAB | Age: 75
Discharge: HOME | End: 2020-07-17
Payer: MEDICARE

## 2020-07-17 PROCEDURE — 93798 PHYS/QHP OP CAR RHAB W/ECG: CPT

## 2020-07-20 ENCOUNTER — HOSPITAL ENCOUNTER (OUTPATIENT)
Dept: CARDIAC REHAB | Age: 75
Discharge: HOME | End: 2020-07-20
Payer: MEDICARE

## 2020-07-20 PROCEDURE — 93798 PHYS/QHP OP CAR RHAB W/ECG: CPT

## 2020-07-22 ENCOUNTER — HOSPITAL ENCOUNTER (OUTPATIENT)
Dept: CARDIAC REHAB | Age: 75
Discharge: HOME | End: 2020-07-22
Payer: MEDICARE

## 2020-07-22 VITALS — WEIGHT: 178.2 LBS | BODY MASS INDEX: 24.85 KG/M2

## 2020-07-22 PROCEDURE — 93798 PHYS/QHP OP CAR RHAB W/ECG: CPT

## 2020-07-24 ENCOUNTER — HOSPITAL ENCOUNTER (OUTPATIENT)
Dept: CARDIAC REHAB | Age: 75
Discharge: HOME | End: 2020-07-24
Payer: MEDICARE

## 2020-07-24 PROCEDURE — 93798 PHYS/QHP OP CAR RHAB W/ECG: CPT

## 2020-08-12 ENCOUNTER — APPOINTMENT (OUTPATIENT)
Dept: GENERAL RADIOLOGY | Age: 75
End: 2020-08-12
Attending: EMERGENCY MEDICINE
Payer: MEDICARE

## 2020-08-12 ENCOUNTER — HOSPITAL ENCOUNTER (EMERGENCY)
Age: 75
Discharge: HOME | End: 2020-08-13
Attending: EMERGENCY MEDICINE
Payer: MEDICARE

## 2020-08-12 DIAGNOSIS — I25.810 CORONARY ARTERY DISEASE INVOLVING CORONARY BYPASS GRAFT OF NATIVE HEART, ANGINA PRESENCE UNSPECIFIED: ICD-10-CM

## 2020-08-12 DIAGNOSIS — R07.89 ATYPICAL CHEST PAIN: Primary | ICD-10-CM

## 2020-08-12 LAB
ALBUMIN SERPL-MCNC: 3.1 G/DL (ref 3.2–4.6)
ALBUMIN/GLOB SERPL: 0.8 {RATIO} (ref 1.2–3.5)
ALP SERPL-CCNC: 91 U/L (ref 50–136)
ALT SERPL-CCNC: 28 U/L (ref 12–65)
ANION GAP SERPL CALC-SCNC: 8 MMOL/L (ref 7–16)
AST SERPL-CCNC: 18 U/L (ref 15–37)
BASOPHILS # BLD: 0 K/UL (ref 0–0.2)
BASOPHILS NFR BLD: 0 % (ref 0–2)
BILIRUB SERPL-MCNC: 0.3 MG/DL (ref 0.2–1.1)
BUN SERPL-MCNC: 18 MG/DL (ref 8–23)
CALCIUM SERPL-MCNC: 8.9 MG/DL (ref 8.3–10.4)
CHLORIDE SERPL-SCNC: 112 MMOL/L (ref 98–107)
CO2 SERPL-SCNC: 24 MMOL/L (ref 21–32)
CREAT SERPL-MCNC: 1.48 MG/DL (ref 0.8–1.5)
DIFFERENTIAL METHOD BLD: ABNORMAL
EOSINOPHIL # BLD: 0.1 K/UL (ref 0–0.8)
EOSINOPHIL NFR BLD: 2 % (ref 0.5–7.8)
ERYTHROCYTE [DISTWIDTH] IN BLOOD BY AUTOMATED COUNT: 14.5 % (ref 11.9–14.6)
GLOBULIN SER CALC-MCNC: 3.7 G/DL (ref 2.3–3.5)
GLUCOSE SERPL-MCNC: 125 MG/DL (ref 65–100)
HCT VFR BLD AUTO: 37.2 % (ref 41.1–50.3)
HGB BLD-MCNC: 12.5 G/DL (ref 13.6–17.2)
IMM GRANULOCYTES # BLD AUTO: 0 K/UL (ref 0–0.5)
IMM GRANULOCYTES NFR BLD AUTO: 0 % (ref 0–5)
LYMPHOCYTES # BLD: 1.1 K/UL (ref 0.5–4.6)
LYMPHOCYTES NFR BLD: 25 % (ref 13–44)
MCH RBC QN AUTO: 29.2 PG (ref 26.1–32.9)
MCHC RBC AUTO-ENTMCNC: 33.6 G/DL (ref 31.4–35)
MCV RBC AUTO: 86.9 FL (ref 79.6–97.8)
MONOCYTES # BLD: 0.5 K/UL (ref 0.1–1.3)
MONOCYTES NFR BLD: 12 % (ref 4–12)
NEUTS SEG # BLD: 2.8 K/UL (ref 1.7–8.2)
NEUTS SEG NFR BLD: 61 % (ref 43–78)
NRBC # BLD: 0 K/UL (ref 0–0.2)
PLATELET # BLD AUTO: 182 K/UL (ref 150–450)
PMV BLD AUTO: 11.4 FL (ref 9.4–12.3)
POTASSIUM SERPL-SCNC: 3.8 MMOL/L (ref 3.5–5.1)
PROT SERPL-MCNC: 6.8 G/DL (ref 6.3–8.2)
RBC # BLD AUTO: 4.28 M/UL (ref 4.23–5.6)
SODIUM SERPL-SCNC: 144 MMOL/L (ref 136–145)
TROPONIN-HIGH SENSITIVITY: 13.2 PG/ML (ref 0–14)
WBC # BLD AUTO: 4.5 K/UL (ref 4.3–11.1)

## 2020-08-12 PROCEDURE — 99285 EMERGENCY DEPT VISIT HI MDM: CPT

## 2020-08-12 PROCEDURE — 71045 X-RAY EXAM CHEST 1 VIEW: CPT

## 2020-08-12 PROCEDURE — 80053 COMPREHEN METABOLIC PANEL: CPT

## 2020-08-12 PROCEDURE — 93005 ELECTROCARDIOGRAM TRACING: CPT | Performed by: EMERGENCY MEDICINE

## 2020-08-12 PROCEDURE — 84484 ASSAY OF TROPONIN QUANT: CPT

## 2020-08-12 PROCEDURE — 74011250637 HC RX REV CODE- 250/637: Performed by: EMERGENCY MEDICINE

## 2020-08-12 PROCEDURE — 74011000250 HC RX REV CODE- 250: Performed by: EMERGENCY MEDICINE

## 2020-08-12 PROCEDURE — 96374 THER/PROPH/DIAG INJ IV PUSH: CPT

## 2020-08-12 PROCEDURE — 85025 COMPLETE CBC W/AUTO DIFF WBC: CPT

## 2020-08-12 RX ORDER — METOPROLOL TARTRATE 5 MG/5ML
5 INJECTION INTRAVENOUS ONCE
Status: COMPLETED | OUTPATIENT
Start: 2020-08-12 | End: 2020-08-12

## 2020-08-12 RX ORDER — NITROGLYCERIN 0.4 MG/1
0.4 TABLET SUBLINGUAL
Status: COMPLETED | OUTPATIENT
Start: 2020-08-12 | End: 2020-08-12

## 2020-08-12 RX ADMIN — NITROGLYCERIN 0.4 MG: 0.4 TABLET SUBLINGUAL at 23:06

## 2020-08-12 RX ADMIN — NITROGLYCERIN 0.4 MG: 0.4 TABLET SUBLINGUAL at 22:59

## 2020-08-12 RX ADMIN — NITROGLYCERIN 0.4 MG: 0.4 TABLET SUBLINGUAL at 22:52

## 2020-08-12 RX ADMIN — METOPROLOL TARTRATE 5 MG: 1 INJECTION, SOLUTION INTRAVENOUS at 22:51

## 2020-08-13 VITALS
TEMPERATURE: 98.7 F | HEART RATE: 66 BPM | WEIGHT: 180 LBS | BODY MASS INDEX: 25.2 KG/M2 | HEIGHT: 71 IN | RESPIRATION RATE: 42 BRPM | OXYGEN SATURATION: 100 % | SYSTOLIC BLOOD PRESSURE: 177 MMHG | DIASTOLIC BLOOD PRESSURE: 94 MMHG

## 2020-08-13 LAB
ATRIAL RATE: 59 BPM
CALCULATED P AXIS, ECG09: 64 DEGREES
CALCULATED R AXIS, ECG10: 17 DEGREES
CALCULATED T AXIS, ECG11: 51 DEGREES
DIAGNOSIS, 93000: NORMAL
P-R INTERVAL, ECG05: 174 MS
Q-T INTERVAL, ECG07: 414 MS
QRS DURATION, ECG06: 80 MS
QTC CALCULATION (BEZET), ECG08: 409 MS
TROPONIN-HIGH SENSITIVITY: 14.7 PG/ML (ref 0–14)
VENTRICULAR RATE, ECG03: 59 BPM

## 2020-08-13 PROCEDURE — 93005 ELECTROCARDIOGRAM TRACING: CPT | Performed by: EMERGENCY MEDICINE

## 2020-08-13 NOTE — ED NOTES
Dr Lozada Ear notified that patient continues to have chest pain after receiving three nitrogylcerins.

## 2020-08-13 NOTE — DISCHARGE INSTRUCTIONS
Patient Education        Musculoskeletal Chest Pain: Care Instructions  Your Care Instructions     Chest pain is not always a sign that something is wrong with your heart or that you have another serious problem. The doctor thinks your chest pain is caused by strained muscles or ligaments, inflamed chest cartilage, or another problem in your chest, rather than by your heart. You may need more tests to find the cause of your chest pain. Follow-up care is a key part of your treatment and safety. Be sure to make and go to all appointments, and call your doctor if you are having problems. It's also a good idea to know your test results and keep a list of the medicines you take. How can you care for yourself at home? · Take pain medicines exactly as directed. ? If the doctor gave you a prescription medicine for pain, take it as prescribed. ? If you are not taking a prescription pain medicine, ask your doctor if you can take an over-the-counter medicine. · Rest and protect the sore area. · Stop, change, or take a break from any activity that may be causing your pain or soreness. · Put ice or a cold pack on the sore area for 10 to 20 minutes at a time. Try to do this every 1 to 2 hours for the next 3 days (when you are awake) or until the swelling goes down. Put a thin cloth between the ice and your skin. · After 2 or 3 days, apply a heating pad set on low or a warm cloth to the area that hurts. Some doctors suggest that you go back and forth between hot and cold. · Do not wrap or tape your ribs for support. This may cause you to take smaller breaths, which could increase your risk of lung problems. · Mentholated creams such as Bengay or Icy Hot may soothe sore muscles. Follow the instructions on the package. · Follow your doctor's instructions for exercising. · Gentle stretching and massage may help you get better faster.  Stretch slowly to the point just before pain begins, and hold the stretch for at least 15 to 30 seconds. Do this 3 or 4 times a day. Stretch just after you have applied heat. · As your pain gets better, slowly return to your normal activities. Any increased pain may be a sign that you need to rest a while longer. When should you call for help? [id number] anytime you think you may need emergency care. For example, call if:  · You have chest pain or pressure. This may occur with:  ? Sweating. ? Shortness of breath. ? Nausea or vomiting. ? Pain that spreads from the chest to the neck, jaw, or one or both shoulders or arms. ? Dizziness or lightheadedness. ? A fast or uneven pulse. After calling 911, chew 1 adult-strength aspirin. Wait for an ambulance. Do not try to drive yourself. · You have sudden chest pain and shortness of breath, or you cough up blood. Call your doctor now or seek immediate medical care if:  · You have any trouble breathing. · Your chest pain gets worse. · Your chest pain occurs consistently with exercise and is relieved by rest.  Watch closely for changes in your health, and be sure to contact your doctor if:  · Your chest pain does not get better after 1 week. Where can you learn more? Go to http://maykelLabmeetingxander.info/  Enter V293 in the search box to learn more about \"Musculoskeletal Chest Pain: Care Instructions. \"  Current as of: June 26, 2019               Content Version: 12.5  © 9685-9708 [employer]. Care instructions adapted under license by [employer] (which disclaims liability or warranty for this information). If you have questions about a medical condition or this instruction, always ask your healthcare professional. [place] [address] any warranty or liability for your use of this information. Patient Education        Angina: Care Instructions  Your Care Instructions     You have a problem called angina. Angina happens when there is not enough blood flow to your heart muscle.  Angina is a sign of coronary artery disease (CAD). CAD occurs when blood vessels that supply the heart become narrowed. Having CAD increases your risk of a heart attack. Chest pain or pressure is the most common symptom of angina. But some people have other symptoms, like:  · Pain, pressure, or a strange feeling in the back, neck, jaw, or upper belly, or in one or both shoulders or arms. · Shortness of breath. · Nausea or vomiting. · Lightheadedness or sudden weakness. · Fast or irregular heartbeat. Women are somewhat more likely than men to have angina symptoms like shortness of breath, nausea, and back or jaw pain. Angina can be dangerous. That's why it is important to pay attention to your symptoms. Know what is typical for you, learn how to control your symptoms, and understand when you need to get treatment. A change in your usual pattern of symptoms is an emergency. It may mean that you are having a heart attack. The doctor has checked you carefully, but problems can develop later. If you notice any problems or new symptoms, get medical treatment right away. Follow-up care is a key part of your treatment and safety. Be sure to make and go to all appointments, and call your doctor if you are having problems. It's also a good idea to know your test results and keep a list of the medicines you take. How can you care for yourself at home? Medicines  · If your doctor has given you nitroglycerin for angina symptoms, keep it with you at all times. If you have symptoms, sit down and rest, and take the first dose of nitroglycerin as directed. If your symptoms get worse or are not getting better within 5 minutes, call 911 right away. Stay on the phone. The emergency  will give you further instructions. · If your doctor advises it, take 1 low-dose aspirin a day to prevent heart attack. · Be safe with medicines. Take your medicines exactly as prescribed.  Call your doctor if you think you are having a problem with your medicine. You will get more details on the specific medicines your doctor prescribes. Lifestyle changes  · Do not smoke. If you need help quitting, talk to your doctor about stop-smoking programs and medicines. These can increase your chances of quitting for good. · Eat a heart-healthy diet that is low in saturated fat and salt, and is high in fiber. Talk to your doctor or a dietitian about healthy eating. · Stay at a healthy weight. Or lose weight if you need to. Activity  · Talk to your doctor about a level of activity that is safe for you. · If an activity causes angina symptoms, stop and rest.  When should you call for help? [id number] anytime you think you may need emergency care. For example, call if:  · You passed out (lost consciousness). · You have symptoms of a heart attack. These may include:  ? Chest pain or pressure, or a strange feeling in the chest.  ? Sweating. ? Shortness of breath. ? Nausea or vomiting. ? Pain, pressure, or a strange feeling in the back, neck, jaw, or upper belly or in one or both shoulders or arms. ? Lightheadedness or sudden weakness. ? A fast or irregular heartbeat. After you call 911, the  may tell you to chew 1 adult-strength or 2 to 4 low-dose aspirin. Wait for an ambulance. Do not try to drive yourself. · You have angina symptoms that do not go away with rest or are not getting better within 5 minutes after you take a dose of nitroglycerin. Call your doctor now if:  · Your angina symptoms seem worse but still follow your typical pattern. You can predict when symptoms will happen, but they may come on sooner, feel worse, or last longer. · You feel dizzy or lightheaded, or you feel like you may faint. Watch closely for changes in your health, and be sure to contact your doctor if you have any problems. Where can you learn more?   Go to http://maykel-xander.info/  Enter H129 in the search box to learn more about \"Angina: Care Instructions. \"  Current as of: December 16, 2019               Content Version: 12.5  © 2483-0754 [employer]. Care instructions adapted under license by [employer] (which disclaims liability or warranty for this information). If you have questions about a medical condition or this instruction, always ask your healthcare professional. [address] any warranty or liability for your use of this information.

## 2020-08-13 NOTE — ED NOTES
I have reviewed discharge instructions with the patient. The patient verbalized understanding. Patient left ED via Discharge Method: ambulatory to Home with self. Opportunity for questions and clarification provided. Patient given 0 scripts. Pt in no acute distress at discharge      To continue your aftercare when you leave the hospital, you may receive an automated call from our care team to check in on how you are doing. This is a free service and part of our promise to provide the best care and service to meet your aftercare needs.  If you have questions, or wish to unsubscribe from this service please call [phone]. Thank you for Choosing our [facility] Emergency Department.

## 2020-08-13 NOTE — ED PROVIDER NOTES
71-year-old gentleman presents to the ER with chest pain which is left-sided. There is no radiation to neck, jaw, shoulder, or arm. Pain started at rest while seated this afternoon and his come and gone lasting sometimes hours at a time. There has been no dyspnea, no nausea, no diaphoresis with it. Patient has a history of coronary artery disease he took his aspirin today, but did not think to try any nitroglycerin for this. There seem to be no worsening or alleviating factors. He notices no change with movement or activity or changes in position. No change with food, he has had no abdominal pain with this. Most recent heart catheterization was early February of this year and PCI with stenting was undertaken to an in-stent stenosis  Cath report attached below.              Past Medical History:   Diagnosis Date    Allergic rhinitis     Arthritis     CAD (coronary artery disease)     CABG '09; troponin elevated 7/14/12 (\"likely due to supply/demand mismatch in setting of fever and increased metabolic demand\"-per cardiac MD note 8/20/12)-had cath, echo, EKG-all WNL except cath showed 2 of the bypasses with blockages- \"occluded SVG to PDA & SVG to LISA\"; EF=55%    Chronic kidney disease     CVA (cerebral vascular accident) ([address] 08/2017    Left- no residual weakness    Diabetes mellitus type 2, controlled ([address]     restarted oral med (metformin 3/2018), does not check BG on regular basis, last hgba1c- 8 (3/12/18)    Dyslipidemia     ED (erectile dysfunction)     Encephalitis 1962    x 2/hospitalized as teenager    GERD (gastroesophageal reflux disease)     controlled with Nexium    Gout     hx of    Hypertension     Lacunar infarct, acute ([address]     possible embolic, remote a-fib- on eliquis    Lumbago     Memory loss     Mitral valve regurgitation 7/14/12    \"moderate\" on echo    ROBERTO (obstructive sleep apnea)     mild per patient, does not use CPAP    PAF (paroxysmal atrial fibrillation) [facility])     Prostate cancer [facility])     followed by urology    Psoriasis     topical creams    SBO (small bowel obstruction) ([facility] , , 2016    Stage 2 chronic kidney disease        Past Surgical History:   Procedure Laterality Date    ABDOMEN SURGERY PROC UNLISTED      exp lap    HX APPENDECTOMY      as a child    HX CATARACT REMOVAL Bilateral     iol     HX COLONOSCOPY  ,     HX CORONARY ARTERY BYPASS GRAFT  03/09/2009    x4 bypass    HX HERNIA REPAIR Bilateral 2012    HX RADICAL PROSTATECTOMY       HX SHOULDER REPLACEMENT Right 2018    Reverse R TSA    HX SPLENECTOMY      NE LEFT HEART CATH,PERCUTANEOUS  2012    no intervention    NE LEFT HEART CATH,PERCUTANEOUS  2019    NSTEMI & PCI    NE LEFT HEART CATH,PERCUTANEOUS  2019    STEMI, PCI, VFib arrest    NE PROSTATE BIOPSY, NEEDLE, SATURATION SAMPLING      and ultrasound    VASCULAR SURGERY PROCEDURE UNLIST  2017    aortogram, w/cass LE runoff,R-LE arteriogram         Family History:   Problem Relation Age of Onset    Hypertension Mother    [facility] Gout Mother     Arthritis-osteo Mother     Heart Disease Mother          of Heart Disease    Coronary Artery Disease Mother     Diabetes Father     Cancer Father     Heart Disease Father     Bleeding Prob Paternal Grandmother     Hypertension Brother     Cancer Maternal Uncle         Prostate       Social History     Socioeconomic History    Marital status:      Spouse name: Not on file    Number of children: Not on file    Years of education: Not on file    Highest education level: Not on file   Occupational History    Not on file   Social Needs    Financial resource strain: Not on file    Food insecurity     Worry: Not on file     Inability: Not on file    Transportation needs     Medical: Not on file     Non-medical: Not on file   Tobacco Use    Smoking status: Former Smoker     Packs/day: 1.00     Years: 15.00     Pack years: 15.00 Last attempt to quit: 1975     Years since quittin.1    Smokeless tobacco: Never Used   Substance and Sexual Activity    Alcohol use: Not Currently     Comment: rarely    Drug use: No    Sexual activity: Yes     Partners: Female   Lifestyle    Physical activity     Days per week: Not on file     Minutes per session: Not on file    Stress: Not on file   Relationships    Social connections     Talks on phone: Not on file     Gets together: Not on file     Attends [employer]ism service: Not on file     Active member of club or organization: Not on file     Attends meetings of clubs or organizations: Not on file     Relationship status: Not on file    Intimate partner violence     Fear of current or ex partner: Not on file     Emotionally abused: Not on file     Physically abused: Not on file     Forced sexual activity: Not on file   Other Topics Concern    Dental Braces Not Asked    Endoscopic Camera Pill Not Asked    Metallic Foreign Body Not Asked    Medication Patches Not Asked    Taking Feraheme Not Asked    Claustrophobic Not Asked    Removable Dental Work Not Asked    Hearing Aids Not Asked    Body Piercing Not Asked    Radiation Seeds Not Asked    Pregnant or Breast Feeding Not Asked    Wounded by Shrapnel or Bullet Not Asked    Other-See Comment Not Asked    Other Implant-See Comment Not Asked    [employer] Service Not Asked    Blood Transfusions Not Asked    Caffeine Concern Not Asked    Occupational Exposure Not Asked    Hobby Hazards Not Asked    Sleep Concern Not Asked    Stress Concern Not Asked    Weight Concern Not Asked    Special Diet Not Asked    Back Care Not Asked    Exercise Not Asked    Bike Helmet Not Asked    [address] Not Asked    Self-Exams Not Asked   Social History Narrative    Not on file         ALLERGIES: Celecoxib; Ibuprofen; Lescol [fluvastatin]; Nsaids (non-steroidal anti-inflammatory drug);  Sulfa (sulfonamide antibiotics); and Uloric [febuxostat]    Review of Systems   Constitutional: Negative for chills and fever. HENT: Negative for rhinorrhea and sore throat. Eyes: Negative for discharge and redness. Respiratory: Negative for cough and shortness of breath. Cardiovascular: Positive for chest pain. Negative for palpitations. Gastrointestinal: Negative for abdominal pain, diarrhea, nausea and vomiting. Musculoskeletal: Negative for arthralgias and back pain. Skin: Negative for rash. Neurological: Negative for dizziness and headaches. All other systems reviewed and are negative. Vitals:    08/12/20 2304 08/12/20 2306 08/12/20 2312 08/12/20 2327   BP: (!) 154/92 (!) 154/92 153/90 (!) 175/96   Pulse: (!) 55 63  (!) 53   Resp: 21   22   Temp:       SpO2: 98%  97% 99%   Weight:       Height:                Physical Exam  Vitals signs and nursing note reviewed. Constitutional:       General: He is not in acute distress. Appearance: Normal appearance. He is well-developed. He is not ill-appearing, toxic-appearing or diaphoretic. HENT:      Head: Normocephalic and atraumatic. Eyes:      General: No scleral icterus. Right eye: No discharge. Left eye: No discharge. Conjunctiva/sclera: Conjunctivae normal.      Pupils: Pupils are equal, round, and reactive to light. Neck:      Musculoskeletal: Normal range of motion and neck supple. Cardiovascular:      Rate and Rhythm: Normal rate and regular rhythm. Heart sounds: Normal heart sounds. No murmur. No gallop. Pulmonary:      Effort: Pulmonary effort is normal. No respiratory distress. Breath sounds: Normal breath sounds. No wheezing or rales. Abdominal:      Palpations: Abdomen is soft. Tenderness: There is no abdominal tenderness. There is no guarding. Musculoskeletal: Normal range of motion. Skin:     General: Skin is warm and dry. Neurological:      General: No focal deficit present.       Mental Status: He is alert and oriented to person, place, and time. Mental status is at baseline. Motor: No abnormal muscle tone. Comments: cni 2-12 grossly   Psychiatric:         Mood and Affect: Mood normal.         Behavior: Behavior normal.          MDM  Number of Diagnoses or Management Options  Atypical chest pain:   Coronary artery disease involving coronary bypass graft of native heart, angina presence unspecified:   Diagnosis management comments: Medical decision making note:  Atypical chest pain, 2 EKGs are nearly normal, 2 troponins are normal,  No relief with 3 nitroglycerin, however at the end of the ER stay patient has spontaneously started feeling good again  This concludes the \"medical decision making note\" part of this emergency department visit note. Amount and/or Complexity of Data Reviewed  Review and summarize past medical records: yes (Left heart cath from February 2020  FINDINGS:  Left ventricle:  Left ventriculogram was completed and showed an ejection fraction of 50-55% with lateral wall hypokinesis.     LVEDP:  5 mmHg.     Left main:  Normal.     Left anterior descending artery was 100% occluded proximally.     Circumflex artery had a 70% proximal occlusion with obtuse marginal-1 100% occluded.     Right coronary artery had a 90% stenosis in the far distal AV groove, RCA before the takeoff of PLV branch. The PDA and PLV branches were very heavily diseased and small vessels with serial 70% and 90% lesions in these vessels.     GRAFT ANATOMY:  WETZEL to LAD was patent. The native LAD was heavily diseased throughout its entire course.     Saphenous vein graft to right coronary artery was not imaged but is known to be occluded. Saphenous vein graft to obtuse marginal 1 had a 99% subtotal occlusion in a distal stent, a proximal stent had 30% in-stent restenosis.   There was heavily diseased throughout the obtuse marginal beyond the anastomosis.     INTERVENTION:  It was decided to intervene upon the distal saphenous vein graft body in-stent restenosis lesion. A 2.5 x 15 NC balloon was used to predilate the lesion and an IVUS catheter was taken down and showed the stent was slightly malposed so a 3 x 20 NC balloon was taken down and used to postdilate the previously placed stent and then a 3.5 x 15 noncompliant balloon was used to postdilate the entire length of the stent.       At the conclusion, there was an excellent angiographic result. )           Procedures

## 2021-01-27 ENCOUNTER — APPOINTMENT (OUTPATIENT)
Dept: GENERAL RADIOLOGY | Age: 76
End: 2021-01-27
Attending: EMERGENCY MEDICINE
Payer: MEDICARE

## 2021-01-27 ENCOUNTER — HOSPITAL ENCOUNTER (EMERGENCY)
Age: 76
Discharge: HOME | End: 2021-01-27
Attending: EMERGENCY MEDICINE
Payer: MEDICARE

## 2021-01-27 VITALS
HEART RATE: 55 BPM | BODY MASS INDEX: 25.9 KG/M2 | OXYGEN SATURATION: 99 % | SYSTOLIC BLOOD PRESSURE: 156 MMHG | TEMPERATURE: 97.9 F | HEIGHT: 71 IN | WEIGHT: 185 LBS | RESPIRATION RATE: 24 BRPM | DIASTOLIC BLOOD PRESSURE: 90 MMHG

## 2021-01-27 DIAGNOSIS — I95.9 TRANSIENT HYPOTENSION: Primary | ICD-10-CM

## 2021-01-27 DIAGNOSIS — R42 ORTHOSTATIC LIGHTHEADEDNESS: ICD-10-CM

## 2021-01-27 LAB
ALBUMIN SERPL-MCNC: 3.3 G/DL (ref 3.2–4.6)
ALBUMIN/GLOB SERPL: 0.9 {RATIO} (ref 1.2–3.5)
ALP SERPL-CCNC: 97 U/L (ref 50–136)
ALT SERPL-CCNC: 25 U/L (ref 12–65)
ANION GAP SERPL CALC-SCNC: 7 MMOL/L (ref 7–16)
AST SERPL-CCNC: 20 U/L (ref 15–37)
BASOPHILS # BLD: 0 K/UL (ref 0–0.2)
BASOPHILS NFR BLD: 1 % (ref 0–2)
BILIRUB SERPL-MCNC: 0.3 MG/DL (ref 0.2–1.1)
BNP SERPL-MCNC: 209 PG/ML (ref 5–125)
BUN SERPL-MCNC: 17 MG/DL (ref 8–23)
CALCIUM SERPL-MCNC: 8.6 MG/DL (ref 8.3–10.4)
CHLORIDE SERPL-SCNC: 110 MMOL/L (ref 98–107)
CO2 SERPL-SCNC: 25 MMOL/L (ref 21–32)
CREAT SERPL-MCNC: 1.66 MG/DL (ref 0.8–1.5)
D DIMER PPP FEU-MCNC: 0.7 UG/ML(FEU)
DIFFERENTIAL METHOD BLD: ABNORMAL
EOSINOPHIL # BLD: 0.1 K/UL (ref 0–0.8)
EOSINOPHIL NFR BLD: 2 % (ref 0.5–7.8)
ERYTHROCYTE [DISTWIDTH] IN BLOOD BY AUTOMATED COUNT: 12.9 % (ref 11.9–14.6)
GLOBULIN SER CALC-MCNC: 3.8 G/DL (ref 2.3–3.5)
GLUCOSE BLD STRIP.AUTO-MCNC: 217 MG/DL (ref 65–100)
GLUCOSE SERPL-MCNC: 205 MG/DL (ref 65–100)
HCT VFR BLD AUTO: 38.6 % (ref 41.1–50.3)
HGB BLD-MCNC: 13 G/DL (ref 13.6–17.2)
IMM GRANULOCYTES # BLD AUTO: 0 K/UL (ref 0–0.5)
IMM GRANULOCYTES NFR BLD AUTO: 0 % (ref 0–5)
LYMPHOCYTES # BLD: 1.4 K/UL (ref 0.5–4.6)
LYMPHOCYTES NFR BLD: 32 % (ref 13–44)
MAGNESIUM SERPL-MCNC: 1.8 MG/DL (ref 1.8–2.4)
MCH RBC QN AUTO: 29.5 PG (ref 26.1–32.9)
MCHC RBC AUTO-ENTMCNC: 33.7 G/DL (ref 31.4–35)
MCV RBC AUTO: 87.5 FL (ref 79.6–97.8)
MONOCYTES # BLD: 0.4 K/UL (ref 0.1–1.3)
MONOCYTES NFR BLD: 10 % (ref 4–12)
NEUTS SEG # BLD: 2.4 K/UL (ref 1.7–8.2)
NEUTS SEG NFR BLD: 55 % (ref 43–78)
NRBC # BLD: 0 K/UL (ref 0–0.2)
PHOSPHATE SERPL-MCNC: 2.8 MG/DL (ref 2.3–3.7)
PLATELET # BLD AUTO: 179 K/UL (ref 150–450)
PMV BLD AUTO: 12.1 FL (ref 9.4–12.3)
POTASSIUM SERPL-SCNC: 3.8 MMOL/L (ref 3.5–5.1)
PROT SERPL-MCNC: 7.1 G/DL (ref 6.3–8.2)
RBC # BLD AUTO: 4.41 M/UL (ref 4.23–5.6)
SODIUM SERPL-SCNC: 142 MMOL/L (ref 136–145)
TROPONIN-HIGH SENSITIVITY: 10 PG/ML (ref 0–14)
TROPONIN-HIGH SENSITIVITY: 8.7 PG/ML (ref 0–14)
WBC # BLD AUTO: 4.3 K/UL (ref 4.3–11.1)

## 2021-01-27 PROCEDURE — 96361 HYDRATE IV INFUSION ADD-ON: CPT

## 2021-01-27 PROCEDURE — 80053 COMPREHEN METABOLIC PANEL: CPT

## 2021-01-27 PROCEDURE — 83880 ASSAY OF NATRIURETIC PEPTIDE: CPT

## 2021-01-27 PROCEDURE — 82962 GLUCOSE BLOOD TEST: CPT

## 2021-01-27 PROCEDURE — 99285 EMERGENCY DEPT VISIT HI MDM: CPT

## 2021-01-27 PROCEDURE — 84100 ASSAY OF PHOSPHORUS: CPT

## 2021-01-27 PROCEDURE — 74011250636 HC RX REV CODE- 250/636: Performed by: EMERGENCY MEDICINE

## 2021-01-27 PROCEDURE — 84484 ASSAY OF TROPONIN QUANT: CPT

## 2021-01-27 PROCEDURE — 85025 COMPLETE CBC W/AUTO DIFF WBC: CPT

## 2021-01-27 PROCEDURE — 83735 ASSAY OF MAGNESIUM: CPT

## 2021-01-27 PROCEDURE — 93005 ELECTROCARDIOGRAM TRACING: CPT | Performed by: EMERGENCY MEDICINE

## 2021-01-27 PROCEDURE — 96360 HYDRATION IV INFUSION INIT: CPT

## 2021-01-27 PROCEDURE — 71045 X-RAY EXAM CHEST 1 VIEW: CPT

## 2021-01-27 PROCEDURE — 85379 FIBRIN DEGRADATION QUANT: CPT

## 2021-01-27 RX ADMIN — SODIUM CHLORIDE 1000 ML: 900 INJECTION, SOLUTION INTRAVENOUS at 15:12

## 2021-01-27 NOTE — ED PROVIDER NOTES
71-year-old male with a history of coronary artery disease. Presents from his exercise class with [facility] cardiac rehab complaining of a lightheaded dizzy feeling. Denies prior episodes  Symptoms worse when he stands or walks better when he rests  He denies chest pain or shortness of breath. Patient denies any change in medications. He has had no lateralizing weakness or speech difficulties. Initial blood pressure at triage 84 systolic    The history is provided by the patient. Dizziness  This is a new problem. The current episode started 1 to 2 hours ago. The problem has been gradually improving. There was no focality noted. Primary symptoms include loss of balance. Pertinent negatives include no focal weakness, no loss of sensation, no slurred speech, no speech difficulty, no memory loss, no movement disorder, no visual change and no mental status change. There has been no fever. Pertinent negatives include no shortness of breath, no chest pain, no vomiting, no confusion, no headaches and no nausea.         Past Medical History:   Diagnosis Date    Allergic rhinitis     Arthritis     CAD (coronary artery disease)     CABG '09; troponin elevated 7/14/12 (\"likely due to supply/demand mismatch in setting of fever and increased metabolic demand\"-per cardiac MD note 8/20/12)-had cath, echo, EKG-all WNL except cath showed 2 of the bypasses with blockages- \"occluded SVG to PDA & SVG to LISA\"; EF=55%    Chronic kidney disease     CVA (cerebral vascular accident) ([address] 08/2017    Left- no residual weakness    Diabetes mellitus type 2, controlled ([address]     restarted oral med (metformin 3/2018), does not check BG on regular basis, last hgba1c- 8 (3/12/18)    Dyslipidemia     ED (erectile dysfunction)     Encephalitis 1962    x 2/hospitalized as teenager    GERD (gastroesophageal reflux disease)     controlled with Nexium    Gout     hx of    Hypertension     Lacunar infarct, acute ([address]     possible embolic, remote a-fib- on eliquis    Lumbago     Memory loss     Mitral valve regurgitation 12    \"moderate\" on echo    ROBERTO (obstructive sleep apnea)     mild per patient, does not use CPAP    [facility]ox[facility]mal [facility] fibrillation) [facility]     Prostate cancer ([facility]     followed by urology    Psoriasis     topical creams    SBO (small bowel obstruction) ([facility] , ,     Stage 2 chronic kidney disease        Past Surgical History:   Procedure Laterality Date    HX APPENDECTOMY      as a child    HX CATARACT REMOVAL Bilateral     iol     HX COLONOSCOPY  ,     HX CORONARY ARTERY BYPASS GRAFT  03/09/2009    x4 bypass    HX HERNIA REPAIR Bilateral     HX RADICAL PROSTATECTOMY       HX SHOULDER REPLACEMENT Right 2018    Reverse R TSA    HX SPLENECTOMY      NM ABDOMEN SURGERY PROC UNLISTED      exp lap    NM LEFT HEART CATH,PERCUTANEOUS  2012    no intervention    NM LEFT HEART CATH,PERCUTANEOUS  2019    NSTEMI & PCI    NM LEFT HEART CATH,PERCUTANEOUS  2019    STEMI, PCI, VFib arrest    NM PROSTATE BIOPSY, NEEDLE, SATURATION SAMPLING      and ultrasound    VASCULAR SURGERY PROCEDURE UNLIST  2017    aortogram, w/cass LE runoff,R-LE arteriogram         Family History:   Problem Relation Age of Onset    Hypertension Mother    Romana Renato Gout Mother     Arthritis-osteo Mother     Heart Disease Mother          of Heart Disease    Coronary Artery Disease Mother     Diabetes Father     Cancer Father     Heart Disease Father     Bleeding Prob Paternal Grandmother     Hypertension Brother     Cancer Maternal Uncle         Prostate       Social History     Socioeconomic History    Marital status:      Spouse name: Not on file    Number of children: Not on file    Years of education: Not on file    Highest education level: Not on file   Occupational History    Not on file   Social Needs    Financial resource strain: Not on file   [place] insecurity     Worry: Not on file     Inability: Not on file    Transportation needs     Medical: Not on file     Non-medical: Not on file   Tobacco Use    Smoking status: Former Smoker     Packs/day: 1.00     Years: 15.00     Pack years: 15.00     Quit date: 1975     Years since quittin.7    Smokeless tobacco: Never Used   Substance and Sexual Activity    Alcohol use: Not Currently     Comment: rarely    Drug use: No    Sexual activity: Yes     Partners: Female   Lifestyle    Physical activity     Days per week: Not on file     Minutes per session: Not on file    Stress: Not on file   Relationships    Social connections     Talks on phone: Not on file     Gets together: Not on file     Attends Hindu service: Not on file     Active member of club or organization: Not on file     Attends meetings of clubs or organizations: Not on file     Relationship status: Not on file    Intimate partner violence     Fear of current or ex partner: Not on file     Emotionally abused: Not on file     Physically abused: Not on file     Forced sexual activity: Not on file   Other Topics Concern    Dental Braces Not Asked    Endoscopic Camera Pill Not Asked    Metallic Foreign Body Not Asked    Medication Patches Not Asked    Taking Feraheme Not Asked    Claustrophobic Not Asked    Removable Dental Work Not Asked    Hearing Aids Not Asked    Body Piercing Not Asked    Radiation Seeds Not Asked    Pregnant or Breast Feeding Not Asked    Wounded by Shrapnel or Bullet Not Asked    Other-See Comment Not Asked    Other Implant-See Comment Not Asked    [facility] Service Not Asked    Blood Transfusions Not Asked    Caffeine Concern Not Asked    Occupational Exposure Not Asked    Hobby Hazards Not Asked    Sleep Concern Not Asked    Stress Concern Not Asked    Weight Concern Not Asked    Special Diet Not Asked    Back Care Not Asked    Exercise Not Asked    Bike Helmet Not Asked    [employer] Not Asked    Self-Exams Not Asked   Social History Narrative    Not on file         ALLERGIES: Celecoxib, Ibuprofen, Lescol [fluvastatin], Nsaids (non-steroidal anti-inflammatory drug), Sulfa (sulfonamide antibiotics), and Uloric [febuxostat]    Review of Systems   Constitutional: Negative for activity change, chills, diaphoresis and fever. HENT: Negative for dental problem, hearing loss, nosebleeds, rhinorrhea and sore throat. Eyes: Negative for pain, discharge, redness and visual disturbance. Respiratory: Negative for cough, chest tightness and shortness of breath. Cardiovascular: Negative for chest pain, palpitations and leg swelling. Gastrointestinal: Negative for abdominal pain, constipation, diarrhea, nausea and vomiting. Endocrine: Negative for cold intolerance, heat intolerance, polydipsia and polyuria. Genitourinary: Negative for dysuria and flank pain. Musculoskeletal: Positive for arthralgias. Negative for back pain, joint swelling, myalgias and neck pain. Skin: Negative for pallor and rash. Allergic/Immunologic: Negative for environmental allergies and food allergies. Neurological: Positive for dizziness and loss of balance. Negative for tremors, focal weakness, speech difficulty, light-headedness, numbness and headaches. Hematological: Negative for adenopathy. Does not bruise/bleed easily. Psychiatric/Behavioral: Negative for confusion, dysphoric mood and memory loss. The patient is not nervous/anxious and is not hyperactive. All other systems reviewed and are negative. Vitals:    01/27/21 1448 01/27/21 1449   BP: (!) 89/56 (!) 84/55   Pulse: 68    Resp: 16    Temp: 97.9 °F (36.6 °C)    SpO2: 98%    Weight: 83.9 kg (185 lb)    Height: 5' 11\" (1.803 m)             Physical Exam  Vitals signs and nursing note reviewed. Constitutional:       General: He is in acute distress. Appearance: Normal appearance. He is well-developed and normal weight.    HENT: Head: Normocephalic and atraumatic. Right Ear: External ear normal.      Left Ear: External ear normal.      Mouth/Throat:      Mouth: Mucous membranes are moist.      Pharynx: Oropharynx is clear. No oropharyngeal exudate. Eyes:      General: No scleral icterus. Extraocular Movements: Extraocular movements intact. Conjunctiva/sclera: Conjunctivae normal.      Pupils: Pupils are equal, round, and reactive to light. Neck:      Musculoskeletal: Normal range of motion and neck supple. Thyroid: No thyromegaly. Vascular: No JVD. Cardiovascular:      Rate and Rhythm: Normal rate and regular rhythm. Pulses: Normal pulses. Heart sounds: Normal heart sounds. No murmur. No friction rub. No gallop. Pulmonary:      Effort: Pulmonary effort is normal. No respiratory distress. Breath sounds: Normal breath sounds. No wheezing. Abdominal:      General: Bowel sounds are normal. There is no distension. Palpations: Abdomen is soft. Tenderness: There is no abdominal tenderness. Musculoskeletal: Normal range of motion. General: No tenderness or deformity. Skin:     General: Skin is warm and dry. Capillary Refill: Capillary refill takes less than 2 seconds. Findings: No rash. Neurological:      General: No focal deficit present. Mental Status: He is alert and oriented to person, place, and time. Cranial Nerves: No cranial nerve deficit. Sensory: No sensory deficit. Motor: No abnormal muscle tone. Coordination: Coordination normal.   Psychiatric:         Mood and Affect: Mood normal.         Behavior: Behavior normal.         Thought Content:  Thought content normal.         Judgment: Judgment normal.          MDM  Number of Diagnoses or Management Options  Orthostatic lightheadedness: new and requires workup  Transient hypotension: new and requires workup  Diagnosis management comments: Patient initially hypotensive with a blood pressure of 84/55  Supine in bed his blood pressure systolic is now 228  Patient states he is feeling better now that he is laying down. Patient is not tachycardic; been maintained on Coreg since his surgery    Blood sugar check here in the emergency room 215    7:41 PM  Troponin negative x2  Blood pressures have now normalized in the systolic range between 082 and 160    Will be discharged to follow-up with primary care cardiologist         Amount and/or Complexity of Data Reviewed  Clinical lab tests: ordered and reviewed  Tests in the radiology section of CPT®: ordered and reviewed  Tests in the medicine section of CPT®: ordered and reviewed  Review and summarize past medical records: yes  Independent visualization of images, tracings, or specimens: yes    Risk of Complications, Morbidity, and/or Mortality  Presenting problems: moderate  Diagnostic procedures: moderate  Management options: moderate  General comments: Elements of this note have been dictated via voice recognition software. Text and phrases may be limited by the accuracy of the software. The chart has been reviewed, but errors may still be present.       Patient Progress  Patient progress: improved         Procedures

## 2021-01-27 NOTE — ED TRIAGE NOTES
Pt c/o intermittent periods of feeling light headed after exercise class this morning. Pt denies headache, denies numbness or tingling. Pt is also c/o pain across the chest after the class that lasted approximately 20 minutes. Pt denies n/v. Pt states he had some blurry vision after the exercise class but denies any blurry or double vision at present time.  Pt masked upon arrival to the ED

## 2021-01-27 NOTE — PROGRESS NOTES
SW met with patient who confirmed demographic, states that he lives with his wife and son and has a daughter who lives locally. Patient does not use assistive devices to ambulate, denies any falls, and does not require ADL assistance. Patient seen by Dr. Marquis Guillaume and is current. KUMAR provided the patient with a brochure for [employer] and verbal education on services. No needs identified by KUMAR at this time.      SANDRITA Sauceda    [facility]    * [email or web address]

## 2021-01-28 LAB
ATRIAL RATE: 66 BPM
CALCULATED P AXIS, ECG09: 48 DEGREES
CALCULATED R AXIS, ECG10: 26 DEGREES
CALCULATED T AXIS, ECG11: 43 DEGREES
DIAGNOSIS, 93000: NORMAL
P-R INTERVAL, ECG05: 176 MS
Q-T INTERVAL, ECG07: 424 MS
QRS DURATION, ECG06: 88 MS
QTC CALCULATION (BEZET), ECG08: 444 MS
VENTRICULAR RATE, ECG03: 66 BPM

## 2021-01-28 NOTE — ED NOTES
I have reviewed discharge instructions with the patient. The patient verbalized understanding. Patient left ED via Discharge Method: ambulatory to Home with self. Opportunity for questions and clarification provided. Patient given 0 scripts. To continue your aftercare when you leave the hospital, you may receive an automated call from our care team to check in on how you are doing. This is a free service and part of our promise to provide the best care and service to meet your aftercare needs.  If you have questions, or wish to unsubscribe from this service please call [phone]. Thank you for Choosing our [facility] Emergency Department.

## 2021-01-28 NOTE — DISCHARGE INSTRUCTIONS
Patient to drink plenty of fluids  Call your doctor in the morning for follow-up  Fall precautions   recheck with your cardiologist  Recheck with your family doctor  Return to ER for any worsening symptoms or new problems which may arise

## 2021-02-26 ENCOUNTER — HOSPITAL ENCOUNTER (OUTPATIENT)
Dept: SLEEP MEDICINE | Age: 76
Discharge: HOME | End: 2021-02-26
Payer: MEDICARE

## 2021-02-26 PROCEDURE — 95811 POLYSOM 6/>YRS CPAP 4/> PARM: CPT

## 2021-03-18 ENCOUNTER — HOSPITAL ENCOUNTER (EMERGENCY)
Age: 76
Discharge: HOME | End: 2021-03-18
Attending: EMERGENCY MEDICINE
Payer: MEDICARE

## 2021-03-18 VITALS
WEIGHT: 190 LBS | BODY MASS INDEX: 27.2 KG/M2 | DIASTOLIC BLOOD PRESSURE: 78 MMHG | TEMPERATURE: 98.9 F | HEART RATE: 73 BPM | SYSTOLIC BLOOD PRESSURE: 173 MMHG | HEIGHT: 70 IN | OXYGEN SATURATION: 100 % | RESPIRATION RATE: 18 BRPM

## 2021-03-18 DIAGNOSIS — G89.29 CHRONIC PAIN OF BOTH LOWER EXTREMITIES: Primary | ICD-10-CM

## 2021-03-18 DIAGNOSIS — M79.605 CHRONIC PAIN OF BOTH LOWER EXTREMITIES: Primary | ICD-10-CM

## 2021-03-18 DIAGNOSIS — M79.604 CHRONIC PAIN OF BOTH LOWER EXTREMITIES: Primary | ICD-10-CM

## 2021-03-18 PROCEDURE — 99283 EMERGENCY DEPT VISIT LOW MDM: CPT

## 2021-03-18 PROCEDURE — 74011250637 HC RX REV CODE- 250/637: Performed by: EMERGENCY MEDICINE

## 2021-03-18 RX ORDER — MORPHINE SULFATE 15 MG/1
7.5 TABLET ORAL ONCE
Status: COMPLETED | OUTPATIENT
Start: 2021-03-18 | End: 2021-03-18

## 2021-03-18 RX ORDER — MORPHINE SULFATE 15 MG/1
7.5 TABLET ORAL
Qty: 8 TAB | Refills: 0 | Status: SHIPPED | OUTPATIENT
Start: 2021-03-18 | End: 2021-03-21

## 2021-03-18 RX ADMIN — MORPHINE SULFATE 7.5 MG: 15 TABLET ORAL at 20:00

## 2021-03-18 NOTE — ED NOTES
I have reviewed discharge instructions with the patient. The patient verbalized understanding. Patient left ED via Discharge Method: ambulatory to Home with family). Opportunity for questions and clarification provided. Patient given 1 scripts. To continue your aftercare when you leave the hospital, you may receive an automated call from our care team to check in on how you are doing. This is a free service and part of our promise to provide the best care and service to meet your aftercare needs.  If you have questions, or wish to unsubscribe from this service please call [phone]. Thank you for Choosing our [address] [place] [address] Emergency Department.

## 2021-03-18 NOTE — ED PROVIDER NOTES
71-year-old male with history of peripheral vascular disease and chronic leg pain presents with lateral leg pain, worse on the left radiating from his ankle to his thigh for the past 2 weeks. He is followed by pain management for this, but has not been prescribed any pain medicine. He received an injection in his back about 6 months ago. He saw his primary care physician in June and was prescribed Norco 5 mg tablets. This occasionally improves the pain, but he wakes up around 2 AM with worsening pain. Pain is worse whenever he lies down. Does not have claudication type pain or worsening pain with activity. No new falls or injuries. No redness or swelling. No fevers or chills. Has appointment with pain management next week. PCP note from 6/22/20:  Yolis Zeng is a 69 y.o. male returns today in follow-up of bilateral lower extremity discomfort. Most recently he describes an aching sensation involving his feet ankles and lower extremities up to the knee. He has had several of these episodes which will last for a couple of days or more before they resolve spontaneously. He has a history of chronic peripheral neuropathy with associated pain as well as peripheral vascular disease. He has been seen by vascular surgery who felt he was not a good surgical candidate as he had no good veins for grafting. He reports that his pain is worse when he lays down. He complains of hip and knee pain and upper thigh pain and calf pain. He also has pain across his foot. He has associated numbness.     Additionally he has significant arthritis involving knees, back and hips. He has seen orthopedic surgery has had at least one steroid injection into his knee which has not helped much. Eliel Mcdonald continues to have unrelenting lower extremity pain which is profoundly disabling. He has seen pain management and his wife states this has not been effective thus far. \"      Leg Pain   Associated symptoms include back pain.         Past Medical History:   Diagnosis Date    Allergic rhinitis     Arthritis     CAD (coronary artery disease)     CABG '09; troponin elevated 7/14/12 (\"likely due to supply/demand mismatch in setting of fever and increased metabolic demand\"-per cardiac MD note 8/20/12)-had cath, echo, EKG-all WNL except cath showed 2 of the bypasses with blockages- \"occluded SVG to PDA & SVG to LISA\"; EF=55%    Chronic kidney disease     CVA (cerebral vascular accident) ([address] 08/2017    Left- no residual weakness    Diabetes mellitus type 2, controlled ([address]     restarted oral med (metformin 3/2018), does not check BG on regular basis, last hgba1c- 8 (3/12/18)    Dyslipidemia     ED (erectile dysfunction)     Encephalitis 1962    x 2/hospitalized as teenager    GERD (gastroesophageal reflux disease)     controlled with Nexium    Gout     hx of    Hypertension     Lacunar infarct, acute ([address]     possible embolic, remote a-fib- on eliquis    Lumbago     Memory loss     Mitral valve regurgitation 7/14/12    \"moderate\" on echo    ROBERTO (obstructive sleep apnea)     mild per patient, does not use CPAP    PAF (paroxysmal atrial fibrillation) ([address]     Prostate cancer ([address]     followed by urology    Psoriasis     topical creams    SBO (small bowel obstruction) ([address] 2011, 2015, 2016    Stage 2 chronic kidney disease        Past Surgical History:   Procedure Laterality Date    HX APPENDECTOMY  1953    as a child    HX CATARACT REMOVAL Bilateral 2013    iol     HX COLONOSCOPY  1998, 2010    HX CORONARY ARTERY BYPASS GRAFT  03/09/2009    x4 bypass    HX HERNIA REPAIR Bilateral 2012    HX RADICAL PROSTATECTOMY       HX SHOULDER REPLACEMENT Right 2018    Reverse R TSA    HX SPLENECTOMY  1951    RI ABDOMEN SURGERY PROC UNLISTED  1967    exp lap    RI LEFT HEART CATH,PERCUTANEOUS  7/2012    no intervention    RI LEFT HEART CATH,PERCUTANEOUS  09/25/2019    NSTEMI & PCI    RI LEFT HEART CATH,PERCUTANEOUS  03/2019    STEMI, PCI, VFib arrest    [place] PROSTATE BIOPSY, NEEDLE, SATURATION SAMPLING      and ultrasound    VASCULAR SURGERY PROCEDURE UNLIST  2017    aortogram, w/cass LE runoff,R-LE arteriogram         Family History:   Problem Relation Age of Onset    Hypertension Mother    [address] Gout Mother     Arthritis-osteo Mother     Heart Disease Mother          of Heart Disease    Coronary Artery Disease Mother     Diabetes Father     Cancer Father     Heart Disease Father     Bleeding Prob Paternal Grandmother     Hypertension Brother     Cancer Maternal Uncle         Prostate       Social History     Socioeconomic History    Marital status:      Spouse name: Not on file    Number of children: Not on file    Years of education: Not on file    Highest education level: Not on file   Occupational History    Not on file   Social Needs    Financial resource strain: Not on file    Food insecurity     Worry: Not on file     Inability: Not on file    Transportation needs     Medical: Not on file     Non-medical: Not on file   Tobacco Use    Smoking status: Former Smoker     Packs/day: 1.00     Years: 15.00     Pack years: 15.00     Quit date: 1975     Years since quittin.7    Smokeless tobacco: Never Used   Substance and Sexual Activity    Alcohol use: Not Currently     Comment: rarely    Drug use: No    Sexual activity: Yes     Partners: Female   Lifestyle    Physical activity     Days per week: Not on file     Minutes per session: Not on file    Stress: Not on file   Relationships    Social connections     Talks on phone: Not on file     Gets together: Not on file     Attends Zoroastrianism service: Not on file     Active member of club or organization: Not on file     Attends meetings of clubs or organizations: Not on file     Relationship status: Not on file    Intimate partner violence     Fear of current or ex partner: Not on file     Emotionally abused: Not on file     Physically abused: Not on file     Forced sexual activity: Not on file   Other Topics Concern    Dental Braces Not Asked    Endoscopic Camera Pill Not Asked    Metallic Foreign Body Not Asked    Medication Patches Not Asked    Taking Feraheme Not Asked    Claustrophobic Not Asked    Removable Dental Work Not Asked    Hearing Aids Not Asked    Body Piercing Not Asked    Radiation Seeds Not Asked    Pregnant or Breast Feeding Not Asked    Wounded by Shrapnel or Bullet Not Asked    Other-See Comment Not Asked    Other Implant-See Comment Not Asked   [address] Service Not Asked    Blood Transfusions Not Asked    Caffeine Concern Not Asked    Occupational Exposure Not Asked    Hobby Hazards Not Asked    Sleep Concern Not Asked    Stress Concern Not Asked    Weight Concern Not Asked    Special Diet Not Asked    Back Care Not Asked    Exercise Not Asked    Bike Helmet Not Asked   [address] Not Asked    Self-Exams Not Asked   Social History Narrative    Not on file         ALLERGIES: Celecoxib, Ibuprofen, Lescol [fluvastatin], Nsaids (non-steroidal anti-inflammatory drug), Sulfa (sulfonamide antibiotics), and Uloric [febuxostat]    Review of Systems   Constitutional: Negative for chills and fever. HENT: Negative for hearing loss. Eyes: Negative for visual disturbance. Respiratory: Negative for cough and shortness of breath. Cardiovascular: Negative for chest pain and palpitations. Gastrointestinal: Negative for abdominal pain, diarrhea, nausea and vomiting. Musculoskeletal: Positive for arthralgias, back pain and myalgias. Skin: Negative for rash. Neurological: Negative for weakness and headaches. Psychiatric/Behavioral: Negative for confusion. Vitals:    03/18/21 1932   BP: (!) 173/78   Pulse: 73   Resp: 18   Temp: 98.9 °F (37.2 °C)   SpO2: 100%   Weight: 86.2 kg (190 lb)   Height: 5' 10\" (1.778 m)            Physical Exam  Vitals signs and nursing note reviewed.    Constitutional: Appearance: He is well-developed. HENT:      Head: Normocephalic and atraumatic. Eyes:      Pupils: Pupils are equal, round, and reactive to light. Neck:      Musculoskeletal: Normal range of motion and neck supple. Cardiovascular:      Rate and Rhythm: Regular rhythm. Heart sounds: Normal heart sounds. Pulmonary:      Effort: Pulmonary effort is normal.      Breath sounds: Normal breath sounds. Abdominal:      Palpations: Abdomen is soft. Tenderness: There is no abdominal tenderness. Musculoskeletal: Normal range of motion. General: No swelling or tenderness. Right lower leg: No edema. Left lower leg: No edema. Skin:     General: Skin is warm and dry. Capillary Refill: Capillary refill takes 2 to 3 seconds. Findings: No erythema or rash. Neurological:      General: No focal deficit present. Mental Status: He is alert. Psychiatric:         Behavior: Behavior normal.          MDM  Number of Diagnoses or Management Options  Diagnosis management comments: Parts of this document were created using dragon voice recognition software. The chart has been reviewed but errors may still be present. I wore appropriate PPE throughout this patient's ED visit. Ramiro Esparza MD, 7:46 PM    Normal pulses and temp bilateral legs. norco prescribed last June. Will rx morphine until he can see pain management. No indication for further work up in ED today. I discussed the results of all labs, procedures, radiographs, and treatments with the patient and available family. Treatment plan is agreed upon and the patient is ready for discharge. Questions about treatment in the ED and differential diagnosis of presenting condition were answered. Patient was given verbal discharge instructions including, but not limited to, importance of returning to the emergency department for any concern of worsening or continued symptoms.   Instructions were given to follow up with a primary care provider or specialist within 1-2 days. Adverse effects of medications, if prescribed, were discussed and patient was advised to refrain from significant physical activity until followed up by primary care physician and to not drive or operate heavy machinery after taking any sedating substances.              Procedures

## 2021-03-19 NOTE — ED NOTES
patient has a history of HP and patient said that he has not taking his pills. Patient was educated on taking his medication and he has a family member with him .

## 2021-05-20 ENCOUNTER — HOSPITAL ENCOUNTER (OUTPATIENT)
Dept: NUTRITION | Age: 76
Discharge: HOME | End: 2021-05-20
Payer: MEDICARE

## 2021-05-20 PROCEDURE — 97802 MEDICAL NUTRITION INDIV IN: CPT

## 2021-05-20 NOTE — PROGRESS NOTES
NUTRITION ASSESSMENT  Pt did not bring nutrition assessment forms filled out for appointment. Reviewed assessment information to the degree that time permitted. Patient History:  Pt referred by Dr. Adriana Mcpherson with diagnosis of type II diabetes with diabetic peripheral angiopathy w/o gangrene, without long term use of insulin (E11.51). Medical history additionally remarkable for gout, dyslipidemia, HTN, acute MI, S/P CABG, and SBO.  Previous Dieting Attempts/Current Knowledge: Has participated in cardiac rehab program (2019,2020) and worked on low sodium, low added sugar, health healthy diet with RD at that time. diets in the past. Voices good basis of knowledge regarding healthy food choices.  Lifestyle/Cultural/Family Influence: Pt is  and retired.  Motivation: Pt appears to understand that dietary changes will delay progression of medical conditions and attending appt today to work on needed changes to dietary intake.  Support: Wife is present at appt and appears/ voices good support.  Barriers: (1) Food and nutrition -knowledge deficit (2) difficulty with adjusting to required changes with respect to habitual intake and / preferred foods.  Strategies to address barriers:  See nutrition counseling and motivational interviewing. Stage of Change (Transtheoretical Model): Behaviors indicate current stage of change is: Preparation. Anticipate some steps toward compliance with recommendations.       Anthropometrics:   Ht: 5'10\" (1.778m)    Wt:185lb 3.2oz (84kg)                  BMI: 26.57 (Overweight BMI Class)                        Weight History:  WT / BMI WEIGHT BODY MASS INDEX   5/11/2021 185 lb 3.2 oz 26.57 kg/m2   3/18/2021 190 lb 27.26 kg/m2   2/16/2021 190 lb 11.2 oz 27.36 kg/m2   2/8/2021 192 lb 26.78 kg/m2   1/27/2021 185 lb 25.8 kg/m2   12/3/2020 188 lb 26.22 kg/m2   8/12/2020 180 lb 25.46 kg/m2   7/22/2020 178 lb 3.2 oz 24.85 kg/m2   7/13/2020 179 lb 3.2 oz 24.99 kg/m2   7/8/2020 183 lb 25.52 kg/m2   7/1/2020 182 lb 6.4 oz 25.44 kg/m2   6/24/2020 181 lb 12.8 oz 25.36 kg/m2   6/19/2020 181 lb 12.8 oz 25.36 kg/m2   6/5/2020 181 lb 3.2 oz 25.27 kg/m2   6/3/2020 181 lb 25.24 kg/m2   5/27/2020 180 lb 25.1 kg/m2   5/20/2020 182 lb 12.8 oz 25.5 kg/m2   5/11/2020 183 lb 9.6 oz 25.61 kg/m2   3/13/2020 184 lb 11.9 oz 25.77 kg/m2     Nutrition Focused Physical Findings:   N/A. Nutritionally Relevant Medications:  Metformin    Supplements/Vitamins/Herbs:  Not discussed. Nutritionally Relevant Labs:  (1/25/21) Glucose 205   GFR 52    (10/6/15) A1c 6.5    Physical Activity:  Works out in [employer] for 1 hr on [employer]    Sleep Habits:  Not discussed. Food and Nutrient Intake:   Based on reported usual intake, pt consumes 3meals/day with refined carbohydrate snacks. Dines out at infrequently/ week. Appears able to obtain appropriate nutritional choices as desired. Diet Recall:  Breakfast: corn flakes, canned peaches (1/2 cup), milk, like syrup or pancakes 1/month  Snack: potato chips or Cheetos, or banana sandwich  Lunch: leftovers from dinner or sandwich  Snack: sandwich or fruit  Dinner: a meat, 2 vegetables, a starch (wife notes pt is trying to eat smaller portions)  Beverages: No details given. Diet Appears:   low in calcium containing choices   low in servings of fruits and vegetables   low in fiber   low in protein at breakfast and snacks   low in added sugar   low in heart healthy fats    Food Allergies/Intolerances: Not discussed    Estimated Nutritional Needs to Maintain Current Wt:  EER: 2057-6653 kcal/day (25-30kcal/kg IBW 75.5kg)   EER: 1900 kcal/day  Carbohydrates: 238 g/day (50% of kcal) or 60 g/meal(3) with 30 g/snack(1)   Protein: 60g/day (0.8 g/kg IBW 75.5kg) (15% of kcal)  Fat: 74 g/day (35% of kcal) Heart- healthy fats emphasized with lists given.   Fluids: 1.9 L/day (1 ml/kcal)       NUTRITION DIAGNOSIS:   Food and nutrition-related knowledge deficit r/t diabetic diet with CKD considerations as evidenced by recall of meals/ snacks. Limited adherence to nutrition -related recommendations r/t competing values for improvng health verses habitual intake as evidenced by food recall and pt's voiced barriers. NUTRITION INTERVENTION:  Appointment length: 90 minutes. Nutrition Counseling: Motivational Interviewing:  Purpose for motivational interviewing: To determine what steps pt is ready to take regarding strategies for nutritional management of diabetes. Health Risk Discussion:  · Discussed pt's health risks with uncontrolled type II DM. Explained co-morbidities associated with diabetes. Pt verbalized good understanding. Reviewed motivations for change (as above). Acknowledge that big changes to intake can be difficult and encouraged pt to take steps first to reduce amount or frequency of foods high in refined carbohydrates and high in sodium and gradually reduce as pt confidence builds  Weight Management Discussion:   Discussed pts intake and activity patterns as above. Reviewed 3 broad areas that impact weight: food choices, physical activity level, and energy balance. Advised that weight management should be viewed as a learning process and best accomplished by making small goals that pt can continue with for life. Essentially building health lifestyle that will facilitate weight loss and maintenance.  Advised pt on the qualities of individuals who are able to lose weight and maintain that wt loss. Highlighted that maintainers tend to continue in the dietary patterns and physical activity patterns they established in order to lose the weight. Advised designing an approach with small goals that pt can practically plan on continuing for life (new behaviors). Encouraged a focus on healthful intake and to avoid a focus on weight numbers.    · Advised pt that goals of this program are to educate pt an appropriate intake and nutrition planning for weight management, to address barriers to wt loss, and to develop self- monitoring, self-regulating behaviors for life-long management skills. Advised pt research shows that meeting regularly with RD until action steps are established is correlated with more successful outcomes in these areas. Pt verbalized good understanding. Food Choices and Eating Pattern Discussion:   Explained the purpose of structured eating: well balanced meals spaced throughout the day (~4 hours apart) with CHO and nutrients appropriately distributed within each meal. Pt's current pattern is 3 meals per day with 2 snacks and imbalanced in macronutrients.  Pt is  willing to attempt lifestyle changes to adjusting portions in meals and frequency/ amount of treat foods. Exercise Recommendations Discussion:   Discussed any exercise recommendations cleared by physician. Pt states he is presently attending exercise classes at [facility].  Adults with type 2 diabetes should ideally perform both aerobic and resistance exercise training for optimal glycemic and health outcomes.(ADA)   Advised pt that any new physical activity should be started slowly and built up gradually. Nutrition Education:  Recommended modifications:   · Eat ADA consistent CHO meal plan with 3 balanced meals eating every 4 hours. (Snack may be required)  · Reduce refined carbohydrate treats- in amount or frequency of intake  · Continue to limit or avoid sugar-sweetened beverages. CONTENT:  · Provided pt with materials to assist pt in building healthy, macronutrient- balanced (consisitent CHO) meals in appropriate reduced kcal portions to promote weight loss and better blood sugar levels. · Explained how different macronutrients have an impact on blood sugar levels and as in light of sedentary situations.   · Provided pt with a MyPlate ADA consistent CHO meal plan and educated pt on how to use meal plan using models and measuring cups to demonstrate appropriate portion sizes (as below). Gave kcal goals for meals if needed for dining out or packaged snacks. Encouraged pt to use meal plan to relearn appropriate portion sizes for present activity level. Advised using portions as a guide and to rely on kcal level if needed when dining out. The Plate Method:  · Educated pt on balancing intake of macronutrients at meals and snacks. Discussed the importance of adequate intake of all macronutrients for satiety and satisfaction. · Discussed proportions on plate: 1/2 non-starchy vegetables, 1/4 protein, 1/4 starch. Stressed the importance of healthy fat with every meal (lists given)  · Demonstrated appropriate portion sizes for various food groups and strategies for portion control. · Carbohydrates: Reviewed food groups containing CHO, provided food examples. Encouraged obtaining CHO from a variety of food groups to vary micronutrient intake, choosing less processed carbs with higher fiber. Encouraged decreasing intake of refined CHO foods/beverages with limited nutritional value and/or those high in added sugar. Reviewed list of starchy vegetables. · Protein: Reviewed food groups containing protein, provided food examples. Stressed the importance of adequate protein intake with each meal/snack to promote satiety following meals and maintenance of lean body mass. · Fat: Educated pt on importance of essential fats in diet. Advised of food sources. Explained the need to increase sources of omega 3 fats and gave products to look for. Educated pt on trans fats and harm caused with consumption. Explained how to find trans fats in ingredient lists verses rather than nutrition fact label. APPLICATION:  Skill development using Meal Plan:  · Provided pt with the MyPlate Template meal planner (as above) and educated pt on individualized portions. Used hand models and measuring cups to demonstrate appropriate portion sizes.  Encouraged pt to use the meal plan as a teaching tool learn about food groups and appropriate portion sizes for present activity level. Advised using portions as a guide and to rely on kcal level if needed when dining out. · Explained to pt how to use the meal planner sheets with the meal plan. Pt composed 2 sample meals in office using meal planner. Pt agreed to take the following steps:  Goal: Pt and wife will work on gradual changes with snacks (CHO and protein; replacing some refined CHO) and macronutrient balance at meals. Plan: Pt will use MyPlate Meal Template and will use planner sheets as guide to structure eating in food choices, macronutrient amounts, and portion sizes for goal stated (as above). Materials Given:  Diabetes Nutrition Guidelines Handout  MyPlate Template (ADA consistent carbohydrate)   Meal planner sheets  Common Comments of Food Choices for Healthy Diet      MONITORING AND EVALUATION:  RECOMMENDATIONS FOR CONTINUED APPOINTMENTS/ SUPPORT:  · Pt was attentive and asked pertinent questions during appointment. It appears pt will take some action steps. · Discussed pt's coverage for diabetic education at [facility]. Pt declined more information at this time. · Pt would benefit from continued support with appts with dietitian until action steps are established for minimum of 6 months. · Frequency depends on any setbacks or need for support. Follow up appt: Pt did not schedule at this time. Follow-up Monitoring Plans: Will call pt in 1 week to check on f/u needs.     Sherrie Carias MS, RD,CSSD, LD  W: [phone]

## 2021-06-07 PROBLEM — E11.40 TYPE 2 DIABETES, CONTROLLED, WITH NEUROPATHY (HCC): Chronic | Status: RESOLVED | Noted: 2017-07-06 | Resolved: 2021-06-07

## 2021-06-07 PROBLEM — E11.21 TYPE 2 DIABETES WITH NEPHROPATHY (HCC): Status: RESOLVED | Noted: 2018-06-12 | Resolved: 2021-06-07

## 2021-07-27 ENCOUNTER — HOSPITAL ENCOUNTER (OUTPATIENT)
Dept: DIABETES SERVICES | Age: 76
Discharge: HOME | End: 2021-07-27
Attending: STUDENT IN AN ORGANIZED HEALTH CARE EDUCATION/TRAINING PROGRAM
Payer: MEDICARE

## 2021-07-27 PROCEDURE — G0108 DIAB MANAGE TRN  PER INDIV: HCPCS

## 2021-07-27 NOTE — PROGRESS NOTES
This is a one on one appointment. Due to being during [facility]- public health emergency, social distancing and mandatory precautions are in place and utilized Came for diabetes educational assessment today. Provided basic information on carbohydrates, proteins and fats. Educational need/plan: Will attend 2 nutrition/2 diabetes sessions to address the following: diabetes disease process, nutritional management, physical activity, using medications, preventing complications, psychosocial adjustment, goal setting, problem solving, monitoring, behavior change strategies. Hopes to gain the following from this educational program:  Being active, healthy coping, healthy eating, monitoring blood sugars, problem solving, taking medications and reducing risk of complications. Issues Identified: He reports it is painful to walk on his feet, but he does exercise 3 times a week in the [employer]. Medication Reconciliation completed at today's visit.

## 2021-08-12 ENCOUNTER — HOSPITAL ENCOUNTER (OUTPATIENT)
Dept: DIABETES SERVICES | Age: 76
Discharge: HOME | End: 2021-08-12
Payer: MEDICARE

## 2021-08-12 PROCEDURE — G0109 DIAB MANAGE TRN IND/GROUP: HCPCS

## 2021-08-12 NOTE — PROGRESS NOTES
This is a class  appointment with limited persons allowed in class due to [place] [address] public health emergency. Social distancing and mandatory precautions are in place and utilized. Pt and wife attended nutrition diabetes #1 group session today. Topics included: disease process and treatment; diet factors impacting blood sugars including food choices, meal timing and portions, carbohydrate choices (emphasizing high fiber carbohydrates); proteins (emphasizing heart healthy choices) and fat food choices (emphasizing unsaturated fats); free foods; combination food choices; nutrition tips for persons with diabetes; snack ideas; resources for diabetes management. Two methods of meal planning were reviewed: carbohydrate choices and carbohydrate counting. Basics of exercise discussed. Voiced /demonstrated understanding of material covered. Anticipated adherence is good. Problems/barriers may be: none anticipated  Educated pt on using his glucometer. Pt could return demonstrations. No medication changes since last visit. No new procedure or surgery since last visit.

## 2021-08-16 ENCOUNTER — HOSPITAL ENCOUNTER (OUTPATIENT)
Dept: DIABETES SERVICES | Age: 76
Discharge: HOME | End: 2021-08-16
Payer: MEDICARE

## 2021-08-16 PROCEDURE — G0109 DIAB MANAGE TRN IND/GROUP: HCPCS

## 2021-08-16 NOTE — PROGRESS NOTES
This is a class  appointment with limited persons allowed in class due to [id number] public health emergency. Social distancing and mandatory precautions are in place and utilized. Participant attended Diabetes #1 session today. Topics included: Characteristics/pathophysiology type 1/type 2 diabetes; Goal/acceptable blood glucose ranges/Hgb A1C/interpreting/using results;meters, continuous glucose monitors and insulin pumps. Using medications safely; Sick day management; Prevention/detection/treatment of acute complications. - Verbalized understanding of material covered.  -Anticipated adherence is good   -Problems/barriers may be  none anticipated   Personal-Came with his wife. She had to leave at 10 AM, but he stayed for entire class. He was engaged in class and asked several questions. Medication Reconciliation Completed. No surgery or procedure.

## 2021-08-18 ENCOUNTER — HOSPITAL ENCOUNTER (OUTPATIENT)
Dept: DIABETES SERVICES | Age: 76
Discharge: HOME | End: 2021-08-18
Payer: MEDICARE

## 2021-08-18 PROCEDURE — G0109 DIAB MANAGE TRN IND/GROUP: HCPCS

## 2021-08-18 NOTE — PROGRESS NOTES
This is a class  appointment with limited persons allowed in class due to [id number]-[id number] public health emergency. Social distancing and mandatory precautions are in place and utilized. Participant attended Diabetes #2 session today. Topics included: Prevention/detection/treatment of chronic complications; sleep apnea; Developing strategies to promote health/change behavior/recommended screenings; Developing strategies to address psychosocial issues; Goal setting. Participants goal/support plan includes:        Diabetes Goal: For Better blood sugar control, pre- meals  mg/dl. I will exercise Mon, Wed, and Fri., at [place] [address] at [facility] a [address] [place] [address] for 45-60 minutes three times a week starting 8-18-20201. I will reevaluate when feel better and increase activity as tolerated. Diabetes Support Plan:        Problems/barriers may be:none anticipated         Plan for follow up/Recommendations: mail follow up survey at 6 months and one year. Medication Reconciliation Completed. No new surgery or procedures.

## 2021-08-31 ENCOUNTER — HOSPITAL ENCOUNTER (OUTPATIENT)
Dept: DIABETES SERVICES | Age: 76
Discharge: HOME | End: 2021-08-31
Payer: MEDICARE

## 2021-08-31 PROCEDURE — G0109 DIAB MANAGE TRN IND/GROUP: HCPCS

## 2021-08-31 NOTE — PROGRESS NOTES
This is a class  appointment with limited persons allowed in class due to [facility]- public health emergency. Social distancing and mandatory precautions are in place and utilized. Pt and wife attended nutrition diabetes #2 group session today. Topics included: plate method for portion control; fiber and sodium guidelines; sugar substitutes; alcohol; eating out; recipe modification; label reading. Participant's goal: To keep blood sugars in target ranges he will limit carbohydrates to 60 grams or less at most meals and re-evaluate in 3-6 months. Participant's diabetes support plan: Use the grocery shopping list.  Barriers identified: None  Voiced/demonstrated understanding of material covered. Anticipated adherence is fair to good with the help of his wife. No medication changes, procedures or surgeries since last visit. Plan for follow up is: will be sent a follow up questionnaire at 6 months and one year.

## 2021-09-02 ENCOUNTER — HOSPITAL ENCOUNTER (OUTPATIENT)
Dept: NUTRITION | Age: 76
Discharge: HOME | End: 2021-09-02
Payer: MEDICARE

## 2021-09-02 PROCEDURE — 97803 MED NUTRITION INDIV SUBSEQ: CPT

## 2021-09-02 NOTE — PROGRESS NOTES
Silke Lemus RD, LD  Outpatient Registered Dietitian  [facility]  Phone: [phone]  Fax: [phone]    ASSESSMENT  Pt is a 77 y.o. male referred with the following diagnosis (es):  Type 2 diabetes mellitus with diabetic peripheral angiopathy without gangrene [E11.51]     Patient Active Problem List   Diagnosis Code    Coronary artery disease of native artery of native heart with stable angina pectoris ([facility] I25.118    Dyslipidemia E78.5    Gout M10.9    S/P CABG (coronary artery bypass graft) Z95.1    Intestinal adhesions with obstruction ([facility] [address] [facility] K56.50    ANABELLA (acute kidney injury) ([facility] [address] [facility] N17.9    HTN (hypertension) I10    ROBERTO (obstructive sleep apnea) G47.33    Malignant neoplasm of prostate ([facility] [address] C61    Other specified disorder of male genital organs(608.89) N50.89    Elevated prostate specific antigen (PSA) R97.20    Lumbago M54.5    Impotence of organic origin N52.9    Non-seasonal allergic rhinitis due to pollen J30.1    Psoriasis L40.9    Arthritis M19.90    Arthralgia M25.50    CKD (chronic kidney disease) stage 3, GFR 30-59 ml/min ([facility] N18.30    Other chest pain R07.89    Cervicalgia M54.2    Lacunar infarct, acute ([facility] [address] I63.81    Ischemia of right lower extremity I99.8    Noncompliance with CPAP treatment Z91.14    Memory loss R41.3    Osteoarthritis of right glenohumeral joint M19.011    Anemia associated with acute blood loss D62    Hypokalemia E87.6    Nocturnal hypoxemia G47.34    Other fatigue R53.83    Macrocytic anemia D53.9    Ventricular fibrillation (HCC) I49.01    Acute myocardial infarction (HCC) I21.9    Acute ST elevation myocardial infarction (STEMI) ([facility]) I21.3    Open wound of left great toe S91.102A    Unstable angina (HCC) I20.0    NSTEMI (non-ST elevated myocardial infarction) ([facility]) I21.4    SBO (small bowel obstruction) ([facility] [address] [facility] K56.609    GI bleeding K92.2    HFrEF (heart failure with reduced ejection fraction) ([facility] [address] I50.20    Peripheral artery disease (HCC) I73.9     Completed 1.5hrs of one on one education with a RD for diabetes and CKD nutrition at [facility], as well as 2 nutrition classes within DSMT program, and cardiopulmonary rehab program. Voiced motivation for glycemic control- asks what a perfect meal would like like, what he should eat and what he should stay away from. Brought in education handout binder from [facility]. Lab Results   Component Value Date/Time    Cholesterol, total 136 02/09/2020 04:16 AM    HDL Cholesterol 45 02/09/2020 04:16 AM    LDL, calculated 76 02/09/2020 04:16 AM    LDL-C, External 163 09/29/2014 12:00 AM    Triglyceride 75 02/09/2020 04:16 AM    CHOL/HDL Ratio 3.0 02/09/2020 04:16 AM     Lab Results   Component Value Date/Time    Sodium 142 01/27/2021 03:24 PM    Potassium 3.8 01/27/2021 03:24 PM    Chloride 110 (H) 01/27/2021 03:24 PM    CO2 25 01/27/2021 03:24 PM    Anion gap 7 01/27/2021 03:24 PM    Glucose 205 (H) 01/27/2021 03:24 PM    BUN 17 01/27/2021 03:24 PM    Creatinine 1.66 (H) 01/27/2021 03:24 PM    BUN/Creatinine ratio 17 11/08/2018 11:37 AM    GFR est AA 52 (L) 01/27/2021 03:24 PM    GFR est non-AA 43 (L) 01/27/2021 03:24 PM    Calcium 8.6 01/27/2021 03:24 PM    Bilirubin, total 0.3 01/27/2021 03:24 PM    ALT (SGPT) 25 01/27/2021 03:24 PM    Alk.  phosphatase 97 01/27/2021 03:24 PM    Protein, total 7.1 01/27/2021 03:24 PM    Albumin 3.3 01/27/2021 03:24 PM    Globulin 3.8 (H) 01/27/2021 03:24 PM    A-G Ratio 0.9 (L) 01/27/2021 03:24 PM      Lab Results   Component Value Date/Time    Hemoglobin A1c 7.6 (H) 02/09/2020 04:16 AM    Hemoglobin A1c (POC) 6.5 (A) 10/06/2015 09:55 AM    Hemoglobin A1c, External 6.5 03/30/2015 12:00 AM      Meds: metformin    Barriers to change: nutrition knowledge deficit, health literacy    Social Support: wife    Diet Recall:   Breakfast- cereal with fruit and 2% milk, 1 slice of amaro  Lunch: sandwich or hamburger  Dinner: meat or fish with vegetables and either rice or potatoes   Notes he will snack on fruit like banana, apple, or orange. He was going to pair this with peanut butter but wasn't sure what kind to buy at the store. He is eating on a small plate. Eating pattern appears low in heart healthy fats and inadequate in fiber and protein. Based on limited information, pt may be within recommended carbohydrate goal of 60g/meal.     Physical Activity: Exercising at [employer], unknown frequency and duration    Sleep Habits: unknown    Behaviors indicate current stage of change is: Action. Anthropometrics:   Estimated body mass index is 26.52 kg/m² as calculated from the following:    Height as of this encounter: 5' 10\" (1.778 m). Weight as of 8/23/21: 83.8 kg (184 lb 12.8 oz). Estimated Nutrition Needs:  MSJ= 1584.25 kcal/d  EEN for weight loss = MSJ x 1.3 PAL - 500kcal/day  (1559kcal/d)  Protein: 50-67g/day (0.6-0.8g/kg;15 %)    Carbohydrate: 195g/day (50%)    Fat: 61g/day (35%)     DIAGNOSIS:  Limited adherence to nutrition recommendations related to nutrition knowledge deficit and health literacy as evidenced by pt voices incomplete understanding of nutrition guidelines for  diabetes and CKD. INTERVENTIONS:  1. 60 minute appointment    2. Nutrition Prescription  Recommended Modifications of Meals and Snacks (RD Goals for pt):  - Consistent CHO, macronutrient balanced meal pattern  - Consume protein and healthy fat with carbohydrate foods  - Choose low glycemic, fiber rich whole foods over highly processed foods    3. Nutrition Education and Counseling   Elicited current knowledge from pt, as well as food preferences. He and his wife voiced uncertainty about a lot of different foods (ex. Grits, almonds).  Reviewed current food choices and eating patterns.  Presented diabetic myplate and stated 21N CHO as goal for each meal. Explained his current CHO intake at breakfast is likely appropriate, but based on recall, it is inadequtae in protein. He notes that the protein portion on the plate looks small compared to what he is eating now. Discussed macronutrient impact on blood sugar at meals and how addition of protein and healthy fat may help influence blood sugar positively, without changing current carbohydrate food choices. Pt feels less overwhelmed by this and like it is a feasible change. Encouraged pt to consider what protein foods he could add to breakfast, and to snacks. Highlighted carbohydrate foods blue and protein foods purple in current education handouts and encouraged pairing a purple food with a blue food.  Reviewed food sources of CHO, lean protein, and heart healthy fats.  Explained impact of macronutrients on blood sugar and insulin levels.  Discussed importance of making sustainable changes to lifestyle rather than an all or nothing approach.  Utilized the following behavior change strategies: motivational interviewing, problem solving, goal setting and cognitive restructuring  Reviewed motivations for change and elicited change talk. Pt identified behaviors address and collaborated in goal setting. Elicited perceived/potential barriers to behavior changes, self efficacy, and strategies to overcome barriers. Pt verbalized understanding of recommendations discussed. Anticipate fair compliance with recommendations. Goal: Pt will structure meals and change food choices to facilitate a reduction in A1c from baseline within 6 months.      MONITORING & EVALUATION:  Monitor food/nutrient intake and lab values  Follow Up: 10/13/21 @12PM

## 2021-09-03 VITALS — HEIGHT: 70 IN | BODY MASS INDEX: 26.52 KG/M2

## 2021-09-11 PROBLEM — R53.83 OTHER FATIGUE: Status: RESOLVED | Noted: 2018-11-08 | Resolved: 2021-09-11

## 2021-09-11 PROBLEM — E87.6 HYPOKALEMIA: Status: RESOLVED | Noted: 2018-04-21 | Resolved: 2021-09-11

## 2021-09-11 PROBLEM — Z91.14 NONCOMPLIANCE WITH CPAP TREATMENT: Status: RESOLVED | Noted: 2018-03-08 | Resolved: 2021-09-11

## 2021-09-22 ENCOUNTER — HOSPITAL ENCOUNTER (OUTPATIENT)
Dept: DIABETES SERVICES | Age: 76
Discharge: HOME | End: 2021-09-22
Payer: MEDICARE

## 2021-09-22 PROCEDURE — G0109 DIAB MANAGE TRN IND/GROUP: HCPCS

## 2021-09-22 NOTE — PROGRESS NOTES
This is a class  appointment with limited persons allowed in class due to PZPVX-12 public health emergency. Social distancing and mandatory precautions are in place and utilized. Pt and wife attended diabetes follow up group class. Open forum for clients to ask questions based on entire diabetes self management education program. Education topics are driven in this class based on clients' individual questions and needs. Topics included sugar substitutes, eating out, stress/depression, foot care, sleep apnea, signs/symptoms of high/low blood sugars, treating low blood sugars and blood pressure. Medicine Reconciliation Completed. No new surgery or procedures.

## 2021-09-26 ENCOUNTER — APPOINTMENT (OUTPATIENT)
Dept: GENERAL RADIOLOGY | Age: 76
End: 2021-09-26
Attending: EMERGENCY MEDICINE
Payer: MEDICARE

## 2021-09-26 ENCOUNTER — HOSPITAL ENCOUNTER (EMERGENCY)
Age: 76
Discharge: HOME | End: 2021-09-27
Attending: EMERGENCY MEDICINE
Payer: MEDICARE

## 2021-09-26 VITALS
OXYGEN SATURATION: 100 % | TEMPERATURE: 98.1 F | WEIGHT: 183 LBS | BODY MASS INDEX: 26.2 KG/M2 | RESPIRATION RATE: 35 BRPM | SYSTOLIC BLOOD PRESSURE: 171 MMHG | HEART RATE: 69 BPM | DIASTOLIC BLOOD PRESSURE: 87 MMHG | HEIGHT: 70 IN

## 2021-09-26 DIAGNOSIS — R07.9 CHEST PAIN, UNSPECIFIED TYPE: Primary | ICD-10-CM

## 2021-09-26 DIAGNOSIS — E11.49 OTHER DIABETIC NEUROLOGICAL COMPLICATION ASSOCIATED WITH TYPE 2 DIABETES MELLITUS (HCC): ICD-10-CM

## 2021-09-26 LAB
ALBUMIN SERPL-MCNC: 3.4 G/DL (ref 3.2–4.6)
ALBUMIN/GLOB SERPL: 0.8 {RATIO} (ref 1.2–3.5)
ALP SERPL-CCNC: 86 U/L (ref 50–136)
ALT SERPL-CCNC: 27 U/L (ref 12–65)
ANION GAP SERPL CALC-SCNC: 6 MMOL/L (ref 7–16)
AST SERPL-CCNC: 22 U/L (ref 15–37)
BASOPHILS # BLD: 0 K/UL (ref 0–0.2)
BASOPHILS NFR BLD: 0 % (ref 0–2)
BILIRUB SERPL-MCNC: 0.4 MG/DL (ref 0.2–1.1)
BUN SERPL-MCNC: 13 MG/DL (ref 8–23)
CALCIUM SERPL-MCNC: 9.2 MG/DL (ref 8.3–10.4)
CHLORIDE SERPL-SCNC: 111 MMOL/L (ref 98–107)
CO2 SERPL-SCNC: 27 MMOL/L (ref 21–32)
CREAT SERPL-MCNC: 1.33 MG/DL (ref 0.8–1.5)
DIFFERENTIAL METHOD BLD: ABNORMAL
EOSINOPHIL # BLD: 0.1 K/UL (ref 0–0.8)
EOSINOPHIL NFR BLD: 2 % (ref 0.5–7.8)
ERYTHROCYTE [DISTWIDTH] IN BLOOD BY AUTOMATED COUNT: 13.5 % (ref 11.9–14.6)
GLOBULIN SER CALC-MCNC: 4.3 G/DL (ref 2.3–3.5)
GLUCOSE SERPL-MCNC: 118 MG/DL (ref 65–100)
HCT VFR BLD AUTO: 38.9 % (ref 41.1–50.3)
HGB BLD-MCNC: 12.9 G/DL (ref 13.6–17.2)
IMM GRANULOCYTES # BLD AUTO: 0 K/UL (ref 0–0.5)
IMM GRANULOCYTES NFR BLD AUTO: 0 % (ref 0–5)
LIPASE SERPL-CCNC: 124 U/L (ref 73–393)
LYMPHOCYTES # BLD: 1.6 K/UL (ref 0.5–4.6)
LYMPHOCYTES NFR BLD: 28 % (ref 13–44)
MAGNESIUM SERPL-MCNC: 2 MG/DL (ref 1.8–2.4)
MCH RBC QN AUTO: 29 PG (ref 26.1–32.9)
MCHC RBC AUTO-ENTMCNC: 33.2 G/DL (ref 31.4–35)
MCV RBC AUTO: 87.4 FL (ref 79.6–97.8)
MONOCYTES # BLD: 0.6 K/UL (ref 0.1–1.3)
MONOCYTES NFR BLD: 11 % (ref 4–12)
NEUTS SEG # BLD: 3.2 K/UL (ref 1.7–8.2)
NEUTS SEG NFR BLD: 58 % (ref 43–78)
NRBC # BLD: 0 K/UL (ref 0–0.2)
PLATELET # BLD AUTO: 181 K/UL (ref 150–450)
PMV BLD AUTO: 12.1 FL (ref 9.4–12.3)
POTASSIUM SERPL-SCNC: 3.8 MMOL/L (ref 3.5–5.1)
PROT SERPL-MCNC: 7.7 G/DL (ref 6.3–8.2)
RBC # BLD AUTO: 4.45 M/UL (ref 4.23–5.6)
SODIUM SERPL-SCNC: 144 MMOL/L (ref 136–145)
TROPONIN-HIGH SENSITIVITY: 20.9 PG/ML (ref 0–14)
WBC # BLD AUTO: 5.5 K/UL (ref 4.3–11.1)

## 2021-09-26 PROCEDURE — 83735 ASSAY OF MAGNESIUM: CPT

## 2021-09-26 PROCEDURE — 84484 ASSAY OF TROPONIN QUANT: CPT

## 2021-09-26 PROCEDURE — 93005 ELECTROCARDIOGRAM TRACING: CPT | Performed by: EMERGENCY MEDICINE

## 2021-09-26 PROCEDURE — 80053 COMPREHEN METABOLIC PANEL: CPT

## 2021-09-26 PROCEDURE — 85025 COMPLETE CBC W/AUTO DIFF WBC: CPT

## 2021-09-26 PROCEDURE — 99284 EMERGENCY DEPT VISIT MOD MDM: CPT

## 2021-09-26 PROCEDURE — 71045 X-RAY EXAM CHEST 1 VIEW: CPT

## 2021-09-26 PROCEDURE — 83690 ASSAY OF LIPASE: CPT

## 2021-09-26 RX ORDER — SODIUM CHLORIDE 0.9 % (FLUSH) 0.9 %
5-10 SYRINGE (ML) INJECTION AS NEEDED
Status: DISCONTINUED | OUTPATIENT
Start: 2021-09-26 | End: 2021-09-27 | Stop reason: HOSPADM

## 2021-09-26 RX ORDER — SODIUM CHLORIDE 0.9 % (FLUSH) 0.9 %
5-10 SYRINGE (ML) INJECTION EVERY 8 HOURS
Status: DISCONTINUED | OUTPATIENT
Start: 2021-09-26 | End: 2021-09-27 | Stop reason: HOSPADM

## 2021-09-27 LAB
ATRIAL RATE: 66 BPM
CALCULATED P AXIS, ECG09: 69 DEGREES
CALCULATED R AXIS, ECG10: 27 DEGREES
CALCULATED T AXIS, ECG11: 54 DEGREES
DIAGNOSIS, 93000: NORMAL
P-R INTERVAL, ECG05: 186 MS
Q-T INTERVAL, ECG07: 408 MS
QRS DURATION, ECG06: 84 MS
QTC CALCULATION (BEZET), ECG08: 427 MS
TROPONIN-HIGH SENSITIVITY: 20.3 PG/ML (ref 0–14)
VENTRICULAR RATE, ECG03: 66 BPM

## 2021-09-27 PROCEDURE — 74011250637 HC RX REV CODE- 250/637: Performed by: EMERGENCY MEDICINE

## 2021-09-27 PROCEDURE — 84484 ASSAY OF TROPONIN QUANT: CPT

## 2021-09-27 RX ORDER — HYDROCODONE BITARTRATE AND ACETAMINOPHEN 5; 325 MG/1; MG/1
1 TABLET ORAL
Status: COMPLETED | OUTPATIENT
Start: 2021-09-27 | End: 2021-09-27

## 2021-09-27 RX ADMIN — HYDROCODONE BITARTRATE AND ACETAMINOPHEN 1 TABLET: 5; 325 TABLET ORAL at 01:12

## 2021-09-27 NOTE — ED TRIAGE NOTES
Pt states he has been having chest pain since last week and his pain is not getting better which brings him in tonight to be seen. Pt states with moving his pain gets worse.  Denies sob, n, or v    C\o pain from his knees down   Also states that he is having pain to his entire left side of his chest and abd area

## 2021-09-27 NOTE — ED PROVIDER NOTES
Mask was worn during the entire patient examination. Lilli Patient is a 77 y.o. male who presents to the ED with a chief complaint of chest pain. He has been hurting off and on for the last several days. States it is currently gone. Patient denies any shortness of breath. He actually denied any cardiac history to me. But the records indicate he has had CABG as well as stents in the past.  He also has had memory problems documented in his past medical history. I feel that some of the history is unreliable. But he does state that he feels fine now and does not have any pain. He also is complaining of numbness and tingling to his feet has been going on for months and this is the main source of his discomfort today. He reports diabetic history. The history is provided by the patient.         Past Medical History:   Diagnosis Date    Allergic rhinitis     Arthritis     CAD (coronary artery disease)     CABG '09; troponin elevated 7/14/12 (\"likely due to supply/demand mismatch in setting of fever and increased metabolic demand\"-per cardiac MD note 8/20/12)-had cath, echo, EKG-all WNL except cath showed 2 of the bypasses with blockages- \"occluded SVG to PDA & SVG to LISA\"; EF=55%    Chronic kidney disease     Coronary artery disease of native artery of native heart with stable angina pectoris ([address]     Diabetes mellitus type 2, controlled ([address]     restarted oral med (metformin 3/2018), does not check BG on regular basis, last hgba1c- 8 (3/12/18)    ED (erectile dysfunction)     Encephalitis 1962    x 2/hospitalized as teenager    Gout     hx of    Hypercholesterolemia     Hypertension     Lacunar infarct, acute ([address]     possible embolic, remote a-fib- on eliquis    Lumbago     Memory loss     Mitral valve regurgitation 7/14/12    \"moderate\" on echo    ROBERTO (obstructive sleep apnea)     mild per patient, does not use CPAP    PAF (paroxysmal atrial fibrillation) (HCC)     Prostate cancer [facility])     followed by urology    Psoriasis     topical creams    SBO (small bowel obstruction) ([facility] [address] [facility] , , 2016       Past Surgical History:   Procedure Laterality Date    HX APPENDECTOMY  3    as a child    HX CATARACT REMOVAL Bilateral     iol     HX COLONOSCOPY  ,     HX CORONARY ARTERY BYPASS GRAFT  03/09/2009    x4 bypass    HX HERNIA REPAIR Bilateral 2012    HX RADICAL PROSTATECTOMY       HX SHOULDER REPLACEMENT Right 2018    Reverse R TSA    HX SPLENECTOMY      HX WISDOM TEETH EXTRACTION      DC ABDOMEN SURGERY PROC UNLISTED  1967    exp lap    DC LEFT HEART CATH,PERCUTANEOUS  2012    no intervention    DC LEFT HEART CATH,PERCUTANEOUS  2019    NSTEMI & PCI    DC LEFT HEART CATH,PERCUTANEOUS  2019    STEMI, PCI, VFib arrest    DC PROSTATE BIOPSY, NEEDLE, SATURATION SAMPLING      and ultrasound    VASCULAR SURGERY PROCEDURE UNLIST  2017    aortogram, w/cass LE runoff,R-LE arteriogram         Family History:   Problem Relation Age of Onset    Hypertension Mother    [de-identified] Gout Mother     Arthritis-osteo Mother     Heart Disease Mother          of Heart Disease    Coronary Artery Disease Mother     Diabetes Father     Cancer Father     Heart Disease Father     Bleeding Prob Paternal Grandmother     Hypertension Brother     Cancer Maternal Uncle         Prostate       Social History     Socioeconomic History    Marital status:      Spouse name: Not on file    Number of children: Not on file    Years of education: Not on file    Highest education level: Not on file   Occupational History    Not on file   Tobacco Use    Smoking status: Former Smoker     Packs/day: 1.00    Smokeless tobacco: Never Used    Tobacco comment: quit around age 28 y/o; smoked 18 years    Vaping Use    Vaping Use: Never used   Substance and Sexual Activity    Alcohol use: Yes     Comment: once every four to six months     Drug use: No    Sexual activity: Not on file   Other Topics Concern    Dental Braces Not Asked    Endoscopic Camera Pill Not Asked    Metallic Foreign Body Not Asked    Medication Patches Not Asked    Taking Feraheme Not Asked    Claustrophobic Not Asked   [address] Work Not Asked    Hearing Aids Not Asked    Body Piercing Not Asked    Radiation Seeds Not Asked    Pregnant or Breast Feeding Not Asked    Wounded by Shrapnel or Bullet Not Asked    Other-See Comment Not Asked    Other Implant-See Comment Not Asked   [address] Not Asked    Blood Transfusions Not Asked    Caffeine Concern Not Asked    Occupational Exposure Not Asked    Hobby Hazards Not Asked    Sleep Concern Not Asked    Stress Concern Not Asked    Weight Concern Not Asked    Special Diet Not Asked    Back Care Not Asked    Exercise Not Asked    Bike Helmet Not Asked   [address] Not Asked    Self-Exams Not Asked   Social History Narrative    Not on file     Social Determinants of Health     Financial Resource Strain:     Difficulty of Paying Living Expenses:    Food Insecurity:     Worried About Running Out of Food in the Last Year:     Ran Out of Food in the Last Year:    Transportation Needs:     Lack of Transportation (Medical):  Lack of Transportation (Non-Medical):    Physical Activity:     Days of Exercise per Week:     Minutes of Exercise per Session:    Stress:     Feeling of Stress :    Social Connections:     Frequency of Communication with Friends and Family:     Frequency of Social Gatherings with Friends and Family:     Attends Jewish Services:     Active Member of Clubs or Organizations:     Attends Club or Organization Meetings:     Marital Status:    Intimate Partner Violence:     Fear of Current or Ex-Partner:     Emotionally Abused:     Physically Abused:     Sexually Abused:           ALLERGIES: Celecoxib, Ibuprofen, Lescol [fluvastatin], Nsaids (non-steroidal anti-inflammatory drug), Sulfa (sulfonamide antibiotics), and Uloric [febuxostat]    Review of Systems   Constitutional: Negative for activity change, chills, fatigue and fever. Respiratory: Positive for chest tightness. Negative for cough, shortness of breath, wheezing and stridor. Cardiovascular: Positive for chest pain. Negative for palpitations. Gastrointestinal: Negative for abdominal pain, diarrhea, nausea and vomiting. Musculoskeletal: Negative for arthralgias, neck pain and neck stiffness. Skin: Negative for color change, pallor and rash. All other systems reviewed and are negative. Vitals:    09/26/21 2230   BP: (!) 208/97   Pulse: 70   Resp: 20   Temp: 98.1 °F (36.7 °C)   SpO2: 100%   Weight: 83 kg (183 lb)   Height: 5' 10\" (1.778 m)            Physical Exam  Vitals and nursing note reviewed. Constitutional:       General: He is not in acute distress. Appearance: He is well-developed. He is not ill-appearing or toxic-appearing. HENT:      Head: Normocephalic and atraumatic. Eyes:      Conjunctiva/sclera: Conjunctivae normal.   Cardiovascular:      Rate and Rhythm: Normal rate and regular rhythm. Pulmonary:      Effort: Pulmonary effort is normal. No tachypnea, accessory muscle usage or respiratory distress. Breath sounds: No stridor. No decreased breath sounds, wheezing, rhonchi or rales. Chest:      Chest wall: Tenderness present. Abdominal:      Palpations: Abdomen is soft. Tenderness: There is no abdominal tenderness. Musculoskeletal:      Cervical back: No rigidity. Skin:     General: Skin is warm and dry. Capillary Refill: Capillary refill takes less than 2 seconds. Coloration: Skin is not cyanotic or pale. Findings: No ecchymosis or rash. Neurological:      General: No focal deficit present. Mental Status: He is alert. Cranial Nerves: No cranial nerve deficit.    Psychiatric:         Mood and Affect: Mood normal.         Behavior: Behavior normal. MDM  Number of Diagnoses or Management Options  Chest pain, unspecified type  Other diabetic neurological complication associated with type 2 diabetes mellitus ([address]  Diagnosis management comments: Patient has 2 negative troponins. He is complaining of leg pain bilaterally. Does have good pulses likely neuropathy is already on gabapentin. I will send referral back to [facility] and have him follow closely with him. Return precautions given. Lieutenant Todd MD; 9/27/2021 @1:22 AM Voice dictation software was used during the making of this note. This software is not perfect and grammatical and other typographical errors may be present.   This note has not been proofread for errors.  ===================================================================          Amount and/or Complexity of Data Reviewed  Clinical lab tests: ordered and reviewed (Results for orders placed or performed during the hospital encounter of 09/26/21  -TROPONIN-HIGH SENSITIVITY       Result                      Value             Ref Range           Troponin-High Sensitiv*     20.9 (H)          0 - 14 pg/mL   -CBC WITH AUTOMATED DIFF       Result                      Value             Ref Range           WBC                         5.5               4.3 - 11.1 K*       RBC                         4.45              4.23 - 5.6 M*       HGB                         12.9 (L)          13.6 - 17.2 *       HCT                         38.9 (L)          41.1 - 50.3 %       MCV                         87.4              79.6 - 97.8 *       MCH                         29.0              26.1 - 32.9 *       MCHC                        33.2              31.4 - 35.0 *       RDW                         13.5              11.9 - 14.6 %       PLATELET                    181               150 - 450 K/*       MPV                         12.1              9.4 - 12.3 FL       ABSOLUTE NRBC               0.00              0.0 - 0.2 K/*       DF AUTOMATED                             NEUTROPHILS                 58                43 - 78 %           LYMPHOCYTES                 28                13 - 44 %           MONOCYTES                   11                4.0 - 12.0 %        EOSINOPHILS                 2                 0.5 - 7.8 %         BASOPHILS                   0                 0.0 - 2.0 %         IMMATURE GRANULOCYTES       0                 0.0 - 5.0 %         ABS. NEUTROPHILS            3.2               1.7 - 8.2 K/*       ABS. LYMPHOCYTES            1.6               0.5 - 4.6 K/*       ABS. MONOCYTES              0.6               0.1 - 1.3 K/*       ABS. EOSINOPHILS            0.1               0.0 - 0.8 K/*       ABS. BASOPHILS              0.0               0.0 - 0.2 K/*       ABS. IMM. GRANS.            0.0               0.0 - 0.5 K/*  -METABOLIC PANEL, COMPREHENSIVE       Result                      Value             Ref Range           Sodium                      144               136 - 145 mm*       Potassium                   3.8               3.5 - 5.1 mm*       Chloride                    111 (H)           98 - 107 mmo*       CO2                         27                21 - 32 mmol*       Anion gap                   6 (L)             7 - 16 mmol/L       Glucose                     118 (H)           65 - 100 mg/*       BUN                         13                8 - 23 MG/DL        Creatinine                  1.33              0.8 - 1.5 MG*       GFR est AA                  >60               >60 ml/min/1*       GFR est non-AA              56 (L)            >60 ml/min/1*       Calcium                     9.2               8.3 - 10.4 M*       Bilirubin, total            0.4               0.2 - 1.1 MG*       ALT (SGPT)                  27                12 - 65 U/L         AST (SGOT)                  22                15 - 37 U/L         Alk.  phosphatase            86                50 - 136 U/L        Protein, total 7.7               6.3 - 8.2 g/*       Albumin                     3.4               3.2 - 4.6 g/*       Globulin                    4.3 (H)           2.3 - 3.5 g/*       A-G Ratio                   0.8 (L)           1.2 - 3.5      -LIPASE       Result                      Value             Ref Range           Lipase                      124               73 - 393 U/L   -MAGNESIUM       Result                      Value             Ref Range           Magnesium                   2.0               1.8 - 2.4 mg*  -TROPONIN-HIGH SENSITIVITY       Result                      Value             Ref Range           Troponin-High Sensitiv*     20.3 (H)          0 - 14 pg/mL  )  Tests in the radiology section of CPT®: ordered and reviewed (XR CHEST PORT    Result Date: 9/26/2021  EXAM: Chest x-ray. INDICATION: Chest pain. COMPARISON: Prior chest x-ray on January 27, 2021. TECHNIQUE: Single frontal view. FINDINGS: The lungs are clear. The cardiac size, mediastinal contour and pulmonary vasculature are normal. No pneumothorax or pleural effusion is seen. Again noted is a prior sternotomy and right shoulder arthroplasty.      No acute process.   )  Independent visualization of images, tracings, or specimens: yes (===============================================  ED EKG Interpretation  EKG was interpreted in the absence of a cardiologist.    EKG rhythm: normal sinus rhythm  Rate:70  ST Segments: Normal ST segments - NO STEMI      Олег Chin MD; 9/26/2021 @10:36 PM================   )           Procedures

## 2021-09-27 NOTE — ED NOTES
I have reviewed discharge instructions with the patient. The patient verbalized understanding. Patient left ED via Discharge Method: ambulatory to Home with wife  . Opportunity for questions and clarification provided. Patient given 0 scripts. To continue your aftercare when you leave the hospital, you may receive an automated call from our care team to check in on how you are doing. This is a free service and part of our promise to provide the best care and service to meet your aftercare needs.  If you have questions, or wish to unsubscribe from this service please call [phone]. Thank you for Choosing our [facility] Emergency Department.

## 2021-10-13 ENCOUNTER — HOSPITAL ENCOUNTER (OUTPATIENT)
Dept: NUTRITION | Age: 76
Discharge: HOME | End: 2021-10-13
Payer: MEDICARE

## 2021-10-13 PROCEDURE — 97803 MED NUTRITION INDIV SUBSEQ: CPT

## 2021-10-13 NOTE — PROGRESS NOTES
Krys Lane RD, LD  Outpatient Registered Dietitian  [facility]  Phone: [phone]  Fax: [phone]    ASSESSMENT  Pt is a 77 y.o. male referred with the following diagnosis (es):  Type 2 diabetes mellitus with diabetic peripheral angiopathy without gangrene [E11.51]     Patient Active Problem List   Diagnosis Code    Coronary artery disease of native artery of native heart with stable angina pectoris ([facility] I25.118    Dyslipidemia E78.5    Gout M10.9    S/P CABG (coronary artery bypass graft) Z95.1    Intestinal adhesions with obstruction ([facility] K56.50    ANABELLA (acute kidney injury) ([facility] N17.9    HTN (hypertension) I10    ROBERTO (obstructive sleep apnea) G47.33    Malignant neoplasm of prostate ([facility] C61    Other specified disorder of male genital organs(608.89) N50.89    Elevated prostate specific antigen (PSA) R97.20    Lumbago M54.50    Impotence of organic origin N52.9    Non-seasonal allergic rhinitis due to pollen J30.1    Psoriasis L40.9    Arthritis M19.90    CKD (chronic kidney disease) stage 3, GFR 30-59 ml/min ([facility]) N18.30    Cervicalgia M54.2    Lacunar infarct, acute ([facility] I63.81    Ischemia of right lower extremity I99.8    Memory loss R41.3    Osteoarthritis of right glenohumeral joint M19.011    Anemia associated with acute blood loss D62    Nocturnal hypoxemia G47.34    Macrocytic anemia D53.9    Ventricular fibrillation ([facility]) I49.01    Acute myocardial infarction ([facility]) I21.9    Acute ST elevation myocardial infarction (STEMI) ([facility]) I21.3    Open wound of left great toe S91.102A    Unstable angina ([facility]) I20.0    NSTEMI (non-ST elevated myocardial infarction) ([facility]) I21.4    SBO (small bowel obstruction) ([facility]) K56.609    GI bleeding K92.2    HFrEF (heart failure with reduced ejection fraction) ([facility]) I50.20    Peripheral artery disease ([facility]) I73.9     Completed 1.5hrs of one on one education with a RD for diabetes and CKD nutrition at [facility] [place] 2], as well as 2 nutrition classes within DSMT program, and cardiopulmonary rehab program. Voiced motivation for glycemic control- asks what a perfect meal would like like, what he should eat and what he should stay away from. Brought in education handout binder from [facility]. 10/13: 30 minute visit today. Completed 2nd group class with DSMT on 9/14. A1c down to 7.1 from 7.3 in April 2021. PMH additionally remarkable for CKD3a, ROBERTO, dyslipidemia, CAD, and HFrEF. Pt states he got a peanut butter that did not have trans fat, but couldn't recall recommended modifications from last appointment. He has not added protein to his breakfast. He and his wife state they are eating vegetables every night at dinner but he is still taking a laxative for constipation. He also states he feels very tired in the morning, sleeping 15hrs a day, that he has completed sleep studies and that a CPAP was recommended but he doesn't like using it or cleaning it, and doesn't think it would be helpful for him to get better sleep since he is already sleeping so much. Recommended pt discuss this with his PCP. He has been exercising 3 days a week and feels that has been helpful. He denies any additional nutrition questions at this time.      Lab Results   Component Value Date/Time    Cholesterol, total 136 02/09/2020 04:16 AM    HDL Cholesterol 45 02/09/2020 04:16 AM    LDL, calculated 76 02/09/2020 04:16 AM    LDL-C, External 163 09/29/2014 12:00 AM    Triglyceride 75 02/09/2020 04:16 AM    CHOL/HDL Ratio 3.0 02/09/2020 04:16 AM     Lab Results   Component Value Date/Time    Sodium 144 09/26/2021 10:42 PM    Potassium 3.8 09/26/2021 10:42 PM    Chloride 111 (H) 09/26/2021 10:42 PM    CO2 27 09/26/2021 10:42 PM    Anion gap 6 (L) 09/26/2021 10:42 PM    Glucose 118 (H) 09/26/2021 10:42 PM    BUN 13 09/26/2021 10:42 PM    Creatinine 1.33 09/26/2021 10:42 PM    BUN/Creatinine ratio 17 11/08/2018 11:37 AM    GFR est AA >60 09/26/2021 10:42 PM    GFR est non-AA 56 (L) 09/26/2021 10:42 PM    Calcium 9.2 09/26/2021 10:42 PM    Bilirubin, total 0.4 09/26/2021 10:42 PM    ALT (SGPT) 27 09/26/2021 10:42 PM    Alk. phosphatase 86 09/26/2021 10:42 PM    Protein, total 7.7 09/26/2021 10:42 PM    Albumin 3.4 09/26/2021 10:42 PM    Globulin 4.3 (H) 09/26/2021 10:42 PM    A-G Ratio 0.8 (L) 09/26/2021 10:42 PM      Lab Results   Component Value Date/Time    Hemoglobin A1c 7.6 (H) 02/09/2020 04:16 AM    Hemoglobin A1c (POC) 7.1 09/14/2021 01:40 PM    Hemoglobin A1c, External 6.5 03/30/2015 12:00 AM      Meds: metformin    Barriers to change: nutrition knowledge deficit, health literacy    Social Support: wife    Diet Recall:   Breakfast- cereal with fruit and 2% milk, 1 slice of amaro  Lunch: sandwich or hamburger  Dinner: meat or fish with vegetables and either rice or potatoes   Notes he will snack on fruit like banana, apple, or orange. He was going to pair this with peanut butter but wasn't sure what kind to buy at the store. He is eating on a small plate. Eating pattern appears low in heart healthy fats and inadequate in fiber and protein. Based on limited information, pt may be within recommended carbohydrate goal of 60g/meal.     Physical Activity: Exercising at [employer], unknown frequency and duration    Sleep Habits: unknown    Behaviors indicate current stage of change is: Preparation. Anthropometrics:   Estimated body mass index is 26.4 kg/m² as calculated from the following:    Height as of 10/8/21: 5' 10\" (1.778 m). Weight as of 10/8/21: 83.5 kg (184 lb).      Estimated Nutrition Needs:  EEN for weight loss = MSJ x 1.3 PAL - 500kcal/day  (1559kcal/d)  Protein: 50-67g/day (0.6-0.8g/kg;15 %)    Carbohydrate: 195g/day (50%)    Fat: 61g/day (35%)     DIAGNOSIS:  Limited adherence to nutrition recommendations related to nutrition knowledge deficit and health literacy as evidenced by pt voices incomplete understanding of nutrition guidelines for  diabetes and CKD. INTERVENTIONS:  1. 30 minute appointment    2. Nutrition Prescription  Recommended Modifications of Meals and Snacks (RD Goals for pt):  - Consistent CHO, macronutrient balanced meal pattern  - Consume protein and healthy fat with carbohydrate foods  - Choose low glycemic, fiber rich whole foods over highly processed foods    3. Nutrition Education and Counseling   Provided resources for recipes in line with recommendations and that allow for meal planning at patient request (diabetes food hub website).  Encouraged adding either 2 eggs or a greek yogurt to breakfast for protein. He sometimes eats yoplait yogurt as a snack but doesn't know if there is a difference between that or other greek yogurt. Discussed differences in protein and added sugar content. Encouraged increasing soluble fiber intake by changing carbohydrate sources at meals for those rich in soluble fiber. Provided food examples. Affirmed benefit of choosing whole grain bread as it is higher in fiber.  Utilized the following behavior change strategies: motivational interviewing, problem solving, goal setting and cognitive restructuring  Reviewed motivations for change and elicited change talk. Pt identified behaviors address and collaborated in goal setting. Elicited perceived/potential barriers to behavior changes, self efficacy, and strategies to overcome barriers. Pt verbalized understanding of recommendations discussed. Anticipate fair compliance with recommendations. Goal: Pt will structure meals and change food choices to facilitate a reduction in A1c from baseline within 6 months. --progressing     MONITORING & EVALUATION:  Monitor food/nutrient intake and lab values  Follow Up: Pt has completed allotted amount of MNT time covered by insurance coverage for this year. Advised pt of his benefits for next year and encouraged him to follow up with our office as needed for additional support.

## 2021-11-23 PROBLEM — E11.9 TYPE 2 DIABETES MELLITUS (HCC): Status: ACTIVE | Noted: 2021-11-23

## 2021-11-30 PROBLEM — R09.02 HYPOXEMIA: Status: ACTIVE | Noted: 2021-11-30

## 2021-11-30 PROBLEM — G47.10 HYPERSOMNIA, UNSPECIFIED: Status: ACTIVE | Noted: 2021-11-30

## 2022-01-03 ENCOUNTER — HOSPITAL ENCOUNTER (OUTPATIENT)
Dept: SLEEP MEDICINE | Age: 77
Discharge: HOME | End: 2022-01-03
Payer: MEDICARE

## 2022-01-03 PROCEDURE — 95810 POLYSOM 6/> YRS 4/> PARAM: CPT

## 2022-01-08 ENCOUNTER — HOSPITAL ENCOUNTER (EMERGENCY)
Age: 77
Discharge: HOME | End: 2022-01-09
Attending: EMERGENCY MEDICINE
Payer: MEDICARE

## 2022-01-08 DIAGNOSIS — R51.9 ACUTE NONINTRACTABLE HEADACHE, UNSPECIFIED HEADACHE TYPE: Primary | ICD-10-CM

## 2022-01-08 DIAGNOSIS — I10 HYPERTENSION, UNSPECIFIED TYPE: ICD-10-CM

## 2022-01-08 DIAGNOSIS — R73.9 HYPERGLYCEMIA: ICD-10-CM

## 2022-01-08 LAB
BASOPHILS # BLD: 0 K/UL (ref 0–0.2)
BASOPHILS NFR BLD: 1 % (ref 0–2)
DIFFERENTIAL METHOD BLD: ABNORMAL
EOSINOPHIL # BLD: 0.1 K/UL (ref 0–0.8)
EOSINOPHIL NFR BLD: 2 % (ref 0.5–7.8)
ERYTHROCYTE [DISTWIDTH] IN BLOOD BY AUTOMATED COUNT: 13.4 % (ref 11.9–14.6)
GLUCOSE BLD STRIP.AUTO-MCNC: 251 MG/DL (ref 65–100)
HCT VFR BLD AUTO: 40.2 % (ref 41.1–50.3)
HGB BLD-MCNC: 13.3 G/DL (ref 13.6–17.2)
IMM GRANULOCYTES # BLD AUTO: 0 K/UL (ref 0–0.5)
IMM GRANULOCYTES NFR BLD AUTO: 0 % (ref 0–5)
LYMPHOCYTES # BLD: 1.6 K/UL (ref 0.5–4.6)
LYMPHOCYTES NFR BLD: 33 % (ref 13–44)
MCH RBC QN AUTO: 29 PG (ref 26.1–32.9)
MCHC RBC AUTO-ENTMCNC: 33.1 G/DL (ref 31.4–35)
MCV RBC AUTO: 87.6 FL (ref 79.6–97.8)
MONOCYTES # BLD: 0.5 K/UL (ref 0.1–1.3)
MONOCYTES NFR BLD: 11 % (ref 4–12)
NEUTS SEG # BLD: 2.5 K/UL (ref 1.7–8.2)
NEUTS SEG NFR BLD: 53 % (ref 43–78)
NRBC # BLD: 0 K/UL (ref 0–0.2)
PLATELET # BLD AUTO: 165 K/UL (ref 150–450)
PMV BLD AUTO: 11.8 FL (ref 9.4–12.3)
RBC # BLD AUTO: 4.59 M/UL (ref 4.23–5.6)
SERVICE CMNT-IMP: ABNORMAL
WBC # BLD AUTO: 4.8 K/UL (ref 4.3–11.1)

## 2022-01-08 PROCEDURE — 80053 COMPREHEN METABOLIC PANEL: CPT

## 2022-01-08 PROCEDURE — 74011250636 HC RX REV CODE- 250/636: Performed by: EMERGENCY MEDICINE

## 2022-01-08 PROCEDURE — 96360 HYDRATION IV INFUSION INIT: CPT

## 2022-01-08 PROCEDURE — 85025 COMPLETE CBC W/AUTO DIFF WBC: CPT

## 2022-01-08 PROCEDURE — 96361 HYDRATE IV INFUSION ADD-ON: CPT

## 2022-01-08 PROCEDURE — 93005 ELECTROCARDIOGRAM TRACING: CPT | Performed by: EMERGENCY MEDICINE

## 2022-01-08 PROCEDURE — 99284 EMERGENCY DEPT VISIT MOD MDM: CPT

## 2022-01-08 PROCEDURE — 82962 GLUCOSE BLOOD TEST: CPT

## 2022-01-08 RX ORDER — SODIUM CHLORIDE 0.9 % (FLUSH) 0.9 %
5-10 SYRINGE (ML) INJECTION AS NEEDED
Status: DISCONTINUED | OUTPATIENT
Start: 2022-01-08 | End: 2022-01-09 | Stop reason: HOSPADM

## 2022-01-08 RX ORDER — SODIUM CHLORIDE 0.9 % (FLUSH) 0.9 %
5-10 SYRINGE (ML) INJECTION EVERY 8 HOURS
Status: DISCONTINUED | OUTPATIENT
Start: 2022-01-08 | End: 2022-01-09 | Stop reason: HOSPADM

## 2022-01-08 RX ADMIN — SODIUM CHLORIDE 1000 ML: 900 INJECTION, SOLUTION INTRAVENOUS at 23:37

## 2022-01-09 ENCOUNTER — APPOINTMENT (OUTPATIENT)
Dept: CT IMAGING | Age: 77
End: 2022-01-09
Attending: EMERGENCY MEDICINE
Payer: MEDICARE

## 2022-01-09 VITALS
DIASTOLIC BLOOD PRESSURE: 89 MMHG | RESPIRATION RATE: 13 BRPM | HEART RATE: 61 BPM | OXYGEN SATURATION: 100 % | TEMPERATURE: 98.2 F | BODY MASS INDEX: 26.48 KG/M2 | HEIGHT: 70 IN | WEIGHT: 185 LBS | SYSTOLIC BLOOD PRESSURE: 191 MMHG

## 2022-01-09 LAB
ALBUMIN SERPL-MCNC: 2.9 G/DL (ref 3.2–4.6)
ALBUMIN/GLOB SERPL: 0.6 {RATIO} (ref 1.2–3.5)
ALP SERPL-CCNC: 94 U/L (ref 50–136)
ALT SERPL-CCNC: 26 U/L (ref 12–65)
ANION GAP SERPL CALC-SCNC: 6 MMOL/L (ref 7–16)
AST SERPL-CCNC: 31 U/L (ref 15–37)
ATRIAL RATE: 57 BPM
BILIRUB SERPL-MCNC: 0.3 MG/DL (ref 0.2–1.1)
BUN SERPL-MCNC: 14 MG/DL (ref 8–23)
CALCIUM SERPL-MCNC: 7.8 MG/DL (ref 8.3–10.4)
CALCULATED P AXIS, ECG09: 47 DEGREES
CALCULATED R AXIS, ECG10: 14 DEGREES
CALCULATED T AXIS, ECG11: -11 DEGREES
CHLORIDE SERPL-SCNC: 109 MMOL/L (ref 98–107)
CO2 SERPL-SCNC: 25 MMOL/L (ref 21–32)
COVID-19 RAPID TEST, COVR: NOT DETECTED
CREAT SERPL-MCNC: 1.38 MG/DL (ref 0.8–1.5)
DIAGNOSIS, 93000: NORMAL
GLOBULIN SER CALC-MCNC: 4.5 G/DL (ref 2.3–3.5)
GLUCOSE SERPL-MCNC: 242 MG/DL (ref 65–100)
P-R INTERVAL, ECG05: 196 MS
POTASSIUM SERPL-SCNC: 4 MMOL/L (ref 3.5–5.1)
PROT SERPL-MCNC: 7.4 G/DL (ref 6.3–8.2)
Q-T INTERVAL, ECG07: 424 MS
QRS DURATION, ECG06: 88 MS
QTC CALCULATION (BEZET), ECG08: 412 MS
SODIUM SERPL-SCNC: 140 MMOL/L (ref 136–145)
SOURCE, COVRS: NORMAL
VENTRICULAR RATE, ECG03: 57 BPM

## 2022-01-09 PROCEDURE — 87635 SARS-COV-2 COVID-19 AMP PRB: CPT

## 2022-01-09 PROCEDURE — 70450 CT HEAD/BRAIN W/O DYE: CPT

## 2022-01-09 NOTE — ED TRIAGE NOTES
Pt seems confused in triage/ he brought in blood sugar machine and said his blood pressure was high/ pt c/o of headache

## 2022-01-09 NOTE — ED PROVIDER NOTES
Nick Cadena is a 77 y.o. male seen on 1/9/2022 in the [address] [place] [address] in room ER04/04. Chief Complaint   Patient presents with    High Blood Sugar    Hypertension    Headache     HPI: 62-year-old -American male presented emergency department with complaints of headache, high blood pressure and high blood sugars that began tonight. Patient states that his headache and symptoms of all improved and that his blood pressure has improved. Patient was concerned because his blood sugar was high at home but is 240 here in the emergency department arrival.  Patient denies any chest pain, shortness of breath, nausea, vomiting, diarrhea, changes in bowel or bladder habits, weakness, dizziness, lightheadedness or any other concerns. Patient is feeling much better upon my evaluation patient. He is on blood thinners. He denies Covid exposures that he is aware of and has been vaccinated for Covid. Historian: Patient    REVIEW OF SYSTEMS     Review of Systems   Constitutional: Negative. HENT: Negative. Respiratory: Negative. Cardiovascular: Negative. Gastrointestinal: Negative. Genitourinary: Negative. Musculoskeletal: Negative. Skin: Negative. Neurological: Positive for headaches. Psychiatric/Behavioral: Negative. All other systems reviewed and are negative.       PAST MEDICAL HISTORY     Past Medical History:   Diagnosis Date    Allergic rhinitis     Arthritis     CAD (coronary artery disease)     CABG '09; troponin elevated 7/14/12 (\"likely due to supply/demand mismatch in setting of fever and increased metabolic demand\"-per cardiac MD note 8/20/12)-had cath, echo, EKG-all WNL except cath showed 2 of the bypasses with blockages- \"occluded SVG to PDA & SVG to LISA\"; EF=55%    Chronic kidney disease     Coronary artery disease of native artery of native heart with stable angina pectoris ([facility]     Diabetes mellitus type 2, controlled ([facility]     restarted oral med (metformin 3/2018), does not check BG on regular basis, last hgba1c- 8 (3/12/18)    ED (erectile dysfunction)     Encephalitis 1962    x 2/hospitalized as teenager    Gout     hx of    Hypercholesterolemia     Hypertension     Lacunar infarct, acute ([facility]     possible embolic, remote a-fib- on eliquis    Lumbago     Memory loss     Mitral valve regurgitation 7/14/12    \"moderate\" on echo    ROBERTO (obstructive sleep apnea)     mild per patient, does not use CPAP    PAF (paroxysmal atrial fibrillation) ([facility]     Prostate cancer ([facility]     followed by urology    Psoriasis     topical creams    SBO (small bowel obstruction) ([facility] 2011, 2015, 2016     Past Surgical History:   Procedure Laterality Date    HX APPENDECTOMY  1953    as a child    HX CATARACT REMOVAL Bilateral 2013    iol     HX COLONOSCOPY  1998, 2010    HX CORONARY ARTERY BYPASS GRAFT  03/09/2009    x4 bypass    HX HERNIA REPAIR Bilateral 2012    HX RADICAL PROSTATECTOMY       HX SHOULDER REPLACEMENT Right 2018    Reverse R TSA    HX SPLENECTOMY  1951    HX WISDOM TEETH EXTRACTION      MO ABDOMEN SURGERY PROC UNLISTED  1967    exp lap    MO LEFT HEART CATH,PERCUTANEOUS  7/2012    no intervention    MO LEFT HEART CATH,PERCUTANEOUS  09/25/2019    NSTEMI & PCI    MO LEFT HEART CATH,PERCUTANEOUS  03/2019    STEMI, PCI, VFib arrest    MO PROSTATE BIOPSY, NEEDLE, SATURATION SAMPLING      and ultrasound    VASCULAR SURGERY PROCEDURE UNLIST  12/29/2017    aortogram, w/cass LE runoff,R-LE arteriogram     Social History     Socioeconomic History    Marital status:    Tobacco Use    Smoking status: Former Smoker     Packs/day: 1.00    Smokeless tobacco: Never Used    Tobacco comment: quit around age 28 y/o; smoked 18 years    Vaping Use    Vaping Use: Never used   Substance and Sexual Activity    Alcohol use: Yes     Comment: once every four to six months     Drug use:  No Prior to Admission Medications   Prescriptions Last Dose Informant Patient Reported? Taking? Blood-Glucose Meter (FreeStyle Lite Meter) monitoring kit   Yes No   Sig: FreeStyle Lite Meter kit   USE TO TEST BLOOD SUGAR UP TO THREE TIMES DAILY   Blood-Glucose Meter monitoring kit   No No   Sig: Check blood sugar up to three times a day   Cetirizine (ZYRTEC) 10 mg Cap   Yes No   Sig: Take 10 mg by mouth nightly. As needed  Indications: inflammation of the nose due to an allergy   Patient not taking: Reported on 11/30/2021   acetaminophen (TYLENOL) 325 mg tablet   Yes No   Sig: Take 2 Tabs by mouth every four (4) hours as needed for Pain. Patient not taking: Reported on 11/30/2021   aspirin 81 mg chewable tablet   Yes No   Sig: Take 1 Tab by mouth daily. bisacodyL (Dulcolax, bisacodyl,) 5 mg EC tablet   Yes No   Sig: Take 5 mg by mouth daily as needed for Constipation. Patient not taking: Reported on 11/30/2021   carvediloL (COREG) 6.25 mg tablet   No No   Sig: Take 1 Tab by mouth two (2) times daily (with meals). cholecalciferol, vitamin D3, (VITAMIN D3) 2,000 unit Tab   Yes No   Sig: Take 2,000 Units by mouth daily. Indications: OSTEOPOROSIS, am   clopidogreL (PLAVIX) 75 mg tab   No No   Sig: TAKE 1 TABLET DAILY   ezetimibe (ZETIA) 10 mg tablet   No No   Sig: Take 1 Tablet by mouth daily. gabapentin (NEURONTIN) 300 mg capsule   No No   Sig: Take 1 Capsule by mouth three (3) times daily.    glucose blood VI test strips (ASCENSIA AUTODISC VI, ONE TOUCH ULTRA TEST VI) strip   No No   Sig: Check blood sugar up to three times a day   glucose blood VI test strips (FreeStyle Lite Strips) strip   Yes No   Sig: FreeStyle Lite Strips   CHECK BLOOD SUGAR UP TO 3 TIMES A DAY   lancets (FreeStyle Lancets) 28 gauge misc   Yes No   Sig: FreeStyle Lancets 28 gauge   USE TO CHECK BLOOD SUGAR UP TO THREE TIMES DAILY   lancets misc   No No   Sig: Check blood sugar up to three times a day   losartan (COZAAR) 50 mg tablet No No   Sig: Take 1 Tablet by mouth daily. metFORMIN (GLUCOPHAGE) 500 mg tablet Not Taking at Unknown time  No No   Sig: Take 1 Tab by mouth daily (with breakfast). Patient not taking: Reported on 1/8/2022   nitroglycerin (NITROSTAT) 0.4 mg SL tablet   No No   Sig: Place 1 sl under the tongue q 5 min prn cp, max 3 sl in a 15-min time period. Call 911 if no relief after the 3rd sl. Patient not taking: Reported on 11/30/2021   pantoprazole (PROTONIX) 40 mg tablet   No No   Sig: Take 1 Tab by mouth daily. rosuvastatin (CRESTOR) 20 mg tablet   No No   Sig: Take 1 Tablet by mouth nightly. simethicone (GAS-X) 125 mg capsule   Yes No   Sig: Take 125 mg by mouth four (4) times daily as needed for Flatulence. Facility-Administered Medications: None     Allergies   Allergen Reactions    Celecoxib Swelling     Hands    Ibuprofen Unknown (comments)     Patient unsure    Lescol [Fluvastatin] Unknown (comments)     Other reaction(s): Unknown (comments)    Nsaids (Non-Steroidal Anti-Inflammatory Drug) Other (comments)     Elevated serum creatinine    Sulfa (Sulfonamide Antibiotics) Rash    Uloric [Febuxostat] Other (comments)     Joint Pain        PHYSICAL EXAM       Vitals:    01/08/22 2344 01/08/22 2345 01/09/22 0001 01/09/22 0031   BP:   (!) 166/79 (!) 191/89   Pulse: 69 61 (!) 58 61   Resp:   21 13   Temp:       SpO2:   100% 100%    Vital signs were reviewed. Physical Exam  Vitals and nursing note reviewed. Constitutional:       General: He is not in acute distress. Appearance: Normal appearance. He is not ill-appearing or toxic-appearing. HENT:      Head: Normocephalic and atraumatic. Mouth/Throat:      Mouth: Mucous membranes are moist.   Eyes:      Extraocular Movements: Extraocular movements intact. Pupils: Pupils are equal, round, and reactive to light. Cardiovascular:      Rate and Rhythm: Normal rate and regular rhythm. Pulses: Normal pulses.       Heart sounds: Normal heart sounds. Pulmonary:      Effort: Pulmonary effort is normal.      Breath sounds: Normal breath sounds. Abdominal:      Palpations: Abdomen is soft. Tenderness: There is no abdominal tenderness. There is no guarding or rebound. Musculoskeletal:         General: Normal range of motion. Cervical back: Normal range of motion. Skin:     General: Skin is warm and dry. Neurological:      General: No focal deficit present. Mental Status: He is alert and oriented to person, place, and time. Psychiatric:         Mood and Affect: Mood normal.         Behavior: Behavior normal.         Thought Content: Thought content normal.         Judgment: Judgment normal.          MEDICAL DECISION MAKING     ED Course:    Orders Placed This Encounter    COVID-19 RAPID TEST    CT HEAD WO CONT    CBC with Diff    CMP    POC GLUCOSE    POC Glucose    POC Urine dip    GLUCOSE, POC    EKG, 12 LEAD, INITIAL    SALINE LOCK IV ONE TIME Routine    sodium chloride (NS) flush 5-10 mL    sodium chloride (NS) flush 5-10 mL    sodium chloride 0.9 % bolus infusion 1,000 mL     Recent Results (from the past 8 hour(s))   GLUCOSE, POC    Collection Time: 01/08/22 11:05 PM   Result Value Ref Range    Glucose (POC) 251 (H) 65 - 100 mg/dL    Performed by [facility]    CBC WITH AUTOMATED DIFF    Collection Time: 01/08/22 11:35 PM   Result Value Ref Range    WBC 4.8 4.3 - 11.1 K/uL    RBC 4.59 4.23 - 5.6 M/uL    HGB 13.3 (L) 13.6 - 17.2 g/dL    HCT 40.2 (L) 41.1 - 50.3 %    MCV 87.6 79.6 - 97.8 FL    MCH 29.0 26.1 - 32.9 PG    MCHC 33.1 31.4 - 35.0 g/dL    RDW 13.4 11.9 - 14.6 %    PLATELET 865 464 - 744 K/uL    MPV 11.8 9.4 - 12.3 FL    ABSOLUTE NRBC 0.00 0.0 - 0.2 K/uL    DF AUTOMATED      NEUTROPHILS 53 43 - 78 %    LYMPHOCYTES 33 13 - 44 %    MONOCYTES 11 4.0 - 12.0 %    EOSINOPHILS 2 0.5 - 7.8 %    BASOPHILS 1 0.0 - 2.0 %    IMMATURE GRANULOCYTES 0 0.0 - 5.0 %    ABS. NEUTROPHILS 2.5 1.7 - 8.2 K/UL    ABS. LYMPHOCYTES 1.6 0.5 - 4.6 K/UL    ABS. MONOCYTES 0.5 0.1 - 1.3 K/UL    ABS. EOSINOPHILS 0.1 0.0 - 0.8 K/UL    ABS. BASOPHILS 0.0 0.0 - 0.2 K/UL    ABS. IMM. GRANS. 0.0 0.0 - 0.5 K/UL   METABOLIC PANEL, COMPREHENSIVE    Collection Time: 01/08/22 11:35 PM   Result Value Ref Range    Sodium 140 136 - 145 mmol/L    Potassium 4.0 3.5 - 5.1 mmol/L    Chloride 109 (H) 98 - 107 mmol/L    CO2 25 21 - 32 mmol/L    Anion gap 6 (L) 7 - 16 mmol/L    Glucose 242 (H) 65 - 100 mg/dL    BUN 14 8 - 23 MG/DL    Creatinine 1.38 0.8 - 1.5 MG/DL    GFR est AA >60 >60 ml/min/1.73m2    GFR est non-AA 53 (L) >60 ml/min/1.73m2    Calcium 7.8 (L) 8.3 - 10.4 MG/DL    Bilirubin, total 0.3 0.2 - 1.1 MG/DL    ALT (SGPT) 26 12 - 65 U/L    AST (SGOT) 31 15 - 37 U/L    Alk. phosphatase 94 50 - 136 U/L    Protein, total 7.4 6.3 - 8.2 g/dL    Albumin 2.9 (L) 3.2 - 4.6 g/dL    Globulin 4.5 (H) 2.3 - 3.5 g/dL    A-G Ratio 0.6 (L) 1.2 - 3.5     COVID-19 RAPID TEST    Collection Time: 01/09/22  1:33 AM   Result Value Ref Range    Specimen source Nasopharyngeal      COVID-19 rapid test Not detected NOTD       CT HEAD WO CONT    Result Date: 1/9/2022  EXAM: Noncontrast CT head. INDICATION: Dizziness and hypertension. COMPARISON: Prior MRI brain on August 29, 2017. TECHNIQUE: Noncontrast CT images of the head were obtained. Radiation dose reduction techniques were used for this study. Our CT scanners use one or all of the following:  Automated exposure control, adjustment of the mA or kV according to patient size, iterative reconstruction. FINDINGS: Brain volume is appropriate for age. There is an old left basal ganglia infarct. No acute infarct, hemorrhage or mass is identified. There is no mass effect, midline shift or depressed fracture. The visualized paranasal sinuses and mastoid air cells are clear. No acute process. EKG interpretation personally: Rate 57. Sinus bradycardia. Nonspecific ST and T wave abnormalities.   Normal RI and QT intervals. MDM  Number of Diagnoses or Management Options  Acute nonintractable headache, unspecified headache type  Hyperglycemia  Hypertension, unspecified type  Diagnosis management comments: 45-year-old -American male presents emergency department with high blood pressure and high glucose levels with associated headache. Patient feeling much better. His labs and imaging are reassuring. Patient will be discharged home and return emergency department for any concerns. Amount and/or Complexity of Data Reviewed  Clinical lab tests: ordered and reviewed  Tests in the radiology section of CPT®: ordered and reviewed  Decide to obtain previous medical records or to obtain history from someone other than the patient: yes  Review and summarize past medical records: yes  Independent visualization of images, tracings, or specimens: yes    Patient Progress  Patient progress: improved        Disposition: Discharged  Diagnosis:     ICD-10-CM ICD-9-CM   1. Acute nonintractable headache, unspecified headache type  R51.9 784.0   2. Hypertension, unspecified type  I10 401.9   3. Hyperglycemia  R73.9 790.29     ____________________________________________________________________  A portion of this note was generated using voice recognition dictation software. While the note has been reviewed for accuracy, please note certain words and phrases may not be transcribed as intended and some grammatical and/or typographical errors may be present.

## 2022-01-09 NOTE — ED NOTES
I have reviewed discharge instructions with the patient. The patient verbalized understanding. Patient left ED via Discharge Method: ambulatory to Home with self. Opportunity for questions and clarification provided. Patient given 0 scripts. To continue your aftercare when you leave the hospital, you may receive an automated call from our care team to check in on how you are doing. This is a free service and part of our promise to provide the best care and service to meet your aftercare needs.  If you have questions, or wish to unsubscribe from this service please call [phone]. Thank you for Choosing our [facility] Emergency Department.

## 2022-01-24 PROBLEM — G47.31 CENTRAL SLEEP APNEA: Status: ACTIVE | Noted: 2022-01-24

## 2022-02-01 ENCOUNTER — APPOINTMENT (OUTPATIENT)
Dept: GENERAL RADIOLOGY | Age: 77
End: 2022-02-01
Attending: EMERGENCY MEDICINE
Payer: MEDICARE

## 2022-02-01 ENCOUNTER — APPOINTMENT (OUTPATIENT)
Dept: CT IMAGING | Age: 77
End: 2022-02-01
Attending: EMERGENCY MEDICINE
Payer: MEDICARE

## 2022-02-01 ENCOUNTER — HOSPITAL ENCOUNTER (EMERGENCY)
Age: 77
Discharge: HOME | End: 2022-02-01
Attending: EMERGENCY MEDICINE
Payer: MEDICARE

## 2022-02-01 VITALS
BODY MASS INDEX: 25.77 KG/M2 | SYSTOLIC BLOOD PRESSURE: 177 MMHG | HEART RATE: 64 BPM | HEIGHT: 70 IN | DIASTOLIC BLOOD PRESSURE: 85 MMHG | WEIGHT: 180 LBS | OXYGEN SATURATION: 99 % | RESPIRATION RATE: 15 BRPM | TEMPERATURE: 98.6 F

## 2022-02-01 DIAGNOSIS — R07.9 ACUTE CHEST PAIN: ICD-10-CM

## 2022-02-01 DIAGNOSIS — R51.9 OCCIPITAL HEADACHE: Primary | ICD-10-CM

## 2022-02-01 DIAGNOSIS — M54.2 CERVICAL PAIN: ICD-10-CM

## 2022-02-01 LAB
ALBUMIN SERPL-MCNC: 3.3 G/DL (ref 3.2–4.6)
ALBUMIN/GLOB SERPL: 0.7 {RATIO} (ref 1.2–3.5)
ALP SERPL-CCNC: 97 U/L (ref 50–136)
ALT SERPL-CCNC: 30 U/L (ref 12–65)
ANION GAP SERPL CALC-SCNC: 5 MMOL/L (ref 7–16)
AST SERPL-CCNC: 30 U/L (ref 15–37)
ATRIAL RATE: 73 BPM
BASOPHILS # BLD: 0 K/UL (ref 0–0.2)
BASOPHILS NFR BLD: 0 % (ref 0–2)
BILIRUB SERPL-MCNC: 0.4 MG/DL (ref 0.2–1.1)
BUN SERPL-MCNC: 15 MG/DL (ref 8–23)
CALCIUM SERPL-MCNC: 9.3 MG/DL (ref 8.3–10.4)
CALCULATED P AXIS, ECG09: 65 DEGREES
CALCULATED R AXIS, ECG10: 34 DEGREES
CALCULATED T AXIS, ECG11: 35 DEGREES
CHLORIDE SERPL-SCNC: 109 MMOL/L (ref 98–107)
CO2 SERPL-SCNC: 25 MMOL/L (ref 21–32)
CREAT SERPL-MCNC: 1.53 MG/DL (ref 0.8–1.5)
DIAGNOSIS, 93000: NORMAL
DIFFERENTIAL METHOD BLD: ABNORMAL
EOSINOPHIL # BLD: 0.1 K/UL (ref 0–0.8)
EOSINOPHIL NFR BLD: 2 % (ref 0.5–7.8)
ERYTHROCYTE [DISTWIDTH] IN BLOOD BY AUTOMATED COUNT: 13.5 % (ref 11.9–14.6)
GLOBULIN SER CALC-MCNC: 4.8 G/DL (ref 2.3–3.5)
GLUCOSE SERPL-MCNC: 155 MG/DL (ref 65–100)
HCT VFR BLD AUTO: 41.8 % (ref 41.1–50.3)
HGB BLD-MCNC: 13.9 G/DL (ref 13.6–17.2)
IMM GRANULOCYTES # BLD AUTO: 0 K/UL (ref 0–0.5)
IMM GRANULOCYTES NFR BLD AUTO: 0 % (ref 0–5)
LYMPHOCYTES # BLD: 1.5 K/UL (ref 0.5–4.6)
LYMPHOCYTES NFR BLD: 20 % (ref 13–44)
MCH RBC QN AUTO: 29.1 PG (ref 26.1–32.9)
MCHC RBC AUTO-ENTMCNC: 33.3 G/DL (ref 31.4–35)
MCV RBC AUTO: 87.4 FL (ref 79.6–97.8)
MONOCYTES # BLD: 0.9 K/UL (ref 0.1–1.3)
MONOCYTES NFR BLD: 13 % (ref 4–12)
NEUTS SEG # BLD: 5 K/UL (ref 1.7–8.2)
NEUTS SEG NFR BLD: 66 % (ref 43–78)
NRBC # BLD: 0 K/UL (ref 0–0.2)
P-R INTERVAL, ECG05: 170 MS
PLATELET # BLD AUTO: 204 K/UL (ref 150–450)
PMV BLD AUTO: 11.9 FL (ref 9.4–12.3)
POTASSIUM SERPL-SCNC: 4.7 MMOL/L (ref 3.5–5.1)
PROT SERPL-MCNC: 8.1 G/DL (ref 6.3–8.2)
Q-T INTERVAL, ECG07: 398 MS
QRS DURATION, ECG06: 86 MS
QTC CALCULATION (BEZET), ECG08: 438 MS
RBC # BLD AUTO: 4.78 M/UL (ref 4.23–5.6)
SODIUM SERPL-SCNC: 139 MMOL/L (ref 136–145)
TROPONIN-HIGH SENSITIVITY: 20.8 PG/ML (ref 0–14)
TROPONIN-HIGH SENSITIVITY: 21.6 PG/ML (ref 0–14)
VENTRICULAR RATE, ECG03: 73 BPM
WBC # BLD AUTO: 7.5 K/UL (ref 4.3–11.1)

## 2022-02-01 PROCEDURE — 70450 CT HEAD/BRAIN W/O DYE: CPT

## 2022-02-01 PROCEDURE — 36416 COLLJ CAPILLARY BLOOD SPEC: CPT

## 2022-02-01 PROCEDURE — 71046 X-RAY EXAM CHEST 2 VIEWS: CPT

## 2022-02-01 PROCEDURE — 74011250636 HC RX REV CODE- 250/636: Performed by: EMERGENCY MEDICINE

## 2022-02-01 PROCEDURE — 93005 ELECTROCARDIOGRAM TRACING: CPT | Performed by: NURSE PRACTITIONER

## 2022-02-01 PROCEDURE — 72125 CT NECK SPINE W/O DYE: CPT

## 2022-02-01 PROCEDURE — 80053 COMPREHEN METABOLIC PANEL: CPT

## 2022-02-01 PROCEDURE — 72040 X-RAY EXAM NECK SPINE 2-3 VW: CPT

## 2022-02-01 PROCEDURE — 74019 RADEX ABDOMEN 2 VIEWS: CPT

## 2022-02-01 PROCEDURE — 81003 URINALYSIS AUTO W/O SCOPE: CPT

## 2022-02-01 PROCEDURE — 85025 COMPLETE CBC W/AUTO DIFF WBC: CPT

## 2022-02-01 PROCEDURE — 99284 EMERGENCY DEPT VISIT MOD MDM: CPT

## 2022-02-01 PROCEDURE — 74011000250 HC RX REV CODE- 250: Performed by: EMERGENCY MEDICINE

## 2022-02-01 PROCEDURE — 96374 THER/PROPH/DIAG INJ IV PUSH: CPT

## 2022-02-01 PROCEDURE — 84484 ASSAY OF TROPONIN QUANT: CPT

## 2022-02-01 PROCEDURE — 96375 TX/PRO/DX INJ NEW DRUG ADDON: CPT

## 2022-02-01 RX ORDER — LABETALOL HYDROCHLORIDE 5 MG/ML
20 INJECTION, SOLUTION INTRAVENOUS
Status: COMPLETED | OUTPATIENT
Start: 2022-02-01 | End: 2022-02-01

## 2022-02-01 RX ORDER — MORPHINE SULFATE 2 MG/ML
2 INJECTION, SOLUTION INTRAMUSCULAR; INTRAVENOUS
Status: COMPLETED | OUTPATIENT
Start: 2022-02-01 | End: 2022-02-01

## 2022-02-01 RX ORDER — NITROGLYCERIN 0.4 MG/1
0.4 TABLET SUBLINGUAL
Qty: 1 EACH | Refills: 0 | Status: SHIPPED | OUTPATIENT
Start: 2022-02-01

## 2022-02-01 RX ORDER — ONDANSETRON 2 MG/ML
4 INJECTION INTRAMUSCULAR; INTRAVENOUS
Status: COMPLETED | OUTPATIENT
Start: 2022-02-01 | End: 2022-02-01

## 2022-02-01 RX ORDER — FAMOTIDINE 20 MG/1
20 TABLET, FILM COATED ORAL 2 TIMES DAILY
Qty: 20 TABLET | Refills: 0 | Status: SHIPPED | OUTPATIENT
Start: 2022-02-01 | End: 2022-02-11

## 2022-02-01 RX ADMIN — MORPHINE SULFATE 2 MG: 2 INJECTION, SOLUTION INTRAMUSCULAR; INTRAVENOUS at 15:58

## 2022-02-01 RX ADMIN — LABETALOL HYDROCHLORIDE 20 MG: 5 INJECTION INTRAVENOUS at 17:34

## 2022-02-01 RX ADMIN — LABETALOL HYDROCHLORIDE 20 MG: 5 INJECTION INTRAVENOUS at 18:02

## 2022-02-01 RX ADMIN — ONDANSETRON 4 MG: 2 INJECTION INTRAMUSCULAR; INTRAVENOUS at 15:57

## 2022-02-01 NOTE — ED TRIAGE NOTES
Patient reports left side chest pain that radiates to left neck and shoulder. Patient reports pain x 1 week but worse since late last night.  Masked

## 2022-02-01 NOTE — ED NOTES
78-year-old male presents the ED with his wife for evaluation of left-sided chest pain that radiates into the left side of his neck. Wife states that he has been reporting intermittent chest pain symptoms for the last 6-7 days, neck pain for the last 3-4 days. Denies shortness of breath, dizziness, headedness, headache, numbness or tingling and weakness, syncope or near syncope, URI symptoms, recent illness and all other complaint. Patient evaluated initially in triage. Rapid Medical Evaluation was conducted and necessary orders have been placed. I have performed a medical screening exam.  Care will now be transferred to the provider in the back of the emergency department.   Valeria Zhao NP 12:21 PM

## 2022-02-01 NOTE — ED PROVIDER NOTES
50-year-old male comes in with his wife. Together they give a reasonable history although the time frame is somewhat confusing. His wife states symptoms started up about a week ago. Patient can only remember issues starting today. But there have been intermittent episodes of upper left and central chest discomfort rating to the neck on and off. Patient remembers an episode occurred this morning. No back pain. No fever chills shortness of breath. There is been no vomiting or diaphoresis. Past history reveals some cardiac issues including paroxysmal atrial fibrillation, coronary disease with bypass grafting. Stent placement as well and some history of congestive failure. History is hypertension diabetes. I do see evidence for prostate cancer with treatment in the past as well. Patient does not take any nitroglycerin. Pain in the neck somewhat worse with movement. The history is provided by the patient and the spouse. Chest Pain (Angina)   This is a new problem. The current episode started more than 2 days ago. The problem has been resolved. The problem occurs daily. The pain is present in the substernal region. The pain is moderate. The quality of the pain is described as pressure-like. The pain radiates to the left neck and right neck. Pertinent negatives include no abdominal pain, no back pain, no cough, no diaphoresis, no dizziness, no fever, no headaches (Occipital headache), no nausea, no palpitations, no shortness of breath and no vomiting. He has tried nothing for the symptoms. Risk factors include diabetes mellitus, hypertension and cardiac disease. Procedural history includes cardiac catheterization, cardiac stents and CABG.        Past Medical History:   Diagnosis Date    Allergic rhinitis     Arthritis     CAD (coronary artery disease)     CABG '09; troponin elevated 7/14/12 (\"likely due to supply/demand mismatch in setting of fever and increased metabolic demand\"-per cardiac MD note 8/20/12)-had cath, echo, EKG-all WNL except cath showed 2 of the bypasses with blockages- \"occluded SVG to PDA & SVG to LISA\"; EF=55%    Chronic kidney disease     Coronary artery disease of native artery of native heart with stable angina pectoris ([address]     Diabetes mellitus type 2, controlled ([address]     restarted oral med (metformin 3/2018), does not check BG on regular basis, last hgba1c- 8 (3/12/18)    ED (erectile dysfunction)     Encephalitis 1962    x 2/hospitalized as teenager    Gout     hx of    Hypercholesterolemia     Hypertension     Lacunar infarct, acute ([address]     possible embolic, remote a-fib- on eliquis    Lumbago     Memory loss     Mitral valve regurgitation 7/14/12    \"moderate\" on echo    ROBERTO (obstructive sleep apnea)     mild per patient, does not use CPAP    PAF (paroxysmal atrial fibrillation) ([address]     Pro[facility]     followed by urology    Psoriasis     topical creams    SBO (small bowel obstruction) ([address] 2011, 2015, 2016       Past Surgical History:   Procedure Laterality Date    HX APPENDECTOMY  1953    as a child    HX CATARACT REMOVAL Bilateral 2013    iol     HX COLONOSCOPY  1998, 2010    HX CORONARY ARTERY BYPASS GRAFT  03/09/2009    x4 bypass    HX HERNIA REPAIR Bilateral 2012    HX RADICAL PROSTATECTOMY       HX SHOULDER REPLACEMENT Right 2018    Reverse R TSA    HX SPLENECTOMY  1951    HX WISDOM TEETH EXTRACTION      CT ABDOMEN SURGERY PROC UNLISTED  1967    exp lap    CT LEFT HEART CATH,PERCUTANEOUS  7/2012    no intervention    CT LEFT HEART CATH,PERCUTANEOUS  09/25/2019    NSTEMI & PCI    CT LEFT HEART CATH,PERCUTANEOUS  03/2019    STEMI, PCI, VFib arrest    CT PROSTATE BIOPSY, NEEDLE, SATURATION SAMPLING      and ultrasound    VASCULAR SURGERY PROCEDURE UNLIST  12/29/2017    aortogram, w/cass LE runoff,R-LE arteriogram         Family History:   Problem Relation Age of Onset    Hypertension Mother    [place] Gout Mother     OSTEOARTHRITIS Mother     Heart Disease Mother          of Heart Disease    Coronary Art Dis Mother     Diabetes Father     Cancer Father     Heart Disease Father     Bleeding Prob Paternal Grandmother     Hypertension Brother     Cancer Maternal Uncle         Prostate       Social History     Socioeconomic History    Marital status:      Spouse name: Not on file    Number of children: Not on file    Years of education: Not on file    Highest education level: Not on file   Occupational History    Not on file   Tobacco Use    Smoking status: Former Smoker     Packs/day: 1.00    Smokeless tobacco: Never Used    Tobacco comment: quit around age 30 y/o; smoked 18 years    Vaping Use    Vaping Use: Never used   Substance and Sexual Activity    Alcohol use: Yes     Comment: once every four to six months     Drug use: No    Sexual activity: Not on file   Other Topics Concern   [place] Not Asked    Endoscopic Camera Pill Not Asked    Metallic Foreign Body Not Asked    Medication Patches Not Asked    Taking Feraheme Not Asked    Claustrophobic Not Asked    Removable Dental Work Not Asked    Hearing Aids Not Asked    Body Piercing Not Asked    Radiation Seeds Not Asked    Pregnant or Breast Feeding Not Asked    Wounded by Shrapnel or Bullet Not Asked    Other-See Comment Not Asked    Other Implant-See Comment Not Asked   [address] [employer] Not Asked    Blood Transfusions Not Asked    Caffeine Concern Not Asked    Occupational Exposure Not Asked    Hobby Hazards Not Asked    Sleep Concern Not Asked    Stress Concern Not Asked    Weight Concern Not Asked    Special Diet Not Asked    Back Care Not Asked    Exercise Not Asked    Bike Helmet Not Asked    [address] Not Asked    Self-Exams Not Asked   Social History Narrative    Not on file     Social Determinants of Health     Financial Resource Strain:     Difficulty of Paying Living Expenses: Not on file   Food Insecurity:     Worried About [facility] in the Last Year: Not on file    Chris of Food in the Last Year: Not on file   Transportation Needs:     Lack of Transportation (Medical): Not on file    Lack of Transportation (Non-Medical): Not on file   Physical Activity:     Days of Exercise per Week: Not on file    Minutes of Exercise per Session: Not on file   Stress:     Feeling of Stress : Not on file   Social Connections:     Frequency of Communication with Friends and Family: Not on file    Frequency of Social Gatherings with Friends and Family: Not on file    Attends Christian Services: Not on file    Active Member of [address] [place] [address] or Organizations: Not on file    Attends Club or Organization Meetings: Not on file    Marital Status: Not on file   Intimate Partner Violence:     Fear of Current or Ex-Partner: Not on file    Emotionally Abused: Not on file    Physically Abused: Not on file    Sexually Abused: Not on file   Housing Stability:     Unable to Pay for Housing in the Last Year: Not on file    Number of [place] in the Last Year: Not on file    Unstable Housing in the Last Year: Not on file         ALLERGIES: Celecoxib, Ibuprofen, Lescol [fluvastatin], Nsaids (non-steroidal anti-inflammatory drug), Sulfa (sulfonamide antibiotics), and Uloric [febuxostat]    Review of Systems   Constitutional: Negative for chills, diaphoresis and fever. Respiratory: Negative for cough and shortness of breath. Cardiovascular: Positive for chest pain. Negative for palpitations. Gastrointestinal: Negative for abdominal pain, diarrhea, nausea and vomiting. Genitourinary: Negative for dysuria and flank pain. Musculoskeletal: Positive for neck pain. Negative for back pain. Skin: Negative for color change and rash. Neurological: Negative for dizziness, syncope and headaches (Occipital headache). All other systems reviewed and are negative.       Vitals:    02/01/22 1220   BP: (!) 177/80   Pulse: (!) 52 Resp: 16   Temp: 98.6 °F (37 °C)   SpO2: 97%   Weight: 81.6 kg (180 lb)   Height: 5' 10\" (1.778 m)            Physical Exam  Vitals and nursing note reviewed. Constitutional:       General: He is not in acute distress. Appearance: He is well-developed. HENT:      Head: Normocephalic and atraumatic. Right Ear: External ear normal.      Left Ear: External ear normal.      Mouth/Throat:      Pharynx: No oropharyngeal exudate. Eyes:      Conjunctiva/sclera: Conjunctivae normal.      Pupils: Pupils are equal, round, and reactive to light. Neck:      Comments: Full range of motion the neck but has some tenderness laterally and anteriorly without mass or crepitus  Cardiovascular:      Rate and Rhythm: Normal rate and regular rhythm. Heart sounds: No murmur heard. Pulmonary:      Effort: No respiratory distress. Breath sounds: Normal breath sounds. Abdominal:      General: Bowel sounds are normal.      Palpations: Abdomen is soft. There is no mass. Tenderness: There is abdominal tenderness (Mild epigastric tenderness without mass or pulsatile mass). There is no guarding or rebound. Hernia: No hernia is present. Musculoskeletal:      Cervical back: Normal range of motion and neck supple. Skin:     General: Skin is warm and dry. Neurological:      Mental Status: He is alert and oriented to person, place, and time. Gait: Gait normal.      Comments: Nl speech   Psychiatric:         Speech: Speech normal.          MDM  Number of Diagnoses or Management Options  Diagnosis management comments: Upper sternal pain. EKG and serial troponins. Check chest x-ray. Imaging and then to check for arthritis. Abdominal series check on the mild abdominal discomfort. Check for pancreatitis. Check chest x-ray. Patient with long history of cardiac issues. Occasional complaints of occipital headache. Will check for CNS bleed with CT.        Amount and/or Complexity of Data Reviewed  Clinical lab tests: ordered and reviewed  Tests in the radiology section of CPT®: ordered and reviewed  Tests in the medicine section of CPT®: ordered and reviewed  Obtain history from someone other than the patient: yes  Review and summarize past medical records: yes  Independent visualization of images, tracings, or specimens: yes (My interpretation EKG reveals normal sinus rhythm at 73. No ST-T changes. No ectopy. Normal QT interval.)    Risk of Complications, Morbidity, and/or Mortality  Presenting problems: moderate  Diagnostic procedures: minimal  Management options: low           Procedures      Blood pressure much improved. Will refer back to cardiology. Patient has some as needed clonidine that he can use in addition to his usual blood pressure medications. Prescriptions for nitroglycerin and Pepcid.

## 2022-02-02 NOTE — DISCHARGE INSTRUCTIONS
Acid medication to cut down on chest discomfort. Tylenol for headache. If worsening chest discomfort, may try nitroglycerin. Recheck if no improvement after 3 nitroglycerin. May use the clonidine that you have if your blood pressure elevates. Call cardiology if you do not hear from them by noon tomorrow regarding recheck.

## 2022-02-02 NOTE — ED NOTES
I have reviewed discharge instructions with the patient. The patient verbalized understanding. Patient left ED via Discharge Method: ambulatory to Home with family. Opportunity for questions and clarification provided. Patient given 2 scripts. To continue your aftercare when you leave the hospital, you may receive an automated call from our care team to check in on how you are doing. This is a free service and part of our promise to provide the best care and service to meet your aftercare needs.  If you have questions, or wish to unsubscribe from this service please call [phone]. Thank you for Choosing our [facility] Department.

## 2022-03-04 ENCOUNTER — TELEPHONE (OUTPATIENT)
Dept: DIABETES SERVICES | Age: 77
End: 2022-03-04

## 2022-03-09 ENCOUNTER — TELEPHONE (OUTPATIENT)
Dept: DIABETES SERVICES | Age: 77
End: 2022-03-09

## 2022-03-09 NOTE — TELEPHONE ENCOUNTER
First call past receiving insurance info. Pt is covered at 100%. Wife states pt had education under medicare benefit. Took our number and pt to call us back.

## 2022-03-18 PROBLEM — M54.2 CERVICALGIA: Status: ACTIVE | Noted: 2017-08-28

## 2022-03-18 PROBLEM — E11.9 TYPE 2 DIABETES MELLITUS (HCC): Status: ACTIVE | Noted: 2021-11-23

## 2022-03-18 PROBLEM — I73.9 PERIPHERAL ARTERY DISEASE (HCC): Status: ACTIVE | Noted: 2020-06-03

## 2022-03-18 PROBLEM — I99.8 ISCHEMIA OF RIGHT LOWER EXTREMITY: Status: ACTIVE | Noted: 2018-01-04

## 2022-03-18 PROBLEM — R09.02 HYPOXEMIA: Status: ACTIVE | Noted: 2021-11-30

## 2022-03-18 PROBLEM — I21.3 ACUTE ST ELEVATION MYOCARDIAL INFARCTION (STEMI) (HCC): Status: ACTIVE | Noted: 2019-03-17

## 2022-03-19 PROBLEM — I21.9 ACUTE MYOCARDIAL INFARCTION (HCC): Status: ACTIVE | Noted: 2019-03-17

## 2022-03-19 PROBLEM — I21.4 NSTEMI (NON-ST ELEVATED MYOCARDIAL INFARCTION) (HCC): Status: ACTIVE | Noted: 2019-09-24

## 2022-03-19 PROBLEM — S91.102A OPEN WOUND OF LEFT GREAT TOE: Status: ACTIVE | Noted: 2019-05-06

## 2022-03-19 PROBLEM — I49.01 VENTRICULAR FIBRILLATION (HCC): Status: ACTIVE | Noted: 2019-03-17

## 2022-03-19 PROBLEM — D62 ANEMIA ASSOCIATED WITH ACUTE BLOOD LOSS: Status: ACTIVE | Noted: 2018-04-19

## 2022-03-19 PROBLEM — G47.10 HYPERSOMNIA, UNSPECIFIED: Status: ACTIVE | Noted: 2021-11-30

## 2022-03-19 PROBLEM — R41.3 MEMORY IMPAIRMENT: Status: ACTIVE | Noted: 2018-04-09

## 2022-03-19 PROBLEM — I20.0 UNSTABLE ANGINA (HCC): Status: ACTIVE | Noted: 2019-09-24

## 2022-03-19 PROBLEM — I50.20 HFREF (HEART FAILURE WITH REDUCED EJECTION FRACTION) (HCC): Status: ACTIVE | Noted: 2020-05-29

## 2022-03-19 PROBLEM — G47.31 CENTRAL SLEEP APNEA: Status: ACTIVE | Noted: 2022-01-24

## 2022-03-19 PROBLEM — M19.011 OSTEOARTHRITIS OF RIGHT GLENOHUMERAL JOINT: Status: ACTIVE | Noted: 2018-04-18

## 2022-03-19 PROBLEM — D53.9 MACROCYTIC ANEMIA: Status: ACTIVE | Noted: 2019-02-14

## 2022-03-19 PROBLEM — K92.2 GI BLEEDING: Status: ACTIVE | Noted: 2019-10-23

## 2022-03-19 PROBLEM — I63.81 LACUNAR INFARCT, ACUTE (HCC): Status: ACTIVE | Noted: 2017-08-29

## 2022-03-20 PROBLEM — G47.34 NOCTURNAL HYPOXEMIA: Status: ACTIVE | Noted: 2018-06-12

## 2022-03-20 PROBLEM — K56.609 SBO (SMALL BOWEL OBSTRUCTION) (HCC): Status: ACTIVE | Noted: 2019-10-21

## 2022-04-13 ENCOUNTER — HOSPITAL ENCOUNTER (OUTPATIENT)
Dept: LAB | Age: 77
Discharge: HOME | End: 2022-04-13
Payer: MEDICARE

## 2022-04-13 DIAGNOSIS — E78.5 DYSLIPIDEMIA: ICD-10-CM

## 2022-04-13 LAB
CHOLEST SERPL-MCNC: 130 MG/DL
HDLC SERPL-MCNC: 36 MG/DL (ref 40–60)
HDLC SERPL: 3.6 {RATIO}
LDLC SERPL CALC-MCNC: 74.8 MG/DL
TRIGL SERPL-MCNC: 96 MG/DL (ref 35–150)
VLDLC SERPL CALC-MCNC: 19.2 MG/DL (ref 6–23)

## 2022-04-13 PROCEDURE — 80061 LIPID PANEL: CPT

## 2022-04-13 PROCEDURE — 36415 COLL VENOUS BLD VENIPUNCTURE: CPT

## 2022-04-20 ENCOUNTER — HOSPITAL ENCOUNTER (OUTPATIENT)
Dept: DIABETES SERVICES | Age: 77
Discharge: HOME | End: 2022-04-20
Payer: MEDICARE

## 2022-04-20 PROCEDURE — G0108 DIAB MANAGE TRN  PER INDIV: HCPCS

## 2022-04-20 NOTE — PROGRESS NOTES
This is a one on one appointment. Due to being during NQYWF-33 public health emergency, social distancing and mandatory precautions are in place and utilized. Client and wife attended one hour oh his two hour yearly follow up session today. Topics discussed were client driven. Topics included how to use a glucometer. Demonstrated for pt. Pt to bring in his glucometer on 4/26/22 so he can return demonstration with RN. Topics discussed were basics of carbohydrates, using high fiber carbohydrates and food sources, having a heart healthy protein with meals and snacks and food sources of proteins, using unsaturated fats, sugar substitutes, eating out, alcohol, low sodium guidelines  and plate method for meal planning. Educated men can have up to 60 grams carbohydrates/meal and 15-30 grams for a snack. Helped plan some meals and snacks with pt. Suggested premiere protein as an option to add protein to meals/snacks. Encourged pt/wife to attend the free grocery shopping tour. Educated on glycemic effect of sugar drinks. Suggested Leah or Bubly. Suggested [employer] yulisa to help with identifying healthier options. Written materials provided. Current diet: High in sodium and sugar drinks, low in fiber and many meals/snacks do not contain protein. Current exercise: [facility]mill [facility] center 2-3 times/week for 20 minutes. Plan for f/u: Pt to see RN, MADIE on 4/26/22.

## 2022-04-26 ENCOUNTER — HOSPITAL ENCOUNTER (OUTPATIENT)
Dept: DIABETES SERVICES | Age: 77
Discharge: HOME | End: 2022-04-26
Payer: MEDICARE

## 2022-04-26 PROCEDURE — G0108 DIAB MANAGE TRN  PER INDIV: HCPCS

## 2022-04-26 NOTE — PROGRESS NOTES
This is a one on one appointment. Due to being during [place] [address] public health emergency, social distancing and mandatory precautions are in place and utilized  Patient attended second hour of his two hour yearly follow up. Topics included:Goal/acceptable blood glucose ranges/Hgb A1C/interpreting/using results; blood sugar testing and rationale to consider finding trigger foods, Glucose meter and testing procedure reviewed as well rationale for knowing his blood sugars. Prevention/detection/treatment of chronic complications. Prevention of complications can be improved with blood sugar control by drinking water, exercise, and proper eating of 45-60 grams of carbohydrates per meal and 15 -30 grams for a snack. Discussed better blood pressure control with exercise and limiting salt intake, Signs and symptoms of low and high blood sugars. Discussed the benefits of exercise and minimal ADA recommendation of 5 days a week for 30 minutes. Currently exercises 3 days a week for 30 minutes. Developing strategies to promote health/change behavior/recommended screenings such as daily foot exams and foot care. Instructed can see a Podiatrist for foot care every 3 months and can have Diabetic shoes and or inserts once a year. Developing strategies to address psychosocial issues such as stress and depression and when to seek treatment if needed. Discussed ways to decrease stress. He reports he has neuropathy discussed better blood sugar control can keep complications from worsening. Instructed complications develop due to poor glycemic control. Verbalized understanding   -Goal for next session two hour follow up again next year.   -Anticipated adherence is  Average   -Problems/barriers may be none anticipated . He did not mention a barrier to exercise. He just knows he needs to do more. Wife reports he is limiting his portions. He does not eat a larger  quantity like he use to eat.         Medication Reconciliation completed at today's visit.

## 2022-05-24 ENCOUNTER — OFFICE VISIT (OUTPATIENT)
Dept: VASCULAR SURGERY | Age: 77
End: 2022-05-24
Payer: MEDICARE

## 2022-05-24 VITALS
TEMPERATURE: 97.1 F | OXYGEN SATURATION: 100 % | BODY MASS INDEX: 26.07 KG/M2 | WEIGHT: 182.1 LBS | DIASTOLIC BLOOD PRESSURE: 85 MMHG | SYSTOLIC BLOOD PRESSURE: 129 MMHG | HEIGHT: 70 IN | HEART RATE: 76 BPM

## 2022-05-24 DIAGNOSIS — Z99.2 ESRD (END STAGE RENAL DISEASE) ON DIALYSIS (HCC): Primary | ICD-10-CM

## 2022-05-24 DIAGNOSIS — N18.6 ESRD (END STAGE RENAL DISEASE) ON DIALYSIS (HCC): Primary | ICD-10-CM

## 2022-05-24 PROCEDURE — 99204 OFFICE O/P NEW MOD 45 MIN: CPT | Performed by: SURGERY

## 2022-05-24 PROCEDURE — G8427 DOCREV CUR MEDS BY ELIG CLIN: HCPCS | Performed by: SURGERY

## 2022-05-24 PROCEDURE — 1123F ACP DISCUSS/DSCN MKR DOCD: CPT | Performed by: SURGERY

## 2022-05-24 PROCEDURE — G8419 CALC BMI OUT NRM PARAM NOF/U: HCPCS | Performed by: SURGERY

## 2022-05-24 PROCEDURE — 1036F TOBACCO NON-USER: CPT | Performed by: SURGERY

## 2022-05-24 PROCEDURE — 3017F COLORECTAL CA SCREEN DOC REV: CPT | Performed by: SURGERY

## 2022-05-24 ASSESSMENT — PATIENT HEALTH QUESTIONNAIRE - PHQ9
SUM OF ALL RESPONSES TO PHQ QUESTIONS 1-9: 0
1. LITTLE INTEREST OR PLEASURE IN DOING THINGS: 0
SUM OF ALL RESPONSES TO PHQ QUESTIONS 1-9: 0
SUM OF ALL RESPONSES TO PHQ9 QUESTIONS 1 & 2: 0
SUM OF ALL RESPONSES TO PHQ QUESTIONS 1-9: 0
SUM OF ALL RESPONSES TO PHQ QUESTIONS 1-9: 0
2. FEELING DOWN, DEPRESSED OR HOPELESS: 0

## 2022-05-24 NOTE — PROGRESS NOTES
[address] FAX: Chuy  1945    Chief Complaint   Patient presents with    Follow-up     wants to discuss further procedures due to increasing BLE pain and welts forming     Results     BLE Arterial done 05/20/22           HPI   Mr. Cristina Ramirez is a 77y.o. year old male who debilitating bilateral leg claudication. He has history of tobacco abuse but stopped about 20 years ago. States he can walk 4-5 blocks with his pain in his right calf. Current Outpatient Medications   Medication Sig Dispense Refill    acetaminophen (TYLENOL) 325 MG tablet Take 650 mg by mouth every 4 hours as needed      aspirin 81 MG chewable tablet Take 81 mg by mouth daily      bisacodyl (DULCOLAX) 5 MG EC tablet Take 5 mg by mouth daily as needed      carvedilol (COREG) 12.5 MG tablet Take 12.5 mg by mouth 2 times daily (with meals)      Cetirizine HCl 10 MG CAPS Take 10 mg by mouth      Cholecalciferol 50 MCG (2000 UT) TABS Take 2,000 Units by mouth daily      clopidogrel (PLAVIX) 75 MG tablet TAKE 1 TABLET DAILY      ezetimibe (ZETIA) 10 MG tablet Take 10 mg by mouth daily      gabapentin (NEURONTIN) 600 MG tablet Take 600 mg by mouth 2 times daily.  losartan (COZAAR) 50 MG tablet Take 50 mg by mouth daily      metFORMIN (GLUCOPHAGE) 500 MG tablet Take 500 mg by mouth 2 times daily (with meals)      nitroGLYCERIN (NITROSTAT) 0.4 MG SL tablet Take 0.4 mg by mouth      rosuvastatin (CRESTOR) 20 MG tablet Take 20 mg by mouth      Simethicone 125 MG CAPS Take 125 mg by mouth 4 times daily as needed      Lancets MISC Check blood sugar up to three times a day       No current facility-administered medications for this visit.      Allergies   Allergen Reactions    Celecoxib Swelling     Hands    Febuxostat Other (See Comments)     Joint Pain    Fluvastatin Other (See Comments)     Other reaction(s): Unknown (comments)    Ibuprofen Other (See Comments)     Patient unsure    Sulfa Antibiotics Rash     Past Medical History:   Diagnosis Date    Allergic rhinitis     Arthritis     CAD (coronary artery disease)     CABG '; troponin elevated 12 (\"likely due to supply/demand mismatch in setting of fever and increased metabolic demand\"-per cardiac MD note 12)-had cath, echo, EKG-all WNL except cath showed 2 of the bypasses with blockages- \"occluded SVG to PDA & SVG to MARIANELA\"; EF=55%    Chronic kidney disease     Coronary artery disease of native artery of native heart with stable angina pectoris ([address]     Diabetes mellitus type 2, controlled ([address]     restarted oral med (metformin 3/2018), does not check BG on regular basis, last hgba1c- 8 (3/12/18)    ED (erectile dysfunction)     Encephalitis 1962    x 2/hospitalized as teenager    Gout     hx of    Hypercholesterolemia     Hypertension     Lacunar infarct, acute ([address]     possible embolic, remote a-fib- on eliquis    Lumbago     Memory loss     Mitral valve regurgitation 12    \"moderate\" on echo    TREVOR (obstructive sleep apnea)     mild per patient, does not use CPAP    PAF (paroxysmal atrial fibrillation) ([address]     Prostate cancer ([address]     followed by urology    Psoriasis     topical creams    SBO (small bowel obstruction) ([address] , , 2016     Family History   Problem Relation Age of Onset    Coronary Art Dis Mother     Heart Disease Mother          of Heart Disease    Osteoarthritis Mother     Gout Mother     Hypertension Mother     Bleeding Prob Paternal Grandmother     Hypertension Brother     Diabetes Father     Cancer Father     Heart Disease Father     Cancer Maternal Uncle         Prostate     Past Surgical History:   Procedure Laterality Date    APPENDECTOMY      as a child    CATARACT REMOVAL Bilateral 2013    iol     COLONOSCOPY  ,     CORONARY ARTERY BYPASS GRAFT  03/09/2009    x4 range of motion. Neurological: He is alert. CN II- XII grossly intact  Skin: Warm and dry. Vascular: Femoral pulses nonpalpable pedal pulses      Imaging Results  Duplex study showed JONATHAN 0.67 with a high-grade right SFA stenosis and toe pressures of 49 mmHg  Left JONATHAN 0.6 with a high-grade SFA stenosis with tibial disease toe pressures of 48 mmHg    ASSESSMENT AND PLAN   Mr. Uzair Moncada is a 77y.o. year old male with bilateral SFA disease with worsening right leg debilitating claudication. We will schedule patient for right leg arteriogram.  I discussed with the patient the risks of been procedure. We will schedule patient in the near future. Elements of this note have been dictated using speech recognition software. As a result, errors of speech recognition may have occurred. 50 minutes of time was spent on this consult with greater than 50% of time spent face-to-face with the patient discussing the disease process, education and treatment options.

## 2022-05-24 NOTE — PATIENT INSTRUCTIONS
Patient Education        Peripheral Arterial Disease (PAD): Care Instructions  Overview  Peripheral arterial disease (PAD) occurs when the blood vessels (arteries) that supply blood to the legs, belly, pelvis, arms, or neck get narrow or blocked. This reduces blood flow to that area. The legs are affected most often. PAD is often caused by fatty buildup (plaque) in the arteries. This buildup is also called \"hardening\" of the arteries. Your risk of PAD increases if you smoke or have high cholesterol, high blood pressure, diabetes, or a familyhistory of PAD. Many people don't have symptoms. If you do have symptoms, you may have weak or tired legs, difficulty walking or balancing, or pain. If you have pain, you might feel a tight, aching, or squeezing pain in the calf, foot, thigh, or buttock that occurs during exercise. The pain usually gets worse during exercise and goes away when you rest. If PAD gets worse, you may have symptomsof poor blood flow, such as leg pain when you rest.  Medicines and lifestyle changes may help your symptoms and lower your risk of heart attack and stroke. In some cases, surgery or other treatment is needed. It is important that you follow up with your doctor. Follow-up care is a key part of your treatment and safety. Be sure to make and go to all appointments, and call your doctor if you are having problems. It's also a good idea to know your test results and keep alist of the medicines you take. How can you care for yourself at home?  Do not smoke. Smoking can make PAD worse. If you need help quitting, talk to your doctor about stop-smoking programs and medicines. These can increase your chances of quitting for good.  Take your medicines exactly as prescribed. Call your doctor if you think you are having a problem with your medicine.  If you take a blood thinner, such as aspirin, be sure to get instructions about how to take your medicine safely.  Blood thinners can cause serious bleeding problems.  Ask your doctor if a cardiac rehab program is right for you. Cardiac rehab can help you make lifestyle changes. In cardiac rehab, a team of health professionals provides education and support to help you make new, healthy habits.  Eat heart-healthy foods such as fruits, vegetables, whole grains, fish, lean meats, and low-fat or nonfat dairy foods. Limit sodium, sugar, and alcohol.  If your doctor recommends it, get more exercise. Walking is a good choice. Bit by bit, increase the amount you walk every day. Try for at least 30 minutes on most days of the week. If you have symptoms when you exercise, ask your doctor about a special exercise program that may help relieve your symptoms.  Stay at a healthy weight. Lose weight if you need to.  Take good care of your feet. ? Treat cuts and scrapes on your legs right away. Poor blood flow prevents (or slows) quick healing of even small cuts or scrapes. This is even more important if you have diabetes. ? Avoid shoes that are too tight or that rub your feet. Shoes should be comfortable and fit well. ? Avoid socks or stockings that are tight enough to leave elastic-band marks on your legs. Tight socks can make circulation problems worse. ? Keep your feet clean and moisturized to prevent drying and cracking. Place cotton or lamb's wool between your toes to prevent rubbing and to absorb moisture. ? If you have a sore on your leg or foot, keep it dry and cover it with a nonstick bandage until you see your doctor. When should you call for help? Call 911 anytime you think you may need emergency care. For example, call if:     You have symptoms of a heart attack. These may include:  ? Chest pain or pressure, or a strange feeling in the chest.  ? Sweating. ? Shortness of breath. ? Nausea or vomiting. ? Pain, pressure, or a strange feeling in the back, neck, jaw, or upper belly or in one or both shoulders or arms.   ? Lightheadedness or sudden weakness. ? A fast or irregular heartbeat. After you call 911, the  may tell you to chew 1 adult-strength or 2 to 4 low-doseaspirin. Wait for an ambulance. Do not try to drive yourself.     You have sudden, severe leg pain, and your leg is cool and pale.      You have symptoms of a stroke. These may include:  ? Sudden numbness, tingling, weakness, or loss of movement in your face, arm, or leg, especially on only one side of your body. ? Sudden vision changes. ? Sudden trouble speaking. ? Sudden confusion or trouble understanding simple statements. ? Sudden problems with walking or balance. ? A sudden, severe headache that is different from past headaches. Call your doctor now or seek immediate medical care if:     You have leg pain that does not go away even if you rest.      Your leg pain changes or gets worse. For example, if you have more pain with normal activity or the same pain with decreased activity, you should call.      You have cold or numb feet or toes.      You have leg or foot sores that are slow to heal.      The skin on your legs or feet changes color.      You have an open sore on your leg or foot that is infected. Signs of infection include:  ? Increased pain, swelling, warmth, or redness. ? Red streaks leading from the sore. ? Pus draining from the sore. ? A fever. Watch closely for changes in your health, and be sure to contact your doctor ifyou have any problems. Where can you learn more? Go to https://[employer]. org and sign in to your [employer] account. Enter [phone] in the [facility] box to learn more about \"Peripheral Arterial Disease (PAD): Care Instructions. \"     If you do not have an account, please click on the \"Sign Up Now\" link. Current as of: January 10, 2022               Content Version: 13.2  © 2006-2022 [employer]. Care instructions adapted under license by [facility] [place] ([facility]).  If you have questions about a medical condition or this instruction, always ask your healthcare professional. [place] [address] any warranty or liability for your use of this information.

## 2022-06-01 NOTE — PROGRESS NOTES
Kam Gifford (:  1945) is a 77 y.o. male,Established patient, here for evaluation of the following chief complaint(s):  Follow-up (6 week fu), Diabetes, and Claudication         ASSESSMENT/PLAN:  1. Coronary artery disease involving native coronary artery of native heart without angina pectoris  2. Chronic HFrEF (heart failure with reduced ejection fraction) ([facility])  Cardiac catheterization on 2020 with 100% proximal LAD occlusion, 70% proximal occlusion of circumflex, 100% obtuse marginal occlusion, 90% stenosis of distal AV groove RCA, very heavily diseased PDA/PLV branches  Patent LIMA to LAD, known occlusion of SVG to RCA  99% subtotal occlusion in distal stent and 30% proximal in-stent restenosis of SVG to obtuse marginal; status post intervention of distal SVG body in-stent restenosis lesion  Echocardiogram on 2020 with a EF of 45 to 50%, hypokinesis of mid inferolateral, mid anterolateral and apical lateral wall, mildly increased wall thickness, grade 1 LV diastolic dysfunction, mildly dilated LA with mild MR and TR  Continue aspirin, Plavix, losartan (uncertain if patient still taking carvedilol), rosuvastatin, zetia,  as needed nitroglycerin.    Follow-up with cardiology as scheduled    3. Peripheral artery disease ([address]  JONATHAN on 2021 with severely decreased bilateral resting values  Lower extremity arterial ultrasound on 10/8/2021 as follows:  20 to 49% stenosis of right mid superficial femoral artery with evidence of tibial disease as noted with monophasic flow in PTA and absence of flow in the ADARSH and peroneal artery  20 to 49% stenosis of left mid superficial femoral artery, evidence of tibial disease with absence of flow in the ADARSH and monophasic flow in PTA and peroneal arteries  No evidence of AAA  Continue aspirin, Plavix, rosuvastatin, Zetia  Recent vascular surgery notes reviewed with plans for upcoming lower extremity arteriogram    4.  Essential hypertension  Continue losartan, (uncertain if still taking carvedilol)    - CBC with Auto Differential; Future  - Comprehensive Metabolic Panel; Future    5. Dyslipidemia  Continue rosuvastatin, Zetia    - Lipid Panel; Future  - T4, Free; Future  - TSH; Future    6. Obstructive sleep apnea  Split-night polysomnogram on 2/26/2021 confirming diagnosis of TREVOR as well as periodic limb movement disorder  Sleep medicine notes reviewed documenting starting positive pressure ventilation however patient has yet to follow-up  Once again emphasized need for follow-up discussing risks associated with untreated sleep apnea    7. Type 2 diabetes mellitus with diabetic peripheral angiopathy without gangrene, without long-term current use of insulin ([facility] [address]  8. Diabetic polyneuropathy associated with type 2 diabetes mellitus (HCC)  A1c 8.8% on 3/3/2022, recheck in office today improved to 7.8  Check urine microalbumin to creatinine ratio to evaluate for proteinuria  Have emphasized on multiple occasions need for compliance with metformin as well as fasting blood sugar checks (?  Secondary to cognitive issues)  Continue gabapentin with past referral to pain management for neuropathic component    - AMB POC HEMOGLOBIN A1C  - Microalbumin / Creatinine Urine Ratio; Future  - metFORMIN (GLUCOPHAGE) 500 MG tablet; Take 1 tablet by mouth 2 times daily (with meals)  Dispense: 180 tablet; Refill: 2    9. Stage 3a chronic kidney disease ([facility] [address]  Per chart review baseline creatinine 1.2-1.5 with baseline GFR in the 50s to 60s  Likely multifactorial in setting of hypertension and diabetes compounded by age  Monitor renal function, renally dose medications, continue BP and glycemic control      Subjective   SUBJECTIVE/OBJECTIVE:  Patient is a 76-year-old -American male who presents to the office today for follow-up. Patient still endorses bilateral lower extremity pain, today left greater than right.   Did notice some mild bilateral ankle swelling but attributes this to recently coming back from a long drive to and from [place]. States swelling did not extend past the ankles and has improved today. Denies shortness of breath, chest pain, palpitations, anorexia. Has not been checking blood sugars at home, not realizing he was supposed to (this has been the case on several occasions). However has tried to cut back on his carbohydrate intake not adding sugar to his cereal (but does admit to purchasing presweetened cereal) and is also been cutting back on cookies. Reports taking metformin however when the medication list he brought to the office today was reviewed there was an extra indicating he has not been taking it. Review of Systems   Constitutional: Negative for appetite change. HENT: Negative for trouble swallowing. Respiratory: Negative for shortness of breath. Cardiovascular: Positive for leg swelling (Mild around the ankles). Negative for chest pain and palpitations. Gastrointestinal: Negative for abdominal pain, anal bleeding, blood in stool, nausea and vomiting. Musculoskeletal: Positive for myalgias. Neurological: Negative for numbness. Denies paresthesias          Objective   Physical Exam  Vitals reviewed. Constitutional:       General: He is not in acute distress. Appearance: Normal appearance. He is not ill-appearing, toxic-appearing or diaphoretic. HENT:      Head: Normocephalic and atraumatic. Eyes:      General:         Right eye: No discharge. Left eye: No discharge. Extraocular Movements: Extraocular movements intact. Cardiovascular:      Rate and Rhythm: Normal rate and regular rhythm. Heart sounds: No murmur heard. No friction rub. No gallop. Pulmonary:      Effort: Pulmonary effort is normal. No respiratory distress. Breath sounds: No wheezing, rhonchi or rales. Abdominal:      General: Bowel sounds are normal.      Palpations: Abdomen is soft. Tenderness: There is no abdominal tenderness. There is no guarding. Musculoskeletal:         General: No swelling. Right lower leg: No edema. Left lower leg: No edema. Comments: Bilateral calves nontender to palpation   Skin:     General: Skin is warm and dry. Coloration: Skin is not jaundiced. Neurological:      Mental Status: He is alert. Mental status is at baseline. Comments: Poor memory noted on multiple encounters now   Psychiatric:         Attention and Perception: Attention normal.         Mood and Affect: Mood is not anxious. Affect is flat. Affect is not tearful. Speech: Speech normal. Speech is not rapid and pressured or slurred. Behavior: Behavior normal. Behavior is not agitated, aggressive or combative. Behavior is cooperative. Thought Content: Thought content normal.         Cognition and Memory: He exhibits impaired recent memory. An electronic signature was used to authenticate this note.     --Eduardo Gonzalez, DO

## 2022-06-03 ENCOUNTER — OFFICE VISIT (OUTPATIENT)
Dept: INTERNAL MEDICINE CLINIC | Facility: CLINIC | Age: 77
End: 2022-06-03
Payer: MEDICARE

## 2022-06-03 VITALS
TEMPERATURE: 97.3 F | OXYGEN SATURATION: 99 % | BODY MASS INDEX: 27.06 KG/M2 | HEART RATE: 78 BPM | HEIGHT: 70 IN | WEIGHT: 189 LBS | SYSTOLIC BLOOD PRESSURE: 140 MMHG | DIASTOLIC BLOOD PRESSURE: 84 MMHG

## 2022-06-03 DIAGNOSIS — I50.22 CHRONIC HFREF (HEART FAILURE WITH REDUCED EJECTION FRACTION) (HCC): ICD-10-CM

## 2022-06-03 DIAGNOSIS — I73.9 PERIPHERAL ARTERY DISEASE (HCC): ICD-10-CM

## 2022-06-03 DIAGNOSIS — I25.10 CORONARY ARTERY DISEASE INVOLVING NATIVE CORONARY ARTERY OF NATIVE HEART WITHOUT ANGINA PECTORIS: Primary | ICD-10-CM

## 2022-06-03 DIAGNOSIS — E78.5 DYSLIPIDEMIA: ICD-10-CM

## 2022-06-03 DIAGNOSIS — G47.33 OBSTRUCTIVE SLEEP APNEA: ICD-10-CM

## 2022-06-03 DIAGNOSIS — E11.42 DIABETIC POLYNEUROPATHY ASSOCIATED WITH TYPE 2 DIABETES MELLITUS (HCC): ICD-10-CM

## 2022-06-03 DIAGNOSIS — N18.31 STAGE 3A CHRONIC KIDNEY DISEASE (HCC): ICD-10-CM

## 2022-06-03 DIAGNOSIS — I10 ESSENTIAL HYPERTENSION: ICD-10-CM

## 2022-06-03 DIAGNOSIS — E11.51 TYPE 2 DIABETES MELLITUS WITH DIABETIC PERIPHERAL ANGIOPATHY WITHOUT GANGRENE, WITHOUT LONG-TERM CURRENT USE OF INSULIN (HCC): ICD-10-CM

## 2022-06-03 LAB — HBA1C MFR BLD: 7.8 %

## 2022-06-03 PROCEDURE — 1123F ACP DISCUSS/DSCN MKR DOCD: CPT | Performed by: STUDENT IN AN ORGANIZED HEALTH CARE EDUCATION/TRAINING PROGRAM

## 2022-06-03 PROCEDURE — 83036 HEMOGLOBIN GLYCOSYLATED A1C: CPT | Performed by: STUDENT IN AN ORGANIZED HEALTH CARE EDUCATION/TRAINING PROGRAM

## 2022-06-03 PROCEDURE — G8417 CALC BMI ABV UP PARAM F/U: HCPCS | Performed by: STUDENT IN AN ORGANIZED HEALTH CARE EDUCATION/TRAINING PROGRAM

## 2022-06-03 PROCEDURE — 2022F DILAT RTA XM EVC RTNOPTHY: CPT | Performed by: STUDENT IN AN ORGANIZED HEALTH CARE EDUCATION/TRAINING PROGRAM

## 2022-06-03 PROCEDURE — G8427 DOCREV CUR MEDS BY ELIG CLIN: HCPCS | Performed by: STUDENT IN AN ORGANIZED HEALTH CARE EDUCATION/TRAINING PROGRAM

## 2022-06-03 PROCEDURE — 1036F TOBACCO NON-USER: CPT | Performed by: STUDENT IN AN ORGANIZED HEALTH CARE EDUCATION/TRAINING PROGRAM

## 2022-06-03 PROCEDURE — 99214 OFFICE O/P EST MOD 30 MIN: CPT | Performed by: STUDENT IN AN ORGANIZED HEALTH CARE EDUCATION/TRAINING PROGRAM

## 2022-06-03 PROCEDURE — 3017F COLORECTAL CA SCREEN DOC REV: CPT | Performed by: STUDENT IN AN ORGANIZED HEALTH CARE EDUCATION/TRAINING PROGRAM

## 2022-06-03 PROCEDURE — 3046F HEMOGLOBIN A1C LEVEL >9.0%: CPT | Performed by: STUDENT IN AN ORGANIZED HEALTH CARE EDUCATION/TRAINING PROGRAM

## 2022-06-03 ASSESSMENT — ENCOUNTER SYMPTOMS
SHORTNESS OF BREATH: 0
ANAL BLEEDING: 0
VOMITING: 0
BLOOD IN STOOL: 0
TROUBLE SWALLOWING: 0
NAUSEA: 0
ABDOMINAL PAIN: 0

## 2022-06-03 ASSESSMENT — PATIENT HEALTH QUESTIONNAIRE - PHQ9
SUM OF ALL RESPONSES TO PHQ QUESTIONS 1-9: 0
1. LITTLE INTEREST OR PLEASURE IN DOING THINGS: 0
SUM OF ALL RESPONSES TO PHQ QUESTIONS 1-9: 0
SUM OF ALL RESPONSES TO PHQ QUESTIONS 1-9: 0
SUM OF ALL RESPONSES TO PHQ9 QUESTIONS 1 & 2: 0
SUM OF ALL RESPONSES TO PHQ QUESTIONS 1-9: 0
2. FEELING DOWN, DEPRESSED OR HOPELESS: 0

## 2022-06-12 NOTE — PROGRESS NOTES
[address] [place] [address] Court, [address]  [phone]    Patient ID:  Name: Lorelei Perez  MRN: [id number]  AGE: 77 y.o.  : 1945          Office Visit 2022    CHIEF COMPLAINT:    Chief Complaint   Patient presents with    Sleep Apnea    Follow-up       HISTORY OF PRESENT ILLNESS:    Pt is a 77 y.o. male  with a history of TREVOR, CAD,PAD, DM2, memory loss, and HFpEF. Pt had a PSG/HST on 1/3/22 with an AHI of 14.9/hr with desaturations to 91%. Pt is on CPAP 5-15 cm H2O. Pt is seen today for follow up. Pt's wife wanted to come with him to his visit today but she had a doctor's appt at the same time. I did call her after his visit and when over the recommendations and plan. Pt reports that he doesn't have TREVOR. He then tells me he doesn't wear anything at night. I then showed him a download from his CPAP machine and he was able to recall that he did use his CPAP some. He reports that he has issues with his mask coming off of his face at night when he is sleeping. His wife reports that he has had issues with increased congestion and epistaxis since starting on PAP therapy. He reports that his energy is ok but that he still gets sleepy easily. He does get sleepy driving but doesn't drive if he feels sleepy. He will get sleepy and fall asleep if he is watching TV. He has been having issues with his memory. He has not seen neurology yet. He has been referred by his PCP. There are no appts pending in Epic at this time with neurology. Pt has only used APAP 24/50 days with only 16/50 days > 4 hours. He has used APAP on average 5 hours and 6 mins per night. Pt has a mask leak of 2.5L/min with an AHI of 10.4/hr. Pt continues to have central and obstructive apneas despite APAP treatment. He will be referred for a titration study to determine if he needs BiPAP vs BiPAP ST vs ASV. Pt and his wife agree with this plan.        ALLERGIES: Allergies   Allergen Reactions    Celecoxib Swelling     Hands    Febuxostat Other (See Comments)     Joint Pain    Fluvastatin Other (See Comments)     Other reaction(s): Unknown (comments)    Ibuprofen Other (See Comments)     Patient unsure    Sulfa Antibiotics Rash          Past Medical History:   Past Medical History:   Diagnosis Date    Allergic rhinitis     Arthritis     CAD (coronary artery disease)     CABG '09; troponin elevated 7/14/12 (\"likely due to supply/demand mismatch in setting of fever and increased metabolic demand\"-per cardiac MD note 8/20/12)-had cath, echo, EKG-all WNL except cath showed 2 of the bypasses with blockages- \"occluded SVG to PDA & SVG to MARIANELA\"; EF=55%    Chronic kidney disease     Coronary artery disease of native artery of native heart with stable angina pectoris ([address]     Diabetes mellitus type 2, controlled ([address]     restarted oral med (metformin 3/2018), does not check BG on regular basis, last hgba1c- 8 (3/12/18)    ED (erectile dysfunction)     Encephalitis 1962    x 2/hospitalized as teenager    Gout     hx of    Hypercholesterolemia     Hypertension     Lacunar infarct, acute ([address]     possible embolic, remote a-fib- on eliquis    Lumbago     Memory loss     Mitral valve regurgitation 7/14/12    \"moderate\" on echo    TREVOR (obstructive sleep apnea)     mild per patient, does not use CPAP    PAF (paroxysmal atrial fibrillation) ([address]     Prostate cancer ([address]     followed by urology    Psoriasis     topical creams    SBO (small bowel obstruction) ([address] 2011, 2015, 2016         Problem List:   Patient Active Problem List   Diagnosis    Elevated prostate specific antigen (PSA)    Hypoxemia    Cervicalgia    Ischemia of right lower extremity    TREVOR (obstructive sleep apnea)    Peripheral artery disease ([address]    HTN (hypertension)    Type 2 diabetes mellitus ([address]    Acute ST elevation myocardial infarction (STEMI) ([address]    Lumbago    Gout    Macrocytic anemia    Ventricular fibrillation (HCC)    Osteoarthritis of right glenohumeral joint    Unstable angina ([facility]    GI bleeding    Impotence of organic origin    Dyslipidemia    S/P CABG (coronary artery bypass graft)    Anemia associated with acute blood loss    Hypersomnia, unspecified    Open wound of left great toe    Central sleep apnea    Arthritis    HFrEF (heart failure with reduced ejection fraction) ([facility])    NSTEMI (non-ST elevated myocardial infarction) ([address]    Memory impairment    Lacunar infarct, acute ([facility])    CKD (chronic kidney disease) stage 3, GFR 30-59 ml/min ([facility])    Non-seasonal allergic rhinitis due to pollen    Acute myocardial infarction ([address]    Psoriasis    SBO (small bowel obstruction) ([facility])    Coronary artery disease of native artery of native heart with stable angina pectoris ([facility])    Nocturnal hypoxemia    [facility]inal adhesions with obstruction [facility]    RAFITA (acute kidney injury) ([facility]         Surgical History:   Past Surgical History:   Procedure Laterality Date    APPENDECTOMY  1953    as a child    CATARACT REMOVAL Bilateral 2013    iol     COLONOSCOPY  1998, 2010    CORONARY ARTERY BYPASS GRAFT  03/09/2009    x4 bypass    HERNIA REPAIR Bilateral 2012    LEFT HEART CATH,PERCUTANEOUS  7/2012    no intervention    LEFT HEART CATH,PERCUTANEOUS  09/25/2019    NSTEMI & PCI    LEFT HEART CATH,PERCUTANEOUS  03/2019    STEMI, PCI, VFib arrest    [place] ABDOMEN SURGERY PROC UNLISTED  1967    exp lap    PROSTATE BIOPSY, NEEDLE, SATURATION SAMPLING      and ultrasound    PROSTATECTOMY       SHOULDER ARTHROPLASTY Right 2018    Reverse R TSA    SPLENECTOMY  1951    VASCULAR SURGERY  12/29/2017    aortogram, w/kristi LE runoff,R-LE arteriogram    WISDOM TOOTH EXTRACTION         Pulmonary Studies: No flowsheet data found.       Social History:   Social History     Socioeconomic History    Marital status:      Spouse name: Not on file    Number of children: Not on file    Years of education: Not on file    Highest education level: Not on file   Occupational History    Not on file   Tobacco Use    Smoking status: Former Smoker     Packs/day: 1.00     Types: Cigarettes     Quit date: 5     Years since quittin.4    Smokeless tobacco: Never Used    Tobacco comment: Quit smoking: quit around age 28 y/o; smoked 18 years   Substance and Sexual Activity    Alcohol use: Yes    Drug use: No    Sexual activity: Not on file   Other Topics Concern    Not on file   Social History Narrative    Not on file     Social Determinants of Health     Financial Resource Strain:     Difficulty of Paying Living Expenses: Not on file   Food Insecurity:     Worried About Running Out of Food in the Last Year: Not on file    Monica of Food in the Last Year: Not on file   Transportation Needs:     Lack of Transportation (Medical): Not on file    Lack of Transportation (Non-Medical):  Not on file   Physical Activity:     Days of Exercise per Week: Not on file    Minutes of Exercise per Session: Not on file   Stress:     Feeling of Stress : Not on file   Social Connections:     Frequency of Communication with Friends and Family: Not on file    Frequency of Social Gatherings with Friends and Family: Not on file    Attends Confucianism Services: Not on file    Active Member of [address] [place] [address] [employer] or Organizations: Not on file    Attends Club or Organization Meetings: Not on file    Marital Status: Not on file   Intimate Partner Violence:     Fear of Current or Ex-Partner: Not on file    Emotionally Abused: Not on file    Physically Abused: Not on file    Sexually Abused: Not on file   Housing Stability:     Unable to Pay for Housing in the Last Year: Not on file    Number of [place] in the Last Year: Not on file    Unstable Housing in the Last Year: Not on file         Family History:   Family History   Problem Relation Age of Onset    Coronary Art Dis Mother    [place] Heart Disease Mother          of Heart Disease    Osteoarthritis Mother     Gout Mother     Hypertension Mother     Bleeding Prob Paternal Grandmother     Hypertension Brother     Diabetes Father     Cancer Father     Heart Disease Father     Cancer Maternal Uncle         Prostate         Patient Medications:   Current Outpatient Medications   Medication Sig    metFORMIN (GLUCOPHAGE) 500 MG tablet Take 1 tablet by mouth 2 times daily (with meals)    Lancets MISC Check blood sugar up to three times a day    acetaminophen (TYLENOL) 325 MG tablet Take 650 mg by mouth every 4 hours as needed    aspirin 81 MG chewable tablet Take 81 mg by mouth daily    bisacodyl (DULCOLAX) 5 MG EC tablet Take 5 mg by mouth daily as needed    carvedilol (COREG) 12.5 MG tablet Take 12.5 mg by mouth 2 times daily (with meals)    Cetirizine HCl 10 MG CAPS Take 10 mg by mouth    Cholecalciferol 50 MCG ( UT) TABS Take 2,000 Units by mouth daily    clopidogrel (PLAVIX) 75 MG tablet TAKE 1 TABLET DAILY    ezetimibe (ZETIA) 10 MG tablet Take 10 mg by mouth daily    gabapentin (NEURONTIN) 600 MG tablet Take 600 mg by mouth 2 times daily.  losartan (COZAAR) 50 MG tablet Take 50 mg by mouth daily    nitroGLYCERIN (NITROSTAT) 0.4 MG SL tablet Take 0.4 mg by mouth    rosuvastatin (CRESTOR) 20 MG tablet Take 20 mg by mouth    Simethicone 125 MG CAPS Take 125 mg by mouth 4 times daily as needed     No current facility-administered medications for this visit. REVIEW OF SYSTEMS:      CONSTITUTIONAL:+persistent fatigue, or lethargy/hypersomnolence   There is no history of fever, chills, night sweats, weight loss, weight gain, . CARDIAC:   No chest pain, pressure, discomfort, palpitations, orthopnea, murmurs, or edema. GI:   No dysphagia, heartburn reflux, nausea/vomiting, diarrhea, abdominal pain, or bleeding.    NEURO:+memory loss   There is no history of AMS, persistent headache, decreased level of consciousness, seizures, or motor or sensory deficits. PHYSICAL EXAM:    Vitals:    06/13/22 1000   BP: (!) 110/58   Pulse: 74   Resp: 16   Temp: 98 °F (36.7 °C)   SpO2: 99%       Physical Exam:  GENERAL APPEARANCE:   The patient is normal weight and in no respiratory distress, on RA. HEENT:   PERRL. Conjunctivae unremarkable. Nasal mucosa is without epistaxis, exudate, or polyps. Gums and dentition are unremarkable. NECK/LYMPHATIC:   Symmetrical with no elevation of jugular venous pulsation. Trachea midline. No thyroid enlargement. No cervical adenopathy. LUNGS:   Normal respiratory effort with symmetrical lung expansion. Breath sounds clear. HEART:   There is a regular rate and rhythm. No murmur, rub, or gallop. There is no edema in the lower extremities. ABDOMEN:   Soft and non-tender. No hepatosplenomegaly. Bowel sounds are normal.     NEURO:   The patient is alert and oriented to person, place, and time. Memory appears intact and mood is normal.  No gross sensorimotor deficits are present. ASSESSMENT:         Diagnosis Orders   1. Central sleep apnea -pt's CSA is not being adequately controlled with APAP, I have referred him for a titration study to determine if he needs BiPAP vs BiPAP ST vs ASV. His last echo revealed normal EF 2/2022. Ambulatory Referral to Sleep Studies   2. TREVOR (obstructive sleep apnea) -still having obstructive and central apneas despite being on APAP. 3. Nocturnal hypoxemia     4. Nasal dryness -pt given Ayr saline spray and gel to try to help with nasal dryness related to the dry air from his APAP. He can increase his humidity and this may help. Medical Decision Making:       Orders:   Orders Placed This Encounter   Procedures    Ambulatory Referral to Sleep Studies        Dr. Vicenta Capps was the onsite physician. He was available to answer any questions.      Plan:  Follow up in the sleep center in 3 months with  MD only     Time spent in preparation, chart review, imaging review and direct patient care was 31 minutes         LISANDRA Woodward  6/13/2022,    Electronically signed

## 2022-06-13 ENCOUNTER — OFFICE VISIT (OUTPATIENT)
Dept: SLEEP MEDICINE | Age: 77
End: 2022-06-13
Payer: MEDICARE

## 2022-06-13 VITALS
HEART RATE: 74 BPM | HEIGHT: 70 IN | RESPIRATION RATE: 16 BRPM | DIASTOLIC BLOOD PRESSURE: 58 MMHG | BODY MASS INDEX: 26.63 KG/M2 | WEIGHT: 186 LBS | SYSTOLIC BLOOD PRESSURE: 110 MMHG | OXYGEN SATURATION: 99 % | TEMPERATURE: 98 F

## 2022-06-13 DIAGNOSIS — G47.33 OSA (OBSTRUCTIVE SLEEP APNEA): ICD-10-CM

## 2022-06-13 DIAGNOSIS — G47.31 CENTRAL SLEEP APNEA: Primary | ICD-10-CM

## 2022-06-13 DIAGNOSIS — J34.89 NASAL DRYNESS: ICD-10-CM

## 2022-06-13 DIAGNOSIS — G47.34 NOCTURNAL HYPOXEMIA: ICD-10-CM

## 2022-06-13 PROCEDURE — 3017F COLORECTAL CA SCREEN DOC REV: CPT | Performed by: PHYSICIAN ASSISTANT

## 2022-06-13 PROCEDURE — 99214 OFFICE O/P EST MOD 30 MIN: CPT | Performed by: PHYSICIAN ASSISTANT

## 2022-06-13 PROCEDURE — G8417 CALC BMI ABV UP PARAM F/U: HCPCS | Performed by: PHYSICIAN ASSISTANT

## 2022-06-13 PROCEDURE — 1123F ACP DISCUSS/DSCN MKR DOCD: CPT | Performed by: PHYSICIAN ASSISTANT

## 2022-06-13 PROCEDURE — 1036F TOBACCO NON-USER: CPT | Performed by: PHYSICIAN ASSISTANT

## 2022-06-13 PROCEDURE — G8427 DOCREV CUR MEDS BY ELIG CLIN: HCPCS | Performed by: PHYSICIAN ASSISTANT

## 2022-06-13 ASSESSMENT — PATIENT HEALTH QUESTIONNAIRE - PHQ9
SUM OF ALL RESPONSES TO PHQ QUESTIONS 1-9: 0
1. LITTLE INTEREST OR PLEASURE IN DOING THINGS: 0
SUM OF ALL RESPONSES TO PHQ QUESTIONS 1-9: 0
2. FEELING DOWN, DEPRESSED OR HOPELESS: 0
SUM OF ALL RESPONSES TO PHQ QUESTIONS 1-9: 0
SUM OF ALL RESPONSES TO PHQ QUESTIONS 1-9: 0
SUM OF ALL RESPONSES TO PHQ9 QUESTIONS 1 & 2: 0

## 2022-06-13 ASSESSMENT — SLEEP AND FATIGUE QUESTIONNAIRES
HOW LIKELY ARE YOU TO NOD OFF OR FALL ASLEEP WHILE SITTING AND READING: 2
HOW LIKELY ARE YOU TO NOD OFF OR FALL ASLEEP WHILE SITTING INACTIVE IN A PUBLIC PLACE: 1
HOW LIKELY ARE YOU TO NOD OFF OR FALL ASLEEP WHEN YOU ARE A PASSENGER IN A CAR FOR AN HOUR WITHOUT A BREAK: 1
HOW LIKELY ARE YOU TO NOD OFF OR FALL ASLEEP IN A CAR, WHILE STOPPED FOR A FEW MINUTES IN TRAFFIC: 0
HOW LIKELY ARE YOU TO NOD OFF OR FALL ASLEEP WHILE LYING DOWN TO REST IN THE AFTERNOON WHEN CIRCUMSTANCES PERMIT: 1
ESS TOTAL SCORE: 8
HOW LIKELY ARE YOU TO NOD OFF OR FALL ASLEEP WHILE SITTING QUIETLY AFTER LUNCH WITHOUT ALCOHOL: 1
HOW LIKELY ARE YOU TO NOD OFF OR FALL ASLEEP WHILE WATCHING TV: 2
HOW LIKELY ARE YOU TO NOD OFF OR FALL ASLEEP WHILE SITTING AND TALKING TO SOMEONE: 0

## 2022-06-13 NOTE — PATIENT INSTRUCTIONS
Patient Education        Sleep Studies: About This Test  What is it? Sleep studies are tests that watch what happens to your body during sleep. These studies usually are done in a sleep lab. Sleep labs are often located in hospitals. Sleep studies you do at home can be done with portable equipment. But they may not give the same results as a sleep lab. Why is this test done? Sleep studies may be done if your sleep is not restful or if you are tired john. These studies can help find sleep problems, such as:   Sleep apnea.  Excessive snoring.  Problems staying awake, such as narcolepsy.  Problems with nighttime behaviors. These include sleepwalking, night terrors, bed-wetting, and REM behavior disorders (RBD).  Conditions such as periodic limb movement disorder. This is repeated muscle twitching of the feet, arms, or legs during sleep.  Seizures that occur at night (nocturnal seizures).  Ongoing problems that have not responded to treatment. How do you prepare for the test?   You may be asked to keep a sleep diary before your sleep study.  Don't take any naps on the day of your test.   You may be asked to avoid food or drinks with caffeine during the afternoon and evening before your test.   If the sleep study will be done in a sleep lab, you will be asked to shower or bathe before your test. Don't use sprays, oils, or gels on your hair. Don't wear makeup, fingernail polish, or fake nails. Take a small overnight bag with personal items, such as a toothbrush, a comb, favorite pillows or blankets, and a book. You can wear your own nightclothes.  If you will have portable sleep monitoring, your doctor will explain how to use the equipment at home. How is the test done?  In the sleep lab, you will be in a private room, much like a hotel room.  Small pads or patches called electrodes will be placed on your head and body with a small amount of glue and tape.  These will record things like brain activity, eye movement, oxygen levels, and snoring.  Soft elastic belts will be placed around your chest and belly to measure your breathing.  Your blood oxygen levels will be checked by a small clip (oximeter) placed either on the tip of your index finger or on your earlobe.  If you have sleep apnea, you may wear a mask that is connected to a continuous positive airway pressure (CPAP) machine.  Depending on the type of test, you will be allowed to sleep through the night or you'll be awakened periodically and asked to stay awake for a while.  If you use portable sleep monitoring, follow the instructions your doctor gave you. How long does the test take?  You will stay in the sleep lab overnight. For some tests, you will also stay part of the next day. What happens after the test?   You will be able to go home right away.  You may not sleep well during the test and may be tired the next day.  You can go back to your usual activities.  After your sleep problem has been identified, you may need a second study if your doctor orders treatment such as CPAP. Follow-up care is a key part of your treatment and safety. Be sure to make and go to all appointments, and call your doctor if you are having problems. It's also a good idea to keep a list of the medicines youtake. Ask your doctor when you can expect to have your test results. Where can you learn more? Go to [email or web address] org and sign in to your [employer] account. Enter B302 in the [employer] box to learn more about \"Sleep Studies: About This Test.\"     If you do not have an account, please click on the \"Sign Up Now\" link. Current as of: July 6, 2021               Content Version: 13.2  © 4800-1086 Healthwise, Incorporated. Care instructions adapted under license by [facility] ([facility]).  If you have questions about a medical condition or this instruction, always ask your healthcare professional. [employer] disclaims any warranty or liability for your use of this information.

## 2022-06-17 NOTE — PERIOP NOTE
Patient verified name and . Order for consent NOT found in EHR at time of PAT visit. Unable to verify case posting against order; surgery verified by patient. Type 1B (scheduled as RLE arteriogram) surgery, phone assessment complete. Orders not received. Labs per surgeon: none ordered  Labs per anesthesia protocol: SQBS, K+ s/h for DOS    Patient is a poor historian, patient denied most of his history, patient does not know his home medications. Patient states that his wife has his medications and she is out of town. Patient instructed not to take any medications the morning of surgery and to bring all medications in the original bottles to the hospital, patient verbalized understanding  - On the day before surgery please take Acetaminophen 1000mg in the morning and then again before bed. You may substitute for Tylenol 650 mg.     -Hold all vitamins and supplements now for surgery and NSAIDS now for surgery. Prescription meds to hold: all medications the morning of surgery and bring medications in the original bottles to the hospital    Patient instructed on the following:    > Arrive at main Entrance, time of arrival to be called the day before by 1700  > NPO after midnight including gum, mints, and ice chips  > Responsible adult must drive patient to the hospital, stay during surgery, and patient will need supervision 24 hours after anesthesia  > Use antibacterial soap in shower the night before surgery and on the morning of surgery  > All piercings must be removed prior to arrival.    > Leave all valuables (money and jewelry) at home but bring insurance card and ID on DOS.   > You may be required to pay a deductible or co-pay on the day of your procedure. You can pre-pay by calling [phone] if your surgery is at the [facility] or [phone] if your surgery is at the [facility]. > Do not wear make-up, nail polish, lotions, cologne, perfumes, powders, or oil on skin.      Patient verbalized understanding.

## 2022-06-20 ENCOUNTER — DIRECT ADMIT ORDERS (OUTPATIENT)
Dept: SURGERY | Age: 77
End: 2022-06-20

## 2022-06-20 NOTE — PERIOP NOTE
Returned call to patient's wife Isis Phillips from message left on PAT line. Patient stated it was OK to speak with his wife. Isis Jacqueline listed the patient's current medications. Medications updated in Epic. Patient verified name and . All prior to admission medications documented in [facility]. Patient's wife instructed to have the patient take the following medications the day of surgery according to anesthesia guidelines with a small sip of water: aspirin 81 mg, bring nitroglycerin to the hospital, gabapentin,  Carvedilol, clopidogrel. On the day before surgery please take Acetaminophen 1000mg in the morning and then again before bed. You may substitute for Tylenol 650 mg. Hold all vitamins and supplements now for surgery and NSAIDS now for surgery. Prescription meds to hold the morning of surgery:  Metformin, losartan. Per Isis Phillips they did not receive any instructions to hold clopidogrel. Patient's wife instructed on the following:    > Arrive at main Entrance, time of arrival to be called the day before by 1700  > NPO after midnight including gum, mints, and ice chips  > Responsible adult must drive patient to the hospital, stay during surgery, and patient will need supervision 24 hours after anesthesia  > Use antibacterial soap in shower the night before surgery and on the morning of surgery  > All piercings must be removed prior to arrival.    > Leave all valuables (money and jewelry) at home but bring insurance card and ID on DOS.   > You may be required to pay a deductible or co-pay on the day of your procedure. You can pre-pay by calling [phone] if your surgery is at the [facility] or [phone] if your surgery is at the [facility]. > Do not wear make-up, nail polish, lotions, cologne, perfumes, powders, or oil on skin.      Isiselo Phillips verbalized understanding

## 2022-06-21 ENCOUNTER — ANESTHESIA EVENT (OUTPATIENT)
Dept: SURGERY | Age: 77
End: 2022-06-21
Payer: MEDICARE

## 2022-06-21 NOTE — PERIOP NOTE
Directly informed patient and or family member of pre op arrival time 0 on 6/22. All questions answered. Pre op instructions reviewed. Left contact information for any additional questions or needs.

## 2022-06-22 ENCOUNTER — APPOINTMENT (OUTPATIENT)
Dept: INTERVENTIONAL RADIOLOGY/VASCULAR | Age: 77
End: 2022-06-22
Attending: SURGERY
Payer: MEDICARE

## 2022-06-22 ENCOUNTER — ANESTHESIA (OUTPATIENT)
Dept: SURGERY | Age: 77
End: 2022-06-22
Payer: MEDICARE

## 2022-06-22 ENCOUNTER — HOSPITAL ENCOUNTER (OUTPATIENT)
Age: 77
Setting detail: OUTPATIENT SURGERY
Discharge: HOME | End: 2022-06-22
Attending: SURGERY | Admitting: SURGERY
Payer: MEDICARE

## 2022-06-22 VITALS
BODY MASS INDEX: 26.45 KG/M2 | OXYGEN SATURATION: 99 % | HEART RATE: 62 BPM | TEMPERATURE: 97.5 F | SYSTOLIC BLOOD PRESSURE: 155 MMHG | DIASTOLIC BLOOD PRESSURE: 82 MMHG | RESPIRATION RATE: 15 BRPM | WEIGHT: 184.8 LBS | HEIGHT: 70 IN

## 2022-06-22 LAB
CREAT BLD-MCNC: 1.6 MG/DL (ref 0.8–1.5)
GLUCOSE BLD STRIP.AUTO-MCNC: 106 MG/DL (ref 65–100)
POTASSIUM BLD-SCNC: 3.8 MMOL/L (ref 3.5–5.1)
SERVICE CMNT-IMP: ABNORMAL

## 2022-06-22 PROCEDURE — 6370000000 HC RX 637 (ALT 250 FOR IP): Performed by: ANESTHESIOLOGY

## 2022-06-22 PROCEDURE — 6360000002 HC RX W HCPCS: Performed by: SURGERY

## 2022-06-22 PROCEDURE — 3700000000 HC ANESTHESIA ATTENDED CARE: Performed by: SURGERY

## 2022-06-22 PROCEDURE — 2500000003 HC RX 250 WO HCPCS: Performed by: NURSE ANESTHETIST, CERTIFIED REGISTERED

## 2022-06-22 PROCEDURE — 75625 CONTRAST EXAM ABDOMINL AORTA: CPT | Performed by: SURGERY

## 2022-06-22 PROCEDURE — 2580000003 HC RX 258: Performed by: SURGERY

## 2022-06-22 PROCEDURE — 82962 GLUCOSE BLOOD TEST: CPT

## 2022-06-22 PROCEDURE — 7100000010 HC PHASE II RECOVERY - FIRST 15 MIN: Performed by: SURGERY

## 2022-06-22 PROCEDURE — 75716 ARTERY X-RAYS ARMS/LEGS: CPT | Performed by: SURGERY

## 2022-06-22 PROCEDURE — 2580000003 HC RX 258: Performed by: ANESTHESIOLOGY

## 2022-06-22 PROCEDURE — 3700000001 HC ADD 15 MINUTES (ANESTHESIA): Performed by: SURGERY

## 2022-06-22 PROCEDURE — 6360000004 HC RX CONTRAST MEDICATION: Performed by: SURGERY

## 2022-06-22 PROCEDURE — 75625 CONTRAST EXAM ABDOMINL AORTA: CPT

## 2022-06-22 PROCEDURE — 6360000002 HC RX W HCPCS: Performed by: NURSE ANESTHETIST, CERTIFIED REGISTERED

## 2022-06-22 PROCEDURE — 7100000011 HC PHASE II RECOVERY - ADDTL 15 MIN: Performed by: SURGERY

## 2022-06-22 PROCEDURE — 7100000000 HC PACU RECOVERY - FIRST 15 MIN: Performed by: SURGERY

## 2022-06-22 PROCEDURE — 3600000007 HC SURGERY HYBRID BASE: Performed by: SURGERY

## 2022-06-22 PROCEDURE — 2500000003 HC RX 250 WO HCPCS: Performed by: SURGERY

## 2022-06-22 PROCEDURE — 7100000001 HC PACU RECOVERY - ADDTL 15 MIN: Performed by: SURGERY

## 2022-06-22 PROCEDURE — 84132 ASSAY OF SERUM POTASSIUM: CPT

## 2022-06-22 PROCEDURE — 3600000017 HC SURGERY HYBRID ADDL 15MIN: Performed by: SURGERY

## 2022-06-22 PROCEDURE — 36246 INS CATH ABD/L-EXT ART 2ND: CPT | Performed by: SURGERY

## 2022-06-22 RX ORDER — PROPOFOL 10 MG/ML
INJECTION, EMULSION INTRAVENOUS CONTINUOUS PRN
Status: DISCONTINUED | OUTPATIENT
Start: 2022-06-22 | End: 2022-06-22 | Stop reason: SDUPTHER

## 2022-06-22 RX ORDER — HYDROMORPHONE HYDROCHLORIDE 2 MG/ML
0.25 INJECTION, SOLUTION INTRAMUSCULAR; INTRAVENOUS; SUBCUTANEOUS EVERY 5 MIN PRN
Status: DISCONTINUED | OUTPATIENT
Start: 2022-06-22 | End: 2022-06-22 | Stop reason: HOSPADM

## 2022-06-22 RX ORDER — SODIUM CHLORIDE 9 MG/ML
INJECTION, SOLUTION INTRAVENOUS CONTINUOUS
Status: DISCONTINUED | OUTPATIENT
Start: 2022-06-22 | End: 2022-06-22 | Stop reason: HOSPADM

## 2022-06-22 RX ORDER — MIDAZOLAM HYDROCHLORIDE 2 MG/2ML
2 INJECTION, SOLUTION INTRAMUSCULAR; INTRAVENOUS
Status: DISCONTINUED | OUTPATIENT
Start: 2022-06-22 | End: 2022-06-22 | Stop reason: HOSPADM

## 2022-06-22 RX ORDER — LIDOCAINE HYDROCHLORIDE 20 MG/ML
INJECTION, SOLUTION EPIDURAL; INFILTRATION; INTRACAUDAL; PERINEURAL PRN
Status: DISCONTINUED | OUTPATIENT
Start: 2022-06-22 | End: 2022-06-22 | Stop reason: ALTCHOICE

## 2022-06-22 RX ORDER — MORPHINE SULFATE 2 MG/ML
2 INJECTION, SOLUTION INTRAMUSCULAR; INTRAVENOUS EVERY 4 HOURS PRN
Status: DISCONTINUED | OUTPATIENT
Start: 2022-06-22 | End: 2022-06-22 | Stop reason: HOSPADM

## 2022-06-22 RX ORDER — SODIUM CHLORIDE 0.9 % (FLUSH) 0.9 %
5-40 SYRINGE (ML) INJECTION PRN
Status: DISCONTINUED | OUTPATIENT
Start: 2022-06-22 | End: 2022-06-22 | Stop reason: HOSPADM

## 2022-06-22 RX ORDER — OXYCODONE HYDROCHLORIDE 5 MG/1
10 TABLET ORAL PRN
Status: DISCONTINUED | OUTPATIENT
Start: 2022-06-22 | End: 2022-06-22 | Stop reason: HOSPADM

## 2022-06-22 RX ORDER — SODIUM CHLORIDE 0.9 % (FLUSH) 0.9 %
5-40 SYRINGE (ML) INJECTION EVERY 12 HOURS SCHEDULED
Status: DISCONTINUED | OUTPATIENT
Start: 2022-06-22 | End: 2022-06-22 | Stop reason: HOSPADM

## 2022-06-22 RX ORDER — SODIUM CHLORIDE, SODIUM LACTATE, POTASSIUM CHLORIDE, CALCIUM CHLORIDE 600; 310; 30; 20 MG/100ML; MG/100ML; MG/100ML; MG/100ML
INJECTION, SOLUTION INTRAVENOUS CONTINUOUS
Status: DISCONTINUED | OUTPATIENT
Start: 2022-06-22 | End: 2022-06-22 | Stop reason: HOSPADM

## 2022-06-22 RX ORDER — DEXMEDETOMIDINE HYDROCHLORIDE 100 UG/ML
INJECTION, SOLUTION INTRAVENOUS PRN
Status: DISCONTINUED | OUTPATIENT
Start: 2022-06-22 | End: 2022-06-22 | Stop reason: SDUPTHER

## 2022-06-22 RX ORDER — HYDROMORPHONE HYDROCHLORIDE 2 MG/ML
0.5 INJECTION, SOLUTION INTRAMUSCULAR; INTRAVENOUS; SUBCUTANEOUS EVERY 5 MIN PRN
Status: DISCONTINUED | OUTPATIENT
Start: 2022-06-22 | End: 2022-06-22 | Stop reason: HOSPADM

## 2022-06-22 RX ORDER — FENTANYL CITRATE 50 UG/ML
100 INJECTION, SOLUTION INTRAMUSCULAR; INTRAVENOUS
Status: DISCONTINUED | OUTPATIENT
Start: 2022-06-22 | End: 2022-06-22 | Stop reason: HOSPADM

## 2022-06-22 RX ORDER — DIPHENHYDRAMINE HYDROCHLORIDE 50 MG/ML
12.5 INJECTION INTRAMUSCULAR; INTRAVENOUS
Status: DISCONTINUED | OUTPATIENT
Start: 2022-06-22 | End: 2022-06-22 | Stop reason: HOSPADM

## 2022-06-22 RX ORDER — GLYCOPYRROLATE 0.2 MG/ML
INJECTION INTRAMUSCULAR; INTRAVENOUS PRN
Status: DISCONTINUED | OUTPATIENT
Start: 2022-06-22 | End: 2022-06-22 | Stop reason: SDUPTHER

## 2022-06-22 RX ORDER — ONDANSETRON 2 MG/ML
4 INJECTION INTRAMUSCULAR; INTRAVENOUS
Status: DISCONTINUED | OUTPATIENT
Start: 2022-06-22 | End: 2022-06-22 | Stop reason: HOSPADM

## 2022-06-22 RX ORDER — ACETAMINOPHEN 500 MG
1000 TABLET ORAL ONCE
Status: COMPLETED | OUTPATIENT
Start: 2022-06-22 | End: 2022-06-22

## 2022-06-22 RX ORDER — PROPOFOL 10 MG/ML
INJECTION, EMULSION INTRAVENOUS PRN
Status: DISCONTINUED | OUTPATIENT
Start: 2022-06-22 | End: 2022-06-22 | Stop reason: SDUPTHER

## 2022-06-22 RX ORDER — OXYCODONE HYDROCHLORIDE 5 MG/1
5 TABLET ORAL PRN
Status: DISCONTINUED | OUTPATIENT
Start: 2022-06-22 | End: 2022-06-22 | Stop reason: HOSPADM

## 2022-06-22 RX ORDER — HEPARIN SODIUM 200 [USP'U]/100ML
INJECTION, SOLUTION INTRAVENOUS CONTINUOUS PRN
Status: COMPLETED | OUTPATIENT
Start: 2022-06-22 | End: 2022-06-22

## 2022-06-22 RX ORDER — OXYCODONE HYDROCHLORIDE AND ACETAMINOPHEN 5; 325 MG/1; MG/1
1 TABLET ORAL EVERY 4 HOURS PRN
Status: DISCONTINUED | OUTPATIENT
Start: 2022-06-22 | End: 2022-06-22 | Stop reason: HOSPADM

## 2022-06-22 RX ORDER — DEXMEDETOMIDINE HYDROCHLORIDE 4 UG/ML
INJECTION, SOLUTION INTRAVENOUS CONTINUOUS PRN
Status: DISCONTINUED | OUTPATIENT
Start: 2022-06-22 | End: 2022-06-22 | Stop reason: SDUPTHER

## 2022-06-22 RX ADMIN — GLYCOPYRROLATE 0.1 MG: 0.2 INJECTION, SOLUTION INTRAMUSCULAR; INTRAVENOUS at 07:26

## 2022-06-22 RX ADMIN — PROPOFOL 140 MCG/KG/MIN: 10 INJECTION, EMULSION INTRAVENOUS at 07:25

## 2022-06-22 RX ADMIN — SODIUM CHLORIDE 83.9 ML/HR: 9 INJECTION, SOLUTION INTRAVENOUS at 06:49

## 2022-06-22 RX ADMIN — DEXMEDETOMIDINE HYDROCHLORIDE 0.7 MCG/KG/HR: 4 INJECTION, SOLUTION INTRAVENOUS at 07:26

## 2022-06-22 RX ADMIN — SODIUM CHLORIDE, SODIUM LACTATE, POTASSIUM CHLORIDE, AND CALCIUM CHLORIDE: 600; 310; 30; 20 INJECTION, SOLUTION INTRAVENOUS at 07:21

## 2022-06-22 RX ADMIN — PROPOFOL 30 MG: 10 INJECTION, EMULSION INTRAVENOUS at 07:25

## 2022-06-22 RX ADMIN — ACETAMINOPHEN 1000 MG: 500 TABLET, FILM COATED ORAL at 06:46

## 2022-06-22 RX ADMIN — Medication 2 G: at 07:30

## 2022-06-22 RX ADMIN — DEXMEDETOMIDINE 20 MCG: 100 INJECTION, SOLUTION, CONCENTRATE INTRAVENOUS at 07:26

## 2022-06-22 ASSESSMENT — PAIN SCALES - GENERAL
PAINLEVEL_OUTOF10: 0
PAINLEVEL_OUTOF10: 0

## 2022-06-22 ASSESSMENT — PAIN DESCRIPTION - DESCRIPTORS: DESCRIPTORS: CRAMPING

## 2022-06-22 ASSESSMENT — PAIN - FUNCTIONAL ASSESSMENT: PAIN_FUNCTIONAL_ASSESSMENT: 0-10

## 2022-06-22 NOTE — BRIEF OP NOTE
[address]   [address] [facility] [place] FAX: [phone]    Brief Op Note Template Note    Pre-Op Diagnosis: Peripheral vascular disease, unspecified ([facility] [I73.9]    Post-Op Diagnosis:  * No post-op diagnosis entered *    Procedures: Procedure(s):  ULTRASOUND GUIDED BILATERAL LOWER EXTREMITY ARTERIOGRAM/ AORTAGRAM    Surgeon: Darryn Caro MD    Assistants: Surgeon(s):  Jim Sargent MD      Anesthesia:  General     Findings: Bilateral popliteal artery occlusion and tibial disease    Tourniquet Time:  * No tourniquets in log *    Estimated Blood Loss:               Specimens:            Implants:  * No implants in log *    Complications: None               Signed: Darryn Caro MD      Elements of this note have been dictated using speech recognition software. As a result, errors of speech recognition may have occurred.

## 2022-06-22 NOTE — PROGRESS NOTES
Patient status post bilateral arteriogram which showed distal tibial disease bilaterally not unreconstructable. Patient is not a candidate for any open revascularization options. Patient follow-up in 1 month for postop check.     Connie Nixon

## 2022-06-22 NOTE — ANESTHESIA POSTPROCEDURE EVALUATION
Department of Anesthesiology  Postprocedure Note    Patient: Aron Rivera  MRN: [id number]  YOB: 1945  Date of evaluation: 6/22/2022      Procedure Summary     Date: 06/22/22 Room / Location: [facility] MAIN OR  / [facility] MAIN OR    Anesthesia Start: 3133 Anesthesia Stop: 8198    Procedure: ULTRASOUND GUIDED BILATERAL LOWER EXTREMITY ARTERIOGRAM/ AORTAGRAM (Right Leg Lower) Diagnosis:       Peripheral vascular disease, unspecified (HCC)      (Peripheral vascular disease, unspecified ([facility] [I73.9])    Providers: Ezequiel Boo MD Responsible Provider: Nasir Botello MD    Anesthesia Type: TIVA ASA Status: 3          Anesthesia Type: TIVA    Liliana Phase I: Liliana Score: 8    Liliana Phase II:      Last vitals:   Vitals Value Taken Time   /78 06/22/22 0855   Temp 97.5 °F (36.4 °C) 06/22/22 0810   Pulse 67 06/22/22 0858   Resp 14 06/22/22 0810   SpO2 98 % 06/22/22 0858   Vitals shown include unvalidated device data.       Anesthesia Post Evaluation    Patient location during evaluation: PACU  Patient participation: complete - patient participated  Level of consciousness: awake and alert  Airway patency: patent  Nausea & Vomiting: no nausea  Cardiovascular status: hemodynamically stable  Respiratory status: acceptable  Hydration status: euvolemic

## 2022-06-22 NOTE — OP NOTE
[address] [facility]  OPERATIVE REPORT    Name:  Divina Mustafa  MR#:  [id number]  :  1945  ACCOUNT #:  [de-identified]  DATE OF SERVICE:  2022    PREOPERATIVE DIAGNOSIS:  Debilitating right leg claudication. POSTOPERATIVE DIAGNOSIS:  Debilitating right leg claudication. PROCEDURES PERFORMED:  1. Ultrasound-guided access of the left common femoral artery with placement of a catheter in the aorta for aortogram.  2.  Bilateral lower extremity arteriogram.    SURGEON:  Yazmin Jung MD    ASSISTANT:     ANESTHESIA:  Sedation. COMPLICATIONS:      SPECIMENS REMOVED:     IMPLANTS:     ESTIMATED BLOOD LOSS:      OPERATIVE FINDINGS:  1. Both renal arteries were patent. There was no evidence of any aortic or iliac disease. 2.  Right lower extremity arteriogram showed a patent common femoral artery, profunda, and SFA with below-knee popliteal artery being heavily diseased and occluded with no named tibial disease with a collateral flow supplying the foot. 3.  Left lower extremity showed a patent common femoral artery, profunda artery, and SFA artery with below-knee popliteal artery being occluded with severe tibial disease and no named vessels. PROCEDURE:  After getting informed consent, the patient was brought to the operating room. Anesthesia was then induced. Preop antibiotics were given before skin incision. The patient's right and left groins were all prepped and draped in normal sterile fashion. Ultrasound was used to identify the common femoral artery over the femoral head. Using the Seldinger technique, we accessed it with a micropuncture access and upsized to a 5-Malaysian sheath over an 0.035 starter wire. We then put a wire in the catheter and put it in the distal aorta for aortogram.  Please see above for anatomical findings.   We then got a catheter up and over into the contralateral common femoral artery and did a digital subtraction which showed that the common femoral artery, profunda, and SFA were all patent. The below-knee popliteal artery was patent but occluded at the tibioperoneal trunk with anterior tibial, posterior tibial and peroneal all occluded. Collateral flow was supplying flow down to the foot. We then removed the catheter and did a completion on the left leg which showed a patent common femoral, SFA, and below-knee pop. However, the tibioperoneal trunk was occluded and collateral flow supplied the foot. We then closed with a Mynx device. The patient was extubated and taken to the PACU in stable condition.       Anthonette Mcardle, MD [id number]  D:  06/22/2022 8:17  T:  06/22/2022 15:13  JOB #:  [id number]

## 2022-06-22 NOTE — PERIOP NOTE
Discharge instructions given to Erin Flynn, patient's spouse. They verbalized understanding and was given opportunity for questions.

## 2022-06-22 NOTE — PROGRESS NOTES
[address]   [address] [facility], [address] [place] [address]. [address] FAX: [phone]         VASCULAR SURGERY FLOOR PROGRESS NOTE    Admit Date: 2022  POD: Day of Surgery    Procedure:  Procedure(s):  Right Lower Extremity Arteriogram    Subjective:     Patient has no new complaints. Objective:     Vitals:  Blood pressure (!) 157/74, pulse 63, temperature 97.9 °F (36.6 °C), temperature source Oral, resp. rate 16, height 5' 10\" (1.778 m), weight 185 lb (83.9 kg), SpO2 97 %. Temp (24hrs), Av.9 °F (36.6 °C), Min:97.9 °F (36.6 °C), Max:97.9 °F (36.6 °C)      Intake / Output:  No intake or output data in the 24 hours ending 22 0631    Physical Exam:    Constitutional: he appears well-developed. No distress. HENT:   Head: Atraumatic. Eyes: Pupils are equal, round, and reactive to light. Neck: Normal range of motion. Cardiovascular: Regular rhythm. Pulmonary/Chest: Effort normal and breath sounds normal. No respiratory distress. Abdominal: Soft. Bowel sounds are normal. he exhibits no distension. There is no tenderness. There is no guarding. No hernia. Musculoskeletal: Normal range of motion. Neurological: He is alert. CN II- XII grossly intact  Vascular: dopperable PT    Labs: No results for input(s): HGB, WBC, K, GLU in the last 72 hours.     Invalid input(s):  CREA    Data Review     Assessment:     Patient Active Problem List    Diagnosis Date Noted    Central sleep apnea 2022    Hypoxemia 2021    Hypersomnia, unspecified 2021    Type 2 diabetes mellitus ([facility] 2021    Peripheral artery disease ([facility] 2020    HFrEF (heart failure with reduced ejection fraction) ([facility] 2020    GI bleeding 10/23/2019    SBO (small bowel obstruction) ([facility] 10/21/2019    Unstable angina ([facility] 2019    NSTEMI (non-ST elevated myocardial infarction) ([facility] 2019    Open wound of left great toe 2019    Acute ST elevation myocardial infarction (STEMI) ([facility] 03/17/2019    Ventricular fibrillation ([facility] 03/17/2019    Acute myocardial infarction ([facility] 03/17/2019    Macrocytic anemia 02/14/2019    Nocturnal hypoxemia 06/12/2018    Anemia associated with acute blood loss 04/19/2018    Osteoarthritis of right glenohumeral joint 04/18/2018    Memory impairment 04/09/2018    Ischemia of right lower extremity 01/04/2018    Lacunar infarct, acute ([facility] 08/29/2017    Cervicalgia 08/28/2017    CKD (chronic kidney disease) stage 3, GFR 30-59 ml/min ([facility]) 02/24/2016    Arthritis 07/16/2015    Non-seasonal allergic rhinitis due to pollen 07/16/2015    Psoriasis 07/16/2015    Elevated prostate specific antigen (PSA)     Lumbago     Impotence of organic origin     TREVOR (obstructive sleep apnea) 07/14/2012    HTN (hypertension) 07/14/2012    RAFITA (acute kidney injury) ([facility] 07/14/2012    Intestinal adhesions with obstruction ([facility] 11/09/2011    S/P CABG (coronary artery bypass graft) 03/10/2009    Gout 03/06/2009    Dyslipidemia 03/06/2009    Coronary artery disease of native artery of native heart with stable angina pectoris ([facility] 03/06/2009       Plan/Recommendations/Medical Decision Making:     Plan for right leg angiogram possible intervention    Elements of this note have been dictated using speech recognition software. As a result, errors of speech recognition may have occurred.

## 2022-06-22 NOTE — PROGRESS NOTES
's pre-procedure visit requested by patient. Conveyed care and concern for patient and family. Offered prayer as requested for patient, family, and staff.     Bienvenido Olivares MDiv, BS  Board Certified

## 2022-06-22 NOTE — ANESTHESIA PRE PROCEDURE
Department of Anesthesiology  Preprocedure Note       Name:  Emre Wong   Age:  77 y.o.  :  1945                                          MRN:  [id number]         Date:  2022      Surgeon: Guru Osborn):  Alfredo Mccain MD    Procedure: Procedure(s):  Right Lower Extremity Arteriogram    Medications prior to admission:   Prior to Admission medications    Medication Sig Start Date End Date Taking? Authorizing Provider   metFORMIN (GLUCOPHAGE) 500 MG tablet Take 1 tablet by mouth 2 times daily (with meals) 6/3/22   Vanessa Dutton DO   Lancets MISC Check blood sugar up to three times a day 3/3/22   Ar Automatic Reconciliation   acetaminophen (TYLENOL) 325 MG tablet Take 650 mg by mouth every 4 hours as needed 19   Ar Automatic Reconciliation   aspirin 81 MG chewable tablet Take 81 mg by mouth daily 19   Ar Automatic Reconciliation   bisacodyl (DULCOLAX) 5 MG EC tablet Take 5 mg by mouth nightly as needed     Ar Automatic Reconciliation   carvedilol (COREG) 12.5 MG tablet Take 12.5 mg by mouth 2 times daily (with meals) 22   Ar Automatic Reconciliation   Cetirizine HCl 10 MG CAPS Take 10 mg by mouth nightly     Ar Automatic Reconciliation   Cholecalciferol 50 MCG (2000 UT) TABS Take 2,000 Units by mouth Daily with lunch     Ar Automatic Reconciliation   clopidogrel (PLAVIX) 75 MG tablet TAKE 1 TABLET DAILY 21   Ar Automatic Reconciliation   ezetimibe (ZETIA) 10 MG tablet Take 10 mg by mouth nightly  3/3/22   Ar Automatic Reconciliation   gabapentin (NEURONTIN) 600 MG tablet Take 600 mg by mouth 2 times daily.  3/3/22   Ar Automatic Reconciliation   losartan (COZAAR) 50 MG tablet Take 50 mg by mouth Daily with lunch  3/3/22   Ar Automatic Reconciliation   nitroGLYCERIN (NITROSTAT) 0.4 MG SL tablet Take 0.4 mg by mouth 22   Ar Automatic Reconciliation   rosuvastatin (CRESTOR) 20 MG tablet Take 20 mg by mouth nightly  3/3/22   Ar Automatic Reconciliation   Simethicone 125 MG CAPS Take 125 mg by mouth 4 times daily as needed Takes at bedtime    Ar Automatic Reconciliation       Current medications:    Current Facility-Administered Medications   Medication Dose Route Frequency Provider Last Rate Last Admin    fentaNYL (SUBLIMAZE) injection 100 mcg  100 mcg IntraVENous Once PRN Milana Christianson MD        lactated ringers infusion   IntraVENous Continuous Milana Christianson MD   Held at 06/22/22 5759    sodium chloride flush 0.9 % injection 5-40 mL  5-40 mL IntraVENous 2 times per day Milana Christianson MD        sodium chloride flush 0.9 % injection 5-40 mL  5-40 mL IntraVENous PRN Milana Christianson MD        midazolam PF (VERSED) injection 2 mg  2 mg IntraVENous Once PRN Milana Christianson MD        ceFAZolin (ANCEF) 2000 mg in sterile water 20 mL IV syringe  2,000 mg IntraVENous Once Jame Hussein MD        0.9 % sodium chloride infusion   IntraVENous Continuous Jame Hussein MD 83.9 mL/hr at 06/22/22 0649 83.9 mL/hr at 06/22/22 8896       Allergies: Allergies   Allergen Reactions    Celecoxib Swelling     Hands.  denies    Febuxostat Other (See Comments)     Joint Pain    Fluvastatin Other (See Comments)     Other reaction(s): Unknown (comments)  Patient denies    Ibuprofen Other (See Comments)     Patient unsure    Sulfa Antibiotics Rash     Patient denies       Problem List:    Patient Active Problem List   Diagnosis Code    Elevated prostate specific antigen (PSA) R97.20    Hypoxemia R09.02    Cervicalgia M54.2    Ischemia of right lower extremity I99.8    TREVOR (obstructive sleep apnea) G47.33    Peripheral artery disease (HCC) I73.9    HTN (hypertension) I10    Type 2 diabetes mellitus (HCC) E11.9    Acute ST elevation myocardial infarction (STEMI) ([facility] I21.3    Lumbago M54.50    Gout M10.9    Macrocytic anemia D53.9    Ventricular fibrillation (HCC) I49.01    Osteoarthritis of right glenohumeral joint M19.011    Unstable angina ([facility] I20.0    GI bleeding K92.2    Impotence of organic origin N52.9    Dyslipidemia E78.5    S/P CABG (coronary artery bypass graft) Z95.1    Anemia associated with acute blood loss D62    Hypersomnia, unspecified G47.10    Open wound of left great toe S91.102A    Central sleep apnea G47.31    Arthritis M19.90    HFrEF (heart failure with reduced ejection fraction) ([facility]) I50.20    NSTEMI (non-ST elevated myocardial infarction) ([facility]) I21.4    Memory impairment R41.3    Lacunar infarct, acute ([facility]) I63.81    CKD (chronic kidney disease) stage 3, GFR 30-59 ml/min ([facility]) N18.30    Non-seasonal allergic rhinitis due to pollen J30.1    Acute myocardial infarction ([facility]) I21.9    Psoriasis L40.9    SBO (small bowel obstruction) ([facility] K56.609    Coronary artery disease of native artery of native heart with stable angina pectoris ([facility]) I25.118    Nocturnal hypoxemia G47.34    Intestinal adhesions with obstruction ([facility]) K56.50    RAFITA (acute kidney injury) ([facility] N17.9       Past Medical History:        Diagnosis Date    Allergic rhinitis     Arthritis     CAD (coronary artery disease)     CABG '09; troponin elevated 7/14/12 (\"likely due to supply/demand mismatch in setting of fever and increased metabolic demand\"-per cardiac MD note 8/20/12)-had cath, echo, EKG-all WNL except cath showed 2 of the bypasses with blockages- \"occluded SVG to PDA & SVG to MARIANELA\"; EF=55%    Cerebral artery occlusion with cerebral infarction ([facility] 2017    acute/subacute infarct left basal ganglia    CHF (congestive heart failure) ([facility])     Chronic kidney disease     3a    Coronary artery disease of native artery of native heart with stable angina pectoris ([facility] 2019, 2020 2019- STEMI- v fib arrest enroute, stent; 2020 NSTEMI angioplasty    Diabetes mellitus type 2, controlled ([address]     patient does not know if he takes medication for DM; rarely check BS, denies hypoglycemia    ED (erectile dysfunction)     Encephalitis 1962 x 2/hospitalized as teenager    Gout     hx of    History of blood transfusion     due to injury    Hx of echocardiogram 2022    EF 50-55%    Hypercholesterolemia     Hypertension     medications    Ischemic cardiomyopathy     Lacunar infarct, acute ([address]     possible embolic, remote a-fib- on eliquis    Lumbago     Memory loss     Mitral valve regurgitation 12    \"moderate\" on echo    TREVOR (obstructive sleep apnea)     mild per patient, does not use CPAP    PAD (peripheral artery disease) ([address]     PAF (paroxysmal atrial fibrillation) ([address]     Poor historian     patient does not know medications and does not know his medical history    Prostate cancer ([address]     followed by urology    Psoriasis     topical creams    SBO (small bowel obstruction) ([facility] , ,        Past Surgical History:        Procedure Laterality Date   [address]    as a child    CATARACT REMOVAL Bilateral     iol    [address] [facility]    CORONARY ARTERY BYPASS GRAFT  03/09/2009    x4 bypass    HERNIA REPAIR Bilateral     LEFT HEART CATH,PERCUTANEOUS  2012    no intervention    LEFT HEART CATH,PERCUTANEOUS  2019    NSTEMI & PCI    LEFT HEART CATH,PERCUTANEOUS  2019    STEMI, PCI, VFib arrest    VT ABDOMEN SURGERY PROC UNLISTED      exp lap, numerous surgeries on abdomen from an injury    PROSTATE BIOPSY, NEEDLE, SATURATION SAMPLING      and ultrasound    PROSTATECTOMY       SHOULDER ARTHROPLASTY Right 2018    Reverse R TSA    SPLENECTOMY      VASCULAR SURGERY  2017    aortogram, w/kristi LE runoff,R-LE arteriogram    WISDOM TOOTH EXTRACTION         Social History:    Social History     Tobacco Use    Smoking status: Former Smoker     Packs/day: 1.00     Types: Cigarettes     Quit date:      Years since quittin.4    Smokeless tobacco: Never Used    Tobacco comment: Quit smoking: quit around age 30 y/o; smoked 18 years   Substance Use Topics    Alcohol use: Not Currently                                Counseling given: Not Answered  Comment: Quit smoking: quit around age 30 y/o; smoked 18 years      Vital Signs (Current):   Vitals:    06/17/22 1131 06/22/22 0629   BP:  (!) 157/74   Pulse:  63   Resp:  16   Temp:  97.9 °F (36.6 °C)   TempSrc:  Oral   SpO2:  97%   Weight: 185 lb (83.9 kg)    Height: 5' 10\" (1.778 m)                                               BP Readings from Last 3 Encounters:   06/22/22 (!) 157/74   06/13/22 (!) 110/58   06/03/22 (!) 140/84       NPO Status: Time of last liquid consumption: 2300                        Time of last solid consumption: 2300                        Date of last liquid consumption: 06/21/22                        Date of last solid food consumption: 06/21/22    BMI:   Wt Readings from Last 3 Encounters:   06/17/22 185 lb (83.9 kg)   06/13/22 186 lb (84.4 kg)   06/03/22 189 lb (85.7 kg)     Body mass index is 26.54 kg/m².     CBC:   Lab Results   Component Value Date    WBC 7.5 02/01/2022    RBC 4.78 02/01/2022    HGB 13.9 02/01/2022    HCT 41.8 02/01/2022    MCV 87.4 02/01/2022    RDW 13.5 02/01/2022     02/01/2022       CMP:   Lab Results   Component Value Date     02/01/2022    K 4.7 02/01/2022     02/01/2022    CO2 25 02/01/2022    BUN 15 02/01/2022    CREATININE 1.60 06/22/2022    CREATININE 1.53 02/01/2022    GFRAA 57 02/01/2022    AGRATIO 0.7 02/01/2022    GLUCOSE 155 02/01/2022    PROT 8.1 02/01/2022    CALCIUM 9.3 02/01/2022    BILITOT 0.4 02/01/2022    ALKPHOS 97 02/01/2022    AST 30 02/01/2022    ALT 30 02/01/2022       POC Tests:   Recent Labs     06/22/22  0558 06/22/22  0604   POCGLU  --  106*   POCK 3.8  --        Coags:   Lab Results   Component Value Date    APTT 77.4 02/09/2020       HCG (If Applicable): No results found for: PREGTESTUR, PREGSERUM, HCG, HCGQUANT     ABGs: No results found for: PHART, PO2ART, CUG2IVF, JEE5GFL, BEART, O8LJGZTM     Type & Screen (If Applicable):  No results found for: LABABO, [address]    Drug/Infectious Status (If Applicable):  No results found for: HIV, HEPCAB    COVID-19 Screening (If Applicable):   Lab Results   Component Value Date    COVID19 Not detected 01/09/2022    COVID19 Not Detected 12/31/2021    COVID19 Performed 12/31/2021           Anesthesia Evaluation  Patient summary reviewed and Nursing notes reviewed no history of anesthetic complications:   Airway: Mallampati: I  TM distance: >3 FB   Neck ROM: full     Dental:    (+) lower dentures and upper dentures      Pulmonary: breath sounds clear to auscultation  (+) sleep apnea: on CPAP,                             Cardiovascular:  Exercise tolerance: good (>4 METS),   (+) hypertension:, past MI:, CAD:, CABG/stent (2019):, CHF:, hyperlipidemia        Rhythm: regular  Rate: normal                 ROS comment: plavix and bASA this am  EF 45-50%     Neuro/Psych:   (+) TIA,             GI/Hepatic/Renal:   (+) renal disease (stage 3): CRI,          ROS comment: Hx GIB. Endo/Other:    (+) DiabetesType II DM, , malignancy/cancer (prostate). Abdominal:             Vascular: Other Findings:           Anesthesia Plan      TIVA     ASA 3       Induction: intravenous. Anesthetic plan and risks discussed with patient and spouse.                         Khadijah Osuna MD   6/22/2022

## 2022-06-22 NOTE — PERIOP NOTE
Pressure bandage removed from pts lt groin site and replaced with bandaid. incision clean, dry, intact.  No signs of bleeding

## 2022-06-24 ENCOUNTER — OFFICE VISIT (OUTPATIENT)
Dept: ORTHOPEDIC SURGERY | Age: 77
End: 2022-06-24
Payer: MEDICARE

## 2022-06-24 DIAGNOSIS — M17.11 OSTEOARTHRITIS OF RIGHT KNEE, UNSPECIFIED OSTEOARTHRITIS TYPE: ICD-10-CM

## 2022-06-24 DIAGNOSIS — M25.561 ACUTE PAIN OF RIGHT KNEE: Primary | ICD-10-CM

## 2022-06-24 PROCEDURE — 1036F TOBACCO NON-USER: CPT | Performed by: ORTHOPAEDIC SURGERY

## 2022-06-24 PROCEDURE — 99204 OFFICE O/P NEW MOD 45 MIN: CPT | Performed by: ORTHOPAEDIC SURGERY

## 2022-06-24 PROCEDURE — 3017F COLORECTAL CA SCREEN DOC REV: CPT | Performed by: ORTHOPAEDIC SURGERY

## 2022-06-24 PROCEDURE — G8427 DOCREV CUR MEDS BY ELIG CLIN: HCPCS | Performed by: ORTHOPAEDIC SURGERY

## 2022-06-24 PROCEDURE — G8417 CALC BMI ABV UP PARAM F/U: HCPCS | Performed by: ORTHOPAEDIC SURGERY

## 2022-06-24 PROCEDURE — 1123F ACP DISCUSS/DSCN MKR DOCD: CPT | Performed by: ORTHOPAEDIC SURGERY

## 2022-06-24 NOTE — PROGRESS NOTES
Name: Shruthi Reynoso  YOB: 1945  Gender: male  MRN: [id number]    CC:   Chief Complaint   Patient presents with    Knee Pain     Right knee pain          HPI:   The pain has been present for a few months and is becoming worse. It hurts too much at night when sleeping. The pain is located over the knee the right side. It does hurt to walk and gets worse with increased distances has a substantial bowing and instability issue. The pain does not radiate down the leg. Numbness and tingling are not noted. Treatment so far has been injection in the past.  H/o PAD with RLE claudication. He underwent cath 2 days ago which showed a patent common femoral artery, profunda, and SFA with below-knee popliteal artery being heavily diseased and occluded with no named tibial disease with a collateral flow supplying the foot, per Dr. Fernando Galicia' op note. He said 2 days ago Dr. Fernando Galicia did not think he had any further surgical endeavors to offer him for his legs with his distal circulation and runoff as he is.     Current Outpatient Medications:     metFORMIN (GLUCOPHAGE) 500 MG tablet, Take 1 tablet by mouth 2 times daily (with meals), Disp: 180 tablet, Rfl: 2    Lancets MISC, Check blood sugar up to three times a day, Disp: , Rfl:     acetaminophen (TYLENOL) 325 MG tablet, Take 650 mg by mouth every 4 hours as needed, Disp: , Rfl:     aspirin 81 MG chewable tablet, Take 81 mg by mouth daily, Disp: , Rfl:     bisacodyl (DULCOLAX) 5 MG EC tablet, Take 5 mg by mouth nightly as needed , Disp: , Rfl:     carvedilol (COREG) 12.5 MG tablet, Take 12.5 mg by mouth 2 times daily (with meals), Disp: , Rfl:     Cetirizine HCl 10 MG CAPS, Take 10 mg by mouth nightly , Disp: , Rfl:     Cholecalciferol 50 MCG (2000 UT) TABS, Take 2,000 Units by mouth Daily with lunch , Disp: , Rfl:     clopidogrel (PLAVIX) 75 MG tablet, TAKE 1 TABLET DAILY, Disp: , Rfl:     ezetimibe (ZETIA) 10 MG tablet, Take 10 mg by mouth nightly , Disp: , Rfl:     gabapentin (NEURONTIN) 600 MG tablet, Take 600 mg by mouth 2 times daily. , Disp: , Rfl:     losartan (COZAAR) 50 MG tablet, Take 50 mg by mouth Daily with lunch , Disp: , Rfl:     nitroGLYCERIN (NITROSTAT) 0.4 MG SL tablet, Take 0.4 mg by mouth, Disp: , Rfl:     rosuvastatin (CRESTOR) 20 MG tablet, Take 20 mg by mouth nightly , Disp: , Rfl:     Simethicone 125 MG CAPS, Take 125 mg by mouth 4 times daily as needed Takes at bedtime, Disp: , Rfl:   Allergies   Allergen Reactions    Celecoxib Swelling     Hands.  denies    Febuxostat Other (See Comments)     Joint Pain    Fluvastatin Other (See Comments)     Other reaction(s): Unknown (comments)  Patient denies    Ibuprofen Other (See Comments)     Patient unsure    Sulfa Antibiotics Rash     Patient denies     Past Medical History:   Diagnosis Date    Allergic rhinitis     Arthritis     CAD (coronary artery disease)     CABG '09; troponin elevated 7/14/12 (\"likely due to supply/demand mismatch in setting of fever and increased metabolic demand\"-per cardiac MD note 8/20/12)-had cath, echo, EKG-all WNL except cath showed 2 of the bypasses with blockages- \"occluded SVG to PDA & SVG to MARIANELA\"; EF=55%    Cerebral artery occlusion with cerebral infarction ([facility] 2017    acute/subacute infarct left basal ganglia    CHF (congestive heart failure) (HCC)     Chronic kidney disease     3a    Coronary artery disease of native artery of native heart with stable angina pectoris ([facility] 2019, 2020 2019- STEMI- v fib arrest enroute, stent; 2020 NSTEMI angioplasty    Diabetes mellitus type 2, controlled ([address]     patient does not know if he takes medication for DM; rarely check BS, denies hypoglycemia    ED (erectile dysfunction)     Encephalitis 1962    x 2/hospitalized as teenager    Gout     hx of    History of blood transfusion     due to injury    Hx of echocardiogram 02/11/2022    EF 50-55%    Hypercholesterolemia     Hypertension     medications    Ischemic cardiomyopathy     Lacunar infarct, acute ([facility]     possible embolic, remote a-fib- on eliquis    Lumbago     Memory loss     Mitral valve regurgitation 12    \"moderate\" on echo    TREVOR (obstructive sleep apnea)     mild per patient, does not use CPAP    PAD (peripheral artery disease) ([facility]     PAF (paroxysmal atrial fibrillation) ([facility]     Poor historian     patient does not know medications and does not know his medical history    Prostate cancer ([facility]     followed by urology    Psoriasis     topical creams    SBO (small bowel obstruction) ([facility] , , 2016     . [facility]  Past Surgical History:   Procedure Laterality Date   [address]    as a child    CATARACT REMOVAL Bilateral 2013 [facility],     CORONARY ARTERY BYPASS GRAFT  03/09/2009    x4 bypass    HERNIA REPAIR Bilateral     LEFT HEART CATH,PERCUTANEOUS  2012    no intervention    LEFT HEART CATH,PERCUTANEOUS  2019    NSTEMI & PCI    LEFT HEART CATH,PERCUTANEOUS  2019    STEMI, PCI, VFib arrest    CO ABDOMEN SURGERY PROC UNLISTED      exp lap, numerous surgeries on abdomen from an injury    PROSTATE BIOPSY, NEEDLE, SATURATION SAMPLING      and ultrasound    PROSTATECTOMY       SHOULDER ARTHROPLASTY Right     Reverse R TSA    SPLENECTOMY      VASCULAR SURGERY  2017    aortogram, w/kristi LE runoff,R-LE arteriogram    VASCULAR SURGERY Right 2022    ULTRASOUND GUIDED BILATERAL LOWER EXTREMITY ARTERIOGRAM/ AORTAGRAM performed [facility] Chandler [facility]    WISDOM TOOTH EXTRACTION       Family History   Problem Relation Age of Onset    Coronary Art Dis Mother     Heart Disease Mother          of Heart Disease    Osteoarthritis Mother     Gout Mother     Hypertension Mother     Bleeding Prob Paternal Grandmother     Hypertension Brother     Diabetes Father     Cancer Father     Heart Disease Father     Cancer Maternal Uncle         Prostate     Social History     Socioeconomic History    Marital status:      Spouse name: Not on file    Number of children: Not on file    Years of education: Not on file    Highest education level: Not on file   Occupational History    Not on file   Tobacco Use    Smoking status: Former Smoker     Packs/day: 1.00     Types: Cigarettes     Quit date:      Years since quittin.5    Smokeless tobacco: Never Used    Tobacco comment: Quit smoking: quit around age 30 y/o; smoked 18 years   Vaping Use    Vaping Use: Never used   Substance and Sexual Activity    Alcohol use: Not Currently    Drug use: No    Sexual activity: Not on file   Other Topics Concern    Not on file   Social History Narrative    Not on file     Social Determinants of Health     Financial Resource Strain:     Difficulty of Paying Living Expenses: Not on file   Food Insecurity:     Worried About [address] [employer] in the Last Year: Not on file    [address] [employer] in the Last Year: Not on file   Transportation Needs:     Lack of Transportation (Medical): Not on file    Lack of Transportation (Non-Medical):  Not on file   Physical Activity:     Days of Exercise per Week: Not on file    Minutes of Exercise per Session: Not on file   Stress:     Feeling of Stress : Not on file   Social Connections:     Frequency of Communication with Friends and Family: Not on file    Frequency of Social Gatherings with Friends and Family: Not on file    Attends [employer] Services: Not on file    Active Member of Clubs or Organizations: Not on file    Attends Club or Organization Meetings: Not on file    Marital Status: Not on file   Intimate Partner Violence:     Fear of Current or Ex-Partner: Not on file    Emotionally Abused: Not on file    Physically Abused: Not on file    Sexually Abused: Not on file   Housing Stability:     Unable to Pay for Housing in the Last Year: Not on file    Number of Places Lived in the Last Year: Not on file    Unstable Housing in the Last Year: Not on file       Review of Systems:  As per HPI. Pertinent positives and negatives are addressed with the patient, particularly those related to musculoskeletal concerns. Non-orthopaedic concerns were referred back to the primary care physician. PHYSICAL EXAMINATION:   The patient appears their stated age and they are in no distress. The lower extremities are as described below. Circulation is normal with palpable pedal pulses bilaterally and no edema. There is no lymph adenopathy in the popliteal or malleolar region. The skin is without stasis disease distally bilaterally. Hip ROM is smooth and painless. Knee range of motion is 5-120 degrees on the right and 0-120 degrees on the left.  right knee: There is 2mm of anterior/posterior translation and 5 mm of medial/lateral instability bilaterally. There is 16 degrees of varus alignment in the right knee. There is some pain to palpation over the medial joint line. Limb lengths are equal.  The gait is noted to be with a slight trendelenburg and antalgia. Straight leg test is negative. Quadriceps strength is good. Sensation is intact to light touch bilaterally. Their judgment and insight are normal.  They are oriented to time, place and person. Their memory is good and the mood and affect appropriate. X-RAY: Views of the right knee are reviewed. 4 views standing reveal joint space loss, eburnated bone, and osteophyte formation present. X-ray impression:  Advanced degenerative joint disease of right knee    IMPRESSION:    Diagnosis Orders   1. Acute pain of right knee  XR KNEE RIGHT (MIN 4 VIEWS)   2. Osteoarthritis of right knee, unspecified osteoarthritis type     . RECOMMENDATIONS:    Reviewed x-ray findings with the patient. Today we discussed conservative treatments such as NSAIDs and PT.   To date these have not been effective for the patient. The concerns with functional limitations are increased and no longer responding to conservative measures. Due to deterioration in their quality of life the decision has been made to perform total knee replacement. We discussed specific risks associated with knee replacement. This includes a risk of infection, dislocation, or general medical risks of surgery such as stroke, heart attack, and blood clot. TKA - Today we also discussed knee replacement surgery. They are aware of the 1% risk of infection. They were also informed of the possibility of stroke, heart attack and blood clot; any of which could result in further hospitalization or death. Approximately 45 minutes was spent reviewing the medical record, imaging, performing history and physical examination, discussing the diagnosis and treatment plan with the patient, and completing documentation for this visit. He is still processing the vascular studies he had with Dr. Matt Miramontes. He is not interested in pursuing knee replacement surgery at this time. He does not have dramatic pain or discomfort so do not feel injection therapy is warranted. He does have substantial instability and varus deformity. This TAVR would require a surgical correction and alignment. He is aware it takes a few months to ranges. He was given a booklet to review and then contact me if he has further interest in addressing his right knee.

## 2022-07-15 ENCOUNTER — OFFICE VISIT (OUTPATIENT)
Dept: VASCULAR SURGERY | Age: 77
End: 2022-07-15
Payer: MEDICARE

## 2022-07-15 VITALS
DIASTOLIC BLOOD PRESSURE: 82 MMHG | WEIGHT: 184 LBS | TEMPERATURE: 97.4 F | BODY MASS INDEX: 26.34 KG/M2 | HEIGHT: 70 IN | SYSTOLIC BLOOD PRESSURE: 138 MMHG | OXYGEN SATURATION: 99 % | HEART RATE: 69 BPM

## 2022-07-15 DIAGNOSIS — I73.9 PVD (PERIPHERAL VASCULAR DISEASE) WITH CLAUDICATION (HCC): Primary | ICD-10-CM

## 2022-07-15 PROCEDURE — G8427 DOCREV CUR MEDS BY ELIG CLIN: HCPCS | Performed by: SURGERY

## 2022-07-15 PROCEDURE — 1123F ACP DISCUSS/DSCN MKR DOCD: CPT | Performed by: SURGERY

## 2022-07-15 PROCEDURE — G8417 CALC BMI ABV UP PARAM F/U: HCPCS | Performed by: SURGERY

## 2022-07-15 PROCEDURE — 99213 OFFICE O/P EST LOW 20 MIN: CPT | Performed by: SURGERY

## 2022-07-15 PROCEDURE — 3017F COLORECTAL CA SCREEN DOC REV: CPT | Performed by: SURGERY

## 2022-07-15 PROCEDURE — 1036F TOBACCO NON-USER: CPT | Performed by: SURGERY

## 2022-07-15 ASSESSMENT — PATIENT HEALTH QUESTIONNAIRE - PHQ9
2. FEELING DOWN, DEPRESSED OR HOPELESS: 0
SUM OF ALL RESPONSES TO PHQ QUESTIONS 1-9: 0
SUM OF ALL RESPONSES TO PHQ9 QUESTIONS 1 & 2: 0
1. LITTLE INTEREST OR PLEASURE IN DOING THINGS: 0

## 2022-07-15 NOTE — PATIENT INSTRUCTIONS
Patient Education        Peripheral Arterial Disease (PAD): Care Instructions  Overview  Peripheral arterial disease (PAD) occurs when the blood vessels (arteries) that supply blood to the legs, belly, pelvis, arms, or neck get narrow or blocked. This reduces blood flow to that area. The legs are affected most often. PAD is often caused by fatty buildup (plaque) in the arteries. This buildup is also called \"hardening\" of the arteries. Your risk of PAD increases if you smoke or have high cholesterol, high blood pressure, diabetes, or a familyhistory of PAD. Many people don't have symptoms. If you do have symptoms, you may have weak or tired legs, difficulty walking or balancing, or pain. If you have pain, you might feel a tight, aching, or squeezing pain in the calf, foot, thigh, or buttock that occurs during exercise. The pain usually gets worse during exercise and goes away when you rest. If PAD gets worse, you may have symptomsof poor blood flow, such as leg pain when you rest.  Medicines and lifestyle changes may help your symptoms and lower your risk of heart attack and stroke. In some cases, surgery or other treatment is needed. It is important that you follow up with your doctor. Follow-up care is a key part of your treatment and safety. Be sure to make and go to all appointments, and call your doctor if you are having problems. It's also a good idea to know your test results and keep alist of the medicines you take. How can you care for yourself at home? Do not smoke. Smoking can make PAD worse. If you need help quitting, talk to your doctor about stop-smoking programs and medicines. These can increase your chances of quitting for good. Take your medicines exactly as prescribed. Call your doctor if you think you are having a problem with your medicine. If you take a blood thinner, such as aspirin, be sure to get instructions about how to take your medicine safely.  Blood thinners can cause serious bleeding problems. Ask your doctor if a cardiac rehab program is right for you. Cardiac rehab can help you make lifestyle changes. In cardiac rehab, a team of health professionals provides education and support to help you make new, healthy habits. Eat heart-healthy foods such as fruits, vegetables, whole grains, fish, lean meats, and low-fat or nonfat dairy foods. Limit sodium, sugar, and alcohol. If your doctor recommends it, get more exercise. Walking is a good choice. Bit by bit, increase the amount you walk every day. Try for at least 30 minutes on most days of the week. If you have symptoms when you exercise, ask your doctor about a special exercise program that may help relieve your symptoms. Stay at a healthy weight. Lose weight if you need to. Take good care of your feet. Treat cuts and scrapes on your legs right away. Poor blood flow prevents (or slows) quick healing of even small cuts or scrapes. This is even more important if you have diabetes. Avoid shoes that are too tight or that rub your feet. Shoes should be comfortable and fit well. Avoid socks or stockings that are tight enough to leave elastic-band marks on your legs. Tight socks can make circulation problems worse. Keep your feet clean and moisturized to prevent drying and cracking. Place cotton or lamb's wool between your toes to prevent rubbing and to absorb moisture. If you have a sore on your leg or foot, keep it dry and cover it with a nonstick bandage until you see your doctor. When should you call for help? Call 911 anytime you think you may need emergency care. For example, call if:    You have symptoms of a heart attack. These may include:  Chest pain or pressure, or a strange feeling in the chest.  Sweating. Shortness of breath. Nausea or vomiting. Pain, pressure, or a strange feeling in the back, neck, jaw, or upper belly or in one or both shoulders or arms. Lightheadedness or sudden weakness.   A fast or irregular heartbeat. After you call 911, the  may tell you to chew 1 adult-strength or 2 to 4 low-doseaspirin. Wait for an ambulance. Do not try to drive yourself. You have sudden, severe leg pain, and your leg is cool and pale. You have symptoms of a stroke. These may include:  Sudden numbness, tingling, weakness, or loss of movement in your face, arm, or leg, especially on only one side of your body. Sudden vision changes. Sudden trouble speaking. Sudden confusion or trouble understanding simple statements. Sudden problems with walking or balance. A sudden, severe headache that is different from past headaches. Call your doctor now or seek immediate medical care if:    You have leg pain that does not go away even if you rest.     Your leg pain changes or gets worse. For example, if you have more pain with normal activity or the same pain with decreased activity, you should call. You have cold or numb feet or toes. You have leg or foot sores that are slow to heal.     The skin on your legs or feet changes color. You have an open sore on your leg or foot that is infected. Signs of infection include: Increased pain, swelling, warmth, or redness. Red streaks leading from the sore. Pus draining from the sore. A fever. Watch closely for changes in your health, and be sure to contact your doctor ifyou have any problems. Where can you learn more? Go to https://[email or web address]. org and sign in to your [employer] account. Enter [phone] in the [facility] box to learn more about \"Peripheral Arterial Disease (PAD): Care Instructions. \"     If you do not have an account, please click on the \"Sign Up Now\" link. Current as of: January 10, 2022               Content Version: 13.3  © 2006-2022 Healthwise, Incorporated. Care instructions adapted under license by [facility] [employer] ([facility]).  If you have questions about a medical condition or this instruction, always ask your healthcare professional. [address] any warranty or liability for your use of this information.

## 2022-07-15 NOTE — PROGRESS NOTES
8/20/12)-had cath, echo, EKG-all WNL except cath showed 2 of the bypasses with blockages- \"occluded SVG to PDA & SVG to MARIANELA\"; EF=55%    Cerebral artery occlusion with cerebral infarction ([address] 2017    acute/subacute infarct left basal ganglia    CHF (congestive heart failure) ([address]     Chronic kidney disease     3a    Coronary artery disease of native artery of native heart with stable angina pectoris ([address] 2019, 2020 2019- STEMI- v fib arrest enroute, stent; 2020 NSTEMI angioplasty    Diabetes mellitus type 2, controlled ([address]     patient does not know if he takes medication for DM; rarely check BS, denies hypoglycemia    ED (erectile dysfunction)     Encephalitis 1962    x 2/hospitalized as teenager    Gout     hx of    History of blood transfusion     due to injury    Hx of echocardiogram 02/11/2022    EF 50-55%    Hypercholesterolemia     Hypertension     medications    Ischemic cardiomyopathy     Lacunar infarct, acute ([address]     possible embolic, remote a-fib- on eliquis    Lumbago     Memory loss     Mitral valve regurgitation 7/14/12    \"moderate\" on echo    TREVOR (obstructive sleep apnea)     mild per patient, does not use CPAP    PAD (peripheral artery disease) ([address]     PAF (paroxysmal atrial fibrillation) ([address]     Poor historian 2022    patient does not know medications and does not know his medical history    Prostate cancer [facility])     followed by urology    Psoriasis     topical creams    SBO (small bowel obstruction) ([address] 2011, 2015, 2016       Physical Examination:   Height: 5' 10\" (1.778 m), Weight: 184 lb (83.5 kg), BP: 138/82    Constitutional: he appears well-developed. No distress. HENT:   Head: Atraumatic. Eyes: Pupils are equal, round, and reactive to light. Neck: Normal range of motion. Cardiovascular: Regular rhythm. Pulmonary/Chest: Effort normal and breath sounds normal. No respiratory distress. Abdominal: Soft. Bowel sounds are normal. he exhibits no distension.  There is no tenderness. There is no guarding. No hernia. Musculoskeletal: Normal range of motion. Neurological: He is alert. CN II- XII grossly intact   Vascular: Palpable femoral pulses nonpalpable pedal pulses    Imaging:          Recommendations/Plans:   Mr. Salome Gilbert is a 77y.o. year old male status post right diagnostic right lower extremity arteriogram with nonobstructive vessels. Had a long discussion with patient and wife in detail. With his tibial vessels being occluded there is no indication for any open reconstruction. If his pain were to worsen or he develop a nonhealing wound he is at high risk for amputation. He can follow-up as needed. Mary Sung MD    Elements of this note have been dictated using speech recognition software. As a result, errors of speech recognition may have occurred.     30 minutes of time was spent on this encounter including chart review, assessment, evaluation and coordination of patient care

## 2022-07-21 ENCOUNTER — HOSPITAL ENCOUNTER (OUTPATIENT)
Dept: SLEEP MEDICINE | Age: 77
Discharge: HOME | End: 2022-07-24
Payer: MEDICARE

## 2022-07-21 PROCEDURE — 95811 POLYSOM 6/>YRS CPAP 4/> PARM: CPT

## 2022-07-28 ENCOUNTER — TELEPHONE (OUTPATIENT)
Dept: SLEEP MEDICINE | Age: 77
End: 2022-07-28

## 2022-07-28 DIAGNOSIS — G47.33 OSA (OBSTRUCTIVE SLEEP APNEA): Primary | ICD-10-CM

## 2022-07-28 NOTE — TELEPHONE ENCOUNTER
Patient has agreed to start Bipap therapy. Order will be sent to [address] [place] [address] rather than the [facility].

## 2022-09-08 NOTE — BRIEF OP NOTE
-- DO NOT REPLY / DO NOT REPLY ALL --  -- Message is from [facility]agement Center [employer] ([facility]) --    General Patient Message Mom is calling about e-advice chats for a virtual visit with Dr. Rojo, please contact as soon as possible.      Caller Information       Type Contact Phone/Fax    09/08/2022 01:56 PM CDT Phone (Incoming) ALLISON CRUMP (Mother) [phone]        Alternative phone number: none     Can a detailed message be left? Yes    Message Turnaround:     Is it Working Hours? Yes - Working Hours     [place]:    Please give this turnaround time to the caller:   \"This message will be sent to [state Provider's name]. The clinical team will fulfill your request as soon as they review your message.\"                 BRIEF OPERATIVE NOTE    Date of Procedure: 12/29/2017   Preoperative Diagnosis: Ulcer of toe of right foot, limited to breakdown of skin ([address] [L97.511]  Postoperative Diagnosis: Ulcer of toe of right foot, limited to breakdown of skin ([address] [L97.511]    Procedure(s):  BILATERAL LOWER EXTREMITY ARTERIOGRAM , AORTAGRAM WITH RUN OFF, ULTRASOUND GUIDED ACCESS,   Surgeon(s) and Role:     * Leonardo Penny MD - Primary         Assistant Staff:       Surgical Staff:  Circ-1: Jaylyn Monteiro RN  Radiology Technician: Araseli Mo, RT, R, CT; Ligia Ramos  Event Time In   Incision Start 0745   Incision Close 0826     Anesthesia: General   Estimated Blood Loss: 20cc  Specimens: * No specimens in log *   Findings: Severe tibial artery occlusive disease   Complications: None  Implants: * No implants in log *

## 2022-09-18 NOTE — PROGRESS NOTES
Juan A Lugo (:  1945) is a 69 y.o. male,Established patient, here for evaluation of the following chief complaint(s):  Follow-up (2 mth fu on diabetes and memory), Circulatory Problem, Memory Loss, and Diabetes         ASSESSMENT/PLAN:  1. Coronary artery disease involving coronary bypass graft of native heart without angina pectoris  2. Chronic HFrEF (heart failure with reduced ejection fraction) ([facility])  Cardiac catheterization on 2020 with 100% proximal LAD occlusion, 70% proximal occlusion of circumflex, 100% obtuse marginal occlusion, 90% stenosis of distal AV groove RCA, very heavily diseased PDA/PLV branches  Patent LIMA to LAD, known occlusion of SVG to RCA  99% subtotal occlusion in distal stent and 30% proximal in-stent restenosis of SVG to obtuse marginal; status post intervention of distal SVG body in-stent restenosis lesion  Echocardiogram on 2020 with a EF of 45 to 50%, hypokinesis of mid inferolateral, mid anterolateral and apical lateral wall, mildly increased wall thickness, grade 1 LV diastolic dysfunction, mildly dilated LA with mild MR and TR  Continue aspirin, Plavix, losartan (uncertain if patient still taking carvedilol), rosuvastatin, zetia,  as needed nitroglycerin. Follow-up with cardiology as scheduled    - CBC with Auto Differential; Future  - Comprehensive Metabolic Panel; Future    3.  Peripheral artery disease ([address]  JONATHAN on 2021 with severely decreased bilateral resting values  Lower extremity arterial ultrasound on 10/8/2021 as follows:  20 to 49% stenosis of right mid superficial femoral artery with evidence of tibial disease as noted with monophasic flow in PTA and absence of flow in the ADARSH and peroneal artery  20 to 49% stenosis of left mid superficial femoral artery, evidence of tibial disease with absence of flow in the ADARSH and monophasic flow in PTA and peroneal arteries  No evidence of AAA  Bilateral lower extremity angiogram on 2022 as follows:  -Patent bilateral renal arteries  -Right lower extremity with heavily diseased below the knee popliteal artery and occluded however collateral flow supplying the foot  -Left lower extremity occluded below the knee popliteal artery with severe tibial disease  Continue aspirin, Plavix, rosuvastatin, Zetia  Follow-up per vascular surgery  Again offered referral to pain management but patient does not believe the symptoms are severe enough where he desires to see a pain specialist at this time    4. Essential hypertension  Continue losartan, (uncertain if still taking carvedilol)    5. Dyslipidemia  Continue rosuvastatin, Zetia    - Lipid Panel; Future  - T4, Free; Future  - TSH; Future    6. Obstructive sleep apnea  Split-night polysomnogram on 2/26/2021 confirming diagnosis of TREVOR as well as periodic limb movement disorder  Sleep medicine notes reviewed documenting starting positive pressure ventilation however patient has yet to follow-up  Once again emphasized need for follow-up discussing risks associated with untreated sleep apnea    7. Type 2 diabetes mellitus with diabetic peripheral angiopathy without gangrene, without long-term current use of insulin ([address]  8. Diabetic polyneuropathy associated with type 2 diabetes mellitus ([address]  A1c 7.8% on 6/3/2022, recheck in office today improved to 6.8%  Check urine microalbumin to creatinine ratio to evaluate for proteinuria  Have emphasized on multiple occasions need for compliance with metformin as well as fasting blood sugar checks (?  Secondary to cognitive issues)  Continue gabapentin     - Microalbumin / Creatinine Urine Ratio; Future  - AMB POC HEMOGLOBIN A1C    9.  Memory disturbance  MRI brain on 8/29/2017 with acute to early subacute infarct within left basal ganglia with associated FLAIR signal hyperintense edema and mild mass-effect on left frontal horn but no midline shift  Carotid ultrasound on 8/30/2017 without hemodynamically significant stenosis  MRI brain on 8/31/2017 suggestive of low flow state in right vertebral artery but no intracranial thromboembolus, high-grade stenosis, aneurysm or vascular malformation  MRI neck on 8/31/2017 without significant ICA stenosis but moderate arterial tortuosity consistent with hypertension as well as distal right vertebral artery high-grade stenosis  MMSE of 25/30 on 11/30/2021. Repeat score of 22/30 on 4/14/2022  PHQ-9 score of 13 with DANNY-7 score of 8 on 4/14/2022  Differential diagnosis includes vascular dementia versus Alzheimer's versus pseudodementia from depression  Previously discussed with patient possibly starting SSRI but desires to hold off at this time  Check thiamine, B12, vitamin D levels to evaluate for deficiency  Check RPR to rule out neurosyphilis  Repeat MRI of the brain, refer for formal driving evaluation    - MRI BRAIN WO CONTRAST; Future  - Vitamin B12; Future  - Vitamin D 25 Hydroxy; Future  - Vitamin B1, Whole Blood; Future  - RPR; Future  - External Referral to Occupational Therapy    10. History of prostate cancer  History of radical retropubic prostatectomy in 2006 with follow-up PSA undetectable for years  Had biochemical recurrence with negative bone scan on 1/10/2014, MRI pelvis on 1/10/2014 with no definite recurrent soft tissue mass at prostate bed  PET scan on 8/7/2017 with small focus of abnormal tracer at prostatectomy bed on the left   Status post XRT x 6 months in 2019. PSA within normal limits on 4/23/2020  Recheck PSA  Patient does not believe he is following any longer with urology    - PSA, Diagnostic; Future    11. Rash  Possible eczematous reaction  Start topical corticosteroid    - triamcinolone (KENALOG) 0.1 % cream; Apply topically 2 times daily to elbows as needed for rash  Dispense: 30 g; Refill: 1      No follow-ups on file.          Subjective   SUBJECTIVE/OBJECTIVE:  Patient is a 80-year-old -American male who presents to the office today for follow-up. Patient still has bilateral lower extremity pain below the knee left greater than right. Patient has known peripheral vascular disease but is a nonsurgical candidate per vascular surgery. Denies significant swelling, numbness, paresthesias. Believes he may take an over-the-counter medication at nighttime but is unable to clarify further (states it might be similar to Carl). Pain not significantly worse with ambulation. Has not been checking his blood sugars at home but remains compliant with his metformin. Typically has cereal for breakfast, lunch and dinner consists of a meat, starch and vegetables. No hypoglycemic symptoms such as fatigue, shakiness or dizziness. Primarily drinks water, sweet tea, lemonade and half to 2 portions of Coca-Cola per day. Denies chest pain, palpitations, orthopnea, paroxysmal nocturnal dyspnea, abdominal pain, nausea, vomiting, diarrhea, constipation, melena or hematochezia. Does have chronic scaling rash along both elbows present for some time. Denies similar rash along his knees. Review of Systems   Respiratory:          Denies orthopnea, paroxysmal nocturnal dyspnea   Cardiovascular:  Negative for chest pain, palpitations and leg swelling. Gastrointestinal:  Negative for abdominal pain, anal bleeding, blood in stool, constipation, diarrhea, nausea and vomiting. Musculoskeletal:  Positive for myalgias. Skin:  Positive for rash. Psychiatric/Behavioral:  Positive for confusion. Objective   Physical Exam  Vitals reviewed. Constitutional:       General: He is not in acute distress. Appearance: Normal appearance. He is not ill-appearing, toxic-appearing or diaphoretic. HENT:      Head: Normocephalic and atraumatic. Eyes:      General:         Right eye: No discharge. Left eye: No discharge. Extraocular Movements: Extraocular movements intact. Neck:      Vascular: No carotid bruit.    Cardiovascular:      Rate and Rhythm: Normal rate and regular rhythm. Heart sounds: No murmur heard. No friction rub. No gallop. Pulmonary:      Effort: Pulmonary effort is normal. No respiratory distress. Breath sounds: No wheezing, rhonchi or rales. Abdominal:      General: Bowel sounds are normal.      Palpations: Abdomen is soft. Tenderness: There is no abdominal tenderness. There is no guarding. Musculoskeletal:         General: No swelling. Right lower leg: No edema. Left lower leg: No edema. Skin:     General: Skin is warm and dry. Coloration: Skin is not jaundiced. Comments: Scaling on erythematous rash on extensor surfaces of both elbows   Neurological:      Mental Status: He is alert and oriented to person, place, and time. Mental status is at baseline. Cranial Nerves: No cranial nerve deficit, dysarthria or facial asymmetry. Sensory: No sensory deficit. Motor: No weakness (Muscle strength 5/5 in all 4 extremities), tremor or pronator drift. Coordination: Finger-Nose-Finger Test normal.   Psychiatric:         Attention and Perception: Attention normal.         Mood and Affect: Mood and affect normal. Mood is not anxious or depressed. Affect is not tearful. Speech: Speech normal. Speech is not rapid and pressured or slurred. Behavior: Behavior normal. Behavior is not agitated, aggressive or combative. Behavior is cooperative. Thought Content: Thought content normal.         Cognition and Memory: Memory is impaired. On this date 9/21/2022 I have spent 60 minutes reviewing previous notes, test results and face to face with the patient discussing the diagnosis and importance of compliance with the treatment plan as well as documenting on the day of the visit. An electronic signature was used to authenticate this note.     --Brenda Newberry,

## 2022-09-21 ENCOUNTER — OFFICE VISIT (OUTPATIENT)
Dept: INTERNAL MEDICINE CLINIC | Facility: CLINIC | Age: 77
End: 2022-09-21
Payer: MEDICARE

## 2022-09-21 VITALS
TEMPERATURE: 98.5 F | DIASTOLIC BLOOD PRESSURE: 74 MMHG | BODY MASS INDEX: 25.71 KG/M2 | HEART RATE: 68 BPM | OXYGEN SATURATION: 99 % | SYSTOLIC BLOOD PRESSURE: 124 MMHG | HEIGHT: 70 IN | WEIGHT: 179.6 LBS

## 2022-09-21 DIAGNOSIS — I50.22 CHRONIC HFREF (HEART FAILURE WITH REDUCED EJECTION FRACTION) (HCC): ICD-10-CM

## 2022-09-21 DIAGNOSIS — E11.51 TYPE 2 DIABETES MELLITUS WITH DIABETIC PERIPHERAL ANGIOPATHY WITHOUT GANGRENE, WITHOUT LONG-TERM CURRENT USE OF INSULIN (HCC): ICD-10-CM

## 2022-09-21 DIAGNOSIS — E78.5 DYSLIPIDEMIA: ICD-10-CM

## 2022-09-21 DIAGNOSIS — Z85.46 HISTORY OF PROSTATE CANCER: ICD-10-CM

## 2022-09-21 DIAGNOSIS — I73.9 PERIPHERAL ARTERY DISEASE (HCC): ICD-10-CM

## 2022-09-21 DIAGNOSIS — R41.3 MEMORY DISTURBANCE: ICD-10-CM

## 2022-09-21 DIAGNOSIS — E11.42 DIABETIC POLYNEUROPATHY ASSOCIATED WITH TYPE 2 DIABETES MELLITUS (HCC): ICD-10-CM

## 2022-09-21 DIAGNOSIS — I10 ESSENTIAL HYPERTENSION: ICD-10-CM

## 2022-09-21 DIAGNOSIS — R21 RASH: ICD-10-CM

## 2022-09-21 DIAGNOSIS — G47.33 OBSTRUCTIVE SLEEP APNEA: ICD-10-CM

## 2022-09-21 DIAGNOSIS — I25.810 CORONARY ARTERY DISEASE INVOLVING CORONARY BYPASS GRAFT OF NATIVE HEART WITHOUT ANGINA PECTORIS: Primary | ICD-10-CM

## 2022-09-21 LAB — HBA1C MFR BLD: 6.8 %

## 2022-09-21 PROCEDURE — 83036 HEMOGLOBIN GLYCOSYLATED A1C: CPT | Performed by: STUDENT IN AN ORGANIZED HEALTH CARE EDUCATION/TRAINING PROGRAM

## 2022-09-21 PROCEDURE — 1036F TOBACCO NON-USER: CPT | Performed by: STUDENT IN AN ORGANIZED HEALTH CARE EDUCATION/TRAINING PROGRAM

## 2022-09-21 PROCEDURE — G8427 DOCREV CUR MEDS BY ELIG CLIN: HCPCS | Performed by: STUDENT IN AN ORGANIZED HEALTH CARE EDUCATION/TRAINING PROGRAM

## 2022-09-21 PROCEDURE — G8417 CALC BMI ABV UP PARAM F/U: HCPCS | Performed by: STUDENT IN AN ORGANIZED HEALTH CARE EDUCATION/TRAINING PROGRAM

## 2022-09-21 PROCEDURE — 1123F ACP DISCUSS/DSCN MKR DOCD: CPT | Performed by: STUDENT IN AN ORGANIZED HEALTH CARE EDUCATION/TRAINING PROGRAM

## 2022-09-21 PROCEDURE — 99215 OFFICE O/P EST HI 40 MIN: CPT | Performed by: STUDENT IN AN ORGANIZED HEALTH CARE EDUCATION/TRAINING PROGRAM

## 2022-09-21 RX ORDER — TRIAMCINOLONE ACETONIDE 1 MG/G
CREAM TOPICAL
Qty: 30 G | Refills: 1 | Status: SHIPPED | OUTPATIENT
Start: 2022-09-21

## 2022-09-21 ASSESSMENT — ENCOUNTER SYMPTOMS
ANAL BLEEDING: 0
DIARRHEA: 0
ABDOMINAL PAIN: 0
VOMITING: 0
NAUSEA: 0
BLOOD IN STOOL: 0
CONSTIPATION: 0

## 2022-09-21 ASSESSMENT — PATIENT HEALTH QUESTIONNAIRE - PHQ9
SUM OF ALL RESPONSES TO PHQ QUESTIONS 1-9: 0
SUM OF ALL RESPONSES TO PHQ9 QUESTIONS 1 & 2: 0
1. LITTLE INTEREST OR PLEASURE IN DOING THINGS: 0
2. FEELING DOWN, DEPRESSED OR HOPELESS: 0
SUM OF ALL RESPONSES TO PHQ QUESTIONS 1-9: 0

## 2022-09-29 NOTE — PROGRESS NOTES
Interdisciplinary team rounds were held 10/23/2019 with the following team members:Care Management, Nursing, Nutrition, Pharmacy, Physical Therapy and Physician. Plan of care discussed. See clinical pathway and/or care plan for interventions and desired outcomes. Yes

## 2022-11-04 ENCOUNTER — OFFICE VISIT (OUTPATIENT)
Dept: INTERNAL MEDICINE CLINIC | Facility: CLINIC | Age: 77
End: 2022-11-04
Payer: MEDICARE

## 2022-11-04 VITALS
HEIGHT: 70 IN | SYSTOLIC BLOOD PRESSURE: 130 MMHG | OXYGEN SATURATION: 94 % | WEIGHT: 181 LBS | BODY MASS INDEX: 25.91 KG/M2 | HEART RATE: 70 BPM | TEMPERATURE: 97.2 F | DIASTOLIC BLOOD PRESSURE: 80 MMHG

## 2022-11-04 DIAGNOSIS — I73.9 PERIPHERAL ARTERY DISEASE (HCC): Primary | ICD-10-CM

## 2022-11-04 DIAGNOSIS — M79.10 MYALGIA: ICD-10-CM

## 2022-11-04 PROCEDURE — 99214 OFFICE O/P EST MOD 30 MIN: CPT | Performed by: NURSE PRACTITIONER

## 2022-11-04 PROCEDURE — G8417 CALC BMI ABV UP PARAM F/U: HCPCS | Performed by: NURSE PRACTITIONER

## 2022-11-04 PROCEDURE — 1036F TOBACCO NON-USER: CPT | Performed by: NURSE PRACTITIONER

## 2022-11-04 PROCEDURE — G8484 FLU IMMUNIZE NO ADMIN: HCPCS | Performed by: NURSE PRACTITIONER

## 2022-11-04 PROCEDURE — G8428 CUR MEDS NOT DOCUMENT: HCPCS | Performed by: NURSE PRACTITIONER

## 2022-11-04 PROCEDURE — 3078F DIAST BP <80 MM HG: CPT | Performed by: NURSE PRACTITIONER

## 2022-11-04 PROCEDURE — 1123F ACP DISCUSS/DSCN MKR DOCD: CPT | Performed by: NURSE PRACTITIONER

## 2022-11-04 PROCEDURE — 3074F SYST BP LT 130 MM HG: CPT | Performed by: NURSE PRACTITIONER

## 2022-11-04 RX ORDER — BLOOD-GLUCOSE METER
EACH MISCELLANEOUS
COMMUNITY
Start: 2022-03-03 | End: 2022-11-04 | Stop reason: CLARIF

## 2022-11-04 RX ORDER — FAMOTIDINE 20 MG/1
TABLET, FILM COATED ORAL
COMMUNITY
Start: 2022-02-01

## 2022-11-04 RX ORDER — LANCETS 23 GAUGE
EACH MISCELLANEOUS
COMMUNITY

## 2022-11-04 RX ORDER — FLASH GLUCOSE SENSOR
KIT MISCELLANEOUS
COMMUNITY

## 2022-11-04 RX ORDER — PRAVASTATIN SODIUM 40 MG
TABLET ORAL
COMMUNITY

## 2022-11-04 RX ORDER — METOPROLOL TARTRATE 100 MG/1
TABLET ORAL
COMMUNITY

## 2022-11-04 RX ORDER — ESOMEPRAZOLE MAGNESIUM 40 MG/1
1 CAPSULE, DELAYED RELEASE ORAL
COMMUNITY

## 2022-11-04 RX ORDER — BACLOFEN 10 MG/1
10 TABLET ORAL 2 TIMES DAILY
Qty: 60 TABLET | Refills: 0 | Status: SHIPPED | OUTPATIENT
Start: 2022-11-04

## 2022-11-04 RX ORDER — FLUTICASONE PROPIONATE 50 MCG
SPRAY, SUSPENSION (ML) NASAL
COMMUNITY

## 2022-11-04 ASSESSMENT — PATIENT HEALTH QUESTIONNAIRE - PHQ9
SUM OF ALL RESPONSES TO PHQ9 QUESTIONS 1 & 2: 0
2. FEELING DOWN, DEPRESSED OR HOPELESS: 0
1. LITTLE INTEREST OR PLEASURE IN DOING THINGS: 0
SUM OF ALL RESPONSES TO PHQ QUESTIONS 1-9: 0

## 2022-11-04 ASSESSMENT — ENCOUNTER SYMPTOMS
COLOR CHANGE: 0
ABDOMINAL PAIN: 0
EYE ITCHING: 0
SHORTNESS OF BREATH: 0
NAUSEA: 0
EYE DISCHARGE: 0
COUGH: 0
APNEA: 0
BACK PAIN: 0
SINUS PAIN: 0
DIARRHEA: 0
EYE PAIN: 0
ABDOMINAL DISTENTION: 0
CONSTIPATION: 0
EYE REDNESS: 0

## 2022-11-04 NOTE — PROGRESS NOTES
[unfilled]  [address] [place] [id number]      PROGRESS NOTE    SUBJECTIVE:   Tyron Green is a 69 y.o. male seen for a follow up visit regarding the following chief complaint:     Chief Complaint   Patient presents with    Leg Pain     Bilateral, off and on for 1 yr    Foot Pain       Leg Pain     Foot Pain   Pertinent negatives include no fever. Patient presents for concerns of ongoing bilateral lower leg and foot pain. This is chronic but worse at night. He has a history of neuropathy, PAD; he remains on Eliquis and plavix and ASA for hx of STEMI. He denies any swelling in his lower legs or redness. He does take gabapentin for neuropathy 600mg BID.  Vascular JONATHAN from 9/21 reveals severely decreased bilateral.     Current Outpatient Medications   Medication Sig Dispense Refill    apixaban (ELIQUIS) 5 MG TABS tablet (Prior Auth: Rx HDN#:[id number]) Oral for 90      esomeprazole (NEXIUM) 40 MG delayed release capsule 1 capsule      famotidine (PEPCID) 20 MG tablet       fluticasone (FLONASE) 50 MCG/ACT nasal spray 1 spray in each nostril Nasally Once a day      metoprolol (LOPRESSOR) 100 MG tablet 1 tablet Orally Twice a day      pravastatin (PRAVACHOL) 40 MG tablet (Prior Auth: Rx FSZ#:[id number]) Oral for 90      Lancets 28G MISC FreeStyle Lancets 28 gauge   USE TO CHECK BLOOD SUGAR UP TO THREE TIMES DAILY      Continuous Blood Gluc Sensor (FREESTYLE TANIV SENSOR SYSTEM) MISC FreeStyle Lite Meter kit   USE TO TEST BLOOD SUGAR UP TO THREE TIMES DAILY      Vitamin D-Vitamin K (VITAMIN K2-VITAMIN D3)  MCG-UNIT CAPS Orally      baclofen (LIORESAL) 10 MG tablet Take 1 tablet by mouth 2 times daily 60 tablet 0    triamcinolone (KENALOG) 0.1 % cream Apply topically 2 times daily to elbows as needed for rash 30 g 1    metFORMIN (GLUCOPHAGE) 500 MG tablet Take 1 tablet by mouth 2 times daily (with meals) 180 tablet 2    Lancets MISC Check blood sugar up to three times a day acetaminophen (TYLENOL) 325 MG tablet Take 650 mg by mouth every 4 hours as needed      aspirin 81 MG chewable tablet Take 81 mg by mouth daily      bisacodyl (DULCOLAX) 5 MG EC tablet Take 5 mg by mouth nightly as needed       carvedilol (COREG) 12.5 MG tablet Take 12.5 mg by mouth 2 times daily (with meals)      Cetirizine HCl 10 MG CAPS Take 10 mg by mouth nightly       Cholecalciferol 50 MCG (2000 UT) TABS Take 2,000 Units by mouth Daily with lunch       clopidogrel (PLAVIX) 75 MG tablet TAKE 1 TABLET DAILY      ezetimibe (ZETIA) 10 MG tablet Take 10 mg by mouth nightly       gabapentin (NEURONTIN) 600 MG tablet Take 600 mg by mouth 2 times daily. losartan (COZAAR) 50 MG tablet Take 50 mg by mouth Daily with lunch       nitroGLYCERIN (NITROSTAT) 0.4 MG SL tablet Take 0.4 mg by mouth      rosuvastatin (CRESTOR) 20 MG tablet Take 20 mg by mouth nightly       Simethicone 125 MG CAPS Take 125 mg by mouth 4 times daily as needed Takes at bedtime       No current facility-administered medications for this visit. Allergies   Allergen Reactions    Celecoxib Swelling     Hands.  denies    Colchicine Other (See Comments)    Febuxostat Other (See Comments)     Joint Pain    Fluvastatin Other (See Comments)     Other reaction(s): Unknown (comments)  Patient denies    Glucosamine 1500 Complex [Glucosamine-Chondroitin] Other (See Comments)    Ibuprofen Other (See Comments)     Patient unsure    Nsaids Other (See Comments)     Elevated serum creatinine  Elevated serum creatinine      Sulfa Antibiotics Rash and Other (See Comments)     Patient denies       Past Medical History:   Diagnosis Date    Allergic rhinitis     Arthritis     CAD (coronary artery disease)     CABG '09; troponin elevated 7/14/12 (\"likely due to supply/demand mismatch in setting of fever and increased metabolic demand\"-per cardiac MD note 8/20/12)-had cath, echo, EKG-all WNL except cath showed 2 of the bypasses with blockages- \"occluded SVG to SAMPLING      and ultrasound    PROSTATECTOMY       SHOULDER ARTHROPLASTY Right 2018    Reverse R TSA    SPLENECTOMY  195    VASCULAR SURGERY  2017    aortogram, w/kristi LE runoff,R-LE arteriogram    VASCULAR SURGERY Right 2022    ULTRASOUND GUIDED BILATERAL LOWER EXTREMITY ARTERIOGRAM/ AORTAGRAM performed by Evelia Gustafson MD at [facility] MAIN OR    WISDOM TOOTH EXTRACTION       Family History   Problem Relation Age of Onset    Coronary Art Dis Mother     Heart Disease Mother          of Heart Disease    Osteoarthritis Mother     Gout Mother     Hypertension Mother     Bleeding Prob Paternal Grandmother     Hypertension Brother     Diabetes Father     Cancer Father     Heart Disease Father     Cancer Maternal Uncle         Prostate     Social History     Tobacco Use    Smoking status: Former     Packs/day: 1.00     Types: Cigarettes     Quit date:      Years since quittin.8    Smokeless tobacco: Never    Tobacco comments:     Quit smoking: quit around age 28 y/o; smoked 18 years   Substance Use Topics    Alcohol use: Not Currently       Review of Systems   Constitutional:  Negative for activity change, appetite change, chills, fatigue and fever. HENT:  Negative for congestion, ear pain and sinus pain. Eyes:  Negative for pain, discharge, redness and itching. Respiratory:  Negative for apnea, cough and shortness of breath. Cardiovascular:  Negative for chest pain, palpitations and leg swelling. Gastrointestinal:  Negative for abdominal distention, abdominal pain, constipation, diarrhea and nausea. Endocrine: Negative for cold intolerance and heat intolerance. Genitourinary:  Negative for difficulty urinating, dysuria, enuresis and urgency. Musculoskeletal:  Positive for myalgias. Negative for arthralgias, back pain, joint swelling and neck pain. Skin:  Negative for color change and rash. Neurological:  Negative for dizziness, weakness and headaches.    Psychiatric/Behavioral: Negative for behavioral problems and sleep disturbance. The patient is not nervous/anxious. OBJECTIVE:  /80   Pulse 70   Temp 97.2 °F (36.2 °C) (Temporal)   Ht 5' 10\" (1.778 m)   Wt 181 lb (82.1 kg)   SpO2 94%   BMI 25.97 kg/m²      Physical Exam  Constitutional:       General: He is not in acute distress. Appearance: Normal appearance. He is not ill-appearing or toxic-appearing. Cardiovascular:      Rate and Rhythm: Normal rate and regular rhythm. Pulmonary:      Effort: Pulmonary effort is normal. No respiratory distress. Musculoskeletal:         General: No swelling, tenderness, deformity or signs of injury. Normal range of motion. Right lower leg: No edema. Left lower leg: No edema. Skin:     General: Skin is warm and dry. Neurological:      Mental Status: He is alert. Mental status is at baseline. Psychiatric:         Mood and Affect: Mood normal.         Behavior: Behavior normal.         Thought Content: Thought content normal.         ASSESSMENT and Kulwant Babin was seen today for leg pain and foot pain. Diagnoses and all orders for this visit:    Peripheral artery disease ([facility]  -     Vascular ankle brachial index (JONATHAN); Future    Myalgia  -     baclofen (LIORESAL) 10 MG tablet; Take 1 tablet by mouth 2 times daily  We discussed obtaining repeat JONATHAN and may refer to vascular surgery. Start on baclofen for pain; continue all other medications as prescribed. Greater than 50% of this 25 min visit was spent counseling the patient about test results, prognosis, importance of compliance, education about disease process, benefits of medications, instructions for management of acute symptoms, and follow up plans. Follow-up and Dispositions    Return if symptoms worsen or fail to improve.          Richie Roberts, ANH, APRN - CNP

## 2022-11-13 ENCOUNTER — HOSPITAL ENCOUNTER (EMERGENCY)
Age: 77
Discharge: HOME | End: 2022-11-14
Attending: EMERGENCY MEDICINE | Admitting: EMERGENCY MEDICINE
Payer: MEDICARE

## 2022-11-13 ENCOUNTER — HOSPITAL ENCOUNTER (EMERGENCY)
Dept: CT IMAGING | Age: 77
End: 2022-11-13
Payer: MEDICARE

## 2022-11-13 ENCOUNTER — HOSPITAL ENCOUNTER (EMERGENCY)
Dept: GENERAL RADIOLOGY | Age: 77
Discharge: HOME | End: 2022-11-16
Payer: MEDICARE

## 2022-11-13 DIAGNOSIS — R41.0 TRANSIENT CONFUSION: Primary | ICD-10-CM

## 2022-11-13 LAB
FLUAV AG NPH QL IA: NEGATIVE
FLUBV AG NPH QL IA: NEGATIVE
SARS-COV-2 RDRP RESP QL NAA+PROBE: NOT DETECTED
SOURCE: NORMAL
SPECIMEN SOURCE: NORMAL

## 2022-11-13 PROCEDURE — 83690 ASSAY OF LIPASE: CPT

## 2022-11-13 PROCEDURE — 99285 EMERGENCY DEPT VISIT HI MDM: CPT

## 2022-11-13 PROCEDURE — 83605 ASSAY OF LACTIC ACID: CPT

## 2022-11-13 PROCEDURE — 71045 X-RAY EXAM CHEST 1 VIEW: CPT

## 2022-11-13 PROCEDURE — 87040 BLOOD CULTURE FOR BACTERIA: CPT

## 2022-11-13 PROCEDURE — 87804 INFLUENZA ASSAY W/OPTIC: CPT

## 2022-11-13 PROCEDURE — 80053 COMPREHEN METABOLIC PANEL: CPT

## 2022-11-13 PROCEDURE — 85379 FIBRIN DEGRADATION QUANT: CPT

## 2022-11-13 PROCEDURE — 84484 ASSAY OF TROPONIN QUANT: CPT

## 2022-11-13 PROCEDURE — 84443 ASSAY THYROID STIM HORMONE: CPT

## 2022-11-13 PROCEDURE — 85025 COMPLETE CBC W/AUTO DIFF WBC: CPT

## 2022-11-13 PROCEDURE — 87635 SARS-COV-2 COVID-19 AMP PRB: CPT

## 2022-11-13 PROCEDURE — 83880 ASSAY OF NATRIURETIC PEPTIDE: CPT

## 2022-11-13 PROCEDURE — 93005 ELECTROCARDIOGRAM TRACING: CPT | Performed by: EMERGENCY MEDICINE

## 2022-11-13 RX ORDER — 0.9 % SODIUM CHLORIDE 0.9 %
500 INTRAVENOUS SOLUTION INTRAVENOUS
Status: COMPLETED | OUTPATIENT
Start: 2022-11-13 | End: 2022-11-14

## 2022-11-13 ASSESSMENT — ENCOUNTER SYMPTOMS
COUGH: 0
EYE PAIN: 0
EYE REDNESS: 0
ABDOMINAL PAIN: 0
RHINORRHEA: 0
DIARRHEA: 0
NAUSEA: 0
EYE DISCHARGE: 0
SHORTNESS OF BREATH: 0
FACIAL SWELLING: 0
WHEEZING: 0
CONSTIPATION: 0
STRIDOR: 0
COLOR CHANGE: 0
VOMITING: 0

## 2022-11-14 ENCOUNTER — HOSPITAL ENCOUNTER (EMERGENCY)
Dept: CT IMAGING | Age: 77
Discharge: HOME | End: 2022-11-17
Payer: MEDICARE

## 2022-11-14 VITALS
SYSTOLIC BLOOD PRESSURE: 201 MMHG | OXYGEN SATURATION: 100 % | RESPIRATION RATE: 24 BRPM | TEMPERATURE: 98.7 F | HEART RATE: 58 BPM | DIASTOLIC BLOOD PRESSURE: 88 MMHG

## 2022-11-14 LAB
ALBUMIN SERPL-MCNC: 3.3 G/DL (ref 3.2–4.6)
ALBUMIN/GLOB SERPL: 0.8 {RATIO} (ref 0.4–1.6)
ALP SERPL-CCNC: 75 U/L (ref 50–136)
ALT SERPL-CCNC: 32 U/L (ref 12–65)
ANION GAP SERPL CALC-SCNC: 5 MMOL/L (ref 2–11)
APPEARANCE UR: CLEAR
AST SERPL-CCNC: 20 U/L (ref 15–37)
BASOPHILS # BLD: 0 K/UL (ref 0–0.2)
BASOPHILS NFR BLD: 0 % (ref 0–2)
BILIRUB SERPL-MCNC: 0.2 MG/DL (ref 0.2–1.1)
BILIRUB UR QL: NEGATIVE
BUN SERPL-MCNC: 16 MG/DL (ref 8–23)
CALCIUM SERPL-MCNC: 9.4 MG/DL (ref 8.3–10.4)
CHLORIDE SERPL-SCNC: 112 MMOL/L (ref 101–110)
CO2 SERPL-SCNC: 26 MMOL/L (ref 21–32)
COLOR UR: NORMAL
CREAT SERPL-MCNC: 1.46 MG/DL (ref 0.8–1.5)
D DIMER PPP FEU-MCNC: 0.73 UG/ML(FEU)
DIFFERENTIAL METHOD BLD: ABNORMAL
EKG ATRIAL RATE: 55 BPM
EKG DIAGNOSIS: NORMAL
EKG P AXIS: 53 DEGREES
EKG P-R INTERVAL: 196 MS
EKG Q-T INTERVAL: 448 MS
EKG QRS DURATION: 84 MS
EKG QTC CALCULATION (BAZETT): 428 MS
EKG R AXIS: 15 DEGREES
EKG T AXIS: 30 DEGREES
EKG VENTRICULAR RATE: 55 BPM
EOSINOPHIL # BLD: 0.1 K/UL (ref 0–0.8)
EOSINOPHIL NFR BLD: 2 % (ref 0.5–7.8)
ERYTHROCYTE [DISTWIDTH] IN BLOOD BY AUTOMATED COUNT: 13.2 % (ref 11.9–14.6)
GLOBULIN SER CALC-MCNC: 4 G/DL (ref 2.8–4.5)
GLUCOSE SERPL-MCNC: 193 MG/DL (ref 65–100)
GLUCOSE UR STRIP.AUTO-MCNC: NEGATIVE MG/DL
HCT VFR BLD AUTO: 38.3 % (ref 41.1–50.3)
HGB BLD-MCNC: 12.6 G/DL (ref 13.6–17.2)
HGB UR QL STRIP: NEGATIVE
IMM GRANULOCYTES # BLD AUTO: 0 K/UL (ref 0–0.5)
IMM GRANULOCYTES NFR BLD AUTO: 0 % (ref 0–5)
KETONES UR QL STRIP.AUTO: NEGATIVE MG/DL
LACTATE SERPL-SCNC: 2 MMOL/L (ref 0.4–2)
LEUKOCYTE ESTERASE UR QL STRIP.AUTO: NEGATIVE
LIPASE SERPL-CCNC: 145 U/L (ref 73–393)
LYMPHOCYTES # BLD: 1.3 K/UL (ref 0.5–4.6)
LYMPHOCYTES NFR BLD: 25 % (ref 13–44)
MCH RBC QN AUTO: 29.2 PG (ref 26.1–32.9)
MCHC RBC AUTO-ENTMCNC: 32.9 G/DL (ref 31.4–35)
MCV RBC AUTO: 88.9 FL (ref 82–102)
MONOCYTES # BLD: 0.4 K/UL (ref 0.1–1.3)
MONOCYTES NFR BLD: 8 % (ref 4–12)
NEUTS SEG # BLD: 3.3 K/UL (ref 1.7–8.2)
NEUTS SEG NFR BLD: 64 % (ref 43–78)
NITRITE UR QL STRIP.AUTO: NEGATIVE
NRBC # BLD: 0 K/UL (ref 0–0.2)
NT PRO BNP: 225 PG/ML
PH UR STRIP: 5.5 [PH] (ref 5–9)
PLATELET # BLD AUTO: 191 K/UL (ref 150–450)
PMV BLD AUTO: 11.8 FL (ref 9.4–12.3)
POTASSIUM SERPL-SCNC: 3.9 MMOL/L (ref 3.5–5.1)
PROT SERPL-MCNC: 7.3 G/DL (ref 6.3–8.2)
PROT UR STRIP-MCNC: NEGATIVE MG/DL
RBC # BLD AUTO: 4.31 M/UL (ref 4.23–5.6)
SODIUM SERPL-SCNC: 143 MMOL/L (ref 133–143)
SP GR UR REFRACTOMETRY: 1.01 (ref 1–1.02)
TROPONIN I SERPL HS-MCNC: 14.6 PG/ML (ref 0–14)
TSH W FREE THYROID IF ABNORMAL: 1.4 UIU/ML (ref 0.36–3.74)
UROBILINOGEN UR QL STRIP.AUTO: 0.2 EU/DL (ref 0.2–1)
WBC # BLD AUTO: 5.2 K/UL (ref 4.3–11.1)

## 2022-11-14 PROCEDURE — 70450 CT HEAD/BRAIN W/O DYE: CPT

## 2022-11-14 PROCEDURE — 81003 URINALYSIS AUTO W/O SCOPE: CPT

## 2022-11-14 PROCEDURE — 2580000003 HC RX 258: Performed by: EMERGENCY MEDICINE

## 2022-11-14 RX ADMIN — SODIUM CHLORIDE 500 ML: 9 INJECTION, SOLUTION INTRAVENOUS at 00:22

## 2022-11-14 NOTE — ED PROVIDER NOTES
[facility] Emergency Department Provider Note                   PCP:                Earl Nieves DO               Age: 69 y.o. Sex: male       ICD-10-CM    1. Transient confusion  R41.0           DISPOSITION Decision To Discharge 11/14/2022 02:13:44 AM       New Prescriptions    No medications on file       Orders Placed This Encounter   Procedures    Culture, Blood 1    Culture, Blood 1    Rapid influenza A/B antigens    COVID-19, Rapid    CT head without contrast    XR CHEST PORTABLE    Troponin    Lipase    Comprehensive Metabolic Panel w/ Reflex to MG    CBC with Auto Differential    D-Dimer, Quantitative    Urinalysis    TSH with Reflex    Lactate, Sepsis    Brain Natriuretic Peptide    EKG 12 Lead    Insert peripheral IV         Ashley Barry is a 69 y.o. male who presents to the Emergency Department with chief complaint of    Chief Complaint   Patient presents with    Altered Mental Status      Mr. Cory Singh is a 61-year-old male with past medical history significant for coronary artery disease status post CABG in 09 and 2 or 3 stents according to his wife but none since 2019 when he had a STEMI and a V. 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 She reports the confusion started after getting out of [employer] service at noon today. He denies fever, chills, nausea, vomiting, focal numbness or weakness, chest pain, cough, congestion, abdominal pain, diarrhea, constipation.       Review of Systems   Constitutional:  Negative for activity change, appetite change, chills, does not know if he takes medication for DM; rarely check BS, denies hypoglycemia    ED (erectile dysfunction)     Encephalitis 1962    x 2/hospitalized as teenager    Gout     hx of    History of blood transfusion     due to injury    Hx of echocardiogram 02/11/2022    EF 50-55%    Hypercholesterolemia     Hypertension     medications    Ischemic cardiomyopathy     Lacunar infarct, acute ([address]     possible embolic, remote a-fib- on eliquis    Lumbago     Memory loss     Mitral valve regurgitation 7/14/12    \"moderate\" on echo    TREVOR (obstructive sleep apnea)     mild per patient, does not use CPAP    PAD (peripheral artery disease) ([address]     PAF (paroxysmal atrial fibrillation) ([address]     Poor historian 2022    patient does not know medications and does not know his medical history    Prostate cancer ([address]     followed by urology    Psoriasis     topical creams    SBO (small bowel obstruction) ([address] 2011, 2015, 2016        Past Surgical History:   Procedure Laterality Date    APPENDECTOMY  1953    as a child    CATARACT REMOVAL Bilateral 2013    [id number] [facility], 2010    CORONARY ARTERY BYPASS GRAFT  03/09/2009    x4 bypass    HERNIA REPAIR Bilateral 2012    LEFT HEART CATH,PERCUTANEOUS  7/2012    no intervention    LEFT HEART CATH,PERCUTANEOUS  09/25/2019    NSTEMI & PCI    LEFT HEART CATH,PERCUTANEOUS  03/2019    STEMI, PCI, VFib arrest    HI ABDOMEN SURGERY PROC UNLISTED  1957    exp lap, numerous surgeries on abdomen from an injury    PROSTATE BIOPSY, NEEDLE, SATURATION SAMPLING      and ultrasound    PROSTATECTOMY       SHOULDER ARTHROPLASTY Right 2018    Reverse R TSA    SPLENECTOMY  1951    VASCULAR SURGERY  12/29/2017    aortogram, w/kristi LE runoff,R-LE arteriogram    VASCULAR SURGERY Right 6/22/2022    ULTRASOUND GUIDED BILATERAL LOWER EXTREMITY ARTERIOGRAM/ AORTAGRAM performed by Khris Calhoun MD at [facility] MAIN OR    WISDOM TOOTH EXTRACTION          Family History   Problem Relation Age of Onset Coronary Art Dis Mother     Heart Disease Mother          of Heart Disease    Osteoarthritis Mother     Gout Mother     Hypertension Mother     Bleeding Prob Paternal Grandmother     Hypertension Brother     Diabetes Father     Cancer Father     Heart Disease Father     Cancer Maternal Uncle         Prostate        Social Connections: Not on file        Allergies   Allergen Reactions    Celecoxib Swelling     Hands. denies    Colchicine Other (See Comments)    Febuxostat Other (See Comments)     Joint Pain    Fluvastatin Other (See Comments)     Other reaction(s): Unknown (comments)  Patient denies    Glucosamine 1500 Complex [Glucosamine-Chondroitin] Other (See Comments)    Ibuprofen Other (See Comments)     Patient unsure    Nsaids Other (See Comments)     Elevated serum creatinine  Elevated serum creatinine      Sulfa Antibiotics Rash and Other (See Comments)     Patient denies        Vitals signs and nursing note reviewed. Patient Vitals for the past 4 hrs:   Temp Pulse Resp BP SpO2   22 0000 98.7 °F (37.1 °C) -- -- -- --   22 2316 -- 58 24 -- 100 %   22 2301 -- 53 11 (!) 206/97 --          Physical Exam  Vitals and nursing note reviewed. Constitutional:       General: He is not in acute distress. Appearance: Normal appearance. He is well-developed and normal weight. He is not ill-appearing, toxic-appearing or diaphoretic. Comments: Lying on stretcher in semireclined position in no acute distress. Appears comfortable. Nontoxic-appearing. Not acutely ill-appearing. Answers questions but appears to have some slowed cognitive response. Otherwise does appear alert, oriented to self, place, purpose, date and time. HENT:      Head: Normocephalic and atraumatic. Right Ear: External ear normal.      Left Ear: External ear normal.      Nose: Nose normal. No congestion or rhinorrhea.       Mouth/Throat:      Mouth: Mucous membranes are moist.      Pharynx: Oropharynx is clear. No oropharyngeal exudate or posterior oropharyngeal erythema. Eyes:      General: No visual field deficit or scleral icterus. Right eye: No discharge. Left eye: No discharge. Extraocular Movements: Extraocular movements intact. Right eye: Normal extraocular motion and no nystagmus. Left eye: Normal extraocular motion and no nystagmus. Conjunctiva/sclera: Conjunctivae normal.      Pupils: Pupils are equal, round, and reactive to light. Pupils are equal.      Right eye: Pupil is round and reactive. Left eye: Pupil is round and reactive. Cardiovascular:      Rate and Rhythm: Normal rate. Pulses: Normal pulses. Heart sounds: Normal heart sounds. No murmur heard. No friction rub. No gallop. Pulmonary:      Effort: Pulmonary effort is normal. No respiratory distress. Breath sounds: Normal breath sounds. No stridor. No wheezing, rhonchi or rales. Abdominal:      General: Abdomen is flat. Bowel sounds are normal. There is no distension. Palpations: There is no mass. Tenderness: There is no abdominal tenderness. There is no guarding or rebound. Musculoskeletal:         General: No swelling, tenderness, deformity or signs of injury. Normal range of motion. Cervical back: Normal range of motion and neck supple. No rigidity or tenderness. Right lower leg: No edema. Left lower leg: No edema. Skin:     General: Skin is warm and dry. Capillary Refill: Capillary refill takes less than 2 seconds. Coloration: Skin is not cyanotic, jaundiced or pale. Findings: No bruising, erythema or rash. Neurological:      General: No focal deficit present. Mental Status: He is alert and oriented to person, place, and time. Cranial Nerves: No cranial nerve deficit, dysarthria or facial asymmetry. Sensory: No sensory deficit. Motor: No weakness.       Coordination: Coordination normal.      Gait: Gait normal. Deep Tendon Reflexes: Reflexes normal.   Psychiatric:         Mood and Affect: Mood normal. Mood is not anxious or depressed. Speech: Speech normal.         Behavior: Behavior normal. Behavior is not agitated. Thought Content: Thought content normal.         Cognition and Memory: Cognition is not impaired. Memory is not impaired. MDM  Number of Diagnoses or Management Options  Transient confusion  Diagnosis management comments: Labs unremarkable. Urine negative. D-dimer normal for age, TSH normal.  Flu and COVID swabs negative. Chest x-ray reviewed and reveals no evidence for acute cardiopulmonary pathology. Appears stable. CT head is negative for any acute pathology. It appears the patient had a transient confusional state associated with near syncope and exhaustion. All of this is improved and resolved prior to arrival.  Work-up is negative. Will discharge home with recommended follow-up with PMD in 1 to 2 days for reevaluation. Clear return precautions discussed. Of note, the patient never had any focal symptoms.        Amount and/or Complexity of Data Reviewed  Clinical lab tests: ordered and reviewed  Tests in the radiology section of CPT®: ordered and reviewed  Tests in the medicine section of CPT®: ordered and reviewed  Decide to obtain previous medical records or to obtain history from someone other than the patient: yes  Obtain history from someone other than the patient: yes  Review and summarize past medical records: yes  Independent visualization of images, tracings, or specimens: yes        EKG 12 Lead    Date/Time: 11/14/2022 2:15 AM  Performed by: Vaishali Berger MD  Authorized by: Vaishali Berger MD     ECG reviewed by ED Physician in the absence of a cardiologist: yes    Previous ECG:     Previous ECG:  Unavailable  Interpretation:     Interpretation: non-specific    Rate:     ECG rate assessment: normal    Rhythm:     Rhythm: sinus rhythm    Ectopy:     Ectopy: none    QRS:     QRS axis:  Normal    QRS intervals:  Normal    QRS conduction: normal    ST segments:     ST segments:  Normal  T waves:     T waves: non-specific    Q waves:     Abnormal Q-waves: not present      Labs Reviewed   TROPONIN - Abnormal; Notable for the following components:       Result Value    Troponin, High Sensitivity 14.6 (*)     All other components within normal limits   COMPREHENSIVE METABOLIC PANEL W/ REFLEX TO MG FOR LOW K - Abnormal; Notable for the following components:    Chloride 112 (*)     Glucose 193 (*)     Est, Glom Filt Rate 50 (*)     All other components within normal limits   CBC WITH AUTO DIFFERENTIAL - Abnormal; Notable for the following components:    Hemoglobin 12.6 (*)     Hematocrit 38.3 (*)     All other components within normal limits   D-DIMER, QUANTITATIVE - Abnormal; Notable for the following components:    D-Dimer, Quant 0.73 (*)     All other components within normal limits   RAPID INFLUENZA A/B ANTIGENS   COVID-19, RAPID   CULTURE, BLOOD 1   CULTURE, BLOOD 1   LIPASE   URINALYSIS   TSH WITH REFLEX   LACTATE, SEPSIS   BRAIN NATRIURETIC PEPTIDE   LACTATE, SEPSIS        CT head without contrast   Final Result   1. No CT evidence of acute intracranial process. XR CHEST PORTABLE   Final Result   No evidence of an acute intrathoracic process. Stable blunting of left costophrenic angle may represent pleural thickening. Opal Coma Scale  Eye Opening: Spontaneous  Best Verbal Response: Oriented  Best Motor Response: Obeys commands  Opal Coma Scale Score: 15                     Voice dictation software was used during the making of this note. This software is not perfect and grammatical and other typographical errors may be present. This note has not been completely proofread for errors.         Dar Geiger MD  11/14/22 [id number]

## 2022-11-14 NOTE — ED NOTES
I have reviewed discharge instructions with the patient. The patient verbalized understanding. Patient left ED via Discharge Method: ambulatory to Home with (spouse      Opportunity for questions and clarification provided. Patient given 0 scripts. To continue your aftercare when you leave the hospital, you may receive an automated call from our care team to check in on how you are doing. This is a free service and part of our promise to provide the best care and service to meet your aftercare needs.  If you have questions, or wish to unsubscribe from this service please call [phone]. Thank you for Choosing our [facility] Emergency Department.         Libby Salcido, AVIVA  11/14/22 [id number]

## 2022-11-17 ENCOUNTER — HOSPITAL ENCOUNTER (OUTPATIENT)
Dept: MRI IMAGING | Age: 77
Discharge: HOME | End: 2022-11-20
Payer: MEDICARE

## 2022-11-17 ENCOUNTER — HOSPITAL ENCOUNTER (OUTPATIENT)
Dept: ULTRASOUND IMAGING | Age: 77
Discharge: HOME | End: 2022-11-20
Payer: MEDICARE

## 2022-11-17 DIAGNOSIS — R41.3 MEMORY DISTURBANCE: ICD-10-CM

## 2022-11-17 DIAGNOSIS — I73.9 PERIPHERAL ARTERY DISEASE (HCC): ICD-10-CM

## 2022-11-17 PROCEDURE — 93922 UPR/L XTREMITY ART 2 LEVELS: CPT

## 2022-11-17 PROCEDURE — 70551 MRI BRAIN STEM W/O DYE: CPT

## 2022-11-18 LAB
BACTERIA SPEC CULT: NORMAL
BACTERIA SPEC CULT: NORMAL
SERVICE CMNT-IMP: NORMAL
SERVICE CMNT-IMP: NORMAL

## 2022-11-21 RX ORDER — CARVEDILOL 12.5 MG/1
TABLET ORAL
Qty: 180 TABLET | OUTPATIENT
Start: 2022-11-21

## 2022-11-22 ENCOUNTER — TELEPHONE (OUTPATIENT)
Dept: INTERNAL MEDICINE CLINIC | Facility: CLINIC | Age: 77
End: 2022-11-22

## 2022-11-22 DIAGNOSIS — R41.3 MEMORY DISTURBANCE: Primary | ICD-10-CM

## 2022-11-22 NOTE — TELEPHONE ENCOUNTER
Given memory issues/cognitive decline, prior history of stroke and MRI results patient agreeable to neurology evaluation.     Orders Placed This Encounter    Byron Marge Canada DO, Neurology, [place]     Referral Priority:   Routine     Referral Type:   Eval and Treat     Referral Reason:   Specialty Services Required     Referred to Provider:   Kelly Thomas DO     Requested Specialty:   Neurology     Number of Visits Requested:   1

## 2022-11-23 ENCOUNTER — HOSPITAL ENCOUNTER (EMERGENCY)
Age: 77
Discharge: HOME | End: 2022-11-24
Attending: EMERGENCY MEDICINE
Payer: MEDICARE

## 2022-11-23 ENCOUNTER — HOSPITAL ENCOUNTER (EMERGENCY)
Dept: CT IMAGING | Age: 77
Discharge: HOME | End: 2022-11-26
Payer: MEDICARE

## 2022-11-23 ENCOUNTER — HOSPITAL ENCOUNTER (EMERGENCY)
Dept: GENERAL RADIOLOGY | Age: 77
Discharge: HOME | End: 2022-11-26
Payer: MEDICARE

## 2022-11-23 VITALS
HEART RATE: 81 BPM | DIASTOLIC BLOOD PRESSURE: 80 MMHG | SYSTOLIC BLOOD PRESSURE: 157 MMHG | RESPIRATION RATE: 19 BRPM | TEMPERATURE: 97.9 F | OXYGEN SATURATION: 100 %

## 2022-11-23 DIAGNOSIS — I73.9 PAD (PERIPHERAL ARTERY DISEASE) (HCC): Primary | ICD-10-CM

## 2022-11-23 DIAGNOSIS — R07.89 CHEST WALL PAIN: Primary | ICD-10-CM

## 2022-11-23 DIAGNOSIS — R10.9 LEFT SIDED ABDOMINAL PAIN: ICD-10-CM

## 2022-11-23 DIAGNOSIS — K59.00 CONSTIPATION, UNSPECIFIED CONSTIPATION TYPE: ICD-10-CM

## 2022-11-23 DIAGNOSIS — M79.602 PAIN IN BOTH UPPER EXTREMITIES: ICD-10-CM

## 2022-11-23 DIAGNOSIS — M79.601 PAIN IN BOTH UPPER EXTREMITIES: ICD-10-CM

## 2022-11-23 LAB
ALBUMIN SERPL-MCNC: 3.5 G/DL (ref 3.2–4.6)
ALBUMIN/GLOB SERPL: 0.9 {RATIO} (ref 0.4–1.6)
ALP SERPL-CCNC: 71 U/L (ref 50–136)
ALT SERPL-CCNC: 28 U/L (ref 12–65)
ANION GAP SERPL CALC-SCNC: 7 MMOL/L (ref 2–11)
AST SERPL-CCNC: 23 U/L (ref 15–37)
BASOPHILS # BLD: 0 K/UL (ref 0–0.2)
BASOPHILS NFR BLD: 0 % (ref 0–2)
BILIRUB SERPL-MCNC: 0.3 MG/DL (ref 0.2–1.1)
BUN SERPL-MCNC: 12 MG/DL (ref 8–23)
CALCIUM SERPL-MCNC: 9.2 MG/DL (ref 8.3–10.4)
CHLORIDE SERPL-SCNC: 109 MMOL/L (ref 101–110)
CO2 SERPL-SCNC: 25 MMOL/L (ref 21–32)
CREAT SERPL-MCNC: 1.38 MG/DL (ref 0.8–1.5)
DIFFERENTIAL METHOD BLD: ABNORMAL
EOSINOPHIL # BLD: 0.1 K/UL (ref 0–0.8)
EOSINOPHIL NFR BLD: 2 % (ref 0.5–7.8)
ERYTHROCYTE [DISTWIDTH] IN BLOOD BY AUTOMATED COUNT: 12.8 % (ref 11.9–14.6)
GLOBULIN SER CALC-MCNC: 3.7 G/DL (ref 2.8–4.5)
GLUCOSE SERPL-MCNC: 132 MG/DL (ref 65–100)
HCT VFR BLD AUTO: 35.9 % (ref 41.1–50.3)
HGB BLD-MCNC: 12.2 G/DL (ref 13.6–17.2)
IMM GRANULOCYTES # BLD AUTO: 0 K/UL (ref 0–0.5)
IMM GRANULOCYTES NFR BLD AUTO: 0 % (ref 0–5)
LYMPHOCYTES # BLD: 1.7 K/UL (ref 0.5–4.6)
LYMPHOCYTES NFR BLD: 32 % (ref 13–44)
MCH RBC QN AUTO: 29.6 PG (ref 26.1–32.9)
MCHC RBC AUTO-ENTMCNC: 34 G/DL (ref 31.4–35)
MCV RBC AUTO: 87.1 FL (ref 82–102)
MONOCYTES # BLD: 0.6 K/UL (ref 0.1–1.3)
MONOCYTES NFR BLD: 11 % (ref 4–12)
NEUTS SEG # BLD: 2.9 K/UL (ref 1.7–8.2)
NEUTS SEG NFR BLD: 55 % (ref 43–78)
NRBC # BLD: 0 K/UL (ref 0–0.2)
PLATELET # BLD AUTO: 175 K/UL (ref 150–450)
PMV BLD AUTO: 11.7 FL (ref 9.4–12.3)
POTASSIUM SERPL-SCNC: 3.6 MMOL/L (ref 3.5–5.1)
PROT SERPL-MCNC: 7.2 G/DL (ref 6.3–8.2)
RBC # BLD AUTO: 4.12 M/UL (ref 4.23–5.6)
SODIUM SERPL-SCNC: 141 MMOL/L (ref 133–143)
TROPONIN I SERPL HS-MCNC: 16.3 PG/ML (ref 0–14)
WBC # BLD AUTO: 5.3 K/UL (ref 4.3–11.1)

## 2022-11-23 PROCEDURE — 84484 ASSAY OF TROPONIN QUANT: CPT

## 2022-11-23 PROCEDURE — 99285 EMERGENCY DEPT VISIT HI MDM: CPT

## 2022-11-23 PROCEDURE — 2580000003 HC RX 258: Performed by: EMERGENCY MEDICINE

## 2022-11-23 PROCEDURE — 94762 N-INVAS EAR/PLS OXIMTRY CONT: CPT

## 2022-11-23 PROCEDURE — 6360000004 HC RX CONTRAST MEDICATION: Performed by: EMERGENCY MEDICINE

## 2022-11-23 PROCEDURE — 71260 CT THORAX DX C+: CPT

## 2022-11-23 PROCEDURE — 85025 COMPLETE CBC W/AUTO DIFF WBC: CPT

## 2022-11-23 PROCEDURE — 80053 COMPREHEN METABOLIC PANEL: CPT

## 2022-11-23 PROCEDURE — 96374 THER/PROPH/DIAG INJ IV PUSH: CPT

## 2022-11-23 PROCEDURE — 96375 TX/PRO/DX INJ NEW DRUG ADDON: CPT

## 2022-11-23 PROCEDURE — 71045 X-RAY EXAM CHEST 1 VIEW: CPT

## 2022-11-23 PROCEDURE — 6360000002 HC RX W HCPCS: Performed by: EMERGENCY MEDICINE

## 2022-11-23 PROCEDURE — 93005 ELECTROCARDIOGRAM TRACING: CPT | Performed by: EMERGENCY MEDICINE

## 2022-11-23 RX ORDER — MORPHINE SULFATE 4 MG/ML
4 INJECTION INTRAVENOUS
Status: COMPLETED | OUTPATIENT
Start: 2022-11-23 | End: 2022-11-23

## 2022-11-23 RX ORDER — 0.9 % SODIUM CHLORIDE 0.9 %
100 INTRAVENOUS SOLUTION INTRAVENOUS ONCE
Status: COMPLETED | OUTPATIENT
Start: 2022-11-23 | End: 2022-11-23

## 2022-11-23 RX ORDER — ONDANSETRON 2 MG/ML
4 INJECTION INTRAMUSCULAR; INTRAVENOUS
Status: COMPLETED | OUTPATIENT
Start: 2022-11-23 | End: 2022-11-23

## 2022-11-23 RX ORDER — SODIUM CHLORIDE 0.9 % (FLUSH) 0.9 %
10 SYRINGE (ML) INJECTION
Status: COMPLETED | OUTPATIENT
Start: 2022-11-23 | End: 2022-11-23

## 2022-11-23 RX ORDER — 0.9 % SODIUM CHLORIDE 0.9 %
500 INTRAVENOUS SOLUTION INTRAVENOUS
Status: COMPLETED | OUTPATIENT
Start: 2022-11-23 | End: 2022-11-24

## 2022-11-23 RX ADMIN — IOPAMIDOL 100 ML: 755 INJECTION, SOLUTION INTRAVENOUS at 23:39

## 2022-11-23 RX ADMIN — SODIUM CHLORIDE 500 ML: 9 INJECTION, SOLUTION INTRAVENOUS at 23:09

## 2022-11-23 RX ADMIN — ONDANSETRON 4 MG: 2 INJECTION INTRAMUSCULAR; INTRAVENOUS at 23:10

## 2022-11-23 RX ADMIN — SODIUM CHLORIDE 100 ML: 9 INJECTION, SOLUTION INTRAVENOUS at 23:39

## 2022-11-23 RX ADMIN — MORPHINE SULFATE 4 MG: 4 INJECTION, SOLUTION INTRAMUSCULAR; INTRAVENOUS at 23:09

## 2022-11-23 RX ADMIN — SODIUM CHLORIDE, PRESERVATIVE FREE 10 ML: 5 INJECTION INTRAVENOUS at 23:39

## 2022-11-23 ASSESSMENT — PAIN - FUNCTIONAL ASSESSMENT: PAIN_FUNCTIONAL_ASSESSMENT: 0-10

## 2022-11-23 ASSESSMENT — PAIN SCALES - GENERAL: PAINLEVEL_OUTOF10: 8

## 2022-11-23 ASSESSMENT — PAIN DESCRIPTION - ORIENTATION: ORIENTATION: LEFT

## 2022-11-23 ASSESSMENT — PAIN DESCRIPTION - LOCATION: LOCATION: CHEST

## 2022-11-24 LAB
EKG ATRIAL RATE: 71 BPM
EKG DIAGNOSIS: NORMAL
EKG P AXIS: 62 DEGREES
EKG P-R INTERVAL: 170 MS
EKG Q-T INTERVAL: 406 MS
EKG QRS DURATION: 84 MS
EKG QTC CALCULATION (BAZETT): 441 MS
EKG R AXIS: 41 DEGREES
EKG T AXIS: 47 DEGREES
EKG VENTRICULAR RATE: 71 BPM
TROPONIN I SERPL HS-MCNC: 12.6 PG/ML (ref 0–14)

## 2022-11-24 ASSESSMENT — ENCOUNTER SYMPTOMS
COLOR CHANGE: 0
NAUSEA: 0
RHINORRHEA: 0
BACK PAIN: 0
VOMITING: 0
ABDOMINAL PAIN: 0
DIARRHEA: 0
SHORTNESS OF BREATH: 0
COUGH: 0

## 2022-11-24 NOTE — ED TRIAGE NOTES
Pt. Saleem Antony to triage. Pt. C/o left side chest pain that has worsened x 1 week. Pt.  States taking two nitroglycerins w/o relief.

## 2022-11-24 NOTE — ED NOTES
I have reviewed discharge instructions with the patient. The patient verbalized understanding. Patient left ED via Discharge Method: ambulatory to Home with spouse      Opportunity for questions and clarification provided. Patient given 0 scripts. To continue your aftercare when you leave the hospital, you may receive an automated call from our care team to check in on how you are doing. This is a free service and part of our promise to provide the best care and service to meet your aftercare needs.  If you have questions, or wish to unsubscribe from this service please call [phone]. Thank you for Choosing our [facility] Emergency Department.         AVIVA Bradley  11/24/22 0135

## 2022-11-24 NOTE — DISCHARGE INSTRUCTIONS
Over-the-counter MiraLAX once daily for 3 days then as needed for constipation. You may stop if diarrhea develops. Tylenol 500 to 650 mg every 6 hours as needed for pain. Follow-up with [facility] cardiology when called with appointment time. Call them Monday afternoon if you have not heard from them regarding this appointment. Call your primary care doctor Monday for follow-up appointment and recheck as well. Return if any new, worsening or concerning symptoms.

## 2022-11-24 NOTE — ED PROVIDER NOTES
Emergency Department Provider Note                   PCP:                Farrukh Lawton DO               Age: 69 y.o. Sex: male       ICD-10-CM    1. Chest wall pain  R07.89       2. Left sided abdominal pain  R10.9       3. Constipation, unspecified constipation type  K59.00           DISPOSITION Decision To Discharge 11/24/2022 01:26:42 AM       MDM  Number of Diagnoses or Management Options  Chest wall pain  Constipation, unspecified constipation type  Left sided abdominal pain  Diagnosis management comments: Pain is reproducible and patient is a poor historian. We will rule out MI and PE/lung pathology with a PE protocol CT. He has history of recurrent diverticulitis in the past and we will do a scan given his left-sided abdominal tenderness to exclude intra-abdominal pathology specifically diverticulitis. 1:30 AM  High-sensitivity troponin x2 negative. CT PE protocol negative. CT abdomen and pelvis negative for acute pathology other than some constipation. Patient be discharged home with instructions for Tylenol for musculoskeletal pain and MiraLAX for constipation. Close follow-up with [facility] cardiology recommended the early part of this week after the holiday weekend for recheck. Return instructions provided.        Amount and/or Complexity of Data Reviewed  Clinical lab tests: ordered and reviewed  Tests in the radiology section of CPT®: ordered and reviewed  Tests in the medicine section of CPT®: ordered and reviewed  Independent visualization of images, tracings, or specimens: yes    Risk of Complications, Morbidity, and/or Mortality  Presenting problems: high  Diagnostic procedures: low  Management options: low    Patient Progress  Patient progress: stable             Orders Placed This Encounter   Procedures    XR CHEST PORTABLE    CT CHEST ABDOMEN PELVIS W CONTRAST Additional Contrast? None    Troponin    CBC with Auto Differential    Comprehensive Metabolic Panel    Troponin Cardiac Monitor - ED Only    Continuous Pulse Oximetry    EKG 12 Lead    Saline lock IV        Medications   morphine injection 4 mg (4 mg IntraVENous Given 11/23/22 2309)   ondansetron (ZOFRAN) injection 4 mg (4 mg IntraVENous Given 11/23/22 2310)   0.9 % sodium chloride bolus (0 mLs IntraVENous Stopped 11/24/22 0007)       New Prescriptions    No medications on file        Ashley Barry is a 69 y.o. male who presents to the Emergency Department with chief complaint of    Chief Complaint   Patient presents with    Chest Pain      Patient complains of constant left-sided dull chest pain and abdominal pain for the last 3 to 4 days. No associated symptoms. No radiation of the pain. Pain worse with movement and certain positions. Patient seen to cough once and it seemed to aggravate his pain. He denies any fevers chills or productive cough. He denies any shortness of breath, nausea or vomiting. Patient has a history of coronary artery disease with multiple stents and bypass surgery. The history is provided by the patient. All other systems reviewed and are negative unless otherwise stated in the history of present illness section. Review of Systems   Constitutional:  Negative for chills and fever. HENT:  Negative for congestion and rhinorrhea. Respiratory:  Negative for cough and shortness of breath. Cardiovascular:  Negative for chest pain and leg swelling. Gastrointestinal:  Negative for abdominal pain, diarrhea, nausea and vomiting. Endocrine: Negative for polydipsia and polyuria. Genitourinary:  Negative for dysuria, frequency and hematuria. Musculoskeletal:  Negative for back pain and myalgias. Skin:  Negative for color change and rash. Neurological:  Negative for weakness and numbness. All other systems reviewed and are negative.     Past Medical History:   Diagnosis Date    Allergic rhinitis     Arthritis     CAD (coronary artery disease)     CABG '09; troponin elevated 7/14/12 (\"likely due to supply/demand mismatch in setting of fever and increased metabolic demand\"-per cardiac MD note 8/20/12)-had cath, echo, EKG-all WNL except cath showed 2 of the bypasses with blockages- \"occluded SVG to PDA & SVG to MARIANELA\"; EF=55%    Cerebral artery occlusion with cerebral infarction [facility]) 2017    acute/subacute infarct left basal ganglia    CHF (congestive heart failure) ([address]     Chronic kidney disease     3a    Coronary artery disease of native artery of native heart with stable angina pectoris ([address] 2019, 2020 2019- STEMI- v fib arrest enroute, stent; 2020 NSTEMI angioplasty    Diabetes mellitus type 2, controlled ([address]     patient does not know if he takes medication for DM; rarely check BS, denies hypoglycemia    ED (erectile dysfunction)     Encephalitis 1962    x 2/hospitalized as teenager    Gout     hx of    History of blood transfusion     due to injury    Hx of echocardiogram 02/11/2022    EF 50-55%    Hypercholesterolemia     Hypertension     medications    Ischemic cardiomyopathy     Lacunar infarct, acute ([address]     possible embolic, remote a-fib- on eliquis    Lumbago     Memory loss     Mitral valve regurgitation 7/14/12    \"moderate\" on echo    TREVOR (obstructive sleep apnea)     mild per patient, does not use CPAP    PAD (peripheral artery disease) ([address]     PAF (paroxysmal atrial fibrillation) ([address]     Poor historian 2022    patient does not know medications and does not know his medical history    Prostate cancer ([address]     followed by urology    Psoriasis     topical creams    SBO (small bowel obstruction) ([address] 2011, 2015, 2016        Past Surgical History:   Procedure Laterality Date    APPENDECTOMY  1953    as a child    CATARACT REMOVAL Bilateral 2013    [id number] [facility], 2010    CORONARY ARTERY BYPASS GRAFT  03/09/2009    x4 bypass    HERNIA REPAIR Bilateral 2012    LEFT HEART CATH,PERCUTANEOUS  7/2012    no intervention    LEFT HEART CATH,PERCUTANEOUS 2019    NSTEMI & PCI    LEFT HEART CATH,PERCUTANEOUS  2019    STEMI, PCI, VFib arrest    [place] ABDOMEN SURGERY PROC UNLISTED      exp lap, numerous surgeries on abdomen from an injury    PROSTATE BIOPSY, NEEDLE, SATURATION SAMPLING      and ultrasound    PROSTATECTOMY       SHOULDER ARTHROPLASTY Right 2018    Reverse R TSA    SPLENECTOMY      VASCULAR SURGERY  2017    aortogram, w/kristi LE runoff,R-LE arteriogram    VASCULAR SURGERY Right 2022    ULTRASOUND GUIDED BILATERAL LOWER EXTREMITY ARTERIOGRAM/ AORTAGRAM performed by Luke Echeverria MD at [facility] MAIN OR    WISDOM TOOTH EXTRACTION          Family History   Problem Relation Age of Onset    Coronary Art Dis Mother     Heart Disease Mother          of Heart Disease    Osteoarthritis Mother     Gout Mother     Hypertension Mother     Bleeding Prob Paternal Grandmother     Hypertension Brother     Diabetes Father     Cancer Father     Heart Disease Father     Cancer Maternal Uncle         Prostate        Social History     Socioeconomic History    Marital status:    Tobacco Use    Smoking status: Former     Packs/day: 1.00     Types: Cigarettes     Quit date:      Years since quittin.9    Smokeless tobacco: Never    Tobacco comments:     Quit smoking: quit around age 28 y/o; smoked 18 years   Vaping Use    Vaping Use: Never used   Substance and Sexual Activity    Alcohol use: Not Currently    Drug use: No        Allergies: Celecoxib, Colchicine, Febuxostat, Fluvastatin, Glucosamine 1500 complex [glucosamine-chondroitin], Ibuprofen, Nsaids, and Sulfa antibiotics    Previous Medications    ACETAMINOPHEN (TYLENOL) 325 MG TABLET    Take 650 mg by mouth every 4 hours as needed    APIXABAN (ELIQUIS) 5 MG TABS TABLET    (Prior Auth: Rx ZHT#:[id number]) Oral for 90    ASPIRIN 81 MG CHEWABLE TABLET    Take 81 mg by mouth daily    BACLOFEN (LIORESAL) 10 MG TABLET    Take 1 tablet by mouth 2 times daily    BISACODYL (DULCOLAX) 5 MG EC TABLET    Take 5 mg by mouth nightly as needed     CARVEDILOL (COREG) 12.5 MG TABLET    Take 12.5 mg by mouth 2 times daily (with meals)    CETIRIZINE HCL 10 MG CAPS    Take 10 mg by mouth nightly     CHOLECALCIFEROL 50 MCG (2000 UT) TABS    Take 2,000 Units by mouth Daily with lunch     CLOPIDOGREL (PLAVIX) 75 MG TABLET    TAKE 1 TABLET DAILY    CONTINUOUS BLOOD GLUC SENSOR (FREESTYLE TANVI SENSOR SYSTEM) MISC    FreeStyle Lite Meter kit   USE TO TEST BLOOD SUGAR UP TO THREE TIMES DAILY    ESOMEPRAZOLE (NEXIUM) 40 MG DELAYED RELEASE CAPSULE    1 capsule    EZETIMIBE (ZETIA) 10 MG TABLET    Take 10 mg by mouth nightly     FAMOTIDINE (PEPCID) 20 MG TABLET        FLUTICASONE (FLONASE) 50 MCG/ACT NASAL SPRAY    1 spray in each nostril Nasally Once a day    GABAPENTIN (NEURONTIN) 600 MG TABLET    Take 600 mg by mouth 2 times daily. LANCETS 28G MISC    FreeStyle Lancets 28 gauge   USE TO CHECK BLOOD SUGAR UP TO THREE TIMES DAILY    LANCETS MISC    Check blood sugar up to three times a day    LOSARTAN (COZAAR) 50 MG TABLET    Take 50 mg by mouth Daily with lunch     METFORMIN (GLUCOPHAGE) 500 MG TABLET    Take 1 tablet by mouth 2 times daily (with meals)    METOPROLOL (LOPRESSOR) 100 MG TABLET    1 tablet Orally Twice a day    NITROGLYCERIN (NITROSTAT) 0.4 MG SL TABLET    Take 0.4 mg by mouth    PRAVASTATIN (PRAVACHOL) 40 MG TABLET    (Prior Auth: Rx JYR#:[id number]) Oral for 90    ROSUVASTATIN (CRESTOR) 20 MG TABLET    Take 20 mg by mouth nightly     SIMETHICONE 125 MG CAPS    Take 125 mg by mouth 4 times daily as needed Takes at bedtime    TRIAMCINOLONE (KENALOG) 0.1 % CREAM    Apply topically 2 times daily to elbows as needed for rash    VITAMIN D-VITAMIN K (VITAMIN K2-VITAMIN D3)  MCG-UNIT CAPS    Orally        Vitals signs and nursing note reviewed.    Patient Vitals for the past 4 hrs:   Temp Pulse Resp BP SpO2   11/23/22 2313 -- 81 19 (!) 157/80 100 %   11/23/22 2159 97.9 °F (36.6 °C) 70 16 (!) 167/96 99 %   11/23/22 2154 98.3 °F (36.8 °C) 98 16 -- 100 %          Physical Exam  Vitals and nursing note reviewed. Constitutional:       Appearance: Normal appearance. HENT:      Head: Normocephalic and atraumatic. Nose: Nose normal.      Mouth/Throat:      Mouth: Mucous membranes are moist.   Eyes:      Conjunctiva/sclera: Conjunctivae normal.      Pupils: Pupils are equal, round, and reactive to light. Cardiovascular:      Rate and Rhythm: Normal rate and regular rhythm. Pulses: Normal pulses. Heart sounds: Normal heart sounds. Pulmonary:      Effort: Pulmonary effort is normal.      Breath sounds: Normal breath sounds. Chest:      Chest wall: Tenderness present. Abdominal:      General: There is no distension. Palpations: Abdomen is soft. There is no mass. Tenderness: There is abdominal tenderness (Mild left upper quadrant and left lower quadrant tenderness). There is no guarding or rebound. Musculoskeletal:         General: No swelling. Normal range of motion. Right lower leg: No edema. Left lower leg: No edema. Skin:     General: Skin is warm and dry. Neurological:      Mental Status: He is alert and oriented to person, place, and time. Psychiatric:         Behavior: Behavior normal.        Procedures    ED EKG Interpretation  EKG was interpreted in the absence of a cardiologist.    Rate: 71  EKG Interpretation: EKG Interpretation: sinus rhythm  ST Segments: Normal ST segments - NO STEMI,PVC    Results for orders placed or performed during the hospital encounter of 11/23/22   XR CHEST PORTABLE    Narrative    EXAMINATION: CHEST RADIOGRAPH 11/23/2022 10:36 PM    ACCESSION NUMBER: [id number]    INDICATION: chest pain    COMPARISON: Chest x-ray 11/13/2022, 2/1/2022, 9/26/2021    TECHNIQUE: A single view of the chest was obtained. FINDINGS:     Support Lines and Tubes: None    Cardiac Silhouette: Within normal limits in size.  Prior median sternotomy and  coronary artery bypass grafting. Lungs: There is blunting of both costophrenic sulci. Pleura: No pneumothorax. Osseous Structures: Right shoulder arthroplasty. Upper Abdomen: Unremarkable. Impression    1. Blunting of both costophrenic sulci, likely consequent small bilateral  pleural effusions. 2. There is minimal adjacent atelectasis at the lung bases. CT CHEST ABDOMEN PELVIS W CONTRAST Additional Contrast? None    Narrative    EXAM: CT chest, abdomen and pelvis with IV contrast.    INDICATION: Dyspnea, pain. COMPARISON: Prior CT abdomen and pelvis on October 21, 2019 and CT chest on  December 27, 2013. TECHNIQUE: Axial CT images of the chest, abdomen and pelvis were obtained after  the intravenous injection of 100 mL Isovue 370 CT contrast. Radiation dose  reduction techniques were used for this study. Our CT scanners use one or all  of the following:  Automated exposure control, adjustment of the mA and/or kV  according to patient size, iterative reconstruction. FINDINGS:  - Pleura/pericardium: Within normal limits. - Lungs: There is mild atelectasis in the lung bases. - Raegan/Mediastinum: Within normal limits. - Tracheobronchial tree: Within normal limits. - Aorta/pulmonary arteries: Within normal limits. - Heart: Mild cardiomegaly. - Coronary arteries: There are coronary artery calcifications, with postsurgical  changes from a prior CABG. - Chest wall: Within normal limits. - Spine/bones: No acute process. There has been a right shoulder arthroplasty. - Additional comments: None. - Liver: There is an unchanged 2.4 cm cyst in the left liver lobe. - Gallbladder and bile ducts: Within normal limits. - Spleen: Spleen has a lobulated appearance, unchanged from prior.  - Urinary tract: There are small bilateral kidney cysts, measuring up to 2.7 cm  on the left. No urinary tract stone or hydronephrosis is seen. The urinary  bladder is within normal limits.  There has been a prostatectomy. - Adrenals: Within normal limits. - Pancreas: Within normal limits. - Gastrointestinal tract: There is moderate constipation and colonic  diverticulosis, without evidence of acute diverticulitis. The small bowel loops  are unremarkable. No bowel obstruction or bowel wall thickening is seen. Post  appendectomy. - Retroperitoneum: Mild to moderate abdominal aortic atherosclerosis, with no  aneurysmal dilatation or dissection.  - Peritoneal cavity and abdominal wall: Within normal limits. - Pelvis: Within normal limits. - Spine/bones: No acute process. - Other comments: None. Impression    1. No acute process in the chest or abdomen. 2. Cardiomegaly, coronary artery disease and a prior CABG. 3. Constipation and colonic diverticulosis. 4. Prior prostatectomy and appendectomy.      Troponin   Result Value Ref Range    Troponin, High Sensitivity 16.3 (H) 0 - 14 pg/mL   CBC with Auto Differential   Result Value Ref Range    WBC 5.3 4.3 - 11.1 K/uL    RBC 4.12 (L) 4.23 - 5.6 M/uL    Hemoglobin 12.2 (L) 13.6 - 17.2 g/dL    Hematocrit 35.9 (L) 41.1 - 50.3 %    MCV 87.1 82.0 - 102.0 FL    MCH 29.6 26.1 - 32.9 PG    MCHC 34.0 31.4 - 35.0 g/dL    RDW 12.8 11.9 - 14.6 %    Platelets 052 730 - 931 K/uL    MPV 11.7 9.4 - 12.3 FL    nRBC 0.00 0.0 - 0.2 K/uL    Differential Type AUTOMATED      Seg Neutrophils 55 43 - 78 %    Lymphocytes 32 13 - 44 %    Monocytes 11 4.0 - 12.0 %    Eosinophils % 2 0.5 - 7.8 %    Basophils 0 0.0 - 2.0 %    Immature Granulocytes 0 0.0 - 5.0 %    Segs Absolute 2.9 1.7 - 8.2 K/UL    Absolute Lymph # 1.7 0.5 - 4.6 K/UL    Absolute Mono # 0.6 0.1 - 1.3 K/UL    Absolute Eos # 0.1 0.0 - 0.8 K/UL    Basophils Absolute 0.0 0.0 - 0.2 K/UL    Absolute Immature Granulocyte 0.0 0.0 - 0.5 K/UL   Comprehensive Metabolic Panel   Result Value Ref Range    Sodium 141 133 - 143 mmol/L    Potassium 3.6 3.5 - 5.1 mmol/L    Chloride 109 101 - 110 mmol/L    CO2 25 21 - 32 mmol/L Anion Gap 7 2 - 11 mmol/L    Glucose 132 (H) 65 - 100 mg/dL    BUN 12 8 - 23 MG/DL    Creatinine 1.38 0.8 - 1.5 MG/DL    Est, Glom Filt Rate 53 (L) >60 ml/min/1.73m2    Calcium 9.2 8.3 - 10.4 MG/DL    Total Bilirubin 0.3 0.2 - 1.1 MG/DL    ALT 28 12 - 65 U/L    AST 23 15 - 37 U/L    Alk Phosphatase 71 50 - 136 U/L    Total Protein 7.2 6.3 - 8.2 g/dL    Albumin 3.5 3.2 - 4.6 g/dL    Globulin 3.7 2.8 - 4.5 g/dL    Albumin/Globulin Ratio 0.9 0.4 - 1.6     Troponin   Result Value Ref Range    Troponin, High Sensitivity 12.6 0 - 14 pg/mL   EKG 12 Lead   Result Value Ref Range    Ventricular Rate 71 BPM    Atrial Rate 71 BPM    P-R Interval 170 ms    QRS Duration 84 ms    Q-T Interval 406 ms    QTc Calculation (Bazett) 441 ms    P Axis 62 degrees    R Axis 41 degrees    T Axis 47 degrees    Diagnosis       Sinus rhythm with occasional Premature ventricular complexes        CT CHEST ABDOMEN PELVIS W CONTRAST Additional Contrast? None   Final Result   1. No acute process in the chest or abdomen. 2. Cardiomegaly, coronary artery disease and a prior CABG. 3. Constipation and colonic diverticulosis. 4. Prior prostatectomy and appendectomy. XR CHEST PORTABLE   Final Result      1. Blunting of both costophrenic sulci, likely consequent small bilateral   pleural effusions. 2. There is minimal adjacent atelectasis at the lung bases. Voice dictation software was used during the making of this note. This software is not perfect and grammatical and other typographical errors may be present. This note has not been completely proofread for errors.         Dakota Wilkerson MD  11/24/22 [id number]       Dakota Wilkerson MD  11/24/22 [id number]

## 2022-12-02 NOTE — PROGRESS NOTES
[address] [place] [address] Court, [address] [place]  [phone]    Patient Name:  Stanislav Burrell  YOB: 1945      Office Visit 12/5/2022    CHIEF COMPLAINT:    Chief Complaint   Patient presents with    Follow-up    Sleep Apnea    Sleep Study    Results    CPAP/BiPAP         HISTORY OF PRESENT ILLNESS:        The patient was seen today in a follow-up visit. He was accompanied by his wife due to history of memory loss      Patient had history of severe sleep apnea with an AHI of 55/hour and lowest oxygen saturation at 75%. He is currently on BiPAP at 18/14 cm based on his most recent titration which was done in July this year. The patient did not bring his machine with him and there is no download can be obtained without the SD card from the machine. His wife indicated that he forgets sometimes to put the machine on and he continues to take naps in the daytime. I encouraged him that it is essential to use his BiPAP machine given the history of severe sleep apnea. He indicated the pressure is okay and he has no problem with that. I suggested to him to try to use it especially when he is taking a nap to get comfortable with it. The reports her score remains at 8/hour      He does report feeling okay in the mornings. I encouraged him to try his best to use his BiPAP machine as this will ultimately help him tremendously for his cognition impairment and memory loss. His wife is going to help him with this matter as well.   I reminded him that he needs to bring his machine with him next office visit to obtain adequate information on his usage of the machine    Sleep Medicine 12/5/2022 6/13/2022 1/24/2022 11/30/2021   Sitting and reading 2 2 2 2   Watching TV 0 2 2 1   Sitting, inactive in a public place (e.g. a theatre or a meeting) 0 1 0 1   As a passenger in a car for an hour without a break 1 1 0 1   Lying down to rest in the afternoon when circumstances permit Pain    Fluvastatin Other (See Comments)     Other reaction(s): Unknown (comments)  Patient denies    Glucosamine 1500 Complex [Glucosamine-Chondroitin] Other (See Comments)    Ibuprofen Other (See Comments)     Patient unsure    Nsaids Other (See Comments)     Elevated serum creatinine  Elevated serum creatinine      Sulfa Antibiotics Rash and Other (See Comments)     Patient denies         Current Outpatient Medications   Medication Sig    apixaban (ELIQUIS) 5 MG TABS tablet (Prior Auth: Rx MGO#:[id number]) Oral for 90    esomeprazole (NEXIUM) 40 MG delayed release capsule 1 capsule    famotidine (PEPCID) 20 MG tablet     fluticasone (FLONASE) 50 MCG/ACT nasal spray 1 spray in each nostril Nasally Once a day    metoprolol (LOPRESSOR) 100 MG tablet 1 tablet Orally Twice a day    pravastatin (PRAVACHOL) 40 MG tablet (Prior Auth: Rx JKA#:[id number]) Oral for 90    Lancets 28G MISC FreeStyle Lancets 28 gauge   USE TO CHECK BLOOD SUGAR UP TO THREE TIMES DAILY    Continuous Blood Gluc Sensor (FREESTYLE TANVI SENSOR SYSTEM) MISC FreeStyle Lite Meter kit   USE TO TEST BLOOD SUGAR UP TO THREE TIMES DAILY    Vitamin D-Vitamin K (VITAMIN K2-VITAMIN D3)  MCG-UNIT CAPS Orally    baclofen (LIORESAL) 10 MG tablet Take 1 tablet by mouth 2 times daily    triamcinolone (KENALOG) 0.1 % cream Apply topically 2 times daily to elbows as needed for rash    metFORMIN (GLUCOPHAGE) 500 MG tablet Take 1 tablet by mouth 2 times daily (with meals)    Lancets MISC Check blood sugar up to three times a day    acetaminophen (TYLENOL) 325 MG tablet Take 650 mg by mouth every 4 hours as needed    aspirin 81 MG chewable tablet Take 81 mg by mouth daily    bisacodyl (DULCOLAX) 5 MG EC tablet Take 5 mg by mouth nightly as needed     carvedilol (COREG) 12.5 MG tablet Take 12.5 mg by mouth 2 times daily (with meals)    Cetirizine HCl 10 MG CAPS Take 10 mg by mouth nightly     Cholecalciferol 50 MCG (2000 UT) TABS Take 2,000 Units by mouth Daily with lunch     clopidogrel (PLAVIX) 75 MG tablet TAKE 1 TABLET DAILY    ezetimibe (ZETIA) 10 MG tablet Take 10 mg by mouth nightly     gabapentin (NEURONTIN) 600 MG tablet Take 600 mg by mouth 2 times daily. losartan (COZAAR) 50 MG tablet Take 50 mg by mouth Daily with lunch     nitroGLYCERIN (NITROSTAT) 0.4 MG SL tablet Take 0.4 mg by mouth    rosuvastatin (CRESTOR) 20 MG tablet Take 20 mg by mouth nightly     Simethicone 125 MG CAPS Take 125 mg by mouth 4 times daily as needed Takes at bedtime     No current facility-administered medications for this visit. REVIEW OF SYSTEMS:   CONSTITUTIONAL:   There is no history of fever, chills, night sweats, weight loss, weight gain, persistent fatigue, or lethargy/hypersomnolence. CARDIAC:   No chest pain, pressure, discomfort, palpitations, orthopnea, murmurs, or edema. GI:   No dysphagia, heartburn reflux, nausea/vomiting, diarrhea, abdominal pain, or bleeding. NEURO:   There is no history of AMS, persistent headache, decreased level of consciousness, seizures, or motor or sensory deficits. PHYSICAL EXAM:    Vitals:    12/05/22 1517   BP: 114/82   Pulse: 84   Resp: 14   Temp: 97 °F (36.1 °C)   SpO2: 97%        GENERAL APPEARANCE:   The patient is normal weight and in no respiratory distress. HEENT:   PERRL. Conjunctivae unremarkable. Nasal mucosa is without epistaxis, exudate, or polyps. Gums and dentition are unremarkable. There is  oropharyngeal narrowing. TMs are clear. NECK/LYMPHATIC:   Symmetrical with no elevation of jugular venous pulsation. Trachea midline. No thyroid enlargement. No cervical adenopathy. LUNGS:   Normal respiratory effort with symmetrical lung expansion. Breath sounds are clear. HEART:   There is a regular rate and rhythm. No murmur, rub, or gallop. There is no edema in the lower extremities. ABDOMEN:   Soft and non-tender. No hepatosplenomegaly.   Bowel sounds are normal.     NEURO:   The patient is alert and oriented to person, place, and time. Memory appears intact and mood is normal.  No gross sensorimotor deficits are present. ASSESSMENT:  (Medical Decision Making)      Diagnosis Orders   1. TREVOR (obstructive sleep apnea), severe and currently being treated with BiPAP at 18/14 cm. His compliance remains suboptimal and erratic and will need closer monitoring       2. Nocturnal hypoxemia, improved with the BiPAP treatment       3. Hypersomnia, unspecified, related to suboptimal treatment of sleep apnea            PLAN:    Continue BiPAP at 18/14 cm with humidity    Encouraged the patient to follow proper sleep hygiene and use his BiPAP anytime he is sleeping even when he is taking a nap    Continue follow-up with other providers    Reminded the patient to bring his machine next office visit    Return to sleep center in 3 months or sooner if needed         Over 50% of today's office visit was spent in face to face time reviewing test results, prognosis, importance of compliance, education about disease process, benefits of medications, instructions for management of acute flare-ups, and follow up plans. Total face to face time spent with patient and charting was 30 minutes.         Kendall Rios MD  Electronically signed

## 2022-12-05 ENCOUNTER — OFFICE VISIT (OUTPATIENT)
Dept: SLEEP MEDICINE | Age: 77
End: 2022-12-05
Payer: MEDICARE

## 2022-12-05 VITALS
DIASTOLIC BLOOD PRESSURE: 82 MMHG | SYSTOLIC BLOOD PRESSURE: 114 MMHG | OXYGEN SATURATION: 97 % | HEART RATE: 84 BPM | HEIGHT: 70 IN | TEMPERATURE: 97 F | RESPIRATION RATE: 14 BRPM | WEIGHT: 182 LBS | BODY MASS INDEX: 26.05 KG/M2

## 2022-12-05 DIAGNOSIS — G47.33 OSA (OBSTRUCTIVE SLEEP APNEA): Primary | ICD-10-CM

## 2022-12-05 DIAGNOSIS — G47.34 NOCTURNAL HYPOXEMIA: ICD-10-CM

## 2022-12-05 DIAGNOSIS — G47.10 HYPERSOMNIA, UNSPECIFIED: ICD-10-CM

## 2022-12-05 PROCEDURE — G8484 FLU IMMUNIZE NO ADMIN: HCPCS | Performed by: INTERNAL MEDICINE

## 2022-12-05 PROCEDURE — 1123F ACP DISCUSS/DSCN MKR DOCD: CPT | Performed by: INTERNAL MEDICINE

## 2022-12-05 PROCEDURE — 3078F DIAST BP <80 MM HG: CPT | Performed by: INTERNAL MEDICINE

## 2022-12-05 PROCEDURE — 99214 OFFICE O/P EST MOD 30 MIN: CPT | Performed by: INTERNAL MEDICINE

## 2022-12-05 PROCEDURE — 1036F TOBACCO NON-USER: CPT | Performed by: INTERNAL MEDICINE

## 2022-12-05 PROCEDURE — 3074F SYST BP LT 130 MM HG: CPT | Performed by: INTERNAL MEDICINE

## 2022-12-05 PROCEDURE — G8427 DOCREV CUR MEDS BY ELIG CLIN: HCPCS | Performed by: INTERNAL MEDICINE

## 2022-12-05 PROCEDURE — G8417 CALC BMI ABV UP PARAM F/U: HCPCS | Performed by: INTERNAL MEDICINE

## 2022-12-05 ASSESSMENT — SLEEP AND FATIGUE QUESTIONNAIRES
HOW LIKELY ARE YOU TO NOD OFF OR FALL ASLEEP WHILE SITTING AND TALKING TO SOMEONE: 1
HOW LIKELY ARE YOU TO NOD OFF OR FALL ASLEEP WHILE SITTING INACTIVE IN A PUBLIC PLACE: 0
HOW LIKELY ARE YOU TO NOD OFF OR FALL ASLEEP WHILE WATCHING TV: 0
HOW LIKELY ARE YOU TO NOD OFF OR FALL ASLEEP IN A CAR, WHILE STOPPED FOR A FEW MINUTES IN TRAFFIC: 0
HOW LIKELY ARE YOU TO NOD OFF OR FALL ASLEEP WHILE SITTING QUIETLY AFTER LUNCH WITHOUT ALCOHOL: 1
ESS TOTAL SCORE: 8
HOW LIKELY ARE YOU TO NOD OFF OR FALL ASLEEP WHEN YOU ARE A PASSENGER IN A CAR FOR AN HOUR WITHOUT A BREAK: 1
HOW LIKELY ARE YOU TO NOD OFF OR FALL ASLEEP WHILE LYING DOWN TO REST IN THE AFTERNOON WHEN CIRCUMSTANCES PERMIT: 3
HOW LIKELY ARE YOU TO NOD OFF OR FALL ASLEEP WHILE SITTING AND READING: 2

## 2022-12-14 DIAGNOSIS — E11.51 TYPE 2 DIABETES MELLITUS WITH DIABETIC PERIPHERAL ANGIOPATHY WITHOUT GANGRENE, WITHOUT LONG-TERM CURRENT USE OF INSULIN (HCC): ICD-10-CM

## 2022-12-14 DIAGNOSIS — Z85.46 HISTORY OF PROSTATE CANCER: ICD-10-CM

## 2022-12-14 DIAGNOSIS — I25.810 CORONARY ARTERY DISEASE INVOLVING CORONARY BYPASS GRAFT OF NATIVE HEART WITHOUT ANGINA PECTORIS: ICD-10-CM

## 2022-12-14 DIAGNOSIS — R41.3 MEMORY DISTURBANCE: ICD-10-CM

## 2022-12-14 DIAGNOSIS — E78.5 DYSLIPIDEMIA: ICD-10-CM

## 2022-12-14 LAB
25(OH)D3 SERPL-MCNC: 45.8 NG/ML (ref 30–100)
ALBUMIN SERPL-MCNC: 3.7 G/DL (ref 3.2–4.6)
ALBUMIN/GLOB SERPL: 1 (ref 0.4–1.6)
ALP SERPL-CCNC: 82 U/L (ref 50–136)
ALT SERPL-CCNC: 30 U/L (ref 12–65)
ANION GAP SERPL CALC-SCNC: 6 MMOL/L (ref 2–11)
AST SERPL-CCNC: 24 U/L (ref 15–37)
BASOPHILS # BLD: 0 K/UL (ref 0–0.2)
BASOPHILS NFR BLD: 1 % (ref 0–2)
BILIRUB SERPL-MCNC: 0.4 MG/DL (ref 0.2–1.1)
BUN SERPL-MCNC: 14 MG/DL (ref 8–23)
CALCIUM SERPL-MCNC: 9.6 MG/DL (ref 8.3–10.4)
CHLORIDE SERPL-SCNC: 111 MMOL/L (ref 101–110)
CHOLEST SERPL-MCNC: 111 MG/DL
CO2 SERPL-SCNC: 26 MMOL/L (ref 21–32)
CREAT SERPL-MCNC: 1.5 MG/DL (ref 0.8–1.5)
CREAT UR-MCNC: 117 MG/DL
DIFFERENTIAL METHOD BLD: ABNORMAL
EOSINOPHIL # BLD: 0.1 K/UL (ref 0–0.8)
EOSINOPHIL NFR BLD: 2 % (ref 0.5–7.8)
ERYTHROCYTE [DISTWIDTH] IN BLOOD BY AUTOMATED COUNT: 13.2 % (ref 11.9–14.6)
GLOBULIN SER CALC-MCNC: 3.7 G/DL (ref 2.8–4.5)
GLUCOSE SERPL-MCNC: 92 MG/DL (ref 65–100)
HCT VFR BLD AUTO: 39.6 % (ref 41.1–50.3)
HDLC SERPL-MCNC: 40 MG/DL (ref 40–60)
HDLC SERPL: 2.8
HGB BLD-MCNC: 12.8 G/DL (ref 13.6–17.2)
IMM GRANULOCYTES # BLD AUTO: 0 K/UL (ref 0–0.5)
IMM GRANULOCYTES NFR BLD AUTO: 0 % (ref 0–5)
LDLC SERPL CALC-MCNC: 59.2 MG/DL
LYMPHOCYTES # BLD: 1.4 K/UL (ref 0.5–4.6)
LYMPHOCYTES NFR BLD: 34 % (ref 13–44)
MCH RBC QN AUTO: 29.4 PG (ref 26.1–32.9)
MCHC RBC AUTO-ENTMCNC: 32.3 G/DL (ref 31.4–35)
MCV RBC AUTO: 90.8 FL (ref 82–102)
MICROALBUMIN UR-MCNC: 3.91 MG/DL (ref 0–3)
MICROALBUMIN/CREAT UR-RTO: 33 MG/G (ref 0–30)
MONOCYTES # BLD: 0.5 K/UL (ref 0.1–1.3)
MONOCYTES NFR BLD: 11 % (ref 4–12)
NEUTS SEG # BLD: 2.2 K/UL (ref 1.7–8.2)
NEUTS SEG NFR BLD: 52 % (ref 43–78)
NRBC # BLD: 0 K/UL (ref 0–0.2)
PLATELET # BLD AUTO: 201 K/UL (ref 150–450)
PMV BLD AUTO: 11.8 FL (ref 9.4–12.3)
POTASSIUM SERPL-SCNC: 4.1 MMOL/L (ref 3.5–5.1)
PROT SERPL-MCNC: 7.4 G/DL (ref 6.3–8.2)
PSA SERPL-MCNC: <0.1 NG/ML
RBC # BLD AUTO: 4.36 M/UL (ref 4.23–5.6)
SODIUM SERPL-SCNC: 143 MMOL/L (ref 133–143)
T4 FREE SERPL-MCNC: 0.9 NG/DL (ref 0.78–1.46)
TRIGL SERPL-MCNC: 59 MG/DL (ref 35–150)
TSH, 3RD GENERATION: 1.7 UIU/ML (ref 0.36–3.74)
VIT B12 SERPL-MCNC: 545 PG/ML (ref 193–986)
VLDLC SERPL CALC-MCNC: 11.8 MG/DL (ref 6–23)
WBC # BLD AUTO: 4.2 K/UL (ref 4.3–11.1)

## 2022-12-15 LAB — RPR SER QL: NONREACTIVE

## 2022-12-16 LAB — VIT B1 BLD-SCNC: 102 NMOL/L (ref 66.5–200)

## 2022-12-18 NOTE — PROGRESS NOTES
Winter Agrawal (:  1945) is a 69 y.o. male,Established patient, here for evaluation of the following chief complaint(s):  Follow-up (Fu on diabetes and memory), Leg Pain, and Memory Loss         ASSESSMENT/PLAN:  1. Coronary artery disease involving coronary bypass graft of native heart without angina pectoris  2. Chronic HFrEF (heart failure with reduced ejection fraction) ([facility])  Cardiac catheterization on 2020 with 100% proximal LAD occlusion, 70% proximal occlusion of circumflex, 100% obtuse marginal occlusion, 90% stenosis of distal AV groove RCA, very heavily diseased PDA/PLV branches  Patent LIMA to LAD, known occlusion of SVG to RCA  99% subtotal occlusion in distal stent and 30% proximal in-stent restenosis of SVG to obtuse marginal; status post intervention of distal SVG body in-stent restenosis lesion  Echocardiogram on 2020 with a EF of 45 to 50%, hypokinesis of mid inferolateral, mid anterolateral and apical lateral wall, mildly increased wall thickness, grade 1 LV diastolic dysfunction, mildly dilated LA with mild MR and TR  Continue aspirin, Plavix, losartan, as needed nitroglycerin  Uncertain if patient currently taking beta-blocker and statin, need to confirm    3.  Peripheral artery disease ([address]  Bilateral lower extremity angiogram on 2022 as follows:  -Patent bilateral renal arteries  -Right lower extremity with heavily diseased below the knee popliteal artery and occluded however collateral flow supplying the foot  -Left lower extremity occluded below the knee popliteal artery with severe tibial disease  Vascular JONATHAN on 2022 with normal to mild disease of right lower extremity, mild disease of left lower extremity, reduced digit pressures and amplitudes possibly secondary to small vessel disease  Continue aspirin, Plavix, BP and glycemic control  Clarification needed whether patient still on some form of statin therapy    - External Referral To Pain versus pseudodementia from possible depression  Labs from 12/14/2022 shows negative RPR, normal thiamine, TSH, free T4, B12 and vitamin D levels  Previously declined offer to start antidepressive medication  Management of vascular disease as above  Have since referred patient to neurology  Referral to [facility] for formal driving evaluation    - External Referral to Occupational Therapy    8. Personal history of prostate cancer  History of radical retropubic prostatectomy in 2006 with follow-up PSA undetectable for years  Had biochemical recurrence with negative bone scan on 1/10/2014, MRI pelvis on 1/10/2014 with no definite recurrent soft tissue mass at prostate bed  PET scan on 8/7/2017 with small focus of abnormal tracer at prostatectomy bed on the left   Status post XRT x 6 months in 2019  PSA less than 0.1 on 12/14/2022  Continue monitoring    Return in about 8 weeks (around 2/15/2023) for EOV . Subjective   SUBJECTIVE/OBJECTIVE:  Patient is a 81-year-old -American male who presents to the office today for follow-up. Patient is a poor historian given underlying memory problems, chronic in nature. Patient still unaware that he needs to be checking his blood sugars regularly and therefore has not been doing so. Is not driving as much as he was previously but denies recent accidents. Still endorses chronic pain in both legs below the knee, left greater than right but believes it has been worsening as of late. Not specifically worse with ambulation. No associated back pain, lower extremity swelling, numbness, paresthesias or wounds. Suspected neuropathy from underlying diabetes. Patient does have known peripheral vascular disease undergoing angiogram in June of this year but does not recall having this done. Currently not taking anything for his pain. Would be agreeable to seeing a pain specialist given chronicity and worsening of symptoms.       Review of Systems Constitutional:  Positive for appetite change (Eating less given early satiety but still averaging 3 meals a day). Respiratory:  Negative for shortness of breath. Cardiovascular:  Negative for chest pain and leg swelling. Gastrointestinal:  Negative for abdominal pain, anal bleeding, blood in stool, constipation, diarrhea, nausea and vomiting. Genitourinary:  Negative for dysuria and hematuria. Musculoskeletal:  Positive for myalgias. Negative for back pain. Skin:  Negative for wound. Neurological:  Negative for numbness and headaches. Denies paresthesias   Psychiatric/Behavioral:  Positive for confusion. Objective   Physical Exam  Vitals reviewed. Constitutional:       General: He is not in acute distress. Appearance: Normal appearance. He is not ill-appearing, toxic-appearing or diaphoretic. HENT:      Head: Normocephalic and atraumatic. Eyes:      General: No visual field deficit. Right eye: No discharge. Left eye: No discharge. Extraocular Movements: Extraocular movements intact. Comments: Visual fields grossly intact and symmetric bilaterally   Cardiovascular:      Rate and Rhythm: Normal rate and regular rhythm. Heart sounds: No murmur heard. No friction rub. No gallop. Pulmonary:      Effort: Pulmonary effort is normal. No respiratory distress. Breath sounds: No wheezing, rhonchi or rales. Abdominal:      General: Bowel sounds are normal.      Palpations: Abdomen is soft. Tenderness: There is abdominal tenderness in the epigastric area. There is no right CVA tenderness, left CVA tenderness or guarding. Musculoskeletal:      Right lower leg: No edema. Left lower leg: No edema. Skin:     General: Skin is dry. Coloration: Skin is not jaundiced. Neurological:      Mental Status: He is alert. Mental status is at baseline. Cranial Nerves: No cranial nerve deficit, dysarthria or facial asymmetry. Sensory: Sensory deficit (Diminished sensation to light touch along right foot compared to left) present. Motor: No tremor or pronator drift. Coordination: Finger-Nose-Finger Test normal.      Comments: Muscle strength 5/5 in bilateral upper extremities   Psychiatric:         Attention and Perception: Attention normal.         Mood and Affect: Mood and affect normal. Mood is not anxious. Affect is not tearful. Speech: Speech normal. Speech is not rapid and pressured or slurred. Behavior: Behavior normal. Behavior is not agitated, aggressive or combative. Behavior is cooperative. Thought Content: Thought content normal.         Cognition and Memory: Memory is impaired. An electronic signature was used to authenticate this note.     --Junito Farley,

## 2022-12-21 ENCOUNTER — OFFICE VISIT (OUTPATIENT)
Dept: INTERNAL MEDICINE CLINIC | Facility: CLINIC | Age: 77
End: 2022-12-21
Payer: MEDICARE

## 2022-12-21 VITALS
SYSTOLIC BLOOD PRESSURE: 138 MMHG | BODY MASS INDEX: 26.14 KG/M2 | HEART RATE: 76 BPM | DIASTOLIC BLOOD PRESSURE: 86 MMHG | TEMPERATURE: 97.8 F | OXYGEN SATURATION: 100 % | HEIGHT: 70 IN | WEIGHT: 182.6 LBS

## 2022-12-21 DIAGNOSIS — I73.9 PERIPHERAL ARTERY DISEASE (HCC): ICD-10-CM

## 2022-12-21 DIAGNOSIS — I10 ESSENTIAL HYPERTENSION: ICD-10-CM

## 2022-12-21 DIAGNOSIS — E11.42 DIABETIC POLYNEUROPATHY ASSOCIATED WITH TYPE 2 DIABETES MELLITUS (HCC): ICD-10-CM

## 2022-12-21 DIAGNOSIS — I50.22 CHRONIC HFREF (HEART FAILURE WITH REDUCED EJECTION FRACTION) (HCC): ICD-10-CM

## 2022-12-21 DIAGNOSIS — R41.3 MEMORY DISTURBANCE: ICD-10-CM

## 2022-12-21 DIAGNOSIS — I25.810 CORONARY ARTERY DISEASE INVOLVING CORONARY BYPASS GRAFT OF NATIVE HEART WITHOUT ANGINA PECTORIS: Primary | ICD-10-CM

## 2022-12-21 DIAGNOSIS — Z85.46 PERSONAL HISTORY OF PROSTATE CANCER: ICD-10-CM

## 2022-12-21 DIAGNOSIS — E11.51 TYPE 2 DIABETES MELLITUS WITH DIABETIC PERIPHERAL ANGIOPATHY WITHOUT GANGRENE, WITHOUT LONG-TERM CURRENT USE OF INSULIN (HCC): ICD-10-CM

## 2022-12-21 LAB — HBA1C MFR BLD: 6.6 %

## 2022-12-21 PROCEDURE — 83036 HEMOGLOBIN GLYCOSYLATED A1C: CPT | Performed by: STUDENT IN AN ORGANIZED HEALTH CARE EDUCATION/TRAINING PROGRAM

## 2022-12-21 PROCEDURE — 3078F DIAST BP <80 MM HG: CPT | Performed by: STUDENT IN AN ORGANIZED HEALTH CARE EDUCATION/TRAINING PROGRAM

## 2022-12-21 PROCEDURE — 1036F TOBACCO NON-USER: CPT | Performed by: STUDENT IN AN ORGANIZED HEALTH CARE EDUCATION/TRAINING PROGRAM

## 2022-12-21 PROCEDURE — G8427 DOCREV CUR MEDS BY ELIG CLIN: HCPCS | Performed by: STUDENT IN AN ORGANIZED HEALTH CARE EDUCATION/TRAINING PROGRAM

## 2022-12-21 PROCEDURE — 1123F ACP DISCUSS/DSCN MKR DOCD: CPT | Performed by: STUDENT IN AN ORGANIZED HEALTH CARE EDUCATION/TRAINING PROGRAM

## 2022-12-21 PROCEDURE — 99214 OFFICE O/P EST MOD 30 MIN: CPT | Performed by: STUDENT IN AN ORGANIZED HEALTH CARE EDUCATION/TRAINING PROGRAM

## 2022-12-21 PROCEDURE — 3074F SYST BP LT 130 MM HG: CPT | Performed by: STUDENT IN AN ORGANIZED HEALTH CARE EDUCATION/TRAINING PROGRAM

## 2022-12-21 PROCEDURE — G8484 FLU IMMUNIZE NO ADMIN: HCPCS | Performed by: STUDENT IN AN ORGANIZED HEALTH CARE EDUCATION/TRAINING PROGRAM

## 2022-12-21 PROCEDURE — G8417 CALC BMI ABV UP PARAM F/U: HCPCS | Performed by: STUDENT IN AN ORGANIZED HEALTH CARE EDUCATION/TRAINING PROGRAM

## 2022-12-21 RX ORDER — CARVEDILOL 12.5 MG/1
12.5 TABLET ORAL 2 TIMES DAILY WITH MEALS
Qty: 180 TABLET | Refills: 2 | Status: SHIPPED | OUTPATIENT
Start: 2022-12-21

## 2022-12-21 RX ORDER — CLOPIDOGREL BISULFATE 75 MG/1
TABLET ORAL
Qty: 90 TABLET | Refills: 2 | Status: SHIPPED | OUTPATIENT
Start: 2022-12-21

## 2022-12-21 ASSESSMENT — ENCOUNTER SYMPTOMS
ABDOMINAL PAIN: 0
CONSTIPATION: 0
SHORTNESS OF BREATH: 0
VOMITING: 0
DIARRHEA: 0
NAUSEA: 0
ANAL BLEEDING: 0
BLOOD IN STOOL: 0
BACK PAIN: 0

## 2022-12-27 ENCOUNTER — HOSPITAL ENCOUNTER (EMERGENCY)
Dept: GENERAL RADIOLOGY | Age: 77
Discharge: HOME | End: 2022-12-30
Payer: MEDICARE

## 2022-12-27 ENCOUNTER — HOSPITAL ENCOUNTER (EMERGENCY)
Dept: ULTRASOUND IMAGING | Age: 77
End: 2022-12-27
Payer: MEDICARE

## 2022-12-27 ENCOUNTER — HOSPITAL ENCOUNTER (EMERGENCY)
Age: 77
Discharge: HOME | End: 2022-12-27
Attending: EMERGENCY MEDICINE
Payer: MEDICARE

## 2022-12-27 ENCOUNTER — HOSPITAL ENCOUNTER (EMERGENCY)
Dept: ULTRASOUND IMAGING | Age: 77
Discharge: HOME | End: 2022-12-30
Payer: MEDICARE

## 2022-12-27 VITALS
TEMPERATURE: 98.2 F | HEIGHT: 70 IN | DIASTOLIC BLOOD PRESSURE: 81 MMHG | SYSTOLIC BLOOD PRESSURE: 144 MMHG | OXYGEN SATURATION: 96 % | BODY MASS INDEX: 26.05 KG/M2 | HEART RATE: 89 BPM | RESPIRATION RATE: 16 BRPM | WEIGHT: 182 LBS

## 2022-12-27 DIAGNOSIS — M79.604 BILATERAL LEG PAIN: ICD-10-CM

## 2022-12-27 DIAGNOSIS — M25.561 CHRONIC PAIN OF BOTH KNEES: Primary | ICD-10-CM

## 2022-12-27 DIAGNOSIS — M25.562 CHRONIC PAIN OF BOTH KNEES: Primary | ICD-10-CM

## 2022-12-27 DIAGNOSIS — M79.605 BILATERAL LEG PAIN: ICD-10-CM

## 2022-12-27 DIAGNOSIS — G89.29 CHRONIC PAIN OF BOTH KNEES: Primary | ICD-10-CM

## 2022-12-27 PROCEDURE — 73562 X-RAY EXAM OF KNEE 3: CPT

## 2022-12-27 PROCEDURE — 93971 EXTREMITY STUDY: CPT

## 2022-12-27 PROCEDURE — 99284 EMERGENCY DEPT VISIT MOD MDM: CPT

## 2022-12-27 ASSESSMENT — PAIN SCALES - GENERAL: PAINLEVEL_OUTOF10: 7

## 2022-12-27 ASSESSMENT — PAIN - FUNCTIONAL ASSESSMENT
PAIN_FUNCTIONAL_ASSESSMENT: PREVENTS OR INTERFERES SOME ACTIVE ACTIVITIES AND ADLS
PAIN_FUNCTIONAL_ASSESSMENT: 0-10

## 2022-12-27 ASSESSMENT — PAIN DESCRIPTION - LOCATION: LOCATION: LEG

## 2022-12-27 ASSESSMENT — PAIN DESCRIPTION - ORIENTATION: ORIENTATION: RIGHT;LEFT

## 2022-12-27 ASSESSMENT — PAIN DESCRIPTION - PAIN TYPE: TYPE: ACUTE PAIN

## 2022-12-27 ASSESSMENT — PAIN DESCRIPTION - ONSET: ONSET: SUDDEN

## 2022-12-27 ASSESSMENT — PAIN DESCRIPTION - FREQUENCY: FREQUENCY: CONTINUOUS

## 2022-12-27 ASSESSMENT — PAIN DESCRIPTION - DESCRIPTORS: DESCRIPTORS: ACHING;DISCOMFORT;SHARP;SHOOTING

## 2022-12-27 NOTE — ED NOTES
I have reviewed discharge instructions with the patient. The patient verbalized understanding. Patient left ED via Discharge Method: ambulatory to Home. Opportunity for questions and clarification provided. Patient given 0 scripts. To continue your aftercare when you leave the hospital, you may receive an automated call from our care team to check in on how you are doing. This is a free service and part of our promise to provide the best care and service to meet your aftercare needs.  If you have questions, or wish to unsubscribe from this service please call [phone]. Thank you for Choosing our [facility] Emergency Department.         Alfonso Denise RN  12/27/22 0306

## 2022-12-27 NOTE — ED PROVIDER NOTES
[facility] Emergency Department Provider Note                   PCP:                Carli Payne DO               Age: 69 y.o. Sex: male       ICD-10-CM    1. Chronic pain of both knees  M25.[address]    D66.893     G89.29       2. Bilateral leg pain  M79.[address]    G43.109           DISPOSITION Decision To Discharge 12/27/2022 04:44:29 PM         Orders Placed This Encounter   Procedures    XR KNEE LEFT (3 VIEWS)    [address]    Vascular duplex lower extremity venous left        Dominique Lopez is a 69 y.o. male who presents to the Emergency Department with chief complaint of    Chief Complaint   Patient presents with    Leg Pain     Bilateral legs      81-year-old male presenting to the emergency department with chief complaint of left lower leg pain that began last night and has worsened today. Denies any history of VTE. He is not currently on any anticoagulation therapy medication. He denies chest pain and shortness of breath. The history is provided by the patient.        Review of Systems    Past Medical History:   Diagnosis Date    Allergic rhinitis     Arthritis     CAD (coronary artery disease)     CABG '09; troponin elevated 7/14/12 (\"likely due to supply/demand mismatch in setting of fever and increased metabolic demand\"-per cardiac MD note 8/20/12)-had cath, echo, EKG-all WNL except cath showed 2 of the bypasses with blockages- \"occluded SVG to PDA & SVG to MARIANELA\"; EF=55%    Cerebral artery occlusion with cerebral infarction ([address] 2017    acute/subacute infarct left basal ganglia    CHF (congestive heart failure) ([address]     Chronic kidney disease     3a    Coronary artery disease of native artery of native heart with stable angina pectoris ([address] 2019, 2020    2019- STEMI- v fib arrest enroute, stent; 2020 NSTEMI angioplasty Family History   Problem Relation Age of Onset    Coronary Art Dis Mother     Heart Disease Mother          of Heart Disease    Osteoarthritis Mother     Gout Mother     Hypertension Mother     Bleeding Prob Paternal Grandmother     Hypertension Brother     Diabetes Father     Cancer Father     Heart Disease Father     Cancer Maternal Uncle         Prostate        Social History     Socioeconomic History    Marital status:    Tobacco Use    Smoking status: Former     Packs/day: 1.00     Types: Cigarettes     Quit date:      Years since quittin.0    Smokeless tobacco: Never    Tobacco comments:     Quit smoking: quit around age 30 y/o; smoked 18 years   Vaping Use    Vaping Use: Never used   Substance and Sexual Activity    Alcohol use: Not Currently    Drug use: No         Celecoxib, Colchicine, Febuxostat, Fluvastatin, Glucosamine 1500 complex [glucosamine-chondroitin], Ibuprofen, Nsaids, and Sulfa antibiotics     Discharge Medication List as of 2022  4:51 PM        CONTINUE these medications which have NOT CHANGED    Details   clopidogrel (PLAVIX) 75 MG tablet TAKE 1 TABLET DAILY, Disp-90 tablet, R-2Normal      carvedilol (COREG) 12.5 MG tablet Take 1 tablet by mouth 2 times daily (with meals), Disp-180 tablet, R-2Normal      esomeprazole (NEXIUM) 40 MG delayed release capsule 1 capsuleHistorical Med      famotidine (PEPCID) 20 MG tablet Historical Med      fluticasone (FLONASE) 50 MCG/ACT nasal spray 1 spray in each nostril Nasally Once a dayHistorical Med      metoprolol (LOPRESSOR) 100 MG tablet 1 tablet Orally Twice a dayHistorical Med      pravastatin (PRAVACHOL) 40 MG tablet (Prior Auth: Lexi FORBES#:[id number]) Oral for 90Historical Med      !! Lancets 28G MISC Historical Med      Continuous Blood Gluc Sensor (FREESTYLE TANVI SENSOR SYSTEM) MISC FreeStyle Lite Meter kit   USE TO TEST BLOOD SUGAR UP TO THREE TIMES DAILYHistorical Med      Vitamin D-Vitamin K (VITAMIN K2-VITAMIN D3)  MCG-UNIT CAPS OrallyHistorical Med      baclofen (LIORESAL) 10 MG tablet Take 1 tablet by mouth 2 times daily, Disp-60 tablet, R-0Normal      triamcinolone (KENALOG) 0.1 % cream Apply topically 2 times daily to elbows as needed for rash, Disp-30 g, R-1, Normal      metFORMIN (GLUCOPHAGE) 500 MG tablet Take 1 tablet by mouth 2 times daily (with meals), Disp-180 tablet, R-2Normal      !! Lancets [facility] Starting Thu 3/3/2022, Historical MedCheck blood sugar up to three times a day      acetaminophen (TYLENOL) 325 MG tablet Take 650 mg by mouth every 4 hours as neededHistorical Med      aspirin 81 MG chewable tablet Take 81 mg by mouth dailyHistorical Med      bisacodyl (DULCOLAX) 5 MG EC tablet Take 5 mg by mouth nightly as needed Historical Med      Cetirizine HCl 10 MG CAPS Take 10 mg by mouth nightly Historical Med      Cholecalciferol 50 MCG (2000 UT) TABS Take 2,000 Units by mouth Daily with lunch Historical Med      ezetimibe (ZETIA) 10 MG tablet Take 10 mg by mouth nightly Historical Med      gabapentin (NEURONTIN) 600 MG tablet Take 600 mg by mouth 2 times daily. Historical Med      losartan (COZAAR) 50 MG tablet Take 50 mg by mouth Daily with lunch Historical Med      nitroGLYCERIN (NITROSTAT) 0.4 MG SL tablet Take 0.4 mg by mouthHistorical Med      rosuvastatin (CRESTOR) 20 MG tablet Take 20 mg by mouth nightly Historical Med      Simethicone 125 MG CAPS Take 125 mg by mouth 4 times daily as needed Takes at [address]       !! - Potential duplicate medications found. Please discuss with provider. Vitals signs and nursing note reviewed. No data found. Physical Exam  Vitals reviewed. Constitutional:       Appearance: Normal appearance. HENT:      Head: Normocephalic and atraumatic. Nose: No congestion or rhinorrhea. Mouth/Throat:      Mouth: Mucous membranes are moist.   Eyes:      Extraocular Movements: Extraocular movements intact.       Conjunctiva/sclera: Conjunctivae normal.      Pupils: Pupils are equal, round, and reactive to light. Cardiovascular:      Rate and Rhythm: Normal rate and regular rhythm. Pulses: Normal pulses. Heart sounds: Normal heart sounds. No murmur heard. Pulmonary:      Effort: Pulmonary effort is normal. No respiratory distress. Breath sounds: Normal breath sounds. Abdominal:      General: Abdomen is flat. There is no distension. Palpations: Abdomen is soft. Tenderness: There is no abdominal tenderness. There is no guarding. Genitourinary:     Penis: Normal.       Testes: Normal.   Musculoskeletal:         General: Tenderness present. No swelling. Normal range of motion. Cervical back: Normal range of motion and neck supple. No rigidity. Comments: Left calf tenderness to palpation   Skin:     General: Skin is warm and dry. Neurological:      General: No focal deficit present. Mental Status: He is alert and oriented to person, place, and time. Psychiatric:         Mood and Affect: Mood normal.         Behavior: Behavior normal.         Thought Content: Thought content normal.         Judgment: Judgment normal.        MDM  Number of Diagnoses or Management Options  Bilateral leg pain  Chronic pain of both knees  Diagnosis management comments: 80-year-old male presenting to the emergency department chief complaint of left lower leg pain. DVT, arthritis, musculoskeletal pain    Upon assessment and evaluation of the patient, he is complaining of left lower leg and knee pain. Being that he has had pain in both of his legs my suspicion is that the pain is musculoskeletal in nature however cannot rule out VTE without obtaining venous Doppler imaging of left lower extremity. His physical examination has guided my decision to obtain Doppler imaging due to increased tenderness to palpation of the left calf compared to the right. Doppler imaging negative for DVT.   X-ray imaging of the left knee indicates no acute findings. Clinical suspicion that his pain symptoms are secondary to arthritic changes being that the pain is in his legs bilaterally, knees, and feet bilaterally. I advised follow-[facility] with [facility] for further assessment of joint pain. I suspect that the patient needs orthopedics over rheumatology due to the worsening of the symptoms at the end of the day and his symptoms are consistent with osteoarthritis. Complexity of Problem: 1 stable, chronic illness. (3)  The patients assessment required an independent historian: I spoke with a family member. I have conducted an independent ordering and review of Ultrasound. Considerations: Shared decision making was utilized in the care of this patient. Patient was discharged risks and benefits of hospitalization were discussed. Amount and/or Complexity of Data Reviewed  Tests in the radiology section of CPT®: ordered and reviewed    Patient Progress  Patient progress: stable      Procedures      Labs Reviewed - No data to display     Vascular duplex lower extremity venous left   Final Result   No evidence of deep venous thrombosis in the left lower extremity         XR KNEE LEFT (3 VIEWS)   Final Result   No acute findings                                Voice dictation software was used during the making of this note. This software is not perfect and grammatical and other typographical errors may be present. This note has not been completely proofread for errors.      LISANDRA Hallman  12/27/22 2028

## 2022-12-27 NOTE — DISCHARGE INSTRUCTIONS
Your x-ray imaging of your left knee indicates there is no broken bone or dislocated bones present. Ultrasound Doppler imaging of your left lower leg indicates there is no presence of blood clot. Continue to take over-the-counter medications such as NSAIDs or Tylenol as needed for pain relief. Follow-up with your primary care provider. Return to the emergency department if your symptoms worsen.

## 2023-01-18 ENCOUNTER — OFFICE VISIT (OUTPATIENT)
Dept: ORTHOPEDIC SURGERY | Age: 78
End: 2023-01-18

## 2023-01-18 DIAGNOSIS — M25.561 ACUTE PAIN OF RIGHT KNEE: ICD-10-CM

## 2023-01-18 DIAGNOSIS — M17.12 PRIMARY OSTEOARTHRITIS OF LEFT KNEE: ICD-10-CM

## 2023-01-18 DIAGNOSIS — M17.11 PRIMARY OSTEOARTHRITIS OF RIGHT KNEE: Primary | ICD-10-CM

## 2023-01-18 DIAGNOSIS — M17.11 PRIMARY OSTEOARTHRITIS OF RIGHT KNEE: ICD-10-CM

## 2023-01-18 DIAGNOSIS — M25.562 ACUTE PAIN OF LEFT KNEE: Primary | ICD-10-CM

## 2023-01-18 RX ORDER — METHYLPREDNISOLONE ACETATE 40 MG/ML
40 INJECTION, SUSPENSION INTRA-ARTICULAR; INTRALESIONAL; INTRAMUSCULAR; SOFT TISSUE ONCE
Status: COMPLETED | OUTPATIENT
Start: 2023-01-18 | End: 2023-01-18

## 2023-01-18 RX ADMIN — METHYLPREDNISOLONE ACETATE 40 MG: 40 INJECTION, SUSPENSION INTRA-ARTICULAR; INTRALESIONAL; INTRAMUSCULAR; SOFT TISSUE at 10:11

## 2023-01-18 NOTE — PROGRESS NOTES
Name: Wolf Love  YOB: 1945  Gender: male  MRN: [id number]    CC: Right knee pain left worse than left    HPI: Wolf Love is a 69 y.o. male who presents with a primary complaint of right knee pain much worse than left. He does appear to have significant memory and cognition problems today on exam.  He seems to have difficulty determining left versus right and he ask where his major concern is. He does have a history of significant peripheral vascular disease resulting in CVA in 2017 according to the chart. He also has a recent work-up for significant peripheral vascular disease with vascular claudication in both lower extremities right worse than left completed in the fall 2022. Per chart review he was found to have significant arterial compromise below the knee bilaterally with collateral circulation but was not felt to be reconstructable. The patient presented to the emergency room around Nelda time with concerns with knee pain and it looks as though his left knee was worked up with an ultrasound which ruled out DVT but no mention in those notes are of any concern with vascular evaluations. Patient is on Plavix currently and it sounds as though has been recommended that he be on more aggressive anticoagulation in the past but is not on that. He describes pain with weightbearing on the right knee but describes his pain is not limiting his activity but more aggravating. He does describe some calf pain but he is able to localize his pain chiefly today to his right knee pointing to it at his area of major pain. History was obtained from patient and he has no one accompany him today    ROS/Meds/PSH/PMH/FH/SH: I personally reviewed the patients standard intake form. Below are the pertinents    Tobacco:  reports that he quit smoking about 38 years ago. His smoking use included cigarettes. He smoked an average of 1 pack per day.  He has never used smokeless tobacco.  Past Medical History:   Diagnosis Date    Allergic rhinitis     Arthritis     CAD (coronary artery disease)     CABG '09; troponin elevated 7/14/12 (\"likely due to supply/demand mismatch in setting of fever and increased metabolic demand\"-per cardiac MD note 8/20/12)-had cath, echo, EKG-all WNL except cath showed 2 of the bypasses with blockages- \"occluded SVG to PDA & SVG to MARIANELA\"; EF=55%    Cerebral artery occlusion with cerebral infarction ([address] 2017    acute/subacute infarct left basal ganglia    CHF (congestive heart failure) ([address]     Chronic kidney disease     3a    Coronary artery disease of native artery of native heart with stable angina pectoris ([address] 2019, 2020 2019- STEMI- v fib arrest enroute, stent; 2020 NSTEMI angioplasty    Diabetes mellitus type 2, controlled ([address]     patient does not know if he takes medication for DM; rarely check BS, denies hypoglycemia    ED (erectile dysfunction)     Encephalitis 1962    x 2/hospitalized as teenager    Gout     hx of    History of blood transfusion     due to injury    Hx of echocardiogram 02/11/2022    EF 50-55%    Hypercholesterolemia     Hypertension     medications    Ischemic cardiomyopathy     Lacunar infarct, acute ([address]     possible embolic, remote a-fib- on eliquis    Lumbago     Memory loss     Mitral valve regurgitation 7/14/12    \"moderate\" on echo    TREVOR (obstructive sleep apnea)     mild per patient, does not use CPAP    PAD (peripheral artery disease) ([address]     PAF (paroxysmal atrial fibrillation) ([address]     Poor historian 2022    patient does not know medications and does not know his medical history    Prostate cancer ([address]     followed by urology    Psoriasis     topical creams    SBO (small bowel obstruction) ([address] 2011, 2015, 2016      Past Surgical History:   Procedure Laterality Date    APPENDECTOMY  1953    as a child    CATARACT REMOVAL Bilateral 2013    [id number], 2010    CORONARY ARTERY BYPASS GRAFT 03/09/2009    x4 bypass    HERNIA REPAIR Bilateral 2012    LEFT HEART CATH,PERCUTANEOUS  7/2012    no intervention    LEFT HEART CATH,PERCUTANEOUS  09/25/2019    NSTEMI & PCI    LEFT HEART CATH,PERCUTANEOUS  03/2019    STEMI, PCI, VFib arrest    MI UNLISTED PROCEDURE ABDOMEN PERITONEUM & OMENTUM  1957    exp lap, numerous surgeries on abdomen from an injury    PROSTATE BIOPSY, NEEDLE, SATURATION SAMPLING      and ultrasound    PROSTATECTOMY       SHOULDER ARTHROPLASTY Right 2018    Reverse R TSA    SPLENECTOMY  1951    VASCULAR SURGERY  12/29/2017    aortogram, w/kristi LE runoff,R-LE arteriogram    VASCULAR SURGERY Right 6/22/2022    ULTRASOUND GUIDED BILATERAL LOWER EXTREMITY ARTERIOGRAM/ AORTAGRAM performed by Paige Cruz MD at [facility] MA[facility] OR    WISDOM TOOTH EXTRACTION          Physical Examination:  Physical exam: On examination of the patient's gait there is there is varus deformity in the right knee and there is a flexion contracture in the rightknee    On examination while standing there is decreased flexion in the lumbosacral spine without acute list or spasm. While seated ,  the Bilateral hip is examined there is full range of motion without obvious issue . On examination of the  Right knee :ROM is 10 to 125 The knee is in varus which corrects with valgus stress to some degree and There is no obvious effusion. On examination of the  Left knee :ROM is 0 to 125 There is no obvious effusion. There is no angular malalignment. Patient is neurologically intact distally. Skin is intact. Inspection of his skin distally he does have good maintenance of skin integrity. There is no significant edema present there is warmth present. I am not able to palpate a clear distal pulse on either lower extremity. He does not appear to have significant calf tenderness or swelling today. Imaging:   Radiographs:     An AP standing, skiers, flexion lateral, and sunrise view of the right knee knee was obtained This demonstrated  Marked medial joint space narrowing and Marked osteophyte formation in all 3 compartments. Radiographic impression: marked DJD right Knee    Radiographs: An AP standing, skiers, flexion lateral, and sunrise view of the left knee was obtained  This demonstrated  Mild joint space narrowing involving the medial compartment. 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 Right worse than left in the patient with apparent memory and perhaps cognition problems with a marked history of peripheral vascular and cardiovascular disease. He has had a work-up recently and per chart review suggests collateral circulation in both lower extremities but significant unreconstructable arterial disease. I did discuss with the patient the source of the pain and treatment options. We discussed nonsurgical measures including:Injection therapy, Bracing, Activity Modification, and Use of assistive device. I counseled the patient regarding the difference between various injection therapies. I explained the role of steroid injections and focused on the use of steroids for more acute issues and flareups of arthritic and other concerns within the joint. Also counseled patient regarding the role of viscosupplementation. I counseled them about the use of viscosupplementation in more moderate issues and more chronic problems. Counseled about the way in which would be administered in the expectation in terms of outcome. We talked about the risks of injection therapy with viscosupplementation. We also discussed the definitive option of total Knee arthroplasty. I am concerned that his issues with his cardiovascular disease and prevascular disease could present significant problems perioperatively. I have explained to him that should he have vascular compromise it does not sound that there is much that can be done with the problem.   There is likely some overlap of his claudication symptoms with his knee arthritis but I do think his knee is a significant issue here    At this point, the patient would prefer to proceed with injection therapies. We will set up viscosupplementation at the patient's earliest convenience pending insurance approval.    They desire a cortisone injection in hopes of some immediate pain relief - please see note below. Hopefully this will give the patient some relief if not further options will be discussed. Plan:       Follow up:   Return for Set Up [facility] Visits.      Kiya Brown MD      Name: Angely Valdivia  YOB: 1945  Gender: male  MRN: [id number]        Current Outpatient Medications:     clopidogrel (PLAVIX) 75 MG tablet, TAKE 1 TABLET DAILY, Disp: 90 tablet, Rfl: 2    carvedilol (COREG) 12.5 MG tablet, Take 1 tablet by mouth 2 times daily (with meals), Disp: 180 tablet, Rfl: 2    esomeprazole (NEXIUM) 40 MG delayed release capsule, 1 capsule, Disp: , Rfl:     famotidine (PEPCID) 20 MG tablet, , Disp: , Rfl:     fluticasone (FLONASE) 50 MCG/ACT nasal spray, 1 spray in each nostril Nasally Once a day, Disp: , Rfl:     metoprolol (LOPRESSOR) 100 MG tablet, 1 tablet Orally Twice a day, Disp: , Rfl:     pravastatin (PRAVACHOL) 40 MG tablet, (Prior Auth: Rx RIG#:[id number]) Oral for 90, Disp: , Rfl:     Lancets 28G MISC, FreeStyle Lancets 28 gauge  USE TO CHECK BLOOD SUGAR UP TO THREE TIMES DAILY, Disp: , Rfl:     Continuous Blood Gluc Sensor (FREESTYLE TANVI SENSOR SYSTEM) MISC, FreeStyle Lite Meter kit  USE TO TEST BLOOD SUGAR UP TO THREE TIMES DAILY, Disp: , Rfl:     Vitamin D-Vitamin K (VITAMIN K2-VITAMIN D3)  MCG-UNIT CAPS, Orally, Disp: , Rfl:     baclofen (LIORESAL) 10 MG tablet, Take 1 tablet by mouth 2 times daily, Disp: 60 tablet, Rfl: 0    triamcinolone (KENALOG) 0.1 % cream, Apply topically 2 times daily to elbows as needed for rash, Disp: 30 g, Rfl: 1 metFORMIN (GLUCOPHAGE) 500 MG tablet, Take 1 tablet by mouth 2 times daily (with meals), Disp: 180 tablet, Rfl: 2    Lancets MISC, Check blood sugar up to three times a day, Disp: , Rfl:     acetaminophen (TYLENOL) 325 MG tablet, Take 650 mg by mouth every 4 hours as needed, Disp: , Rfl:     aspirin 81 MG chewable tablet, Take 81 mg by mouth daily, Disp: , Rfl:     bisacodyl (DULCOLAX) 5 MG EC tablet, Take 5 mg by mouth nightly as needed , Disp: , Rfl:     Cetirizine HCl 10 MG CAPS, Take 10 mg by mouth nightly , Disp: , Rfl:     Cholecalciferol 50 MCG (2000 UT) TABS, Take 2,000 Units by mouth Daily with lunch , Disp: , Rfl:     ezetimibe (ZETIA) 10 MG tablet, Take 10 mg by mouth nightly , Disp: , Rfl:     gabapentin (NEURONTIN) 600 MG tablet, Take 600 mg by mouth 2 times daily. , Disp: , Rfl:     losartan (COZAAR) 50 MG tablet, Take 50 mg by mouth Daily with lunch , Disp: , Rfl:     nitroGLYCERIN (NITROSTAT) 0.4 MG SL tablet, Take 0.4 mg by mouth, Disp: , Rfl:     rosuvastatin (CRESTOR) 20 MG tablet, Take 20 mg by mouth nightly , Disp: , Rfl:     Simethicone 125 MG CAPS, Take 125 mg by mouth 4 times daily as needed Takes at bedtime, Disp: , Rfl:   Allergies   Allergen Reactions    Celecoxib Swelling     Hands. denies    Colchicine Other (See Comments)    Febuxostat Other (See Comments)     Joint Pain    Fluvastatin Other (See Comments)     Other reaction(s): Unknown (comments)  Patient denies    Glucosamine 1500 Complex [Glucosamine-Chondroitin] Other (See Comments)    Ibuprofen Other (See Comments)     Patient unsure    Nsaids Other (See Comments)     Elevated serum creatinine  Elevated serum creatinine      Sulfa Antibiotics Rash and Other (See Comments)     Patient denies       CC:right knee pain    Impression: Osteoarthritis of the right knee    Procedure: Depomedrol injection     Procedure Note: The right knee was prepped with alcohol.  Then the right knee was injected with 1 mL of 0.5% Marcaine and 40mg of depomedrol The patient tolerated the procedure without difficulty.       Radha Estrada MD

## 2023-02-01 ENCOUNTER — OFFICE VISIT (OUTPATIENT)
Dept: ORTHOPEDIC SURGERY | Age: 78
End: 2023-02-01
Payer: MEDICARE

## 2023-02-01 DIAGNOSIS — M17.11 PRIMARY OSTEOARTHRITIS OF RIGHT KNEE: Primary | ICD-10-CM

## 2023-02-01 PROCEDURE — 20611 DRAIN/INJ JOINT/BURSA W/US: CPT | Performed by: PHYSICIAN ASSISTANT

## 2023-02-01 RX ORDER — HYALURONATE SODIUM 10 MG/ML
20 SYRINGE (ML) INTRAARTICULAR ONCE
Status: COMPLETED | OUTPATIENT
Start: 2023-02-01 | End: 2023-02-01

## 2023-02-01 RX ADMIN — Medication 20 MG: at 13:32

## 2023-02-03 DIAGNOSIS — I73.9 PAD (PERIPHERAL ARTERY DISEASE) (HCC): Primary | ICD-10-CM

## 2023-02-07 ENCOUNTER — OFFICE VISIT (OUTPATIENT)
Dept: VASCULAR SURGERY | Age: 78
End: 2023-02-07
Payer: MEDICARE

## 2023-02-07 VITALS
WEIGHT: 182 LBS | HEART RATE: 70 BPM | DIASTOLIC BLOOD PRESSURE: 67 MMHG | HEIGHT: 70 IN | SYSTOLIC BLOOD PRESSURE: 104 MMHG | BODY MASS INDEX: 26.05 KG/M2

## 2023-02-07 DIAGNOSIS — I73.9 PVD (PERIPHERAL VASCULAR DISEASE) WITH CLAUDICATION (HCC): Primary | ICD-10-CM

## 2023-02-07 PROCEDURE — G8484 FLU IMMUNIZE NO ADMIN: HCPCS | Performed by: SURGERY

## 2023-02-07 PROCEDURE — G8427 DOCREV CUR MEDS BY ELIG CLIN: HCPCS | Performed by: SURGERY

## 2023-02-07 PROCEDURE — 1123F ACP DISCUSS/DSCN MKR DOCD: CPT | Performed by: SURGERY

## 2023-02-07 PROCEDURE — G8417 CALC BMI ABV UP PARAM F/U: HCPCS | Performed by: SURGERY

## 2023-02-07 PROCEDURE — 1036F TOBACCO NON-USER: CPT | Performed by: SURGERY

## 2023-02-07 PROCEDURE — 99213 OFFICE O/P EST LOW 20 MIN: CPT | Performed by: SURGERY

## 2023-02-07 PROCEDURE — 3078F DIAST BP <80 MM HG: CPT | Performed by: SURGERY

## 2023-02-07 PROCEDURE — 3074F SYST BP LT 130 MM HG: CPT | Performed by: SURGERY

## 2023-02-07 NOTE — PROGRESS NOTES
[address] FAX: [place]uy  : 1945    Chief Complaint:     History of Present Illness:   Patient follows up today for follow-up with worsening pain in both legs. Patient underwent an arteriogram last year or so he has severe not unreconstructable tibial disease. He describes rest pain symptoms bilaterally. CURRENT MEDICATIONS:  Current Outpatient Medications   Medication Sig Dispense Refill    diclofenac sodium (VOLTAREN) 1 % GEL Apply 4g to the affected area 4 times daily.  100 g 1    clopidogrel (PLAVIX) 75 MG tablet TAKE 1 TABLET DAILY 90 tablet 2    carvedilol (COREG) 12.5 MG tablet Take 1 tablet by mouth 2 times daily (with meals) 180 tablet 2    esomeprazole (NEXIUM) 40 MG delayed release capsule 1 capsule      famotidine (PEPCID) 20 MG tablet       fluticasone (FLONASE) 50 MCG/ACT nasal spray 1 spray in each nostril Nasally Once a day      metoprolol (LOPRESSOR) 100 MG tablet 1 tablet Orally Twice a day      pravastatin (PRAVACHOL) 40 MG tablet (Prior Auth: Rx [employer]#:[id number]) Oral for 90      Lancets 28G MISC FreeStyle Lancets 28 gauge   USE TO CHECK BLOOD SUGAR UP TO THREE TIMES DAILY      Continuous Blood Gluc Sensor (FREESTYLE TANVI SENSOR SYSTEM) MISC FreeStyle Lite Meter kit   USE TO TEST BLOOD SUGAR UP TO THREE TIMES DAILY      Vitamin D-Vitamin K (VITAMIN K2-VITAMIN D3)  MCG-UNIT CAPS Orally      baclofen (LIORESAL) 10 MG tablet Take 1 tablet by mouth 2 times daily 60 tablet 0    triamcinolone (KENALOG) 0.1 % cream Apply topically 2 times daily to elbows as needed for rash 30 g 1    metFORMIN (GLUCOPHAGE) 500 MG tablet Take 1 tablet by mouth 2 times daily (with meals) 180 tablet 2    Lancets MISC Check blood sugar up to three times a day      acetaminophen (TYLENOL) 325 MG tablet Take 650 mg by mouth every 4 hours as needed      aspirin 81 MG chewable tablet Take 81 mg by mouth daily bisacodyl (DULCOLAX) 5 MG EC tablet Take 5 mg by mouth nightly as needed       Cetirizine HCl 10 MG CAPS Take 10 mg by mouth nightly       Cholecalciferol 50 MCG (2000 UT) TABS Take 2,000 Units by mouth Daily with lunch       ezetimibe (ZETIA) 10 MG tablet Take 10 mg by mouth nightly       gabapentin (NEURONTIN) 600 MG tablet Take 600 mg by mouth 2 times daily. losartan (COZAAR) 50 MG tablet Take 50 mg by mouth Daily with lunch       nitroGLYCERIN (NITROSTAT) 0.4 MG SL tablet Take 0.4 mg by mouth      rosuvastatin (CRESTOR) 20 MG tablet Take 20 mg by mouth nightly       Simethicone 125 MG CAPS Take 125 mg by mouth 4 times daily as needed Takes at bedtime       No current facility-administered medications for this visit.        Past Medical History:   Diagnosis Date    Allergic rhinitis     Arthritis     CAD (coronary artery disease)     CABG '09; troponin elevated 7/14/12 (\"likely due to supply/demand mismatch in setting of fever and increased metabolic demand\"-per cardiac MD note 8/20/12)-had cath, echo, EKG-all WNL except cath showed 2 of the bypasses with blockages- \"occluded SVG to PDA & SVG to MARIANELA\"; EF=55%    [facility] 2017    acute/subacute infarct left basal ganglia    CHF (congestive heart failure) [facility]     Chronic kidney disease     3a    Coronary artery disease of native artery of native heart with stable angina pectoris ([facility] 2019, 2020 2019- STEMI- v fib arrest enroute, stent; 2020 NSTEMI angioplasty    Diabetes mellitus type 2, controlled ([facility]     patient does not know if he takes medication for DM; rarely check BS, denies hypoglycemia    ED (erectile dysfunction)     Encephalitis 1962    x 2/hospitalized as teenager    Gout     hx of    History of blood transfusion     due to injury    Hx of echocardiogram 02/11/2022    EF 50-55%    Hypercholesterolemia     Hypertension     medications    Ischemic cardiomyopathy     Lacunar infarct, acute ([facility] possible embolic, remote a-fib- on eliquis    Lumbago     Memory loss     Mitral valve regurgitation 7/14/12    \"moderate\" on echo    TREVOR (obstructive sleep apnea)     mild per patient, does not use CPAP    PAD (peripheral artery disease) ([facility]     PAF (paroxysmal atrial fibrillation) ([facility]     Poor historian 2022    patient does not know medications and does not know his medical history    Prostate cancer [facility])     followed by urology    Psoriasis     topical creams    SBO (small bowel obstruction) ([facility] 2011, 2015, 2016       Physical Examination:   Height: 5' 10\" (1.778 m), Weight: 182 lb (82.6 kg), BP: 104/67    Constitutional: he appears well-developed. No distress. HENT:   Head: Atraumatic. Eyes: Pupils are equal, round, and reactive to light. Neck: Normal range of motion. Cardiovascular: Regular rhythm. Pulmonary/Chest: Effort normal and breath sounds normal. No respiratory distress. Abdominal: Soft. Bowel sounds are normal. he exhibits no distension. There is no tenderness. There is no guarding. No hernia. Musculoskeletal: Normal range of motion. Neurological: He is alert. CN II- XII grossly intact   Vascular: Emerald pulses palp nonpalpable pedal pulses    Imaging:          Recommendations/Plans:   Mr. Madai Gómez is a 69y.o. year old male patient with severe tibial disease not unreconstructable. Along discussed with patient detail. Reviewed his arterial images. He has severe occlusions of all his tibial vessels in both legs. If patient were develop worsening pain or an ulcer the only option he would be to be an amputation. He can follow-up as needed. Aaron Hussein MD    Elements of this note have been dictated using speech recognition software. As a result, errors of speech recognition may have occurred.     20 minutes of time was spent on this encounter including chart review, assessment, evaluation and coordination of patient care

## 2023-02-08 ENCOUNTER — OFFICE VISIT (OUTPATIENT)
Dept: ORTHOPEDIC SURGERY | Age: 78
End: 2023-02-08

## 2023-02-08 DIAGNOSIS — M17.11 PRIMARY OSTEOARTHRITIS OF RIGHT KNEE: Primary | ICD-10-CM

## 2023-02-08 RX ORDER — HYALURONATE SODIUM 10 MG/ML
20 SYRINGE (ML) INTRAARTICULAR ONCE
Status: COMPLETED | OUTPATIENT
Start: 2023-02-08 | End: 2023-02-08

## 2023-02-08 RX ADMIN — Medication 20 MG: at 13:49

## 2023-02-08 NOTE — PROGRESS NOTES
Name: Keerthi Sevilla  YOB: 1945  Gender: male  MRN: [id number]     CC: right Knee Osteoarthritis      PROCEDURE: #2 of 3 Hyaluronic Injection    After discussion of risks and benefits including but not limited to pain, infection, skin discoloration, and injury to blood vessels or nerves the patient verbally consented to proceed with injection of the RIGHT. The patient is to restrict their activity for 48 hours. Radiology Report: US guidance was used to examine the joint, ensure adequate needle placement and to decrease the risk of joint aggravation. The intracondylar notch, retropatellar fat pad, patella tendon, patella and tibia were all visualized. Pre and post injection US still images were obtained and placed in the record. Image were obtained with a [employer] ultrasound transducer (model [id number]). Procedure Note: After steriley prepping the RIGHT knee, it was injected with a 2mL of Euflexxa and the medication was observed going into the intra-articular space via US guidance. The patient tolerated the procedure without difficulty.     LISANDRA Da Silva

## 2023-02-15 ENCOUNTER — OFFICE VISIT (OUTPATIENT)
Dept: ORTHOPEDIC SURGERY | Age: 78
End: 2023-02-15

## 2023-02-15 DIAGNOSIS — M17.11 PRIMARY OSTEOARTHRITIS OF RIGHT KNEE: Primary | ICD-10-CM

## 2023-02-15 RX ORDER — HYALURONATE SODIUM 10 MG/ML
20 SYRINGE (ML) INTRAARTICULAR ONCE
Status: COMPLETED | OUTPATIENT
Start: 2023-02-15 | End: 2023-02-15

## 2023-02-15 RX ADMIN — Medication 20 MG: at 14:01

## 2023-02-15 NOTE — PROGRESS NOTES
Name: Jhoana Mclean  YOB: 1945  Gender: male  MRN: [id number]     CC: RIGHT Knee Osteoarthritis      PROCEDURE: #3 of 3 Hyaluronic Injection    After discussion of risks and benefits including but not limited to pain, infection, skin discoloration, and injury to blood vessels or nerves the patient verbally consented to proceed with injection of the RIGHT. The patient is to restrict their activity for 48 hours. Radiology Report: US guidance was used to examine the joint, ensure adequate needle placement and to decrease the risk of joint aggravation. The intracondylar notch, retropatellar fat pad, patella tendon, patella and tibia were all visualized. Pre and post injection US still images were obtained and placed in the record. Image were obtained with a [employer] ultrasound transducer (model [id number]). Procedure Note: After steriley prepping the RIGHT knee, it was injected with a 2mL of Euflexxa and the medication was observed going into the intra-articular space via US guidance. The patient tolerated the procedure without difficulty.     LISANDRA Sal

## 2023-02-22 NOTE — PROGRESS NOTES
Jaiden Valenzuela (:  1945) is a 69 y.o. male,Established patient, here for evaluation of the following chief complaint(s):  Cough (Wife states that he has had this dry cough for about 6 weeks now. Has also had a runny nose as well. But no other symptoms with this. )         ASSESSMENT/PLAN:  1. Acute cough  Rapid COVID/flu/RSV testing in the office today negative   Obtain chest x-ray to evaluate for infiltrative disease   Suspect viral versus bacterial bronchitis versus community-acquired pneumonia  Given comorbidities and duration of symptoms start empiric doxycycline (ideally would use Levaquin however would have to dose every other day based on kidney function and given memory problems will be concerned about compliance)    - AMB POC COVID-19 COV  - AMB POC RSV  - AMB POC RAPID INFLUENZA TEST  - doxycycline hyclate (VIBRA-TABS) 100 MG tablet; Take 1 tablet by mouth 2 times daily for 5 days  Dispense: 10 tablet; Refill: 0  - XR CHEST PA LAT (2 VIEWS); Future    2. Coronary artery disease involving coronary bypass graft of native heart without angina pectoris  3. Chronic heart failure with preserved ejection fraction (HCC)  Cardiac catheterization on 2020 with 100% proximal LAD occlusion, 70% proximal occlusion of circumflex, 100% obtuse marginal occlusion, 90% stenosis of distal AV groove RCA, very heavily diseased PDA/PLV branches  Patent LIMA to LAD, known occlusion of SVG to RCA  99% subtotal occlusion in distal stent and 30% proximal in-stent restenosis of SVG to obtuse marginal; status post intervention of distal SVG body in-stent restenosis lesion  Echo on 2022 with a EF of 50 to 21%, diastolic dysfunction present, normal LV filling pressure, mildly dilated LA with mildly dilated aortic annulus  G in office today performed given questionable chest pain. Showed sinus bradycardia with heart rate of 52 bpm, normal axis, no acute ST segment or T wave abnormalities.   Suspect chest pain is costochondritis given some reproducibility with palpation as well as frequent coughing.  Recommendations for Tylenol, avoid NSAIDs  Continue aspirin, Plavix, carvedilol, rosuvastatin, Zetia, losartan, as needed nitroglycerin    - EKG 12 Lead    4. Essential hypertension  Continue losartan and carvedilol  Blood pressure well controlled in the office today    5. Type 2 diabetes mellitus with diabetic peripheral angiopathy without gangrene, without long-term current use of insulin ([facility])  6. Diabetic polyneuropathy associated with type 2 diabetes mellitus ([facility])  A1c 6.6% on 12/21/2022  Urine microalbumin to creatinine ratio borderline elevated at 33 on 12/14/2022  No longer checking fasting blood sugars given difficulty with lancing device having used 2 separate kinds.  Continue metformin, gabapentin, losartan  Given some questionable intolerance to 800 mg dosage [facility], stop this and reduce back to 600 mg twice a day    - gabapentin (NEURONTIN) 600 MG tablet; Take 1 tablet by mouth 2 times daily for 180 days. TAKE THIS IN PLACE OF YOUR 800MG DOSAGE  Dispense: 180 tablet; Refill: 1    7. Memory disturbance  Carotid ultrasound on 8/30/2017 without hemodynamically significant stenosis  MRI brain on 8/31/2017 suggestive of low flow state in right vertebral artery but no intracranial thromboembolus, high-grade stenosis, aneurysm or vascular malformation  MRI neck on 8/31/2017 without significant ICA stenosis but moderate arterial tortuosity consistent with hypertension as well as distal right vertebral artery high-grade stenosis  MMSE of 25/30 on 11/30/2021.  Repeat score of 22/30 on 4/14/2022  MMSE score of 24/30 at prior visit   PHQ-9 score of 13 with DANNY-7 score of 8 on 4/14/2022  MRI of the brain without contrast on 11/17/2022 as follows:  -Encephalomalacia in the anterior left periventricular white matter and left lentiform nucleus at the site of prior infarction  -No acute ischemic  infarction  -Nonspecific symmetric global brain parenchymal volume loss (hippocampal volume loss is symmetric but slightly worse than the degree of global brain parenchymal volume loss)  Labs from 12/14/2022 shows negative RPR, normal thiamine, TSH, free T4, B12 and vitamin D levels  Suspect underlying dementia either vascular or Alzheimer's in etiology  Management of vascular disease as above  Previously placed referrals to neurology as well as [address] [place] [address] for driving evaluation. However wife was told by facility that given he is a  and this has to go through the [facility]. Advised patient and wife to make this a priority to contact the [facility] stating that he needs a driving evaluation    8. Obstructive sleep apnea  Prior intolerance to positive pressure ventilation  Strongly encouraged follow-up with sleep medicine to discuss possible need for nocturnal oxygen versus mouthguard versus position changes      Return for chest xray ordere for today; needs regular follow up appointment in 8-12 weeks EOV . Subjective   SUBJECTIVE/OBJECTIVE:  Patient is a 78-year-old -American male who presents to the office today companied by his wife for persistent cough. Symptoms have been ongoing for months per patient. Of note patient is a poor historian given suspected underlying dementia and memory problems. He has been having nonproductive cough with questionable congestion and rhinorrhea. Does have occasional chest pain but wife states that he has not had to use nitroglycerin in several weeks. Denies fevers, chills, sputum production, leg swelling, sinus pain, ear pain, sore throat or difficulty swallowing. Does have some postnasal drip. Denies seasonal allergies. Wife states that multiple family members have some degree of a cough. Patient has not been checking his blood sugars given difficulty with using lancing device having used 2 different types. Still consumes sugary beverages often. Reportedly remains compliant with metformin. Wife states that his gabapentin dose was increased to 800 mg twice a day by his [address]. However patient states sometimes he will \"feel bad\" and would be interested in reducing the dose of this medication. Patient no longer using CPAP as he was not able to tolerate the mask. Uncertain whether he has made an appointment to see neurology given concerns for progressive memory issues. Review of Systems   Constitutional:  Negative for chills and fever. HENT:  Positive for congestion, postnasal drip and rhinorrhea. Negative for ear pain, sinus pressure, sinus pain, sore throat and trouble swallowing. Respiratory:  Positive for cough. Denies sputum production   Cardiovascular:  Positive for chest pain. Negative for leg swelling. Objective   Physical Exam  Vitals reviewed. Constitutional:       General: He is not in acute distress. Appearance: Normal appearance. He is not ill-appearing, toxic-appearing or diaphoretic. HENT:      Head: Normocephalic and atraumatic. Right Ear: External ear normal. There is impacted cerumen. Left Ear: Tympanic membrane, ear canal and external ear normal. There is no impacted cerumen. Nose: Congestion present. Mouth/Throat:      Mouth: Mucous membranes are moist.      Pharynx: Oropharynx is clear. No oropharyngeal exudate or posterior oropharyngeal erythema. Comments: Some postnasal drip   Eyes:      General:         Right eye: No discharge. Left eye: No discharge. Extraocular Movements: Extraocular movements intact. Neck:      Trachea: No tracheostomy or tracheal deviation. Cardiovascular:      Rate and Rhythm: Normal rate and regular rhythm. Heart sounds: No murmur heard. No friction rub. No gallop. Pulmonary:      Effort: Pulmonary effort is normal. No respiratory distress. Breath sounds: Examination of the right-lower field reveals rhonchi.  Rhonchi (clears with coughing) present. No wheezing or rales. Chest:      Chest wall: Tenderness present. Abdominal:      General: Bowel sounds are normal.      Palpations: Abdomen is soft. Tenderness: There is abdominal tenderness in the left lower quadrant. There is no guarding. Musculoskeletal:      Cervical back: Neck supple. Tenderness (cervical lymph nodes) present. Lymphadenopathy:      Cervical: Cervical adenopathy present. Right cervical: Superficial cervical adenopathy present. Left cervical: Superficial cervical adenopathy present. Skin:     General: Skin is dry. Coloration: Skin is not jaundiced. Neurological:      Mental Status: He is alert. Psychiatric:         Attention and Perception: Attention normal.         Mood and Affect: Affect is blunt. Affect is not tearful. Speech: Speech normal. Speech is not rapid and pressured or slurred. Behavior: Behavior normal. Behavior is not agitated, aggressive or combative. Behavior is cooperative. Cognition and Memory: Memory is impaired. On this date 2/23/2023 I have spent 45 minutes reviewing previous notes, test results and face to face with the patient discussing the diagnosis and importance of compliance with the treatment plan as well as documenting on the day of the visit. An electronic signature was used to authenticate this note.     --Roselia Hunter,

## 2023-02-23 ENCOUNTER — OFFICE VISIT (OUTPATIENT)
Dept: INTERNAL MEDICINE CLINIC | Facility: CLINIC | Age: 78
End: 2023-02-23
Payer: MEDICARE

## 2023-02-23 ENCOUNTER — HOSPITAL ENCOUNTER (OUTPATIENT)
Dept: GENERAL RADIOLOGY | Age: 78
Discharge: HOME | End: 2023-02-23
Payer: MEDICARE

## 2023-02-23 VITALS
DIASTOLIC BLOOD PRESSURE: 80 MMHG | HEIGHT: 70 IN | OXYGEN SATURATION: 98 % | BODY MASS INDEX: 25.65 KG/M2 | SYSTOLIC BLOOD PRESSURE: 126 MMHG | WEIGHT: 179.2 LBS | TEMPERATURE: 96.9 F | RESPIRATION RATE: 16 BRPM | HEART RATE: 62 BPM

## 2023-02-23 DIAGNOSIS — I25.810 CORONARY ARTERY DISEASE INVOLVING CORONARY BYPASS GRAFT OF NATIVE HEART WITHOUT ANGINA PECTORIS: ICD-10-CM

## 2023-02-23 DIAGNOSIS — G47.33 OBSTRUCTIVE SLEEP APNEA: ICD-10-CM

## 2023-02-23 DIAGNOSIS — R05.1 ACUTE COUGH: Primary | ICD-10-CM

## 2023-02-23 DIAGNOSIS — R05.1 ACUTE COUGH: ICD-10-CM

## 2023-02-23 DIAGNOSIS — E11.42 DIABETIC POLYNEUROPATHY ASSOCIATED WITH TYPE 2 DIABETES MELLITUS (HCC): ICD-10-CM

## 2023-02-23 DIAGNOSIS — E11.51 TYPE 2 DIABETES MELLITUS WITH DIABETIC PERIPHERAL ANGIOPATHY WITHOUT GANGRENE, WITHOUT LONG-TERM CURRENT USE OF INSULIN (HCC): ICD-10-CM

## 2023-02-23 DIAGNOSIS — R41.3 MEMORY DISTURBANCE: ICD-10-CM

## 2023-02-23 DIAGNOSIS — I50.32 CHRONIC HEART FAILURE WITH PRESERVED EJECTION FRACTION (HCC): ICD-10-CM

## 2023-02-23 DIAGNOSIS — I10 ESSENTIAL HYPERTENSION: ICD-10-CM

## 2023-02-23 PROBLEM — E87.6 HYPOKALEMIA: Status: ACTIVE | Noted: 2018-04-21

## 2023-02-23 PROBLEM — C61 PROSTATE CANCER (HCC): Status: ACTIVE | Noted: 2017-12-15

## 2023-02-23 PROBLEM — M79.604 PAIN IN BOTH LOWER EXTREMITIES: Status: ACTIVE | Noted: 2019-06-21

## 2023-02-23 PROBLEM — G62.0 DRUG-INDUCED POLYNEUROPATHY (HCC): Status: ACTIVE | Noted: 2021-01-19

## 2023-02-23 PROBLEM — M1A.00X0 CHRONIC GOUTY ARTHRITIS: Status: ACTIVE | Noted: 2023-02-23

## 2023-02-23 PROBLEM — R07.2 PRECORDIAL PAIN: Status: ACTIVE | Noted: 2020-02-08

## 2023-02-23 PROBLEM — E78.5 HYPERLIPIDEMIA: Status: ACTIVE | Noted: 2023-02-23

## 2023-02-23 PROBLEM — N50.89 OTHER SPECIFIED DISORDER OF MALE GENITAL ORGANS(608.89): Status: ACTIVE | Noted: 2023-02-23

## 2023-02-23 PROBLEM — K21.9 GASTROESOPHAGEAL REFLUX DISEASE WITHOUT ESOPHAGITIS: Status: ACTIVE | Noted: 2023-02-23

## 2023-02-23 PROBLEM — I63.9 CEREBROVASCULAR ACCIDENT (CVA) (HCC): Status: ACTIVE | Noted: 2017-01-01

## 2023-02-23 PROBLEM — M79.605 PAIN IN BOTH LOWER EXTREMITIES: Status: ACTIVE | Noted: 2019-06-21

## 2023-02-23 PROBLEM — G62.9 PERIPHERAL NEUROPATHY: Status: ACTIVE | Noted: 2019-03-26

## 2023-02-23 PROBLEM — M1A.9XX0 CHRONIC GOUTY ARTHRITIS: Status: ACTIVE | Noted: 2023-02-23

## 2023-02-23 PROBLEM — M54.16 LUMBAR RADICULOPATHY: Status: ACTIVE | Noted: 2019-08-14

## 2023-02-23 LAB
EXP DATE SOLUTION: NORMAL
EXP DATE SWAB: NORMAL
EXPIRATION DATE: NORMAL
LOT NUMBER POC: NORMAL
LOT NUMBER SOLUTION: NORMAL
LOT NUMBER SWAB: NORMAL
QUICKVUE INFLUENZA TEST: NEGATIVE
RSV, POC: NEGATIVE
SARS-COV-2 RNA, POC: NEGATIVE
VALID INTERNAL CONTROL, POC: YES
VALID INTERNAL CONTROL: YES

## 2023-02-23 PROCEDURE — 99215 OFFICE O/P EST HI 40 MIN: CPT | Performed by: STUDENT IN AN ORGANIZED HEALTH CARE EDUCATION/TRAINING PROGRAM

## 2023-02-23 PROCEDURE — 71046 X-RAY EXAM CHEST 2 VIEWS: CPT

## 2023-02-23 PROCEDURE — G8427 DOCREV CUR MEDS BY ELIG CLIN: HCPCS | Performed by: STUDENT IN AN ORGANIZED HEALTH CARE EDUCATION/TRAINING PROGRAM

## 2023-02-23 PROCEDURE — 87420 RESP SYNCYTIAL VIRUS AG IA: CPT | Performed by: STUDENT IN AN ORGANIZED HEALTH CARE EDUCATION/TRAINING PROGRAM

## 2023-02-23 PROCEDURE — 87635 SARS-COV-2 COVID-19 AMP PRB: CPT | Performed by: STUDENT IN AN ORGANIZED HEALTH CARE EDUCATION/TRAINING PROGRAM

## 2023-02-23 PROCEDURE — 1036F TOBACCO NON-USER: CPT | Performed by: STUDENT IN AN ORGANIZED HEALTH CARE EDUCATION/TRAINING PROGRAM

## 2023-02-23 PROCEDURE — G8484 FLU IMMUNIZE NO ADMIN: HCPCS | Performed by: STUDENT IN AN ORGANIZED HEALTH CARE EDUCATION/TRAINING PROGRAM

## 2023-02-23 PROCEDURE — 3079F DIAST BP 80-89 MM HG: CPT | Performed by: STUDENT IN AN ORGANIZED HEALTH CARE EDUCATION/TRAINING PROGRAM

## 2023-02-23 PROCEDURE — G8417 CALC BMI ABV UP PARAM F/U: HCPCS | Performed by: STUDENT IN AN ORGANIZED HEALTH CARE EDUCATION/TRAINING PROGRAM

## 2023-02-23 PROCEDURE — 93000 ELECTROCARDIOGRAM COMPLETE: CPT | Performed by: STUDENT IN AN ORGANIZED HEALTH CARE EDUCATION/TRAINING PROGRAM

## 2023-02-23 PROCEDURE — 3074F SYST BP LT 130 MM HG: CPT | Performed by: STUDENT IN AN ORGANIZED HEALTH CARE EDUCATION/TRAINING PROGRAM

## 2023-02-23 PROCEDURE — 87804 INFLUENZA ASSAY W/OPTIC: CPT | Performed by: STUDENT IN AN ORGANIZED HEALTH CARE EDUCATION/TRAINING PROGRAM

## 2023-02-23 PROCEDURE — 1123F ACP DISCUSS/DSCN MKR DOCD: CPT | Performed by: STUDENT IN AN ORGANIZED HEALTH CARE EDUCATION/TRAINING PROGRAM

## 2023-02-23 RX ORDER — GABAPENTIN 600 MG/1
600 TABLET ORAL 2 TIMES DAILY
Qty: 180 TABLET | Refills: 1 | Status: SHIPPED | OUTPATIENT
Start: 2023-02-23 | End: 2023-08-22

## 2023-02-23 RX ORDER — DOXYCYCLINE HYCLATE 100 MG
100 TABLET ORAL 2 TIMES DAILY
Qty: 10 TABLET | Refills: 0 | Status: SHIPPED | OUTPATIENT
Start: 2023-02-23 | End: 2023-02-28

## 2023-02-23 SDOH — ECONOMIC STABILITY: FOOD INSECURITY: WITHIN THE PAST 12 MONTHS, THE FOOD YOU BOUGHT JUST DIDN'T LAST AND YOU DIDN'T HAVE MONEY TO GET MORE.: NEVER TRUE

## 2023-02-23 SDOH — ECONOMIC STABILITY: INCOME INSECURITY: HOW HARD IS IT FOR YOU TO PAY FOR THE VERY BASICS LIKE FOOD, HOUSING, MEDICAL CARE, AND HEATING?: NOT HARD AT ALL

## 2023-02-23 SDOH — ECONOMIC STABILITY: HOUSING INSECURITY
IN THE LAST 12 MONTHS, WAS THERE A TIME WHEN YOU DID NOT HAVE A STEADY PLACE TO SLEEP OR SLEPT IN A SHELTER (INCLUDING NOW)?: NO

## 2023-02-23 SDOH — ECONOMIC STABILITY: FOOD INSECURITY: WITHIN THE PAST 12 MONTHS, YOU WORRIED THAT YOUR FOOD WOULD RUN OUT BEFORE YOU GOT MONEY TO BUY MORE.: NEVER TRUE

## 2023-02-23 ASSESSMENT — PATIENT HEALTH QUESTIONNAIRE - PHQ9
2. FEELING DOWN, DEPRESSED OR HOPELESS: 0
1. LITTLE INTEREST OR PLEASURE IN DOING THINGS: 0
SUM OF ALL RESPONSES TO PHQ9 QUESTIONS 1 & 2: 0
SUM OF ALL RESPONSES TO PHQ QUESTIONS 1-9: 0

## 2023-02-23 ASSESSMENT — ENCOUNTER SYMPTOMS
SINUS PAIN: 0
SINUS PRESSURE: 0
SORE THROAT: 0
RHINORRHEA: 1
TROUBLE SWALLOWING: 0
COUGH: 1

## 2023-03-27 DIAGNOSIS — E78.2 MIXED HYPERLIPIDEMIA: ICD-10-CM

## 2023-03-27 DIAGNOSIS — I10 PRIMARY HYPERTENSION: Primary | ICD-10-CM

## 2023-03-27 DIAGNOSIS — E11.42 DIABETIC POLYNEUROPATHY ASSOCIATED WITH TYPE 2 DIABETES MELLITUS (HCC): ICD-10-CM

## 2023-03-27 DIAGNOSIS — I63.9 CEREBROVASCULAR ACCIDENT (CVA), UNSPECIFIED MECHANISM (HCC): ICD-10-CM

## 2023-03-28 RX ORDER — GABAPENTIN 600 MG/1
600 TABLET ORAL 2 TIMES DAILY
Qty: 60 TABLET | Refills: 0 | Status: SHIPPED | OUTPATIENT
Start: 2023-03-28 | End: 2023-04-27

## 2023-03-28 RX ORDER — CARVEDILOL 12.5 MG/1
12.5 TABLET ORAL 2 TIMES DAILY WITH MEALS
Qty: 180 TABLET | Refills: 0 | Status: SHIPPED | OUTPATIENT
Start: 2023-03-28

## 2023-03-28 RX ORDER — CLOPIDOGREL BISULFATE 75 MG/1
TABLET ORAL
Qty: 90 TABLET | Refills: 0 | Status: SHIPPED | OUTPATIENT
Start: 2023-03-28

## 2023-03-28 RX ORDER — LOSARTAN POTASSIUM 50 MG/1
50 TABLET ORAL
Qty: 90 TABLET | Refills: 0 | Status: SHIPPED | OUTPATIENT
Start: 2023-03-28

## 2023-03-28 RX ORDER — EZETIMIBE 10 MG/1
10 TABLET ORAL NIGHTLY
Qty: 90 TABLET | Refills: 0 | Status: SHIPPED | OUTPATIENT
Start: 2023-03-28

## 2023-04-03 PROBLEM — E11.21 TYPE 2 DIABETES WITH NEPHROPATHY (HCC): Status: RESOLVED | Noted: 2018-06-12 | Resolved: 2021-06-07

## 2023-04-03 PROBLEM — E11.40 TYPE 2 DIABETES, CONTROLLED, WITH NEUROPATHY (HCC): Status: RESOLVED | Noted: 2017-07-06 | Resolved: 2021-06-07

## 2023-04-17 ENCOUNTER — OFFICE VISIT (OUTPATIENT)
Dept: CARDIOLOGY CLINIC | Age: 78
End: 2023-04-17
Payer: MEDICARE

## 2023-04-17 VITALS
WEIGHT: 183 LBS | BODY MASS INDEX: 25.62 KG/M2 | SYSTOLIC BLOOD PRESSURE: 138 MMHG | HEIGHT: 71 IN | DIASTOLIC BLOOD PRESSURE: 80 MMHG

## 2023-04-17 DIAGNOSIS — G47.31 CENTRAL SLEEP APNEA: ICD-10-CM

## 2023-04-17 DIAGNOSIS — I50.20 HFREF (HEART FAILURE WITH REDUCED EJECTION FRACTION) (HCC): ICD-10-CM

## 2023-04-17 DIAGNOSIS — I73.9 PERIPHERAL ARTERY DISEASE (HCC): ICD-10-CM

## 2023-04-17 DIAGNOSIS — E11.51 TYPE 2 DIABETES MELLITUS WITH DIABETIC PERIPHERAL ANGIOPATHY WITHOUT GANGRENE, WITHOUT LONG-TERM CURRENT USE OF INSULIN (HCC): ICD-10-CM

## 2023-04-17 DIAGNOSIS — F01.C0 SEVERE VASCULAR DEMENTIA WITHOUT BEHAVIORAL DISTURBANCE, PSYCHOTIC DISTURBANCE, MOOD DISTURBANCE, OR ANXIETY (HCC): ICD-10-CM

## 2023-04-17 DIAGNOSIS — I25.118 CORONARY ARTERY DISEASE OF NATIVE ARTERY OF NATIVE HEART WITH STABLE ANGINA PECTORIS (HCC): ICD-10-CM

## 2023-04-17 DIAGNOSIS — I20.8 STABLE ANGINA (HCC): Primary | ICD-10-CM

## 2023-04-17 PROCEDURE — G8417 CALC BMI ABV UP PARAM F/U: HCPCS | Performed by: INTERNAL MEDICINE

## 2023-04-17 PROCEDURE — 3075F SYST BP GE 130 - 139MM HG: CPT | Performed by: INTERNAL MEDICINE

## 2023-04-17 PROCEDURE — 1036F TOBACCO NON-USER: CPT | Performed by: INTERNAL MEDICINE

## 2023-04-17 PROCEDURE — 99214 OFFICE O/P EST MOD 30 MIN: CPT | Performed by: INTERNAL MEDICINE

## 2023-04-17 PROCEDURE — G8427 DOCREV CUR MEDS BY ELIG CLIN: HCPCS | Performed by: INTERNAL MEDICINE

## 2023-04-17 PROCEDURE — 1123F ACP DISCUSS/DSCN MKR DOCD: CPT | Performed by: INTERNAL MEDICINE

## 2023-04-17 PROCEDURE — 3079F DIAST BP 80-89 MM HG: CPT | Performed by: INTERNAL MEDICINE

## 2023-04-17 ASSESSMENT — ENCOUNTER SYMPTOMS: SHORTNESS OF BREATH: 1

## 2023-04-17 NOTE — PROGRESS NOTES
sections of this note, and reviewed with patient. .       ASSESSMENT and PLAN    Yohan Ndiaye was seen today for follow-up and congestive heart failure. Diagnoses and all orders for this visit:    Stable angina [facility])  -     Nuclear stress test with myocardial perfusion; Future  -     Transthoracic echocardiogram (TTE) complete with contrast, bubble, strain, and 3D PRN; Future    Type 2 diabetes mellitus with diabetic peripheral angiopathy without gangrene, without long-term current use of insulin (HCC)  -     metFORMIN (GLUCOPHAGE) 500 MG tablet; Take 1 tablet by mouth 2 times daily (with meals)    Coronary artery disease of native artery of native heart with stable angina pectoris (HCC)    Peripheral artery disease (HCC)    HFrEF (heart failure with reduced ejection fraction) ([facility]    Central sleep apnea    Severe vascular dementia without behavioral disturbance, psychotic disturbance, mood disturbance, or anxiety ([facility]          IMPRESSION:    History and evaluation are severely limited by dementia. It is helpful to have his wife here today and urged her to accompany him to all visits, bring all medication bottles to all visits, and ask their [facility] providers to share records with his community providers since I am unable to answer their questions about testing the [facility] has recommended. On close questioning of his wife, it seems the patient has stable angina about once a week on average. She's never had to give him 3 NTG. He also has chest wall tenderness from a rash he's had, making it doubly difficult. When he mentions tightness to her, advised try to distract him for 5 minutes and if still there, then start with NTG protocol and ER for 3 doses, notify us if having to use it more frequently    Explained he is a poor candidate for invasive strategies d/t his accelerating dementia and as long as anginal pattern remains stable would stay the course    Urged to reschedule with sleep center.   He's fixated on sleep apnea

## 2023-04-17 NOTE — PATIENT INSTRUCTIONS
better within 5 minutes, call 911 right away. Stay on the phone. The emergency  will give you further instructions. If your doctor advises it, take 1 low-dose aspirin a day to prevent heart attack. Be safe with medicines. Take your medicines exactly as prescribed. Call your doctor if you think you are having a problem with your medicine. You will get more details on the specific medicines your doctor prescribes. Lifestyle changes    Do not smoke. If you need help quitting, talk to your doctor about stop-smoking programs and medicines. These can increase your chances of quitting for good. Eat a heart-healthy diet that is low in saturated fat and salt, and is high in fiber. Talk to your doctor or a dietitian about healthy eating. Stay at a healthy weight. Or lose weight if you need to. Activity    Talk to your doctor about a level of activity that is safe for you. If an activity causes angina symptoms, stop and rest.   When should you call for help? Call 911 anytime you think you may need emergency care. For example, call if:    You passed out (lost consciousness). You have symptoms of a heart attack. These may include:  Chest pain or pressure, or a strange feeling in the chest.  Sweating. Shortness of breath. Nausea or vomiting. Pain, pressure, or a strange feeling in the back, neck, jaw, or upper belly or in one or both shoulders or arms. Lightheadedness or sudden weakness. A fast or irregular heartbeat. After you call 911, the  may tell you to chew 1 adult-strength or 2 to 4 low-dose aspirin. Wait for an ambulance. Do not try to drive yourself. You have angina symptoms that do not go away with rest or are not getting better within 5 minutes after you take a dose of nitroglycerin. Call your doctor now if:    Your angina symptoms seem worse but still follow your typical pattern.  You can predict when symptoms will happen, but they may come on sooner, feel

## 2023-04-23 PROBLEM — G62.0 DRUG-INDUCED POLYNEUROPATHY (HCC): Status: RESOLVED | Noted: 2021-01-19 | Resolved: 2023-04-23

## 2023-04-23 PROBLEM — C61 PROSTATE CANCER (HCC): Status: RESOLVED | Noted: 2017-12-15 | Resolved: 2023-04-23

## 2023-04-28 ENCOUNTER — OFFICE VISIT (OUTPATIENT)
Dept: INTERNAL MEDICINE CLINIC | Facility: CLINIC | Age: 78
End: 2023-04-28
Payer: MEDICARE

## 2023-04-28 VITALS
TEMPERATURE: 97.2 F | HEART RATE: 63 BPM | BODY MASS INDEX: 25.68 KG/M2 | RESPIRATION RATE: 16 BRPM | WEIGHT: 179.4 LBS | DIASTOLIC BLOOD PRESSURE: 84 MMHG | OXYGEN SATURATION: 100 % | HEIGHT: 70 IN | SYSTOLIC BLOOD PRESSURE: 130 MMHG

## 2023-04-28 DIAGNOSIS — E11.42 DIABETIC POLYNEUROPATHY ASSOCIATED WITH TYPE 2 DIABETES MELLITUS (HCC): ICD-10-CM

## 2023-04-28 DIAGNOSIS — G47.33 OBSTRUCTIVE SLEEP APNEA: ICD-10-CM

## 2023-04-28 DIAGNOSIS — I25.810 CORONARY ARTERY DISEASE INVOLVING CORONARY BYPASS GRAFT OF NATIVE HEART WITHOUT ANGINA PECTORIS: Primary | ICD-10-CM

## 2023-04-28 DIAGNOSIS — I10 ESSENTIAL HYPERTENSION: ICD-10-CM

## 2023-04-28 DIAGNOSIS — E11.51 TYPE 2 DIABETES MELLITUS WITH DIABETIC PERIPHERAL ANGIOPATHY WITHOUT GANGRENE, WITHOUT LONG-TERM CURRENT USE OF INSULIN (HCC): ICD-10-CM

## 2023-04-28 DIAGNOSIS — R41.3 MEMORY DISTURBANCE: ICD-10-CM

## 2023-04-28 DIAGNOSIS — I73.9 PERIPHERAL ARTERY DISEASE (HCC): ICD-10-CM

## 2023-04-28 DIAGNOSIS — I50.32 CHRONIC HEART FAILURE WITH PRESERVED EJECTION FRACTION (HCC): ICD-10-CM

## 2023-04-28 LAB — HBA1C MFR BLD: 8.2 %

## 2023-04-28 PROCEDURE — 1123F ACP DISCUSS/DSCN MKR DOCD: CPT | Performed by: STUDENT IN AN ORGANIZED HEALTH CARE EDUCATION/TRAINING PROGRAM

## 2023-04-28 PROCEDURE — 3075F SYST BP GE 130 - 139MM HG: CPT | Performed by: STUDENT IN AN ORGANIZED HEALTH CARE EDUCATION/TRAINING PROGRAM

## 2023-04-28 PROCEDURE — 3079F DIAST BP 80-89 MM HG: CPT | Performed by: STUDENT IN AN ORGANIZED HEALTH CARE EDUCATION/TRAINING PROGRAM

## 2023-04-28 PROCEDURE — 99215 OFFICE O/P EST HI 40 MIN: CPT | Performed by: STUDENT IN AN ORGANIZED HEALTH CARE EDUCATION/TRAINING PROGRAM

## 2023-04-28 PROCEDURE — G8417 CALC BMI ABV UP PARAM F/U: HCPCS | Performed by: STUDENT IN AN ORGANIZED HEALTH CARE EDUCATION/TRAINING PROGRAM

## 2023-04-28 PROCEDURE — G8427 DOCREV CUR MEDS BY ELIG CLIN: HCPCS | Performed by: STUDENT IN AN ORGANIZED HEALTH CARE EDUCATION/TRAINING PROGRAM

## 2023-04-28 PROCEDURE — 83036 HEMOGLOBIN GLYCOSYLATED A1C: CPT | Performed by: STUDENT IN AN ORGANIZED HEALTH CARE EDUCATION/TRAINING PROGRAM

## 2023-04-28 PROCEDURE — 1036F TOBACCO NON-USER: CPT | Performed by: STUDENT IN AN ORGANIZED HEALTH CARE EDUCATION/TRAINING PROGRAM

## 2023-04-28 RX ORDER — GABAPENTIN 600 MG/1
600 TABLET ORAL 2 TIMES DAILY
Qty: 180 TABLET | Refills: 0 | Status: SHIPPED | OUTPATIENT
Start: 2023-04-28 | End: 2023-07-27

## 2023-04-28 ASSESSMENT — PATIENT HEALTH QUESTIONNAIRE - PHQ9
SUM OF ALL RESPONSES TO PHQ QUESTIONS 1-9: 0
2. FEELING DOWN, DEPRESSED OR HOPELESS: 0
SUM OF ALL RESPONSES TO PHQ QUESTIONS 1-9: 0
SUM OF ALL RESPONSES TO PHQ9 QUESTIONS 1 & 2: 0
SUM OF ALL RESPONSES TO PHQ QUESTIONS 1-9: 0
SUM OF ALL RESPONSES TO PHQ QUESTIONS 1-9: 0
1. LITTLE INTEREST OR PLEASURE IN DOING THINGS: 0

## 2023-04-28 ASSESSMENT — ENCOUNTER SYMPTOMS
TROUBLE SWALLOWING: 0
BLOOD IN STOOL: 0
SHORTNESS OF BREATH: 0
COLOR CHANGE: 0
VOMITING: 0
ANAL BLEEDING: 0
BACK PAIN: 0
ABDOMINAL PAIN: 0
NAUSEA: 0

## 2023-05-16 ENCOUNTER — OFFICE VISIT (OUTPATIENT)
Dept: NEUROLOGY | Age: 78
End: 2023-05-16
Payer: MEDICARE

## 2023-05-16 VITALS
HEIGHT: 70 IN | BODY MASS INDEX: 25.65 KG/M2 | HEART RATE: 70 BPM | WEIGHT: 179.2 LBS | OXYGEN SATURATION: 99 % | SYSTOLIC BLOOD PRESSURE: 108 MMHG | DIASTOLIC BLOOD PRESSURE: 69 MMHG

## 2023-05-16 DIAGNOSIS — F03.B0 MODERATE DEMENTIA WITHOUT BEHAVIORAL DISTURBANCE, PSYCHOTIC DISTURBANCE, MOOD DISTURBANCE, OR ANXIETY, UNSPECIFIED DEMENTIA TYPE (HCC): Primary | ICD-10-CM

## 2023-05-16 DIAGNOSIS — I63.9 CEREBROVASCULAR ACCIDENT (CVA), UNSPECIFIED MECHANISM (HCC): ICD-10-CM

## 2023-05-16 PROCEDURE — 1036F TOBACCO NON-USER: CPT | Performed by: PSYCHIATRY & NEUROLOGY

## 2023-05-16 PROCEDURE — G8427 DOCREV CUR MEDS BY ELIG CLIN: HCPCS | Performed by: PSYCHIATRY & NEUROLOGY

## 2023-05-16 PROCEDURE — 1123F ACP DISCUSS/DSCN MKR DOCD: CPT | Performed by: PSYCHIATRY & NEUROLOGY

## 2023-05-16 PROCEDURE — 3078F DIAST BP <80 MM HG: CPT | Performed by: PSYCHIATRY & NEUROLOGY

## 2023-05-16 PROCEDURE — G8417 CALC BMI ABV UP PARAM F/U: HCPCS | Performed by: PSYCHIATRY & NEUROLOGY

## 2023-05-16 PROCEDURE — 99204 OFFICE O/P NEW MOD 45 MIN: CPT | Performed by: PSYCHIATRY & NEUROLOGY

## 2023-05-16 PROCEDURE — 3074F SYST BP LT 130 MM HG: CPT | Performed by: PSYCHIATRY & NEUROLOGY

## 2023-05-16 RX ORDER — MEMANTINE HYDROCHLORIDE 10 MG/1
TABLET ORAL
Qty: 60 TABLET | Refills: 11 | Status: SHIPPED | OUTPATIENT
Start: 2023-05-16

## 2023-05-16 ASSESSMENT — ENCOUNTER SYMPTOMS
GASTROINTESTINAL NEGATIVE: 1
RESPIRATORY NEGATIVE: 1
EYES NEGATIVE: 1
ALLERGIC/IMMUNOLOGIC NEGATIVE: 1

## 2023-05-16 NOTE — PROGRESS NOTES
hippocampal volume loss bilaterally this with his memory loss makes me very concerned that this is truly an underlying Alzheimer's type picture. They want to avoid the spinal tap and the PET scan organ to begin Namenda 10 twice a day. And due to the injury was seen on the MRI were going to move forward with a CTA of the head and neck he is already on 81 aspirin and a cholesterol-lowering agent. The Diagnosis and differential diagnostic considerations, and Rx Tx were reviewed with the patient at length. No orders of the defined types were placed in this encounter. I have spent greater than 50% of visit discussing and counseling of patient  for treatment and diagnostic plan review. Total time45     . Notes: Patient is to continue all medications as directed by prescribing physicians. Continuations on today's visit are made based on the patient's report of current medications.              Dr. Jose Benítez  Consultation Neurology, Neurodiagnostics and Neurotherapeutics  Neuroelectrophysiology, EEG, EMG  [facility]  [address] [place] [address], [facility]  Phone:  [phone]  Fax:   [phone]

## 2023-06-28 ENCOUNTER — TELEPHONE (OUTPATIENT)
Dept: NEUROLOGY | Age: 78
End: 2023-06-28

## 2023-06-28 ENCOUNTER — CLINICAL DOCUMENTATION (OUTPATIENT)
Dept: NEUROLOGY | Age: 78
End: 2023-06-28

## 2023-06-28 NOTE — TELEPHONE ENCOUNTER
Pts wife called and stated that her  was put on Memantine for his dementia. She feels as though he is having a reaction to this medication and finds that he has been lethargic and slow and cannot seem to get out of bed.  She is concerned about this and wondered if this is from the medication and is asking for advice

## 2023-06-29 ENCOUNTER — OFFICE VISIT (OUTPATIENT)
Age: 78
End: 2023-06-29
Payer: MEDICARE

## 2023-06-29 VITALS
DIASTOLIC BLOOD PRESSURE: 68 MMHG | SYSTOLIC BLOOD PRESSURE: 128 MMHG | HEIGHT: 70 IN | BODY MASS INDEX: 25.2 KG/M2 | HEART RATE: 76 BPM | WEIGHT: 176 LBS

## 2023-06-29 DIAGNOSIS — I50.20 HFREF (HEART FAILURE WITH REDUCED EJECTION FRACTION) (HCC): Primary | ICD-10-CM

## 2023-06-29 DIAGNOSIS — I10 ESSENTIAL HYPERTENSION: ICD-10-CM

## 2023-06-29 DIAGNOSIS — I25.118 CORONARY ARTERY DISEASE OF NATIVE ARTERY OF NATIVE HEART WITH STABLE ANGINA PECTORIS (HCC): ICD-10-CM

## 2023-06-29 DIAGNOSIS — I73.9 PERIPHERAL ARTERY DISEASE (HCC): ICD-10-CM

## 2023-06-29 PROCEDURE — G8427 DOCREV CUR MEDS BY ELIG CLIN: HCPCS | Performed by: INTERNAL MEDICINE

## 2023-06-29 PROCEDURE — 99214 OFFICE O/P EST MOD 30 MIN: CPT | Performed by: INTERNAL MEDICINE

## 2023-06-29 PROCEDURE — 1123F ACP DISCUSS/DSCN MKR DOCD: CPT | Performed by: INTERNAL MEDICINE

## 2023-06-29 PROCEDURE — 1036F TOBACCO NON-USER: CPT | Performed by: INTERNAL MEDICINE

## 2023-06-29 PROCEDURE — 3074F SYST BP LT 130 MM HG: CPT | Performed by: INTERNAL MEDICINE

## 2023-06-29 PROCEDURE — 3078F DIAST BP <80 MM HG: CPT | Performed by: INTERNAL MEDICINE

## 2023-06-29 PROCEDURE — G8417 CALC BMI ABV UP PARAM F/U: HCPCS | Performed by: INTERNAL MEDICINE

## 2023-06-29 ASSESSMENT — ENCOUNTER SYMPTOMS: SHORTNESS OF BREATH: 0

## 2023-07-07 ENCOUNTER — HOSPITAL ENCOUNTER (EMERGENCY)
Age: 78
Discharge: HOME | End: 2023-07-07
Attending: EMERGENCY MEDICINE
Payer: MEDICARE

## 2023-07-07 VITALS
OXYGEN SATURATION: 98 % | BODY MASS INDEX: 25.05 KG/M2 | DIASTOLIC BLOOD PRESSURE: 88 MMHG | SYSTOLIC BLOOD PRESSURE: 175 MMHG | TEMPERATURE: 98.1 F | RESPIRATION RATE: 18 BRPM | WEIGHT: 175 LBS | HEART RATE: 65 BPM | HEIGHT: 70 IN

## 2023-07-07 DIAGNOSIS — M79.2 PERIPHERAL NEUROPATHIC PAIN: Primary | ICD-10-CM

## 2023-07-07 DIAGNOSIS — R10.84 GENERALIZED ABDOMINAL PAIN: ICD-10-CM

## 2023-07-07 LAB
ALBUMIN SERPL-MCNC: 3.4 G/DL (ref 3.2–4.6)
ALBUMIN/GLOB SERPL: 0.8 (ref 0.4–1.6)
ALP SERPL-CCNC: 71 U/L (ref 50–136)
ALT SERPL-CCNC: 36 U/L (ref 12–65)
ANION GAP SERPL CALC-SCNC: 5 MMOL/L (ref 2–11)
AST SERPL-CCNC: 29 U/L (ref 15–37)
BASOPHILS # BLD: 0 K/UL (ref 0–0.2)
BASOPHILS NFR BLD: 1 % (ref 0–2)
BILIRUB SERPL-MCNC: 0.5 MG/DL (ref 0.2–1.1)
BUN SERPL-MCNC: 12 MG/DL (ref 8–23)
CALCIUM SERPL-MCNC: 9.7 MG/DL (ref 8.3–10.4)
CHLORIDE SERPL-SCNC: 114 MMOL/L (ref 101–110)
CO2 SERPL-SCNC: 23 MMOL/L (ref 21–32)
CREAT SERPL-MCNC: 1.5 MG/DL (ref 0.8–1.5)
DIFFERENTIAL METHOD BLD: ABNORMAL
EOSINOPHIL # BLD: 0.1 K/UL (ref 0–0.8)
EOSINOPHIL NFR BLD: 2 % (ref 0.5–7.8)
ERYTHROCYTE [DISTWIDTH] IN BLOOD BY AUTOMATED COUNT: 12.9 % (ref 11.9–14.6)
GLOBULIN SER CALC-MCNC: 4.1 G/DL (ref 2.8–4.5)
GLUCOSE SERPL-MCNC: 105 MG/DL (ref 65–100)
HCT VFR BLD AUTO: 38.8 % (ref 41.1–50.3)
HGB BLD-MCNC: 13.1 G/DL (ref 13.6–17.2)
IMM GRANULOCYTES # BLD AUTO: 0 K/UL (ref 0–0.5)
IMM GRANULOCYTES NFR BLD AUTO: 0 % (ref 0–5)
LIPASE SERPL-CCNC: 151 U/L (ref 73–393)
LYMPHOCYTES # BLD: 1.5 K/UL (ref 0.5–4.6)
LYMPHOCYTES NFR BLD: 30 % (ref 13–44)
MCH RBC QN AUTO: 29.7 PG (ref 26.1–32.9)
MCHC RBC AUTO-ENTMCNC: 33.8 G/DL (ref 31.4–35)
MCV RBC AUTO: 88 FL (ref 82–102)
MONOCYTES # BLD: 0.6 K/UL (ref 0.1–1.3)
MONOCYTES NFR BLD: 12 % (ref 4–12)
NEUTS SEG # BLD: 2.8 K/UL (ref 1.7–8.2)
NEUTS SEG NFR BLD: 55 % (ref 43–78)
NRBC # BLD: 0 K/UL (ref 0–0.2)
PLATELET # BLD AUTO: 172 K/UL (ref 150–450)
PMV BLD AUTO: 11.3 FL (ref 9.4–12.3)
POTASSIUM SERPL-SCNC: 3.9 MMOL/L (ref 3.5–5.1)
PROT SERPL-MCNC: 7.5 G/DL (ref 6.3–8.2)
RBC # BLD AUTO: 4.41 M/UL (ref 4.23–5.6)
SODIUM SERPL-SCNC: 142 MMOL/L (ref 133–143)
WBC # BLD AUTO: 5.1 K/UL (ref 4.3–11.1)

## 2023-07-07 PROCEDURE — 85025 COMPLETE CBC W/AUTO DIFF WBC: CPT

## 2023-07-07 PROCEDURE — 80053 COMPREHEN METABOLIC PANEL: CPT

## 2023-07-07 PROCEDURE — 83690 ASSAY OF LIPASE: CPT

## 2023-07-07 PROCEDURE — 99283 EMERGENCY DEPT VISIT LOW MDM: CPT

## 2023-07-07 PROCEDURE — 6370000000 HC RX 637 (ALT 250 FOR IP): Performed by: EMERGENCY MEDICINE

## 2023-07-07 RX ORDER — HYDROCODONE BITARTRATE AND ACETAMINOPHEN 5; 325 MG/1; MG/1
1 TABLET ORAL
Status: COMPLETED | OUTPATIENT
Start: 2023-07-07 | End: 2023-07-07

## 2023-07-07 RX ORDER — HYDROCODONE BITARTRATE AND ACETAMINOPHEN 5; 325 MG/1; MG/1
1 TABLET ORAL EVERY 6 HOURS PRN
Qty: 15 TABLET | Refills: 0 | Status: SHIPPED | OUTPATIENT
Start: 2023-07-07 | End: 2023-07-12

## 2023-07-07 RX ADMIN — HYDROCODONE BITARTRATE AND ACETAMINOPHEN 1 TABLET: 5; 325 TABLET ORAL at 05:25

## 2023-07-07 ASSESSMENT — LIFESTYLE VARIABLES
HOW OFTEN DO YOU HAVE A DRINK CONTAINING ALCOHOL: NEVER
HOW MANY STANDARD DRINKS CONTAINING ALCOHOL DO YOU HAVE ON A TYPICAL DAY: PATIENT DOES NOT DRINK

## 2023-07-07 ASSESSMENT — ENCOUNTER SYMPTOMS
FACIAL SWELLING: 0
SHORTNESS OF BREATH: 0
ABDOMINAL PAIN: 1
DIARRHEA: 0
VOMITING: 0

## 2023-07-07 ASSESSMENT — PAIN DESCRIPTION - LOCATION: LOCATION: ABDOMEN

## 2023-07-07 ASSESSMENT — PAIN SCALES - GENERAL
PAINLEVEL_OUTOF10: 8
PAINLEVEL_OUTOF10: 8

## 2023-07-07 ASSESSMENT — PAIN - FUNCTIONAL ASSESSMENT: PAIN_FUNCTIONAL_ASSESSMENT: 0-10

## 2023-07-07 NOTE — ED TRIAGE NOTES
Pt arrives to ed with c/o abd pain and bilateral foot pain up to the knees. Pt denies fever and chills, n/v, diarrhea and constipation.  Hx of neuropathy , pain worsening last night

## 2023-07-07 NOTE — ED PROVIDER NOTES
7/14/12 (\"likely due to supply/demand mismatch in setting of fever and increased metabolic demand\"-per cardiac MD note 8/20/12)-had cath, echo, EKG-all WNL except cath showed 2 of the bypasses with blockages- \"occluded SVG to PDA & SVG to MARIANELA\"; EF=55%    Cerebral artery occlusion with cerebral infarction ([address]) 2017     acute/subacute infarct left basal ganglia    CHF (congestive heart failure) ([address])      Chronic kidney disease       3a    Coronary artery disease of native artery of native heart with stable angina pectoris ([address]) 2019, 2020 2019- STEMI- v fib arrest enroute, stent; 2020 NSTEMI angioplasty    Diabetes mellitus type 2, controlled ([address])       patient does not know if he takes medication for DM; rarely check BS, denies hypoglycemia    ED (erectile dysfunction)      Encephalitis 1962     x 2/hospitalized as teenager    Gout       hx of    History of blood transfusion       due to injury    Hx of echocardiogram 02/11/2022     EF 50-55%    Hypercholesterolemia      Hypertension       medications    Ischemic cardiomyopathy      Lacunar infarct, acute ([address])       possible embolic, remote a-fib- on eliquis    Lumbago      Memory loss      Mitral valve regurgitation 7/14/12     \"moderate\" on echo    TREVOR (obstructive sleep apnea)       mild per patient, does not use CPAP    PAD (peripheral artery disease) ([address])      PAF (paroxysmal atrial fibrillation) ([address])      Poor historian 2022     patient does not know medications and does not know his medical history    Prostate cancer [facility])       followed by urology    Psoriasis       topical creams    SBO (small bowel obstruction) ([address]) 2011, 2015, 2016            Physical Examination:   Height: 5' 10\" (1.778 m), Weight: 182 lb (82.6 kg), BP: 104/67     Constitutional: he appears well-developed. No distress. HENT:   Head: Atraumatic. Eyes: Pupils are equal, round, and reactive to light. Neck: Normal range of motion. Cardiovascular: Regular rhythm.

## 2023-07-07 NOTE — DISCHARGE INSTRUCTIONS
Talk to your doctor about other medications to control your pain. Return for worsening or concerning symptoms.

## 2023-07-12 DIAGNOSIS — I10 PRIMARY HYPERTENSION: ICD-10-CM

## 2023-07-12 RX ORDER — CARVEDILOL 12.5 MG/1
12.5 TABLET ORAL 2 TIMES DAILY WITH MEALS
Qty: 180 TABLET | Refills: 2 | Status: SHIPPED | OUTPATIENT
Start: 2023-07-12

## 2023-07-14 ENCOUNTER — TELEPHONE (OUTPATIENT)
Dept: INTERNAL MEDICINE CLINIC | Facility: CLINIC | Age: 78
End: 2023-07-14

## 2023-07-14 NOTE — TELEPHONE ENCOUNTER
Called and spoke with patient regarding the following instructions:     I have tried to call this patient several times this week to let him know that there was a drug interaction between his Plavix and omeprazole (the omeprazole makes the Plavix work less). The Plavix is important given his heart history. I would recommend he stop omeprazole and if he has worsening reflux symptoms to use Tums or over-the-counter Pepcid. Can you please try and call him to see if he will ?   Thanks     Mustapha Marin

## 2023-07-21 DIAGNOSIS — E11.51 TYPE 2 DIABETES MELLITUS WITH DIABETIC PERIPHERAL ANGIOPATHY WITHOUT GANGRENE, WITHOUT LONG-TERM CURRENT USE OF INSULIN (HCC): Primary | ICD-10-CM

## 2023-07-29 NOTE — PROGRESS NOTES
Frantz Beal (:  1945) is a 69 y.o. male,Established patient, here for evaluation of the following chief complaint(s):  Diabetes (3 Month Follow Up) and Hypertension (3 Month Follow Up)         ASSESSMENT/PLAN:  1. Moderate dementia without behavioral disturbance, psychotic disturbance, mood disturbance, or anxiety, unspecified dementia type ([address])  Carotid ultrasound on 2017 without hemodynamically significant stenosis  MRI brain on 2017 suggestive of low flow state in right vertebral artery but no intracranial thromboembolus, high-grade stenosis, aneurysm or vascular malformation  MRI neck on 2017 without significant ICA stenosis but moderate arterial tortuosity consistent with hypertension as well as distal right vertebral artery high-grade stenosis  MMSE score of 24/30 on 22  MRI of the brain without contrast on 2022 as follows:  -Encephalomalacia in the anterior left periventricular white matter and left lentiform nucleus at the site of prior infarction  -No acute ischemic infarction  -Nonspecific symmetric global brain parenchymal volume loss (hippocampal volume loss is symmetric but slightly worse than the degree of global brain parenchymal volume loss)  Labs from 2022 shows negative RPR, normal thiamine, TSH, free T4, B12 and vitamin D levels  Neurology notes reviewed with diagnosis of dementia. Was initially started on Namenda but developed intolerance with lethargy. Has since reduced dose to 1/2 tablet daily  Follow-up per specialist  Per wife they did have driving evaluation which he reportedly passed    2. Coronary artery disease involving coronary bypass graft of native heart without angina pectoris  3.  Chronic HFrEF (heart failure with reduced ejection fraction) ([facility])  Cardiac catheterization on 2020 with 100% proximal LAD occlusion, 70% proximal occlusion of circumflex, 100% obtuse marginal occlusion, 90% stenosis of distal AV groove RCA, very

## 2023-08-01 ENCOUNTER — OFFICE VISIT (OUTPATIENT)
Dept: INTERNAL MEDICINE CLINIC | Facility: CLINIC | Age: 78
End: 2023-08-01
Payer: MEDICARE

## 2023-08-01 VITALS
DIASTOLIC BLOOD PRESSURE: 80 MMHG | WEIGHT: 176.8 LBS | HEART RATE: 70 BPM | SYSTOLIC BLOOD PRESSURE: 130 MMHG | BODY MASS INDEX: 25.31 KG/M2 | TEMPERATURE: 96.6 F | HEIGHT: 70 IN | OXYGEN SATURATION: 98 %

## 2023-08-01 DIAGNOSIS — Z85.46 PERSONAL HISTORY OF PROSTATE CANCER: ICD-10-CM

## 2023-08-01 DIAGNOSIS — I25.810 CORONARY ARTERY DISEASE INVOLVING CORONARY BYPASS GRAFT OF NATIVE HEART WITHOUT ANGINA PECTORIS: ICD-10-CM

## 2023-08-01 DIAGNOSIS — E11.42 DIABETIC POLYNEUROPATHY ASSOCIATED WITH TYPE 2 DIABETES MELLITUS (HCC): ICD-10-CM

## 2023-08-01 DIAGNOSIS — E11.51 TYPE 2 DIABETES MELLITUS WITH DIABETIC PERIPHERAL ANGIOPATHY WITHOUT GANGRENE, WITHOUT LONG-TERM CURRENT USE OF INSULIN (HCC): ICD-10-CM

## 2023-08-01 DIAGNOSIS — I73.9 PERIPHERAL ARTERY DISEASE (HCC): ICD-10-CM

## 2023-08-01 DIAGNOSIS — I10 ESSENTIAL HYPERTENSION: ICD-10-CM

## 2023-08-01 DIAGNOSIS — F03.B0 MODERATE DEMENTIA WITHOUT BEHAVIORAL DISTURBANCE, PSYCHOTIC DISTURBANCE, MOOD DISTURBANCE, OR ANXIETY, UNSPECIFIED DEMENTIA TYPE (HCC): Primary | ICD-10-CM

## 2023-08-01 DIAGNOSIS — I50.22 CHRONIC HFREF (HEART FAILURE WITH REDUCED EJECTION FRACTION) (HCC): ICD-10-CM

## 2023-08-01 LAB — HBA1C MFR BLD: 7.3 %

## 2023-08-01 PROCEDURE — 99214 OFFICE O/P EST MOD 30 MIN: CPT | Performed by: STUDENT IN AN ORGANIZED HEALTH CARE EDUCATION/TRAINING PROGRAM

## 2023-08-01 PROCEDURE — 3075F SYST BP GE 130 - 139MM HG: CPT | Performed by: STUDENT IN AN ORGANIZED HEALTH CARE EDUCATION/TRAINING PROGRAM

## 2023-08-01 PROCEDURE — 83036 HEMOGLOBIN GLYCOSYLATED A1C: CPT | Performed by: STUDENT IN AN ORGANIZED HEALTH CARE EDUCATION/TRAINING PROGRAM

## 2023-08-01 PROCEDURE — G8417 CALC BMI ABV UP PARAM F/U: HCPCS | Performed by: STUDENT IN AN ORGANIZED HEALTH CARE EDUCATION/TRAINING PROGRAM

## 2023-08-01 PROCEDURE — 1123F ACP DISCUSS/DSCN MKR DOCD: CPT | Performed by: STUDENT IN AN ORGANIZED HEALTH CARE EDUCATION/TRAINING PROGRAM

## 2023-08-01 PROCEDURE — 1036F TOBACCO NON-USER: CPT | Performed by: STUDENT IN AN ORGANIZED HEALTH CARE EDUCATION/TRAINING PROGRAM

## 2023-08-01 PROCEDURE — 3079F DIAST BP 80-89 MM HG: CPT | Performed by: STUDENT IN AN ORGANIZED HEALTH CARE EDUCATION/TRAINING PROGRAM

## 2023-08-01 PROCEDURE — G8427 DOCREV CUR MEDS BY ELIG CLIN: HCPCS | Performed by: STUDENT IN AN ORGANIZED HEALTH CARE EDUCATION/TRAINING PROGRAM

## 2023-08-01 RX ORDER — LOSARTAN POTASSIUM 50 MG/1
50 TABLET ORAL
Qty: 90 TABLET | Refills: 2 | Status: SHIPPED | OUTPATIENT
Start: 2023-08-01

## 2023-08-01 RX ORDER — ROSUVASTATIN CALCIUM 20 MG/1
20 TABLET, COATED ORAL NIGHTLY
Qty: 90 TABLET | Refills: 2 | Status: SHIPPED | OUTPATIENT
Start: 2023-08-01

## 2023-08-01 RX ORDER — EZETIMIBE 10 MG/1
10 TABLET ORAL NIGHTLY
Qty: 90 TABLET | Refills: 2 | Status: SHIPPED | OUTPATIENT
Start: 2023-08-01

## 2023-08-01 ASSESSMENT — ENCOUNTER SYMPTOMS
DIARRHEA: 0
CONSTIPATION: 0
BLOOD IN STOOL: 0
VOMITING: 0
ANAL BLEEDING: 0
BACK PAIN: 0
ABDOMINAL PAIN: 0
NAUSEA: 0

## 2023-08-02 ENCOUNTER — HOSPITAL ENCOUNTER (EMERGENCY)
Age: 78
Discharge: HOME | End: 2023-08-02
Attending: STUDENT IN AN ORGANIZED HEALTH CARE EDUCATION/TRAINING PROGRAM
Payer: MEDICARE

## 2023-08-02 VITALS
HEART RATE: 64 BPM | RESPIRATION RATE: 17 BRPM | SYSTOLIC BLOOD PRESSURE: 199 MMHG | WEIGHT: 175 LBS | HEIGHT: 71 IN | OXYGEN SATURATION: 95 % | DIASTOLIC BLOOD PRESSURE: 112 MMHG | TEMPERATURE: 98.7 F | BODY MASS INDEX: 24.5 KG/M2

## 2023-08-02 DIAGNOSIS — M10.9 ACUTE GOUT OF RIGHT HAND, UNSPECIFIED CAUSE: ICD-10-CM

## 2023-08-02 DIAGNOSIS — M79.641 HAND PAIN, RIGHT: Primary | ICD-10-CM

## 2023-08-02 PROCEDURE — 6370000000 HC RX 637 (ALT 250 FOR IP): Performed by: STUDENT IN AN ORGANIZED HEALTH CARE EDUCATION/TRAINING PROGRAM

## 2023-08-02 PROCEDURE — 99283 EMERGENCY DEPT VISIT LOW MDM: CPT

## 2023-08-02 RX ORDER — HYDROCODONE BITARTRATE AND ACETAMINOPHEN 5; 325 MG/1; MG/1
1 TABLET ORAL EVERY 6 HOURS PRN
Qty: 10 TABLET | Refills: 0 | Status: SHIPPED | OUTPATIENT
Start: 2023-08-02 | End: 2023-08-05

## 2023-08-02 RX ORDER — HYDROCODONE BITARTRATE AND ACETAMINOPHEN 5; 325 MG/1; MG/1
1 TABLET ORAL
Status: COMPLETED | OUTPATIENT
Start: 2023-08-02 | End: 2023-08-02

## 2023-08-02 RX ADMIN — HYDROCODONE BITARTRATE AND ACETAMINOPHEN 1 TABLET: 5; 325 TABLET ORAL at 10:55

## 2023-08-02 ASSESSMENT — PAIN DESCRIPTION - ORIENTATION
ORIENTATION: RIGHT
ORIENTATION: RIGHT

## 2023-08-02 ASSESSMENT — PAIN DESCRIPTION - LOCATION
LOCATION: HAND
LOCATION: HAND

## 2023-08-02 ASSESSMENT — LIFESTYLE VARIABLES
HOW OFTEN DO YOU HAVE A DRINK CONTAINING ALCOHOL: MONTHLY OR LESS
HOW MANY STANDARD DRINKS CONTAINING ALCOHOL DO YOU HAVE ON A TYPICAL DAY: PATIENT DOES NOT DRINK

## 2023-08-02 ASSESSMENT — PAIN SCALES - GENERAL
PAINLEVEL_OUTOF10: 10
PAINLEVEL_OUTOF10: 10

## 2023-08-02 ASSESSMENT — PAIN - FUNCTIONAL ASSESSMENT: PAIN_FUNCTIONAL_ASSESSMENT: 0-10

## 2023-08-02 NOTE — ED TRIAGE NOTES
Per patient r #1 digit swelling with increased warmth/pain x1 day prior to arrival. Denies injury. States hx of gout. Radial pulse present and strong.

## 2023-08-02 NOTE — ED PROVIDER NOTES
Emergency Department Provider Note       PCP: Lola Ray DO   Age: 69 y.o. Sex: male     DISPOSITION Decision To Discharge 08/02/2023 10:40:00 AM       ICD-10-CM    1. Hand pain, right  M79.641 HYDROcodone-acetaminophen (NORCO) 5-325 MG per tablet      2. Acute gout of right hand, unspecified cause  M10.9 HYDROcodone-acetaminophen (NORCO) 5-325 MG per tablet          Medical Decision Making     Complexity of Problems Addressed:  Complexity of Problem: 1 chronic illness with exacerbation. Data Reviewed and Analyzed:  Category 1:   I independently ordered and reviewed each unique test.  I reviewed external records: provider visit note from PCP. The patients assessment required an independent historian: Wife. The reason they were needed is important historical information not provided by the patient. Category 2:       Category 3: Discussion of management or test interpretation. 64-year-old male presents to the emergency department complaining of pain in his right thumb and lateral hand. Symptoms began yesterday worsened today. Does have history of gout. Was seen at his primary care physician's office yesterday, prescribed steroids which he just began today for treatment of the gout flare. He reports the pain is worsened. Denies any trauma. Denies fever or chills. Reports significant pain with even light touch of the first MCP. Warmth and erythema without significant edema noted. No fevers or chills. History and physical consistent with gout. Patient encouraged to comply with the steroids. Will prescribe Norco and will give first dose here in the ER. Sedation precautions given. Patient given outpatient follow-up and return precautions. Patient and family voiced understanding and agreement. To note patient is hypertensive but did not take his blood pressure medication this morning. He will take this with upon returning home.     Risk of Complications and/or Morbidity of Patient

## 2023-08-02 NOTE — DISCHARGE INSTRUCTIONS
Continue taking your prednisone as prescribed yesterday. You can also take over-the-counter NSAIDs as long as you do not have a true allergy to this. Take medication with food. Take hydrocodone for pain. Caution of this medication is sedating. Do not drive or operate heavy machinery while taking. Take a stool softener daily. Follow-up with your primary care physician within 1 week peer return to the ER for worsening or worrisome symptoms.

## 2023-08-03 ENCOUNTER — HOSPITAL ENCOUNTER (OUTPATIENT)
Dept: LAB | Age: 78
Discharge: HOME | End: 2023-08-06

## 2023-08-03 DIAGNOSIS — Z85.46 PERSONAL HISTORY OF PROSTATE CANCER: ICD-10-CM

## 2023-08-03 DIAGNOSIS — I25.810 CORONARY ARTERY DISEASE INVOLVING CORONARY BYPASS GRAFT OF NATIVE HEART WITHOUT ANGINA PECTORIS: ICD-10-CM

## 2023-08-03 DIAGNOSIS — E11.51 TYPE 2 DIABETES MELLITUS WITH DIABETIC PERIPHERAL ANGIOPATHY WITHOUT GANGRENE, WITHOUT LONG-TERM CURRENT USE OF INSULIN (HCC): ICD-10-CM

## 2023-08-03 DIAGNOSIS — I50.22 CHRONIC HFREF (HEART FAILURE WITH REDUCED EJECTION FRACTION) (HCC): ICD-10-CM

## 2023-08-03 DIAGNOSIS — I10 ESSENTIAL HYPERTENSION: ICD-10-CM

## 2023-08-03 LAB
ALBUMIN SERPL-MCNC: 3.7 G/DL (ref 3.2–4.6)
ALBUMIN/GLOB SERPL: 1 (ref 0.4–1.6)
ALP SERPL-CCNC: 71 U/L (ref 50–136)
ALT SERPL-CCNC: 24 U/L (ref 12–65)
ANION GAP SERPL CALC-SCNC: 8 MMOL/L (ref 2–11)
AST SERPL-CCNC: 11 U/L (ref 15–37)
BASOPHILS # BLD: 0 K/UL (ref 0–0.2)
BASOPHILS NFR BLD: 0 % (ref 0–2)
BILIRUB SERPL-MCNC: 0.5 MG/DL (ref 0.2–1.1)
BUN SERPL-MCNC: 16 MG/DL (ref 8–23)
CALCIUM SERPL-MCNC: 9.9 MG/DL (ref 8.3–10.4)
CHLORIDE SERPL-SCNC: 116 MMOL/L (ref 101–110)
CHOLEST SERPL-MCNC: 126 MG/DL
CO2 SERPL-SCNC: 22 MMOL/L (ref 21–32)
CREAT SERPL-MCNC: 1.5 MG/DL (ref 0.8–1.5)
DIFFERENTIAL METHOD BLD: ABNORMAL
EOSINOPHIL # BLD: 0 K/UL (ref 0–0.8)
EOSINOPHIL NFR BLD: 0 % (ref 0.5–7.8)
ERYTHROCYTE [DISTWIDTH] IN BLOOD BY AUTOMATED COUNT: 13.1 % (ref 11.9–14.6)
GLOBULIN SER CALC-MCNC: 3.7 G/DL (ref 2.8–4.5)
GLUCOSE SERPL-MCNC: 137 MG/DL (ref 65–100)
HCT VFR BLD AUTO: 39.6 % (ref 41.1–50.3)
HDLC SERPL-MCNC: 47 MG/DL (ref 40–60)
HDLC SERPL: 2.7
HGB BLD-MCNC: 13.2 G/DL (ref 13.6–17.2)
IMM GRANULOCYTES # BLD AUTO: 0 K/UL (ref 0–0.5)
IMM GRANULOCYTES NFR BLD AUTO: 0 % (ref 0–5)
LDLC SERPL CALC-MCNC: 66.8 MG/DL
LYMPHOCYTES # BLD: 1.3 K/UL (ref 0.5–4.6)
LYMPHOCYTES NFR BLD: 15 % (ref 13–44)
MCH RBC QN AUTO: 29.4 PG (ref 26.1–32.9)
MCHC RBC AUTO-ENTMCNC: 33.3 G/DL (ref 31.4–35)
MCV RBC AUTO: 88.2 FL (ref 82–102)
MONOCYTES # BLD: 0.8 K/UL (ref 0.1–1.3)
MONOCYTES NFR BLD: 9 % (ref 4–12)
NEUTS SEG # BLD: 6.7 K/UL (ref 1.7–8.2)
NEUTS SEG NFR BLD: 76 % (ref 43–78)
NRBC # BLD: 0 K/UL (ref 0–0.2)
PLATELET # BLD AUTO: 191 K/UL (ref 150–450)
PMV BLD AUTO: 12.5 FL (ref 9.4–12.3)
POTASSIUM SERPL-SCNC: 4.1 MMOL/L (ref 3.5–5.1)
PROT SERPL-MCNC: 7.4 G/DL (ref 6.3–8.2)
RBC # BLD AUTO: 4.49 M/UL (ref 4.23–5.6)
SODIUM SERPL-SCNC: 146 MMOL/L (ref 133–143)
T4 FREE SERPL-MCNC: 1 NG/DL (ref 0.78–1.46)
TRIGL SERPL-MCNC: 61 MG/DL (ref 35–150)
TSH, 3RD GENERATION: 0.7 UIU/ML (ref 0.36–3.74)
VLDLC SERPL CALC-MCNC: 12.2 MG/DL (ref 6–23)
WBC # BLD AUTO: 8.9 K/UL (ref 4.3–11.1)

## 2023-08-04 LAB
EST. AVERAGE GLUCOSE BLD GHB EST-MCNC: 146 MG/DL
HBA1C MFR BLD: 6.7 % (ref 4.8–5.6)

## 2023-08-05 LAB — PSA SERPL DL<=0.01 NG/ML-MCNC: <0.006 NG/ML (ref 0–4)

## 2023-09-19 ENCOUNTER — HOSPITAL ENCOUNTER (OUTPATIENT)
Dept: MRI IMAGING | Age: 78
Discharge: HOME | End: 2023-09-22

## 2023-09-19 DIAGNOSIS — E13.42 DIABETIC POLYNEUROPATHY ASSOCIATED WITH OTHER SPECIFIED DIABETES MELLITUS (HCC): ICD-10-CM

## 2023-09-19 DIAGNOSIS — M48.07 LUMBOSACRAL STENOSIS: ICD-10-CM

## 2023-09-19 DIAGNOSIS — I73.9 PERIPHERAL ARTERY DISEASE (HCC): ICD-10-CM

## 2023-11-16 ENCOUNTER — TELEPHONE (OUTPATIENT)
Dept: INTERNAL MEDICINE CLINIC | Facility: CLINIC | Age: 78
End: 2023-11-16

## 2023-11-29 ENCOUNTER — OFFICE VISIT (OUTPATIENT)
Dept: NEUROLOGY | Age: 78
End: 2023-11-29
Payer: MEDICARE

## 2023-11-29 DIAGNOSIS — F03.B0 MODERATE DEMENTIA WITHOUT BEHAVIORAL DISTURBANCE, PSYCHOTIC DISTURBANCE, MOOD DISTURBANCE, OR ANXIETY, UNSPECIFIED DEMENTIA TYPE (HCC): Primary | ICD-10-CM

## 2023-11-29 DIAGNOSIS — I63.9 CEREBROVASCULAR ACCIDENT (CVA), UNSPECIFIED MECHANISM (HCC): ICD-10-CM

## 2023-11-29 PROCEDURE — 99214 OFFICE O/P EST MOD 30 MIN: CPT | Performed by: PSYCHIATRY & NEUROLOGY

## 2023-11-29 PROCEDURE — G8427 DOCREV CUR MEDS BY ELIG CLIN: HCPCS | Performed by: PSYCHIATRY & NEUROLOGY

## 2023-11-29 PROCEDURE — G8420 CALC BMI NORM PARAMETERS: HCPCS | Performed by: PSYCHIATRY & NEUROLOGY

## 2023-11-29 PROCEDURE — 1123F ACP DISCUSS/DSCN MKR DOCD: CPT | Performed by: PSYCHIATRY & NEUROLOGY

## 2023-11-29 PROCEDURE — 1036F TOBACCO NON-USER: CPT | Performed by: PSYCHIATRY & NEUROLOGY

## 2023-11-29 PROCEDURE — G8484 FLU IMMUNIZE NO ADMIN: HCPCS | Performed by: PSYCHIATRY & NEUROLOGY

## 2023-11-29 RX ORDER — MEMANTINE HYDROCHLORIDE 10 MG/1
TABLET ORAL
Qty: 60 TABLET | Refills: 11 | Status: SHIPPED | OUTPATIENT
Start: 2023-11-29

## 2023-11-29 ASSESSMENT — ENCOUNTER SYMPTOMS
RESPIRATORY NEGATIVE: 1
GASTROINTESTINAL NEGATIVE: 1
EYES NEGATIVE: 1

## 2023-11-29 NOTE — PROGRESS NOTES
CATARACT REMOVAL Bilateral     iol     COLONOSCOPY  ,     CORONARY ARTERY BYPASS GRAFT  03/09/2009    x4 bypass    HERNIA REPAIR Bilateral     LEFT HEART CATH,PERCUTANEOUS  2012    no intervention    LEFT HEART CATH,PERCUTANEOUS  2019    NSTEMI & PCI    LEFT HEART CATH,PERCUTANEOUS  2019    STEMI, PCI, VFib arrest    MI UNLISTED PROCEDURE ABDOMEN PERITONEUM & OMENTUM      exp lap, numerous surgeries on abdomen from an injury    PROSTATE BIOPSY, NEEDLE, SATURATION SAMPLING      and ultrasound    PROSTATECTOMY       SHOULDER ARTHROPLASTY Right 2018    Reverse R TSA    SPLENECTOMY      VASCULAR SURGERY  2017    aortogram, w/kristi LE runoff,R-LE arteriogram    VASCULAR SURGERY Right 2022    ULTRASOUND GUIDED BILATERAL LOWER EXTREMITY ARTERIOGRAM/ AORTAGRAM performed by Tuan Horner MD at [facility] MAIN OR    WISDOM TOOTH EXTRACTION         Social History:  Social History     Socioeconomic History    Marital status:      Spouse name: Not on file    Number of children: Not on file    Years of education: Not on file    Highest education level: Not on file   Occupational History    Not on file   Tobacco Use    Smoking status: Former     Packs/day: 1.00     Years: 30.00     Additional pack years: 0.00     Total pack years: 30.00     Types: Cigarettes     Quit date: 5     Years since quittin.9    Smokeless tobacco: Never    Tobacco comments:     Quit smoking: quit around age 30 y/o; smoked 18 years   Vaping Use    Vaping Use: Never used   Substance and Sexual Activity    Alcohol use: Not Currently    Drug use: No    Sexual activity: Not on file   Other Topics Concern    Not on file   Social History Narrative    Not on file     Social Determinants of Health     Financial Resource Strain: Low Risk  (2023)    Overall Financial Resource Strain (CARDIA)     Difficulty of Paying Living Expenses: Not hard at all   Food Insecurity: No Food Insecurity (2023)    Hunger

## 2023-11-30 ENCOUNTER — TELEPHONE (OUTPATIENT)
Age: 78
End: 2023-11-30

## 2023-11-30 NOTE — TELEPHONE ENCOUNTER
Patient having SCS Trial on TBD with Dr. Bee Marie. Requesting plavix hold for 14 days prior and cardiac clearance.  Fax: [phone]

## 2023-12-01 NOTE — TELEPHONE ENCOUNTER
Physician's response faxed to [phone]. \"He has severe diffuse and aggressive vascular disease. 7 days will usually suffice for noncompressible spaces. Low risk to hold 7-10 days. \"  Farrukh Payne

## 2023-12-07 DIAGNOSIS — E11.51 TYPE 2 DIABETES MELLITUS WITH DIABETIC PERIPHERAL ANGIOPATHY WITHOUT GANGRENE, WITHOUT LONG-TERM CURRENT USE OF INSULIN (HCC): ICD-10-CM

## 2023-12-18 ENCOUNTER — TELEPHONE (OUTPATIENT)
Age: 78
End: 2023-12-18

## 2023-12-18 NOTE — TELEPHONE ENCOUNTER
Pt's wife called on behalf of the pt. States the pt has been referred to have a \"pain blocking\" surgery where they stick something in his back and wants to know if the pt is strong enough for this surgery?

## 2024-01-11 DIAGNOSIS — E11.42 DIABETIC POLYNEUROPATHY ASSOCIATED WITH TYPE 2 DIABETES MELLITUS (HCC): ICD-10-CM

## 2024-01-11 RX ORDER — GABAPENTIN 600 MG/1
TABLET ORAL
Qty: 180 TABLET | Refills: 0 | OUTPATIENT
Start: 2024-01-11

## 2024-01-24 DIAGNOSIS — I63.9 CEREBROVASCULAR ACCIDENT (CVA), UNSPECIFIED MECHANISM (HCC): ICD-10-CM

## 2024-01-24 RX ORDER — CLOPIDOGREL BISULFATE 75 MG/1
TABLET ORAL
Qty: 90 TABLET | Refills: 2 | Status: SHIPPED | OUTPATIENT
Start: 2024-01-24

## 2024-01-24 NOTE — TELEPHONE ENCOUNTER
Pt is requesting a refill of clopidogrel. Please send to [facility] on [address].     Pt is completely out of this medication.

## 2024-01-31 PROBLEM — I21.9 ACUTE MYOCARDIAL INFARCTION (HCC): Status: RESOLVED | Noted: 2019-03-17 | Resolved: 2024-01-31

## 2024-01-31 PROBLEM — I20.0 UNSTABLE ANGINA (HCC): Status: RESOLVED | Noted: 2019-09-24 | Resolved: 2024-01-31

## 2024-01-31 PROBLEM — I21.3 ACUTE ST ELEVATION MYOCARDIAL INFARCTION (STEMI) (HCC): Status: RESOLVED | Noted: 2019-03-17 | Resolved: 2024-01-31

## 2024-01-31 PROBLEM — I21.4 NSTEMI (NON-ST ELEVATED MYOCARDIAL INFARCTION) (HCC): Status: RESOLVED | Noted: 2019-09-24 | Resolved: 2024-01-31

## 2024-01-31 NOTE — PROGRESS NOTES
0.00     Average packs/day: 1 pack/day for 30.0 years (30.0 ttl pk-yrs)     Types: Cigarettes     Start date:      Quit date:      Years since quittin.1    Smokeless tobacco: Never    Tobacco comments:     Quit smoking: quit around age 35 y/o; smoked 18 years   Substance Use Topics    Alcohol use: Not Currently       ROS:    Review of Systems   Constitutional: Positive for weight loss.   Cardiovascular:  Negative for chest pain.   Psychiatric/Behavioral:  Positive for memory loss.           PHYSICAL EXAM:   /72   Pulse 52   Ht 1.803 m (5' 11\")   Wt 77.1 kg (170 lb)   BMI 23.71 kg/m²      Wt Readings from Last 3 Encounters:   24 77.1 kg (170 lb)   23 79.4 kg (175 lb)   23 80.2 kg (176 lb 12.8 oz)     BP Readings from Last 3 Encounters:   24 120/72   23 (!) 199/112   23 130/80     Pulse Readings from Last 3 Encounters:   24 52   23 64   23 70           Physical Exam  Constitutional:       Appearance: Normal appearance.   HENT:      Head: Normocephalic and atraumatic.   Eyes:      Conjunctiva/sclera: Conjunctivae normal.   Neck:      Vascular: No carotid bruit.   Cardiovascular:      Rate and Rhythm: Normal rate and regular rhythm.      Heart sounds: No murmur heard.     No friction rub. No gallop.   Pulmonary:      Effort: No respiratory distress.      Breath sounds: No wheezing or rales.   Musculoskeletal:         General: No swelling.      Cervical back: Neck supple.   Skin:     General: Skin is warm and dry.   Neurological:      General: No focal deficit present.      Mental Status: He is alert.   Psychiatric:         Mood and Affect: Mood normal.         Behavior: Behavior normal.         Medical problems and test results were reviewed with the patient today.     DATA REVIEW    LIPID PANEL     Lab Results   Component Value Date    CHOL 126 2023    CHOL 111 2022    CHOL 130 2022     Lab Results   Component Value Date

## 2024-02-01 ENCOUNTER — OFFICE VISIT (OUTPATIENT)
Age: 79
End: 2024-02-01

## 2024-02-01 VITALS
DIASTOLIC BLOOD PRESSURE: 72 MMHG | BODY MASS INDEX: 23.8 KG/M2 | HEART RATE: 52 BPM | SYSTOLIC BLOOD PRESSURE: 120 MMHG | HEIGHT: 71 IN | WEIGHT: 170 LBS

## 2024-02-01 DIAGNOSIS — I50.32 CHRONIC HEART FAILURE WITH PRESERVED EJECTION FRACTION (HFPEF) (HCC): Primary | ICD-10-CM

## 2024-02-01 DIAGNOSIS — N18.32 TYPE 2 DIABETES MELLITUS WITH STAGE 3B CHRONIC KIDNEY DISEASE, WITHOUT LONG-TERM CURRENT USE OF INSULIN (HCC): ICD-10-CM

## 2024-02-01 DIAGNOSIS — I25.118 CORONARY ARTERY DISEASE OF NATIVE ARTERY OF NATIVE HEART WITH STABLE ANGINA PECTORIS (HCC): ICD-10-CM

## 2024-02-01 DIAGNOSIS — E11.22 TYPE 2 DIABETES MELLITUS WITH STAGE 3B CHRONIC KIDNEY DISEASE, WITHOUT LONG-TERM CURRENT USE OF INSULIN (HCC): ICD-10-CM

## 2024-02-01 DIAGNOSIS — I73.9 PERIPHERAL ARTERY DISEASE (HCC): ICD-10-CM

## 2024-02-07 ENCOUNTER — TELEPHONE (OUTPATIENT)
Dept: ORTHOPEDIC SURGERY | Age: 79
End: 2024-02-07

## 2024-02-07 NOTE — TELEPHONE ENCOUNTER
His wife is calling stating he is having a procedure on Feb 8 at his pain [facility] office. They gave him an antibiotic to take and she was supposed to start that tonight but accidentally gave it last night. He has been up all night confused. I told her she was going to have to speak to the pain [facility] doctors and to call 911 if needed.

## 2024-02-13 DIAGNOSIS — I25.810 CORONARY ARTERY DISEASE INVOLVING CORONARY BYPASS GRAFT OF NATIVE HEART WITHOUT ANGINA PECTORIS: ICD-10-CM

## 2024-02-13 DIAGNOSIS — E11.51 TYPE 2 DIABETES MELLITUS WITH DIABETIC PERIPHERAL ANGIOPATHY WITHOUT GANGRENE, WITHOUT LONG-TERM CURRENT USE OF INSULIN (HCC): ICD-10-CM

## 2024-02-13 DIAGNOSIS — I10 ESSENTIAL HYPERTENSION: ICD-10-CM

## 2024-02-13 DIAGNOSIS — I50.22 CHRONIC HFREF (HEART FAILURE WITH REDUCED EJECTION FRACTION) (HCC): ICD-10-CM

## 2024-02-13 RX ORDER — LOSARTAN POTASSIUM 50 MG/1
50 TABLET ORAL
Qty: 90 TABLET | Refills: 1 | Status: SHIPPED | OUTPATIENT
Start: 2024-02-13

## 2024-02-13 NOTE — TELEPHONE ENCOUNTER
Pt is requesting a refill of losartan and metformin. Please send to [facility] in [place].

## 2024-02-25 NOTE — PROGRESS NOTES
Lauro Ryan (:  1945) is a 78 y.o. male,Established patient, here for evaluation of the following chief complaint(s):  3 Month Follow-Up and Knee Pain         ASSESSMENT/PLAN:  1. Moderate dementia without behavioral disturbance, psychotic disturbance, mood disturbance, or anxiety, unspecified dementia type ([facility])  Carotid ultrasound on 2017 without hemodynamically significant stenosis  MRI brain on 2017 suggestive of low flow state in right vertebral artery but no intracranial thromboembolus, high-grade stenosis, aneurysm or vascular malformation  MRI neck on 2017 without significant ICA stenosis but moderate arterial tortuosity consistent with hypertension as well as distal right vertebral artery high-grade stenosis  MMSE score of 24/30 on 22  MRI of the brain without contrast on 2022 as follows:  -Encephalomalacia in the anterior left periventricular white matter and left lentiform nucleus at the site of prior infarction  -No acute ischemic infarction  -Nonspecific symmetric global brain parenchymal volume loss (hippocampal volume loss is symmetric but slightly worse than the degree of global brain parenchymal volume loss)  Labs from 2022 shows negative RPR, normal thiamine, TSH, free T4, B12 and vitamin D levels  Continue memantine per neurology.  Wife previously mentioned that patient did undergo driving evaluation which she reportedly passed    2. Coronary artery disease involving coronary bypass graft of native heart without angina pectoris  3. Chronic HFrEF (heart failure with reduced ejection fraction) (Former[facility])  Cardiac catheterization on 2020 with 100% proximal LAD occlusion, 70% proximal occlusion of circumflex, 100% obtuse marginal occlusion, 90% stenosis of distal AV groove RCA, very heavily diseased PDA/PLV branches  Patent LIMA to LAD, known occlusion of SVG to RCA  99% subtotal occlusion in distal stent and 30% proximal in-stent restenosis of SVG

## 2024-02-27 ENCOUNTER — OFFICE VISIT (OUTPATIENT)
Dept: INTERNAL MEDICINE CLINIC | Facility: CLINIC | Age: 79
End: 2024-02-27
Payer: MEDICARE

## 2024-02-27 VITALS
TEMPERATURE: 97.9 F | HEART RATE: 70 BPM | SYSTOLIC BLOOD PRESSURE: 120 MMHG | HEIGHT: 71 IN | WEIGHT: 170 LBS | DIASTOLIC BLOOD PRESSURE: 76 MMHG | RESPIRATION RATE: 16 BRPM | OXYGEN SATURATION: 97 % | BODY MASS INDEX: 23.8 KG/M2

## 2024-02-27 DIAGNOSIS — I25.810 CORONARY ARTERY DISEASE INVOLVING CORONARY BYPASS GRAFT OF NATIVE HEART WITHOUT ANGINA PECTORIS: ICD-10-CM

## 2024-02-27 DIAGNOSIS — E11.51 TYPE 2 DIABETES MELLITUS WITH DIABETIC PERIPHERAL ANGIOPATHY WITHOUT GANGRENE, WITHOUT LONG-TERM CURRENT USE OF INSULIN (HCC): ICD-10-CM

## 2024-02-27 DIAGNOSIS — F03.B0 MODERATE DEMENTIA WITHOUT BEHAVIORAL DISTURBANCE, PSYCHOTIC DISTURBANCE, MOOD DISTURBANCE, OR ANXIETY, UNSPECIFIED DEMENTIA TYPE (HCC): Primary | ICD-10-CM

## 2024-02-27 DIAGNOSIS — I73.9 PERIPHERAL ARTERY DISEASE (HCC): ICD-10-CM

## 2024-02-27 DIAGNOSIS — E11.42 DIABETIC POLYNEUROPATHY ASSOCIATED WITH TYPE 2 DIABETES MELLITUS (HCC): ICD-10-CM

## 2024-02-27 DIAGNOSIS — I50.22 CHRONIC HFREF (HEART FAILURE WITH REDUCED EJECTION FRACTION) (HCC): ICD-10-CM

## 2024-02-27 DIAGNOSIS — I10 ESSENTIAL HYPERTENSION: ICD-10-CM

## 2024-02-27 DIAGNOSIS — Z85.46 PERSONAL HISTORY OF PROSTATE CANCER: ICD-10-CM

## 2024-02-27 DIAGNOSIS — G89.29 CHRONIC PAIN OF RIGHT KNEE: ICD-10-CM

## 2024-02-27 DIAGNOSIS — M25.561 CHRONIC PAIN OF RIGHT KNEE: ICD-10-CM

## 2024-02-27 PROCEDURE — 99214 OFFICE O/P EST MOD 30 MIN: CPT | Performed by: STUDENT IN AN ORGANIZED HEALTH CARE EDUCATION/TRAINING PROGRAM

## 2024-02-27 PROCEDURE — G8420 CALC BMI NORM PARAMETERS: HCPCS | Performed by: STUDENT IN AN ORGANIZED HEALTH CARE EDUCATION/TRAINING PROGRAM

## 2024-02-27 PROCEDURE — 1123F ACP DISCUSS/DSCN MKR DOCD: CPT | Performed by: STUDENT IN AN ORGANIZED HEALTH CARE EDUCATION/TRAINING PROGRAM

## 2024-02-27 PROCEDURE — 1036F TOBACCO NON-USER: CPT | Performed by: STUDENT IN AN ORGANIZED HEALTH CARE EDUCATION/TRAINING PROGRAM

## 2024-02-27 PROCEDURE — G8484 FLU IMMUNIZE NO ADMIN: HCPCS | Performed by: STUDENT IN AN ORGANIZED HEALTH CARE EDUCATION/TRAINING PROGRAM

## 2024-02-27 PROCEDURE — 3074F SYST BP LT 130 MM HG: CPT | Performed by: STUDENT IN AN ORGANIZED HEALTH CARE EDUCATION/TRAINING PROGRAM

## 2024-02-27 PROCEDURE — 3078F DIAST BP <80 MM HG: CPT | Performed by: STUDENT IN AN ORGANIZED HEALTH CARE EDUCATION/TRAINING PROGRAM

## 2024-02-27 PROCEDURE — G8428 CUR MEDS NOT DOCUMENT: HCPCS | Performed by: STUDENT IN AN ORGANIZED HEALTH CARE EDUCATION/TRAINING PROGRAM

## 2024-02-27 RX ORDER — GABAPENTIN 600 MG/1
600 TABLET ORAL 2 TIMES DAILY
Qty: 180 TABLET | Refills: 2 | Status: SHIPPED | OUTPATIENT
Start: 2024-02-27 | End: 2024-11-23

## 2024-02-27 RX ORDER — ROSUVASTATIN CALCIUM 20 MG/1
20 TABLET, COATED ORAL NIGHTLY
Qty: 90 TABLET | Refills: 2 | Status: SHIPPED | OUTPATIENT
Start: 2024-02-27

## 2024-02-27 RX ORDER — EZETIMIBE 10 MG/1
10 TABLET ORAL NIGHTLY
Qty: 90 TABLET | Refills: 2 | Status: SHIPPED | OUTPATIENT
Start: 2024-02-27

## 2024-02-27 ASSESSMENT — ENCOUNTER SYMPTOMS
VOMITING: 0
BLOOD IN STOOL: 0
CONSTIPATION: 0
ABDOMINAL PAIN: 0
SHORTNESS OF BREATH: 0
DIARRHEA: 0
TROUBLE SWALLOWING: 0
ANAL BLEEDING: 0
SINUS PRESSURE: 0
NAUSEA: 0

## 2024-03-06 ENCOUNTER — OFFICE VISIT (OUTPATIENT)
Dept: ORTHOPEDIC SURGERY | Age: 79
End: 2024-03-06

## 2024-03-06 DIAGNOSIS — M17.11 PRIMARY OSTEOARTHRITIS OF RIGHT KNEE: Primary | ICD-10-CM

## 2024-03-06 RX ORDER — METHYLPREDNISOLONE ACETATE 40 MG/ML
40 INJECTION, SUSPENSION INTRA-ARTICULAR; INTRALESIONAL; INTRAMUSCULAR; SOFT TISSUE ONCE
Status: COMPLETED | OUTPATIENT
Start: 2024-03-06 | End: 2024-03-06

## 2024-03-06 RX ADMIN — METHYLPREDNISOLONE ACETATE 40 MG: 40 INJECTION, SUSPENSION INTRA-ARTICULAR; INTRALESIONAL; INTRAMUSCULAR; SOFT TISSUE at 11:17

## 2024-03-06 NOTE — PROGRESS NOTES
Name: Lauro Ryan  YOB: 1945  Gender: male  MRN: [id number]    CC: Right knee pain    HPI: Lauro Ryan is a 78 y.o. male who presents with recurrent right knee pain.  I did see him about a year ago with similar complaints.  There is an extensive note from that visit which is reviewed by me today.  He was found at that time and remains affected by significant memory issues.  A CVA in 2017 with basal ganglia infarcts.  He does not recall much or if anything about the visit that he had with me last year nor the follow-up injections that he had.    He is accompanied by a caregiver/family member today who does not recall those visits either.  His main concern is increasing pain a few weeks ago.  They thought he might have gout.  She states that his knee was swollen and painful but has settled down since he made the appointment to some degree.  He has had no other joints affected by his or her report.    History was obtained from patient and family member    ROS/Meds/PSH/PMH/FH/SH: I personally reviewed the patients standard intake form.  Below are the pertinents    Tobacco:  reports that he quit smoking about 39 years ago. His smoking use included cigarettes. He started smoking about 69 years ago. He has a 30.0 pack-year smoking history. He has never used smokeless tobacco.  Past Medical History:   Diagnosis Date    Allergic rhinitis     Arthritis     CAD (coronary artery disease)     CABG '09; troponin elevated 7/14/12 (\"likely due to supply/demand mismatch in setting of fever and increased metabolic demand\"-per cardiac MD note 8/20/12)-had cath, echo, EKG-all WNL except cath showed 2 of the bypasses with blockages- \"occluded SVG to PDA & SVG to MARIANELA\"; EF=55%    Cerebral artery occlusion with cerebral infarction (HCC) 2017    acute/subacute infarct left basal ganglia    CHF (congestive heart failure) (HCC)     Chronic kidney disease     3a    Coronary artery disease of native

## 2024-03-11 ENCOUNTER — NURSE ONLY (OUTPATIENT)
Dept: INTERNAL MEDICINE CLINIC | Facility: CLINIC | Age: 79
End: 2024-03-11

## 2024-03-11 DIAGNOSIS — E11.51 TYPE 2 DIABETES MELLITUS WITH DIABETIC PERIPHERAL ANGIOPATHY WITHOUT GANGRENE, WITHOUT LONG-TERM CURRENT USE OF INSULIN (HCC): ICD-10-CM

## 2024-03-11 DIAGNOSIS — I10 ESSENTIAL HYPERTENSION: ICD-10-CM

## 2024-03-11 LAB
ALBUMIN SERPL-MCNC: 3.5 G/DL (ref 3.2–4.6)
ALBUMIN/GLOB SERPL: 1 (ref 0.4–1.6)
ALP SERPL-CCNC: 73 U/L (ref 50–136)
ALT SERPL-CCNC: 35 U/L (ref 12–65)
ANION GAP SERPL CALC-SCNC: 8 MMOL/L (ref 2–11)
AST SERPL-CCNC: 28 U/L (ref 15–37)
BASOPHILS # BLD: 0 K/UL (ref 0–0.2)
BASOPHILS NFR BLD: 0 % (ref 0–2)
BILIRUB SERPL-MCNC: 0.3 MG/DL (ref 0.2–1.1)
BUN SERPL-MCNC: 15 MG/DL (ref 8–23)
CALCIUM SERPL-MCNC: 10 MG/DL (ref 8.3–10.4)
CHLORIDE SERPL-SCNC: 110 MMOL/L (ref 103–113)
CHOLEST SERPL-MCNC: 135 MG/DL
CO2 SERPL-SCNC: 26 MMOL/L (ref 21–32)
CREAT SERPL-MCNC: 1.5 MG/DL (ref 0.8–1.5)
CREAT UR-MCNC: 160 MG/DL
DIFFERENTIAL METHOD BLD: ABNORMAL
EOSINOPHIL # BLD: 0 K/UL (ref 0–0.8)
EOSINOPHIL NFR BLD: 2 % (ref 0.5–7.8)
ERYTHROCYTE [DISTWIDTH] IN BLOOD BY AUTOMATED COUNT: 13.9 % (ref 11.9–14.6)
EST. AVERAGE GLUCOSE BLD GHB EST-MCNC: 140 MG/DL
GLOBULIN SER CALC-MCNC: 3.5 G/DL (ref 2.8–4.5)
GLUCOSE SERPL-MCNC: 167 MG/DL (ref 65–100)
HBA1C MFR BLD: 6.5 % (ref 4.8–5.6)
HCT VFR BLD AUTO: 31.2 % (ref 41.1–50.3)
HDLC SERPL-MCNC: 44 MG/DL (ref 40–60)
HDLC SERPL: 3.1
HGB BLD-MCNC: 10.1 G/DL (ref 13.6–17.2)
IMM GRANULOCYTES # BLD AUTO: 0 K/UL (ref 0–0.5)
IMM GRANULOCYTES NFR BLD AUTO: 0 % (ref 0–5)
LDLC SERPL CALC-MCNC: 71.6 MG/DL
LYMPHOCYTES # BLD: 0.8 K/UL (ref 0.5–4.6)
LYMPHOCYTES NFR BLD: 32 % (ref 13–44)
MCH RBC QN AUTO: 28.8 PG (ref 26.1–32.9)
MCHC RBC AUTO-ENTMCNC: 32.4 G/DL (ref 31.4–35)
MCV RBC AUTO: 88.9 FL (ref 82–102)
MICROALBUMIN UR-MCNC: 1.61 MG/DL
MICROALBUMIN/CREAT UR-RTO: 10 MG/G (ref 0–30)
MONOCYTES # BLD: 0.2 K/UL (ref 0.1–1.3)
MONOCYTES NFR BLD: 9 % (ref 4–12)
NEUTS SEG # BLD: 1.5 K/UL (ref 1.7–8.2)
NEUTS SEG NFR BLD: 57 % (ref 43–78)
NRBC # BLD: 0 K/UL (ref 0–0.2)
PLATELET # BLD AUTO: 179 K/UL (ref 150–450)
PMV BLD AUTO: 12.2 FL (ref 9.4–12.3)
POTASSIUM SERPL-SCNC: 4.3 MMOL/L (ref 3.5–5.1)
PROT SERPL-MCNC: 7 G/DL (ref 6.3–8.2)
RBC # BLD AUTO: 3.51 M/UL (ref 4.23–5.6)
SODIUM SERPL-SCNC: 144 MMOL/L (ref 136–146)
T4 FREE SERPL-MCNC: 1 NG/DL (ref 0.78–1.46)
TRIGL SERPL-MCNC: 97 MG/DL (ref 35–150)
TSH, 3RD GENERATION: 2.38 UIU/ML (ref 0.36–3.74)
VLDLC SERPL CALC-MCNC: 19.4 MG/DL (ref 6–23)
WBC # BLD AUTO: 2.6 K/UL (ref 4.3–11.1)

## 2024-03-12 DIAGNOSIS — D64.9 NORMOCYTIC ANEMIA: ICD-10-CM

## 2024-03-12 DIAGNOSIS — D72.819 LEUKOPENIA, UNSPECIFIED TYPE: Primary | ICD-10-CM

## 2024-03-12 NOTE — TELEPHONE ENCOUNTER
Recent labs with leukopenia with white blood cell count of 2.6 but normal differential, worsening anemia with hemoglobin of 10.1, normal platelet count.  Orders for repeat nonfasting CBC in addition to CMP, iron studies, folate, B12 and peripheral smear placed.    Orders Placed This Encounter    CBC with Auto Differential     Standing Status:   Future     Standing Expiration Date:   3/12/2025    Comprehensive Metabolic Panel     Standing Status:   Future     Standing Expiration Date:   9/12/2024    Transferrin Saturation     Standing Status:   Future     Standing Expiration Date:   3/12/2025    Ferritin     Standing Status:   Future     Standing Expiration Date:   3/12/2025    Vitamin B12     Standing Status:   Future     Standing Expiration Date:   9/12/2024    Folate     Standing Status:   Future     Standing Expiration Date:   3/12/2025    Path Review, Smear     Standing Status:   Future     Standing Expiration Date:   3/12/2025     Order Specific Question:   Reason for Review:     Answer:   Other     Order Specific Question:   Reason for Pathologist Review:     Answer:   worsening anemia and new onset leukopenia

## 2024-03-18 ENCOUNTER — NURSE ONLY (OUTPATIENT)
Dept: INTERNAL MEDICINE CLINIC | Facility: CLINIC | Age: 79
End: 2024-03-18

## 2024-03-18 DIAGNOSIS — D72.819 LEUKOPENIA, UNSPECIFIED TYPE: ICD-10-CM

## 2024-03-18 DIAGNOSIS — D64.9 NORMOCYTIC ANEMIA: ICD-10-CM

## 2024-03-18 LAB
ALBUMIN SERPL-MCNC: 3.2 G/DL (ref 3.2–4.6)
ALBUMIN/GLOB SERPL: 0.9 (ref 0.4–1.6)
ALP SERPL-CCNC: 71 U/L (ref 50–136)
ALT SERPL-CCNC: 32 U/L (ref 12–65)
ANION GAP SERPL CALC-SCNC: 7 MMOL/L (ref 2–11)
AST SERPL-CCNC: 26 U/L (ref 15–37)
BASOPHILS # BLD: 0 K/UL (ref 0–0.2)
BASOPHILS NFR BLD: 0 % (ref 0–2)
BILIRUB SERPL-MCNC: 0.4 MG/DL (ref 0.2–1.1)
BUN SERPL-MCNC: 13 MG/DL (ref 8–23)
CALCIUM SERPL-MCNC: 9.4 MG/DL (ref 8.3–10.4)
CHLORIDE SERPL-SCNC: 112 MMOL/L (ref 103–113)
CO2 SERPL-SCNC: 25 MMOL/L (ref 21–32)
CREAT SERPL-MCNC: 1.5 MG/DL (ref 0.8–1.5)
DIFFERENTIAL METHOD BLD: ABNORMAL
EOSINOPHIL # BLD: 0.1 K/UL (ref 0–0.8)
EOSINOPHIL NFR BLD: 1 % (ref 0.5–7.8)
ERYTHROCYTE [DISTWIDTH] IN BLOOD BY AUTOMATED COUNT: 13.7 % (ref 11.9–14.6)
FERRITIN SERPL-MCNC: 281 NG/ML (ref 8–388)
FOLATE SERPL-MCNC: 22.4 NG/ML (ref 3.1–17.5)
GLOBULIN SER CALC-MCNC: 3.4 G/DL (ref 2.8–4.5)
GLUCOSE SERPL-MCNC: 188 MG/DL (ref 65–100)
HCT VFR BLD AUTO: 36.7 % (ref 41.1–50.3)
HGB BLD-MCNC: 11.8 G/DL (ref 13.6–17.2)
IMM GRANULOCYTES # BLD AUTO: 0 K/UL (ref 0–0.5)
IMM GRANULOCYTES NFR BLD AUTO: 0 % (ref 0–5)
IRON SATN MFR SERPL: 13 %
IRON SERPL-MCNC: 30 UG/DL (ref 35–150)
LYMPHOCYTES # BLD: 1.2 K/UL (ref 0.5–4.6)
LYMPHOCYTES NFR BLD: 22 % (ref 13–44)
MCH RBC QN AUTO: 28.8 PG (ref 26.1–32.9)
MCHC RBC AUTO-ENTMCNC: 32.2 G/DL (ref 31.4–35)
MCV RBC AUTO: 89.5 FL (ref 82–102)
MONOCYTES # BLD: 0.5 K/UL (ref 0.1–1.3)
MONOCYTES NFR BLD: 10 % (ref 4–12)
NEUTS SEG # BLD: 3.5 K/UL (ref 1.7–8.2)
NEUTS SEG NFR BLD: 67 % (ref 43–78)
NRBC # BLD: 0 K/UL (ref 0–0.2)
PLATELET # BLD AUTO: 176 K/UL (ref 150–450)
PMV BLD AUTO: 12.5 FL (ref 9.4–12.3)
POTASSIUM SERPL-SCNC: 4 MMOL/L (ref 3.5–5.1)
PROT SERPL-MCNC: 6.6 G/DL (ref 6.3–8.2)
RBC # BLD AUTO: 4.1 M/UL (ref 4.23–5.6)
SODIUM SERPL-SCNC: 144 MMOL/L (ref 136–146)
TIBC SERPL-MCNC: 237 UG/DL (ref 250–450)
VIT B12 SERPL-MCNC: 415 PG/ML (ref 193–986)
WBC # BLD AUTO: 5.2 K/UL (ref 4.3–11.1)

## 2024-03-19 ENCOUNTER — TELEPHONE (OUTPATIENT)
Dept: INTERNAL MEDICINE CLINIC | Facility: CLINIC | Age: 79
End: 2024-03-19

## 2024-03-19 DIAGNOSIS — D50.9 IRON DEFICIENCY ANEMIA, UNSPECIFIED IRON DEFICIENCY ANEMIA TYPE: Primary | ICD-10-CM

## 2024-03-19 LAB — PATH REV BLD -IMP: NORMAL

## 2024-03-19 NOTE — TELEPHONE ENCOUNTER
Patient's wife called requesting med refill for CARVEDILOL 12.5 MG. Stating that he is out. Please send to [phone]. 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 Tachycardic, hypotensive, and hypoxemic in ER. CT chest revealed b/l PE with evidence of right heart strain. Labs with elevated trop/lactate, EPIFANIO, and transaminitis. Pt was administered 50mg of tpa and is now stable for transfer to step down unit.    Acute Hypoxic Respiratory Failure due to Bilateral PE with RV strain  RLE DVT  -TTE - preserved EF; severely enlarged RV, severely reduced systolic function, with evidence of McConells sign  -s/p TPA   -repeat TTE with moderately dilated RV, no wall motion abnormalities  -heparin gtt transitioned to eliquis  -hematology consult given history of anticardiolipin AB positive in 2021 (Per Heme patient only with 1 of 3 ab. May Cont plan for DOAC as opposed to coumadin outpt f/u with heme)    Abnormal CT  -Unchanged 3 cm round nodule in the left upper quadrant which may be a splenule, or arising from the left adrenal gland  -outpatient surveillance

## 2024-03-20 NOTE — TELEPHONE ENCOUNTER
Recent labs with hemoglobin low at 11.8, iron saturation low at 13%, Ferritin within normal limits at 281.  Patient agreeable to gastroenterology evaluation.  Referral placed.    Orders Placed This Encounter    External Referral To Gastroenterology     Referral Priority:   Routine     Referral Type:   Eval and Treat     Referral Reason:   Specialty Services Required     Requested Specialty:   Gastroenterology     Number of Visits Requested:   1

## 2024-03-22 ENCOUNTER — OFFICE VISIT (OUTPATIENT)
Dept: ORTHOPEDIC SURGERY | Age: 79
End: 2024-03-22

## 2024-03-22 DIAGNOSIS — M17.11 PRIMARY OSTEOARTHRITIS OF RIGHT KNEE: Primary | ICD-10-CM

## 2024-03-22 RX ORDER — HYALURONATE SODIUM 10 MG/ML
20 SYRINGE (ML) INTRAARTICULAR ONCE
Status: COMPLETED | OUTPATIENT
Start: 2024-03-22 | End: 2024-03-22

## 2024-03-22 RX ADMIN — Medication 20 MG: at 10:04

## 2024-03-22 NOTE — PROGRESS NOTES
Name: Lauro Ryan  YOB: 1945  Gender: male  MRN: [id number]     CC: RIGHT Knee Osteoarthritis      PROCEDURE: #1 of 3 Hyaluronic Injection    After discussion of risks and benefits including but not limited to pain, infection, skin discoloration, and injury to blood vessels or nerves the patient verbally consented to proceed with injection of the RIGHT.  The patient is to restrict their activity for 48 hours.    Radiology Report: US guidance was used to examine the joint, ensure adequate needle placement and to decrease the risk of joint aggravation. The intracondylar notch, retropatellar fat pad, patella tendon, patella and tibia were all visualized. Pre and post injection US still images were obtained and placed in the record. Image were obtained with a [employer] ultrasound transducer (model [id number]).    Procedure Note: After steriley prepping the RIGHT knee, it was injected with a 2mL of Euflexxa and the medication was observed going into the intra-articular space via US guidance.    The patient tolerated the procedure without difficulty.    RENU TRAMMELL MD

## 2024-03-29 ENCOUNTER — OFFICE VISIT (OUTPATIENT)
Dept: ORTHOPEDIC SURGERY | Age: 79
End: 2024-03-29
Payer: MEDICARE

## 2024-03-29 DIAGNOSIS — M17.11 PRIMARY OSTEOARTHRITIS OF RIGHT KNEE: Primary | ICD-10-CM

## 2024-03-29 PROCEDURE — 20611 DRAIN/INJ JOINT/BURSA W/US: CPT | Performed by: PHYSICIAN ASSISTANT

## 2024-03-29 RX ORDER — HYALURONATE SODIUM 10 MG/ML
20 SYRINGE (ML) INTRAARTICULAR ONCE
Status: COMPLETED | OUTPATIENT
Start: 2024-03-29 | End: 2024-03-29

## 2024-03-29 RX ADMIN — Medication 20 MG: at 10:45

## 2024-03-29 NOTE — PROGRESS NOTES
Name: Lauro Ryan  YOB: 1945  Gender: male  MRN: [id number]     CC: RIGHT Knee Osteoarthritis      PROCEDURE: #2 of 3 Hyaluronic Injection    After discussion of risks and benefits including but not limited to pain, infection, skin discoloration, and injury to blood vessels or nerves the patient verbally consented to proceed with injection of the RIGHT.  The patient is to restrict their activity for 48 hours.    Radiology Report: US guidance was used to examine the joint, ensure adequate needle placement and to decrease the risk of joint aggravation. The intracondylar notch, retropatellar fat pad, patella tendon, patella and tibia were all visualized. Pre and post injection US still images were obtained and placed in the record. Image were obtained with a [employer] ultrasound transducer (model [id number]).    Procedure Note: After steriley prepping the RIGHT knee, it was injected with a 2mL of Euflexxa and the medication was observed going into the intra-articular space via US guidance.    The patient tolerated the procedure without difficulty.    LISANDRA Thrasher

## 2024-04-05 ENCOUNTER — OFFICE VISIT (OUTPATIENT)
Dept: ORTHOPEDIC SURGERY | Age: 79
End: 2024-04-05

## 2024-04-05 DIAGNOSIS — M17.11 PRIMARY OSTEOARTHRITIS OF RIGHT KNEE: Primary | ICD-10-CM

## 2024-04-05 RX ORDER — HYALURONATE SODIUM 10 MG/ML
20 SYRINGE (ML) INTRAARTICULAR ONCE
Status: COMPLETED | OUTPATIENT
Start: 2024-04-05 | End: 2024-04-05

## 2024-04-05 RX ADMIN — Medication 20 MG: at 10:14

## 2024-04-05 NOTE — PROGRESS NOTES
Name: Lauro Ryan  YOB: 1945  Gender: male  MRN: [id number]     CC: RIGHT Knee Osteoarthritis      PROCEDURE: #3 of 3 Hyaluronic Injection    After discussion of risks and benefits including but not limited to pain, infection, skin discoloration, and injury to blood vessels or nerves the patient verbally consented to proceed with injection of the RIGHT.  The patient is to restrict their activity for 48 hours.    Radiology Report: US guidance was used to examine the joint, ensure adequate needle placement and to decrease the risk of joint aggravation. The intracondylar notch, retropatellar fat pad, patella tendon, patella and tibia were all visualized. Pre and post injection US still images were obtained and placed in the record. Image were obtained with a [employer] ultrasound transducer (model [id number]).    Procedure Note: After steriley prepping the RIGHT knee, it was injected with a 2mL of Euflexxa and the medication was observed going into the intra-articular space via US guidance.    The patient tolerated the procedure without difficulty.    LISANDRA Thrasher

## 2024-05-13 DIAGNOSIS — I10 PRIMARY HYPERTENSION: ICD-10-CM

## 2024-05-13 DIAGNOSIS — I50.22 CHRONIC HFREF (HEART FAILURE WITH REDUCED EJECTION FRACTION) (HCC): ICD-10-CM

## 2024-05-13 DIAGNOSIS — I25.810 CORONARY ARTERY DISEASE INVOLVING CORONARY BYPASS GRAFT OF NATIVE HEART WITHOUT ANGINA PECTORIS: ICD-10-CM

## 2024-05-13 DIAGNOSIS — I10 ESSENTIAL HYPERTENSION: ICD-10-CM

## 2024-05-13 RX ORDER — LOSARTAN POTASSIUM 50 MG/1
50 TABLET ORAL
Qty: 90 TABLET | Refills: 2 | Status: SHIPPED | OUTPATIENT
Start: 2024-05-13

## 2024-05-13 RX ORDER — CARVEDILOL 12.5 MG/1
12.5 TABLET ORAL 2 TIMES DAILY WITH MEALS
Qty: 180 TABLET | Refills: 2 | Status: SHIPPED | OUTPATIENT
Start: 2024-05-13

## 2024-05-13 NOTE — TELEPHONE ENCOUNTER
Pt's spouse is calling the office on behalf of the pt to request a refill of carvedilol & losartan. Please send to [facility] [address] .

## 2024-05-16 ENCOUNTER — TELEPHONE (OUTPATIENT)
Age: 79
End: 2024-05-16

## 2024-05-16 NOTE — TELEPHONE ENCOUNTER
Called to inform patient of AEGIS II clinical trial results and unblinding information.  No answer unable to leave a message.  Will try again later.

## 2024-06-05 ENCOUNTER — OFFICE VISIT (OUTPATIENT)
Dept: NEUROLOGY | Age: 79
End: 2024-06-05
Payer: MEDICARE

## 2024-06-05 DIAGNOSIS — I63.9 CEREBROVASCULAR ACCIDENT (CVA), UNSPECIFIED MECHANISM (HCC): ICD-10-CM

## 2024-06-05 DIAGNOSIS — F03.B0 MODERATE DEMENTIA WITHOUT BEHAVIORAL DISTURBANCE, PSYCHOTIC DISTURBANCE, MOOD DISTURBANCE, OR ANXIETY, UNSPECIFIED DEMENTIA TYPE (HCC): Primary | ICD-10-CM

## 2024-06-05 PROCEDURE — 99214 OFFICE O/P EST MOD 30 MIN: CPT | Performed by: PSYCHIATRY & NEUROLOGY

## 2024-06-05 PROCEDURE — 1036F TOBACCO NON-USER: CPT | Performed by: PSYCHIATRY & NEUROLOGY

## 2024-06-05 PROCEDURE — 1123F ACP DISCUSS/DSCN MKR DOCD: CPT | Performed by: PSYCHIATRY & NEUROLOGY

## 2024-06-05 PROCEDURE — G8420 CALC BMI NORM PARAMETERS: HCPCS | Performed by: PSYCHIATRY & NEUROLOGY

## 2024-06-05 PROCEDURE — G8427 DOCREV CUR MEDS BY ELIG CLIN: HCPCS | Performed by: PSYCHIATRY & NEUROLOGY

## 2024-06-05 RX ORDER — DONEPEZIL HYDROCHLORIDE 5 MG/1
5 TABLET, FILM COATED ORAL NIGHTLY
Qty: 90 TABLET | Refills: 3 | Status: SHIPPED | OUTPATIENT
Start: 2024-06-05

## 2024-06-05 NOTE — PROGRESS NOTES
disturbance, or anxiety, unspecified dementia type (HCC) at this time and add Aricept 5 mg to the Namenda 10 twice a day and I will make it very clear that this gentleman has been losing weight prior to these 2 medications.  But if there is any GI discomfort then she is going to discontinue the Aricept.  We are to hold off on the PET scan until they are comfortable with doing this at [facility].  Blood studies were intermittent risk.  I think this is accompanied by vascular dementia with an underlying mild Alzheimer's component with it making this moderate dementia.    Cerebrovascular accident (CVA), unspecified mechanism (HCC) he has an MRI done last showed a left anterior lentiform nucleus ischemic changes bilateral hippocampal volume loss.  At this point we are going to move forward with a CTA we ordered her last time it just never got done.  -     CTA HEAD NECK W WO CONTRAST; Future    Other orders  -     donepezil (ARICEPT) 5 MG tablet; Take 1 tablet by mouth nightly        The Diagnosis and differential diagnostic considerations, and Rx Tx were reviewed with the patient at length.             Orders Placed This Encounter   Procedures    CTA HEAD NECK W WO CONTRAST     Standing Status:   Future     Standing Expiration Date:   6/5/2025     Order Specific Question:   Additional Contrast?     Answer:   None     Order Specific Question:   STAT Creatinine as needed:     Answer:   Yes          I have spent greater than 50% of visit discussing and counseling of patient  for treatment and diagnostic plan review. Total time30     .      Notes: Patient is to continue all medications as directed by prescribing physicians. Continuations on today's visit are made based on the patient's report of current medications.             Dr. Joshua Orourke  Consultation Neurology, Neurodiagnostics and Neurotherapeutics  Neuroelectrophysiology, EEG, EMG  [facility]  [address] [place] [address], [address]  [place] [address]  Phone:

## 2024-06-11 ENCOUNTER — TELEPHONE (OUTPATIENT)
Age: 79
End: 2024-06-11

## 2024-06-11 NOTE — TELEPHONE ENCOUNTER
Attempted to call patient to inform him of the AEGIS II trial results.  Tried all numbers.  Unable to leave VM.  Will try again later.

## 2024-06-21 ENCOUNTER — TELEPHONE (OUTPATIENT)
Dept: NEUROLOGY | Age: 79
End: 2024-06-21

## 2024-06-21 NOTE — TELEPHONE ENCOUNTER
Pt is calling about two medications the donepezil and the memantine. They are concerned bc they were told by their pharmacist that normally you don't take both of those at the same time and also, since he has been on both he sleeps constantly, can't seem to get up during the day and she can't get him out of bed. He doesn't want to eat even.

## 2024-06-24 ENCOUNTER — CLINICAL DOCUMENTATION (OUTPATIENT)
Dept: NEUROLOGY | Age: 79
End: 2024-06-24

## 2024-06-24 NOTE — PROGRESS NOTES
For some reason the pharmacist told this patient that Namenda and Aricept are not used together when they are.  When he started Namenda he did fairly well without daytime sleepiness until the Aricept was added so organ to hold off on the Aricept and see how he just does with Namenda 10 twice a day.

## 2024-06-25 NOTE — DISCHARGE INSTRUCTIONS
Be sure to drink plenty of fluids and get plenty rest.  Call your doctor in the morning to arrange follow-up in 1 to 2 days for reevaluation. Return to your doctor or here sooner if you experience worsening or other worrisome symptoms. Goals of Care Note    A discussion of the patient's Goals of Care has been completed with the Patient regarding the patient's current condition, prognosis, and  future goals of care. The Patient has a fair understanding of the patient’s current condition and overall disease process.     The Goals of Care include   Full Resuscitation (usual medical care including resuscitation, intubation, vasopressors, and transfer to the ICU if/when indicated)  The anticipated/desired location for the patient upon discharge would be  Inpatient Rehab Facility    The following additional care is recommended  None    Additional discussion: None

## 2024-07-02 ENCOUNTER — HOSPITAL ENCOUNTER (EMERGENCY)
Age: 79
Discharge: HOME | End: 2024-07-02
Attending: STUDENT IN AN ORGANIZED HEALTH CARE EDUCATION/TRAINING PROGRAM
Payer: MEDICARE

## 2024-07-02 ENCOUNTER — APPOINTMENT (OUTPATIENT)
Dept: GENERAL RADIOLOGY | Age: 79
End: 2024-07-02
Payer: MEDICARE

## 2024-07-02 VITALS
TEMPERATURE: 97.7 F | DIASTOLIC BLOOD PRESSURE: 83 MMHG | HEART RATE: 70 BPM | SYSTOLIC BLOOD PRESSURE: 127 MMHG | OXYGEN SATURATION: 100 % | RESPIRATION RATE: 16 BRPM

## 2024-07-02 DIAGNOSIS — R07.9 CHEST PAIN, UNSPECIFIED TYPE: Primary | ICD-10-CM

## 2024-07-02 LAB
ALBUMIN SERPL-MCNC: 3.7 G/DL (ref 3.2–4.6)
ALBUMIN/GLOB SERPL: 1 (ref 1–1.9)
ALP SERPL-CCNC: 73 U/L (ref 40–129)
ALT SERPL-CCNC: 21 U/L (ref 12–65)
ANION GAP SERPL CALC-SCNC: 11 MMOL/L (ref 9–18)
AST SERPL-CCNC: 35 U/L (ref 15–37)
BASOPHILS # BLD: 0 K/UL (ref 0–0.2)
BASOPHILS NFR BLD: 1 % (ref 0–2)
BILIRUB SERPL-MCNC: 0.3 MG/DL (ref 0–1.2)
BUN SERPL-MCNC: 20 MG/DL (ref 8–23)
CALCIUM SERPL-MCNC: 9.8 MG/DL (ref 8.8–10.2)
CHLORIDE SERPL-SCNC: 108 MMOL/L (ref 98–107)
CO2 SERPL-SCNC: 24 MMOL/L (ref 20–28)
CREAT SERPL-MCNC: 1.78 MG/DL (ref 0.8–1.3)
D DIMER PPP FEU-MCNC: 0.71 UG/ML(FEU)
DIFFERENTIAL METHOD BLD: ABNORMAL
EKG ATRIAL RATE: 63 BPM
EKG DIAGNOSIS: NORMAL
EKG P AXIS: 89 DEGREES
EKG P-R INTERVAL: 162 MS
EKG Q-T INTERVAL: 418 MS
EKG QRS DURATION: 86 MS
EKG QTC CALCULATION (BAZETT): 427 MS
EKG R AXIS: 32 DEGREES
EKG T AXIS: 32 DEGREES
EKG VENTRICULAR RATE: 63 BPM
EOSINOPHIL # BLD: 0.1 K/UL (ref 0–0.8)
EOSINOPHIL NFR BLD: 2 % (ref 0.5–7.8)
ERYTHROCYTE [DISTWIDTH] IN BLOOD BY AUTOMATED COUNT: 14.6 % (ref 11.9–14.6)
GLOBULIN SER CALC-MCNC: 3.7 G/DL (ref 2.3–3.5)
GLUCOSE SERPL-MCNC: 81 MG/DL (ref 70–99)
HCT VFR BLD AUTO: 38.8 % (ref 41.1–50.3)
HGB BLD-MCNC: 12.8 G/DL (ref 13.6–17.2)
IMM GRANULOCYTES # BLD AUTO: 0 K/UL (ref 0–0.5)
IMM GRANULOCYTES NFR BLD AUTO: 1 % (ref 0–5)
LIPASE SERPL-CCNC: 38 U/L (ref 13–60)
LYMPHOCYTES # BLD: 1.6 K/UL (ref 0.5–4.6)
LYMPHOCYTES NFR BLD: 31 % (ref 13–44)
MCH RBC QN AUTO: 29 PG (ref 26.1–32.9)
MCHC RBC AUTO-ENTMCNC: 33 G/DL (ref 31.4–35)
MCV RBC AUTO: 88 FL (ref 82–102)
MONOCYTES # BLD: 0.7 K/UL (ref 0.1–1.3)
MONOCYTES NFR BLD: 14 % (ref 4–12)
NEUTS SEG # BLD: 2.7 K/UL (ref 1.7–8.2)
NEUTS SEG NFR BLD: 51 % (ref 43–78)
NRBC # BLD: 0 K/UL (ref 0–0.2)
PLATELET # BLD AUTO: 187 K/UL (ref 150–450)
PMV BLD AUTO: 11.1 FL (ref 9.4–12.3)
POTASSIUM SERPL-SCNC: 4.4 MMOL/L (ref 3.5–5.1)
PROT SERPL-MCNC: 7.5 G/DL (ref 6.3–8.2)
RBC # BLD AUTO: 4.41 M/UL (ref 4.23–5.6)
SODIUM SERPL-SCNC: 143 MMOL/L (ref 136–145)
TROPONIN T SERPL HS-MCNC: 19 NG/L (ref 0–22)
TROPONIN T SERPL HS-MCNC: 22 NG/L (ref 0–22)
WBC # BLD AUTO: 5.2 K/UL (ref 4.3–11.1)

## 2024-07-02 PROCEDURE — 85379 FIBRIN DEGRADATION QUANT: CPT

## 2024-07-02 PROCEDURE — 93010 ELECTROCARDIOGRAM REPORT: CPT | Performed by: INTERNAL MEDICINE

## 2024-07-02 PROCEDURE — 71046 X-RAY EXAM CHEST 2 VIEWS: CPT

## 2024-07-02 PROCEDURE — 80053 COMPREHEN METABOLIC PANEL: CPT

## 2024-07-02 PROCEDURE — 83690 ASSAY OF LIPASE: CPT

## 2024-07-02 PROCEDURE — 99285 EMERGENCY DEPT VISIT HI MDM: CPT

## 2024-07-02 PROCEDURE — 84484 ASSAY OF TROPONIN QUANT: CPT

## 2024-07-02 PROCEDURE — 85025 COMPLETE CBC W/AUTO DIFF WBC: CPT

## 2024-07-02 PROCEDURE — 93005 ELECTROCARDIOGRAM TRACING: CPT | Performed by: STUDENT IN AN ORGANIZED HEALTH CARE EDUCATION/TRAINING PROGRAM

## 2024-07-02 ASSESSMENT — PAIN SCALES - GENERAL: PAINLEVEL_OUTOF10: 0

## 2024-07-02 NOTE — DISCHARGE INSTRUCTIONS
Your evaluation today reveals no evidence of acute heart abnormality.  However, you require close outpatient follow-up with a cardiology specialist. This appointment has been arranged and you should receive a call from this specialist within the next 48 hours to schedule your appointment.  If you do not receive this call, please call the number listed.  Return immediately if your symptoms worsen or you have any concerns or questions.

## 2024-07-02 NOTE — ED TRIAGE NOTES
Pt c/o CP since Sunday and weakness. Pt is poor historian. Per wife, pt took nitro on Sunday once with relief. Hx CHF, CAD, CKD, HTN, afib

## 2024-07-02 NOTE — ED NOTES
Patient mobility status  with no difficulty. Provider aware     I have reviewed discharge instructions with the patient.  The patient verbalized understanding.    Patient left ED via Discharge Method: ambulatory to Home with  self .    Opportunity for questions and clarification provided.     Patient given 0 scripts.            Judith Zuniga LPN  07/02/24 1958

## 2024-07-02 NOTE — ED PROVIDER NOTES
Emergency Department Provider Note       PCP: Ramos Gu DO   Age: 78 y.o.   Sex: male     DISPOSITION Decision To Discharge 07/02/2024 07:48:40 PM       ICD-10-CM    1. Chest pain, unspecified type  R07.9 [facility] [place]          Medical Decision Making     Generally well-appearing male patient, presents with his wife reporting intermittent chest pain for the past several days  Initial blood work reveals stable troponin, EKG is reassuring on arrival, chest x-ray overall appears stable with mention of slight dilatation of the aorta, patient exam inconsistent with aortic catastrophe as he is well-appearing, denies any pain, does not report tearing or ripping sensation, no associated back pain.  D-dimer within normal limits, subsequent troponin has actually improved  Given patient's well appearance, stable vital signs I will think he is safe for discharge with close outpatient follow-up which I will arrange for [facility].  Discussed plan of care with patient voiced understanding agreement    ED Course as of 07/02/24 1949 Tue Jul 02, 2024 1945 D-dimer within age-adjusted limits for patient [BR]   1946 Troponins of 22 and 19 respectively. [BR]   1946 Chest x-ray appears stable, note is made of dilation of the aorta though patient is well-appearing on exam, he has no pain currently and symptoms inconsistent with aortic catastrophe.  D-dimer within normal limits as well [BR]   1946 EKG interpretation: Sinus rhythm, rate of 63, normal axis, no ischemia [BR]      ED Course User Index  [BR] Sharad Lester DO     1 or more acute illnesses that pose a threat to life or bodily function.   1 or more chronic illnesses with a severe exacerbation or progression.  Patient was discharged risks and benefits of hospitalization were considered.  Chronic medical problems impacting care include CAD.  Shared medical decision making was utilized in creating the patients health plan

## 2024-07-03 ENCOUNTER — CARE COORDINATION (OUTPATIENT)
Dept: CARE COORDINATION | Facility: CLINIC | Age: 79
End: 2024-07-03

## 2024-07-03 NOTE — CARE COORDINATION
Ambulatory Care Management Outreach Attempt    This patient was received as a referral from  Daily assignment for case management of ed utilizer.    Attempted to reach patient for ED follow up. Pt has respectfully declined services at this time, my contact information has been shared with pt in the event there circumstances change. They are free to reach out at any time. ACM signing off.        Patient: Lauro Ryan Patient : 1945 MRN: [id number]    Last Discharge Facility       Date Complaint Diagnosis Description Type Department Provider    24 Chest Pain Chest pain, unspecified type ED (DISCHARGE) [facility] Sharad Lester DO                Noted following upcoming appointments from discharge chart review:   BS follow up appointment(s):   Future Appointments   Date Time Provider Department Center   2024 11:30 AM Carmel Reyes MD [facility]   2024 11:20 AM Ramos Gu DO [facility]   3/5/2025 11:00 AM Joshua Orourke DO [facility]     Non-BS follow up appointment(s):

## 2024-07-31 NOTE — PROGRESS NOTES
takes medication for DM; rarely check BS, denies hypoglycemia    ED (erectile dysfunction)     Encephalitis 1962    x 2/hospitalized as teenager    Gout     hx of    History of blood transfusion     due to injury    Hx of echocardiogram 02/11/2022    EF 50-55%    Hypercholesterolemia     Hypertension     medications    Ischemic cardiomyopathy     Lacunar infarct, acute (HCC)     possible embolic, remote a-fib- on eliquis    Lumbago     Memory loss     Mitral valve regurgitation 7/14/12    \"moderate\" on echo    TREVOR (obstructive sleep apnea)     mild per patient, does not use CPAP    PAD (peripheral artery disease) ([facility])     PAF (paroxysmal atrial fibrillation) ([facility])     Poor historian 2022    patient does not know medications and does not know his medical history    Prostate cancer ([facility])     followed by urology    Psoriasis     topical creams    SBO (small bowel obstruction) ([facility]) 2011, 2015, 2016     Past Surgical History:   Procedure Laterality Date    APPENDECTOMY  1953    as a child    CATARACT REMOVAL Bilateral 2013    iol     COLONOSCOPY  1998, 2010    CORONARY ARTERY BYPASS GRAFT  03/09/2009    x4 bypass    HERNIA REPAIR Bilateral 2012    LEFT HEART CATH,PERCUTANEOUS  7/2012    no intervention    LEFT HEART CATH,PERCUTANEOUS  09/25/2019    NSTEMI & PCI    LEFT HEART CATH,PERCUTANEOUS  03/2019    STEMI, PCI, VFib arrest    MN UNLISTED PROCEDURE ABDOMEN PERITONEUM & OMENTUM  1957    exp lap, numerous surgeries on abdomen from an injury    PROSTATE BIOPSY, NEEDLE, SATURATION SAMPLING      and ultrasound    PROSTATECTOMY       SHOULDER ARTHROPLASTY Right 2018    Reverse R TSA    SPLENECTOMY  1951    VASCULAR SURGERY  12/29/2017    aortogram, w/kristi LE runoff,R-LE arteriogram    VASCULAR SURGERY Right 6/22/2022    ULTRASOUND GUIDED BILATERAL LOWER EXTREMITY ARTERIOGRAM/ AORTAGRAM performed by Abdoulaye Rios MD at [facility] MAIN OR    WISDOM TOOTH EXTRACTION       Family History   Problem Relation Age of Onset

## 2024-08-01 ENCOUNTER — CLINICAL DOCUMENTATION (OUTPATIENT)
Age: 79
End: 2024-08-01

## 2024-08-01 ENCOUNTER — OFFICE VISIT (OUTPATIENT)
Age: 79
End: 2024-08-01
Payer: MEDICARE

## 2024-08-01 VITALS
HEIGHT: 71 IN | HEART RATE: 60 BPM | BODY MASS INDEX: 22.12 KG/M2 | SYSTOLIC BLOOD PRESSURE: 112 MMHG | DIASTOLIC BLOOD PRESSURE: 74 MMHG | WEIGHT: 158 LBS

## 2024-08-01 DIAGNOSIS — I25.118 CORONARY ARTERY DISEASE OF NATIVE ARTERY OF NATIVE HEART WITH STABLE ANGINA PECTORIS (HCC): Primary | ICD-10-CM

## 2024-08-01 DIAGNOSIS — I50.20 HFREF (HEART FAILURE WITH REDUCED EJECTION FRACTION) (HCC): ICD-10-CM

## 2024-08-01 DIAGNOSIS — E78.5 DYSLIPIDEMIA: ICD-10-CM

## 2024-08-01 DIAGNOSIS — I10 ESSENTIAL HYPERTENSION: ICD-10-CM

## 2024-08-01 DIAGNOSIS — I73.9 PERIPHERAL ARTERY DISEASE (HCC): ICD-10-CM

## 2024-08-01 DIAGNOSIS — N18.32 STAGE 3B CHRONIC KIDNEY DISEASE (HCC): ICD-10-CM

## 2024-08-01 PROCEDURE — 3078F DIAST BP <80 MM HG: CPT | Performed by: INTERNAL MEDICINE

## 2024-08-01 PROCEDURE — 99214 OFFICE O/P EST MOD 30 MIN: CPT | Performed by: INTERNAL MEDICINE

## 2024-08-01 PROCEDURE — 1123F ACP DISCUSS/DSCN MKR DOCD: CPT | Performed by: INTERNAL MEDICINE

## 2024-08-01 PROCEDURE — G8420 CALC BMI NORM PARAMETERS: HCPCS | Performed by: INTERNAL MEDICINE

## 2024-08-01 PROCEDURE — 3074F SYST BP LT 130 MM HG: CPT | Performed by: INTERNAL MEDICINE

## 2024-08-01 PROCEDURE — 1036F TOBACCO NON-USER: CPT | Performed by: INTERNAL MEDICINE

## 2024-08-01 PROCEDURE — G8427 DOCREV CUR MEDS BY ELIG CLIN: HCPCS | Performed by: INTERNAL MEDICINE

## 2024-08-01 RX ORDER — NITROGLYCERIN 0.4 MG/1
0.4 TABLET SUBLINGUAL EVERY 5 MIN PRN
Qty: 25 TABLET | Refills: 3 | Status: SHIPPED | OUTPATIENT
Start: 2024-08-01

## 2024-08-01 NOTE — PATIENT INSTRUCTIONS
Patient Education        Learning About the Mediterranean Diet  What is the Mediterranean diet?     The Mediterranean diet is a style of eating rather than a diet plan. It features foods eaten in [place], [place], [place], and other countries along the [place] Sea. It emphasizes eating foods like fish, fruits, vegetables, beans, high-fiber breads and whole grains, nuts, and olive oil. This style of eating includes limited red meat, cheese, and sweets.  Why choose the Mediterranean diet?  A Mediterranean-style diet may improve heart health. It contains more fat than other heart-healthy diets. But the fats are mainly from nuts, unsaturated oils (such as fish oils and olive oil), and certain nut or seed oils (such as canola, soybean, or flaxseed oil). These fats may help protect the heart and blood vessels.  How can you get started on the Mediterranean diet?  Here are some things you can do to switch to a more Mediterranean way of eating.  What to eat  Eat a variety of fruits and vegetables each day, such as grapes, blueberries, tomatoes, broccoli, peppers, figs, olives, spinach, eggplant, beans, lentils, and chickpeas.  Eat a variety of whole-grain foods each day, such as oats, brown rice, and whole wheat bread, pasta, and couscous.  Eat fish at least 2 times a week. Try tuna, salmon, mackerel, lake trout, herring, or sardines.  Eat moderate amounts of low-fat dairy products, such as milk, cheese, or yogurt.  Eat moderate amounts of poultry and eggs.  Choose healthy (unsaturated) fats, such as nuts, olive oil, and certain nut or seed oils like canola, soybean, and flaxseed.  Limit unhealthy (saturated) fats, such as butter, palm oil, and coconut oil. And limit fats found in animal products, such as meat and dairy products made with whole milk. Try to eat red meat only a few times a month in very small amounts.  Limit sweets and desserts to only a few times a week. This includes sugar-sweetened

## 2024-08-01 NOTE — PROGRESS NOTES
Patient here for routine visit with Dr. Reyes today.  He is accompanied by his wife.  Patient and wife notified that the AEGIS II trial results were in and patient was randomized to placebo.  Wife verbalized understanding and denied any questions.

## 2024-08-17 DIAGNOSIS — E11.51 TYPE 2 DIABETES MELLITUS WITH DIABETIC PERIPHERAL ANGIOPATHY WITHOUT GANGRENE, WITHOUT LONG-TERM CURRENT USE OF INSULIN (HCC): ICD-10-CM

## 2024-08-17 NOTE — ASSESSMENT & PLAN NOTE
Likely multifactorial in setting of age-related decline in renal function, hypertension and diabetes  Monitor volume status given history of CHF, avoid NSAIDs especially given use of dual antiplatelet therapy.  Maintain adequate hydration  BP and glycemic control as above

## 2024-08-17 NOTE — ASSESSMENT & PLAN NOTE
Bilateral lower extremity angiogram on 6/22/2022 as follows:  -Patent bilateral renal arteries  -Right lower extremity with heavily diseased below the knee popliteal artery and occluded however collateral flow supplying the foot  -Left lower extremity occluded below the knee popliteal artery with severe tibial disease  Vascular JONATHAN on 11/17/2022 with normal to mild disease of right lower extremity, mild disease of left lower extremity, reduced digit pressures and amplitudes possibly secondary to small vessel disease  Continue aspirin, Plavix, rosuvastatin, zetia, BP and glycemic control  Per vascular surgery no further intervention able to be performed

## 2024-08-17 NOTE — PROGRESS NOTES
Lauro Deepak Ryan (:  1945) is a 78 y.o. male,Established patient, here for evaluation of the following chief complaint(s):  Medicare AWV (Pt is here for his yearly AWV. )         Assessment & Plan  Medicare annual wellness visit, subsequent  Medicare wellness responses reviewed in the office today  Colonoscopy on 2024  Check ultrasensitive PSA level as below given history of prostate cancer         Moderate dementia without behavioral disturbance, psychotic disturbance, mood disturbance, or anxiety, unspecified dementia type (HCC)  Carotid ultrasound on 2017 without hemodynamically significant stenosis  MRI brain on 2017 suggestive of low flow state in right vertebral artery but no intracranial thromboembolus, high-grade stenosis, aneurysm or vascular malformation  MRI neck on 2017 without significant ICA stenosis but moderate arterial tortuosity consistent with hypertension as well as distal right vertebral artery high-grade stenosis  MMSE score of 24/30 on 22  MRI of the brain without contrast on 2022 as follows:  -Encephalomalacia in the anterior left periventricular white matter and left lentiform nucleus at the site of prior infarction  -No acute ischemic infarction  -Nonspecific symmetric global brain parenchymal volume loss (hippocampal volume loss is symmetric but slightly worse than the degree of global brain parenchymal volume loss)  Labs from 2022 shows negative RPR, normal thiamine, TSH, free T4, B12 and vitamin D levels  Drowsiness noted with donepezil. Continue memantine per neurology          History of CVA (cerebrovascular accident)  Neuroimaging as above  Continue secondary prevention with aspirin, Plavix, rosuvastatin, ezetimibe, BP and glycemic control    Orders:    clopidogrel (PLAVIX) 75 MG tablet; TAKE 1 TABLET DAILY    Coronary artery disease involving coronary bypass graft of native heart without angina pectoris      Orders:

## 2024-08-20 ENCOUNTER — OFFICE VISIT (OUTPATIENT)
Dept: INTERNAL MEDICINE CLINIC | Facility: CLINIC | Age: 79
End: 2024-08-20
Payer: MEDICARE

## 2024-08-20 VITALS
TEMPERATURE: 97.3 F | SYSTOLIC BLOOD PRESSURE: 128 MMHG | BODY MASS INDEX: 22.54 KG/M2 | HEIGHT: 71 IN | DIASTOLIC BLOOD PRESSURE: 82 MMHG | WEIGHT: 161 LBS

## 2024-08-20 DIAGNOSIS — D50.9 IRON DEFICIENCY ANEMIA, UNSPECIFIED IRON DEFICIENCY ANEMIA TYPE: ICD-10-CM

## 2024-08-20 DIAGNOSIS — Z85.46 PERSONAL HISTORY OF PROSTATE CANCER: ICD-10-CM

## 2024-08-20 DIAGNOSIS — Z00.00 MEDICARE ANNUAL WELLNESS VISIT, SUBSEQUENT: Primary | ICD-10-CM

## 2024-08-20 DIAGNOSIS — Z86.73 HISTORY OF CVA (CEREBROVASCULAR ACCIDENT): ICD-10-CM

## 2024-08-20 DIAGNOSIS — F03.B0 MODERATE DEMENTIA WITHOUT BEHAVIORAL DISTURBANCE, PSYCHOTIC DISTURBANCE, MOOD DISTURBANCE, OR ANXIETY, UNSPECIFIED DEMENTIA TYPE (HCC): ICD-10-CM

## 2024-08-20 DIAGNOSIS — I50.22 CHRONIC HFREF (HEART FAILURE WITH REDUCED EJECTION FRACTION) (HCC): ICD-10-CM

## 2024-08-20 DIAGNOSIS — E11.51 TYPE 2 DIABETES MELLITUS WITH DIABETIC PERIPHERAL ANGIOPATHY WITHOUT GANGRENE, WITHOUT LONG-TERM CURRENT USE OF INSULIN (HCC): ICD-10-CM

## 2024-08-20 DIAGNOSIS — E11.42 DIABETIC POLYNEUROPATHY ASSOCIATED WITH TYPE 2 DIABETES MELLITUS (HCC): ICD-10-CM

## 2024-08-20 DIAGNOSIS — N18.31 STAGE 3A CHRONIC KIDNEY DISEASE (HCC): ICD-10-CM

## 2024-08-20 DIAGNOSIS — I10 ESSENTIAL HYPERTENSION: ICD-10-CM

## 2024-08-20 DIAGNOSIS — G47.33 OBSTRUCTIVE SLEEP APNEA: ICD-10-CM

## 2024-08-20 DIAGNOSIS — I25.810 CORONARY ARTERY DISEASE INVOLVING CORONARY BYPASS GRAFT OF NATIVE HEART WITHOUT ANGINA PECTORIS: ICD-10-CM

## 2024-08-20 DIAGNOSIS — I73.9 PERIPHERAL ARTERY DISEASE (HCC): ICD-10-CM

## 2024-08-20 LAB — HBA1C MFR BLD: 6.5 %

## 2024-08-20 PROCEDURE — 83036 HEMOGLOBIN GLYCOSYLATED A1C: CPT | Performed by: STUDENT IN AN ORGANIZED HEALTH CARE EDUCATION/TRAINING PROGRAM

## 2024-08-20 PROCEDURE — 3074F SYST BP LT 130 MM HG: CPT | Performed by: STUDENT IN AN ORGANIZED HEALTH CARE EDUCATION/TRAINING PROGRAM

## 2024-08-20 PROCEDURE — G0439 PPPS, SUBSEQ VISIT: HCPCS | Performed by: STUDENT IN AN ORGANIZED HEALTH CARE EDUCATION/TRAINING PROGRAM

## 2024-08-20 PROCEDURE — 3079F DIAST BP 80-89 MM HG: CPT | Performed by: STUDENT IN AN ORGANIZED HEALTH CARE EDUCATION/TRAINING PROGRAM

## 2024-08-20 PROCEDURE — 3044F HG A1C LEVEL LT 7.0%: CPT | Performed by: STUDENT IN AN ORGANIZED HEALTH CARE EDUCATION/TRAINING PROGRAM

## 2024-08-20 PROCEDURE — 1123F ACP DISCUSS/DSCN MKR DOCD: CPT | Performed by: STUDENT IN AN ORGANIZED HEALTH CARE EDUCATION/TRAINING PROGRAM

## 2024-08-20 RX ORDER — GABAPENTIN 600 MG/1
600 TABLET ORAL 2 TIMES DAILY
Qty: 180 TABLET | Refills: 2 | Status: SHIPPED | OUTPATIENT
Start: 2024-08-20 | End: 2025-05-17

## 2024-08-20 RX ORDER — EZETIMIBE 10 MG/1
10 TABLET ORAL NIGHTLY
Qty: 90 TABLET | Refills: 2 | Status: SHIPPED | OUTPATIENT
Start: 2024-08-20

## 2024-08-20 RX ORDER — CLOPIDOGREL BISULFATE 75 MG/1
TABLET ORAL
Qty: 90 TABLET | Refills: 2 | Status: SHIPPED | OUTPATIENT
Start: 2024-08-20

## 2024-08-20 RX ORDER — ROSUVASTATIN CALCIUM 20 MG/1
20 TABLET, COATED ORAL NIGHTLY
Qty: 90 TABLET | Refills: 2 | Status: SHIPPED | OUTPATIENT
Start: 2024-08-20

## 2024-08-20 ASSESSMENT — ENCOUNTER SYMPTOMS
SHORTNESS OF BREATH: 0
TROUBLE SWALLOWING: 0
CONSTIPATION: 0
DIARRHEA: 0

## 2024-08-20 ASSESSMENT — PATIENT HEALTH QUESTIONNAIRE - PHQ9
SUM OF ALL RESPONSES TO PHQ QUESTIONS 1-9: 0
SUM OF ALL RESPONSES TO PHQ9 QUESTIONS 1 & 2: 0
SUM OF ALL RESPONSES TO PHQ QUESTIONS 1-9: 0
2. FEELING DOWN, DEPRESSED OR HOPELESS: NOT AT ALL
1. LITTLE INTEREST OR PLEASURE IN DOING THINGS: NOT AT ALL
SUM OF ALL RESPONSES TO PHQ QUESTIONS 1-9: 0
SUM OF ALL RESPONSES TO PHQ QUESTIONS 1-9: 0

## 2024-08-20 ASSESSMENT — LIFESTYLE VARIABLES
HOW MANY STANDARD DRINKS CONTAINING ALCOHOL DO YOU HAVE ON A TYPICAL DAY: PATIENT DOES NOT DRINK
HOW OFTEN DO YOU HAVE A DRINK CONTAINING ALCOHOL: NEVER

## 2024-08-21 NOTE — ASSESSMENT & PLAN NOTE
Orders:    AMB POC HEMOGLOBIN A1C    Microalbumin / Creatinine Urine Ratio; Future    Urinalysis; Future    metFORMIN (GLUCOPHAGE) 1000 MG tablet; Take 1 tablet by mouth 2 times daily (with meals)

## 2024-08-21 NOTE — PATIENT INSTRUCTIONS
can help remind the person you are caring for to brush and floss their teeth or to clean their dentures. In some cases, you may need to do the brushing and other dental care tasks. People who have trouble using their hands or who have dementia may need this extra help.  How can you help with dental care?  Normal dental care  To keep the teeth and gums healthy:  Brush the teeth with fluoride toothpaste twice a day--in the morning and at night--and floss at least once a day. Plaque can quickly build up on the teeth of older adults.  Watch for the signs of gum disease. These signs include gums that bleed after brushing or after eating hard foods, such as apples.  See a dentist regularly. Many experts recommend checkups every 6 months.  Keep the dentist up to date on any new medications the person is taking.  Encourage a balanced diet that includes whole grains, vegetables, and fruits, and that is low in saturated fat and sodium.  Encourage the person you're caring for not to use tobacco products. They can affect dental and general health.  Many older adults have a fixed income and feel that they can't afford dental care. But most [facility] and [facility] have programs in which dentists help older adults by lowering fees. Contact your area's public health offices or  for information about dental care in your area.  Using a toothbrush  Older adults with arthritis sometimes have trouble brushing their teeth because they can't easily hold the toothbrush. Their hands and fingers may be stiff, painful, or weak. If this is the case, you can:  Offer an electric toothbrush.  Enlarge the handle of a non-electric toothbrush by wrapping a sponge, an elastic bandage, or adhesive tape around it.  Push the toothbrush handle through a ball made of rubber or soft foam.  Make the handle longer and thicker by taping Popsicle sticks or tongue depressors to it.  You may also be able to buy special toothbrushes, toothpaste

## 2024-11-26 ENCOUNTER — HOSPITAL ENCOUNTER (INPATIENT)
Age: 79
LOS: 1 days | Discharge: HOME | DRG: 683 | End: 2024-11-27
Attending: EMERGENCY MEDICINE | Admitting: HOSPITALIST
Payer: OTHER GOVERNMENT

## 2024-11-26 ENCOUNTER — APPOINTMENT (OUTPATIENT)
Dept: CT IMAGING | Age: 79
DRG: 683 | End: 2024-11-26
Payer: OTHER GOVERNMENT

## 2024-11-26 DIAGNOSIS — F03.90 DEMENTIA, UNSPECIFIED DEMENTIA SEVERITY, UNSPECIFIED DEMENTIA TYPE, UNSPECIFIED WHETHER BEHAVIORAL, PSYCHOTIC, OR MOOD DISTURBANCE OR ANXIETY (HCC): ICD-10-CM

## 2024-11-26 DIAGNOSIS — R53.1 GENERAL WEAKNESS: Primary | ICD-10-CM

## 2024-11-26 LAB
LIPASE SERPL-CCNC: 53 U/L (ref 13–60)
MAGNESIUM SERPL-MCNC: 2.1 MG/DL (ref 1.8–2.4)
TROPONIN T SERPL HS-MCNC: 19 NG/L (ref 0–22)

## 2024-11-26 PROCEDURE — 80053 COMPREHEN METABOLIC PANEL: CPT

## 2024-11-26 PROCEDURE — 70450 CT HEAD/BRAIN W/O DYE: CPT

## 2024-11-26 PROCEDURE — 85025 COMPLETE CBC W/AUTO DIFF WBC: CPT

## 2024-11-26 PROCEDURE — 84484 ASSAY OF TROPONIN QUANT: CPT

## 2024-11-26 PROCEDURE — 83690 ASSAY OF LIPASE: CPT

## 2024-11-26 PROCEDURE — 83735 ASSAY OF MAGNESIUM: CPT

## 2024-11-26 PROCEDURE — 99285 EMERGENCY DEPT VISIT HI MDM: CPT

## 2024-11-26 ASSESSMENT — LIFESTYLE VARIABLES: HOW OFTEN DO YOU HAVE A DRINK CONTAINING ALCOHOL: NEVER

## 2024-11-26 ASSESSMENT — PAIN - FUNCTIONAL ASSESSMENT: PAIN_FUNCTIONAL_ASSESSMENT: NONE - DENIES PAIN

## 2024-11-27 ENCOUNTER — APPOINTMENT (OUTPATIENT)
Dept: GENERAL RADIOLOGY | Age: 79
DRG: 683 | End: 2024-11-27
Payer: OTHER GOVERNMENT

## 2024-11-27 VITALS
OXYGEN SATURATION: 100 % | HEIGHT: 71 IN | BODY MASS INDEX: 25.2 KG/M2 | TEMPERATURE: 98.1 F | WEIGHT: 180 LBS | DIASTOLIC BLOOD PRESSURE: 80 MMHG | HEART RATE: 60 BPM | RESPIRATION RATE: 16 BRPM | SYSTOLIC BLOOD PRESSURE: 155 MMHG

## 2024-11-27 PROBLEM — N18.30 ACUTE RENAL FAILURE SUPERIMPOSED ON STAGE 3 CHRONIC KIDNEY DISEASE, UNSPECIFIED ACUTE RENAL FAILURE TYPE, UNSPECIFIED WHETHER STAGE 3A OR 3B CKD (HCC): Status: ACTIVE | Noted: 2024-11-27

## 2024-11-27 PROBLEM — N17.9 ACUTE RENAL FAILURE SUPERIMPOSED ON STAGE 3 CHRONIC KIDNEY DISEASE, UNSPECIFIED ACUTE RENAL FAILURE TYPE, UNSPECIFIED WHETHER STAGE 3A OR 3B CKD (HCC): Status: ACTIVE | Noted: 2024-11-27

## 2024-11-27 LAB
ALBUMIN SERPL-MCNC: 3.8 G/DL (ref 3.2–4.6)
ALBUMIN/GLOB SERPL: 1.1 (ref 1–1.9)
ALP SERPL-CCNC: 75 U/L (ref 40–129)
ALT SERPL-CCNC: 23 U/L (ref 8–55)
ANION GAP SERPL CALC-SCNC: 13 MMOL/L (ref 7–16)
ANION GAP SERPL CALC-SCNC: 13 MMOL/L (ref 7–16)
AST SERPL-CCNC: 31 U/L (ref 15–37)
BASOPHILS # BLD: 0 K/UL (ref 0–0.2)
BASOPHILS NFR BLD: 1 % (ref 0–2)
BILIRUB SERPL-MCNC: 0.4 MG/DL (ref 0–1.2)
BUN SERPL-MCNC: 26 MG/DL (ref 8–23)
BUN SERPL-MCNC: 30 MG/DL (ref 8–23)
CALCIUM SERPL-MCNC: 10.2 MG/DL (ref 8.8–10.2)
CALCIUM SERPL-MCNC: 9.7 MG/DL (ref 8.8–10.2)
CHLORIDE SERPL-SCNC: 109 MMOL/L (ref 98–107)
CHLORIDE SERPL-SCNC: 110 MMOL/L (ref 98–107)
CO2 SERPL-SCNC: 21 MMOL/L (ref 20–29)
CO2 SERPL-SCNC: 22 MMOL/L (ref 20–29)
CREAT SERPL-MCNC: 1.71 MG/DL (ref 0.8–1.3)
CREAT SERPL-MCNC: 2.01 MG/DL (ref 0.8–1.3)
DIFFERENTIAL METHOD BLD: ABNORMAL
EOSINOPHIL # BLD: 0.1 K/UL (ref 0–0.8)
EOSINOPHIL NFR BLD: 2 % (ref 0.5–7.8)
ERYTHROCYTE [DISTWIDTH] IN BLOOD BY AUTOMATED COUNT: 13.4 % (ref 11.9–14.6)
GLOBULIN SER CALC-MCNC: 3.4 G/DL (ref 2.3–3.5)
GLUCOSE BLD STRIP.AUTO-MCNC: 122 MG/DL (ref 65–100)
GLUCOSE BLD STRIP.AUTO-MCNC: 144 MG/DL (ref 65–100)
GLUCOSE BLD STRIP.AUTO-MCNC: 60 MG/DL (ref 65–100)
GLUCOSE SERPL-MCNC: 108 MG/DL (ref 70–99)
GLUCOSE SERPL-MCNC: 88 MG/DL (ref 70–99)
HCT VFR BLD AUTO: 38.4 % (ref 41.1–50.3)
HGB BLD-MCNC: 12.7 G/DL (ref 13.6–17.2)
IMM GRANULOCYTES # BLD AUTO: 0.1 K/UL (ref 0–0.5)
IMM GRANULOCYTES NFR BLD AUTO: 1 % (ref 0–5)
LACTATE SERPL-SCNC: 1.3 MMOL/L (ref 0.5–2)
LYMPHOCYTES # BLD: 1.6 K/UL (ref 0.5–4.6)
LYMPHOCYTES NFR BLD: 30 % (ref 13–44)
MCH RBC QN AUTO: 28.9 PG (ref 26.1–32.9)
MCHC RBC AUTO-ENTMCNC: 33.1 G/DL (ref 31.4–35)
MCV RBC AUTO: 87.3 FL (ref 82–102)
MONOCYTES # BLD: 0.6 K/UL (ref 0.1–1.3)
MONOCYTES NFR BLD: 11 % (ref 4–12)
NEUTS SEG # BLD: 2.9 K/UL (ref 1.7–8.2)
NEUTS SEG NFR BLD: 55 % (ref 43–78)
NRBC # BLD: 0 K/UL (ref 0–0.2)
PLATELET # BLD AUTO: 197 K/UL (ref 150–450)
PMV BLD AUTO: 12.1 FL (ref 9.4–12.3)
POTASSIUM SERPL-SCNC: 4.3 MMOL/L (ref 3.5–5.1)
POTASSIUM SERPL-SCNC: 5 MMOL/L (ref 3.5–5.1)
PROT SERPL-MCNC: 7.3 G/DL (ref 6.3–8.2)
RBC # BLD AUTO: 4.4 M/UL (ref 4.23–5.6)
SERVICE CMNT-IMP: ABNORMAL
SODIUM SERPL-SCNC: 144 MMOL/L (ref 136–145)
SODIUM SERPL-SCNC: 144 MMOL/L (ref 136–145)
WBC # BLD AUTO: 5.3 K/UL (ref 4.3–11.1)

## 2024-11-27 PROCEDURE — 94760 N-INVAS EAR/PLS OXIMETRY 1: CPT

## 2024-11-27 PROCEDURE — 1100000000 HC RM PRIVATE

## 2024-11-27 PROCEDURE — 80048 BASIC METABOLIC PNL TOTAL CA: CPT

## 2024-11-27 PROCEDURE — 6370000000 HC RX 637 (ALT 250 FOR IP): Performed by: HOSPITALIST

## 2024-11-27 PROCEDURE — 36415 COLL VENOUS BLD VENIPUNCTURE: CPT

## 2024-11-27 PROCEDURE — 82962 GLUCOSE BLOOD TEST: CPT

## 2024-11-27 PROCEDURE — 6370000000 HC RX 637 (ALT 250 FOR IP)

## 2024-11-27 PROCEDURE — 71045 X-RAY EXAM CHEST 1 VIEW: CPT

## 2024-11-27 PROCEDURE — 83605 ASSAY OF LACTIC ACID: CPT

## 2024-11-27 PROCEDURE — 2580000003 HC RX 258: Performed by: HOSPITALIST

## 2024-11-27 RX ORDER — SODIUM CHLORIDE 9 MG/ML
INJECTION, SOLUTION INTRAVENOUS PRN
Status: DISCONTINUED | OUTPATIENT
Start: 2024-11-27 | End: 2024-11-27 | Stop reason: HOSPADM

## 2024-11-27 RX ORDER — IBUPROFEN 600 MG/1
1 TABLET ORAL PRN
Status: DISCONTINUED | OUTPATIENT
Start: 2024-11-27 | End: 2024-11-27 | Stop reason: HOSPADM

## 2024-11-27 RX ORDER — POLYETHYLENE GLYCOL 3350 17 G/17G
17 POWDER, FOR SOLUTION ORAL DAILY PRN
Status: DISCONTINUED | OUTPATIENT
Start: 2024-11-27 | End: 2024-11-27 | Stop reason: HOSPADM

## 2024-11-27 RX ORDER — INSULIN LISPRO 100 [IU]/ML
0-4 INJECTION, SOLUTION INTRAVENOUS; SUBCUTANEOUS
Status: DISCONTINUED | OUTPATIENT
Start: 2024-11-27 | End: 2024-11-27 | Stop reason: HOSPADM

## 2024-11-27 RX ORDER — MAGNESIUM SULFATE IN WATER 40 MG/ML
2000 INJECTION, SOLUTION INTRAVENOUS PRN
Status: DISCONTINUED | OUTPATIENT
Start: 2024-11-27 | End: 2024-11-27 | Stop reason: HOSPADM

## 2024-11-27 RX ORDER — DEXTROSE MONOHYDRATE 100 MG/ML
INJECTION, SOLUTION INTRAVENOUS CONTINUOUS PRN
Status: DISCONTINUED | OUTPATIENT
Start: 2024-11-27 | End: 2024-11-27 | Stop reason: HOSPADM

## 2024-11-27 RX ORDER — ONDANSETRON 2 MG/ML
4 INJECTION INTRAMUSCULAR; INTRAVENOUS EVERY 6 HOURS PRN
Status: DISCONTINUED | OUTPATIENT
Start: 2024-11-27 | End: 2024-11-27 | Stop reason: HOSPADM

## 2024-11-27 RX ORDER — PANTOPRAZOLE SODIUM 40 MG/1
40 TABLET, DELAYED RELEASE ORAL
Status: DISCONTINUED | OUTPATIENT
Start: 2024-11-27 | End: 2024-11-27 | Stop reason: HOSPADM

## 2024-11-27 RX ORDER — SODIUM CHLORIDE 0.9 % (FLUSH) 0.9 %
5-40 SYRINGE (ML) INJECTION EVERY 12 HOURS SCHEDULED
Status: DISCONTINUED | OUTPATIENT
Start: 2024-11-27 | End: 2024-11-27 | Stop reason: HOSPADM

## 2024-11-27 RX ORDER — ACETAMINOPHEN 325 MG/1
650 TABLET ORAL EVERY 6 HOURS PRN
Status: DISCONTINUED | OUTPATIENT
Start: 2024-11-27 | End: 2024-11-27 | Stop reason: HOSPADM

## 2024-11-27 RX ORDER — POTASSIUM CHLORIDE 7.45 MG/ML
10 INJECTION INTRAVENOUS PRN
Status: DISCONTINUED | OUTPATIENT
Start: 2024-11-27 | End: 2024-11-27 | Stop reason: HOSPADM

## 2024-11-27 RX ORDER — MEMANTINE HYDROCHLORIDE 5 MG/1
10 TABLET ORAL DAILY
Status: DISCONTINUED | OUTPATIENT
Start: 2024-11-27 | End: 2024-11-27 | Stop reason: HOSPADM

## 2024-11-27 RX ORDER — ONDANSETRON 4 MG/1
4 TABLET, ORALLY DISINTEGRATING ORAL EVERY 8 HOURS PRN
Status: DISCONTINUED | OUTPATIENT
Start: 2024-11-27 | End: 2024-11-27 | Stop reason: HOSPADM

## 2024-11-27 RX ORDER — CLOPIDOGREL BISULFATE 75 MG/1
75 TABLET ORAL DAILY
Status: DISCONTINUED | OUTPATIENT
Start: 2024-11-27 | End: 2024-11-27 | Stop reason: HOSPADM

## 2024-11-27 RX ORDER — ASPIRIN 81 MG/1
81 TABLET, CHEWABLE ORAL DAILY
Status: DISCONTINUED | OUTPATIENT
Start: 2024-11-27 | End: 2024-11-27 | Stop reason: HOSPADM

## 2024-11-27 RX ORDER — 0.9 % SODIUM CHLORIDE 0.9 %
500 INTRAVENOUS SOLUTION INTRAVENOUS ONCE
Status: DISCONTINUED | OUTPATIENT
Start: 2024-11-27 | End: 2024-11-27 | Stop reason: HOSPADM

## 2024-11-27 RX ORDER — POTASSIUM CHLORIDE 1500 MG/1
40 TABLET, EXTENDED RELEASE ORAL PRN
Status: DISCONTINUED | OUTPATIENT
Start: 2024-11-27 | End: 2024-11-27 | Stop reason: HOSPADM

## 2024-11-27 RX ORDER — ROSUVASTATIN CALCIUM 20 MG/1
20 TABLET, COATED ORAL NIGHTLY
Status: DISCONTINUED | OUTPATIENT
Start: 2024-11-27 | End: 2024-11-27 | Stop reason: HOSPADM

## 2024-11-27 RX ORDER — HYDRALAZINE HYDROCHLORIDE 20 MG/ML
10 INJECTION INTRAMUSCULAR; INTRAVENOUS EVERY 6 HOURS PRN
Status: DISCONTINUED | OUTPATIENT
Start: 2024-11-27 | End: 2024-11-27 | Stop reason: HOSPADM

## 2024-11-27 RX ORDER — SODIUM CHLORIDE 9 MG/ML
INJECTION, SOLUTION INTRAVENOUS CONTINUOUS
Status: DISCONTINUED | OUTPATIENT
Start: 2024-11-27 | End: 2024-11-27 | Stop reason: HOSPADM

## 2024-11-27 RX ORDER — SODIUM CHLORIDE 0.9 % (FLUSH) 0.9 %
5-40 SYRINGE (ML) INJECTION PRN
Status: DISCONTINUED | OUTPATIENT
Start: 2024-11-27 | End: 2024-11-27 | Stop reason: HOSPADM

## 2024-11-27 RX ORDER — HEPARIN SODIUM 5000 [USP'U]/ML
5000 INJECTION, SOLUTION INTRAVENOUS; SUBCUTANEOUS EVERY 8 HOURS SCHEDULED
Status: DISCONTINUED | OUTPATIENT
Start: 2024-11-27 | End: 2024-11-27 | Stop reason: HOSPADM

## 2024-11-27 RX ORDER — ACETAMINOPHEN 650 MG/1
650 SUPPOSITORY RECTAL EVERY 6 HOURS PRN
Status: DISCONTINUED | OUTPATIENT
Start: 2024-11-27 | End: 2024-11-27 | Stop reason: HOSPADM

## 2024-11-27 RX ADMIN — SODIUM CHLORIDE: 9 INJECTION, SOLUTION INTRAVENOUS at 01:26

## 2024-11-27 RX ADMIN — MEMANTINE 10 MG: 5 TABLET ORAL at 09:28

## 2024-11-27 RX ADMIN — CLOPIDOGREL BISULFATE 75 MG: 75 TABLET ORAL at 09:29

## 2024-11-27 RX ADMIN — Medication 6 MG: at 03:38

## 2024-11-27 RX ADMIN — Medication 500 ML: at 00:34

## 2024-11-27 RX ADMIN — ASPIRIN 81 MG: 81 TABLET, CHEWABLE ORAL at 09:28

## 2024-11-27 RX ADMIN — PANTOPRAZOLE SODIUM 40 MG: 40 TABLET, DELAYED RELEASE ORAL at 09:28

## 2024-11-27 NOTE — PROGRESS NOTES
TRANSFER - IN REPORT:    Verbal report received from AGUILAR Craft on Lauro Ryan  [place] [facility] [place] care      Report consisted of patient's Situation, Background, Assessment and   Recommendations(SBAR).     Information from the following report(s) Nurse Handoff Report, ED Encounter Summary, ED SBAR, and Recent Results was reviewed with the receiving nurse.    Opportunity for questions and clarification was provided.      Assessment completed upon patient's arrival to unit and care assumed.

## 2024-11-27 NOTE — ED TRIAGE NOTES
Pt seems confused in Triage/ c/o of feeling tired/ went to urgent care and they sent him here over kidney function

## 2024-11-27 NOTE — PLAN OF CARE
Problem: Chronic Conditions and Co-morbidities  Goal: Patient's chronic conditions and co-morbidity symptoms are monitored and maintained or improved  Outcome: Progressing  Flowsheets (Taken 11/27/2024 0116)  Care Plan - Patient's Chronic Conditions and Co-Morbidity Symptoms are Monitored and Maintained or Improved: Monitor and assess patient's chronic conditions and comorbid symptoms for stability, deterioration, or improvement     Problem: Discharge Planning  Goal: Discharge to home or other facility with appropriate resources  Outcome: Progressing  Flowsheets (Taken 11/27/2024 0116)  Discharge to home or other facility with appropriate resources:   Identify barriers to discharge with patient and caregiver   Identify discharge learning needs (meds, wound care, etc)     Problem: Safety - Adult  Goal: Free from fall injury  Outcome: Progressing

## 2024-11-27 NOTE — PROGRESS NOTES
Patient is confused and agitated. He has taken the telemetry leads off at least 6 times since arriving to the floor and is refusing to keep it on. Messaged MD to determine if telemetry can be discontinued, MD stated patient needs it for K level. Encouraged patient to keep telemetry monitor on.

## 2024-11-27 NOTE — ED PROVIDER NOTES
Emergency Department Provider Note       PCP: Ramos Gu DO   Age: 79 y.o.   Sex: male     DISPOSITION Admitted 11/27/2024 12:36:48 AM    ICD-10-CM    1. General weakness  R53.1       2. Dementia, unspecified dementia severity, unspecified dementia type, unspecified whether behavioral, psychotic, or mood disturbance or anxiety ([facility])  F03.90           Medical Decision Making     Patient comes to the ED with his wife for evaluation of weakness.  Patient with known dementia.  Wife states his confusion is at baseline.  Patient is smiling and pleasant.  He is able to identify his wife in the room.  Wife states that he has been fatigued and not eating since yesterday.  Patient has been in the bed for the majority of the day.  She states he is able to ambulate without difficulty.  Urgent care notes reveal elevation of BUN and creatinine.  Wife denies patient with any vomiting.  He has not complained of chest pain or headache.  Patient without focal motor deficits on exam.    CBC without leukocytosis.  Stable H&H.  CMP with creatinine of 2, BUN of 30.  This is elevated from patient's baseline.  500 cc normal saline fluid bolus ordered.  Lipase within normal limits.  Magnesium 2.1.  Troponin within normal limits.  EKG with NSR.  CT brain and chest x-ray both unremarkable.  Spoke with hospitalist who will evaluate patient for admission.    1 acute illness with systemic symptoms.    Over the counter drug management performed.  Prescription drug management performed.  Shared medical decision making was utilized in creating the patients health plan today.    I independently ordered and reviewed each unique test.    I reviewed external records: Pt seen at an urgent care earlier today.  Office note reviewed. Cr was 2.27.  Flu and COVID negative at outlying  Cr usually runs 1.5 to 1.78. K of 5.3    The patients assessment required an independent historian: wife.  The reason they were needed is dementia.    ED cardiac

## 2024-11-27 NOTE — DISCHARGE SUMMARY
Hospitalist Discharge Summary   Admit Date:  2024  9:58 PM   DC Note date: 2024  Name:  Lauro Ryan   Age:  79 y.o.  Sex:  male  :  1945   MRN:  [id number]   Room:  [facility]  PCP:  Ramos Gu DO    Presenting Complaint: Fatigue (Sent from Urgent care)     Initial Admission Diagnosis: General weakness [R53.1]  Acute renal failure superimposed on stage 3 chronic kidney disease, unspecified acute renal failure type, unspecified whether stage 3a or 3b CKD (HCC) [N17.9, N18.30]  Dementia, unspecified dementia severity, unspecified dementia type, unspecified whether behavioral, psychotic, or mood disturbance or anxiety (HCC) [F03.90]     Problem List for this Hospitalization (present on admission):    Principal Problem:    Acute renal failure superimposed on stage 3 chronic kidney disease, unspecified acute renal failure type, unspecified whether stage 3a or 3b CKD (HCC)  Resolved Problems:    * No resolved hospital problems. *      Hospital Course:  Patient is a 79 year old gentleman with history of CKD stage III, diabetes type 2, dementia, hypertension, who presented to the emergency room with wife for evaluation of generalized weakness, decreased p.o. intake since yesterday. Patient's wife tells me they went to a urgent care who checked labs and told them to come to the emergency room for abnormal potassium levels. In emergency room creatinine elevated slightly greater than baseline. He was given IV fluids with improvement in renal function. Potassium remained stable and within normal limits. No further needs for inpatient hospitalization.     Acute kidney injury on chronic CKD stage III:  Initiated on gentle hydration  -monitor urine output  -creatinine down-trending  -repeat renal panel in 1-2 weeks with PCP     Diabetes type 2  -resume metformin on DC     CAD  -Continue on aspirin, Plavix, statin, coreg     Hypertension  -continue Losartan and Carvedilol on discharge     Dementia  33.1 31.4 - 35.0 g/dL    RDW 13.4 11.9 - 14.6 %    Platelets 197 150 - 450 K/uL    MPV 12.1 9.4 - 12.3 FL    nRBC 0.00 0.0 - 0.2 K/uL    Differential Type AUTOMATED      Neutrophils % 55 43 - 78 %    Lymphocytes % 30 13 - 44 %    Monocytes % 11 4.0 - 12.0 %    Eosinophils % 2 0.5 - 7.8 %    Basophils % 1 0.0 - 2.0 %    Immature Granulocytes % 1 0.0 - 5.0 %    Neutrophils Absolute 2.9 1.7 - 8.2 K/UL    Lymphocytes Absolute 1.6 0.5 - 4.6 K/UL    Monocytes Absolute 0.6 0.1 - 1.3 K/UL    Eosinophils Absolute 0.1 0.0 - 0.8 K/UL    Basophils Absolute 0.0 0.0 - 0.2 K/UL    Immature Granulocytes Absolute 0.1 0.0 - 0.5 K/UL   CMP    Collection Time: 11/26/24 11:16 PM   Result Value Ref Range    Sodium 144 136 - 145 mmol/L    Potassium 5.0 3.5 - 5.1 mmol/L    Chloride 109 (H) 98 - 107 mmol/L    CO2 22 20 - 29 mmol/L    Anion Gap 13 7 - 16 mmol/L    Glucose 88 70 - 99 mg/dL    BUN 30 (H) 8 - 23 MG/DL    Creatinine 2.01 (H) 0.80 - 1.30 MG/DL    Est, Glom Filt Rate 33 (L) >60 ml/min/1.73m2    Calcium 10.2 8.8 - 10.2 MG/DL    Total Bilirubin 0.4 0.0 - 1.2 MG/DL    ALT 23 8 - 55 U/L    AST 31 15 - 37 U/L    Alk Phosphatase 75 40 - 129 U/L    Total Protein 7.3 6.3 - 8.2 g/dL    Albumin 3.8 3.2 - 4.6 g/dL    Globulin 3.4 2.3 - 3.5 g/dL    Albumin/Globulin Ratio 1.1 1.0 - 1.9     Magnesium    Collection Time: 11/26/24 11:16 PM   Result Value Ref Range    Magnesium 2.1 1.8 - 2.4 mg/dL   Lipase    Collection Time: 11/26/24 11:16 PM   Result Value Ref Range    Lipase 53 13 - 60 U/L   Troponin    Collection Time: 11/26/24 11:16 PM   Result Value Ref Range    Troponin T 19.0 0 - 22 ng/L   Lactate, Sepsis    Collection Time: 11/27/24  2:20 AM   Result Value Ref Range    Lactic Acid, Sepsis 1.3 0.5 - 2.0 MMOL/L   POCT Glucose    Collection Time: 11/27/24  7:53 AM   Result Value Ref Range    POC Glucose 60 (L) 65 - 100 mg/dL    Performed by: Gama    POCT Glucose    Collection Time: 11/27/24  8:55 AM   Result Value Ref Range

## 2024-11-27 NOTE — FLOWSHEET NOTE
11/27/24 1348   AVS Reviewed   AVS & discharge instructions reviewed with patient and/or representative? Yes   Reviewed instructions with Patient   Level of Understanding Questions answered

## 2024-11-27 NOTE — CARE COORDINATION
11/27/24 1226   Service Assessment   Patient Orientation Alert and Oriented  (Pt has dementia.)   Cognition Alert   History Provided By Significant Other   Primary Caregiver Spouse   Accompanied By/Relationship Wife Abundio   Support Systems Spouse/Significant Other   Patient's Healthcare Decision Maker is: Legal Next of Kin   PCP Verified by CM Yes   Last Visit to PCP Within last 6 months   Prior Functional Level Assistance with the following:;Cooking;Housework;Shopping   Current Functional Level Assistance with the following:;Cooking;Housework;Shopping   Can patient return to prior living arrangement Yes   Ability to make needs known: Fair   Family able to assist with home care needs: Yes   Would you like for me to discuss the discharge plan with any other family members/significant others, and if so, who? No   Financial Resources [place] ([place]   Community Resources None   CM/HIMANSHU Referral Other (see comment)  (New admission.)   Social/Functional History   Lives With Spouse     Himanshu reviewed chart and discussed pt in Md rounds this am. Pt is a 79 yr old AA male adm yesterday due to his BUN/Creat being elevated when pt was at the Urgent Care early. Wife informed pt has dementia but is constantly on the go. Pt sees his PCP twice a year. He is ready to leave the hospital and is obsessing about getting the IV out. Sw asked wife if she has any help at home and she informed her children are grown and grands are busy. Wife informed pt is still indep with dressing/bathing but requires supervision for everything. Wife informed pt is being discharged later today once he receives the rest of the bag of fluids. No needs verbalized.   Tiffanie Hill/SHIRIN

## 2024-11-27 NOTE — ED NOTES
TRANSFER - OUT REPORT:    Verbal report given to receiving RN on Lauro Ryan  being transferred to [facility] for routine progression of patient care       Report consisted of patient's Situation, Background, Assessment and   Recommendations(SBAR).     Information from the following report(s) Nurse Handoff Report was reviewed with the receiving nurse.    Garner Fall Assessment:    Presents to emergency department  because of falls (Syncope, seizure, or loss of consciousness): No  Age > 70: No  Altered Mental Status, Intoxication with alcohol or substance confusion (Disorientation, impaired judgment, poor safety awaremess, or inability to follow instructions): No  Impaired Mobility: Ambulates or transfers with assistive devices or assistance; Unable to ambulate or transer.: No  Nursing Judgement: No          Lines:   Peripheral IV Proximal;Right Forearm (Active)   Site Assessment Clean, dry & intact 11/26/24 2318   Line Status Blood return noted;Flushed 11/26/24 2318   Dressing Status Clean, dry & intact 11/26/24 2318        Opportunity for questions and clarification was provided.      Patient transported with:  Registered Nurse

## 2024-11-27 NOTE — H&P
Hospitalist History and Physical   Admit Date:  2024  9:58 PM   Name:  Lauro Ryan   Age:  79 y.o.  Sex:  male  :  1945   MRN:  [id number]   Room:  [facility]/    Presenting/Chief Complaint: Fatigue (Sent from Urgent care)     Reason(s) for Admission: General weakness [R53.1]  Acute renal failure superimposed on stage 3 chronic kidney disease, unspecified acute renal failure type, unspecified whether stage 3a or 3b CKD (HCC) [N17.9, N18.30]  Dementia, unspecified dementia severity, unspecified dementia type, unspecified whether behavioral, psychotic, or mood disturbance or anxiety (HCC) [F03.90]     History of Present Illness:     79 years old gentleman with history of CKD stage III, diabetes type 2, dementia hypertension presented to the emergency room with wife for evaluation of generalized weakness, decreased p.o. intake since yesterday.  Patient wife noted him on the bed most of the day at baseline he walks around.  Patient wife) encouraged him to eat but not successful so patient brought to emergency room for further evaluation.  In emergency room creatinine elevated than baseline, looks dehydrated, patient was given fluids, emergency room physician requested admission of this patient to hospital for further management.    Assessment & Plan:     Deconditioning, dehydration, acute kidney injury on chronic CKD stage III: Initiated on gentle hydration, monitor urine output, creatinine, patient will be encouraged to eat.    Diabetes type 2: Hold metformin, placed on sliding scale insulin, monitor blood sugars.    Coronary disease: Continue on aspirin, Plavix, statins, beta-blockers.    Hypertension: Hold losartan, continue on carvedilol.    Dementia: Supportive therapy, continue Aricept.    GERD: Continue on Protonix.        Diet: ADULT DIET; Regular  VTE prophylaxis: Heparin  Code status: Full Code      Non-peripheral Lines and Tubes (if present):             Hospital Problems:  Principal

## 2024-11-29 ENCOUNTER — CARE COORDINATION (OUTPATIENT)
Dept: CARE COORDINATION | Facility: CLINIC | Age: 79
End: 2024-11-29

## 2024-11-29 ENCOUNTER — TELEPHONE (OUTPATIENT)
Dept: INTERNAL MEDICINE CLINIC | Facility: CLINIC | Age: 79
End: 2024-11-29

## 2024-11-29 DIAGNOSIS — N17.9 ACUTE RENAL FAILURE SUPERIMPOSED ON STAGE 3 CHRONIC KIDNEY DISEASE, UNSPECIFIED ACUTE RENAL FAILURE TYPE, UNSPECIFIED WHETHER STAGE 3A OR 3B CKD (HCC): Primary | ICD-10-CM

## 2024-11-29 DIAGNOSIS — N18.30 ACUTE RENAL FAILURE SUPERIMPOSED ON STAGE 3 CHRONIC KIDNEY DISEASE, UNSPECIFIED ACUTE RENAL FAILURE TYPE, UNSPECIFIED WHETHER STAGE 3A OR 3B CKD (HCC): Primary | ICD-10-CM

## 2024-11-29 NOTE — CARE COORDINATION
Care Transitions Note    Initial Call - Call within 2 business days of discharge: Yes    Patient Current Location:  Home: [address] [place] [address] [place] [address]    Care Transition Nurse contacted the spouse/partner  by telephone to perform post hospital discharge assessment, verified name and  as identifiers. Provided introduction to self, and explanation of the Care Transition Nurse role.     Patient: Lauro Ryan    Patient : 1945   MRN: [id number]    Reason for Admission: acute renal failure type, unspecified whether stage 3a or 3b CKD   Discharge Date: 24  RURS: Readmission Risk Score: 14.3      Last Discharge Facility       Date Complaint Diagnosis Description Type Department Provider    24 Fatigue General weakness ... ED to Hosp-Admission (Discharged) (ADMITTED) [id number] Melgar MD; Chuy Asencio..            Was this an external facility discharge? No    Additional needs identified to be addressed with provider   No needs identified             Method of communication with provider: phone.    Patients top risk factors for readmission: medical condition-acute renal failure type, unspecified whether stage 3a or 3b CKD ,CHF,HTN,DM, dementia    Interventions to address risk factors:   Spouse reports patient doing well at time of call without any issues or concerns  Reviewed discharge instructions and offered opportunity to ask any questions regarding discharge instructions.  Confirmed patient to resume home medications and to d/c gabapentin and Aricept. Spouse reports she is going to obtain new lancets for BS monitoring due to the current one not being able to pizarro his tough skin. Will discuss with PCP at f/u appointment.  CTN scheduled PCP follow up appointment-12/3/2024 at 1:30 pm.  Family to contact CTN with any issues, questions, or concerns prior to next outreach.    Care Transition Nurse reviewed discharge instructions, medical action plan, and red flags with

## 2024-11-29 NOTE — TELEPHONE ENCOUNTER
Care Transitions Initial Follow Up Call    Outreach made within 2 business days of discharge: Yes    Patient: Lauro Ryan Patient : 1945   MRN: [id number]  Reason for Admission: acute renal failure  Discharge Date: 24       Spoke with: attempted to contact pt's spouse, unable to lvm due to mailbox being full.

## 2024-11-29 NOTE — TELEPHONE ENCOUNTER
GARY for acute renal failure d/c 11/27 seen at [facility].    HFU appt [facility] for 12/3/24.    [phone] care transitions nurse

## 2024-11-30 NOTE — PROGRESS NOTES
Physician Progress Note      PATIENT:               LAWANDA MEDINA  CSN #:                  [id number]  :                       1945  ADMIT DATE:       2024 9:58 PM  DISCH DATE:        2024 2:33 PM  RESPONDING  PROVIDER #:        Ray Stevens MD          QUERY TEXT:    Patient admitted with Acute Kidney Injury in H&P . In order to support   the diagnosis of RAFITA, please include additional clinical indicators in your   documentation.? Or please document if the diagnosis of RAFITA has been ruled out   after further study.    The medical record reflects the following:  Risk Factors: CKD, HTN, DM  Clinical Indicators: H&P  Deconditioning, dehydration, acute kidney   injury on chronic CKD stage III: Initiated on gentle hydration, monitor urine   output, creatinine, patient will be encouraged to eat.  DS  Acute kidney injury on chronic CKD stage III, Initiated on gentle   hydration.    BUN   CR 2.01/1.71  GFR      Treatment: monitor urine output, Monitor BMP, IVF    Thank you  Zainab ARCHULETA CDS      Defined by Kidney Disease Improving Global Outcomes (KDIGO) clinical practice   guideline for acute kidney injury:  -Increase in SCr by greater than or equal to 0.3 mg/dl within 48 hours; or  -Increase or decrease in SCr to greater than or equal to 1.5 times baseline,   which is known or presumed to have occurred within the prior 7 days; or  -Urine volume < 0.5ml/kg/h for 6 hours.  Options provided:  -- Acute kidney injury evidenced by, Please document evidence as well as a   numerical baseline creatinine, if known.  -- Acute kidney injury ruled out after study  -- Other - I will add my own diagnosis  -- Disagree - Not applicable / Not valid  -- Disagree - Clinically unable to determine / Unknown  -- Refer to Clinical Documentation Reviewer    PROVIDER RESPONSE TEXT:    This patient has acute kidney injury as evidenced by RAFITA on CKDIIIA    Query created by: Moose Fuentes

## 2024-12-02 DIAGNOSIS — I25.810 CORONARY ARTERY DISEASE INVOLVING CORONARY BYPASS GRAFT OF NATIVE HEART WITHOUT ANGINA PECTORIS: ICD-10-CM

## 2024-12-02 DIAGNOSIS — Z86.73 HISTORY OF CVA (CEREBROVASCULAR ACCIDENT): ICD-10-CM

## 2024-12-02 RX ORDER — CLOPIDOGREL BISULFATE 75 MG/1
TABLET ORAL
Qty: 90 TABLET | Refills: 2 | OUTPATIENT
Start: 2024-12-02

## 2024-12-03 ENCOUNTER — OFFICE VISIT (OUTPATIENT)
Dept: INTERNAL MEDICINE CLINIC | Facility: CLINIC | Age: 79
End: 2024-12-03

## 2024-12-03 ENCOUNTER — LAB (OUTPATIENT)
Dept: INTERNAL MEDICINE CLINIC | Facility: CLINIC | Age: 79
End: 2024-12-03

## 2024-12-03 VITALS
DIASTOLIC BLOOD PRESSURE: 80 MMHG | OXYGEN SATURATION: 97 % | SYSTOLIC BLOOD PRESSURE: 138 MMHG | HEIGHT: 71 IN | WEIGHT: 162 LBS | RESPIRATION RATE: 16 BRPM | TEMPERATURE: 97.2 F | BODY MASS INDEX: 22.68 KG/M2 | HEART RATE: 68 BPM

## 2024-12-03 DIAGNOSIS — N18.32 TYPE 2 DIABETES MELLITUS WITH STAGE 3B CHRONIC KIDNEY DISEASE, WITHOUT LONG-TERM CURRENT USE OF INSULIN (HCC): ICD-10-CM

## 2024-12-03 DIAGNOSIS — E11.22 TYPE 2 DIABETES MELLITUS WITH STAGE 3B CHRONIC KIDNEY DISEASE, WITHOUT LONG-TERM CURRENT USE OF INSULIN (HCC): ICD-10-CM

## 2024-12-03 DIAGNOSIS — N18.30 ACUTE RENAL FAILURE SUPERIMPOSED ON STAGE 3 CHRONIC KIDNEY DISEASE, UNSPECIFIED ACUTE RENAL FAILURE TYPE, UNSPECIFIED WHETHER STAGE 3A OR 3B CKD (HCC): ICD-10-CM

## 2024-12-03 DIAGNOSIS — N17.9 ACUTE RENAL FAILURE SUPERIMPOSED ON STAGE 3 CHRONIC KIDNEY DISEASE, UNSPECIFIED ACUTE RENAL FAILURE TYPE, UNSPECIFIED WHETHER STAGE 3A OR 3B CKD (HCC): ICD-10-CM

## 2024-12-03 DIAGNOSIS — Z09 HOSPITAL DISCHARGE FOLLOW-UP: Primary | ICD-10-CM

## 2024-12-03 LAB
ANION GAP SERPL CALC-SCNC: 12 MMOL/L (ref 7–16)
BUN SERPL-MCNC: 21 MG/DL (ref 8–23)
CALCIUM SERPL-MCNC: 10.7 MG/DL (ref 8.8–10.2)
CHLORIDE SERPL-SCNC: 106 MMOL/L (ref 98–107)
CO2 SERPL-SCNC: 24 MMOL/L (ref 20–29)
CREAT SERPL-MCNC: 1.66 MG/DL (ref 0.8–1.3)
GLUCOSE SERPL-MCNC: 92 MG/DL (ref 70–99)
POTASSIUM SERPL-SCNC: 4.4 MMOL/L (ref 3.5–5.1)
SODIUM SERPL-SCNC: 142 MMOL/L (ref 136–145)

## 2024-12-03 RX ORDER — GABAPENTIN 600 MG/1
600 TABLET ORAL 2 TIMES DAILY
COMMUNITY
End: 2024-12-04

## 2024-12-03 SDOH — ECONOMIC STABILITY: FOOD INSECURITY: WITHIN THE PAST 12 MONTHS, THE FOOD YOU BOUGHT JUST DIDN'T LAST AND YOU DIDN'T HAVE MONEY TO GET MORE.: NEVER TRUE

## 2024-12-03 SDOH — ECONOMIC STABILITY: FOOD INSECURITY: WITHIN THE PAST 12 MONTHS, YOU WORRIED THAT YOUR FOOD WOULD RUN OUT BEFORE YOU GOT MONEY TO BUY MORE.: NEVER TRUE

## 2024-12-03 SDOH — ECONOMIC STABILITY: INCOME INSECURITY: HOW HARD IS IT FOR YOU TO PAY FOR THE VERY BASICS LIKE FOOD, HOUSING, MEDICAL CARE, AND HEATING?: NOT HARD AT ALL

## 2024-12-03 ASSESSMENT — ENCOUNTER SYMPTOMS
COUGH: 0
ABDOMINAL PAIN: 0

## 2024-12-03 NOTE — PROGRESS NOTES
Post-Discharge Transitional Care Follow Up      Lauro Deepak Ryan   YOB: 1945    Date of Office Visit:  12/3/2024  Date of Hospital Admission: 11/26/24  Date of Hospital Discharge: 11/27/24  Readmission Risk Score (high >=14%. Medium >=10%):Readmission Risk Score: 14.3      Care management risk score Rising risk (score 2-5) and Complex Care (Scores >=6): No Risk Score On File     Non face to face  following discharge, date last encounter closed (first attempt may have been earlier): 11/29/2024     Call initiated 2 business days of discharge: Yes     Hospital discharge follow-up  -     [place] DISCHARGE MEDS RECONCILED W/ CURRENT OUTPATIENT MED LIST    Acute renal failure superimposed on stage 3 chronic kidney disease, unspecified acute renal failure type, unspecified whether stage 3a or 3b CKD (HCC)  -     Basic Metabolic Panel; Future  Recheck BMP today. Oral hydration encouraged.     Type 2 diabetes mellitus with stage 3b chronic kidney disease, without long-term current use of insulin ([facility])  Metformin restarted at the time of discharge. Recheck BMP today. Plan to restart gabapentin pending renal function.       Medical Decision Making: moderate complexity  Return in 7 weeks (on 1/21/2025) for previously scheduled routine follow-up with PCP or sooner if needed.    On this date 12/3/2024 I have spent 32 minutes reviewing previous notes, test results and face to face with the patient discussing the diagnosis and importance of compliance with the treatment plan as well as documenting on the day of the visit.       Subjective:   HPI    Inpatient course: Discharge summary reviewed- see chart.    Interval history/Current status:     Hospital Course:  Patient is a 79 year old gentleman with history of CKD stage III, diabetes type 2, dementia, hypertension, who presented to the emergency room with wife for evaluation of generalized weakness, decreased p.o. intake since yesterday. Patient's wife tells me

## 2024-12-04 RX ORDER — GABAPENTIN 300 MG/1
300 CAPSULE ORAL 2 TIMES DAILY
Qty: 180 CAPSULE | Refills: 1 | Status: SHIPPED | OUTPATIENT
Start: 2024-12-04 | End: 2025-06-02

## 2024-12-06 ENCOUNTER — CARE COORDINATION (OUTPATIENT)
Dept: CARE COORDINATION | Facility: CLINIC | Age: 79
End: 2024-12-06

## 2024-12-06 NOTE — CARE COORDINATION
Care Transitions Note    Follow Up Call     Patient Current Location:  Home: [address] [place] [address] [place] [address]    [facility] Care Coordinator contacted the spouse/partner  by telephone. Verified name and  as identifiers.    Additional needs identified to be addressed with provider   No needs identified                 Method of communication with provider: none.    Care Summary Note: Spoke with patient's spouse, Abundio and later spoke with patient as well.  Mr. Ryan reports doing fine at time of call and he verbalized no questions or concerns.  He was seen in office with PCP on 12/3/24.  Notes reviewed and no medication changes noted.  Patient is scheduled in January for Labs on 25 and f/u with PCP 25.  Contact information provided.    Advance Care Planning:   Does patient have an Advance Directive: reviewed during previous call, see note. .    Medication Review:  No changes since last call.     Assessments:   Goals Addressed                   This Visit's Progress     Returns to baseline activity level.   On track     Patient/Family able to obtain medicine after d/c     Patient/Family able to verbalize medicine changes     Patient/Family aware and attends follow up appointments s/p d/c.     Patient/Family agrees to notify provider of any barriers to plan of care.    Patient/Family agrees to notify provider of any symptoms that indicate a worsening of condition    Patient/Family agrees to monitor and record weights daily and report a gain of 2 lbs in a day or 5 lbs in a week to MD for review.    Patient/Family agrees to monitor and record BS and BP daily for MD review.                Follow Up Appointment:   Reviewed upcoming appointment(s).  Future Appointments         Provider Specialty Dept Phone    2025 10:00 AM [facility] Internal Medicine [phone]    2025 11:20 AM Ramos Gu DO Internal Medicine [phone]    2025 11:00 AM Carmel Reyes MD Cardiology [phone]

## 2024-12-13 ENCOUNTER — CARE COORDINATION (OUTPATIENT)
Dept: CARE COORDINATION | Facility: CLINIC | Age: 79
End: 2024-12-13

## 2024-12-13 NOTE — CARE COORDINATION
Care Transitions Note    Follow Up Call     Attempted to reach patient for transitions of care follow up.  Unable to reach patient.      Outreach Attempts:   Unable to leave message.     Care Summary Note: Voicemail is not set up.  Patient is scheduled with PCP 1/21/24.    Follow Up Appointment:   Future Appointments         Provider Specialty Dept Phone    1/14/2025 10:00 AM [facility] Internal Medicine [phone]    1/21/2025 11:20 AM Ramos Gu DO Internal Medicine [phone]    2/5/2025 11:00 AM Carmel Reyes MD Cardiology [phone]    3/5/2025 11:00 AM Joshua Orourke DO Neurology [phone]            Plan for follow-up call in 6-10 days based on severity of symptoms and risk factors. Plan for next call: symptom management    Vanna Fung LPN

## 2024-12-20 ENCOUNTER — CARE COORDINATION (OUTPATIENT)
Dept: CARE COORDINATION | Facility: CLINIC | Age: 79
End: 2024-12-20

## 2024-12-20 DIAGNOSIS — N18.32 TYPE 2 DIABETES MELLITUS WITH STAGE 3B CHRONIC KIDNEY DISEASE, WITHOUT LONG-TERM CURRENT USE OF INSULIN (HCC): Primary | ICD-10-CM

## 2024-12-20 DIAGNOSIS — E11.22 TYPE 2 DIABETES MELLITUS WITH STAGE 3B CHRONIC KIDNEY DISEASE, WITHOUT LONG-TERM CURRENT USE OF INSULIN (HCC): Primary | ICD-10-CM

## 2024-12-20 NOTE — CARE COORDINATION
Care Transitions Note    Follow Up Call     Patient Current Location:  Home: [address] [place] [address] [place] [address]    [facility] Care Coordinator contacted the patient by telephone. Verified name and  as identifiers.    Additional needs identified to be addressed with provider   No needs identified                 Method of communication with provider: none.    Care Summary Note: Patient reports no issues at time of call. Patient requested notification of his follow up appointments.  Notified patient that he has an active MyChart.  Patient is not aware of instructions for MyChart access.  Instructed patient to look on his AVS from his recent discharge.  Patient has email address on file and requested information be sent to his email.  Sent patient's upcoming lab and PCP appointments  and 25.  Also sent link to text messaging sign up as well as link to MyChart.  Patient instructed to contact his providers with any questions or concerns next week as this LPN CC and CTN will be out of the office. Patient verbalized understanding.    Advance Care Planning:   Does patient have an Advance Directive: reviewed during previous call, see note. .    Medication Review:  No changes since last call.       Assessments:   Goals Addressed                   This Visit's Progress     Returns to baseline activity level.   On track     Patient/Family able to obtain medicine after d/c     Patient/Family able to verbalize medicine changes     Patient/Family aware and attends follow up appointments s/p d/c.     Patient/Family agrees to notify provider of any barriers to plan of care.    Patient/Family agrees to notify provider of any symptoms that indicate a worsening of condition    Patient/Family agrees to monitor and record weights daily and report a gain of 2 lbs in a day or 5 lbs in a week to MD for review.    Patient/Family agrees to monitor and record BS and BP daily for MD review.                Follow Up Appointment:

## 2024-12-27 ENCOUNTER — CARE COORDINATION (OUTPATIENT)
Dept: CARE COORDINATION | Facility: CLINIC | Age: 79
End: 2024-12-27

## 2024-12-27 NOTE — CARE COORDINATION
Care Transitions Note    Final Call     Patient Current Location:  Home: [address] [place] [address] [place] [address]    [facility] Care Coordinator contacted the patient, spouse/partner  by telephone. Verified name and  as identifiers.    Patient graduated from the Care Transitions program on 24.  Patient/family verbalizes confidence in the ability to self-manage at this time..      Advance Care Planning:   Does patient have an Advance Directive: reviewed during previous call, see note. .    Handoff:   Patient was not referred to the ACM team due to patient declined services.      Care Summary Note: Spoke with patient and his spouse, Abundio.  Patient reports feeling fine with no issues at time of call. No changes reported or noted per chart review.  ACM services declined.  Follow up appointments reviewed and are scheduled appropriately.  Provided this LPN CC's contact information in the event assistance is needed.  Patient declines further outreach and reports ability to self manage at this time along with help from his supportive wife.    Assessments:   Goals Addressed                   This Visit's Progress     COMPLETED: Returns to baseline activity level.   On track     Patient/Family able to obtain medicine after d/c     Patient/Family able to verbalize medicine changes     Patient/Family aware and attends follow up appointments s/p d/c.     Patient/Family agrees to notify provider of any barriers to plan of care.    Patient/Family agrees to notify provider of any symptoms that indicate a worsening of condition    Patient/Family agrees to monitor and record weights daily and report a gain of 2 lbs in a day or 5 lbs in a week to MD for review.    Patient/Family agrees to monitor and record BS and BP daily for MD review.                Upcoming Appointments:    Future Appointments         Provider Specialty Dept Phone    2025 10:00 AM [facility] Internal Medicine [phone]    2025 11:20 AM Ramos Gu,

## 2025-01-14 ENCOUNTER — LAB (OUTPATIENT)
Dept: INTERNAL MEDICINE CLINIC | Facility: CLINIC | Age: 80
End: 2025-01-14

## 2025-01-14 DIAGNOSIS — Z85.46 PERSONAL HISTORY OF PROSTATE CANCER: ICD-10-CM

## 2025-01-14 DIAGNOSIS — D50.9 IRON DEFICIENCY ANEMIA, UNSPECIFIED IRON DEFICIENCY ANEMIA TYPE: ICD-10-CM

## 2025-01-14 DIAGNOSIS — N18.32 TYPE 2 DIABETES MELLITUS WITH STAGE 3B CHRONIC KIDNEY DISEASE, WITHOUT LONG-TERM CURRENT USE OF INSULIN (HCC): ICD-10-CM

## 2025-01-14 DIAGNOSIS — I10 ESSENTIAL HYPERTENSION: ICD-10-CM

## 2025-01-14 DIAGNOSIS — E11.51 TYPE 2 DIABETES MELLITUS WITH DIABETIC PERIPHERAL ANGIOPATHY WITHOUT GANGRENE, WITHOUT LONG-TERM CURRENT USE OF INSULIN (HCC): ICD-10-CM

## 2025-01-14 DIAGNOSIS — E11.22 TYPE 2 DIABETES MELLITUS WITH STAGE 3B CHRONIC KIDNEY DISEASE, WITHOUT LONG-TERM CURRENT USE OF INSULIN (HCC): ICD-10-CM

## 2025-01-14 LAB
ALBUMIN SERPL-MCNC: 3.5 G/DL (ref 3.2–4.6)
ALBUMIN/GLOB SERPL: 1.1 (ref 1–1.9)
ALP SERPL-CCNC: 68 U/L (ref 40–129)
ALT SERPL-CCNC: 33 U/L (ref 8–55)
ANION GAP SERPL CALC-SCNC: 11 MMOL/L (ref 7–16)
APPEARANCE UR: CLEAR
AST SERPL-CCNC: 42 U/L (ref 15–37)
BASOPHILS # BLD: 0.02 K/UL (ref 0–0.2)
BASOPHILS NFR BLD: 0.5 % (ref 0–2)
BILIRUB SERPL-MCNC: 0.3 MG/DL (ref 0–1.2)
BILIRUB UR QL: NEGATIVE
BUN SERPL-MCNC: 14 MG/DL (ref 8–23)
CALCIUM SERPL-MCNC: 9.7 MG/DL (ref 8.8–10.2)
CHLORIDE SERPL-SCNC: 108 MMOL/L (ref 98–107)
CHOLEST SERPL-MCNC: 100 MG/DL (ref 0–200)
CO2 SERPL-SCNC: 25 MMOL/L (ref 20–29)
COLOR UR: NORMAL
CREAT SERPL-MCNC: 1.62 MG/DL (ref 0.8–1.3)
CREAT UR-MCNC: 178 MG/DL (ref 39–259)
DIFFERENTIAL METHOD BLD: ABNORMAL
EOSINOPHIL # BLD: 0.08 K/UL (ref 0–0.8)
EOSINOPHIL NFR BLD: 2.1 % (ref 0.5–7.8)
ERYTHROCYTE [DISTWIDTH] IN BLOOD BY AUTOMATED COUNT: 13.7 % (ref 11.9–14.6)
EST. AVERAGE GLUCOSE BLD GHB EST-MCNC: 134 MG/DL
FERRITIN SERPL-MCNC: 167 NG/ML (ref 8–388)
GLOBULIN SER CALC-MCNC: 3.3 G/DL (ref 2.3–3.5)
GLUCOSE SERPL-MCNC: 90 MG/DL (ref 70–99)
GLUCOSE UR STRIP.AUTO-MCNC: NEGATIVE MG/DL
HBA1C MFR BLD: 6.3 % (ref 0–5.6)
HCT VFR BLD AUTO: 38.5 % (ref 41.1–50.3)
HDLC SERPL-MCNC: 38 MG/DL (ref 40–60)
HDLC SERPL: 2.6 (ref 0–5)
HGB BLD-MCNC: 12.4 G/DL (ref 13.6–17.2)
HGB UR QL STRIP: NEGATIVE
IMM GRANULOCYTES # BLD AUTO: 0.01 K/UL (ref 0–0.5)
IMM GRANULOCYTES NFR BLD AUTO: 0.3 % (ref 0–5)
IRON SATN MFR SERPL: 19 % (ref 20–50)
IRON SERPL-MCNC: 53 UG/DL (ref 35–100)
KETONES UR QL STRIP.AUTO: NEGATIVE MG/DL
LDLC SERPL CALC-MCNC: 49 MG/DL (ref 0–100)
LEUKOCYTE ESTERASE UR QL STRIP.AUTO: NEGATIVE
LYMPHOCYTES # BLD: 1.17 K/UL (ref 0.5–4.6)
LYMPHOCYTES NFR BLD: 30.7 % (ref 13–44)
MCH RBC QN AUTO: 29.1 PG (ref 26.1–32.9)
MCHC RBC AUTO-ENTMCNC: 32.2 G/DL (ref 31.4–35)
MCV RBC AUTO: 90.4 FL (ref 82–102)
MICROALBUMIN UR-MCNC: 1.93 MG/DL (ref 0–20)
MICROALBUMIN/CREAT UR-RTO: 11 MG/G (ref 0–30)
MONOCYTES # BLD: 0.38 K/UL (ref 0.1–1.3)
MONOCYTES NFR BLD: 10 % (ref 4–12)
NEUTS SEG # BLD: 2.15 K/UL (ref 1.7–8.2)
NEUTS SEG NFR BLD: 56.4 % (ref 43–78)
NITRITE UR QL STRIP.AUTO: NEGATIVE
NRBC # BLD: 0 K/UL (ref 0–0.2)
PH UR STRIP: 5.5 (ref 5–9)
PLATELET # BLD AUTO: 186 K/UL (ref 150–450)
PMV BLD AUTO: 12.7 FL (ref 9.4–12.3)
POTASSIUM SERPL-SCNC: 4.4 MMOL/L (ref 3.5–5.1)
PROT SERPL-MCNC: 6.8 G/DL (ref 6.3–8.2)
PROT UR STRIP-MCNC: NEGATIVE MG/DL
RBC # BLD AUTO: 4.26 M/UL (ref 4.23–5.6)
SODIUM SERPL-SCNC: 143 MMOL/L (ref 136–145)
SP GR UR REFRACTOMETRY: 1.01 (ref 1–1.02)
T4 FREE SERPL-MCNC: 1.1 NG/DL (ref 0.9–1.7)
TIBC SERPL-MCNC: 273 UG/DL (ref 240–450)
TRIGL SERPL-MCNC: 64 MG/DL (ref 0–150)
TSH, 3RD GENERATION: 2.6 UIU/ML (ref 0.27–4.2)
UIBC SERPL-MCNC: 220 UG/DL (ref 112–347)
UROBILINOGEN UR QL STRIP.AUTO: 0.2 EU/DL (ref 0.2–1)
VLDLC SERPL CALC-MCNC: 13 MG/DL (ref 6–23)
WBC # BLD AUTO: 3.8 K/UL (ref 4.3–11.1)

## 2025-01-15 LAB — PSA SERPL DL<=0.01 NG/ML-MCNC: <0.006 NG/ML (ref 0–4)

## 2025-01-19 NOTE — PROGRESS NOTES
Lauro Ryan (:  1945) is a 79 y.o. male,Established patient, here for evaluation of the following chief complaint(s):  Follow-up (Pt is here for a 5 month follow up and lab review. )         Assessment & Plan  Moderate dementia without behavioral disturbance, psychotic disturbance, mood disturbance, or anxiety, unspecified dementia type ([facility])  Carotid ultrasound on 2017 without hemodynamically significant stenosis  MRI brain on 2017 suggestive of low flow state in right vertebral artery but no intracranial thromboembolus, high-grade stenosis, aneurysm or vascular malformation  MRI neck on 2017 without significant ICA stenosis but moderate arterial tortuosity consistent with hypertension as well as distal right vertebral artery high-grade stenosis  MMSE score of 24/30 on 22  MRI of the brain without contrast on 2022 as follows:  -Encephalomalacia in the anterior left periventricular white matter and left lentiform nucleus at the site of prior infarction  -No acute ischemic infarction  -Nonspecific symmetric global brain parenchymal volume loss (hippocampal volume loss is symmetric but slightly worse than the degree of global brain parenchymal volume loss)  Labs from 2022 shows negative RPR, normal thiamine, TSH, free T4, B12 and vitamin D levels  Drowsiness noted with donepezil. Continue memantine per neurology.  Wife does not believe medication has made much of a change.  Advised her to discuss with neurology at their appointment in March         Coronary artery disease involving coronary bypass graft of native heart without angina pectoris       Orders:    carvedilol (COREG) 12.5 MG tablet; Take 1 tablet by mouth 2 times daily (with meals)    losartan (COZAAR) 50 MG tablet; Take 1 tablet by mouth daily    Chronic HFrEF (heart failure with reduced ejection fraction) ([facility])  Cardiac catheterization on 2020 with 100% proximal LAD occlusion, 70% proximal

## 2025-01-19 NOTE — ASSESSMENT & PLAN NOTE
Bilateral lower extremity angiogram on 6/22/2022 as follows:  -Patent bilateral renal arteries  -Right lower extremity with heavily diseased below the knee popliteal artery and occluded however collateral flow supplying the foot  -Left lower extremity occluded below the knee popliteal artery with severe tibial disease  Vascular JONATHAN on 11/17/2022 with normal to mild disease of right lower extremity, mild disease of left lower extremity, reduced digit pressures and amplitudes possibly secondary to small vessel disease  Continue aspirin, Plavix, rosuvastatin, zetia, BP and glycemic control  Per vascular surgery no further intervention able to be performed  Lower extremity pain improved with use of acetaminophen

## 2025-01-19 NOTE — ASSESSMENT & PLAN NOTE
Orders:    metFORMIN (GLUCOPHAGE) 1000 MG tablet; Take 1 tablet by mouth daily (with breakfast)

## 2025-01-21 ENCOUNTER — OFFICE VISIT (OUTPATIENT)
Dept: INTERNAL MEDICINE CLINIC | Facility: CLINIC | Age: 80
End: 2025-01-21
Payer: MEDICARE

## 2025-01-21 VITALS
BODY MASS INDEX: 22.48 KG/M2 | HEIGHT: 71 IN | TEMPERATURE: 97.2 F | OXYGEN SATURATION: 99 % | HEART RATE: 57 BPM | WEIGHT: 160.6 LBS | SYSTOLIC BLOOD PRESSURE: 132 MMHG | DIASTOLIC BLOOD PRESSURE: 84 MMHG

## 2025-01-21 DIAGNOSIS — I25.810 CORONARY ARTERY DISEASE INVOLVING CORONARY BYPASS GRAFT OF NATIVE HEART WITHOUT ANGINA PECTORIS: ICD-10-CM

## 2025-01-21 DIAGNOSIS — I10 ESSENTIAL HYPERTENSION: ICD-10-CM

## 2025-01-21 DIAGNOSIS — N18.32 STAGE 3B CHRONIC KIDNEY DISEASE (HCC): ICD-10-CM

## 2025-01-21 DIAGNOSIS — F03.B0 MODERATE DEMENTIA WITHOUT BEHAVIORAL DISTURBANCE, PSYCHOTIC DISTURBANCE, MOOD DISTURBANCE, OR ANXIETY, UNSPECIFIED DEMENTIA TYPE (HCC): Primary | ICD-10-CM

## 2025-01-21 DIAGNOSIS — E11.42 DIABETIC POLYNEUROPATHY ASSOCIATED WITH TYPE 2 DIABETES MELLITUS (HCC): ICD-10-CM

## 2025-01-21 DIAGNOSIS — I50.22 CHRONIC HFREF (HEART FAILURE WITH REDUCED EJECTION FRACTION) (HCC): ICD-10-CM

## 2025-01-21 DIAGNOSIS — E11.51 TYPE 2 DIABETES MELLITUS WITH DIABETIC PERIPHERAL ANGIOPATHY WITHOUT GANGRENE, WITHOUT LONG-TERM CURRENT USE OF INSULIN (HCC): ICD-10-CM

## 2025-01-21 DIAGNOSIS — I73.9 PERIPHERAL ARTERY DISEASE (HCC): ICD-10-CM

## 2025-01-21 DIAGNOSIS — D50.9 IRON DEFICIENCY ANEMIA, UNSPECIFIED IRON DEFICIENCY ANEMIA TYPE: ICD-10-CM

## 2025-01-21 DIAGNOSIS — Z85.46 PERSONAL HISTORY OF PROSTATE CANCER: ICD-10-CM

## 2025-01-21 PROCEDURE — 99215 OFFICE O/P EST HI 40 MIN: CPT | Performed by: STUDENT IN AN ORGANIZED HEALTH CARE EDUCATION/TRAINING PROGRAM

## 2025-01-21 PROCEDURE — 1160F RVW MEDS BY RX/DR IN RCRD: CPT | Performed by: STUDENT IN AN ORGANIZED HEALTH CARE EDUCATION/TRAINING PROGRAM

## 2025-01-21 PROCEDURE — 3075F SYST BP GE 130 - 139MM HG: CPT | Performed by: STUDENT IN AN ORGANIZED HEALTH CARE EDUCATION/TRAINING PROGRAM

## 2025-01-21 PROCEDURE — G8427 DOCREV CUR MEDS BY ELIG CLIN: HCPCS | Performed by: STUDENT IN AN ORGANIZED HEALTH CARE EDUCATION/TRAINING PROGRAM

## 2025-01-21 PROCEDURE — 3044F HG A1C LEVEL LT 7.0%: CPT | Performed by: STUDENT IN AN ORGANIZED HEALTH CARE EDUCATION/TRAINING PROGRAM

## 2025-01-21 PROCEDURE — 1123F ACP DISCUSS/DSCN MKR DOCD: CPT | Performed by: STUDENT IN AN ORGANIZED HEALTH CARE EDUCATION/TRAINING PROGRAM

## 2025-01-21 PROCEDURE — 3079F DIAST BP 80-89 MM HG: CPT | Performed by: STUDENT IN AN ORGANIZED HEALTH CARE EDUCATION/TRAINING PROGRAM

## 2025-01-21 PROCEDURE — G8420 CALC BMI NORM PARAMETERS: HCPCS | Performed by: STUDENT IN AN ORGANIZED HEALTH CARE EDUCATION/TRAINING PROGRAM

## 2025-01-21 PROCEDURE — 1159F MED LIST DOCD IN RCRD: CPT | Performed by: STUDENT IN AN ORGANIZED HEALTH CARE EDUCATION/TRAINING PROGRAM

## 2025-01-21 PROCEDURE — 1126F AMNT PAIN NOTED NONE PRSNT: CPT | Performed by: STUDENT IN AN ORGANIZED HEALTH CARE EDUCATION/TRAINING PROGRAM

## 2025-01-21 PROCEDURE — 1036F TOBACCO NON-USER: CPT | Performed by: STUDENT IN AN ORGANIZED HEALTH CARE EDUCATION/TRAINING PROGRAM

## 2025-01-21 RX ORDER — LOSARTAN POTASSIUM 50 MG/1
50 TABLET ORAL DAILY
Qty: 90 TABLET | Refills: 2 | Status: SHIPPED | OUTPATIENT
Start: 2025-01-21

## 2025-01-21 RX ORDER — CARVEDILOL 12.5 MG/1
12.5 TABLET ORAL 2 TIMES DAILY WITH MEALS
Qty: 180 TABLET | Refills: 2 | Status: SHIPPED | OUTPATIENT
Start: 2025-01-21

## 2025-01-21 ASSESSMENT — ENCOUNTER SYMPTOMS
WHEEZING: 0
COUGH: 0
ABDOMINAL PAIN: 0
SHORTNESS OF BREATH: 1
DIARRHEA: 0
BLOOD IN STOOL: 0
ANAL BLEEDING: 0
TROUBLE SWALLOWING: 0
CONSTIPATION: 0

## 2025-02-04 ENCOUNTER — TELEPHONE (OUTPATIENT)
Dept: INTERNAL MEDICINE CLINIC | Facility: CLINIC | Age: 80
End: 2025-02-04

## 2025-02-04 NOTE — TELEPHONE ENCOUNTER
Patient is calling requesting to know the reason why he was scheduled for labs on 2/4/2025.    Please advise...

## 2025-02-24 NOTE — DISCHARGE INSTRUCTIONS
Faxed signed updated DME with clinicals to [facility] [phone]   Follow-up with pain management at previously scheduled appointment next week. Further pain medications must be prescribed by pain management. Return for worsening or concerning symptoms.

## 2025-03-05 ENCOUNTER — OFFICE VISIT (OUTPATIENT)
Dept: NEUROLOGY | Age: 80
End: 2025-03-05
Payer: MEDICARE

## 2025-03-05 DIAGNOSIS — G30.8 SEVERE ALZHEIMER'S DEMENTIA OF OTHER ONSET WITHOUT BEHAVIORAL DISTURBANCE, PSYCHOTIC DISTURBANCE, MOOD DISTURBANCE, OR ANXIETY (HCC): Primary | ICD-10-CM

## 2025-03-05 DIAGNOSIS — Z86.73 HISTORY OF STROKE: ICD-10-CM

## 2025-03-05 DIAGNOSIS — F02.C0 SEVERE ALZHEIMER'S DEMENTIA OF OTHER ONSET WITHOUT BEHAVIORAL DISTURBANCE, PSYCHOTIC DISTURBANCE, MOOD DISTURBANCE, OR ANXIETY (HCC): Primary | ICD-10-CM

## 2025-03-05 PROCEDURE — 1036F TOBACCO NON-USER: CPT | Performed by: PSYCHIATRY & NEUROLOGY

## 2025-03-05 PROCEDURE — 99214 OFFICE O/P EST MOD 30 MIN: CPT | Performed by: PSYCHIATRY & NEUROLOGY

## 2025-03-05 PROCEDURE — 1159F MED LIST DOCD IN RCRD: CPT | Performed by: PSYCHIATRY & NEUROLOGY

## 2025-03-05 PROCEDURE — G8427 DOCREV CUR MEDS BY ELIG CLIN: HCPCS | Performed by: PSYCHIATRY & NEUROLOGY

## 2025-03-05 PROCEDURE — G8420 CALC BMI NORM PARAMETERS: HCPCS | Performed by: PSYCHIATRY & NEUROLOGY

## 2025-03-05 PROCEDURE — 1123F ACP DISCUSS/DSCN MKR DOCD: CPT | Performed by: PSYCHIATRY & NEUROLOGY

## 2025-03-05 PROCEDURE — 1160F RVW MEDS BY RX/DR IN RCRD: CPT | Performed by: PSYCHIATRY & NEUROLOGY

## 2025-03-05 RX ORDER — MEMANTINE HYDROCHLORIDE 10 MG/1
TABLET ORAL
Qty: 60 TABLET | Refills: 11 | Status: SHIPPED | OUTPATIENT
Start: 2025-03-05

## 2025-03-05 ASSESSMENT — ENCOUNTER SYMPTOMS
EYES NEGATIVE: 1
ALLERGIC/IMMUNOLOGIC NEGATIVE: 1
RESPIRATORY NEGATIVE: 1
GASTROINTESTINAL NEGATIVE: 1

## 2025-03-05 NOTE — PROGRESS NOTES
[facility] NEUROLOGY  [address], [address]  [place] [address]  Phone: [phone] Fax [phone]  Dr. Joshua Orourke      3/5/2025  Lauro Deepaksusan Ryan     Patient is referred by the following provider for consultation regarding as below:       I reviewed the available records and notes and have examined patient with the following findings:     Chief Complaint:  Chief Complaint   Patient presents with    Follow-up          HPI: This is a right handed 79 y.o.  male here with his wife.  And the patient unfortunately has had short-term memory loss that started around 2021 where he was repeating questions within minutes conversations within minutes.  And is only progressively gotten worse in fact he has 5 times on the way over here where they are going.  He no longer is driving.  He is on Namenda 10 twice a day.  Unfortunately his pharmacist talked him into not taking Aricept with Namenda for some unknown reason.  We have set up a CTA of his head and neck twice but unfortunately is still not been done.  We have him on Plavix 75 mg a day and Crestor along with aspirin 81 mg a day and Namenda 10 twice a day which he is comfortable with.  Carries a past medical history of this left anterior lentiform nucleus stroke that was seen on MRI his amyloid beta 42/40 came back at intermediate range.  He has a history of V-fib MI central sleep apnea peripheral neuropathy and coronary artery disease.    IMAGING REVIEW:  I REVIEWED PERTINENT  IMAGES AND REPORTS WITH THE PATIENT PERSONALLY, DIRECTLY AND FULLY.     Past Medical History:  Past Medical History:   Diagnosis Date    Allergic rhinitis     Arthritis     CAD (coronary artery disease)     CABG '09; troponin elevated 7/14/12 (\"likely due to supply/demand mismatch in setting of fever and increased metabolic demand\"-per cardiac MD note 8/20/12)-had cath, echo, EKG-all WNL except cath showed 2 of the bypasses with blockages- \"occluded SVG to PDA & SVG

## 2025-05-27 ENCOUNTER — TELEPHONE (OUTPATIENT)
Dept: INTERNAL MEDICINE CLINIC | Facility: CLINIC | Age: 80
End: 2025-05-27

## 2025-06-18 ENCOUNTER — APPOINTMENT (OUTPATIENT)
Dept: GENERAL RADIOLOGY | Age: 80
End: 2025-06-18
Payer: OTHER GOVERNMENT

## 2025-06-18 ENCOUNTER — APPOINTMENT (OUTPATIENT)
Dept: CT IMAGING | Age: 80
End: 2025-06-18
Payer: OTHER GOVERNMENT

## 2025-06-18 ENCOUNTER — HOSPITAL ENCOUNTER (EMERGENCY)
Age: 80
Discharge: HOME | End: 2025-06-18
Attending: EMERGENCY MEDICINE | Admitting: INTERNAL MEDICINE
Payer: OTHER GOVERNMENT

## 2025-06-18 VITALS
DIASTOLIC BLOOD PRESSURE: 79 MMHG | TEMPERATURE: 97.5 F | RESPIRATION RATE: 16 BRPM | HEIGHT: 71 IN | BODY MASS INDEX: 23.1 KG/M2 | HEART RATE: 63 BPM | SYSTOLIC BLOOD PRESSURE: 166 MMHG | OXYGEN SATURATION: 100 % | WEIGHT: 165 LBS

## 2025-06-18 DIAGNOSIS — F03.C0 SEVERE DEMENTIA WITHOUT BEHAVIORAL DISTURBANCE, PSYCHOTIC DISTURBANCE, MOOD DISTURBANCE, OR ANXIETY, UNSPECIFIED DEMENTIA TYPE (HCC): Primary | ICD-10-CM

## 2025-06-18 LAB
ALBUMIN SERPL-MCNC: 3.3 G/DL (ref 3.2–4.6)
ALBUMIN/GLOB SERPL: 0.8 (ref 1–1.9)
ALP SERPL-CCNC: 74 U/L (ref 40–129)
ALT SERPL-CCNC: 15 U/L (ref 8–55)
AMMONIA PLAS-SCNC: 17 UMOL/L (ref 16–60)
AMPHET UR QL SCN: NEGATIVE
ANION GAP SERPL CALC-SCNC: 14 MMOL/L (ref 7–16)
APPEARANCE UR: CLEAR
AST SERPL-CCNC: 24 U/L (ref 15–37)
BACTERIA URNS QL MICRO: 0 /HPF
BARBITURATES UR QL SCN: NEGATIVE
BASOPHILS # BLD: 0.03 K/UL (ref 0–0.2)
BASOPHILS NFR BLD: 0.5 % (ref 0–2)
BENZODIAZ UR QL: NEGATIVE
BILIRUB SERPL-MCNC: 0.6 MG/DL (ref 0–1.2)
BILIRUB UR QL: NEGATIVE
BUN SERPL-MCNC: 21 MG/DL (ref 8–23)
CALCIUM SERPL-MCNC: 9.6 MG/DL (ref 8.8–10.2)
CANNABINOIDS UR QL SCN: NEGATIVE
CASTS URNS QL MICRO: ABNORMAL /LPF
CHLORIDE SERPL-SCNC: 110 MMOL/L (ref 98–107)
CO2 SERPL-SCNC: 17 MMOL/L (ref 20–29)
COCAINE UR QL SCN: NEGATIVE
COLOR UR: ABNORMAL
CREAT SERPL-MCNC: 1.87 MG/DL (ref 0.8–1.3)
DIFFERENTIAL METHOD BLD: ABNORMAL
EOSINOPHIL # BLD: 0.1 K/UL (ref 0–0.8)
EOSINOPHIL NFR BLD: 1.7 % (ref 0.5–7.8)
EPI CELLS #/AREA URNS HPF: ABNORMAL /HPF
ERYTHROCYTE [DISTWIDTH] IN BLOOD BY AUTOMATED COUNT: 14 % (ref 11.9–14.6)
ETHANOL SERPL-MCNC: <11 MG/DL (ref 0–0.08)
GLOBULIN SER CALC-MCNC: 3.9 G/DL (ref 2.3–3.5)
GLUCOSE SERPL-MCNC: 139 MG/DL (ref 70–99)
GLUCOSE UR STRIP.AUTO-MCNC: NEGATIVE MG/DL
HCT VFR BLD AUTO: 39.4 % (ref 41.1–50.3)
HGB BLD-MCNC: 12.6 G/DL (ref 13.6–17.2)
HGB UR QL STRIP: NEGATIVE
IMM GRANULOCYTES # BLD AUTO: 0.01 K/UL (ref 0–0.5)
IMM GRANULOCYTES NFR BLD AUTO: 0.2 % (ref 0–5)
KETONES UR QL STRIP.AUTO: ABNORMAL MG/DL
LEUKOCYTE ESTERASE UR QL STRIP.AUTO: ABNORMAL
LYMPHOCYTES # BLD: 1.23 K/UL (ref 0.5–4.6)
LYMPHOCYTES NFR BLD: 21.4 % (ref 13–44)
Lab: NORMAL
MCH RBC QN AUTO: 28.6 PG (ref 26.1–32.9)
MCHC RBC AUTO-ENTMCNC: 32 G/DL (ref 31.4–35)
MCV RBC AUTO: 89.3 FL (ref 82–102)
METHADONE UR QL: NEGATIVE
MONOCYTES # BLD: 0.6 K/UL (ref 0.1–1.3)
MONOCYTES NFR BLD: 10.4 % (ref 4–12)
NEUTS SEG # BLD: 3.79 K/UL (ref 1.7–8.2)
NEUTS SEG NFR BLD: 65.8 % (ref 43–78)
NITRITE UR QL STRIP.AUTO: NEGATIVE
NRBC # BLD: 0 K/UL (ref 0–0.2)
OPIATES UR QL: NEGATIVE
OTHER OBSERVATIONS: ABNORMAL
PCP UR QL: NEGATIVE
PH UR STRIP: 5 (ref 5–9)
PLATELET # BLD AUTO: 179 K/UL (ref 150–450)
PMV BLD AUTO: 11.7 FL (ref 9.4–12.3)
POTASSIUM SERPL-SCNC: 4.6 MMOL/L (ref 3.5–5.1)
PROT SERPL-MCNC: 7.2 G/DL (ref 6.3–8.2)
PROT UR STRIP-MCNC: NEGATIVE MG/DL
RBC # BLD AUTO: 4.41 M/UL (ref 4.23–5.6)
RBC #/AREA URNS HPF: ABNORMAL /HPF
SODIUM SERPL-SCNC: 141 MMOL/L (ref 136–145)
SP GR UR REFRACTOMETRY: 1.02 (ref 1–1.02)
TSH, 3RD GENERATION: 1.55 UIU/ML (ref 0.27–4.2)
UROBILINOGEN UR QL STRIP.AUTO: 0.2 EU/DL (ref 0.2–1)
WBC # BLD AUTO: 5.8 K/UL (ref 4.3–11.1)
WBC URNS QL MICRO: ABNORMAL /HPF

## 2025-06-18 PROCEDURE — 70450 CT HEAD/BRAIN W/O DYE: CPT

## 2025-06-18 PROCEDURE — 93005 ELECTROCARDIOGRAM TRACING: CPT | Performed by: EMERGENCY MEDICINE

## 2025-06-18 PROCEDURE — 99285 EMERGENCY DEPT VISIT HI MDM: CPT

## 2025-06-18 PROCEDURE — 82077 ASSAY SPEC XCP UR&BREATH IA: CPT

## 2025-06-18 PROCEDURE — 80307 DRUG TEST PRSMV CHEM ANLYZR: CPT

## 2025-06-18 PROCEDURE — 94761 N-INVAS EAR/PLS OXIMETRY MLT: CPT

## 2025-06-18 PROCEDURE — 84443 ASSAY THYROID STIM HORMONE: CPT

## 2025-06-18 PROCEDURE — 82140 ASSAY OF AMMONIA: CPT

## 2025-06-18 PROCEDURE — 81001 URINALYSIS AUTO W/SCOPE: CPT

## 2025-06-18 PROCEDURE — 2580000003 HC RX 258: Performed by: EMERGENCY MEDICINE

## 2025-06-18 PROCEDURE — 80053 COMPREHEN METABOLIC PANEL: CPT

## 2025-06-18 PROCEDURE — 85025 COMPLETE CBC W/AUTO DIFF WBC: CPT

## 2025-06-18 PROCEDURE — 71046 X-RAY EXAM CHEST 2 VIEWS: CPT

## 2025-06-18 RX ORDER — 0.9 % SODIUM CHLORIDE 0.9 %
1000 INTRAVENOUS SOLUTION INTRAVENOUS ONCE
Status: COMPLETED | OUTPATIENT
Start: 2025-06-18 | End: 2025-06-18

## 2025-06-18 RX ADMIN — SODIUM CHLORIDE 1000 ML: 0.9 INJECTION, SOLUTION INTRAVENOUS at 18:39

## 2025-06-18 ASSESSMENT — ENCOUNTER SYMPTOMS
BACK PAIN: 0
SORE THROAT: 0
SHORTNESS OF BREATH: 0
NAUSEA: 0
VOMITING: 0
DIARRHEA: 0
COUGH: 0
ABDOMINAL PAIN: 0
CONSTIPATION: 0

## 2025-06-18 NOTE — ED PROVIDER NOTES
[employer] Emergency Department Provider Note                   PCP:                Ramos Gu DO               Age: 79 y.o.      Sex: male     MEDICAL DECISION MAKING  Complexity of Problems Addressed:   1 or more chronic illness with an exacerbation or progression    Data Reviewed and Analyzed:  Category 1:    I have reviewed outside records from an external source for any pertinent PMH, ED visits, primary care visits, specialist visits, labs, EKG, and/or radiologic studies.  I reviewed external records: provider visit note from PCP.  I reviewed external records: provider visit note from outside specialist.     Category 2:     ED EKG Interpretation  EKG was interpreted in the absence of a cardiologist.    Rate: Rate: Bradycardia  EKG Interpretation: EKG Interpretation: sinus rhythm, no evidence of arrhythmia  ST Segments: Nonspecific ST segments - NO STEMI      I independently ordered and reviewed the labs.    I have reviewed the Radiologist interpretation of the radiologic studies. My medical decision making regarding the radiologic studies is based on the interpretation report of the board certified Radiologist.    Patient was discharged risks and benefits of hospitalization were considered.  Chronic medical problems impacting care include dementia.    The patients assessment required an independent historian: Patient's spouse.  The reason they were needed is dementia.        Category 3:     Discussion of management or test interpretation:    MDM  Number of Diagnoses or Management Options  Severe dementia without behavioral disturbance, psychotic disturbance, mood disturbance, or anxiety, unspecified dementia type (HCC)  Diagnosis management comments: Patient with severe dementia presented for lethargy, decreased appetite, and decreased activity.  Patient is confused but at his baseline per the wife.  His head CT does not show any acute process.  He has chronic microvascular changes.  He has no focal

## 2025-06-18 NOTE — ED NOTES
Patient originally came in for change in demeanor, with more agitation than normal. Patient is now pleasant and co-operative with wife and staff.

## 2025-06-18 NOTE — ED TRIAGE NOTES
Pt presents to triage with wife. Pt c/o pt sleeping more than usual, lethargic, refusing to eat. For approximately a month.  Pt is \"really out of it today\".      Hx dementia     Pt A&O x3 in triage.

## 2025-06-19 ENCOUNTER — CARE COORDINATION (OUTPATIENT)
Dept: CARE COORDINATION | Facility: CLINIC | Age: 80
End: 2025-06-19

## 2025-06-19 LAB
EKG ATRIAL RATE: 53 BPM
EKG DIAGNOSIS: NORMAL
EKG P AXIS: 3 DEGREES
EKG P-R INTERVAL: 177 MS
EKG Q-T INTERVAL: 424 MS
EKG QRS DURATION: 94 MS
EKG QTC CALCULATION (BAZETT): 402 MS
EKG R AXIS: 40 DEGREES
EKG T AXIS: 51 DEGREES
EKG VENTRICULAR RATE: 54 BPM

## 2025-06-19 NOTE — CARE COORDINATION
Ambulatory Care Coordination Note     2025 10:42 AM     Patient Current Location:  Home: [address] [place] [address] [place] [address]     This patient was received as a referral from Population health report .    ACM contacted the patient by telephone. Verified name and  with patient as identifiers. Provided introduction to self, and explanation of the ACM role.   Patient declined care management services at this time.          ACM: Nicolasa Mae RN     Utilization: Initial Call - Discharge Date: 25   Discharge Facility: [facility]  Reason for ED Visit: lethargy, decreased appetite and activity  Visit Diagnosis: Dementia    Number of ED visits in the last 6 months: 1      Do you have any ongoing symptoms? No    Care Summary Note: ACM spoke with patient for follow up. He states doing fine and voices no concerns or needs. Lives with spouse who is not available to speak with ACM. ACM will f/u  with spouse due to patient history of dementia for any needs or concerns.     Completed: Nicolasa Mae RN Ambulatory Care Manager  Date 2025

## 2025-06-19 NOTE — ED NOTES
Patient mobility status ambulates with no difficulty.     I have reviewed discharge instructions with the patient.  The patient verbalized understanding.    Patient left ED via Discharge Method: wheelchair to Home with Significant Other.    Opportunity for questions and clarification provided.     Patient given 0 scripts.

## 2025-08-05 ENCOUNTER — TELEPHONE (OUTPATIENT)
Dept: INTERNAL MEDICINE CLINIC | Facility: CLINIC | Age: 80
End: 2025-08-05

## 2025-08-05 DIAGNOSIS — E11.51 TYPE 2 DIABETES MELLITUS WITH DIABETIC PERIPHERAL ANGIOPATHY WITHOUT GANGRENE, WITHOUT LONG-TERM CURRENT USE OF INSULIN (HCC): ICD-10-CM

## 2025-08-05 RX ORDER — GABAPENTIN 300 MG/1
300 CAPSULE ORAL 2 TIMES DAILY
Qty: 60 CAPSULE | Refills: 0 | Status: SHIPPED | OUTPATIENT
Start: 2025-08-05 | End: 2025-09-04

## (undated) DEVICE — BUTTON SWITCH PENCIL BLADE ELECTRODE, HOLSTER: Brand: EDGE

## (undated) DEVICE — (D)PREP SKN CHLRAPRP APPL 26ML -- CONVERT TO ITEM 371833

## (undated) DEVICE — FAN SPRAY KIT: Brand: PULSAVAC®

## (undated) DEVICE — DRAPE TWL SURG 16X26IN BLU ORB04] ALLCARE INC]

## (undated) DEVICE — INTEGUSEAL MICROBIAL SEALANT: Brand: AVANOS

## (undated) DEVICE — REM POLYHESIVE ADULT PATIENT RETURN ELECTRODE: Brand: VALLEYLAB

## (undated) DEVICE — ARGYLE SIGMOID SURGICAL SUCTION INSTRUMENT 23 FR/CH (7.7 MM): Brand: ARGYLE

## (undated) DEVICE — KENDALL SCD EXPRESS SLEEVES, KNEE LENGTH, MEDIUM: Brand: KENDALL SCD

## (undated) DEVICE — GUIDEPIN ORTH L300MM DIA1.5MM GLENOSPHERE FOR DELT XTEND

## (undated) DEVICE — (D)STRIP SKN CLSR 0.5X4IN WHT --

## (undated) DEVICE — CARDINAL HEALTH FLEXIBLE LIGHT HANDLE COVER: Brand: CARDINAL HEALTH

## (undated) DEVICE — SYRINGE CATH TIP 50ML

## (undated) DEVICE — GUIDEPIN ORTH L190MM DIA2.5MM METAGLENE CTRL FOR DELT XTEND

## (undated) DEVICE — ELECTRODE NDL 2.8IN COAT VALLEYLAB

## (undated) DEVICE — AMD ANTIMICROBIAL BANDAGE ROLL,6 PLY: Brand: KERLIX

## (undated) DEVICE — SOLUTION IRRIG 1000ML H2O STRL BLT

## (undated) DEVICE — PACKING WND W1INXL6FT VAG WVN COT GZ RADPQ

## (undated) DEVICE — OSCILLATING TIP SAW CARTRIDGE: Brand: STRYKER PRECISION

## (undated) DEVICE — ABDOMINAL PAD: Brand: DERMACEA

## (undated) DEVICE — BANDAGE COMPR SELF ADH 5 YDX4 IN TAN STRL PREMIERPRO LF

## (undated) DEVICE — GOWN,REINFORCED,POLY,AURORA,XXLARGE,STR: Brand: MEDLINE

## (undated) DEVICE — MEDI-VAC YANKAUER SUCTION HANDLE W/BULBOUS TIP: Brand: CARDINAL HEALTH

## (undated) DEVICE — SPONGE LAP 18X18IN STRL -- 5/PK

## (undated) DEVICE — Device

## (undated) DEVICE — SINGLE BASIN: Brand: CARDINAL HEALTH

## (undated) DEVICE — DRAPE,TOP,102X53,STERILE: Brand: MEDLINE

## (undated) DEVICE — SUTURE VCRL SZ 0 L27IN ABSRB UD L36MM CP-1 1/2 CIR REV CUT J267H

## (undated) DEVICE — SHEET, DRAPE, SPLIT, STERILE: Brand: MEDLINE

## (undated) DEVICE — SURGICAL PROCEDURE PACK BASIC ST FRANCIS

## (undated) DEVICE — STOCKINETTE TUBE 6X48 -- MEDICHOICE

## (undated) DEVICE — COVER,MAYO STAND,STERILE: Brand: MEDLINE

## (undated) DEVICE — 3000CC GUARDIAN II: Brand: GUARDIAN

## (undated) DEVICE — SUTURE 5 MERS GRN 30 TO 40 IN D9211

## (undated) DEVICE — SUTURE ETHBND EXCEL SZ 2 L27IN NONABSORBABLE GRN WHT MO-7 D7485

## (undated) DEVICE — SOLUTION IRRIG 3000ML 0.9% SOD CHL FLX CONT 0797208] ICU MEDICAL INC]

## (undated) DEVICE — BLADE SAW W19.5XL71MM THK1.2MM OSC L BONE

## (undated) DEVICE — SUTURE PROL SZ 2-0 L18IN NONABSORBABLE BLU FS L26MM 3/8 CIR 8685H